# Patient Record
Sex: FEMALE | Race: WHITE | NOT HISPANIC OR LATINO | Employment: UNEMPLOYED | URBAN - METROPOLITAN AREA
[De-identification: names, ages, dates, MRNs, and addresses within clinical notes are randomized per-mention and may not be internally consistent; named-entity substitution may affect disease eponyms.]

---

## 2017-01-02 ENCOUNTER — TRANSFERRED RECORDS (OUTPATIENT)
Dept: HEALTH INFORMATION MANAGEMENT | Facility: CLINIC | Age: 52
End: 2017-01-02

## 2017-01-06 ENCOUNTER — HOSPITAL ENCOUNTER (OUTPATIENT)
Dept: ULTRASOUND IMAGING | Facility: CLINIC | Age: 52
Discharge: HOME | End: 2017-01-06
Attending: INTERNAL MEDICINE | Admitting: INTERNAL MEDICINE
Payer: COMMERCIAL

## 2017-01-06 VITALS
DIASTOLIC BLOOD PRESSURE: 74 MMHG | HEART RATE: 78 BPM | OXYGEN SATURATION: 96 % | SYSTOLIC BLOOD PRESSURE: 107 MMHG | RESPIRATION RATE: 16 BRPM

## 2017-01-06 DIAGNOSIS — R79.89 ABNORMAL LFTS: ICD-10-CM

## 2017-01-06 PROCEDURE — 88313 SPECIAL STAINS GROUP 2: CPT | Mod: 26 | Performed by: INTERNAL MEDICINE

## 2017-01-06 PROCEDURE — 88307 TISSUE EXAM BY PATHOLOGIST: CPT | Performed by: INTERNAL MEDICINE

## 2017-01-06 PROCEDURE — 88313 SPECIAL STAINS GROUP 2: CPT | Performed by: INTERNAL MEDICINE

## 2017-01-06 PROCEDURE — 25000125 ZZHC RX 250

## 2017-01-06 PROCEDURE — 25000125 ZZHC RX 250: Performed by: RADIOLOGY

## 2017-01-06 PROCEDURE — 76942 ECHO GUIDE FOR BIOPSY: CPT

## 2017-01-06 PROCEDURE — 88307 TISSUE EXAM BY PATHOLOGIST: CPT | Mod: 26 | Performed by: INTERNAL MEDICINE

## 2017-01-06 RX ORDER — FENTANYL CITRATE 50 UG/ML
100 INJECTION, SOLUTION INTRAMUSCULAR; INTRAVENOUS ONCE
Status: COMPLETED | OUTPATIENT
Start: 2017-01-06 | End: 2017-01-06

## 2017-01-06 RX ORDER — FENTANYL CITRATE 50 UG/ML
INJECTION, SOLUTION INTRAMUSCULAR; INTRAVENOUS
Status: COMPLETED
Start: 2017-01-06 | End: 2017-01-06

## 2017-01-06 RX ADMIN — MIDAZOLAM 2 MG: 1 INJECTION INTRAMUSCULAR; INTRAVENOUS at 08:38

## 2017-01-06 RX ADMIN — LIDOCAINE HYDROCHLORIDE 20 ML: 10 INJECTION, SOLUTION EPIDURAL; INFILTRATION; INTRACAUDAL; PERINEURAL at 08:39

## 2017-01-06 RX ADMIN — FENTANYL CITRATE 100 MCG: 50 INJECTION, SOLUTION INTRAMUSCULAR; INTRAVENOUS at 08:38

## 2017-01-06 RX ADMIN — FENTANYL CITRATE 100 MCG: 50 INJECTION INTRAMUSCULAR; INTRAVENOUS at 08:38

## 2017-01-06 NOTE — PROGRESS NOTES
Consent for liver random biopsy obtained by Dr. Renner.  Pt tolerated procedure well with Fentanyl 100 mcg and Versed 2.0 mgm titrated to excellent effect.  Vital signs stable thru out visit.  Puncture site dressing clean and dry and free of bleeding on Discharge to home per wheelchair with . All site care instructions understood by pt.  Will follow up with DR. Linares for results.

## 2017-01-11 LAB — COPATH REPORT: NORMAL

## 2019-02-11 LAB
ALT SERPL-CCNC: 13 IU/L (ref 8–45)
AST SERPL-CCNC: 26 IU/L (ref 2–40)
CHOLEST SERPL-MCNC: 186 MG/DL (ref 100–199)
HDLC SERPL-MCNC: 27 MG/DL
LDLC SERPL CALC-MCNC: 118 MG/DL
NONHDLC SERPL-MCNC: 159 MG/DL
TRIGL SERPL-MCNC: 207 MG/DL

## 2019-02-27 LAB — EJECTION FRACTION: 66

## 2019-03-22 ENCOUNTER — TRANSFERRED RECORDS (OUTPATIENT)
Dept: HEALTH INFORMATION MANAGEMENT | Facility: CLINIC | Age: 54
End: 2019-03-22

## 2019-03-23 LAB
CREAT SERPL-MCNC: 0.86 MG/DL (ref 0.57–1.11)
GFR SERPL CREATININE-BSD FRML MDRD: >60 ML/MIN/1.73M2
GLUCOSE SERPL-MCNC: 84 MG/DL (ref 65–100)
POTASSIUM SERPL-SCNC: 4.1 MMOL/L (ref 3.5–5)

## 2019-03-24 ENCOUNTER — APPOINTMENT (OUTPATIENT)
Dept: GENERAL RADIOLOGY | Facility: CLINIC | Age: 54
End: 2019-03-24
Attending: EMERGENCY MEDICINE
Payer: COMMERCIAL

## 2019-03-24 ENCOUNTER — TRANSFERRED RECORDS (OUTPATIENT)
Dept: HEALTH INFORMATION MANAGEMENT | Facility: CLINIC | Age: 54
End: 2019-03-24

## 2019-03-24 ENCOUNTER — HOSPITAL ENCOUNTER (EMERGENCY)
Facility: CLINIC | Age: 54
Discharge: ANOTHER HOSPITAL | End: 2019-03-24
Attending: EMERGENCY MEDICINE | Admitting: EMERGENCY MEDICINE
Payer: COMMERCIAL

## 2019-03-24 VITALS
TEMPERATURE: 97.9 F | DIASTOLIC BLOOD PRESSURE: 84 MMHG | RESPIRATION RATE: 10 BRPM | HEART RATE: 69 BPM | OXYGEN SATURATION: 98 % | SYSTOLIC BLOOD PRESSURE: 133 MMHG

## 2019-03-24 DIAGNOSIS — I21.4 NSTEMI (NON-ST ELEVATED MYOCARDIAL INFARCTION) (H): ICD-10-CM

## 2019-03-24 LAB
ANION GAP SERPL CALCULATED.3IONS-SCNC: 7 MMOL/L (ref 3–14)
BASOPHILS # BLD AUTO: 0 10E9/L (ref 0–0.2)
BASOPHILS NFR BLD AUTO: 0.3 %
BUN SERPL-MCNC: 14 MG/DL (ref 7–30)
CALCIUM SERPL-MCNC: 8.7 MG/DL (ref 8.5–10.1)
CHLORIDE SERPL-SCNC: 108 MMOL/L (ref 94–109)
CO2 SERPL-SCNC: 24 MMOL/L (ref 20–32)
CREAT SERPL-MCNC: 1.02 MG/DL (ref 0.52–1.04)
DIFFERENTIAL METHOD BLD: ABNORMAL
EOSINOPHIL # BLD AUTO: 0.1 10E9/L (ref 0–0.7)
EOSINOPHIL NFR BLD AUTO: 1.4 %
ERYTHROCYTE [DISTWIDTH] IN BLOOD BY AUTOMATED COUNT: 15.4 % (ref 10–15)
GFR SERPL CREATININE-BSD FRML MDRD: 62 ML/MIN/{1.73_M2}
GLUCOSE SERPL-MCNC: 94 MG/DL (ref 70–99)
HCT VFR BLD AUTO: 41 % (ref 35–47)
HGB BLD-MCNC: 13.3 G/DL (ref 11.7–15.7)
IMM GRANULOCYTES # BLD: 0 10E9/L (ref 0–0.4)
IMM GRANULOCYTES NFR BLD: 0.3 %
LYMPHOCYTES # BLD AUTO: 1.3 10E9/L (ref 0.8–5.3)
LYMPHOCYTES NFR BLD AUTO: 36.1 %
MCH RBC QN AUTO: 30.3 PG (ref 26.5–33)
MCHC RBC AUTO-ENTMCNC: 32.4 G/DL (ref 31.5–36.5)
MCV RBC AUTO: 93 FL (ref 78–100)
MONOCYTES # BLD AUTO: 0.3 10E9/L (ref 0–1.3)
MONOCYTES NFR BLD AUTO: 9 %
NEUTROPHILS # BLD AUTO: 1.8 10E9/L (ref 1.6–8.3)
NEUTROPHILS NFR BLD AUTO: 52.9 %
NRBC # BLD AUTO: 0 10*3/UL
NRBC BLD AUTO-RTO: 0 /100
PLATELET # BLD AUTO: 140 10E9/L (ref 150–450)
POTASSIUM SERPL-SCNC: 4.1 MMOL/L (ref 3.4–5.3)
RBC # BLD AUTO: 4.39 10E12/L (ref 3.8–5.2)
SODIUM SERPL-SCNC: 139 MMOL/L (ref 133–144)
TROPONIN I SERPL-MCNC: 0.26 UG/L (ref 0–0.04)
WBC # BLD AUTO: 3.5 10E9/L (ref 4–11)

## 2019-03-24 PROCEDURE — 71046 X-RAY EXAM CHEST 2 VIEWS: CPT

## 2019-03-24 PROCEDURE — 80048 BASIC METABOLIC PNL TOTAL CA: CPT | Performed by: EMERGENCY MEDICINE

## 2019-03-24 PROCEDURE — A9270 NON-COVERED ITEM OR SERVICE: HCPCS | Mod: GY | Performed by: EMERGENCY MEDICINE

## 2019-03-24 PROCEDURE — 84484 ASSAY OF TROPONIN QUANT: CPT | Performed by: EMERGENCY MEDICINE

## 2019-03-24 PROCEDURE — 99285 EMERGENCY DEPT VISIT HI MDM: CPT | Mod: 25

## 2019-03-24 PROCEDURE — 25000132 ZZH RX MED GY IP 250 OP 250 PS 637: Performed by: EMERGENCY MEDICINE

## 2019-03-24 PROCEDURE — 93005 ELECTROCARDIOGRAM TRACING: CPT

## 2019-03-24 PROCEDURE — 85025 COMPLETE CBC W/AUTO DIFF WBC: CPT | Performed by: EMERGENCY MEDICINE

## 2019-03-24 RX ORDER — NITROGLYCERIN 0.4 MG/1
0.4 TABLET SUBLINGUAL EVERY 5 MIN PRN
Status: DISCONTINUED | OUTPATIENT
Start: 2019-03-24 | End: 2019-03-24 | Stop reason: HOSPADM

## 2019-03-24 RX ORDER — ASPIRIN 81 MG/1
324 TABLET, CHEWABLE ORAL ONCE
Status: COMPLETED | OUTPATIENT
Start: 2019-03-24 | End: 2019-03-24

## 2019-03-24 RX ADMIN — NITROGLYCERIN 0.4 MG: 0.4 TABLET SUBLINGUAL at 18:32

## 2019-03-24 RX ADMIN — ASPIRIN 81 MG 324 MG: 81 TABLET ORAL at 18:08

## 2019-03-24 ASSESSMENT — ENCOUNTER SYMPTOMS
DIZZINESS: 1
CHEST TIGHTNESS: 1

## 2019-03-24 NOTE — ED PROVIDER NOTES
History     Chief Complaint:  Chest Pain    HPI   Karolina Light is a 53 year old female with a history of CAD, who presents with her  to the ED for the evaluation of dizziness and tachycardia. The patient reports that just prior to her arrival to the ED, she was walking up stairs when she began to feel dizziness, chest tightness, and her heart racing, prompting her to the ED.  Of note, two days ago the patient had two stents placed during coronary angiography in her mid LAD and mid RCA via her left radial artery.  This was done as a planned procedure following an abnormal stress test. She states that she was feeling her regular self after being discharged yesterday from the hospital and that her chest tightness has been relieved, but not her dizziness. She also notes that her current symptoms feel somewhat similar to a previous heart attack she experienced, but states that her previous heart attack was much more painful. The patient denies pain and other issues.    Allergies:  Atorvastatin  Codeine Sulfate  Plavix     Medications:    Cymbalta  Imdur  Nitrostat  Prilosec  Pravachol    Past Medical History:    CAD  Rheumatoid arthritis   Cute coronary syndrome     Past Surgical History:    Coronary Angiography via the left radial artery.  Stent placed to the mid LAD and to the mid RCA - 3/22/2019  Heart Cath, Angioplasty - 2012  Angiogram - 2015    Family History:    CAD  Cerebrovascular disease    Social History:  Smoking status: Former Smoker, Quit Date: 7/18/2015  Alcohol use: No   Marital Status:   [2]     Review of Systems   Respiratory: Positive for chest tightness.    Cardiovascular: Negative for chest pain.   Neurological: Positive for dizziness.   All other systems reviewed and are negative.      Physical Exam     Patient Vitals for the past 24 hrs:   BP Temp Pulse Resp SpO2   03/24/19 1745 (!) 156/102 97.9  F (36.6  C) 74 16 100 %        Physical  Exam  General:   Well-nourished   Speaking in full sentences  Eyes:   Conjunctiva without injection or scleral icterus  ENT:   Moist mucous membranes   Nares patent   Pinnae normal  Neck:   Full ROM   No stiffness appreciated  Resp:   Lungs CTAB   No crackles, wheezing or audible rubs   Good air movement  CV:    Normal rate, regular rhythm   S1 and S2 present   No murmur, gallop or rub  GI:   BS present   Abdomen soft without distention   Non-tender to light and deep palpation   No guarding or rebound tenderness  Skin:   Warm, dry, well perfused   No rashes or open wounds on exposed skin  MSK:   Moves all extremities   No focal deformities or swelling  Neuro:   Alert   Answers questions appropriately   Moves all extremities equally   Gait stable  Psych:   Normal affect, normal mood      Emergency Department Course   ECG (17:45:19):  Rate 73 bpm. IL interval 180. QRS duration 84. QT/QTc 402/442. P-R-T axes 20 9 30. Normal sinus rhythm. Normal ECG.  Interpreted at 1752 by Sukhdev Hallman MD.     Imaging:  Radiographic findings were communicated with the patient and family who voiced understanding of the findings.    XR Chest 2 Views  IMPRESSION: No acute disease.  As read by Radiology.    Laboratory:  BMP: WNL (Creatinine 1.02)  Troponin (1804): 0.258 (HH)  CBC: WBC 3.5 (L),  (L), WNL (HGB 13.3)    Interventions:  1808, aspirin, 324 mg, PO  1832, Nitrostat, 0.4 mg, Sublingual    Emergency Department Course:  Past medical records, nursing notes, and vitals reviewed.  1745: I performed an exam of the patient and obtained history, as documented above.    IV inserted and blood drawn.    The patient was sent for a chest x ray while in the emergency department, findings above.    1840: I rechecked the patient. Explained findings to patient and spouse.    1850: I spoke with Dr. Valdovinos of Cardiology.     1858: Patient will be transferred to [facility] via EMS. Discussed the case with Dr. Lane, who  will admit the patient to a monitored bed for further monitoring, evaluation, and treatment. Findings and plan explained to the Patient and spouse.    Impression & Plan    Medical Decision Making:  Karolina Light is a 53-year-old female presenting to the emergency department for evaluation of chest pain.  VS on presentation reveal elevated BP though otherwise are unremarkable.  Prior records reviewed from outside hospital notable for stent placement x2 chest 2 days previous.  She presents today with symptoms concerning for recurrent cardiac ischemia given associated chest tightness, dizziness, during exertion.  Symptoms did improve with rest, and following one sublingual nitro here in the ED, symptoms have resolved.  Her EKG demonstrates sinus rhythm without evidence of ST segment elevation or depression or acute changes compared with previous EKG in our system.  Labs demonstrate elevated troponin to 0.258.  At this time, it is unclear if this remains elevated following her recent cardiac catheterization or if this represents underlying ischemia secondary to exertional activities earlier today.  She was provided full dose aspirin.  She has remained compliant with her antiplatelet regimen prescribed by cardiology.  Case was discussed with cardiology from [facility].  He agreed with transfer to [facility] for close monitoring and further treatment.  In the absence of EKG changes, or ongoing pain, he did not feel heparin was necessary at this time.  Patient will be transferred via EMS to [facility] and case was accepted by Dr. Lane to a monitored bed.  Patient and her  were updated regarding the results of the above studies and proposed plan of care.  Questions were answered prior to transfer.    Diagnosis:    ICD-10-CM    1. NSTEMI (non-ST elevated myocardial infarction) (H) I21.4        Disposition:  Patient transferred to [facility].     Discharge Medications:  None    Scribe  Disclosure:  I, Spencer Romo, am serving as a scribe at 5:52 PM on 3/24/2019 to document services personally performed by Sukhdev Hallman MD based on my observations and the provider's statements to me.      Spencer Romo  3/24/2019   [facility] EMERGENCY DEPARTMENT       Sukhdev Hallman MD  03/24/19 2038

## 2019-03-24 NOTE — ED TRIAGE NOTES
Patient presents to the ED reporting palpitations, chest pressure and dizziness after walking up the steps. Patient had 2 stents placed on Friday.

## 2019-03-25 LAB
EJECTION FRACTION: 63
INTERPRETATION ECG - MUSE: NORMAL

## 2019-04-04 ENCOUNTER — HOSPITAL ENCOUNTER (OUTPATIENT)
Dept: CARDIAC REHAB | Facility: CLINIC | Age: 54
End: 2019-04-04
Attending: INTERNAL MEDICINE
Payer: COMMERCIAL

## 2019-04-04 PROCEDURE — 93797 PHYS/QHP OP CAR RHAB WO ECG: CPT | Mod: 59 | Performed by: OCCUPATIONAL THERAPIST

## 2019-04-04 PROCEDURE — 40000575 ZZH STATISTIC OP CARDIAC VISIT #2: Performed by: OCCUPATIONAL THERAPIST

## 2019-04-04 PROCEDURE — 40000116 ZZH STATISTIC OP CR VISIT: Performed by: OCCUPATIONAL THERAPIST

## 2019-04-04 PROCEDURE — 93798 PHYS/QHP OP CAR RHAB W/ECG: CPT | Performed by: OCCUPATIONAL THERAPIST

## 2019-04-09 ENCOUNTER — HOSPITAL ENCOUNTER (OUTPATIENT)
Dept: CARDIAC REHAB | Facility: CLINIC | Age: 54
End: 2019-04-09
Attending: INTERNAL MEDICINE
Payer: COMMERCIAL

## 2019-04-09 PROCEDURE — 40000116 ZZH STATISTIC OP CR VISIT: Performed by: REHABILITATION PRACTITIONER

## 2019-04-09 PROCEDURE — 93798 PHYS/QHP OP CAR RHAB W/ECG: CPT | Performed by: REHABILITATION PRACTITIONER

## 2019-04-12 ENCOUNTER — HOSPITAL ENCOUNTER (OUTPATIENT)
Dept: CARDIAC REHAB | Facility: CLINIC | Age: 54
End: 2019-04-12
Attending: INTERNAL MEDICINE
Payer: COMMERCIAL

## 2019-04-12 PROCEDURE — 40000116 ZZH STATISTIC OP CR VISIT: Performed by: REHABILITATION PRACTITIONER

## 2019-04-12 PROCEDURE — 93798 PHYS/QHP OP CAR RHAB W/ECG: CPT | Performed by: REHABILITATION PRACTITIONER

## 2019-04-15 ENCOUNTER — HOSPITAL ENCOUNTER (OUTPATIENT)
Dept: CARDIAC REHAB | Facility: CLINIC | Age: 54
End: 2019-04-15
Attending: INTERNAL MEDICINE
Payer: COMMERCIAL

## 2019-04-15 PROCEDURE — 93798 PHYS/QHP OP CAR RHAB W/ECG: CPT | Performed by: REHABILITATION PRACTITIONER

## 2019-04-15 PROCEDURE — 40000116 ZZH STATISTIC OP CR VISIT: Performed by: REHABILITATION PRACTITIONER

## 2019-04-17 ENCOUNTER — HOSPITAL ENCOUNTER (OUTPATIENT)
Dept: CARDIAC REHAB | Facility: CLINIC | Age: 54
End: 2019-04-17
Attending: INTERNAL MEDICINE
Payer: COMMERCIAL

## 2019-04-17 PROCEDURE — 40000116 ZZH STATISTIC OP CR VISIT

## 2019-04-17 PROCEDURE — 93798 PHYS/QHP OP CAR RHAB W/ECG: CPT

## 2019-04-19 ENCOUNTER — HOSPITAL ENCOUNTER (OUTPATIENT)
Dept: CARDIAC REHAB | Facility: CLINIC | Age: 54
End: 2019-04-19
Attending: INTERNAL MEDICINE
Payer: COMMERCIAL

## 2019-04-19 PROCEDURE — 40000116 ZZH STATISTIC OP CR VISIT

## 2019-04-19 PROCEDURE — 93798 PHYS/QHP OP CAR RHAB W/ECG: CPT

## 2019-04-22 ENCOUNTER — HOSPITAL ENCOUNTER (OUTPATIENT)
Dept: CARDIAC REHAB | Facility: CLINIC | Age: 54
End: 2019-04-22
Attending: INTERNAL MEDICINE
Payer: COMMERCIAL

## 2019-04-22 PROCEDURE — 40000116 ZZH STATISTIC OP CR VISIT: Performed by: REHABILITATION PRACTITIONER

## 2019-04-22 PROCEDURE — 93798 PHYS/QHP OP CAR RHAB W/ECG: CPT | Performed by: REHABILITATION PRACTITIONER

## 2019-04-24 ENCOUNTER — HOSPITAL ENCOUNTER (OUTPATIENT)
Dept: CARDIAC REHAB | Facility: CLINIC | Age: 54
End: 2019-04-24
Attending: INTERNAL MEDICINE
Payer: COMMERCIAL

## 2019-04-24 PROCEDURE — 93798 PHYS/QHP OP CAR RHAB W/ECG: CPT

## 2019-04-24 PROCEDURE — 40000116 ZZH STATISTIC OP CR VISIT

## 2019-04-25 ENCOUNTER — HOSPITAL ENCOUNTER (OUTPATIENT)
Dept: CARDIAC REHAB | Facility: CLINIC | Age: 54
End: 2019-04-25
Attending: INTERNAL MEDICINE
Payer: COMMERCIAL

## 2019-04-25 PROCEDURE — 40000116 ZZH STATISTIC OP CR VISIT

## 2019-04-25 PROCEDURE — 93798 PHYS/QHP OP CAR RHAB W/ECG: CPT

## 2019-04-29 ENCOUNTER — HOSPITAL ENCOUNTER (OUTPATIENT)
Dept: CARDIAC REHAB | Facility: CLINIC | Age: 54
End: 2019-04-29
Attending: INTERNAL MEDICINE
Payer: COMMERCIAL

## 2019-04-29 PROCEDURE — 93798 PHYS/QHP OP CAR RHAB W/ECG: CPT

## 2019-04-29 PROCEDURE — 40000116 ZZH STATISTIC OP CR VISIT

## 2019-05-01 ENCOUNTER — HOSPITAL ENCOUNTER (OUTPATIENT)
Dept: CARDIAC REHAB | Facility: CLINIC | Age: 54
End: 2019-05-01
Attending: INTERNAL MEDICINE
Payer: COMMERCIAL

## 2019-05-01 PROCEDURE — 93798 PHYS/QHP OP CAR RHAB W/ECG: CPT | Performed by: OCCUPATIONAL THERAPIST

## 2019-05-01 PROCEDURE — 40000116 ZZH STATISTIC OP CR VISIT: Performed by: OCCUPATIONAL THERAPIST

## 2019-05-03 ENCOUNTER — HOSPITAL ENCOUNTER (OUTPATIENT)
Dept: CARDIAC REHAB | Facility: CLINIC | Age: 54
End: 2019-05-03
Attending: INTERNAL MEDICINE
Payer: COMMERCIAL

## 2019-05-03 PROCEDURE — 93798 PHYS/QHP OP CAR RHAB W/ECG: CPT

## 2019-05-03 PROCEDURE — 40000116 ZZH STATISTIC OP CR VISIT

## 2019-05-06 ENCOUNTER — HOSPITAL ENCOUNTER (OUTPATIENT)
Dept: CARDIAC REHAB | Facility: CLINIC | Age: 54
End: 2019-05-06
Attending: INTERNAL MEDICINE
Payer: COMMERCIAL

## 2019-05-06 PROCEDURE — 40000575 ZZH STATISTIC OP CARDIAC VISIT #2: Performed by: OCCUPATIONAL THERAPIST

## 2019-05-06 PROCEDURE — 93797 PHYS/QHP OP CAR RHAB WO ECG: CPT | Performed by: OCCUPATIONAL THERAPIST

## 2019-05-06 PROCEDURE — 93798 PHYS/QHP OP CAR RHAB W/ECG: CPT

## 2019-05-06 PROCEDURE — 40000116 ZZH STATISTIC OP CR VISIT

## 2019-05-08 ENCOUNTER — HOSPITAL ENCOUNTER (OUTPATIENT)
Dept: CARDIAC REHAB | Facility: CLINIC | Age: 54
End: 2019-05-08
Attending: INTERNAL MEDICINE
Payer: COMMERCIAL

## 2019-05-08 PROCEDURE — 93798 PHYS/QHP OP CAR RHAB W/ECG: CPT | Performed by: REHABILITATION PRACTITIONER

## 2019-05-08 PROCEDURE — 40000116 ZZH STATISTIC OP CR VISIT: Performed by: REHABILITATION PRACTITIONER

## 2019-05-09 ENCOUNTER — HOSPITAL ENCOUNTER (OUTPATIENT)
Dept: CARDIAC REHAB | Facility: CLINIC | Age: 54
End: 2019-05-09
Attending: INTERNAL MEDICINE
Payer: COMMERCIAL

## 2019-05-09 PROCEDURE — 93798 PHYS/QHP OP CAR RHAB W/ECG: CPT | Performed by: OCCUPATIONAL THERAPIST

## 2019-05-09 PROCEDURE — 40000116 ZZH STATISTIC OP CR VISIT: Performed by: OCCUPATIONAL THERAPIST

## 2019-05-13 ENCOUNTER — HOSPITAL ENCOUNTER (OUTPATIENT)
Dept: CARDIAC REHAB | Facility: CLINIC | Age: 54
End: 2019-05-13
Attending: INTERNAL MEDICINE
Payer: COMMERCIAL

## 2019-05-13 PROCEDURE — 93797 PHYS/QHP OP CAR RHAB WO ECG: CPT | Performed by: REHABILITATION PRACTITIONER

## 2019-05-13 PROCEDURE — 93798 PHYS/QHP OP CAR RHAB W/ECG: CPT | Performed by: REHABILITATION PRACTITIONER

## 2019-05-13 PROCEDURE — 40000575 ZZH STATISTIC OP CARDIAC VISIT #2: Performed by: REHABILITATION PRACTITIONER

## 2019-05-13 PROCEDURE — 40000116 ZZH STATISTIC OP CR VISIT: Performed by: REHABILITATION PRACTITIONER

## 2019-05-15 ENCOUNTER — HOSPITAL ENCOUNTER (OUTPATIENT)
Dept: CARDIAC REHAB | Facility: CLINIC | Age: 54
End: 2019-05-15
Attending: INTERNAL MEDICINE
Payer: COMMERCIAL

## 2019-05-15 PROCEDURE — 93797 PHYS/QHP OP CAR RHAB WO ECG: CPT | Performed by: REHABILITATION PRACTITIONER

## 2019-05-15 PROCEDURE — 40000575 ZZH STATISTIC OP CARDIAC VISIT #2: Performed by: REHABILITATION PRACTITIONER

## 2019-05-15 PROCEDURE — 93798 PHYS/QHP OP CAR RHAB W/ECG: CPT | Performed by: REHABILITATION PRACTITIONER

## 2019-05-15 PROCEDURE — 40000116 ZZH STATISTIC OP CR VISIT: Performed by: REHABILITATION PRACTITIONER

## 2019-05-17 ENCOUNTER — HOSPITAL ENCOUNTER (OUTPATIENT)
Dept: CARDIAC REHAB | Facility: CLINIC | Age: 54
End: 2019-05-17
Attending: INTERNAL MEDICINE
Payer: COMMERCIAL

## 2019-05-17 PROCEDURE — 40000116 ZZH STATISTIC OP CR VISIT

## 2019-05-17 PROCEDURE — 93798 PHYS/QHP OP CAR RHAB W/ECG: CPT

## 2019-05-21 ENCOUNTER — HOSPITAL ENCOUNTER (OUTPATIENT)
Dept: CARDIAC REHAB | Facility: CLINIC | Age: 54
End: 2019-05-21
Attending: INTERNAL MEDICINE
Payer: COMMERCIAL

## 2019-05-21 PROCEDURE — 40000116 ZZH STATISTIC OP CR VISIT

## 2019-05-21 PROCEDURE — 93798 PHYS/QHP OP CAR RHAB W/ECG: CPT

## 2019-05-23 ENCOUNTER — HOSPITAL ENCOUNTER (OUTPATIENT)
Dept: CARDIAC REHAB | Facility: CLINIC | Age: 54
End: 2019-05-23
Attending: INTERNAL MEDICINE
Payer: COMMERCIAL

## 2019-05-23 PROCEDURE — 93798 PHYS/QHP OP CAR RHAB W/ECG: CPT | Performed by: OCCUPATIONAL THERAPIST

## 2019-05-23 PROCEDURE — 40000116 ZZH STATISTIC OP CR VISIT: Performed by: OCCUPATIONAL THERAPIST

## 2019-05-31 ENCOUNTER — HOSPITAL ENCOUNTER (OUTPATIENT)
Dept: CARDIAC REHAB | Facility: CLINIC | Age: 54
End: 2019-05-31
Attending: INTERNAL MEDICINE
Payer: COMMERCIAL

## 2019-05-31 PROCEDURE — 93798 PHYS/QHP OP CAR RHAB W/ECG: CPT

## 2019-05-31 PROCEDURE — 40000116 ZZH STATISTIC OP CR VISIT

## 2019-06-12 ENCOUNTER — HOSPITAL ENCOUNTER (OUTPATIENT)
Dept: CARDIAC REHAB | Facility: CLINIC | Age: 54
End: 2019-06-12
Attending: INTERNAL MEDICINE
Payer: COMMERCIAL

## 2019-06-12 PROCEDURE — 93798 PHYS/QHP OP CAR RHAB W/ECG: CPT | Performed by: REHABILITATION PRACTITIONER

## 2019-06-12 PROCEDURE — 40000116 ZZH STATISTIC OP CR VISIT: Performed by: REHABILITATION PRACTITIONER

## 2019-06-14 ENCOUNTER — HOSPITAL ENCOUNTER (OUTPATIENT)
Dept: CARDIAC REHAB | Facility: CLINIC | Age: 54
End: 2019-06-14
Attending: INTERNAL MEDICINE
Payer: COMMERCIAL

## 2019-06-14 PROCEDURE — 93798 PHYS/QHP OP CAR RHAB W/ECG: CPT

## 2019-06-14 PROCEDURE — 40000116 ZZH STATISTIC OP CR VISIT

## 2019-06-17 ENCOUNTER — HOSPITAL ENCOUNTER (OUTPATIENT)
Dept: CARDIAC REHAB | Facility: CLINIC | Age: 54
End: 2019-06-17
Attending: INTERNAL MEDICINE
Payer: COMMERCIAL

## 2019-06-17 PROCEDURE — 93798 PHYS/QHP OP CAR RHAB W/ECG: CPT | Performed by: OCCUPATIONAL THERAPIST

## 2019-06-17 PROCEDURE — 40000116 ZZH STATISTIC OP CR VISIT: Performed by: OCCUPATIONAL THERAPIST

## 2019-06-19 ENCOUNTER — HOSPITAL ENCOUNTER (OUTPATIENT)
Dept: CARDIAC REHAB | Facility: CLINIC | Age: 54
End: 2019-06-19
Attending: INTERNAL MEDICINE
Payer: COMMERCIAL

## 2019-06-19 PROCEDURE — 40000116 ZZH STATISTIC OP CR VISIT

## 2019-06-19 PROCEDURE — 93798 PHYS/QHP OP CAR RHAB W/ECG: CPT

## 2019-06-20 ENCOUNTER — HOSPITAL ENCOUNTER (OUTPATIENT)
Dept: CARDIAC REHAB | Facility: CLINIC | Age: 54
End: 2019-06-20
Attending: INTERNAL MEDICINE
Payer: COMMERCIAL

## 2019-06-20 PROCEDURE — 93797 PHYS/QHP OP CAR RHAB WO ECG: CPT | Performed by: REHABILITATION PRACTITIONER

## 2019-06-20 PROCEDURE — 93798 PHYS/QHP OP CAR RHAB W/ECG: CPT | Performed by: REHABILITATION PRACTITIONER

## 2019-06-20 PROCEDURE — 40000575 ZZH STATISTIC OP CARDIAC VISIT #2: Performed by: REHABILITATION PRACTITIONER

## 2019-06-20 PROCEDURE — 40000116 ZZH STATISTIC OP CR VISIT: Performed by: REHABILITATION PRACTITIONER

## 2021-12-03 ENCOUNTER — OFFICE VISIT (OUTPATIENT)
Dept: FAMILY MEDICINE | Facility: CLINIC | Age: 56
End: 2021-12-03
Payer: MEDICARE

## 2021-12-03 VITALS
WEIGHT: 194.6 LBS | SYSTOLIC BLOOD PRESSURE: 120 MMHG | DIASTOLIC BLOOD PRESSURE: 70 MMHG | BODY MASS INDEX: 32.42 KG/M2 | HEIGHT: 65 IN | HEART RATE: 75 BPM | OXYGEN SATURATION: 96 % | RESPIRATION RATE: 16 BRPM | TEMPERATURE: 98.6 F

## 2021-12-03 DIAGNOSIS — M05.79 RHEUMATOID ARTHRITIS INVOLVING MULTIPLE SITES WITH POSITIVE RHEUMATOID FACTOR (H): ICD-10-CM

## 2021-12-03 DIAGNOSIS — Z12.31 ENCOUNTER FOR SCREENING MAMMOGRAM FOR BREAST CANCER: ICD-10-CM

## 2021-12-03 DIAGNOSIS — E78.5 HYPERLIPIDEMIA LDL GOAL <100: ICD-10-CM

## 2021-12-03 DIAGNOSIS — Z11.59 NEED FOR HEPATITIS C SCREENING TEST: ICD-10-CM

## 2021-12-03 DIAGNOSIS — F32.1 CURRENT MODERATE EPISODE OF MAJOR DEPRESSIVE DISORDER WITHOUT PRIOR EPISODE (H): ICD-10-CM

## 2021-12-03 DIAGNOSIS — I25.10 CORONARY ARTERY DISEASE INVOLVING NATIVE CORONARY ARTERY OF NATIVE HEART WITHOUT ANGINA PECTORIS: ICD-10-CM

## 2021-12-03 DIAGNOSIS — Z00.00 ENCOUNTER FOR MEDICARE ANNUAL WELLNESS EXAM: Primary | ICD-10-CM

## 2021-12-03 DIAGNOSIS — J34.89 MASS OF NOSE: ICD-10-CM

## 2021-12-03 DIAGNOSIS — Z12.11 SCREEN FOR COLON CANCER: ICD-10-CM

## 2021-12-03 PROCEDURE — 99204 OFFICE O/P NEW MOD 45 MIN: CPT | Mod: 25 | Performed by: FAMILY MEDICINE

## 2021-12-03 PROCEDURE — 86803 HEPATITIS C AB TEST: CPT | Performed by: FAMILY MEDICINE

## 2021-12-03 PROCEDURE — G0438 PPPS, INITIAL VISIT: HCPCS | Performed by: FAMILY MEDICINE

## 2021-12-03 PROCEDURE — 80061 LIPID PANEL: CPT | Performed by: FAMILY MEDICINE

## 2021-12-03 PROCEDURE — 80053 COMPREHEN METABOLIC PANEL: CPT | Performed by: FAMILY MEDICINE

## 2021-12-03 PROCEDURE — 36415 COLL VENOUS BLD VENIPUNCTURE: CPT | Performed by: FAMILY MEDICINE

## 2021-12-03 RX ORDER — SULFASALAZINE 500 MG/1
1000 TABLET ORAL ONCE
COMMUNITY
End: 2022-03-18

## 2021-12-03 RX ORDER — CARVEDILOL 3.12 MG/1
1 TABLET ORAL DAILY
COMMUNITY
Start: 2021-10-19 | End: 2021-12-03

## 2021-12-03 RX ORDER — CARVEDILOL 3.12 MG/1
3.12 TABLET ORAL 2 TIMES DAILY WITH MEALS
Qty: 180 TABLET | Refills: 1 | Status: SHIPPED | OUTPATIENT
Start: 2021-12-03 | End: 2022-04-04

## 2021-12-03 RX ORDER — SERTRALINE HYDROCHLORIDE 100 MG/1
100 TABLET, FILM COATED ORAL DAILY
Qty: 90 TABLET | Refills: 1 | Status: SHIPPED | OUTPATIENT
Start: 2021-12-03 | End: 2022-07-19

## 2021-12-03 RX ORDER — AZATHIOPRINE 100 MG/1
100 TABLET ORAL 2 TIMES DAILY
COMMUNITY
End: 2022-03-18

## 2021-12-03 RX ORDER — SERTRALINE HYDROCHLORIDE 100 MG/1
100 TABLET, FILM COATED ORAL DAILY
COMMUNITY
End: 2021-12-03

## 2021-12-03 ASSESSMENT — ENCOUNTER SYMPTOMS
COUGH: 0
FEVER: 0
DIARRHEA: 0
NAUSEA: 0
ARTHRALGIAS: 1
CONSTIPATION: 0
ABDOMINAL PAIN: 0
CHILLS: 1
DIZZINESS: 0
WEAKNESS: 1
FREQUENCY: 0
SHORTNESS OF BREATH: 0
DYSURIA: 0
JOINT SWELLING: 1
HEMATURIA: 0
HEARTBURN: 1
BREAST MASS: 0
SORE THROAT: 0
NERVOUS/ANXIOUS: 1
EYE PAIN: 0
PALPITATIONS: 1
PARESTHESIAS: 0
MYALGIAS: 1
HEADACHES: 1
HEMATOCHEZIA: 0

## 2021-12-03 ASSESSMENT — PAIN SCALES - GENERAL: PAINLEVEL: MILD PAIN (2)

## 2021-12-03 ASSESSMENT — PATIENT HEALTH QUESTIONNAIRE - PHQ9
SUM OF ALL RESPONSES TO PHQ QUESTIONS 1-9: 10
SUM OF ALL RESPONSES TO PHQ QUESTIONS 1-9: 10
10. IF YOU CHECKED OFF ANY PROBLEMS, HOW DIFFICULT HAVE THESE PROBLEMS MADE IT FOR YOU TO DO YOUR WORK, TAKE CARE OF THINGS AT HOME, OR GET ALONG WITH OTHER PEOPLE: SOMEWHAT DIFFICULT

## 2021-12-03 ASSESSMENT — ACTIVITIES OF DAILY LIVING (ADL): CURRENT_FUNCTION: NO ASSISTANCE NEEDED

## 2021-12-03 ASSESSMENT — MIFFLIN-ST. JEOR: SCORE: 1468.58

## 2021-12-03 NOTE — PROGRESS NOTES
"SUBJECTIVE:   Karolina Light is a 56 year old female who presents for Preventive Visit.    {  Patient has been advised of split billing requirements and indicates understanding: Yes   Are you in the first 12 months of your Medicare coverage?  No    Healthy Habits:     In general, how would you rate your overall health?  Good    Frequency of exercise:  1 day/week    Duration of exercise:  15-30 minutes    Do you usually eat at least 4 servings of fruit and vegetables a day, include whole grains    & fiber and avoid regularly eating high fat or \"junk\" foods?  No    Taking medications regularly:  Yes    Medication side effects:  Not applicable    Ability to successfully perform activities of daily living:  No assistance needed    Home Safety:  Throw rugs in the hallway    Hearing Impairment:  Feel that people are mumbling or not speaking clearly and need to ask people to speak up or repeat themselves    In the past 6 months, have you been bothered by leaking of urine? Yes    In general, how would you rate your overall mental or emotional health?  Good      PHQ-2 Total Score: 4    Additional concerns today:  Yes    Do you feel safe in your environment? Yes    Have you ever done Advance Care Planning? (For example, a Health Directive, POLST, or a discussion with a medical provider or your loved ones about your wishes): No, advance care planning information given to patient to review.  Advanced care planning was discussed at today's visit.    Previously [facility] pt, but insurance changed and is estblishing care here.  Will need rheum referral.  Also needs cardiology review.  Did have MI in 2012, several stents.    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  Is not currently taking statin.     Growth on her nose for the last 3-4 months, needs assessed.  Itches, doesn't bleed.    Last pap smear was 10 years ago - did have complete hysterectomy.    Uses Sertraline for MDD.  Feels that mood is well treated, no " current concerns.    PmHx, SHx, FHx reviewed and updated.      Fall risk  Fallen 2 or more times in the past year?: No  Any fall with injury in the past year?: No    Cognitive Screening   1) Repeat 3 items (Leader, Season, Table)    2) Clock draw: NORMAL  3) 3 item recall: Recalls 3 objects  Results: 3 items recalled: COGNITIVE IMPAIRMENT LESS LIKELY    Mini-CogTM Copyright MYRA Leger. Licensed by the author for use in [facility]; reprinted with permission (alexey@[facility]). All rights reserved.      Do you have sleep apnea, excessive snoring or daytime drowsiness?: yes    Reviewed and updated as needed this visit by clinical staff  Tobacco  Allergies  Meds   Med Hx  Surg Hx  Fam Hx  Soc Hx       Reviewed and updated as needed this visit by Provider               Social History     Tobacco Use     Smoking status: Former Smoker     Quit date: 2015     Years since quittin.3     Smokeless tobacco: Former User   Substance Use Topics     Alcohol use: No     Alcohol/week: 0.0 standard drinks     If you drink alcohol do you typically have >3 drinks per day or >7 drinks per week? No    Alcohol Use 12/3/2021   Prescreen: >3 drinks/day or >7 drinks/week? No               Current providers sharing in care for this patient include:   Patient Care Team:  No Ref-Primary, Physician as PCP - General    The following health maintenance items are reviewed in Epic and correct as of today:  Health Maintenance Due   Topic Date Due     ANNUAL REVIEW OF  ORDERS  Never done     ADVANCE CARE PLANNING  Never done     MAMMO SCREENING  Never done     Pneumococcal Vaccine: Pediatrics (0 to 5 Years) and At-Risk Patients (6 to 64 Years) (1 of 2 - PPSV23) Never done     COLORECTAL CANCER SCREENING  Never done     COVID-19 Vaccine (1) Never done     HIV SCREENING  Never done     HEPATITIS C SCREENING  Never done     PAP  Never done     ZOSTER IMMUNIZATION (1 of 2) Never done     LUNG CANCER SCREENING  2016      "INFLUENZA VACCINE (1) 09/01/2021     Lab work is in process      Breast CA Risk Assessment (FHS-7) 12/3/2021   Do you have a family history of breast, colon, or ovarian cancer? No / Unknown       click delete button to remove this line now  Mammogram Screening: Recommended annual mammography  Pertinent mammograms are reviewed under the imaging tab.    Review of Systems   Constitutional: Positive for chills. Negative for fever.   HENT: Positive for hearing loss. Negative for congestion, ear pain and sore throat.    Eyes: Positive for visual disturbance. Negative for pain.   Respiratory: Negative for cough and shortness of breath.    Cardiovascular: Positive for chest pain, palpitations and peripheral edema.   Gastrointestinal: Positive for heartburn. Negative for abdominal pain, constipation, diarrhea, hematochezia and nausea.   Breasts:  Negative for tenderness, breast mass and discharge.   Genitourinary: Positive for urgency. Negative for dysuria, frequency, genital sores, hematuria, pelvic pain, vaginal bleeding and vaginal discharge.   Musculoskeletal: Positive for arthralgias, joint swelling and myalgias.   Skin: Negative for rash.   Neurological: Positive for weakness and headaches. Negative for dizziness and paresthesias.   Psychiatric/Behavioral: Positive for mood changes. The patient is nervous/anxious.          OBJECTIVE:   /70 (BP Location: Right arm, Patient Position: Sitting, Cuff Size: Adult Large)   Pulse 75   Temp 98.6  F (37  C) (Oral)   Resp 16   Ht 1.651 m (5' 5\")   Wt 88.3 kg (194 lb 9.6 oz)   SpO2 96%   BMI 32.38 kg/m   Estimated body mass index is 32.38 kg/m  as calculated from the following:    Height as of this encounter: 1.651 m (5' 5\").    Weight as of this encounter: 88.3 kg (194 lb 9.6 oz).  Physical Exam  Constitutional:       General: She is not in acute distress.     Appearance: She is well-developed.   HENT:      Head:        Right Ear: Tympanic membrane and external ear " normal.      Left Ear: Tympanic membrane and external ear normal.      Nose: Nose normal.      Mouth/Throat:      Lips: Pink.      Mouth: Mucous membranes are moist. No oral lesions.      Pharynx: No oropharyngeal exudate.   Eyes:      General:         Right eye: No discharge.         Left eye: No discharge.      Conjunctiva/sclera: Conjunctivae normal.      Pupils: Pupils are equal, round, and reactive to light.   Neck:      Thyroid: No thyromegaly.      Trachea: No tracheal deviation.   Cardiovascular:      Rate and Rhythm: Normal rate and regular rhythm.      Pulses: Normal pulses.      Heart sounds: Normal heart sounds, S1 normal and S2 normal. No murmur heard.  No friction rub. No S3 or S4 sounds.    Pulmonary:      Effort: Pulmonary effort is normal. No respiratory distress.      Breath sounds: Normal breath sounds. No wheezing or rales.   Abdominal:      General: Bowel sounds are normal.      Palpations: Abdomen is soft. There is no mass.      Tenderness: There is no abdominal tenderness.   Musculoskeletal:         General: Normal range of motion.      Cervical back: Neck supple.   Lymphadenopathy:      Cervical: No cervical adenopathy.   Skin:     General: Skin is warm and dry.      Findings: No rash.   Neurological:      Mental Status: She is alert and oriented to person, place, and time.      Motor: No abnormal muscle tone.      Deep Tendon Reflexes: Reflexes are normal and symmetric.   Psychiatric:         Thought Content: Thought content normal.         Judgment: Judgment normal.               ASSESSMENT / PLAN:       ICD-10-CM    1. Encounter for Medicare annual wellness exam  Z00.00 Comprehensive metabolic panel (BMP + Alb, Alk Phos, ALT, AST, Total. Bili, TP)     Comprehensive metabolic panel (BMP + Alb, Alk Phos, ALT, AST, Total. Bili, TP)   2. Screen for colon cancer  Z12.11    3. Need for hepatitis C screening test  Z11.59 Hepatitis C Screen Reflex to HCV RNA Quant and Genotype     Hepatitis C  "Screen Reflex to HCV RNA Quant and Genotype   4. Hyperlipidemia LDL goal <100  E78.5 Adult Cardiology Eval Referral     Lipid panel reflex to direct LDL Fasting     OFFICE/OUTPT VISIT,NEW,LEVL IV     Lipid panel reflex to direct LDL Fasting   5. Rheumatoid arthritis involving multiple sites with positive rheumatoid factor (H)  M05.79 Rheumatology Referral     Comprehensive metabolic panel (BMP + Alb, Alk Phos, ALT, AST, Total. Bili, TP)     OFFICE/OUTPT VISIT,PAPITO ROJAS IV     Comprehensive metabolic panel (BMP + Alb, Alk Phos, ALT, AST, Total. Bili, TP)   6. Coronary artery disease involving native coronary artery of native heart without angina pectoris  I25.10 carvedilol (COREG) 3.125 MG tablet     Adult Cardiology Eval Referral     Lipid panel reflex to direct LDL Fasting     OFFICE/OUTPT VISIT,NEW,LEVL IV     Lipid panel reflex to direct LDL Fasting   7. Current moderate episode of major depressive disorder without prior episode (H)  F32.1 sertraline (ZOLOFT) 100 MG tablet     OFFICE/OUTPT VISIT,NEW,LEVL IV   8. Mass of nose  J34.89 Adult Dermatology Referral     OFFICE/OUTPT VISIT,PAPITO ROJAS IV   9. Encounter for screening mammogram for breast cancer  Z12.31 MA SCREENING DIGITAL BILAT - Future  (s+30)       Patient has been advised of split billing requirements and indicates understanding: Yes  COUNSELING:  Reviewed preventive health counseling, as reflected in patient instructions    Estimated body mass index is 32.38 kg/m  as calculated from the following:    Height as of this encounter: 1.651 m (5' 5\").    Weight as of this encounter: 88.3 kg (194 lb 9.6 oz).    Weight management plan: Discussed healthy diet and exercise guidelines    She reports that she quit smoking about 6 years ago. She has quit using smokeless tobacco.      Appropriate preventive services were discussed with this patient, including applicable screening as appropriate for cardiovascular disease, diabetes, osteopenia/osteoporosis, and " glaucoma.  As appropriate for age/gender, discussed screening for colorectal cancer, prostate cancer, breast cancer, and cervical cancer. Checklist reviewing preventive services available has been given to the patient.    Reviewed patients plan of care and provided an AVS. The Basic Care Plan (routine screening as documented in Health Maintenance) for Karolina meets the Care Plan requirement. This Care Plan has been established and reviewed with the Patient.    Counseling Resources:  ATP IV Guidelines  Pooled Cohorts Equation Calculator  Breast Cancer Risk Calculator  Breast Cancer: Medication to Reduce Risk  FRAX Risk Assessment  ICSI Preventive Guidelines  Dietary Guidelines for Americans, 2010  [employer]'s MyPlate  ASA Prophylaxis  Lung CA Screening    Harley Covington MD  [facility]    Identified Health Risks:  Answers for HPI/ROS submitted by the patient on 12/3/2021  If you checked off any problems, how difficult have these problems made it for you to do your work, take care of things at home, or get along with other people?: Somewhat difficult  PHQ9 TOTAL SCORE: 10

## 2021-12-03 NOTE — PATIENT INSTRUCTIONS
Patient Education   Personalized Prevention Plan  You are due for the preventive services outlined below.  Your care team is available to assist you in scheduling these services.  If you have already completed any of these items, please share that information with your care team to update in your medical record.  Health Maintenance Due   Topic Date Due     ANNUAL REVIEW OF HM ORDERS  Never done     Discuss Advance Care Planning  Never done     Mammogram  Never done     Pneumococcal Vaccine (1 of 2 - PPSV23) Never done     Colorectal Cancer Screening  Never done     COVID-19 Vaccine (1) Never done     HIV Screening  Never done     Annual Wellness Visit  Never done     Hepatitis C Screening  Never done     PAP Smear  Never done     Zoster (Shingles) Vaccine (1 of 2) Never done     LUNG CANCER SCREENING  07/20/2016     Flu Vaccine (1) 09/01/2021

## 2021-12-04 LAB
ALBUMIN SERPL-MCNC: 3.7 G/DL (ref 3.4–5)
ALP SERPL-CCNC: 78 U/L (ref 40–150)
ALT SERPL W P-5'-P-CCNC: 20 U/L (ref 0–50)
ANION GAP SERPL CALCULATED.3IONS-SCNC: 3 MMOL/L (ref 3–14)
AST SERPL W P-5'-P-CCNC: 17 U/L (ref 0–45)
BILIRUB SERPL-MCNC: 0.4 MG/DL (ref 0.2–1.3)
BUN SERPL-MCNC: 14 MG/DL (ref 7–30)
CALCIUM SERPL-MCNC: 9.3 MG/DL (ref 8.5–10.1)
CHLORIDE BLD-SCNC: 108 MMOL/L (ref 94–109)
CO2 SERPL-SCNC: 28 MMOL/L (ref 20–32)
CREAT SERPL-MCNC: 1.13 MG/DL (ref 0.52–1.04)
GFR SERPL CREATININE-BSD FRML MDRD: 54 ML/MIN/1.73M2
GLUCOSE BLD-MCNC: 97 MG/DL (ref 70–99)
HCV AB SERPL QL IA: NONREACTIVE
POTASSIUM BLD-SCNC: 4.5 MMOL/L (ref 3.4–5.3)
PROT SERPL-MCNC: 7.7 G/DL (ref 6.8–8.8)
SODIUM SERPL-SCNC: 139 MMOL/L (ref 133–144)

## 2021-12-04 ASSESSMENT — PATIENT HEALTH QUESTIONNAIRE - PHQ9: SUM OF ALL RESPONSES TO PHQ QUESTIONS 1-9: 10

## 2021-12-06 ENCOUNTER — TELEPHONE (OUTPATIENT)
Dept: DERMATOLOGY | Facility: CLINIC | Age: 56
End: 2021-12-06
Payer: MEDICARE

## 2021-12-06 NOTE — TELEPHONE ENCOUNTER
Health Call Center    Phone Message    May a detailed message be left on voicemail: yes     Reason for Call: Symptoms or Concerns     If patient has red-flag symptoms, warm transfer to triage line    Current symptom or concern: skin lesion on nose    Symptoms have been present for:  4 month(s)    Has patient previously been seen for this? Yes    By : Dr. Covington    Date: 12/03    Are there any new or worsening symptoms? Yes: Pt has had prior skin cancer removed. Pt. Is scheduled for 03/04 and wait listed. Pt. Is concerned to wait this long. Please call Pt back to discuss. Thanks      Action Taken: Message routed to:  [facility] ([facility] Derm    Travel Screening: Not Applicable

## 2021-12-06 NOTE — TELEPHONE ENCOUNTER
Called and spoke to patient.    Patient has a nonhealing lesion on nose- wart like in appearance, itchy, intermittently bleeds, and has been present for 6 months.    Patient has a hx of skin cancer.    Rescheduled patient for an earlier appointment.    Patient voiced understanding.    Tita RN-BSN-N  [facility]  [phone]

## 2021-12-16 LAB
CHOLEST SERPL-MCNC: 195 MG/DL
FASTING STATUS PATIENT QL REPORTED: NO
HDLC SERPL-MCNC: 28 MG/DL
LDLC SERPL CALC-MCNC: 118 MG/DL
NONHDLC SERPL-MCNC: 167 MG/DL
TRIGL SERPL-MCNC: 244 MG/DL

## 2022-01-22 ENCOUNTER — HEALTH MAINTENANCE LETTER (OUTPATIENT)
Age: 57
End: 2022-01-22

## 2022-01-27 ENCOUNTER — OFFICE VISIT (OUTPATIENT)
Dept: DERMATOLOGY | Facility: CLINIC | Age: 57
End: 2022-01-27
Payer: MEDICARE

## 2022-01-27 VITALS
SYSTOLIC BLOOD PRESSURE: 124 MMHG | HEIGHT: 65 IN | BODY MASS INDEX: 32.38 KG/M2 | HEART RATE: 70 BPM | DIASTOLIC BLOOD PRESSURE: 82 MMHG

## 2022-01-27 DIAGNOSIS — D18.01 ANGIOMA OF SKIN: ICD-10-CM

## 2022-01-27 DIAGNOSIS — Z85.828 HISTORY OF SKIN CANCER: ICD-10-CM

## 2022-01-27 DIAGNOSIS — D36.9 ANGIOFIBROMA: ICD-10-CM

## 2022-01-27 DIAGNOSIS — L82.1 SEBORRHEIC KERATOSIS: ICD-10-CM

## 2022-01-27 DIAGNOSIS — D23.9 DERMAL NEVUS: ICD-10-CM

## 2022-01-27 DIAGNOSIS — L81.4 LENTIGO: Primary | ICD-10-CM

## 2022-01-27 PROCEDURE — 99203 OFFICE O/P NEW LOW 30 MIN: CPT | Mod: 25 | Performed by: DERMATOLOGY

## 2022-01-27 PROCEDURE — 88331 PATH CONSLTJ SURG 1 BLK 1SPC: CPT | Performed by: DERMATOLOGY

## 2022-01-27 PROCEDURE — 11102 TANGNTL BX SKIN SINGLE LES: CPT | Performed by: DERMATOLOGY

## 2022-01-27 NOTE — PROGRESS NOTES
Karolina Light is an extremely pleasant 56 year old year old female patient here today for spot on left nswe.   .   Patient states this has been present for a while.  Patient reports the following symptoms:  Crusted.  .  Patient reports the following previous treatments none.  These treatments did not work.  Patient reports the following modifying factors none.  Associated symptoms: none.  Patient has no other skin complaints today.  Remainder of the HPI, Meds, PMH, Allergies, FH, and SH was reviewed in chart.      Past Medical History:   Diagnosis Date     CAD (coronary artery disease)     MI and stent      RA (rheumatoid arthritis) (H)        Past Surgical History:   Procedure Laterality Date     ANGIOGRAM  2015    In-segment restenosis in the proximal right coronary artery stent stenosis appears to be the range of 50%. Serial stenoses in the left anterior ascending artery and in the proximal mid and distal all appear to be in the 50% range. Scattered mild disease in the circumflex system no stenosis greater than 35%. No intervention performed.     HAND RECONSTRUCTION Right 06/15/1967     HEART CATH, ANGIOPLASTY  2012    Angioplasty and MIREILLE x2 to RCA     HYSTERECTOMY  1998        Family History   Problem Relation Age of Onset     Cerebrovascular Disease Mother 61     C.A.D. Father 42     Myocardial Infarction Sister      Cerebrovascular Disease Sister      Diabetes Sister      C.A.D. Brother      C.A.D. Brother      Other - See Comments Brother         PAD     Coronary Artery Disease Brother        Social History     Socioeconomic History     Marital status:      Spouse name: Not on file     Number of children: Not on file     Years of education: Not on file     Highest education level: Not on file   Occupational History     Not on file   Tobacco Use     Smoking status: Former Smoker     Quit date: 2015     Years since quittin.5     Smokeless tobacco: Former User    Substance and Sexual Activity     Alcohol use: No     Alcohol/week: 0.0 standard drinks     Drug use: Not on file     Sexual activity: Not on file   Other Topics Concern      Service Not Asked     Blood Transfusions Not Asked     Caffeine Concern Yes     Comment: 20+ oz daily     Occupational Exposure Not Asked     Hobby Hazards Not Asked     Sleep Concern Yes     Stress Concern Yes     Weight Concern Not Asked     Special Diet Yes     Comment: low sodium, greens , healthier      Back Care Not Asked     Exercise No     Comment: walks dogs 1 mile a day      Bike Helmet Not Asked     Seat Belt Yes     Self-Exams Not Asked   Social History Narrative     Not on file     Social Determinants of Health     Financial Resource Strain: Not on file   Food Insecurity: Not on file   Transportation Needs: Not on file   Physical Activity: Not on file   Stress: Not on file   Social Connections: Not on file   Intimate Partner Violence: Not on file   Housing Stability: Not on file       Outpatient Encounter Medications as of 1/27/2022   Medication Sig Dispense Refill     acetaminophen (TYLENOL) 500 MG tablet Take 1,500 mg by mouth daily as needed (RA flare)       aspirin EC 81 MG EC tablet Take 1 tablet by mouth daily. (Patient not taking: Reported on 12/3/2021) 180 tablet 3     azaTHIOprine 100 MG TABS Take 100 mg by mouth 2 times daily In the morning       carvedilol (COREG) 3.125 MG tablet Take 1 tablet (3.125 mg) by mouth 2 times daily (with meals) 180 tablet 1     nitroglycerin (NITROSTAT) 0.4 MG SL tablet Place 1 tablet (0.4 mg) under the tongue every 5 minutes as needed for chest pain 25 tablet 11     sertraline (ZOLOFT) 100 MG tablet Take 1 tablet (100 mg) by mouth daily 90 tablet 1     sulfaSALAzine (AZULFIDINE) 500 MG tablet Take 1,000 mg by mouth once       vitamin C (ASCORBIC ACID) 250 MG TABS tablet Take 1 tablet by mouth daily       No facility-administered encounter medications on file as of 1/27/2022.  "            O:   NAD, WDWN, Alert & Oriented, Mood & Affect wnl, Vitals stable   Here today alone   /82   Pulse 70   Ht 1.651 m (5' 5\")   BMI 32.38 kg/m     General appearance normal   Vitals stable   Alert, oriented and in no acute distress      Following lymph nodes palpated: Occipital, Cervical, Supraclavicular no lad  L NSW crusted 5mm scaly papule        Stuck on papules and brown macules on trunk and ext   Red papules on trunk  Flesh colored papules on trunk         Eyes: Conjunctivae/lids:Normal     ENT: Lips, buccal mucosa, tongue: normal    MSK:Normal    Cardiovascular: peripheral edema none    Pulm: Breathing Normal    Lymph Nodes: No Head and Neck Lymphadenopathy     Neuro/Psych: Orientation:Alert and Orientedx3 ; Mood/Affect:normal       MICRO:   L NSW:Unremarkable epidermis with some thinning of epidermis and a well-circumscribed dome-shaped lesion with epithelial collarettes, characterized by a proliferation of sebaceous glands in dermis    A/P:  1. Seborrheic keratosis, lentigo, angioma, dermal nevus, hx of non-melanoma skin cancer   2. L NSW r/o basal cell carcinoma   TANGENTIAL BIOPSY IN HOUSE:  After consent, anesthesia with LEC and prep, tangential excision performed and dx above confirmed with frozen section histology.  No complications and routine wound care.  Patient is not on  anticoagulants and risk of bleeding discussed with patient.       I have personally reviewed all specimens and/or slides and used them with my medical judgement to determine or confirm the final diagnosis.     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      It was a pleasure speaking to Karolina Light today.  Previous clinic notes and pertinent laboratory tests were reviewed prior to Karolina Light's visit.  Nature and genetics of benign skin lesions dicussed with patient.  Signs and Symptoms of skin cancer discussed with patient.  Patient encouraged to perform monthly skin exams.  UV " precautions reviewed with patient.  Risks of non-melanoma skin cancer discussed with patient   Return to clinic 6 months

## 2022-01-27 NOTE — LETTER
1/27/2022         RE: Karolina Light  [address] [facility] [address]        Dear Colleague,    Thank you for referring your patient, Karolina Light, to the [facility]. Please see a copy of my visit note below.    Karolina Light is an extremely pleasant 56 year old year old female patient here today for spot on left nswe.   .   Patient states this has been present for a while.  Patient reports the following symptoms:  Crusted.  .  Patient reports the following previous treatments none.  These treatments did not work.  Patient reports the following modifying factors none.  Associated symptoms: none.  Patient has no other skin complaints today.  Remainder of the HPI, Meds, PMH, Allergies, FH, and SH was reviewed in chart.      Past Medical History:   Diagnosis Date     CAD (coronary artery disease)     MI and stent 2012     RA (rheumatoid arthritis) (H)        Past Surgical History:   Procedure Laterality Date     ANGIOGRAM  07/21/2015    In-segment restenosis in the proximal right coronary artery stent stenosis appears to be the range of 50%. Serial stenoses in the left anterior ascending artery and in the proximal mid and distal all appear to be in the 50% range. Scattered mild disease in the circumflex system no stenosis greater than 35%. No intervention performed.     HAND RECONSTRUCTION Right 06/15/1967     HEART CATH, ANGIOPLASTY  11/01/2012    Angioplasty and MIREILLE x2 to RCA     HYSTERECTOMY  03/01/1998        Family History   Problem Relation Age of Onset     Cerebrovascular Disease Mother 61     C.A.D. Father 42     Myocardial Infarction Sister      Cerebrovascular Disease Sister      Diabetes Sister      C.A.D. Brother      C.A.D. Brother      Other - See Comments Brother         PAD     Coronary Artery Disease Brother        Social History     Socioeconomic History     Marital status:      Spouse name: Not on file     Number of children:  Not on file     Years of education: Not on file     Highest education level: Not on file   Occupational History     Not on file   Tobacco Use     Smoking status: Former Smoker     Quit date: 2015     Years since quittin.5     Smokeless tobacco: Former User   Substance and Sexual Activity     Alcohol use: No     Alcohol/week: 0.0 standard drinks     Drug use: Not on file     Sexual activity: Not on file   Other Topics Concern      Service Not Asked     Blood Transfusions Not Asked     Caffeine Concern Yes     Comment: 20+ oz daily     Occupational Exposure Not Asked     Hobby Hazards Not Asked     Sleep Concern Yes     Stress Concern Yes     Weight Concern Not Asked     Special Diet Yes     Comment: low sodium, greens , healthier      Back Care Not Asked     Exercise No     Comment: walks dogs 1 mile a day      Bike Helmet Not Asked     Seat Belt Yes     Self-Exams Not Asked   Social History Narrative     Not on file     Social Determinants of Health     Financial Resource Strain: Not on file   Food Insecurity: Not on file   Transportation Needs: Not on file   Physical Activity: Not on file   Stress: Not on file   Social Connections: Not on file   Intimate Partner Violence: Not on file   Housing Stability: Not on file       Outpatient Encounter Medications as of 2022   Medication Sig Dispense Refill     acetaminophen (TYLENOL) 500 MG tablet Take 1,500 mg by mouth daily as needed (RA flare)       aspirin EC 81 MG EC tablet Take 1 tablet by mouth daily. (Patient not taking: Reported on 12/3/2021) 180 tablet 3     azaTHIOprine 100 MG TABS Take 100 mg by mouth 2 times daily In the morning       carvedilol (COREG) 3.125 MG tablet Take 1 tablet (3.125 mg) by mouth 2 times daily (with meals) 180 tablet 1     nitroglycerin (NITROSTAT) 0.4 MG SL tablet Place 1 tablet (0.4 mg) under the tongue every 5 minutes as needed for chest pain 25 tablet 11     sertraline (ZOLOFT) 100 MG tablet Take 1 tablet (100  "mg) by mouth daily 90 tablet 1     sulfaSALAzine (AZULFIDINE) 500 MG tablet Take 1,000 mg by mouth once       vitamin C (ASCORBIC ACID) 250 MG TABS tablet Take 1 tablet by mouth daily       No facility-administered encounter medications on file as of 1/27/2022.             O:   NAD, WDWN, Alert & Oriented, Mood & Affect wnl, Vitals stable   Here today alone   /82   Pulse 70   Ht 1.651 m (5' 5\")   BMI 32.38 kg/m     General appearance normal   Vitals stable   Alert, oriented and in no acute distress      Following lymph nodes palpated: Occipital, Cervical, Supraclavicular no lad  L NSW crusted 5mm scaly papule        Stuck on papules and brown macules on trunk and ext   Red papules on trunk  Flesh colored papules on trunk         Eyes: Conjunctivae/lids:Normal     ENT: Lips, buccal mucosa, tongue: normal    MSK:Normal    Cardiovascular: peripheral edema none    Pulm: Breathing Normal    Lymph Nodes: No Head and Neck Lymphadenopathy     Neuro/Psych: Orientation:Alert and Orientedx3 ; Mood/Affect:normal       MICRO:   L NSW:Unremarkable epidermis with some thinning of epidermis and a well-circumscribed dome-shaped lesion with epithelial collarettes, characterized by a proliferation of sebaceous glands in dermis    A/P:  1. Seborrheic keratosis, lentigo, angioma, dermal nevus, hx of non-melanoma skin cancer   2. L NSW r/o basal cell carcinoma   TANGENTIAL BIOPSY IN HOUSE:  After consent, anesthesia with LEC and prep, tangential excision performed and dx above confirmed with frozen section histology.  No complications and routine wound care.  Patient is not on  anticoagulants and risk of bleeding discussed with patient.       I have personally reviewed all specimens and/or slides and used them with my medical judgement to determine or confirm the final diagnosis.     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      It was a pleasure speaking to Karolina Light today.  Previous clinic notes and " pertinent laboratory tests were reviewed prior to Karolina Light's visit.  Nature and genetics of benign skin lesions dicussed with patient.  Signs and Symptoms of skin cancer discussed with patient.  Patient encouraged to perform monthly skin exams.  UV precautions reviewed with patient.  Risks of non-melanoma skin cancer discussed with patient   Return to clinic 6 months        Again, thank you for allowing me to participate in the care of your patient.        Sincerely,        Xu White MD

## 2022-01-27 NOTE — PATIENT INSTRUCTIONS
Angiofibroma- benign      Wound Care Instructions     FOR SUPERFICIAL WOUNDS     [facility] [phone]                       AFTER 24 HOURS YOU SHOULD REMOVE THE BANDAGE AND BEGIN DAILY DRESSING CHANGES AS FOLLOWS:     1) Remove Dressing.     2) Clean and dry the area with tap water using a Q-tip or sterile gauze pad.     3) Apply Vaseline, Aquaphor, Polysporin ointment or Bacitracin ointment over entire wound.  Do NOT use Neosporin ointment.     4) Cover the wound with a band-aid, or a sterile non-stick gauze pad and micropore paper tape      REPEAT THESE INSTRUCTIONS AT LEAST ONCE A DAY UNTIL THE WOUND HAS COMPLETELY HEALED.    It is an old wives tale that a wound heals better when it is exposed to air and allowed to dry out. The wound will heal faster with a better cosmetic result if it is kept moist with ointment and covered with a bandage.    **Do not let the wound dry out.**      Supplies Needed:      *Cotton tipped applicators (Q-tips)    *Polysporin Ointment or Bacitracin Ointment (NOT NEOSPORIN)    *Band-aids or non-stick gauze pads and micropore paper tape.      PATIENT INFORMATION:    During the healing process you will notice a number of changes. All wounds develop a small halo of redness surrounding the wound.  This means healing is occurring. Severe itching with extensive redness usually indicates sensitivity to the ointment or bandage tape used to dress the wound.  You should call our office if this develops.      Swelling  and/or discoloration around your surgical site is common, particularly when performed around the eye.    All wounds normally drain.  The larger the wound the more drainage there will be.  After 7-10 days, you will notice the wound beginning to shrink and new skin will begin to grow.  The wound is healed when you can see skin has formed over the entire area.  A healed wound has a healthy, shiny look to the surface and is red to dark pink in color to normalize.   Wounds may take approximately 4-6 weeks to heal.  Larger wounds may take 6-8 weeks.  After the wound is healed you may discontinue dressing changes.    You may experience a sensation of tightness as your wound heals. This is normal and will gradually subside.    Your healed wound may be sensitive to temperature changes. This sensitivity improves with time, but if you re having a lot of discomfort, try to avoid temperature extremes.    Patients frequently experience itching after their wound appears to have healed because of the continue healing under the skin.  Plain Vaseline will help relieve the itching.        POSSIBLE COMPLICATIONS    BLEEDIN. Leave the bandage in place.  2. Use tightly rolled up gauze or a cloth to apply direct pressure over the bandage for 30  minutes.  3. Reapply pressure for an additional 30 minutes if necessary  4. Use additional gauze and tape to maintain pressure once the bleeding has stopped.  5.

## 2022-03-04 ENCOUNTER — OFFICE VISIT (OUTPATIENT)
Dept: DERMATOLOGY | Facility: CLINIC | Age: 57
End: 2022-03-04
Attending: FAMILY MEDICINE
Payer: MEDICARE

## 2022-03-04 VITALS — SYSTOLIC BLOOD PRESSURE: 134 MMHG | HEART RATE: 89 BPM | OXYGEN SATURATION: 97 % | DIASTOLIC BLOOD PRESSURE: 89 MMHG

## 2022-03-04 DIAGNOSIS — D18.01 ANGIOMA OF SKIN: ICD-10-CM

## 2022-03-04 DIAGNOSIS — L82.1 SEBORRHEIC KERATOSIS: ICD-10-CM

## 2022-03-04 DIAGNOSIS — Z85.828 HISTORY OF BASAL CELL CARCINOMA (BCC): ICD-10-CM

## 2022-03-04 DIAGNOSIS — D22.9 NEVUS: Primary | ICD-10-CM

## 2022-03-04 DIAGNOSIS — D48.5 NEOPLASM OF UNCERTAIN BEHAVIOR OF SKIN: ICD-10-CM

## 2022-03-04 DIAGNOSIS — L81.4 LENTIGO: ICD-10-CM

## 2022-03-04 PROCEDURE — 11102 TANGNTL BX SKIN SINGLE LES: CPT | Performed by: PHYSICIAN ASSISTANT

## 2022-03-04 PROCEDURE — 99213 OFFICE O/P EST LOW 20 MIN: CPT | Mod: 25 | Performed by: PHYSICIAN ASSISTANT

## 2022-03-04 PROCEDURE — 88305 TISSUE EXAM BY PATHOLOGIST: CPT | Performed by: PATHOLOGY

## 2022-03-04 RX ORDER — AZATHIOPRINE 100 MG/1
100 TABLET ORAL 2 TIMES DAILY
OUTPATIENT
Start: 2022-03-04

## 2022-03-04 NOTE — LETTER
3/4/2022         RE: Karolina Light  [address] [facility] [address]        Dear Colleague,    Thank you for referring your patient, Karolina Light, to the [facility]. Please see a copy of my visit note below.    HPI:   Chief complaints: Karolina Light is a pleasant 56 year old female who presents for Full skin cancer screening to rule out skin cancer   Last Skin Exam: few years ago      1st Baseline: no  Personal HX of Skin Cancer: yes BCC on the right forehead in 2019   Personal HX of Malignant Melanoma: no   Family HX of Skin Cancer / Malignant Melanoma: no  Personal HX of Atypical Moles:   no  Risk factors: history of sun exposure  New / Changing lesions:yes red spots around her pervious mohs site  Social History: has 5 dogs in the home; 3 are her son's dogs  On review of systems, there are no further skin complaints, patient is feeling otherwise well.   ROS of the following were done and are negative: Constitutional, Eyes, Ears, Nose,   Mouth, Throat, Cardiovascular, Respiratory, GI, Genitourinary, Musculoskeletal,   Psychiatric, Endocrine, Allergic/Immunologic.    PHYSICAL EXAM:   /89   Pulse 89   SpO2 97%   Breastfeeding No   Skin exam performed as follows: Type 2 skin. Mood appropriate  Alert and Oriented X 3. Well developed, well nourished in no distress.  General appearance: Normal  Head including face: Normal  Eyes: conjunctiva and lids: Normal  Mouth: Lips, teeth, gums: Normal  Neck: Normal  Chest-breast/axillae: Normal  Back: Normal  Spleen and liver: Normal  Cardiovascular: Exam of peripheral vascular system by observation for swelling, varicosities, edema: Normal  Genitalia: groin, buttocks: Normal  Extremities: digits/nails (clubbing): Normal  Eccrine and Apocrine glands: Normal  Right upper extremity: Normal  Left upper extremity: Normal  Right lower extremity: Normal  Left lower extremity: Normal  Skin: Scalp and body hair:  See below    Pt deferred exam of breasts, groin, buttocks: No    Other physical findings:  1. Multiple pigmented macules on extremities and trunk  2. Multiple pigmented macules on face, trunk and extremities  3. Multiple vascular papules on trunk, arms and legs  4. Multiple scattered keratotic plaques  5. 2 mm pearly papule with arborizing vessels  on the right forehead adjacent to previous mohs site     Except as noted above, no other signs of skin cancer or melanoma.     ASSESSMENT/PLAN:   Benign Full skin cancer screening today. . Patient with history of BCC  Advised on monthly self exams and 1 year  Patient Education: Appropriate brochures given.    1. Multiple benign appearing melanocytic nevi on arms, legs and trunk. Discussed ABCDEs of melanoma and sunscreen.   2. Multiple lentigos on arms, legs and trunk. Advised benign, no treatment needed.  3. Multiple scattered angiomas. Advised benign, no treatment needed.   4. Seborrheic keratosis on arms, legs and trunk. Advised benign, no treatment needed.  5. R/o BCC on the right forehead. Shave biopsy performed.  Area cleaned and anesthetized with 1% lidocaine with epinephrine.  Dermablade used to remove the lesion and sent to pathology. Bleeding was cauterized. Pt tolerated procedure well with no complications.             Follow-up: yearly FSE/PRN sooner    1.) Patient was asked about new and changing moles. YES  2.) Patient received a complete physical skin examination: YES  3.) Patient was counseled to perform a monthly self skin examination: YES  Scribed By: Analia Turner MS, PAROBBIN          Again, thank you for allowing me to participate in the care of your patient.        Sincerely,        Analia Turner PA-C

## 2022-03-04 NOTE — PROGRESS NOTES
HPI:   Chief complaints: Karolina Light is a pleasant 56 year old female who presents for Full skin cancer screening to rule out skin cancer   Last Skin Exam: few years ago      1st Baseline: no  Personal HX of Skin Cancer: yes BCC on the right forehead in 2019   Personal HX of Malignant Melanoma: no   Family HX of Skin Cancer / Malignant Melanoma: no  Personal HX of Atypical Moles:   no  Risk factors: history of sun exposure  New / Changing lesions:yes red spots around her pervious mohs site  Social History: has 5 dogs in the home; 3 are her son's dogs  On review of systems, there are no further skin complaints, patient is feeling otherwise well.   ROS of the following were done and are negative: Constitutional, Eyes, Ears, Nose,   Mouth, Throat, Cardiovascular, Respiratory, GI, Genitourinary, Musculoskeletal,   Psychiatric, Endocrine, Allergic/Immunologic.    PHYSICAL EXAM:   /89   Pulse 89   SpO2 97%   Breastfeeding No   Skin exam performed as follows: Type 2 skin. Mood appropriate  Alert and Oriented X 3. Well developed, well nourished in no distress.  General appearance: Normal  Head including face: Normal  Eyes: conjunctiva and lids: Normal  Mouth: Lips, teeth, gums: Normal  Neck: Normal  Chest-breast/axillae: Normal  Back: Normal  Spleen and liver: Normal  Cardiovascular: Exam of peripheral vascular system by observation for swelling, varicosities, edema: Normal  Genitalia: groin, buttocks: Normal  Extremities: digits/nails (clubbing): Normal  Eccrine and Apocrine glands: Normal  Right upper extremity: Normal  Left upper extremity: Normal  Right lower extremity: Normal  Left lower extremity: Normal  Skin: Scalp and body hair: See below    Pt deferred exam of breasts, groin, buttocks: No    Other physical findings:  1. Multiple pigmented macules on extremities and trunk  2. Multiple pigmented macules on face, trunk and extremities  3. Multiple vascular papules on trunk, arms and legs  4. Multiple  scattered keratotic plaques  5. 2 mm pearly papule with arborizing vessels  on the right forehead adjacent to previous mohs site     Except as noted above, no other signs of skin cancer or melanoma.     ASSESSMENT/PLAN:   Benign Full skin cancer screening today. . Patient with history of BCC  Advised on monthly self exams and 1 year  Patient Education: Appropriate brochures given.    1. Multiple benign appearing melanocytic nevi on arms, legs and trunk. Discussed ABCDEs of melanoma and sunscreen.   2. Multiple lentigos on arms, legs and trunk. Advised benign, no treatment needed.  3. Multiple scattered angiomas. Advised benign, no treatment needed.   4. Seborrheic keratosis on arms, legs and trunk. Advised benign, no treatment needed.  5. R/o BCC on the right forehead. Shave biopsy performed.  Area cleaned and anesthetized with 1% lidocaine with epinephrine.  Dermablade used to remove the lesion and sent to pathology. Bleeding was cauterized. Pt tolerated procedure well with no complications.             Follow-up: yearly FSE/PRN sooner    1.) Patient was asked about new and changing moles. YES  2.) Patient received a complete physical skin examination: YES  3.) Patient was counseled to perform a monthly self skin examination: YES  Scribed By: Analia Turner MS, PA-C

## 2022-03-04 NOTE — TELEPHONE ENCOUNTER
Sky Daniel MD     [facility]    [address]    [facility] [address]    [place] [address]    [phone]

## 2022-03-04 NOTE — PATIENT INSTRUCTIONS
Wound Care Instructions     FOR SUPERFICIAL WOUNDS     [facility]  [phone]                 AFTER 24 HOURS YOU SHOULD REMOVE THE BANDAGE AND BEGIN DAILY DRESSING CHANGES AS FOLLOWS:     1) Remove Dressing.     2) Clean and dry the area with tap water using a Q-tip or sterile gauze pad.     3) Apply Vaseline, Aquaphor, Polysporin ointment or Bacitracin ointment over entire wound.  Do NOT use Neosporin ointment.     4) Cover the wound with a band-aid, or a sterile non-stick gauze pad and micropore paper tape    REPEAT THESE INSTRUCTIONS AT LEAST ONCE A DAY UNTIL THE WOUND HAS COMPLETELY HEALED.    It is an old wives tale that a wound heals better when it is exposed to air and allowed to dry out. The wound will heal faster with a better cosmetic result if it is kept moist with ointment and covered with a bandage.    **Do not let the wound dry out.**    Supplies Needed:      *Cotton tipped applicators (Q-tips)    *Vaseline, Aquaphor, Polysporin or Bacitracin Ointment (NOT NEOSPORIN)    *Band-aids or non-stick gauze pads and micropore paper tape.      PATIENT INFORMATION:    During the healing process you will notice a number of changes. All wounds develop a small halo of redness surrounding the wound.  This means healing is occurring. Severe itching with extensive redness usually indicates sensitivity to the ointment or bandage tape used to dress the wound.  You should call our office if this develops.      Swelling  and/or discoloration around your surgical site is common, particularly when performed around the eye.    All wounds normally drain.  The larger the wound the more drainage there will be.  After 7-10 days, you will notice the wound beginning to shrink and new skin will begin to grow.  The wound is healed when you can see skin has formed over the entire area.  A healed wound has a healthy, shiny look to the surface and is red to dark pink in color to normalize.  Wounds may  take approximately 4-6 weeks to heal.  Larger wounds may take 6-8 weeks.  After the wound is healed you may discontinue dressing changes.    You may experience a sensation of tightness as your wound heals. This is normal and will gradually subside.    Your healed wound may be sensitive to temperature changes. This sensitivity improves with time, but if you re having a lot of discomfort, try to avoid temperature extremes.    Patients frequently experience itching after their wound appears to have healed because of the continue healing under the skin.  Plain Vaseline will help relieve the itching.      POSSIBLE COMPLICATIONS    BLEEDIN. Leave the bandage in place.  2. Use tightly rolled up gauze or a cloth to apply direct pressure over the bandage for 30  minutes.  3. Reapply pressure for an additional 30 minutes if necessary  4. Use additional gauze and tape to maintain pressure once the bleeding has stopped.

## 2022-03-08 ENCOUNTER — TELEPHONE (OUTPATIENT)
Dept: DERMATOLOGY | Facility: CLINIC | Age: 57
End: 2022-03-08
Payer: MEDICARE

## 2022-03-08 LAB
PATH REPORT.COMMENTS IMP SPEC: NORMAL
PATH REPORT.COMMENTS IMP SPEC: NORMAL
PATH REPORT.FINAL DX SPEC: NORMAL
PATH REPORT.GROSS SPEC: NORMAL
PATH REPORT.MICROSCOPIC SPEC OTHER STN: NORMAL
PATH REPORT.RELEVANT HX SPEC: NORMAL

## 2022-03-08 NOTE — TELEPHONE ENCOUNTER
Called and spoke to patient.    Educated patient on biopsy results- BCC (mohs).    **patient has hx of skin cancer/mohs    Educated patient on BCC, mohs, scheduled mohs appointment, and letter/packet sent.    Patient voiced understanding.    Tita RN-BSN-N  [facility]  [phone]

## 2022-03-08 NOTE — LETTER
[facility]  [address]  [address]  [phone]    3/8/2022       Karolina Light  [address] [place] [id number]      Dear Karolina:    You are scheduled for Mohs Surgery on: 5/5/2022 @8:15am.    Please check in at [facility], [address].     You don't need to arrive more than 5-10 minutes prior to your appointment time.     Be sure to eat a good breakfast and bathe and wash your hair prior to surgery.     If you are taking any anti-coagulants that are prescribed by your Doctor (such as Coumadin/Warfarin, Plavix, Aspirin, Ibuprofen), please continue taking them.     However, if you are taking anti-coagulants over the counter without a Doctor's order for a medical condition, please discontinue them 10 days prior to surgery.     Please wear loose comfortable clothing as it could possibly be 4-6 hours until your surgery is completed depending upon how many layers of tissue need to be removed.      Thank you,    JUANITO White MD

## 2022-03-08 NOTE — TELEPHONE ENCOUNTER
----- Message from Analia Turner PA-C sent at 3/8/2022  4:21 PM CST -----  Right forehead BCC please schedule mohs

## 2022-03-18 ENCOUNTER — OFFICE VISIT (OUTPATIENT)
Dept: RHEUMATOLOGY | Facility: CLINIC | Age: 57
End: 2022-03-18
Attending: FAMILY MEDICINE
Payer: MEDICARE

## 2022-03-18 VITALS
BODY MASS INDEX: 32.21 KG/M2 | SYSTOLIC BLOOD PRESSURE: 118 MMHG | HEART RATE: 69 BPM | DIASTOLIC BLOOD PRESSURE: 80 MMHG | WEIGHT: 193.3 LBS | HEIGHT: 65 IN | OXYGEN SATURATION: 95 %

## 2022-03-18 DIAGNOSIS — M05.79 RHEUMATOID ARTHRITIS INVOLVING MULTIPLE SITES WITH POSITIVE RHEUMATOID FACTOR (H): ICD-10-CM

## 2022-03-18 PROCEDURE — 99205 OFFICE O/P NEW HI 60 MIN: CPT | Performed by: STUDENT IN AN ORGANIZED HEALTH CARE EDUCATION/TRAINING PROGRAM

## 2022-03-18 RX ORDER — SULFASALAZINE 500 MG/1
1000 TABLET ORAL ONCE
Qty: 180 TABLET | Refills: 0 | Status: SHIPPED | OUTPATIENT
Start: 2022-03-18 | End: 2022-03-18

## 2022-03-18 RX ORDER — AZATHIOPRINE 50 MG/1
100 TABLET ORAL DAILY
Qty: 180 TABLET | Refills: 0 | Status: SHIPPED | OUTPATIENT
Start: 2022-03-18 | End: 2022-06-23

## 2022-03-18 ASSESSMENT — PAIN SCALES - GENERAL: PAINLEVEL: MILD PAIN (3)

## 2022-03-18 NOTE — NURSING NOTE
"Chief Complaint   Patient presents with     New Patient     Rheumatoid arthritis involving multiple sites with positive rheumatoid factor       Vitals:    03/18/22 1243   BP: 118/80   BP Location: Left arm   Patient Position: Sitting   Cuff Size: Adult Large   Pulse: 69   SpO2: 95%   Weight: 87.7 kg (193 lb 4.8 oz)   Height: 1.651 m (5' 5\")       Body mass index is 32.17 kg/m .    Brittni Penn MA    "

## 2022-03-18 NOTE — PATIENT INSTRUCTIONS
Continue Azathioprine 100 mg daily    Sulfasalazine 1000 mg daily     Labs every 3 months     Follow up in 6 months

## 2022-03-18 NOTE — PROGRESS NOTES
Rheumatology Clinic Visit     Karolina Light MRN# [id number]   YOB: 1965 Age: 56 year old     Date of Visit: Mar 18, 2022   Primary care provider: Harley Covington          Assessment and Plan:     Assessment     Seropositive Rheumatoid arthritis   High risk medication use - SSZ and AZA  CAD s/p cardiac stenting  HTN, Morbid obesity    Ms. Light is 56 year old female seen in clinic for management of Rheumatoid arthritis    Seropositive Rheumatoid arthritis : She has seropositive rheumatoid arthritis for many years, diagnosed in 1994. She was maintained on methotrexate for almost 27 years but in 2016 it was discontinued due to elevation in liver enzymes. At present she is on sulfasalazine 1000 mg daily and azathioprine 100 mg daily.  Denies any side effect with medication. She has noticed mild improvement in her symptoms on the combination.  On exam she has no active synovitis or tenderness.    We will continue same dose of sulfasalazine and azathioprine.    High risk medication use: Sulfasalazine and azathioprine monitoring labs done on 3/21/2022 showed normal CBC, creatinine of 1.28, normal AST, ALT, CRP of 19, sed rate of 18.  Will repeat her creatinine and CRP.  She follows with dermatology on a regular basis for skin checks.     Plan    Continue Azathioprine 100 mg daily    Sulfasalazine 1000 mg daily     Labs every 3 months     Follow up in 6 months     -- Orders placed this encounter  Orders Placed This Encounter   Procedures     CBC with platelets     AST     ALT     Albumin level     Creatinine     CRP inflammation     Erythrocyte sedimentation rate auto       -- Medications   Orders Placed This Encounter   Medications     azaTHIOprine (IMURAN) 50 MG tablet     Sig: Take 2 tablets (100 mg) by mouth daily Labs every 8-12 weeks.     Dispense:  180 tablet     Refill:  0     sulfaSALAzine (AZULFIDINE) 500 MG tablet     Sig: Take 2 tablets (1,000 mg) by mouth once for 1 dose     Dispense:   180 tablet     Refill:  0       TT 60 min was spent on date of the encounter doing chart review, history and exam, documentation and further activities as noted above. Any prior notes, outside records, laboratory results, and imaging studies were reviewed if relevant.    Patient verbalized agreement with and understanding of the rationale for the diagnosis and treatment plan.  All questions were answered to best of my ability and the patient's satisfaction.      Chart documentation done in part with Dragon Voice recognition Software. Although reviewed after completion, some word and grammatical error may remain.              Active Problem List:     Patient Active Problem List    Diagnosis Date Noted     Idiopathic esophageal varices without bleeding (H) 04/04/2022     Priority: Medium     Hyperlipidemia LDL goal <100 12/03/2021     Priority: Medium     Rheumatoid arthritis involving multiple sites with positive rheumatoid factor (H) 12/03/2021     Priority: Medium     Current moderate episode of major depressive disorder without prior episode (H) 12/03/2021     Priority: Medium     Chest pain 07/20/2015     Priority: Medium     CAD (coronary artery disease)      Priority: Medium     MI and stent 2012       ACS (acute coronary syndrome) (H) 11/21/2012     Priority: Medium            History of Present Illness:   Karolina Light is a 56 year old female with PMH of CAD s/p stent, depression, hyperlipidemia seen in the clinic in consultation at request of Dr Covington for management of rheumatoid arthritis.    She was diagnosed with seropositive rheumatoid arthritis in 1994 and has been on methotrexate since that time.  She follows with Dr. Daniel in rheumatology for many years and noted to change in insurance moved her care to [place].  She stayed on methotrexate for almost 27 years but in 2016 it was discontinued due to elevated liver enzymes.  She tried hydroxychloroquine but did not help.  Later she was started on  sulfasalazine and azathioprine in 5/2017.  She is taking azathioprine 100 mg daily and sulfasalazine 1000 mg twice daily.  The combination has helped to keep her arthritis under control.  Denies any side effects with medication.  She follows with Dermatology for skin checks every 6 months. She see cardiologist once a year for coronary artery disease.    No history of psoriasis, ulcerative colitis or chron's disease. No h/o iritis, enthesitis, finger or toe swelling like dactylitis, plantar fascitis or heel pain. Denies any raynauds, malar rash, photosensitivity, recurrent mouth/genital ulcers, sicca symptoms, pleuritic chest pains, recurrent sinusitis/rhinitis, swallowing difficulty, hearing or visual changes recently.          Review of Systems:     Review Of Systems  Constitutional: denies fever, chills, night sweats and weight loss.  Skin: No skin rash.  Eyes: No dryness or irritation in eyes. No episode of eye inflammation or redness.   Ears/Nose/Throat: no recurrent sinus infections.  Respiratory: No shortness of breath, dyspnea on exertion, cough, or hemoptysis  Cardiovascular: no chest pain or palpitations.  Gastrointestinal: no nausea, vomiting, abdominal pain.  Normal bowel movements.  Genitourinary: no dysuria, frequency  or hematuria.  Musculoskeletal: as in HPI  Neurologic: no numbness, tingling.  Psychiatric: no mood disorders.  Hematologic/Lymphatic/Immunologic: no history of easy bruising, petechia or purpura.  No abnormal bleeding.   Endocrine: no h/o thyroid disease or Diabetes.                  Past Medical History:     Past Medical History:   Diagnosis Date     CAD (coronary artery disease)     MI and stent 2012     RA (rheumatoid arthritis) (H)      Skin cancer     unknown type     Past Surgical History:   Procedure Laterality Date     ANGIOGRAM  07/21/2015    In-segment restenosis in the proximal right coronary artery stent stenosis appears to be the range of 50%. Serial stenoses in the left  anterior ascending artery and in the proximal mid and distal all appear to be in the 50% range. Scattered mild disease in the circumflex system no stenosis greater than 35%. No intervention performed.     HAND RECONSTRUCTION Right 06/15/1967     HEART CATH, ANGIOPLASTY  2012    Angioplasty and MIREILLE x2 to RCA     HYSTERECTOMY  1998            Social History:     Social History     Occupational History     Not on file   Tobacco Use     Smoking status: Former Smoker     Quit date: 2015     Years since quittin.7     Smokeless tobacco: Former User   Substance and Sexual Activity     Alcohol use: No     Alcohol/week: 0.0 standard drinks     Drug use: Not on file     Sexual activity: Not on file            Family History:     Family History   Problem Relation Age of Onset     Cerebrovascular Disease Mother 61     C.A.D. Father 42     Myocardial Infarction Sister      Cerebrovascular Disease Sister      Diabetes Sister      C.A.D. Brother      C.A.D. Brother      Other - See Comments Brother         PAD     Coronary Artery Disease Brother             Allergies:     Allergies   Allergen Reactions     Bee Venom Swelling     Shellfish Allergy Anaphylaxis     Atorvastatin Muscle Pain (Myalgia)     Codeine Sulfate      Methotrexate Other (See Comments)     hepatotxicity     Plavix [Clopidogrel]      Night terrors            Medications:     Current Outpatient Medications   Medication Sig Dispense Refill     acetaminophen (TYLENOL) 500 MG tablet Take 1,500 mg by mouth daily as needed (RA flare)       azaTHIOprine (IMURAN) 50 MG tablet Take 2 tablets (100 mg) by mouth daily Labs every 8-12 weeks. 180 tablet 0     sertraline (ZOLOFT) 100 MG tablet Take 1 tablet (100 mg) by mouth daily 90 tablet 1     aspirin (ASA) 81 MG chewable tablet Take 1 tablet (81 mg) by mouth daily       aspirin EC 81 MG EC tablet Take 1 tablet by mouth daily. (Patient not taking: Reported on 12/3/2021) 180 tablet 3     carvedilol (COREG)  "3.125 MG tablet Take 2 tablets (6.25 mg) by mouth 2 times daily (with meals) 180 tablet 1     nitroglycerin (NITROSTAT) 0.4 MG SL tablet Place 1 tablet (0.4 mg) under the tongue every 5 minutes as needed for chest pain 25 tablet 11     nitroGLYcerin (NITROSTAT) 0.4 MG sublingual tablet For chest pain place 1 tablet under the tongue every 5 minutes for 3 doses. If symptoms persist 5 minutes after 1st dose call 911. 30 tablet 11     sulfaSALAzine (AZULFIDINE) 500 MG tablet Take 1,000 mg by mouth       vitamin C (ASCORBIC ACID) 250 MG TABS tablet Take 1 tablet by mouth daily (Patient not taking: Reported on 3/18/2022)              Physical Exam:   Blood pressure 118/80, pulse 69, height 1.651 m (5' 5\"), weight 87.7 kg (193 lb 4.8 oz), SpO2 95 %, not currently breastfeeding.  Wt Readings from Last 4 Encounters:   04/04/22 87.8 kg (193 lb 9.6 oz)   03/18/22 87.7 kg (193 lb 4.8 oz)   12/03/21 88.3 kg (194 lb 9.6 oz)   04/14/16 87.5 kg (193 lb)       Constitutional: Moderately built, appearing stated age; cooperative  Eyes: nl EOM, PERRLA, vision, conjunctiva, sclera  ENT: nl external ears, nose, hearing, lips, teeth, gums, throat  No mucous membrane lesions, normal saliva pool  Neck: no mass or thyroid enlargement  Resp: lungs clear to auscultation, nl to palpation  CV: RRR, no murmurs, rubs or gallops, no edema  GI: no ABD mass or tenderness, no HSM  : not tested  Lymph: no cervical, supraclavicular, inguinal or epitrochlear nodes    MS: All TMJ, neck, shoulder, elbow, wrist, MCP/PIP/DIP, spine, hip, knee, ankle, and foot MTP/IP joints were examined.   -- No active synovitis or deformity. Full ROM.  Normal  strength.   -- No dactylitis,  tenosynovitis, enthesopathy.    Skin: no nail pitting, alopecia, rash, nodules or lesions  Neuro: nl cranial nerves, strength, sensation, DTRs.   Psych: nl judgement, orientation, memory, affect.         Data:     No results found for any visits on 03/18/22.    Recent Labs   Lab " Test 12/03/21  1230 03/24/19  1804 07/21/15  0635 07/19/15  2357 06/23/15  0000   WBC  --  3.5* 4.4 10.4 6.6   RBC  --  4.39 3.83 4.16 4.62   HGB  --  13.3 11.6* 12.9 14.0   HCT  --  41.0 36.3 39.0 42.3   MCV  --  93 95 94 92   RDW  --  15.4* 18.3* 18.4* 19.9*   PLT  --  140* 108* 157 180   ALBUMIN 3.7  --  2.5* 3.1* 3.5   CRP  --   --   --   --  2.60*   BUN 14 14 9 8 12      No results for input(s): TSH, T4 in the last 00016 hours.  Hemoglobin   Date Value Ref Range Status   03/21/2022 13.6 11.7 - 15.7 g/dL Final   03/24/2019 13.3 11.7 - 15.7 g/dL Final   07/21/2015 11.6 (L) 11.7 - 15.7 g/dL Final   07/19/2015 12.9 11.7 - 15.7 g/dL Final     Urea Nitrogen   Date Value Ref Range Status   12/03/2021 14 7 - 30 mg/dL Final   03/24/2019 14 7 - 30 mg/dL Final   07/21/2015 9 7 - 30 mg/dL Final   07/19/2015 8 7 - 30 mg/dL Final     Sed Rate   Date Value Ref Range Status   06/23/2015 41 (H) 31 mm/hr Final     Erythrocyte Sedimentation Rate   Date Value Ref Range Status   03/21/2022 18 0 - 30 mm/hr Final     C-Reactive Protein   Date Value Ref Range Status   06/23/2015 2.60 (A) 0 - 0.5 mg/dL Final     CRP Inflammation   Date Value Ref Range Status   03/21/2022 19.0 (H) 0.0 - 8.0 mg/L Final     AST   Date Value Ref Range Status   03/21/2022 18 0 - 45 U/L Final   12/03/2021 17 0 - 45 U/L Final   02/11/2019 26 2 - 40 IU/L Final   07/21/2015 30 0 - 45 U/L Final   07/19/2015 37 0 - 45 U/L Final     Albumin   Date Value Ref Range Status   03/21/2022 3.8 3.4 - 5.0 g/dL Final   12/03/2021 3.7 3.4 - 5.0 g/dL Final   07/21/2015 2.5 (L) 3.4 - 5.0 g/dL Final   07/19/2015 3.1 (L) 3.4 - 5.0 g/dL Final   06/23/2015 3.5 g/dL Final     Alkaline Phosphatase   Date Value Ref Range Status   12/03/2021 78 40 - 150 U/L Final   07/21/2015 108 40 - 150 U/L Final   07/19/2015 119 40 - 150 U/L Final   06/23/2015 152 (H) 50 - 136 U/L Final     ALT   Date Value Ref Range Status   03/21/2022 23 0 - 50 U/L Final   12/03/2021 20 0 - 50 U/L Final    02/11/2019 13 8 - 45 IU/L Final   07/21/2015 30 0 - 50 U/L Final   07/19/2015 39 0 - 50 U/L Final     Recent Labs   Lab Test 03/21/22  1506 12/03/21  1230 03/24/19  1804 02/11/19  0000 07/21/15  0635 07/19/15  2357   WBC 4.7  --  3.5*  --  4.4 10.4   HGB 13.6  --  13.3  --  11.6* 12.9   HCT 42.0  --  41.0  --  36.3 39.0   MCV 88  --  93  --  95 94     --  140*  --  108* 157   BUN  --  14 14  --  9 8   AST 18 17  --  26 30 37   ALT 23 20  --  13 30 39   ALKPHOS  --  78  --   --  108 119     Outside studies reviewed: Dr. Sky Daniel's notes reviewed    Reviewed Rheumatology lab flowsheet    Ana Richey MD  [facility]  Department of Rheumatology & Autoimmune Disorders  [facility]: [phone]   Pager - [phone]

## 2022-03-21 ENCOUNTER — LAB (OUTPATIENT)
Dept: LAB | Facility: CLINIC | Age: 57
End: 2022-03-21
Payer: MEDICARE

## 2022-03-21 DIAGNOSIS — M05.79 RHEUMATOID ARTHRITIS INVOLVING MULTIPLE SITES WITH POSITIVE RHEUMATOID FACTOR (H): ICD-10-CM

## 2022-03-21 LAB
ALBUMIN SERPL-MCNC: 3.8 G/DL (ref 3.4–5)
ALT SERPL W P-5'-P-CCNC: 23 U/L (ref 0–50)
AST SERPL W P-5'-P-CCNC: 18 U/L (ref 0–45)
CREAT SERPL-MCNC: 1.28 MG/DL (ref 0.52–1.04)
CRP SERPL-MCNC: 19 MG/L (ref 0–8)
ERYTHROCYTE [DISTWIDTH] IN BLOOD BY AUTOMATED COUNT: 15.8 % (ref 10–15)
ERYTHROCYTE [SEDIMENTATION RATE] IN BLOOD BY WESTERGREN METHOD: 18 MM/HR (ref 0–30)
GFR SERPL CREATININE-BSD FRML MDRD: 49 ML/MIN/1.73M2
HCT VFR BLD AUTO: 42 % (ref 35–47)
HGB BLD-MCNC: 13.6 G/DL (ref 11.7–15.7)
MCH RBC QN AUTO: 28.6 PG (ref 26.5–33)
MCHC RBC AUTO-ENTMCNC: 32.4 G/DL (ref 31.5–36.5)
MCV RBC AUTO: 88 FL (ref 78–100)
PLATELET # BLD AUTO: 156 10E3/UL (ref 150–450)
RBC # BLD AUTO: 4.76 10E6/UL (ref 3.8–5.2)
WBC # BLD AUTO: 4.7 10E3/UL (ref 4–11)

## 2022-03-21 PROCEDURE — 85027 COMPLETE CBC AUTOMATED: CPT

## 2022-03-21 PROCEDURE — 84450 TRANSFERASE (AST) (SGOT): CPT

## 2022-03-21 PROCEDURE — 36415 COLL VENOUS BLD VENIPUNCTURE: CPT

## 2022-03-21 PROCEDURE — 82040 ASSAY OF SERUM ALBUMIN: CPT

## 2022-03-21 PROCEDURE — 82565 ASSAY OF CREATININE: CPT

## 2022-03-21 PROCEDURE — 85652 RBC SED RATE AUTOMATED: CPT

## 2022-03-21 PROCEDURE — 86140 C-REACTIVE PROTEIN: CPT

## 2022-03-21 PROCEDURE — 84460 ALANINE AMINO (ALT) (SGPT): CPT

## 2022-03-21 NOTE — LETTER
April 27, 2022      Karolina Light  [address] [facility] [address]        Dear ,    We are writing to inform you of your test results.    Your blood work has shown normal CBC, liver test but kidney test is mildly elevated as compared to before.  I will repeat it. Discuss with your primary care provider about elevated creatinine.  CRP marker of inflammation is elevated.  Let us know if you are having any flare like symptoms.     Ana Richey MD       Resulted Orders   CBC with platelets   Result Value Ref Range    WBC Count 4.7 4.0 - 11.0 10e3/uL    RBC Count 4.76 3.80 - 5.20 10e6/uL    Hemoglobin 13.6 11.7 - 15.7 g/dL    Hematocrit 42.0 35.0 - 47.0 %    MCV 88 78 - 100 fL    MCH 28.6 26.5 - 33.0 pg    MCHC 32.4 31.5 - 36.5 g/dL    RDW 15.8 (H) 10.0 - 15.0 %    Platelet Count 156 150 - 450 10e3/uL   AST   Result Value Ref Range    AST 18 0 - 45 U/L   ALT   Result Value Ref Range    ALT 23 0 - 50 U/L   Albumin level   Result Value Ref Range    Albumin 3.8 3.4 - 5.0 g/dL   Creatinine   Result Value Ref Range    Creatinine 1.28 (H) 0.52 - 1.04 mg/dL    GFR Estimate 49 (L) >60 mL/min/1.73m2      Comment:      Effective December 21, 2021 eGFRcr in adults is calculated using the 2021 CKD-EPI creatinine equation which includes age and gender (Chandra baker al., NEJM, DOI: 10.1056/IQWXmr8415337)   CRP inflammation   Result Value Ref Range    CRP Inflammation 19.0 (H) 0.0 - 8.0 mg/L   Erythrocyte sedimentation rate auto   Result Value Ref Range    Erythrocyte Sedimentation Rate 18 0 - 30 mm/hr

## 2022-04-04 ENCOUNTER — OFFICE VISIT (OUTPATIENT)
Dept: CARDIOLOGY | Facility: CLINIC | Age: 57
End: 2022-04-04
Attending: FAMILY MEDICINE
Payer: MEDICARE

## 2022-04-04 VITALS
HEART RATE: 78 BPM | BODY MASS INDEX: 32.26 KG/M2 | HEIGHT: 65 IN | WEIGHT: 193.6 LBS | DIASTOLIC BLOOD PRESSURE: 80 MMHG | SYSTOLIC BLOOD PRESSURE: 132 MMHG | OXYGEN SATURATION: 97 %

## 2022-04-04 DIAGNOSIS — I25.10 CORONARY ARTERY DISEASE INVOLVING NATIVE CORONARY ARTERY OF NATIVE HEART WITHOUT ANGINA PECTORIS: ICD-10-CM

## 2022-04-04 DIAGNOSIS — I85.00 IDIOPATHIC ESOPHAGEAL VARICES WITHOUT BLEEDING (H): ICD-10-CM

## 2022-04-04 DIAGNOSIS — Z11.59 ENCOUNTER FOR SCREENING FOR OTHER VIRAL DISEASES: Primary | ICD-10-CM

## 2022-04-04 DIAGNOSIS — M05.742 RHEUMATOID ARTHRITIS INVOLVING BOTH HANDS WITH POSITIVE RHEUMATOID FACTOR (H): Primary | ICD-10-CM

## 2022-04-04 DIAGNOSIS — E78.5 HYPERLIPIDEMIA LDL GOAL <100: ICD-10-CM

## 2022-04-04 DIAGNOSIS — F32.1 CURRENT MODERATE EPISODE OF MAJOR DEPRESSIVE DISORDER WITHOUT PRIOR EPISODE (H): ICD-10-CM

## 2022-04-04 DIAGNOSIS — M05.741 RHEUMATOID ARTHRITIS INVOLVING BOTH HANDS WITH POSITIVE RHEUMATOID FACTOR (H): Primary | ICD-10-CM

## 2022-04-04 PROCEDURE — 93000 ELECTROCARDIOGRAM COMPLETE: CPT | Performed by: INTERNAL MEDICINE

## 2022-04-04 PROCEDURE — 99204 OFFICE O/P NEW MOD 45 MIN: CPT | Performed by: INTERNAL MEDICINE

## 2022-04-04 RX ORDER — CARVEDILOL 3.12 MG/1
6.25 TABLET ORAL 2 TIMES DAILY WITH MEALS
Qty: 180 TABLET | Refills: 1 | Status: SHIPPED | OUTPATIENT
Start: 2022-04-04 | End: 2022-04-23

## 2022-04-04 RX ORDER — NITROGLYCERIN 0.4 MG/1
TABLET SUBLINGUAL
Qty: 30 TABLET | Refills: 11 | Status: SHIPPED | OUTPATIENT
Start: 2022-04-04

## 2022-04-04 RX ORDER — SULFASALAZINE 500 MG/1
1000 TABLET ORAL DAILY
COMMUNITY
Start: 2022-03-18 | End: 2022-06-24

## 2022-04-04 RX ORDER — ASPIRIN 81 MG/1
81 TABLET, CHEWABLE ORAL DAILY
Status: ON HOLD | COMMUNITY
Start: 2022-04-04 | End: 2022-04-23

## 2022-04-04 NOTE — PROGRESS NOTES
Service Date: 04/04/2022    CARDIOLOGY CONSULTATION     REFERRING PROVIDER:  Harley Covington MD    HISTORY OF PRESENT ILLNESS:  Karolina Light, a 57-year-old woman with coronary artery disease, rheumatoid arthritis, dyslipidemia (statin intolerant), cirrhosis, esophageal varices and an old history of right wrist laceration with ulnar neuropathy, was evaluated in consultation at your request for chest pain.    The patient sustained a non-ST segment elevation MI in 11/2012.  Diagnostic coronary angiography showed a 20% narrowing in the left main with no significant focal narrowing in the LAD, a 70% narrowing in the proximal right coronary and a distal 90% narrowing in the right coronary, felt to be the infarct-related vessel.  The patient underwent implantation of a 2.5 x 38 mm length everolimus-eluting Promus Element stent in the distal right coronary, postdilated to 2.5 mm, and a 2.5 x 38 mm length everolimus-eluting Promus Element stent in the proximal right coronary, postdilated to 2.75 mm.  In 2015, she underwent repeat coronary angiography for chest discomfort and an abnormal nuclear stress test.  There was a 50% proximal in-stent restenosis in the right coronary with a normal iFR.  There were scattered 50% narrowings in the proximal and mid LAD with a normal FFR.  The circumflex had atheromatous change with no significant focal narrowing.  Medical therapy was continued.    In 2019, she was seen at the [facility] for recurrent chest discomfort.  Repeat coronary angiography showed a 75% stenosis in the mid LAD and an 80%-90% in-stent restenosis in the proximal right coronary.  The stent in the distal right coronary remained widely patent.  There was a 50% lesion in the mid circumflex.  FFR in the LAD was 0.79, and a 3.0 x 34 mm length zotarolimus-eluting [place] Resolute stent was placed in the proximal right coronary while a 2.75 x 38 mm length zotarolimus-eluting Juliano stent was placed in the mid  LAD.    The patient was free of symptoms until about 1 month ago, at which time she noted the recurrence of typical anginal symptoms.  She describes substernal chest discomfort that occurs both with activity and at times at rest.  There have been no prolonged episodes thus far.    The patient has been off aspirin ever since she saw her cardiologist in December 12/2021.  She is on no other antiplatelet therapy.  She has been intolerant of statin drugs and has refused PCSK9 inhibitors.    Her rheumatoid arthritis is well controlled on current therapy.  Although she has a history of cirrhosis with esophageal varices, there is no significant drinking history, and the patient has been free of any bleeding.    CARDIAC RISK FACTORS:    1.  Dyslipidemia.  2.  Previous myocardial infarction.  3.  Marked family history of premature coronary artery disease.    No history of diabetes or smoking at this time.  No reports of hypertension.    PAST MEDICAL HISTORY:    1.  Rheumatoid arthritis:  Currently on azathioprine 100 mg daily.  2.  Coronary artery disease.  a.  Non-ST segment elevation MI in 2012.  Implantation of drug-eluting stents in distal posterolateral branch of right coronary and proximal right coronary.  b.  Coronary angiography in 2015 for recurrent chest pain.  Abnormal nuclear stress test.  Invasive evaluation with iFR demonstrating no lesion that warranted intervention at that time.  c.  Presentation with exertional angina in 2019.  Repeat angiography showing 25% narrowing in left main, 75% in-stent restenosis in LAD, 50% circumflex and subtotal proximal right coronary artery narrowing.  Status post implantation of zotarolimus-eluting [employer] [place] stents in the mid LAD and the right coronary within regions of in-stent restenosis.  d.  Recent recurrence of angina.  3.  Dyslipidemia:  Reportedly statin intolerant.  Has been unwilling to try ezetimibe or PCSK9 inhibitors.  4.  Reported history of cirrhosis with  esophageal varices: No active bleeds at this time.  5.  History of right wrist laceration.  Subsequent ulnar nerve damage.    ALLERGIES:  Bee venom causes swelling.  Shellfish cause anaphylaxis.    She has tolerated contrast dye without reaction in the past.    She is allergic to atorvastatin, codeine, methotrexate and clopidogrel.  She has tolerated ticagrelor in the past.    HABITS:  The patient no longer smokes cigarettes.  She does not abuse alcohol.  There is no history of drug use.    SOCIAL HISTORY:  The patient is .  She has 3 children.  She has 3 grandchildren, ages 15, 11 and 7.  She enjoys making chain mail jewelry. Her  has multiple sclerosis, but she and her  enjoy camping.  She has a dog, and she is learning knitting.    REVIEW OF SYSTEMS:  A 12 point review of systems was performed.  Outside the issues mentioned in the HPI, there were no other complaints.    FAMILY HISTORY:  There is a family history of premature coronary artery disease in several brothers.  She has a father who had rheumatoid arthritis.  There is also diabetes in family members.    MEDICATIONS:    1.  Azathioprine 100 mg daily.  2.  Carvedilol 3.125 mg b.i.d.  3.  Sertraline 100 mg daily.  4.  Nitroglycerin sublingual p.r.n.:  Not taking.    PHYSICAL EXAMINATION:    GENERAL:  Exam demonstrates a very pleasant, cooperative and intelligent, 57-year-old woman who appears comfortable at rest.  She is overweight.  VITAL SIGNS:  Her blood pressure is 132/80. Her heart rate is 78.  Her height is 1.65 m.  Her weight is 87.9 kg.  Her BMI is 32.  LUNGS:  Clear to percussion and auscultation.  CARDIOVASCULAR:  Normal S1 with a normal S2.  There is no S3.  There is no murmur, rub or click.  Her pulses are symmetrical in the carotid, radial, brachial, femoral and dorsalis pedis areas.  Her right wrist has evidence of previous surgery with loss of half of the 5th digit.  She has a palpable radial pulse, but I cannot feel a  good ulnar pulse.  She has good left radial and ulnar pulses.  ABDOMEN:  Obesity is present.  Liver percusses to about 7 cm.  Spleen is not palpable.  The aorta is not tender or enlarged.  NEUROLOGIC:  Cranial nerves II-XII are intact.  Strength is equal and symmetrical.  She displays normal insight and judgment.    LABORATORY STUDIES:  Her LDL cholesterol is 118.  Her creatinine is 1.28, her hemoglobin is 13.6, and platelet count is 156,000.  Her ECG shows a normal sinus rhythm with a normal axis and normal intervals.    ASSESSMENT:  This 57-year-old woman with a known history of coronary artery disease, status post multiple previous percutaneous coronary interventions, now presents with progressive angina despite her current medical therapy.  The patient is reportedly intolerant of statins.  She still refuses to consider PCSK9 inhibitors and ezetimibe.  She is on a low dose of carvedilol, which could be safely increased.  I am uncertain why she is off aspirin, but I would resume aspirin 81 daily as long as there are no bleeding complications.  The patient has been intolerant of clopidogrel, but has taken ticagrelor without problems.  Given her previous right wrist injury, we will plan a left radial approach for an angiography.    I would favor diagnostic coronary angiography with possible revascularization.  I have explained to the patient that if she suffered a secondary restenosis at either site in her right or left coronary, it may be better to consider bypass surgery.  Repeat percutaneous intervention has limited options, but brachytherapy and/or laser treatment could be considered as well.    RECOMMENDATIONS:    1.  Increase carvedilol from 3.125-6.25 b.i.d.  2.  Resume aspirin 81 daily.  3.  We will continue to discuss the possibility of taking either ezetimibe or a PCSK9 inhibitor if the patient is statin intolerant.  4.  Diagnostic coronary angiography with possible revascularization via the left radial  artery.  I would plan to use ticagrelor if she receives another stent.  We have discussed the risks of death, myocardial infarction, stroke, hematoma, bleeding, infection, embolus, arrhythmias, in-stent restenosis, the option of medical therapy alone, the risk of peripheral vascular complications and the possibility that a repeat PCI may not be a good option if she has in-stent restenosis.  5.  Mediterranean-style diet.  6.  Regular exercise program.    We appreciate the opportunity to participate in the care of your patient Anup Light.      cc:  [place] [address]    Xu Dias MD        D: 2022   T: 2022   MT: neto    Name:     ANUP LIGHT  MRN:      [id number]        Account:      [id number]   :      1965           Service Date: 2022       Document: [id number]

## 2022-04-04 NOTE — PROGRESS NOTES
HISTORY OF PRESENT ILLNESS:  Last intervention 3/19 for chest pain. Same discomfort beginning last month. One to 2 times per week. Not at night. Occurs one to two times per day. Sits rests and goes away. Doesn't take NTG>. Does not wish  PCSK9 inhibitors. Not on asa or any other antiplatet. RA well controlled. More frequent, progressive.    Allergies intolerant of statins  Hydroxychloroquine methotrexate, codeine    Does not smoke  Very rare etoh. 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    Encounter Diagnoses   Name Primary?     Hyperlipidemia LDL goal <100      Coronary artery disease involving native coronary artery of native heart without angina pectoris        CURRENT MEDICATIONS:  Current Outpatient Medications   Medication Sig Dispense Refill     acetaminophen (TYLENOL) 500 MG tablet Take 1,500 mg by mouth daily as needed (RA flare)       azaTHIOprine (IMURAN) 50 MG tablet Take 2 tablets (100 mg) by mouth daily Labs every 8-12 weeks. 180 tablet 0     carvedilol (COREG) 3.125 MG tablet Take 1 tablet (3.125 mg) by mouth 2 times daily (with meals) 180 tablet 1     nitroglycerin (NITROSTAT) 0.4 MG SL tablet Place 1 tablet (0.4 mg) under the tongue every 5 minutes as needed for chest pain 25 tablet 11     sertraline (ZOLOFT) 100 MG tablet Take 1 tablet (100 mg) by mouth daily 90 tablet 1     sulfaSALAzine (AZULFIDINE) 500 MG tablet Take 1,000 mg by mouth       aspirin EC 81 MG EC tablet Take 1 tablet by mouth daily. (Patient not taking: Reported on  "12/3/2021) 180 tablet 3     vitamin C (ASCORBIC ACID) 250 MG TABS tablet Take 1 tablet by mouth daily (Patient not taking: Reported on 3/18/2022)         ALLERGIES     Allergies   Allergen Reactions     Bee Venom Swelling     Shellfish Allergy Anaphylaxis     Atorvastatin Muscle Pain (Myalgia)     Codeine Sulfate      Methotrexate Other (See Comments)     hepatotxicity     Plavix [Clopidogrel]      Night terrors       PAST MEDICAL, SURGICAL, FAMILY, SOCIAL HISTORY:  History was reviewed and updated as needed, see medical record.    Review of Systems:  A 12-point review of systems was completed, see medical record for detailed review of systems information.    Physical Exam:  Vitals: /80 (BP Location: Right arm, Patient Position: Sitting, Cuff Size: Adult Regular)   Pulse 78   Ht 1.651 m (5' 5\")   Wt 87.8 kg (193 lb 9.6 oz)   SpO2 97%   BMI 32.22 kg/m      Constitutional:           Skin:           Head:           Eyes:           ENT:           Neck:           Chest:           Cardiac:                    Abdomen:           Vascular:                                        Extremities and Back:           Neurological:           ASSESSMENT: progressive angina sp old PCI, statin intolerant does not want PCSK9 inhibitor and resistant to ezetamibe.        RECOMMENDATIONS:  Increase carvedilol 6.25 bid   Resume asa 81 mg  CAG possible PCI      Recent Lab Results:  LIPID RESULTS:  Lab Results   Component Value Date    CHOL 195 12/03/2021    CHOL 186 02/11/2019    HDL 28 (L) 12/03/2021    HDL 27 (L) 02/11/2019     (H) 12/03/2021     02/11/2019    TRIG 244 (H) 12/03/2021    TRIG 207 (H) 02/11/2019    CHOLHDLRATIO 7.2 (H) 07/21/2015       LIVER ENZYME RESULTS:  Lab Results   Component Value Date    AST 18 03/21/2022    AST 26 02/11/2019    ALT 23 03/21/2022    ALT 13 02/11/2019       CBC RESULTS:  Lab Results   Component Value Date    WBC 4.7 03/21/2022    WBC 3.5 (L) 03/24/2019    RBC 4.76 03/21/2022    " RBC 4.39 03/24/2019    HGB 13.6 03/21/2022    HGB 13.3 03/24/2019    HCT 42.0 03/21/2022    HCT 41.0 03/24/2019    MCV 88 03/21/2022    MCV 93 03/24/2019    MCH 28.6 03/21/2022    MCH 30.3 03/24/2019    MCHC 32.4 03/21/2022    MCHC 32.4 03/24/2019    RDW 15.8 (H) 03/21/2022    RDW 15.4 (H) 03/24/2019     03/21/2022     (L) 03/24/2019       BMP RESULTS:  Lab Results   Component Value Date     12/03/2021     03/24/2019    POTASSIUM 4.5 12/03/2021    POTASSIUM 4.1 03/24/2019    CHLORIDE 108 12/03/2021    CHLORIDE 108 03/24/2019    CO2 28 12/03/2021    CO2 24 03/24/2019    ANIONGAP 3 12/03/2021    ANIONGAP 7 03/24/2019    GLC 97 12/03/2021    GLC 94 03/24/2019    BUN 14 12/03/2021    BUN 14 03/24/2019    CR 1.28 (H) 03/21/2022    CR 1.02 03/24/2019    GFRESTIMATED 49 (L) 03/21/2022    GFRESTIMATED 62 03/24/2019    GFRESTBLACK 72 03/24/2019    JUDAH 9.3 12/03/2021    JUDAH 8.7 03/24/2019        A1C RESULTS:  No results found for: A1C    INR RESULTS:  No results found for: INR    We greatly appreciate the opportunity to be involved in the care of your patient, Karolina Light.    Sincerely,  Xu Dias MD      CC  Harley Covington MD  [address] [address]

## 2022-04-04 NOTE — LETTER
4/4/2022    Harley Covington MD  [address] [place] [address]    RE: Karolina Light       Dear Colleague,     I had the pleasure of seeing Karolina Light in the [facility].  HISTORY OF PRESENT ILLNESS:  Last intervention 3/19 for chest pain. Same discomfort beginning last month. One to 2 times per week. Not at night. Occurs one to two times per day. Sits rests and goes away. Doesn't take NTG>. Does not wish  PCSK9 inhibitors. Not on asa or any other antiplatet. RA well controlled. More frequent, progressive.    Allergies intolerant of statins  Hydroxychloroquine methotrexate, codeine    Does not smoke  Very rare etoh. 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    Encounter Diagnoses   Name Primary?     Hyperlipidemia LDL goal <100      Coronary artery disease involving native coronary artery of native heart without angina pectoris        CURRENT MEDICATIONS:  Current Outpatient Medications   Medication Sig Dispense Refill     acetaminophen (TYLENOL) 500 MG tablet Take 1,500 mg by mouth daily as needed (RA flare)       azaTHIOprine (IMURAN) 50 MG tablet Take 2 tablets (100 mg) by mouth daily Labs every 8-12 weeks. 180 tablet 0     carvedilol (COREG) 3.125 MG tablet Take 1 tablet (3.125 mg) by mouth 2 times daily (with meals) 180 tablet 1     nitroglycerin (NITROSTAT) 0.4 MG SL tablet Place 1 tablet (0.4 mg) under the tongue every 5 minutes as needed for chest pain 25 tablet 11     sertraline (ZOLOFT) 100 MG tablet Take  "1 tablet (100 mg) by mouth daily 90 tablet 1     sulfaSALAzine (AZULFIDINE) 500 MG tablet Take 1,000 mg by mouth       aspirin EC 81 MG EC tablet Take 1 tablet by mouth daily. (Patient not taking: Reported on 12/3/2021) 180 tablet 3     vitamin C (ASCORBIC ACID) 250 MG TABS tablet Take 1 tablet by mouth daily (Patient not taking: Reported on 3/18/2022)         ALLERGIES     Allergies   Allergen Reactions     Bee Venom Swelling     Shellfish Allergy Anaphylaxis     Atorvastatin Muscle Pain (Myalgia)     Codeine Sulfate      Methotrexate Other (See Comments)     hepatotxicity     Plavix [Clopidogrel]      Night terrors       PAST MEDICAL, SURGICAL, FAMILY, SOCIAL HISTORY:  History was reviewed and updated as needed, see medical record.    Review of Systems:  A 12-point review of systems was completed, see medical record for detailed review of systems information.    Physical Exam:  Vitals: /80 (BP Location: Right arm, Patient Position: Sitting, Cuff Size: Adult Regular)   Pulse 78   Ht 1.651 m (5' 5\")   Wt 87.8 kg (193 lb 9.6 oz)   SpO2 97%   BMI 32.22 kg/m      Constitutional:           Skin:           Head:           Eyes:           ENT:           Neck:           Chest:           Cardiac:                    Abdomen:           Vascular:                                        Extremities and Back:           Neurological:           ASSESSMENT: progressive angina sp old PCI, statin intolerant does not want PCSK9 inhibitor and resistant to ezetamibe.        RECOMMENDATIONS:  Increase carvedilol 6.25 bid   Resume asa 81 mg  CAG possible PCI      Recent Lab Results:  LIPID RESULTS:  Lab Results   Component Value Date    CHOL 195 12/03/2021    CHOL 186 02/11/2019    HDL 28 (L) 12/03/2021    HDL 27 (L) 02/11/2019     (H) 12/03/2021     02/11/2019    TRIG 244 (H) 12/03/2021    TRIG 207 (H) 02/11/2019    CHOLHDLRATIO 7.2 (H) 07/21/2015       LIVER ENZYME RESULTS:  Lab Results   Component Value Date    " AST 18 03/21/2022    AST 26 02/11/2019    ALT 23 03/21/2022    ALT 13 02/11/2019       CBC RESULTS:  Lab Results   Component Value Date    WBC 4.7 03/21/2022    WBC 3.5 (L) 03/24/2019    RBC 4.76 03/21/2022    RBC 4.39 03/24/2019    HGB 13.6 03/21/2022    HGB 13.3 03/24/2019    HCT 42.0 03/21/2022    HCT 41.0 03/24/2019    MCV 88 03/21/2022    MCV 93 03/24/2019    MCH 28.6 03/21/2022    MCH 30.3 03/24/2019    MCHC 32.4 03/21/2022    MCHC 32.4 03/24/2019    RDW 15.8 (H) 03/21/2022    RDW 15.4 (H) 03/24/2019     03/21/2022     (L) 03/24/2019       BMP RESULTS:  Lab Results   Component Value Date     12/03/2021     03/24/2019    POTASSIUM 4.5 12/03/2021    POTASSIUM 4.1 03/24/2019    CHLORIDE 108 12/03/2021    CHLORIDE 108 03/24/2019    CO2 28 12/03/2021    CO2 24 03/24/2019    ANIONGAP 3 12/03/2021    ANIONGAP 7 03/24/2019    GLC 97 12/03/2021    GLC 94 03/24/2019    BUN 14 12/03/2021    BUN 14 03/24/2019    CR 1.28 (H) 03/21/2022    CR 1.02 03/24/2019    GFRESTIMATED 49 (L) 03/21/2022    GFRESTIMATED 62 03/24/2019    GFRESTBLACK 72 03/24/2019    JUDAH 9.3 12/03/2021    JUDAH 8.7 03/24/2019        A1C RESULTS:  No results found for: A1C    INR RESULTS:  No results found for: INR    We greatly appreciate the opportunity to be involved in the care of your patient, Karolina Light.    Sincerely,  Xu Dias MD        Harley Covington MD  [address] [place] [address]

## 2022-04-19 ENCOUNTER — LAB (OUTPATIENT)
Dept: LAB | Facility: CLINIC | Age: 57
End: 2022-04-19
Attending: INTERNAL MEDICINE
Payer: MEDICARE

## 2022-04-19 DIAGNOSIS — Z11.59 ENCOUNTER FOR SCREENING FOR OTHER VIRAL DISEASES: ICD-10-CM

## 2022-04-19 LAB — SARS-COV-2 RNA RESP QL NAA+PROBE: NEGATIVE

## 2022-04-19 PROCEDURE — U0005 INFEC AGEN DETEC AMPLI PROBE: HCPCS

## 2022-04-19 PROCEDURE — U0003 INFECTIOUS AGENT DETECTION BY NUCLEIC ACID (DNA OR RNA); SEVERE ACUTE RESPIRATORY SYNDROME CORONAVIRUS 2 (SARS-COV-2) (CORONAVIRUS DISEASE [COVID-19]), AMPLIFIED PROBE TECHNIQUE, MAKING USE OF HIGH THROUGHPUT TECHNOLOGIES AS DESCRIBED BY CMS-2020-01-R: HCPCS

## 2022-04-19 NOTE — RESULT ENCOUNTER NOTE
Amy Turcios,    Your blood work has shown normal CBC, liver test but kidney test is mildly elevated as compared to before.  I will repeat it. Discuss with your primary care provider about elevated creatinine.  CRP marker of inflammation is elevated.  Let us know if you are having any flare like symptoms.    Ana Richey MD

## 2022-04-21 ENCOUNTER — HOSPITAL ENCOUNTER (OUTPATIENT)
Facility: CLINIC | Age: 57
Discharge: HOME | End: 2022-04-21
Admitting: INTERNAL MEDICINE
Payer: MEDICARE

## 2022-04-21 VITALS
RESPIRATION RATE: 16 BRPM | HEART RATE: 78 BPM | SYSTOLIC BLOOD PRESSURE: 144 MMHG | DIASTOLIC BLOOD PRESSURE: 84 MMHG | OXYGEN SATURATION: 100 %

## 2022-04-21 DIAGNOSIS — R07.9 CHEST PAIN, UNSPECIFIED TYPE: ICD-10-CM

## 2022-04-21 DIAGNOSIS — I20.0 WORSENING ANGINA (H): ICD-10-CM

## 2022-04-21 DIAGNOSIS — I25.10 CORONARY ARTERY DISEASE INVOLVING NATIVE CORONARY ARTERY OF NATIVE HEART WITHOUT ANGINA PECTORIS: Primary | ICD-10-CM

## 2022-04-21 DIAGNOSIS — E78.5 HYPERLIPIDEMIA LDL GOAL <100: ICD-10-CM

## 2022-04-21 DIAGNOSIS — I25.10 CORONARY ARTERY DISEASE INVOLVING NATIVE CORONARY ARTERY OF NATIVE HEART WITHOUT ANGINA PECTORIS: ICD-10-CM

## 2022-04-21 DIAGNOSIS — I20.89 STABLE ANGINA PECTORIS (H): Primary | ICD-10-CM

## 2022-04-21 PROBLEM — Z98.890 STATUS POST CORONARY ANGIOGRAM: Status: ACTIVE | Noted: 2022-04-21

## 2022-04-21 LAB
ACT BLD: 292 SECONDS (ref 74–150)
ANION GAP SERPL CALCULATED.3IONS-SCNC: 5 MMOL/L (ref 3–14)
APTT PPP: 30 SECONDS (ref 22–38)
BUN SERPL-MCNC: 12 MG/DL (ref 7–30)
CALCIUM SERPL-MCNC: 9 MG/DL (ref 8.5–10.1)
CHLORIDE BLD-SCNC: 108 MMOL/L (ref 94–109)
CHOLEST SERPL-MCNC: 182 MG/DL
CO2 SERPL-SCNC: 27 MMOL/L (ref 20–32)
CREAT SERPL-MCNC: 1.05 MG/DL (ref 0.52–1.04)
ERYTHROCYTE [DISTWIDTH] IN BLOOD BY AUTOMATED COUNT: 15.6 % (ref 10–15)
GFR SERPL CREATININE-BSD FRML MDRD: 61 ML/MIN/1.73M2
GLUCOSE BLD-MCNC: 102 MG/DL (ref 70–99)
HCT VFR BLD AUTO: 41.5 % (ref 35–47)
HDLC SERPL-MCNC: 22 MG/DL
HGB BLD-MCNC: 13.3 G/DL (ref 11.7–15.7)
INR PPP: 1.06 (ref 0.85–1.15)
LDLC SERPL CALC-MCNC: 107 MG/DL
MAGNESIUM SERPL-MCNC: 2.6 MG/DL (ref 1.6–2.3)
MCH RBC QN AUTO: 28.4 PG (ref 26.5–33)
MCHC RBC AUTO-ENTMCNC: 32 G/DL (ref 31.5–36.5)
MCV RBC AUTO: 89 FL (ref 78–100)
NONHDLC SERPL-MCNC: 160 MG/DL
PLATELET # BLD AUTO: 118 10E3/UL (ref 150–450)
POTASSIUM BLD-SCNC: 3.9 MMOL/L (ref 3.4–5.3)
RBC # BLD AUTO: 4.68 10E6/UL (ref 3.8–5.2)
SODIUM SERPL-SCNC: 140 MMOL/L (ref 133–144)
TRIGL SERPL-MCNC: 264 MG/DL
WBC # BLD AUTO: 3.2 10E3/UL (ref 4–11)

## 2022-04-21 PROCEDURE — 258N000003 HC RX IP 258 OP 636: Performed by: INTERNAL MEDICINE

## 2022-04-21 PROCEDURE — 272N000001 HC OR GENERAL SUPPLY STERILE: Performed by: INTERNAL MEDICINE

## 2022-04-21 PROCEDURE — C1887 CATHETER, GUIDING: HCPCS | Performed by: INTERNAL MEDICINE

## 2022-04-21 PROCEDURE — C1753 CATH, INTRAVAS ULTRASOUND: HCPCS | Performed by: INTERNAL MEDICINE

## 2022-04-21 PROCEDURE — 85014 HEMATOCRIT: CPT | Performed by: INTERNAL MEDICINE

## 2022-04-21 PROCEDURE — 92978 ENDOLUMINL IVUS OCT C 1ST: CPT | Mod: 26 | Performed by: INTERNAL MEDICINE

## 2022-04-21 PROCEDURE — 99152 MOD SED SAME PHYS/QHP 5/>YRS: CPT | Performed by: INTERNAL MEDICINE

## 2022-04-21 PROCEDURE — C9600 PERC DRUG-EL COR STENT SING: HCPCS | Performed by: INTERNAL MEDICINE

## 2022-04-21 PROCEDURE — 250N000011 HC RX IP 250 OP 636: Performed by: INTERNAL MEDICINE

## 2022-04-21 PROCEDURE — 80061 LIPID PANEL: CPT | Performed by: INTERNAL MEDICINE

## 2022-04-21 PROCEDURE — 85610 PROTHROMBIN TIME: CPT | Performed by: INTERNAL MEDICINE

## 2022-04-21 PROCEDURE — 92978 ENDOLUMINL IVUS OCT C 1ST: CPT | Performed by: INTERNAL MEDICINE

## 2022-04-21 PROCEDURE — 93010 ELECTROCARDIOGRAM REPORT: CPT | Performed by: INTERNAL MEDICINE

## 2022-04-21 PROCEDURE — 85347 COAGULATION TIME ACTIVATED: CPT

## 2022-04-21 PROCEDURE — 36415 COLL VENOUS BLD VENIPUNCTURE: CPT | Performed by: INTERNAL MEDICINE

## 2022-04-21 PROCEDURE — 93454 CORONARY ARTERY ANGIO S&I: CPT | Mod: XU | Performed by: INTERNAL MEDICINE

## 2022-04-21 PROCEDURE — 85730 THROMBOPLASTIN TIME PARTIAL: CPT | Performed by: INTERNAL MEDICINE

## 2022-04-21 PROCEDURE — C1725 CATH, TRANSLUMIN NON-LASER: HCPCS | Performed by: INTERNAL MEDICINE

## 2022-04-21 PROCEDURE — 93571 IV DOP VEL&/PRESS C FLO 1ST: CPT | Performed by: INTERNAL MEDICINE

## 2022-04-21 PROCEDURE — 250N000009 HC RX 250: Performed by: INTERNAL MEDICINE

## 2022-04-21 PROCEDURE — C1894 INTRO/SHEATH, NON-LASER: HCPCS | Performed by: INTERNAL MEDICINE

## 2022-04-21 PROCEDURE — 93571 IV DOP VEL&/PRESS C FLO 1ST: CPT | Mod: 26 | Performed by: INTERNAL MEDICINE

## 2022-04-21 PROCEDURE — 250N000013 HC RX MED GY IP 250 OP 250 PS 637: Performed by: INTERNAL MEDICINE

## 2022-04-21 PROCEDURE — C1769 GUIDE WIRE: HCPCS | Performed by: INTERNAL MEDICINE

## 2022-04-21 PROCEDURE — C1874 STENT, COATED/COV W/DEL SYS: HCPCS | Performed by: INTERNAL MEDICINE

## 2022-04-21 PROCEDURE — 83735 ASSAY OF MAGNESIUM: CPT | Performed by: INTERNAL MEDICINE

## 2022-04-21 PROCEDURE — 99153 MOD SED SAME PHYS/QHP EA: CPT | Performed by: INTERNAL MEDICINE

## 2022-04-21 PROCEDURE — 92928 PRQ TCAT PLMT NTRAC ST 1 LES: CPT | Mod: RC | Performed by: INTERNAL MEDICINE

## 2022-04-21 PROCEDURE — 250N000013 HC RX MED GY IP 250 OP 250 PS 637

## 2022-04-21 PROCEDURE — 80048 BASIC METABOLIC PNL TOTAL CA: CPT | Performed by: INTERNAL MEDICINE

## 2022-04-21 DEVICE — STENT RESOLUTE ONYX DE 2.7FR 3.50X38MM RONYX DE35038UX: Type: IMPLANTABLE DEVICE | Status: FUNCTIONAL

## 2022-04-21 DEVICE — STENT RESOLUTE ONYX DE 2.7FR 3.00X12MM RONYX DE30012UX: Type: IMPLANTABLE DEVICE | Status: FUNCTIONAL

## 2022-04-21 RX ORDER — NALOXONE HYDROCHLORIDE 0.4 MG/ML
0.4 INJECTION, SOLUTION INTRAMUSCULAR; INTRAVENOUS; SUBCUTANEOUS
Status: DISCONTINUED | OUTPATIENT
Start: 2022-04-21 | End: 2022-04-21 | Stop reason: HOSPADM

## 2022-04-21 RX ORDER — ASPIRIN 325 MG
325 TABLET ORAL ONCE
Status: DISCONTINUED | OUTPATIENT
Start: 2022-04-21 | End: 2022-04-21

## 2022-04-21 RX ORDER — POTASSIUM CHLORIDE 750 MG/1
20 TABLET, EXTENDED RELEASE ORAL
Status: CANCELLED | OUTPATIENT
Start: 2022-04-21

## 2022-04-21 RX ORDER — ACETAMINOPHEN 325 MG/1
650 TABLET ORAL EVERY 4 HOURS PRN
Status: DISCONTINUED | OUTPATIENT
Start: 2022-04-21 | End: 2022-04-21 | Stop reason: HOSPADM

## 2022-04-21 RX ORDER — ATROPINE SULFATE 0.1 MG/ML
0.5 INJECTION INTRAVENOUS
Status: DISCONTINUED | OUTPATIENT
Start: 2022-04-21 | End: 2022-04-21 | Stop reason: HOSPADM

## 2022-04-21 RX ORDER — OXYCODONE HYDROCHLORIDE 5 MG/1
5 TABLET ORAL EVERY 4 HOURS PRN
Status: DISCONTINUED | OUTPATIENT
Start: 2022-04-21 | End: 2022-04-21 | Stop reason: HOSPADM

## 2022-04-21 RX ORDER — FENTANYL CITRATE 50 UG/ML
INJECTION, SOLUTION INTRAMUSCULAR; INTRAVENOUS
Status: DISCONTINUED | OUTPATIENT
Start: 2022-04-21 | End: 2022-04-21 | Stop reason: HOSPADM

## 2022-04-21 RX ORDER — NITROGLYCERIN 5 MG/ML
VIAL (ML) INTRAVENOUS
Status: DISCONTINUED
Start: 2022-04-21 | End: 2022-04-21 | Stop reason: HOSPADM

## 2022-04-21 RX ORDER — VERAPAMIL HYDROCHLORIDE 2.5 MG/ML
INJECTION, SOLUTION INTRAVENOUS
Status: DISCONTINUED
Start: 2022-04-21 | End: 2022-04-21 | Stop reason: HOSPADM

## 2022-04-21 RX ORDER — DOPAMINE HYDROCHLORIDE 160 MG/100ML
2-20 INJECTION, SOLUTION INTRAVENOUS CONTINUOUS PRN
Status: DISCONTINUED | OUTPATIENT
Start: 2022-04-21 | End: 2022-04-21

## 2022-04-21 RX ORDER — FLUMAZENIL 0.1 MG/ML
0.2 INJECTION, SOLUTION INTRAVENOUS
Status: DISCONTINUED | OUTPATIENT
Start: 2022-04-21 | End: 2022-04-21 | Stop reason: HOSPADM

## 2022-04-21 RX ORDER — ASPIRIN 325 MG
325 TABLET ORAL ONCE
Status: CANCELLED | OUTPATIENT
Start: 2022-04-21 | End: 2022-04-21

## 2022-04-21 RX ORDER — ARGATROBAN 1 MG/ML
150 INJECTION, SOLUTION INTRAVENOUS
Status: DISCONTINUED | OUTPATIENT
Start: 2022-04-21 | End: 2022-04-21

## 2022-04-21 RX ORDER — IOPAMIDOL 755 MG/ML
INJECTION, SOLUTION INTRAVASCULAR
Status: DISCONTINUED | OUTPATIENT
Start: 2022-04-21 | End: 2022-04-21 | Stop reason: HOSPADM

## 2022-04-21 RX ORDER — FENTANYL CITRATE 50 UG/ML
INJECTION, SOLUTION INTRAMUSCULAR; INTRAVENOUS
Status: DISCONTINUED
Start: 2022-04-21 | End: 2022-04-21 | Stop reason: HOSPADM

## 2022-04-21 RX ORDER — METOPROLOL TARTRATE 1 MG/ML
5 INJECTION, SOLUTION INTRAVENOUS
Status: DISCONTINUED | OUTPATIENT
Start: 2022-04-21 | End: 2022-04-21 | Stop reason: HOSPADM

## 2022-04-21 RX ORDER — LIDOCAINE HYDROCHLORIDE 10 MG/ML
INJECTION, SOLUTION EPIDURAL; INFILTRATION; INTRACAUDAL; PERINEURAL
Status: DISCONTINUED
Start: 2022-04-21 | End: 2022-04-21 | Stop reason: HOSPADM

## 2022-04-21 RX ORDER — ASPIRIN 81 MG/1
81 TABLET ORAL DAILY
Status: DISCONTINUED | OUTPATIENT
Start: 2022-04-22 | End: 2022-04-21 | Stop reason: HOSPADM

## 2022-04-21 RX ORDER — LIDOCAINE 40 MG/G
CREAM TOPICAL
Status: DISCONTINUED | OUTPATIENT
Start: 2022-04-21 | End: 2022-04-21

## 2022-04-21 RX ORDER — HEPARIN SODIUM 1000 [USP'U]/ML
INJECTION, SOLUTION INTRAVENOUS; SUBCUTANEOUS
Status: DISCONTINUED
Start: 2022-04-21 | End: 2022-04-21 | Stop reason: HOSPADM

## 2022-04-21 RX ORDER — HYDRALAZINE HYDROCHLORIDE 20 MG/ML
10 INJECTION INTRAMUSCULAR; INTRAVENOUS EVERY 4 HOURS PRN
Status: DISCONTINUED | OUTPATIENT
Start: 2022-04-21 | End: 2022-04-21 | Stop reason: HOSPADM

## 2022-04-21 RX ORDER — SODIUM CHLORIDE 9 MG/ML
INJECTION, SOLUTION INTRAVENOUS CONTINUOUS
Status: DISCONTINUED | OUTPATIENT
Start: 2022-04-21 | End: 2022-04-21 | Stop reason: HOSPADM

## 2022-04-21 RX ORDER — NALOXONE HYDROCHLORIDE 0.4 MG/ML
0.2 INJECTION, SOLUTION INTRAMUSCULAR; INTRAVENOUS; SUBCUTANEOUS
Status: DISCONTINUED | OUTPATIENT
Start: 2022-04-21 | End: 2022-04-21 | Stop reason: HOSPADM

## 2022-04-21 RX ORDER — ASPIRIN 81 MG/1
243 TABLET, CHEWABLE ORAL ONCE
Status: DISCONTINUED | OUTPATIENT
Start: 2022-04-21 | End: 2022-04-21

## 2022-04-21 RX ORDER — FENTANYL CITRATE 50 UG/ML
25 INJECTION, SOLUTION INTRAMUSCULAR; INTRAVENOUS
Status: DISCONTINUED | OUTPATIENT
Start: 2022-04-21 | End: 2022-04-21 | Stop reason: HOSPADM

## 2022-04-21 RX ORDER — SODIUM CHLORIDE 9 MG/ML
INJECTION, SOLUTION INTRAVENOUS CONTINUOUS
Status: DISCONTINUED | OUTPATIENT
Start: 2022-04-21 | End: 2022-04-21

## 2022-04-21 RX ORDER — ARGATROBAN 1 MG/ML
350 INJECTION, SOLUTION INTRAVENOUS
Status: DISCONTINUED | OUTPATIENT
Start: 2022-04-21 | End: 2022-04-21

## 2022-04-21 RX ORDER — HEPARIN SODIUM 10000 [USP'U]/100ML
100-1000 INJECTION, SOLUTION INTRAVENOUS CONTINUOUS PRN
Status: DISCONTINUED | OUTPATIENT
Start: 2022-04-21 | End: 2022-04-21

## 2022-04-21 RX ORDER — ONDANSETRON 2 MG/ML
4 INJECTION INTRAMUSCULAR; INTRAVENOUS EVERY 6 HOURS PRN
Status: DISCONTINUED | OUTPATIENT
Start: 2022-04-21 | End: 2022-04-21 | Stop reason: HOSPADM

## 2022-04-21 RX ORDER — ASPIRIN 81 MG/1
243 TABLET, CHEWABLE ORAL ONCE
Status: CANCELLED | OUTPATIENT
Start: 2022-04-21

## 2022-04-21 RX ORDER — POTASSIUM CHLORIDE 1500 MG/1
20 TABLET, EXTENDED RELEASE ORAL
Status: COMPLETED | OUTPATIENT
Start: 2022-04-21 | End: 2022-04-21

## 2022-04-21 RX ORDER — EPTIFIBATIDE 2 MG/ML
180 INJECTION, SOLUTION INTRAVENOUS EVERY 10 MIN PRN
Status: DISCONTINUED | OUTPATIENT
Start: 2022-04-21 | End: 2022-04-21

## 2022-04-21 RX ORDER — DOBUTAMINE HYDROCHLORIDE 200 MG/100ML
2-20 INJECTION INTRAVENOUS CONTINUOUS PRN
Status: DISCONTINUED | OUTPATIENT
Start: 2022-04-21 | End: 2022-04-21

## 2022-04-21 RX ORDER — SODIUM CHLORIDE 9 MG/ML
INJECTION, SOLUTION INTRAVENOUS CONTINUOUS
Status: CANCELLED | OUTPATIENT
Start: 2022-04-21

## 2022-04-21 RX ORDER — ASPIRIN 81 MG/1
81 TABLET, CHEWABLE ORAL ONCE
Status: DISCONTINUED | OUTPATIENT
Start: 2022-04-21 | End: 2022-04-21 | Stop reason: HOSPADM

## 2022-04-21 RX ORDER — NITROGLYCERIN 0.4 MG/1
0.4 TABLET SUBLINGUAL EVERY 5 MIN PRN
Status: DISCONTINUED | OUTPATIENT
Start: 2022-04-21 | End: 2022-04-21 | Stop reason: HOSPADM

## 2022-04-21 RX ORDER — OXYCODONE HYDROCHLORIDE 5 MG/1
10 TABLET ORAL EVERY 4 HOURS PRN
Status: DISCONTINUED | OUTPATIENT
Start: 2022-04-21 | End: 2022-04-21 | Stop reason: HOSPADM

## 2022-04-21 RX ORDER — POTASSIUM CHLORIDE 1500 MG/1
TABLET, EXTENDED RELEASE ORAL
Status: COMPLETED
Start: 2022-04-21 | End: 2022-04-21

## 2022-04-21 RX ORDER — LIDOCAINE 40 MG/G
CREAM TOPICAL
Status: CANCELLED | OUTPATIENT
Start: 2022-04-21

## 2022-04-21 RX ORDER — ONDANSETRON 4 MG/1
4 TABLET, ORALLY DISINTEGRATING ORAL EVERY 6 HOURS PRN
Status: DISCONTINUED | OUTPATIENT
Start: 2022-04-21 | End: 2022-04-21 | Stop reason: HOSPADM

## 2022-04-21 RX ORDER — NITROGLYCERIN 5 MG/ML
VIAL (ML) INTRAVENOUS
Status: DISCONTINUED | OUTPATIENT
Start: 2022-04-21 | End: 2022-04-21 | Stop reason: HOSPADM

## 2022-04-21 RX ORDER — HEPARIN SODIUM 1000 [USP'U]/ML
INJECTION, SOLUTION INTRAVENOUS; SUBCUTANEOUS
Status: DISCONTINUED | OUTPATIENT
Start: 2022-04-21 | End: 2022-04-21 | Stop reason: HOSPADM

## 2022-04-21 RX ADMIN — POTASSIUM CHLORIDE 20 MEQ: 1500 TABLET, EXTENDED RELEASE ORAL at 10:59

## 2022-04-21 RX ADMIN — SODIUM CHLORIDE: 9 INJECTION, SOLUTION INTRAVENOUS at 11:00

## 2022-04-21 NOTE — DISCHARGE INSTRUCTIONS
"  Discharge Instructions for Cardiac Catheterization   Cardiac catheterization is an invasive procedure to evaluate for certain heart problems involving the hearts chambers, valves, and blood vessels.  A thin, flexible tube (catheter) is put in a blood vessel in your groin or arm. Once the catheter is advanced into the heart measurements can be taken to assess blood flow, pressure, and oxygen. The healthcare provider can inject contrast fluid into your blood, which then flows to your heart. X-rays pictures can then be taken of your heart.  Often \"coronary angiography\" is performed as part of a cardiac catheterization which looks for blocked areas in the arteries that send blood to the heart. If a significant blockage is found your doctor may attempt to open up the artery which often involves placing a stent. Your provider will review the results of your procedure with you. Be sure to ask any questions you have before you leave. This sheet will help you take care of yourself at home.  Home care  Don't drive or make any important decisions for at least 24 hours after getting any type of sedation or anesthesia.   Arrange to have a responsible adult drive you home after your procedure.  Only do light and easy activities for the next  2 to 3 days. Ask for help with chores and errands while you recover. Have someone drive you to your appointments.  Don't lift anything heavy (No More than 10 pounds) for 3 days.  Ask your healthcare team when you can expect to return to work. Unless your job involves lifting, you may be able to return to your normal activities within a couple of days.  Take your medicines as directed. Don't skip doses.  Drink  6 to 8 glasses of water a day. This is to help flush the contrast dye out of your body. Call your healthcare team if your urine has any change in color.  Take your temperature each day for 7 days. If you feel cold and clammy or start sweating, take your temperature right away and call " your healthcare team.  Check your incisions every day for signs of infection. These include redness, swelling, and drainage. It's normal to have a small bruise or bump where the catheter was inserted. A bruise that's getting larger is not normal and should be reported to your healthcare team. If you see blood forming in the incision, call your healthcare team. Go to the emergency department if you have uncontrolled bleeding from the artery site. This is especially true if you take medicines that make it hard for your blood to clot. Examples are aspirin, clopidogrel, and warfarin.  Eat a healthy diet. Make sure it's low in fat, salt, and cholesterol. Ask your healthcare team for diet information.  Stop smoking. Enroll in a stop-smoking program or ask your healthcare team for help. Stop-smoking programs can be life saving.  Exercise as your healthcare team tells you to. Your healthcare team may recommend you start a cardiac rehabilitation program. Cardiac rehab is an exercise program in which trained healthcare staff watch your progress and stress on your heart while you exercise. Ask your team how to enroll.  Don't swim or take baths until your healthcare team says it s OK. You can shower the day after the procedure. Keep the site clean and dry. This keeps the incision from getting wet and infected until the skin and artery can heal.  Be sure to follow all after-care instructions.     Follow-up care  Make a follow-up appointment as advised by our staff. It's common to have a follow-up appointment 2 to 4 weeks after an angioplasty or coronary stent procedure.  Make a yearly appointment, too. This is to make sure you are still doing well and not having any new symptoms.  Don't wait for a follow-up appointment if your medicines aren't working or you are having heart-related symptoms.    When to seek medical care  Call your healthcare provider right away if you have any of the following:  Chest pain  Constant or  increasing pain or numbness in your leg  Fever of 100.4  F ( 38.0 C) or higher, or as directed by your healthcare provider  Symptoms of infection. These include redness, swelling, drainage, or warmth at the incision site.  Shortness of breath  A leg that feels cold or appears blue  Bleeding, bruising, or a lot of swelling where the catheter was inserted  Blood in your urine  Black or tarry stools  Any unusual bleeding  Melissa last reviewed this educational content on 10/1/2019    4330-2761 [employer]. All rights reserved. This information is not intended as a substitute for professional medical care. Always follow your healthcare professional's instructions.

## 2022-04-21 NOTE — IP AVS SNAPSHOT
After Visit Summary Template Not Found    This Print Group is only intended to be used in the After Visit Summary and can only be used in a report that uses a released After Visit Summary Template.                       MRN:[id number]                          After Visit Summary   4/21/2022    Karolina Light   MRN: [id number]           Visit Information        Department      4/21/2022  9:55 AM [facility]          Review of your medicines      UNREVIEWED medicines. Ask your doctor about these medicines       Dose / Directions   acetaminophen 500 MG tablet  Commonly known as: TYLENOL      Dose: 1,500 mg  Take 1,500 mg by mouth daily as needed (RA flare)  Refills: 0     * aspirin 81 MG EC tablet  Commonly known as: ASA  Used for: ACS (acute coronary syndrome) (H)  Ask about: Which instructions should I use?      Dose: 81 mg  Take 1 tablet by mouth daily.  Quantity: 180 tablet  Refills: 3     * aspirin 81 MG chewable tablet  Commonly known as: ASA  Used for: Hyperlipidemia LDL goal <100, Coronary artery disease involving native coronary artery of native heart without angina pectoris  Ask about: Which instructions should I use?      Dose: 81 mg  Take 1 tablet (81 mg) by mouth daily  Refills: 0     * aspirin 81 MG EC tablet  Commonly known as: ASA  Used for: Stable angina pectoris (H)  Ask about: Which instructions should I use?      Dose: 81 mg  Start taking on: April 22, 2022  Take 1 tablet (81 mg) by mouth daily Start tomorrow.  Quantity: 30 tablet  Refills: 3     azaTHIOprine 50 MG tablet  Commonly known as: IMURAN  Used for: Rheumatoid arthritis involving multiple sites with positive rheumatoid factor (H)      Dose: 100 mg  Take 2 tablets (100 mg) by mouth daily Labs every 8-12 weeks.  Quantity: 180 tablet  Refills: 0     carvedilol 3.125 MG tablet  Commonly known as: COREG  Used for: Coronary artery disease involving native coronary artery of native heart without angina  pectoris      Dose: 6.25 mg  Take 2 tablets (6.25 mg) by mouth 2 times daily (with meals)  Quantity: 180 tablet  Refills: 1     * nitroGLYcerin 0.4 MG sublingual tablet  Commonly known as: NITROSTAT  Used for: ACS (acute coronary syndrome) (H)      Dose: 0.4 mg  Place 1 tablet (0.4 mg) under the tongue every 5 minutes as needed for chest pain  Quantity: 25 tablet  Refills: 11     * nitroGLYcerin 0.4 MG sublingual tablet  Commonly known as: NITROSTAT  Used for: Hyperlipidemia LDL goal <100, Coronary artery disease involving native coronary artery of native heart without angina pectoris      For chest pain place 1 tablet under the tongue every 5 minutes for 3 doses. If symptoms persist 5 minutes after 1st dose call 911.  Quantity: 30 tablet  Refills: 11     sertraline 100 MG tablet  Commonly known as: ZOLOFT  Used for: Current moderate episode of major depressive disorder without prior episode (H)      Dose: 100 mg  Take 1 tablet (100 mg) by mouth daily  Quantity: 90 tablet  Refills: 1     sulfaSALAzine 500 MG tablet  Commonly known as: AZULFIDINE      Dose: 1,000 mg  Take 1,000 mg by mouth  Refills: 0     vitamin C 250 MG tablet  Commonly known as: ASCORBIC ACID      Dose: 1 tablet  Take 1 tablet by mouth daily  Refills: 0         * This list has 5 medication(s) that are the same as other medications prescribed for you. Read the directions carefully, and ask your doctor or other care provider to review them with you.            START taking       Dose / Directions   ticagrelor 90 MG tablet  Commonly known as: BRILINTA  Used for: Stable angina pectoris (H)      Dose: 90 mg  Take 1 tablet (90 mg) by mouth 2 times daily Start this evening.  Quantity: 180 tablet  Refills: 3           Where to get your medicines      Some of these will need a paper prescription and others can be bought over the counter. Ask your nurse if you have questions.    Bring a paper prescription for each of these medications  aspirin 81 MG EC  tablet  ticagrelor 90 MG tablet           Prescriptions were sent or printed at these locations (2 Prescriptions)            Other Prescriptions                Printed at Department/Unit printer (2 of 2)         aspirin (ASA) 81 MG EC tablet               ticagrelor (BRILINTA) 90 MG tablet                Protect others around you: Learn how to safely use, store and throw away your medicines at www.disposemymeds.org.       Follow-ups after your visit       Additional Services     CARDIAC REHAB REFERRAL      Please be aware that coverage of these services is subject to the terms and limitations of your health insurance plan.  Call member services at your health plan with any benefit or coverage questions.     Order is sent electronically to central rehab scheduling. Call [phone] if you haven't been contacted regarding these appointments within 2 business days of discharge.  If you have not heard from the scheduling office within 2 business days, please call [phone] for [facility], [phone] for [place] and [phone] for [place].         Preferred Location: [facility]    Scheduling Instructions: If you have not heard from the scheduling office within 2 business days, please call [phone] for [facility], [phone] for [place] and [phone] for [place].    Which Procedure did the patient have?: PCI    Special Programs: Outpatient Phase II Cardiac Rehab Program      Your next 10 appointments already scheduled    Apr 27, 2022  1:30 PM  Return Cardiology with Scot Soto PA-C  [facility] ([facility] ) [address] [id number] [phone]        May 05, 2022  8:15 AM  (Arrive by 8:00 AM)  MOHS with Xu White MD  [facility] ([facility] ) [address]  "[id number]  [phone]        Jun 03, 2022 10:30 AM  (Arrive by 10:10 AM)  Provider Visit with Harley Covington MD  [facility] ([facility] ) [address] [facility] [address] [phone]   Please plan to arrive at the clinic at your \"Arrive By\" time for your appointment. Our late policy will be enforced based on this time.       Aug 18, 2022 12:30 PM  Return Visit with Ana Richey MD  [facility] [place] [facility] [address] [address] [phone]           Care Instructions       After Care Instructions     Discharge Instructions - IF on Metformin (Glucophage or Glucovance) or Metformin containing medications on day of the procedure and for 48 hours after IV contrast given- Patients with acute kidney injury or severe chronic kidney disease (stage IV or stage V; i.e., eGFR less than 30)      IF on Metformin (Glucophage or Glucovance) or Metformin containing medications , schedule a Basic Metabolic Panel at [facility] or Primary Clinic in 48 - 72 hours post procedure and PRIOR TO resuming the Metformin or Metformin containing medications.  Hold Metformin (Glucophage or Glucovance) or Metformin containing medications until after the Basic Metabolic Panel on the 2nd or 3rd day following the procedure.  May resume after blood draw is complete.         Reason Lipid Lowering Medications not prescribed from this order set        Further instructions from your care team         Discharge Instructions for Cardiac Catheterization   Cardiac catheterization is an invasive procedure to evaluate for certain heart problems involving the hearts chambers, valves, and blood vessels.  A thin, flexible tube (catheter) is put in a blood vessel in your groin or arm. Once the catheter is advanced into the heart measurements can be taken to assess blood flow, pressure, and oxygen. " "The healthcare provider can inject contrast fluid into your blood, which then flows to your heart. X-rays pictures can then be taken of your heart.  Often \"coronary angiography\" is performed as part of a cardiac catheterization which looks for blocked areas in the arteries that send blood to the heart. If a significant blockage is found your doctor may attempt to open up the artery which often involves placing a stent. Your provider will review the results of your procedure with you. Be sure to ask any questions you have before you leave. This sheet will help you take care of yourself at home.  Home care  Don't drive or make any important decisions for at least 24 hours after getting any type of sedation or anesthesia.   Arrange to have a responsible adult drive you home after your procedure.  Only do light and easy activities for the next  2 to 3 days. Ask for help with chores and errands while you recover. Have someone drive you to your appointments.  Don't lift anything heavy (No More than 10 pounds) for 3 days.  Ask your healthcare team when you can expect to return to work. Unless your job involves lifting, you may be able to return to your normal activities within a couple of days.  Take your medicines as directed. Don't skip doses.  Drink  6 to 8 glasses of water a day. This is to help flush the contrast dye out of your body. Call your healthcare team if your urine has any change in color.  Take your temperature each day for 7 days. If you feel cold and clammy or start sweating, take your temperature right away and call your healthcare team.  Check your incisions every day for signs of infection. These include redness, swelling, and drainage. It's normal to have a small bruise or bump where the catheter was inserted. A bruise that's getting larger is not normal and should be reported to your healthcare team. If you see blood forming in the incision, call your healthcare team. Go to the emergency department if " you have uncontrolled bleeding from the artery site. This is especially true if you take medicines that make it hard for your blood to clot. Examples are aspirin, clopidogrel, and warfarin.  Eat a healthy diet. Make sure it's low in fat, salt, and cholesterol. Ask your healthcare team for diet information.  Stop smoking. Enroll in a stop-smoking program or ask your healthcare team for help. Stop-smoking programs can be life saving.  Exercise as your healthcare team tells you to. Your healthcare team may recommend you start a cardiac rehabilitation program. Cardiac rehab is an exercise program in which trained healthcare staff watch your progress and stress on your heart while you exercise. Ask your team how to enroll.  Don't swim or take baths until your healthcare team says it s OK. You can shower the day after the procedure. Keep the site clean and dry. This keeps the incision from getting wet and infected until the skin and artery can heal.  Be sure to follow all after-care instructions.     Follow-up care  Make a follow-up appointment as advised by our staff. It's common to have a follow-up appointment 2 to 4 weeks after an angioplasty or coronary stent procedure.  Make a yearly appointment, too. This is to make sure you are still doing well and not having any new symptoms.  Don't wait for a follow-up appointment if your medicines aren't working or you are having heart-related symptoms.    When to seek medical care  Call your healthcare provider right away if you have any of the following:  Chest pain  Constant or increasing pain or numbness in your leg  Fever of 100.4  F ( 38.0 C) or higher, or as directed by your healthcare provider  Symptoms of infection. These include redness, swelling, drainage, or warmth at the incision site.  Shortness of breath  A leg that feels cold or appears blue  Bleeding, bruising, or a lot of swelling where the catheter was inserted  Blood in your urine  Black or tarry stools  Any  unusual bleeding  Melissa last reviewed this educational content on 10/1/2019    8616-9598 [employer]. All rights reserved. This information is not intended as a substitute for professional medical care. Always follow your healthcare professional's instructions.          Additional Information About Your Visit       [employer] Information    [employer] gives you secure access to your electronic health record. If you see a primary care provider, you can also send messages to your care team and make appointments. If you have questions, please call your primary care clinic.  If you do not have a primary care provider, please call [phone] and they will assist you.       Care EveryWhere ID    This is your Care EveryWhere ID. This could be used by other organizations to access your [facility] medical records  [id number]       Your Vitals Were  Most recent update: 4/21/2022  3:49 PM    Blood Pressure   105/73    Pulse   60    Respirations   14    Pulse Oximetry   100%           Primary Care Provider  Harley Covington MD Office Phone #  [phone] Fax #  [phone]      Equal Access to Services    KELIN MCEKON : Hadii aad ku hadasho Soomaali, waaxda luqadaha, qaybta kaalmada adeegyada, waxay idiin hayjosephn norman mejia . So [facility] [phone].    ATENCIÓN: Si habla español, tiene a mack disposición servicios gratuitos de asistencia lingüística. Llame al [phone].    We comply with applicable federal and state civil rights laws, including the [place] Human Rights Act. We do not discriminate on the basis of race, color, creed, Yazidism, national origin, marital status, age, disability, sex, sexual orientation, or gender identity.    If you would like an itemization of your charges they will now be available in [employer] 30 days after discharge. To access the itemized statements in [employer] go to billing/billing summary. From there select view account. There will be multiple tabs showing an  overview of your account, detail, payments, and communications. From the communications tab you can see your monthly statements, your itemized statements, and any billing letters generated for your account. If you do not have a [employer] account and need help getting access please contact "[employer]" at [phone].  If you would prefer to have your itemized statements mailed please contact our automated itemized bill request line at [phone] option  2.       Thank you!    Thank you for choosing [facility] for your care. Our goal is always to provide you with excellent care. Hearing back from our patients is one way we can continue to improve our services. Please take a few minutes to complete the written survey that you may receive in the mail after you visit. If you would like to speak to someone directly about your visit please contact Patient Relations at [phone]. Thank you!              Medication List      Medications          Morning Afternoon Evening Bedtime As Needed    ticagrelor 90 MG tablet  Also known as: BRILINTA  INSTRUCTIONS: Take 1 tablet (90 mg) by mouth 2 times daily Start this evening.  LAST TAKEN: 180 mg on April 21, 2022 12:48 PM                       ASK your doctor about these medications          Morning Afternoon Evening Bedtime As Needed    acetaminophen 500 MG tablet  Also known as: TYLENOL  INSTRUCTIONS: Take 1,500 mg by mouth daily as needed (RA flare)                     * aspirin 81 MG EC tablet  Also known as: ASA  INSTRUCTIONS: Take 1 tablet by mouth daily.  Ask about: Which instructions should I use?                     * aspirin 81 MG chewable tablet  Also known as: ASA  INSTRUCTIONS: Take 1 tablet (81 mg) by mouth daily  Ask about: Which instructions should I use?                     * aspirin 81 MG EC tablet  Also known as: ASA  Start taking on: April 22, 2022  INSTRUCTIONS: Take 1 tablet (81 mg) by mouth daily Start tomorrow.  Ask about: Which  instructions should I use?                     azaTHIOprine 50 MG tablet  Also known as: IMURAN  INSTRUCTIONS: Take 2 tablets (100 mg) by mouth daily Labs every 8-12 weeks.                     carvedilol 3.125 MG tablet  Also known as: COREG  INSTRUCTIONS: Take 2 tablets (6.25 mg) by mouth 2 times daily (with meals)                     * nitroGLYcerin 0.4 MG sublingual tablet  Also known as: NITROSTAT  INSTRUCTIONS: Place 1 tablet (0.4 mg) under the tongue every 5 minutes as needed for chest pain  LAST TAKEN: Ask your nurse or doctor                     * nitroGLYcerin 0.4 MG sublingual tablet  Also known as: NITROSTAT  INSTRUCTIONS: For chest pain place 1 tablet under the tongue every 5 minutes for 3 doses. If symptoms persist 5 minutes after 1st dose call 911.  LAST TAKEN: Ask your nurse or doctor                     sertraline 100 MG tablet  Also known as: ZOLOFT  INSTRUCTIONS: Take 1 tablet (100 mg) by mouth daily                     sulfaSALAzine 500 MG tablet  Also known as: AZULFIDINE  INSTRUCTIONS: Take 1,000 mg by mouth                     vitamin C 250 MG tablet  Also known as: ASCORBIC ACID  INSTRUCTIONS: Take 1 tablet by mouth daily                        * This list has 5 medication(s) that are the same as other medications prescribed for you. Read the directions carefully, and ask your doctor or other care provider to review them with you.

## 2022-04-21 NOTE — Clinical Note
Stent deployed in the proximal right coronary artery. Max pressure = 14 hanh. Total duration = 20 seconds.

## 2022-04-21 NOTE — Clinical Note
The first balloon was inserted into the right coronary artery and proximal right coronary artery.Max pressure = 18 hanh. Total duration = 20 seconds.     Max pressure = 18 hanh. Total duration = 20 seconds.  Balloon reinflated a fourth time: Max pressure = 16 hanh. Total duration = 30 seconds.

## 2022-04-21 NOTE — IP AVS SNAPSHOT
[facility]  [address]  [facility] [address]  Phone: [phone]  Fax: [phone]                                      After Visit Summary   4/21/2022    Karolina Light   MRN: [id number]           After Visit Summary Signature Page    I have received my discharge instructions, and my questions have been answered. I have discussed any challenges I see with this plan with the nurse or doctor.    ..........................................................................................................................................  Patient/Patient Representative Signature      ..........................................................................................................................................  Patient Representative Print Name and Relationship to Patient    ..................................................               ................................................  Date                                   Time    ..........................................................................................................................................  Reviewed by Signature/Title    ...................................................              ..............................................  Date                                               Time          22EPIC Rev 08/18

## 2022-04-21 NOTE — Clinical Note
The first balloon was inserted into the right coronary artery and ostium right coronary artery.Max pressure = 12 hanh. Total duration = 13 seconds.     Max pressure = 12 hanh. Total duration = 10 seconds.    Balloon reinflated a second time: Max pressure = 12 hanh. Total duration = 10 seconds.

## 2022-04-21 NOTE — Clinical Note
The first balloon was inserted into the right coronary artery and middle right coronary artery.Max pressure = 14 hanh. Total duration = 25 seconds.     Max pressure = 12 ahnh. Total duration = 20 seconds.    Balloon reinflated a second time: Max pressure = 12 hanh. Total duration = 20 seconds.  Balloon reinflated a third time: Max pressure = 12 hanh. Total duration = 20 seconds.  Balloon reinflated a fourth time: Max pressure = 12 hanh. Total duration = 20 seconds.  Balloon reinflated a fourth time: Max pressure = 14 hanh. Total duration = 15 seconds.

## 2022-04-21 NOTE — Clinical Note
The first balloon was inserted into the right coronary artery and distal right coronary artery.Max pressure = 14 hanh. Total duration = 20 seconds.

## 2022-04-21 NOTE — Clinical Note
Prepped: right groin and left radial. Prepped with: ChloraPrep. The patient was draped. .Pre-procedure site marking:Insertion site not predetermined

## 2022-04-21 NOTE — PROGRESS NOTES
Patient discharged home accompanied by , Eddie.  Discharged instructions given, questions answered.  Yulissa Piedra RN

## 2022-04-21 NOTE — Clinical Note
The DP pulses are 2+ bilaterally. The PT pulses are 2+ bilaterally. The left radial pulse is 2+. The left ulnar pulse is 1+.

## 2022-04-21 NOTE — Clinical Note
The first balloon was inserted into the right coronary artery and distal right coronary artery.Max pressure = 12 hanh. Total duration = 20 seconds.     Max pressure = 12 hanh. Total duration = 20 seconds.    Balloon reinflated a second time: Max pressure = 12 hanh. Total duration = 20 seconds.

## 2022-04-22 ENCOUNTER — HOSPITAL ENCOUNTER (OUTPATIENT)
Facility: CLINIC | Age: 57
Setting detail: OBSERVATION
Discharge: HOME | End: 2022-04-23
Attending: EMERGENCY MEDICINE | Admitting: INTERNAL MEDICINE
Payer: MEDICARE

## 2022-04-22 ENCOUNTER — APPOINTMENT (OUTPATIENT)
Dept: CT IMAGING | Facility: CLINIC | Age: 57
End: 2022-04-22
Attending: EMERGENCY MEDICINE
Payer: MEDICARE

## 2022-04-22 ENCOUNTER — TELEPHONE (OUTPATIENT)
Dept: CARDIOLOGY | Facility: CLINIC | Age: 57
End: 2022-04-22

## 2022-04-22 DIAGNOSIS — R07.9 CHEST PAIN, UNSPECIFIED TYPE: Primary | ICD-10-CM

## 2022-04-22 DIAGNOSIS — I20.9 ANGINAL PAIN (H): ICD-10-CM

## 2022-04-22 DIAGNOSIS — I24.9 ACS (ACUTE CORONARY SYNDROME) (H): ICD-10-CM

## 2022-04-22 LAB
ANION GAP SERPL CALCULATED.3IONS-SCNC: 5 MMOL/L (ref 3–14)
ATRIAL RATE - MUSE: 57 BPM
ATRIAL RATE - MUSE: 64 BPM
BASOPHILS # BLD AUTO: 0 10E3/UL (ref 0–0.2)
BASOPHILS NFR BLD AUTO: 0 %
BUN SERPL-MCNC: 13 MG/DL (ref 7–30)
CALCIUM SERPL-MCNC: 9.2 MG/DL (ref 8.5–10.1)
CHLORIDE BLD-SCNC: 106 MMOL/L (ref 94–109)
CO2 SERPL-SCNC: 25 MMOL/L (ref 20–32)
CREAT SERPL-MCNC: 1.03 MG/DL (ref 0.52–1.04)
CRP SERPL-MCNC: 8.3 MG/L (ref 0–8)
D DIMER PPP FEU-MCNC: 1.6 UG/ML FEU (ref 0–0.5)
DIASTOLIC BLOOD PRESSURE - MUSE: NORMAL MMHG
DIASTOLIC BLOOD PRESSURE - MUSE: NORMAL MMHG
EOSINOPHIL # BLD AUTO: 0 10E3/UL (ref 0–0.7)
EOSINOPHIL NFR BLD AUTO: 1 %
ERYTHROCYTE [DISTWIDTH] IN BLOOD BY AUTOMATED COUNT: 15.6 % (ref 10–15)
ERYTHROCYTE [SEDIMENTATION RATE] IN BLOOD BY WESTERGREN METHOD: 25 MM/HR (ref 0–30)
GFR SERPL CREATININE-BSD FRML MDRD: 63 ML/MIN/1.73M2
GLUCOSE BLD-MCNC: 83 MG/DL (ref 70–99)
HCT VFR BLD AUTO: 41.1 % (ref 35–47)
HGB BLD-MCNC: 13.2 G/DL (ref 11.7–15.7)
IMM GRANULOCYTES # BLD: 0 10E3/UL
IMM GRANULOCYTES NFR BLD: 0 %
INTERPRETATION ECG - MUSE: NORMAL
INTERPRETATION ECG - MUSE: NORMAL
LYMPHOCYTES # BLD AUTO: 0.8 10E3/UL (ref 0.8–5.3)
LYMPHOCYTES NFR BLD AUTO: 20 %
MCH RBC QN AUTO: 28.4 PG (ref 26.5–33)
MCHC RBC AUTO-ENTMCNC: 32.1 G/DL (ref 31.5–36.5)
MCV RBC AUTO: 89 FL (ref 78–100)
MONOCYTES # BLD AUTO: 0.3 10E3/UL (ref 0–1.3)
MONOCYTES NFR BLD AUTO: 7 %
NEUTROPHILS # BLD AUTO: 3 10E3/UL (ref 1.6–8.3)
NEUTROPHILS NFR BLD AUTO: 72 %
NRBC # BLD AUTO: 0 10E3/UL
NRBC BLD AUTO-RTO: 0 /100
P AXIS - MUSE: 17 DEGREES
P AXIS - MUSE: 22 DEGREES
PLATELET # BLD AUTO: 147 10E3/UL (ref 150–450)
POTASSIUM BLD-SCNC: 4.1 MMOL/L (ref 3.4–5.3)
PR INTERVAL - MUSE: 186 MS
PR INTERVAL - MUSE: 206 MS
QRS DURATION - MUSE: 80 MS
QRS DURATION - MUSE: 82 MS
QT - MUSE: 430 MS
QT - MUSE: 472 MS
QTC - MUSE: 443 MS
QTC - MUSE: 459 MS
R AXIS - MUSE: -2 DEGREES
R AXIS - MUSE: 2 DEGREES
RBC # BLD AUTO: 4.64 10E6/UL (ref 3.8–5.2)
SARS-COV-2 RNA RESP QL NAA+PROBE: NEGATIVE
SODIUM SERPL-SCNC: 136 MMOL/L (ref 133–144)
SYSTOLIC BLOOD PRESSURE - MUSE: NORMAL MMHG
SYSTOLIC BLOOD PRESSURE - MUSE: NORMAL MMHG
T AXIS - MUSE: 25 DEGREES
T AXIS - MUSE: 40 DEGREES
TROPONIN I SERPL HS-MCNC: 216 NG/L
TROPONIN I SERPL HS-MCNC: 234 NG/L
TROPONIN I SERPL HS-MCNC: 240 NG/L
TROPONIN I SERPL HS-MCNC: 273 NG/L
VENTRICULAR RATE- MUSE: 57 BPM
VENTRICULAR RATE- MUSE: 64 BPM
WBC # BLD AUTO: 4.1 10E3/UL (ref 4–11)

## 2022-04-22 PROCEDURE — 36415 COLL VENOUS BLD VENIPUNCTURE: CPT | Performed by: INTERNAL MEDICINE

## 2022-04-22 PROCEDURE — G1004 CDSM NDSC: HCPCS

## 2022-04-22 PROCEDURE — C9803 HOPD COVID-19 SPEC COLLECT: HCPCS

## 2022-04-22 PROCEDURE — U0005 INFEC AGEN DETEC AMPLI PROBE: HCPCS | Performed by: EMERGENCY MEDICINE

## 2022-04-22 PROCEDURE — 250N000013 HC RX MED GY IP 250 OP 250 PS 637: Performed by: INTERNAL MEDICINE

## 2022-04-22 PROCEDURE — 250N000009 HC RX 250: Performed by: EMERGENCY MEDICINE

## 2022-04-22 PROCEDURE — 85025 COMPLETE CBC W/AUTO DIFF WBC: CPT | Performed by: EMERGENCY MEDICINE

## 2022-04-22 PROCEDURE — 99285 EMERGENCY DEPT VISIT HI MDM: CPT | Mod: 25

## 2022-04-22 PROCEDURE — 85379 FIBRIN DEGRADATION QUANT: CPT | Performed by: EMERGENCY MEDICINE

## 2022-04-22 PROCEDURE — 84484 ASSAY OF TROPONIN QUANT: CPT | Performed by: INTERNAL MEDICINE

## 2022-04-22 PROCEDURE — 84484 ASSAY OF TROPONIN QUANT: CPT | Performed by: EMERGENCY MEDICINE

## 2022-04-22 PROCEDURE — 250N000013 HC RX MED GY IP 250 OP 250 PS 637: Performed by: EMERGENCY MEDICINE

## 2022-04-22 PROCEDURE — 86140 C-REACTIVE PROTEIN: CPT | Performed by: EMERGENCY MEDICINE

## 2022-04-22 PROCEDURE — 99220 PR INITIAL OBSERVATION CARE,LEVEL III: CPT | Performed by: INTERNAL MEDICINE

## 2022-04-22 PROCEDURE — 250N000011 HC RX IP 250 OP 636: Performed by: EMERGENCY MEDICINE

## 2022-04-22 PROCEDURE — 36415 COLL VENOUS BLD VENIPUNCTURE: CPT | Performed by: EMERGENCY MEDICINE

## 2022-04-22 PROCEDURE — G0378 HOSPITAL OBSERVATION PER HR: HCPCS

## 2022-04-22 PROCEDURE — 85652 RBC SED RATE AUTOMATED: CPT | Performed by: EMERGENCY MEDICINE

## 2022-04-22 PROCEDURE — 93005 ELECTROCARDIOGRAM TRACING: CPT

## 2022-04-22 PROCEDURE — 80048 BASIC METABOLIC PNL TOTAL CA: CPT | Performed by: EMERGENCY MEDICINE

## 2022-04-22 RX ORDER — ACETAMINOPHEN 650 MG/1
650 SUPPOSITORY RECTAL EVERY 6 HOURS PRN
Status: DISCONTINUED | OUTPATIENT
Start: 2022-04-22 | End: 2022-04-23 | Stop reason: HOSPADM

## 2022-04-22 RX ORDER — IOPAMIDOL 755 MG/ML
500 INJECTION, SOLUTION INTRAVASCULAR ONCE
Status: COMPLETED | OUTPATIENT
Start: 2022-04-22 | End: 2022-04-22

## 2022-04-22 RX ORDER — IBUPROFEN 600 MG/1
600 TABLET, FILM COATED ORAL EVERY 6 HOURS PRN
Status: DISCONTINUED | OUTPATIENT
Start: 2022-04-22 | End: 2022-04-23 | Stop reason: HOSPADM

## 2022-04-22 RX ORDER — SULFASALAZINE 500 MG/1
1000 TABLET ORAL DAILY
Status: DISCONTINUED | OUTPATIENT
Start: 2022-04-23 | End: 2022-04-23 | Stop reason: HOSPADM

## 2022-04-22 RX ORDER — MAGNESIUM HYDROXIDE/ALUMINUM HYDROXICE/SIMETHICONE 120; 1200; 1200 MG/30ML; MG/30ML; MG/30ML
30 SUSPENSION ORAL EVERY 4 HOURS PRN
Status: DISCONTINUED | OUTPATIENT
Start: 2022-04-22 | End: 2022-04-23 | Stop reason: HOSPADM

## 2022-04-22 RX ORDER — CARVEDILOL 6.25 MG/1
6.25 TABLET ORAL 2 TIMES DAILY WITH MEALS
Status: DISCONTINUED | OUTPATIENT
Start: 2022-04-22 | End: 2022-04-23

## 2022-04-22 RX ORDER — ACETAMINOPHEN 325 MG/1
650 TABLET ORAL EVERY 6 HOURS PRN
Status: DISCONTINUED | OUTPATIENT
Start: 2022-04-22 | End: 2022-04-23 | Stop reason: HOSPADM

## 2022-04-22 RX ORDER — NITROGLYCERIN 0.4 MG/1
0.4 TABLET SUBLINGUAL EVERY 5 MIN PRN
Status: DISCONTINUED | OUTPATIENT
Start: 2022-04-22 | End: 2022-04-23 | Stop reason: HOSPADM

## 2022-04-22 RX ORDER — NITROGLYCERIN 0.4 MG/1
0.4 TABLET SUBLINGUAL ONCE
Status: COMPLETED | OUTPATIENT
Start: 2022-04-22 | End: 2022-04-22

## 2022-04-22 RX ORDER — ASPIRIN 81 MG/1
81 TABLET, CHEWABLE ORAL DAILY
Status: DISCONTINUED | OUTPATIENT
Start: 2022-04-23 | End: 2022-04-23 | Stop reason: HOSPADM

## 2022-04-22 RX ORDER — SERTRALINE HYDROCHLORIDE 100 MG/1
100 TABLET, FILM COATED ORAL DAILY
Status: DISCONTINUED | OUTPATIENT
Start: 2022-04-23 | End: 2022-04-23 | Stop reason: HOSPADM

## 2022-04-22 RX ORDER — AZATHIOPRINE 50 MG/1
100 TABLET ORAL DAILY
Status: DISCONTINUED | OUTPATIENT
Start: 2022-04-23 | End: 2022-04-23 | Stop reason: HOSPADM

## 2022-04-22 RX ADMIN — IOPAMIDOL 66 ML: 755 INJECTION, SOLUTION INTRAVENOUS at 15:16

## 2022-04-22 RX ADMIN — SODIUM CHLORIDE 85 ML: 9 INJECTION, SOLUTION INTRAVENOUS at 15:17

## 2022-04-22 RX ADMIN — CARVEDILOL 6.25 MG: 6.25 TABLET, FILM COATED ORAL at 19:06

## 2022-04-22 RX ADMIN — TICAGRELOR 90 MG: 90 TABLET ORAL at 20:04

## 2022-04-22 RX ADMIN — NITROGLYCERIN 0.4 MG: 0.4 TABLET SUBLINGUAL at 14:00

## 2022-04-22 NOTE — ED NOTES
[facility]  ED Nurse Handoff Report    Karolina Light is a 57 year old female   ED Chief complaint: Chest Pain and Shortness of Breath  . ED Diagnosis:   Final diagnoses:   Chest pain, unspecified type     Allergies:   Allergies   Allergen Reactions     Bee Venom Swelling     Shellfish Allergy Anaphylaxis     Atorvastatin Muscle Pain (Myalgia)     Codeine Sulfate      Hydrocodone      Heart races and feels like bugs crawling under skin     Methotrexate Other (See Comments)     hepatotxicity     Plavix [Clopidogrel]      Night terrors       Code Status: Full Code  Activity level - Baseline/Home:  Independent. Activity Level - Current:   Independent. Lift room needed: No. Bariatric: No   Needed: No   Isolation: No. Infection: Not Applicable.     Vital Signs:   Vitals:    04/22/22 1445 04/22/22 1530 04/22/22 1545 04/22/22 1600   BP: 119/82 126/79 132/76 (!) 144/88   Pulse: 66 65 64 69   Resp: 8 14 10 13   Temp:       TempSrc:       SpO2: 98% 99% 99% 98%   Weight:       Height:           Cardiac Rhythm:  ,      Pain level:    Patient confused: No. Patient Falls Risk: No.   Elimination Status: Has voided   Patient Report - Initial Complaint: cp, sob. Focused Assessment: cardiac   Tests Performed:   Labs Ordered and Resulted from Time of ED Arrival to Time of ED Departure   TROPONIN I - Abnormal       Result Value    Troponin I High Sensitivity 273 (*)    D DIMER QUANTITATIVE - Abnormal    D-Dimer Quantitative 1.60 (*)    CBC WITH PLATELETS AND DIFFERENTIAL - Abnormal    WBC Count 4.1      RBC Count 4.64      Hemoglobin 13.2      Hematocrit 41.1      MCV 89      MCH 28.4      MCHC 32.1      RDW 15.6 (*)     Platelet Count 147 (*)     % Neutrophils 72      % Lymphocytes 20      % Monocytes 7      % Eosinophils 1      % Basophils 0      % Immature Granulocytes 0      NRBCs per 100 WBC 0      Absolute Neutrophils 3.0      Absolute Lymphocytes 0.8      Absolute Monocytes 0.3      Absolute Eosinophils  0.0      Absolute Basophils 0.0      Absolute Immature Granulocytes 0.0      Absolute NRBCs 0.0     CRP INFLAMMATION - Abnormal    CRP Inflammation 8.3 (*)    BASIC METABOLIC PANEL - Normal    Sodium 136      Potassium 4.1      Chloride 106      Carbon Dioxide (CO2) 25      Anion Gap 5      Urea Nitrogen 13      Creatinine 1.03      Calcium 9.2      Glucose 83      GFR Estimate 63     ERYTHROCYTE SEDIMENTATION RATE AUTO - Normal    Erythrocyte Sedimentation Rate 25     COVID-19 VIRUS (CORONAVIRUS) BY PCR     CT Chest Pulmonary Embolism w Contrast   Final Result   IMPRESSION:   1.  No evidence for pulmonary embolism, acute thoracic aortic   abnormality, or acute airspace disease.   2.  Coronary artery calcifications   3.  New splenomegaly as compared to the CT from 7/20/2015, nonspecific   as to etiology.      ALONZO MILLER MD            SYSTEM ID:  [id number]        . Abnormal Results: See above.   Treatments provided: nitroglycerin x1  Family Comments: NA  OBS brochure/video discussed/provided to patient:  Yes  ED Medications:   Medications   nitroGLYcerin (NITROSTAT) sublingual tablet 0.4 mg (0.4 mg Sublingual Given 4/22/22 1400)   CT SCAN FLUSH (85 mLs Intravenous Given 4/22/22 1517)   iopamidol (ISOVUE-370) solution 500 mL (66 mLs Intravenous Given 4/22/22 1516)     Drips infusing:  No  For the majority of the shift, the patient's behavior Green. Interventions performed were NA.    Sepsis treatment initiated: No     Patient tested for COVID 19 prior to admission: YES    ED Nurse Name/Phone Number: Lucila Garsia RN,   4:29 PM    RECEIVING UNIT ED HANDOFF REVIEW    Above ED Nurse Handoff Report was reviewed: Yes  Reviewed by: Christi Mcqueen RN on April 22, 2022 at 4:52 PM

## 2022-04-22 NOTE — H&P
[facility]    HISTORY AND PHYSICAL    (Hospitalist Service)       Date of Admission:  4/22/2022    Assessment & Plan   Karolina Light is a 57 year old female who presents with chest pain.    This is a 57-year-old female with a past medical history significant for coronary artery disease, hypertension,  rheumatoid arthritis, dyslipidemia (statin intolerant), cirrhosis, esophageal varices.  She underwent coronary angiogram just yesterday for uncontrolled anginal symptoms.  Drug-eluting stents to the proximal and mid RCA placed for in-stent restenosis.    Her symptoms started around 11:00 this morning.  She had something to eat and she was resting in the bed when the symptoms started.  She describes the pain as dull achy pain.  Pain was constant in nature.  She thinks that this pain is not the same as her angina pain.  Her angina pain used to be more sharp but this was more dull and burning in nature.  Initially the pain was constant but when she was in the emergency room she noticed that the pain was getting better when she was sitting up and worse with laying down.    EKG in the emergency room is unremarkable.  She does have marginally elevated troponin.  She is currently chest pain-free.  CT chest was negative for PE.    Dr. Elena spoke to cardiologist on-call Dr. Patterson.  Unclear etiology.  Difficult to interpret in the mildly elevated troponin in the setting of recent PCI.  Recommended admission and trending the troponin.    Problem List:    Chest pain.  - In the setting of recent PCI.  - The characteristics of the pain (improvement with sitting up) is in favor of pericarditis.  But no pericarditis like EKG changes noted.  Other possibility would be GERD as the pain was burning in nature.  - Admission to medicine service as an observation.  - Trend the troponins.  - Continue aspirin and Brilinta.  - Echocardiogram in the morning.  - If she develops the chest pain again, NSAIDs such as  ibuprofen can be tried.  If that does not help then a GI cocktail or PPI can be tried.  These trials would be helpful in treating her pain as well as differentiating the cause.    Hypertension  - Continue carvedilol    Dyslipidemia  - Intolerant to statins.  Per cardiology note, patient has declined PCSK9 inhibitors.    Rheumatoid arthritis  - Continue Imuran and sulfasalazine.    Depression  - On Zoloft.      DVT Prophylaxis: Low Risk/Ambulatory with no VTE prophylaxis indicated  Code Status: Full Code    Mushtaq Donaldson MD    Primary Care Physician   Harley Covington    Chief Complaint   Chest pain      History of Present Illness   Karolina Light is a 57 year old female presented with chest pain      Past Medical History    I have reviewed this patient's medical history and updated it with pertinent information if needed.   Past Medical History:   Diagnosis Date     CAD (coronary artery disease)     MI and stent 2012     RA (rheumatoid arthritis) (H)      Skin cancer     unknown type       Past Surgical History   I have reviewed this patient's surgical history and updated it with pertinent information if needed.  Past Surgical History:   Procedure Laterality Date     ANGIOGRAM  07/21/2015    In-segment restenosis in the proximal right coronary artery stent stenosis appears to be the range of 50%. Serial stenoses in the left anterior ascending artery and in the proximal mid and distal all appear to be in the 50% range. Scattered mild disease in the circumflex system no stenosis greater than 35%. No intervention performed.     HAND RECONSTRUCTION Right 06/15/1967     HEART CATH, ANGIOPLASTY  11/01/2012    Angioplasty and MIREILLE x2 to RCA     HYSTERECTOMY  03/01/1998       Prior to Admission Medications   Prior to Admission Medications   Prescriptions Last Dose Informant Patient Reported? Taking?   acetaminophen (TYLENOL) 500 MG tablet   Yes No   Sig: Take 1,500 mg by mouth daily as needed (RA flare)   aspirin (ASA) 81  MG EC tablet   No No   Sig: Take 1 tablet (81 mg) by mouth daily Start tomorrow.   aspirin (ASA) 81 MG chewable tablet   No No   Sig: Take 1 tablet (81 mg) by mouth daily   aspirin EC 81 MG EC tablet   No No   Sig: Take 1 tablet by mouth daily.   Patient not taking: Reported on 12/3/2021   azaTHIOprine (IMURAN) 50 MG tablet   No No   Sig: Take 2 tablets (100 mg) by mouth daily Labs every 8-12 weeks.   carvedilol (COREG) 3.125 MG tablet   No No   Sig: Take 2 tablets (6.25 mg) by mouth 2 times daily (with meals)   nitroGLYcerin (NITROSTAT) 0.4 MG sublingual tablet   No No   Sig: For chest pain place 1 tablet under the tongue every 5 minutes for 3 doses. If symptoms persist 5 minutes after 1st dose call 911.   nitroglycerin (NITROSTAT) 0.4 MG SL tablet   No No   Sig: Place 1 tablet (0.4 mg) under the tongue every 5 minutes as needed for chest pain   sertraline (ZOLOFT) 100 MG tablet   No No   Sig: Take 1 tablet (100 mg) by mouth daily   sulfaSALAzine (AZULFIDINE) 500 MG tablet   Yes No   Sig: Take 1,000 mg by mouth   ticagrelor (BRILINTA) 90 MG tablet   No No   Sig: Take 1 tablet (90 mg) by mouth 2 times daily Start this evening.   vitamin C (ASCORBIC ACID) 250 MG TABS tablet   Yes No   Sig: Take 1 tablet by mouth daily   Patient not taking: Reported on 3/18/2022      Facility-Administered Medications: None     Allergies   Allergies   Allergen Reactions     Bee Venom Swelling     Shellfish Allergy Anaphylaxis     Atorvastatin Muscle Pain (Myalgia)     Codeine Sulfate      Hydrocodone      Heart races and feels like bugs crawling under skin     Methotrexate Other (See Comments)     hepatotxicity     Plavix [Clopidogrel]      Night terrors       Social History   I have reviewed this patient's social history and updated it with pertinent information if needed. Karolina Light  reports that she quit smoking about 6 years ago. She has quit using smokeless tobacco. She reports that she does not drink alcohol.    Family  History   I have reviewed this patient's family history and updated it with pertinent information if needed.   Family History   Problem Relation Age of Onset     Cerebrovascular Disease Mother 61     C.A.D. Father 42     Myocardial Infarction Sister      Cerebrovascular Disease Sister      Diabetes Sister      C.A.D. Brother      C.A.D. Brother      Other - See Comments Brother         PAD     Coronary Artery Disease Brother        Review of Systems   The 10 point Review of Systems is negative other than noted in the HPI or here.     Physical Exam   Temp: 97.1  F (36.2  C) Temp src: Temporal BP: (!) 144/88 Pulse: 69   Resp: 13 SpO2: 98 % O2 Device: None (Room air)    Vital Signs with Ranges  Temp:  [97.1  F (36.2  C)] 97.1  F (36.2  C)  Pulse:  [64-78] 69  Resp:  [8-20] 13  BP: (119-155)/() 144/88  SpO2:  [97 %-99 %] 98 %  193 lbs 0 oz    Constitutional: Awake, alert, cooperative, no apparent distress.  Eyes: Conjunctiva and pupils examined and normal.  HEENT: Moist mucous membranes, normal dentition.  Respiratory: Clear to auscultation bilaterally, no crackles or wheezing.  Cardiovascular: Regular rate and rhythm, normal S1 and S2, and no murmur noted.  GI: Soft, non-distended, non-tender, normal bowel sounds.  Skin: No rashes, no cyanosis, no edema.  Musculoskeletal: No joint swelling, erythema or tenderness.  Neurologic: Cranial nerves 2-12 intact, normal strength and sensation.  Psychiatric: Alert, oriented to person, place and time, no obvious anxiety or depression.    Data     Recent Labs   Lab 04/22/22  1357 04/21/22  1029   WBC 4.1 3.2*   HGB 13.2 13.3   MCV 89 89   * 118*   INR  --  1.06    140   POTASSIUM 4.1 3.9   CHLORIDE 106 108   CO2 25 27   BUN 13 12   CR 1.03 1.05*   ANIONGAP 5 5   JUDAH 9.2 9.0   GLC 83 102*       Recent Results (from the past 24 hour(s))   CT Chest Pulmonary Embolism w Contrast    Narrative    CT CHEST PULMONARY EMBOLISM WITH CONTRAST 4/22/2022 3:22 PM    CLINICAL  HISTORY: Chest pain, elevated D-dimer.    TECHNIQUE: CT angiogram chest during arterial phase injection IV  contrast. 2D and 3D MIP reconstructions were performed by the CT  technologist. Dose reduction techniques were used.     CONTRAST: 66 mL Isovue-370    COMPARISON: CT chest 7/20/2015.    FINDINGS:  ANGIOGRAM CHEST: Pulmonary arteries are normal caliber and negative  for pulmonary emboli. Thoracic aorta is negative for dissection. No CT  evidence of right heart strain.    LUNGS AND PLEURA: No effusions. Stable small right lower lobe  granuloma series 7 image 103. Stable 0.3 cm nodule posteromedial right  lower lobe image 118. No new worrisome airspace disease.    MEDIASTINUM/AXILLAE: No acute mediastinal abnormality. No enlarged  mediastinal lymph nodes. There are a few minimally prominent and  stable left axillary lymph nodes.    CORONARY ARTERY CALCIFICATION: Suspect moderate.    UPPER ABDOMEN: New splenomegaly with transverse length of 18 cm,  previously 13.4 cm on the CT from 7/20/2015. Some vicarious excretion  of contrast material versus gallstones in the gallbladder lumen.    MUSCULOSKELETAL: Normal.      Impression    IMPRESSION:  1.  No evidence for pulmonary embolism, acute thoracic aortic  abnormality, or acute airspace disease.  2.  Coronary artery calcifications  3.  New splenomegaly as compared to the CT from 7/20/2015, nonspecific  as to etiology.    ALONZO MILLER MD         SYSTEM ID:  [id number]

## 2022-04-22 NOTE — TELEPHONE ENCOUNTER
Patient was here for scheduled OP coronary angiogram secondary to accelerating chest pain over the past several weeks. PMH: coronary artery disease, rheumatoid arthritis, dyslipidemia (statin intolerant), cirrhosis, esophageal varices and an old history of right wrist laceration with ulnar neuropathy. 4/21/22: Coronary angiogram via LRA s/p successful PCI with drug-eluting stent placement in the proximal and mid RCA for in-stent restenosis. Moderate flow-limiting mid LAD in-stent restenosis (iFR 0.88) involving a moderate-sized diagonal branch. Consider staged PCI of the LAD and first diagonal branch if patient continues to have refractory angina despite optimal medical therapy. Started on ASA, Brilinta and PRN SL NTG. Writer attempted to call patient to discuss any post hospital d/c questions, review medication changes, and confirm f/u appts, but  states pt is back in the ED secondary to burning chest pain. RN will follow up with pt next week. Will confirm with patient that she was d/c with an adequate supply of the antiplatelet Brilinta, and reminded of importance of taking without interruption. Pt has an Rx for PRN SL Nitroglycerin. Pt has an OV scheduled on 4/27/22 at 1330 with JOEL Scot Soto at our [place] [facility]. Cardiac rehab still needs to be scheduled.  JOEY Jackson RN.

## 2022-04-22 NOTE — PHARMACY-ADMISSION MEDICATION HISTORY
Admission medication history interview status for this patient is complete. See [facility] admission navigator for allergy information, prior to admission medications and immunization status.     Medication history interview done, indicate source(s): Patient  Medication history resources (including written lists, pill bottles, clinic record):Jose, chart review  Pharmacy: [facility]/pharmacy #[id number] [id number]  [address]    Changes made to PTA medication list:  Added: none  Changed: none  Reported as Not Taking: none  Removed: vitamin C    Actions taken by pharmacist (provider contacted, etc):None     Additional medication history information:None    Medication reconciliation/reorder completed by provider prior to medication history?  Y    Prior to Admission medications    Medication Sig Last Dose Taking? Auth Provider   aspirin (ASA) 81 MG chewable tablet Take 1 tablet (81 mg) by mouth daily  at duplicate entry, last took today AM 4/22 Yes Xu Dias MD   aspirin (ASA) 81 MG EC tablet Take 1 tablet (81 mg) by mouth daily Start tomorrow. 4/22/2022 at AM Yes Anant Saldivar MD   azaTHIOprine (IMURAN) 50 MG tablet Take 2 tablets (100 mg) by mouth daily Labs every 8-12 weeks. 4/22/2022 at AM Yes Ana Richey MD   carvedilol (COREG) 3.125 MG tablet Take 2 tablets (6.25 mg) by mouth 2 times daily (with meals) 4/22/2022 at AM x1 Yes Xu Dias MD   nitroGLYcerin (NITROSTAT) 0.4 MG sublingual tablet For chest pain place 1 tablet under the tongue every 5 minutes for 3 doses. If symptoms persist 5 minutes after 1st dose call 911.  at PRN Yes Xu Dias MD   sertraline (ZOLOFT) 100 MG tablet Take 1 tablet (100 mg) by mouth daily 4/22/2022 at Unknown time Yes Harley Covington MD   sulfaSALAzine (AZULFIDINE) 500 MG tablet Take 1,000 mg by mouth daily 4/22/2022 at AM Yes Reported, Patient   ticagrelor (BRILINTA) 90 MG tablet Take 1 tablet (90 mg) by mouth 2 times daily Start  this evening.  Patient taking differently: Take 90 mg by mouth 2 times daily 4/22/2022 at AM x1 Yes Anant Saldivar MD

## 2022-04-22 NOTE — ED PROVIDER NOTES
History     Chief Complaint:  Chest Pain and Shortness of Breath       HPI   Karolina Light is a 57 year old female who presents with chest pain 1 day after coronary angiogram status post stent placement.  Patient reports she was laying down around 11 AM when she experienced sharp central chest discomfort.  She felt chest heaviness as well.  Not clearly worse with exertion.  At present pain is 1 out of 10.  Symptoms are improved with sitting up and worse with lying down.  Currently not short of breath.  Pain feels similar to symptoms she was having prior to  angiogram yesterday although prior pain was not positional like this was.    Per chart review patient has history of CAD, cirrhosis.  She has history of prior drug-eluting stent placements and multiple previous percutaneous coronary interventions.  Yesterday she underwent diagnostic coronary angiogram due to concern for progressive angina despite medical therapy.    4/21/22 coronary angiogram:  Conclusion    1.  Status post successful PCI with drug-eluting stent placement in the proximal and mid RCA for in-stent restenosis.  2.  Moderate flow-limiting mid LAD in-stent restenosis (iFR 0.88) involving a moderate-sized diagonal branch.          ROS:  Review of Systems  A 10 point ROS was obtained and negative except as noted here and in HPI      Allergies:  Bee Venom  Shellfish Allergy  Atorvastatin  Codeine Sulfate  Hydrocodone  Methotrexate  Plavix [Clopidogrel]     Medications:    No current outpatient medications on file.      Past Medical History:    Past Medical History:   Diagnosis Date     CAD (coronary artery disease)      RA (rheumatoid arthritis) (H)      Skin cancer      Patient Active Problem List   Diagnosis     ACS (acute coronary syndrome) (H)     CAD (coronary artery disease)     Chest pain     Hyperlipidemia LDL goal <100     Rheumatoid arthritis involving multiple sites with positive rheumatoid factor (H)     Current moderate episode of major  "depressive disorder without prior episode (H)     Idiopathic esophageal varices without bleeding (H)     Status post coronary angiogram        Past Surgical History:    Past Surgical History:   Procedure Laterality Date     ANGIOGRAM  07/21/2015    In-segment restenosis in the proximal right coronary artery stent stenosis appears to be the range of 50%. Serial stenoses in the left anterior ascending artery and in the proximal mid and distal all appear to be in the 50% range. Scattered mild disease in the circumflex system no stenosis greater than 35%. No intervention performed.     HAND RECONSTRUCTION Right 06/15/1967     HEART CATH, ANGIOPLASTY  11/01/2012    Angioplasty and MIREILLE x2 to RCA     HYSTERECTOMY  03/01/1998        Family History:    family history includes C.A.D. in her brother and brother; C.A.D. (age of onset: 42) in her father; Cerebrovascular Disease in her sister; Cerebrovascular Disease (age of onset: 61) in her mother; Coronary Artery Disease in her brother; Diabetes in her sister; Myocardial Infarction in her sister; Other - See Comments in her brother.    Social History:   reports that she quit smoking about 6 years ago. She has quit using smokeless tobacco. She reports that she does not drink alcohol.  PCP: Harley Covington     Physical Exam     Patient Vitals for the past 24 hrs:   BP Temp Temp src Pulse Resp SpO2 Height Weight   04/22/22 1728 (!) 147/82 98  F (36.7  C) Oral 65 12 98 % 1.651 m (5' 5\") 88.5 kg (195 lb 1.6 oz)   04/22/22 1715 -- -- -- 67 8 98 % -- --   04/22/22 1700 (!) 136/93 -- -- 63 16 98 % -- --   04/22/22 1645 (!) 143/88 -- -- 66 11 97 % -- --   04/22/22 1630 137/82 -- -- 71 13 98 % -- --   04/22/22 1615 (!) 152/92 -- -- 71 (!) 34 100 % -- --   04/22/22 1600 (!) 144/88 -- -- 69 13 98 % -- --   04/22/22 1545 132/76 -- -- 64 10 99 % -- --   04/22/22 1530 126/79 -- -- 65 14 99 % -- --   04/22/22 1445 119/82 -- -- 66 8 98 % -- --   04/22/22 1430 131/84 -- -- 68 10 97 % -- " "--   04/22/22 1415 133/88 -- -- 65 13 98 % -- --   04/22/22 1400 (!) 151/86 -- -- 66 16 99 % -- --   04/22/22 1306 (!) 155/100 97.1  F (36.2  C) Temporal 71 20 99 % 1.651 m (5' 5\") 87.5 kg (193 lb)        Physical Exam  VS: Reviewed per above  HENT: normal speech  EYES: sclera anicteric  CV: Rate as noted, regular rhythm.   RESP: Effort normal. Breath sounds are normal bilaterally.  GI: no tenderness/rebound/guarding, not distended.  NEURO: Alert, moving all extremities  MSK: No deformity of the extremities  SKIN: Warm and dry    Emergency Department Course   ECG  ECG taken at 1312, ECG read at 1315  Normal sinus rhythm    No significant change as compared to prior, dated 04/21/22.  Rate 64 bpm. NH interval 186 ms. QRS duration 82 ms. QT/QTc 430/443 ms. P-R-T axes 22 -2 40.     Imaging:  CT Chest Pulmonary Embolism w Contrast   Final Result   IMPRESSION:   1.  No evidence for pulmonary embolism, acute thoracic aortic   abnormality, or acute airspace disease.   2.  Coronary artery calcifications   3.  New splenomegaly as compared to the CT from 7/20/2015, nonspecific   as to etiology.      ALONZO MILLER MD            SYSTEM ID:  [id number]      Echocardiogram Complete    (Results Pending)      Report per radiology    Laboratory:  Labs Ordered and Resulted from Time of ED Arrival to Time of ED Departure   TROPONIN I - Abnormal       Result Value    Troponin I High Sensitivity 273 (*)    D DIMER QUANTITATIVE - Abnormal    D-Dimer Quantitative 1.60 (*)    CBC WITH PLATELETS AND DIFFERENTIAL - Abnormal    WBC Count 4.1      RBC Count 4.64      Hemoglobin 13.2      Hematocrit 41.1      MCV 89      MCH 28.4      MCHC 32.1      RDW 15.6 (*)     Platelet Count 147 (*)     % Neutrophils 72      % Lymphocytes 20      % Monocytes 7      % Eosinophils 1      % Basophils 0      % Immature Granulocytes 0      NRBCs per 100 WBC 0      Absolute Neutrophils 3.0      Absolute Lymphocytes 0.8      Absolute Monocytes 0.3      Absolute " Eosinophils 0.0      Absolute Basophils 0.0      Absolute Immature Granulocytes 0.0      Absolute NRBCs 0.0     CRP INFLAMMATION - Abnormal    CRP Inflammation 8.3 (*)    BASIC METABOLIC PANEL - Normal    Sodium 136      Potassium 4.1      Chloride 106      Carbon Dioxide (CO2) 25      Anion Gap 5      Urea Nitrogen 13      Creatinine 1.03      Calcium 9.2      Glucose 83      GFR Estimate 63     ERYTHROCYTE SEDIMENTATION RATE AUTO - Normal    Erythrocyte Sedimentation Rate 25     COVID-19 VIRUS (CORONAVIRUS) BY PCR - Normal    SARS CoV2 PCR Negative     TROPONIN I        Emergency Department Course:     Reviewed:  I reviewed nursing notes, vitals and past medical history    Assessments:   I obtained history and examined the patient as noted above.    I rechecked the patient and explained findings.     Consults:   Dr Patterson, cardiology    Interventions:  Medications   aspirin (ASA) chewable tablet 81 mg (has no administration in time range)   carvedilol (COREG) tablet 6.25 mg (has no administration in time range)   sertraline (ZOLOFT) tablet 100 mg (has no administration in time range)   ticagrelor (BRILINTA) tablet 90 mg (has no administration in time range)   nitroGLYcerin (NITROSTAT) sublingual tablet 0.4 mg (has no administration in time range)   alum & mag hydroxide-simethicone (MAALOX) suspension 30 mL (has no administration in time range)   acetaminophen (TYLENOL) tablet 650 mg (has no administration in time range)     Or   acetaminophen (TYLENOL) Suppository 650 mg (has no administration in time range)   ibuprofen (ADVIL/MOTRIN) tablet 600 mg (has no administration in time range)   nitroGLYcerin (NITROSTAT) sublingual tablet 0.4 mg (0.4 mg Sublingual Given 4/22/22 1400)   CT SCAN FLUSH (85 mLs Intravenous Given 4/22/22 1517)   iopamidol (ISOVUE-370) solution 500 mL (66 mLs Intravenous Given 4/22/22 1516)        Disposition:  The patient was admitted to the hospital under the care of Dr. Donaldson.      Impression & Plan      Covid-19  Karolina Light was evaluated during a global COVID-19 pandemic, which necessitated consideration that the patient might be at risk for infection with the SARS-CoV-2 virus that causes COVID-19.   Applicable protocols for evaluation were followed during the patient's care.   COVID-19 was considered as part of the patient's evaluation. The plan for testing is:  a test was obtained during this visit.    Medical Decision Making:  Patient presents to the ER for evaluation of chest pain 1 day after coronary angiogram with 2 stents placed.  On arrival, vital signs are reassuring aside from mildly elevated blood pressure.  Her chest pain is already improving while in the ER.  She was given sublingual nitroglycerin and chest pain resolved.  EKG does not show specific ischemic change.  A single troponin is elevated although this is difficult to interpret in the setting of recent PCI.  I spoke with cardiology with plans for hospital admission to trending of cardiac enzymes and cardiac evaluation.  Patient did have a CT pulmonary angiogram due to elevated D-dimer which was negative for acute PE or other intrathoracic process.  Patient remained stable in the ER prior to admission    Diagnosis:    ICD-10-CM    1. Chest pain, unspecified type  R07.9         Discharge Medications:  Current Discharge Medication List           4/22/2022   Doug Elena MD     Scribe Disclosure:  I, Justin Carrillo, am serving as a scribe on 4/22/2022 at 1:55 PM to document services personally performed by Doug Elena MD based on my observations and the provider's statements to me.              Doug Elena MD  04/22/22 1004

## 2022-04-22 NOTE — ED TRIAGE NOTES
Pt arrives to the ED due to having mid sternal chest pain along with increased SOB that began suddenly around 1130. Pt had angioplasty done yesterday, where she had 2 stents placed.  Pt states pain with deep breath, states feeling like she has a weight on her chest.

## 2022-04-23 ENCOUNTER — APPOINTMENT (OUTPATIENT)
Dept: CARDIOLOGY | Facility: CLINIC | Age: 57
End: 2022-04-23
Attending: INTERNAL MEDICINE
Payer: MEDICARE

## 2022-04-23 VITALS
DIASTOLIC BLOOD PRESSURE: 78 MMHG | OXYGEN SATURATION: 97 % | WEIGHT: 195.1 LBS | HEART RATE: 65 BPM | HEIGHT: 65 IN | BODY MASS INDEX: 32.51 KG/M2 | SYSTOLIC BLOOD PRESSURE: 139 MMHG | TEMPERATURE: 98 F | RESPIRATION RATE: 16 BRPM

## 2022-04-23 LAB — LVEF ECHO: NORMAL

## 2022-04-23 PROCEDURE — 99217 PR OBSERVATION CARE DISCHARGE: CPT | Performed by: PHYSICIAN ASSISTANT

## 2022-04-23 PROCEDURE — 250N000012 HC RX MED GY IP 250 OP 636 PS 637: Performed by: INTERNAL MEDICINE

## 2022-04-23 PROCEDURE — 99214 OFFICE O/P EST MOD 30 MIN: CPT | Performed by: INTERNAL MEDICINE

## 2022-04-23 PROCEDURE — G0378 HOSPITAL OBSERVATION PER HR: HCPCS

## 2022-04-23 PROCEDURE — 93306 TTE W/DOPPLER COMPLETE: CPT

## 2022-04-23 PROCEDURE — 250N000013 HC RX MED GY IP 250 OP 250 PS 637: Performed by: INTERNAL MEDICINE

## 2022-04-23 PROCEDURE — 93306 TTE W/DOPPLER COMPLETE: CPT | Mod: 26 | Performed by: INTERNAL MEDICINE

## 2022-04-23 RX ORDER — ISOSORBIDE MONONITRATE 30 MG/1
30 TABLET, EXTENDED RELEASE ORAL DAILY
Status: DISCONTINUED | OUTPATIENT
Start: 2022-04-23 | End: 2022-04-23 | Stop reason: HOSPADM

## 2022-04-23 RX ORDER — METOPROLOL SUCCINATE 25 MG/1
25 TABLET, EXTENDED RELEASE ORAL DAILY
Status: DISCONTINUED | OUTPATIENT
Start: 2022-04-23 | End: 2022-04-23 | Stop reason: HOSPADM

## 2022-04-23 RX ORDER — ISOSORBIDE MONONITRATE 30 MG/1
30 TABLET, EXTENDED RELEASE ORAL DAILY
Qty: 30 TABLET | Refills: 3 | Status: SHIPPED | OUTPATIENT
Start: 2022-04-23 | End: 2022-05-06

## 2022-04-23 RX ORDER — METOPROLOL SUCCINATE 25 MG/1
25 TABLET, EXTENDED RELEASE ORAL DAILY
Status: DISCONTINUED | OUTPATIENT
Start: 2022-04-24 | End: 2022-04-23

## 2022-04-23 RX ORDER — METOPROLOL SUCCINATE 25 MG/1
25 TABLET, EXTENDED RELEASE ORAL DAILY
Qty: 30 TABLET | Refills: 3 | Status: SHIPPED | OUTPATIENT
Start: 2022-04-24 | End: 2022-05-06

## 2022-04-23 RX ADMIN — SERTRALINE 100 MG: 100 TABLET, FILM COATED ORAL at 08:11

## 2022-04-23 RX ADMIN — CARVEDILOL 6.25 MG: 6.25 TABLET, FILM COATED ORAL at 08:10

## 2022-04-23 RX ADMIN — METOPROLOL SUCCINATE 25 MG: 25 TABLET, EXTENDED RELEASE ORAL at 11:35

## 2022-04-23 RX ADMIN — ASPIRIN 81 MG CHEWABLE TABLET 81 MG: 81 TABLET CHEWABLE at 08:12

## 2022-04-23 RX ADMIN — AZATHIOPRINE 100 MG: 50 TABLET ORAL at 08:12

## 2022-04-23 RX ADMIN — SULFASALAZINE 1000 MG: 500 TABLET ORAL at 08:22

## 2022-04-23 RX ADMIN — TICAGRELOR 90 MG: 90 TABLET ORAL at 08:14

## 2022-04-23 NOTE — PLAN OF CARE
"PRIMARY DIAGNOSIS: CHEST PAIN  OUTPATIENT/OBSERVATION GOALS TO BE MET BEFORE DISCHARGE:  1. Ruled out acute coronary syndrome (negative or stable Troponin):  Yes  2. Pain Status: Pain free.  3. Appropriate provocative testing performed: No  - Stress Test Procedure: Echo  - Interpretation of cardiac rhythm per telemetry tech: NSR 60s    4. Cleared by Consultants (if applicable):No  5. Return to near baseline physical activity: Yes  Discharge Planner Nurse   Safe discharge environment identified: Yes  Barriers to discharge: Yes       Entered by: Neftali Emerson 04/23/2022 4:00AM  /78 (BP Location: Right arm, Patient Position: Supine)   Pulse 64   Temp 98.2  F (36.8  C) (Oral)   Resp 16   Ht 1.651 m (5' 5\")   Wt 88.5 kg (195 lb 1.6 oz)   SpO2 96%   BMI 32.47 kg/m    VSS on RA. AxOx4 and able to make needs known. Ambulating independently. Tolerating cardiac diet. Has not reported pain overnight. PIV SL. Tele : NSR 60s. 2200 Trop: 216 (trending down). Plan to monitor and treat chest pain, trend troponin, and complete Echo in the morning. Pt is agreeable to POC and resting peacefully. Will continue to provide supportive cares.  Please review provider order for any additional goals.   Nurse to notify provider when observation goals have been met and patient is ready for discharge.      "

## 2022-04-23 NOTE — CONSULTS
[facility]  Cardiology Consultation     Date of Admission:  4/22/2022    Assessment & Plan   Karolina Light is a 57 year old female with    1.  Coronary disease s/p recent PCI of RCA, moderate ISR of previously placed LAD stent, 80% stenosis of D1  2.  Hyperlipidemia    The patient has been observed overnight in the hospital and continues to remain asymptomatic.  She is only on Coreg for angina.  She tells me that she had an allergic reaction to metoprolol a decade ago [nausea vomiting].  This is important because her blood pressure is borderline and ideally I would like her to be on metoprolol to give room in terms of blood pressure for addition of another antianginal such as Imdur.  We talked about this and the patient agreed to give metoprolol another try.  If she is able to tolerate this medication we will continue 25 mg of metoprolol XL and discontinue Coreg.  We will also add 30 mg of Imdur.  She will follow-up with Dr. Shine and has an upcoming appointment with him.  She will continue to take dual antiplatelet therapy at least for a year.  I believe she is stable from cardiac standpoint for discharge at this time.  Cardiology will sign off.    Geoavny Pino    Code Status    Full Code    Reason for Consult   Reason for consult: Chest pain    Primary Care Physician   Harley Covington    Chief Complaint   Chest pain    History of Present Illness   Karolina Light is a 57 year old female with past medical history of coronary disease status post previous stenting, hyperlipidemia, rheumatoid arthritis, major depression presented to the hospital for complaints of an episode of chest pain.    The patient recently underwent coronary angiogram and PCI of RCA on 4/21/2022.  This was in the setting of ACS.  She was also noted to have in-stent restenosis of the previously placed proximal LAD stent.  Hemodynamic assessment showed IFR of 0.88 (bracket borderline positive).  The plan was to  manage her aggressively with antianginals and  consider intervention on the LAD only if medical therapy fails.    The patient was doing well until last night when she had an episode of severe left-sided/substernal chest pain.  This occurred at rest and the patient was lying in bed when this happened.  The pain lasted from 11:00 to 4:30 in the morning and got relieved by 1 sublingual nitroglycerin.  Patient does have sublingual nitroglycerin with her but did not take it until it was given to her in the emergency room.  The pain has since resolved and has not recurred.  Blood work showed troponin which has been downtrending and is most likely from the PCI few days earlier.  EKG does not show ACS.  Echocardiogram shows normal LV size and systolic function.  No significant valvular disease.    Patient Active Problem List   Diagnosis     ACS (acute coronary syndrome) (H)     CAD (coronary artery disease)     Chest pain     Hyperlipidemia LDL goal <100     Rheumatoid arthritis involving multiple sites with positive rheumatoid factor (H)     Current moderate episode of major depressive disorder without prior episode (H)     Idiopathic esophageal varices without bleeding (H)     Status post coronary angiogram       Past Medical History   I have reviewed this patient's medical history and updated it with pertinent information if needed.   Past Medical History:   Diagnosis Date     CAD (coronary artery disease)     MI and stent 2012     RA (rheumatoid arthritis) (H)      Skin cancer     unknown type       Past Surgical History   I have reviewed this patient's surgical history and updated it with pertinent information if needed.  Past Surgical History:   Procedure Laterality Date     ANGIOGRAM  07/21/2015    In-segment restenosis in the proximal right coronary artery stent stenosis appears to be the range of 50%. Serial stenoses in the left anterior ascending artery and in the proximal mid and distal all appear to be in the  50% range. Scattered mild disease in the circumflex system no stenosis greater than 35%. No intervention performed.     HAND RECONSTRUCTION Right 06/15/1967     HEART CATH, ANGIOPLASTY  11/01/2012    Angioplasty and MIREILLE x2 to RCA     HYSTERECTOMY  03/01/1998       Prior to Admission Medications   Prior to Admission Medications   Prescriptions Last Dose Informant Patient Reported? Taking?   aspirin (ASA) 81 MG EC tablet 4/22/2022 at AM  No Yes   Sig: Take 1 tablet (81 mg) by mouth daily Start tomorrow.   aspirin (ASA) 81 MG chewable tablet  at duplicate entry, last took today AM 4/22  No Yes   Sig: Take 1 tablet (81 mg) by mouth daily   azaTHIOprine (IMURAN) 50 MG tablet 4/22/2022 at AM  No Yes   Sig: Take 2 tablets (100 mg) by mouth daily Labs every 8-12 weeks.   carvedilol (COREG) 3.125 MG tablet 4/22/2022 at AM x1  No Yes   Sig: Take 2 tablets (6.25 mg) by mouth 2 times daily (with meals)   nitroGLYcerin (NITROSTAT) 0.4 MG sublingual tablet  at PRN  No Yes   Sig: For chest pain place 1 tablet under the tongue every 5 minutes for 3 doses. If symptoms persist 5 minutes after 1st dose call 911.   sertraline (ZOLOFT) 100 MG tablet 4/22/2022 at Unknown time  No Yes   Sig: Take 1 tablet (100 mg) by mouth daily   sulfaSALAzine (AZULFIDINE) 500 MG tablet 4/22/2022 at AM  Yes Yes   Sig: Take 1,000 mg by mouth daily   ticagrelor (BRILINTA) 90 MG tablet 4/22/2022 at AM x1  No Yes   Sig: Take 1 tablet (90 mg) by mouth 2 times daily Start this evening.   Patient taking differently: Take 90 mg by mouth 2 times daily      Facility-Administered Medications: None     Current Facility-Administered Medications   Medication Dose Route Frequency     aspirin  81 mg Oral Daily     azaTHIOprine  100 mg Oral Daily     isosorbide mononitrate  30 mg Oral Daily     metoprolol succinate ER  25 mg Oral Daily     sertraline  100 mg Oral Daily     sulfaSALAzine  1,000 mg Oral Daily     ticagrelor  90 mg Oral BID     Current Facility-Administered  Medications   Medication Last Rate     Allergies   Allergies   Allergen Reactions     Bee Venom Swelling     Shellfish Allergy Anaphylaxis     Atorvastatin Muscle Pain (Myalgia)     Codeine Sulfate      Hydrocodone      Heart races and feels like bugs crawling under skin     Methotrexate Other (See Comments)     hepatotxicity     Plavix [Clopidogrel]      Night terrors       Social History    reports that she quit smoking about 6 years ago. She has quit using smokeless tobacco. She reports that she does not drink alcohol.    Family History   Family History   Problem Relation Age of Onset     Cerebrovascular Disease Mother 61     C.A.D. Father 42     Myocardial Infarction Sister      Cerebrovascular Disease Sister      Diabetes Sister      C.A.D. Brother      C.A.D. Brother      Other - See Comments Brother         PAD     Coronary Artery Disease Brother        Review of Systems   The comprehensive 10 point Review of Systems is negative other than noted in the HPI or here.     Physical Exam   Temp: 98.1  F (36.7  C) Temp src: Oral BP: 123/72 Pulse: 71   Resp: 16 SpO2: 96 % O2 Device: None (Room air)    Vital Signs with Ranges  Temp:  [97.1  F (36.2  C)-98.5  F (36.9  C)] 98.1  F (36.7  C)  Pulse:  [63-71] 71  Resp:  [8-34] 16  BP: (108-152)/(59-93) 123/72  SpO2:  [96 %-100 %] 96 %  195 lbs 1.6 oz    Constitutional: Alert, no apparent distress,    Lungs: Normal bilateral breath sounds   Cardiovascular: Regular rate and rhythm, normal S1 and S2, and no murmur   Lymphm node  Neck No enlargement  No jugular vein extension or carotid bruit   Skin: Normal   Extremity: No edema       Data   Results for orders placed or performed during the hospital encounter of 04/22/22 (from the past 24 hour(s))   CT Chest Pulmonary Embolism w Contrast    Narrative    CT CHEST PULMONARY EMBOLISM WITH CONTRAST 4/22/2022 3:22 PM    CLINICAL HISTORY: Chest pain, elevated D-dimer.    TECHNIQUE: CT angiogram chest during arterial phase  injection IV  contrast. 2D and 3D MIP reconstructions were performed by the CT  technologist. Dose reduction techniques were used.     CONTRAST: 66 mL Isovue-370    COMPARISON: CT chest 7/20/2015.    FINDINGS:  ANGIOGRAM CHEST: Pulmonary arteries are normal caliber and negative  for pulmonary emboli. Thoracic aorta is negative for dissection. No CT  evidence of right heart strain.    LUNGS AND PLEURA: No effusions. Stable small right lower lobe  granuloma series 7 image 103. Stable 0.3 cm nodule posteromedial right  lower lobe image 118. No new worrisome airspace disease.    MEDIASTINUM/AXILLAE: No acute mediastinal abnormality. No enlarged  mediastinal lymph nodes. There are a few minimally prominent and  stable left axillary lymph nodes.    CORONARY ARTERY CALCIFICATION: Suspect moderate.    UPPER ABDOMEN: New splenomegaly with transverse length of 18 cm,  previously 13.4 cm on the CT from 7/20/2015. Some vicarious excretion  of contrast material versus gallstones in the gallbladder lumen.    MUSCULOSKELETAL: Normal.      Impression    IMPRESSION:  1.  No evidence for pulmonary embolism, acute thoracic aortic  abnormality, or acute airspace disease.  2.  Coronary artery calcifications  3.  New splenomegaly as compared to the CT from 7/20/2015, nonspecific  as to etiology.    ALONZO MILLER MD         SYSTEM ID:  [id number]   Asymptomatic COVID-19 Virus (Coronavirus) by PCR Nasopharyngeal    Specimen: Nasopharyngeal; Swab   Result Value Ref Range    SARS CoV2 PCR Negative Negative    Narrative    Testing was performed using the Xpert Xpress SARS-CoV-2 Assay on the   Cepheid Gene-Xpert Instrument Systems. Additional information about   this Emergency Use Authorization (EUA) assay can be found via the Lab   Guide. This test should be ordered for the detection of SARS-CoV-2 in   individuals who meet SARS-CoV-2 clinical and/or epidemiological   criteria. Test performance is unknown in asymptomatic patients. This   test is  for in vitro diagnostic use under the FDA EUA for   laboratories certified under CLIA to perform high complexity testing.   This test has not been FDA cleared or approved. A negative result   does not rule out the presence of PCR inhibitors in the specimen or   target RNA in concentration below the limit of detection for the   assay. The possibility of a false negative should be considered if   the patient's recent exposure or clinical presentation suggests   COVID-19. This test was validated by the [facility]. This laboratory is certified under the Clinical Laboratory Improvement Amendments of 1988 (CLIA-88) as qualified to perform high complexity laboratory testing.     Troponin I (now)   Result Value Ref Range    Troponin I High Sensitivity 240 (HH) <54 ng/L   Troponin I - now then in 2 hours x 2   Result Value Ref Range    Troponin I High Sensitivity 234 (HH) <54 ng/L   Troponin I - now then in 2 hours x 2   Result Value Ref Range    Troponin I High Sensitivity 216 (HH) <54 ng/L   Echocardiogram Complete   Result Value Ref Range    LVEF  60-65%     Narrative    [id number]  [id number]  [id number]  [id number]^GABRIEL^CAROL     [facility]  Echocardiography Laboratory  [address] [place] [address]     Name: ANUP WASHINGTON  MRN: [id number]  : 1965  Study Date: 2022 08:41 AM  Age: 57 yrs  Gender: Female  Patient Location: [facility]  Reason For Study: Chest Pain, Pericarditis  Ordering Physician: CAROL RIOS  Referring Physician: Harley Covington MD  Performed By: Nell Calderon RDCS     BSA: 2.0 m2  Height: 65 in  Weight: 195 lb  HR: 62  BP: 147/82 mmHg  ______________________________________________________________________________  Procedure  Complete Portable Echo Adult. Complete Echo Adult.  ______________________________________________________________________________  Interpretation Summary     Left ventricular systolic function is normal. The  visual ejection fraction is  60-65%. No regional wall motion abnormalities noted.  The right ventricle is normal in size and function. RV apical hypokinesis.  No hemodynamically significant valvular abnormalities on 2D or color flow  imaging.  Compared to the prior study in 2015, LV systolic function is unchanged.  ______________________________________________________________________________  Left Ventricle  The left ventricle is normal in size. There is concentric remodeling present.  Left ventricular systolic function is normal. The visual ejection fraction is  60-65%. Grade I or early diastolic dysfunction. No regional wall motion  abnormalities noted.     Right Ventricle  The right ventricle is normal in size and function. RV apical hypokinesis.     Atria  Normal left atrial size. Right atrial size is normal.     Mitral Valve  The mitral valve leaflets appear normal. There is no evidence of stenosis,  fluttering, or prolapse. There is trace mitral regurgitation.     Tricuspid Valve  Normal tricuspid valve. There is trace tricuspid regurgitation. The right  ventricular systolic pressure is approximated at 12.8 mmHg plus the right  atrial pressure.     Aortic Valve  There is moderate aortic sclerosis of the non-coronary cusp. The aortic valve  is trileaflet. No aortic stenosis is present.     Pulmonic Valve  The pulmonic valve is not well visualized. There is trace pulmonic valvular  regurgitation.     Vessels  Normal size aorta. Normal size ascending aorta. Inferior vena cava not well  visualized for estimation of right atrial pressure.     Pericardium  There is no pericardial effusion.     Rhythm  Sinus rhythm was noted.  ______________________________________________________________________________  MMode/2D Measurements & Calculations  IVSd: 0.97 cm     LVIDd: 4.2 cm  LVIDs: 3.3 cm  LVPWd: 1.1 cm  FS: 20.4 %  LV mass(C)d: 140.7 grams  LV mass(C)dI: 71.9 grams/m2  Ao root diam: 3.5 cm  LA dimension: 4.1  cm  asc Aorta Diam: 4.0 cm  LA/Ao: 1.2  LVOT diam: 2.0 cm  LVOT area: 3.2 cm2  RWT: 0.51     Doppler Measurements & Calculations  MV E max pamela: 61.2 cm/sec  MV A max pamela: 79.0 cm/sec  MV E/A: 0.78  MV dec time: 0.16 sec  Ao V2 max: 121.1 cm/sec  Ao max P.0 mmHg  PA acc time: 0.18 sec  TR max pamela: 179.0 cm/sec  TR max P.8 mmHg  E/E' av.4  Lateral E/e': 7.8     Medial E/e': 8.9     ______________________________________________________________________________  Report approved by: Isatu Enriquez 2022 01:01 PM

## 2022-04-23 NOTE — PLAN OF CARE
"Goal Outcome Evaluation:    Plan of Care Reviewed With: patient, spouse     Patient's After Visit Summary was reviewed with patient and/or spouse.   Patient verbalized understanding of After Visit Summary, recommended follow up and was given an opportunity to ask questions.   Discharge medications sent home with patient/family: Not applicable, meds sent to [facility] pharmacy. (Metoprolol and Imdur)   Discharged with spouse    /78 (BP Location: Left arm)   Pulse 65   Temp 98  F (36.7  C) (Oral)   Resp 16   Ht 1.651 m (5' 5\")   Wt 88.5 kg (195 lb 1.6 oz)   SpO2 97%   BMI 32.47 kg/m       OBSERVATION patient END time: 1556      "

## 2022-04-23 NOTE — PLAN OF CARE
"PRIMARY DIAGNOSIS: CHEST PAIN  OUTPATIENT/OBSERVATION GOALS TO BE MET BEFORE DISCHARGE:  1. Ruled out acute coronary syndrome (negative or stable Troponin):  No  2. Pain Status: Pain free.  3. Appropriate provocative testing performed: No  - Stress Test Procedure: Echo  - Interpretation of cardiac rhythm per telemetry tech: NSR 60s    4. Cleared by Consultants (if applicable):No  5. Return to near baseline physical activity: Yes  Discharge Planner Nurse   Safe discharge environment identified: Yes  Barriers to discharge: Yes       Entered by: Neftali Emerson 04/23/2022 12:37 AM  /70 (BP Location: Right arm)   Pulse 64   Temp 98.2  F (36.8  C) (Oral)   Resp 15   Ht 1.651 m (5' 5\")   Wt 88.5 kg (195 lb 1.6 oz)   SpO2 97%   BMI 32.47 kg/m    VSS on RA. AxOx4 and able to make needs known. Ambulating independently. Tolerating cardiac diet. Has not reported pain in last 4hr. PIV SL. Tele : NSR 60s. 2200 Trop: 216. Plan to monitor and treat chest pain, trend troponin, and complete Echo in the morning. Pt is agreeable to POC and resting peacefully. Will continue to provide supportive cares.  Please review provider order for any additional goals.   Nurse to notify provider when observation goals have been met and patient is ready for discharge.    "

## 2022-04-23 NOTE — PLAN OF CARE
Goal Outcome Evaluation:    PRIMARY DIAGNOSIS: CHEST PAIN  OUTPATIENT/OBSERVATION GOALS TO BE MET BEFORE DISCHARGE:  1. Ruled out acute coronary syndrome (negative or stable Troponin):  No  2. Pain Status: Improved-controlled with oral pain medications.  3. Appropriate provocative testing performed: Yes  - Stress Test Procedure: Echo  - Interpretation of cardiac rhythm per telemetry tech:     4. Cleared by Consultants (if applicable):No  5. Return to near baseline physical activity: Yes  Discharge Planner Nurse   Safe discharge environment identified: Yes  Barriers to discharge: Yes      Entered by: Chrsiti Mcqueen 04/22/2022   Pt is AandOx4, clear speech. Inp. Mod carb diet, S/L. VS stable. Awaiting tele box.  Plan: echo in the morning.     Please review provider order for any additional goals.   Nurse to notify provider when observation goals have been met and patient is ready for discharge.

## 2022-04-23 NOTE — PROGRESS NOTES
Care Management Discharge Note    Discharge Date: 04/23/2022     Discharge Disposition:  Home    Additional Information:  Pt has a [facility] listed and is identified as a Service Bundle #3. Per chart review, no needs or assessment needed at this time. Please consult CM/SW if discharge needs should arise.    Sushila Senior RN BSN   Inpatient Care Coordination  [facility]   Phone [phone]

## 2022-04-23 NOTE — PROGRESS NOTES
[facility]  Medicine Progress Note - Hospitalist Service       Date of Admission:  4/22/2022  Assessment & Plan     Karolina Light is a 58 yo female with HTN, statin-intolerant DLD, RA on chronic immunotherapies, and ASCVD.  On 4/21/2022, she underwent cardiac cath for accelerating angina culminating in MIREILLE to pRCA and mRCA for instent restenosis with identified (but not addressed) flow-limiting in-stent restenosis of mLAD prompting recommendation to consider staged stenting of mLAD and D1 if angina recurs.  DAPT with Brilinta initiated.  Patient discharged home same-day before returning 4/22/2022 with recurrent chest pain.  Initial trop mildly elevated.  EKG without changes.  ED provider spoke with Cardiologist who recommended overnight Observation.     Chest pain 24 hours after PCI  Trops elevated but cycled flat.  EKG unchanged.  Patient is now chest pain free but still feels unwell.  Etiology for chest pain unclear:  Query symptomatic mLAD/D1 lesion vs Dressler's vs re-perfusion angina.  -- Hemodynamically stable  -- Echocardiogram in process  -- Flat trops and resolution of chest pain calm concerns that patient would need to proceed with staged stenting of mLAD and D1 but await results of Echo and Cardiology recommendations.   -- Continue PTA ASA 81, Brilinta.  -- Considering decreasing carvedilol to 3.125 BID for soft pressures     Dyslipidemia  - Intolerant to statins.  Per cardiology note, patient has declined PCSK9 inhibitors.     Rheumatoid arthritis  - Continue PTA Imuran and sulfasalazine.     Depression  - Continue PTA Zoloft      Diet: Combination Diet Low Saturated Fat Na <2400mg Diet, No Caffeine Diet    DVT Prophylaxis: Ambulate  Code Status: Full Code      DISPO:  Home in 0-1 days    BILL Weiss  Hospitalist Service  [facility]     Text Page (7AM - 5PM, M-F)  ______________________________________________________________________    Interval  "History   Chest pain free.  Tolerating PO.  Feels \"blah\", lethargic.  BP a bit on the lower side but denies dizziness.    Data reviewed today: I reviewed all medications, new labs and imaging results over the last 24 hours. I personally reviewed no images or EKG's today.    Physical Exam   Vital Signs: Temp: 98.5  F (36.9  C) Temp src: Oral BP: 108/59 Pulse: 67   Resp: 16 SpO2: 97 % O2 Device: None (Room air)    Weight: 195 lbs 1.6 oz    GENERAL:  Pleasant, cooperative, alert. Well developed, well nourished.   HEENT: Normocephalic, atraumatic.  Extra occular mm intact.  Sclera clear. PERRL.  Mucous membranes moist.    PULMONOLOGY: Clear to auscultation bilaterally   CARDIAC: Regular    ABDOMEN: Soft, nontender    MUSCULOSKELETAL:  Moving x 4 spontaneously with CMS intact x4.  Right arm with scattered bruising.  Angio site flat, intact.   NEURO: Alert and oriented x3.  CN II-XII grossly intact and symmetric. Nonfocal.       Data   Recent Labs   Lab 04/22/22  1357 04/21/22  1029   WBC 4.1 3.2*   HGB 13.2 13.3   MCV 89 89   * 118*   INR  --  1.06    140   POTASSIUM 4.1 3.9   CHLORIDE 106 108   CO2 25 27   BUN 13 12   CR 1.03 1.05*   ANIONGAP 5 5   JUDAH 9.2 9.0   GLC 83 102*     Recent Results (from the past 24 hour(s))   CT Chest Pulmonary Embolism w Contrast    Narrative    CT CHEST PULMONARY EMBOLISM WITH CONTRAST 4/22/2022 3:22 PM    CLINICAL HISTORY: Chest pain, elevated D-dimer.    TECHNIQUE: CT angiogram chest during arterial phase injection IV  contrast. 2D and 3D MIP reconstructions were performed by the CT  technologist. Dose reduction techniques were used.     CONTRAST: 66 mL Isovue-370    COMPARISON: CT chest 7/20/2015.    FINDINGS:  ANGIOGRAM CHEST: Pulmonary arteries are normal caliber and negative  for pulmonary emboli. Thoracic aorta is negative for dissection. No CT  evidence of right heart strain.    LUNGS AND PLEURA: No effusions. Stable small right lower lobe  granuloma series 7 image " 103. Stable 0.3 cm nodule posteromedial right  lower lobe image 118. No new worrisome airspace disease.    MEDIASTINUM/AXILLAE: No acute mediastinal abnormality. No enlarged  mediastinal lymph nodes. There are a few minimally prominent and  stable left axillary lymph nodes.    CORONARY ARTERY CALCIFICATION: Suspect moderate.    UPPER ABDOMEN: New splenomegaly with transverse length of 18 cm,  previously 13.4 cm on the CT from 7/20/2015. Some vicarious excretion  of contrast material versus gallstones in the gallbladder lumen.    MUSCULOSKELETAL: Normal.      Impression    IMPRESSION:  1.  No evidence for pulmonary embolism, acute thoracic aortic  abnormality, or acute airspace disease.  2.  Coronary artery calcifications  3.  New splenomegaly as compared to the CT from 7/20/2015, nonspecific  as to etiology.    ALONZO MILLER MD         SYSTEM ID:  [id number]     Medications       aspirin  81 mg Oral Daily     azaTHIOprine  100 mg Oral Daily     carvedilol  6.25 mg Oral BID w/meals     sertraline  100 mg Oral Daily     sulfaSALAzine  1,000 mg Oral Daily     ticagrelor  90 mg Oral BID

## 2022-04-23 NOTE — PLAN OF CARE
PRIMARY DIAGNOSIS: CHEST PAIN  OUTPATIENT/OBSERVATION GOALS TO BE MET BEFORE DISCHARGE:  1. Ruled out acute coronary syndrome (negative or stable Troponin):  No  2. Pain Status: Pain free.  3. Appropriate provocative testing performed: No  - Stress Test Procedure: Echo  - Interpretation of cardiac rhythm per telemetry tech:      4. Cleared by Consultants (if applicable):No  5. Return to near baseline physical activity: Yes  Discharge Planner Nurse   Safe discharge environment identified: Yes  Barriers to discharge: Yes        Pt AandOx4. Independent in room. Mod carb diet. PIV on R arm S/L. Denies chest pain. Plan: Echo completed. Ambulated in the su[address] way successfully,  VS stable. Plan: received and tolerated metoprolol, and possible discharge home.     Please review provider order for any additional goals.   Nurse to notify provider when observation goals have been met and patient is ready for discharge.

## 2022-04-23 NOTE — DISCHARGE SUMMARY
[facility]  Discharge Summary  Hospitalist    Date of Admission:  4/22/2022  Date of Discharge:  4/23/2022    Discharging Provider: Martina Alanis PAC    Discharge Diagnoses   1. New onset chest pain 24 hours after cardiac cath.  Work-up negative for ischemic process.  Echo without new rWMA.  Seen by Cardiology who recommend starting Imdur 30 and changing carvedilol >> Toprol. PTA ASA 81 and Brilinta continued    ASCVD with concerns for unstable angina culminating in C 4/21/2022 which revealed in-stent restenosis in RCA and LAD stents.  S/p MIREILLE to pRCA and mRCA.  While iFR suggests moderate flow limiting lesion in the prior mLAD stent, plan is to pursue medical management versus staged PCI of mLAD and D1.    2. HTN complicated by intermittent hypotension  3. Statin intolerant DLD.  Declines trial of PCSK9 inhibitors.  4. Obesity   5. RA on immunospressants    Discharge Disposition     Discharged to home    Condition at Discharge:  Stable    History of Present Illness   Karolina Light is a 56 yo female with HTN, statin-intolerant DLD, RA on chronic immunotherapies, and ASCVD.  On 4/21/2022, she underwent cardiac cath for accelerating angina culminating in MIREILLE to pRCA and mRCA for instent restenosis with identified (but not addressed) flow-limiting in-stent restenosis of mLAD prompting recommendation to consider staged stenting of mLAD and D1 if angina recurs.  DAPT with Brilinta initiated.  Patient discharged home same-day before returning 4/22/2022 with recurrent chest pain.  Initial trop mildly elevated.  EKG without changes.  ED provider spoke with Cardiologist who recommended overnight Observation.     Trops elevated but cycled flat.  EKG unchanged.  Patient is now chest pain free but still feels unwell.  Etiology for chest pain unclear:  Query symptomatic mLAD/D1 lesion vs Dressler's vs re-perfusion angina.  Echocardiogram within normal limits.  Patient seen by Cardiology who  recommends starting Imdur 30 daily and changing from carvedilol >> Toprol 25.  Toprol was given in-house and BP actually improved.  Patient ambulated around unit without dizziness or lightheadedness.      Patient to discharge home today with planned outpatient Cardiology appt late next week.      BILL Weiss    Code Status   Full Code       Primary Care Physician   Harley Covington    Consultations This Hospital Stay   CARDIOLOGY IP CONSULT  CARDIOLOGY IP CONSULT    Time Spent on this Encounter   I, BILL Weiss, personally saw the patient today and spent greater than 30 minutes discharging this patient.    Allergies   Allergies   Allergen Reactions     Bee Venom Swelling     Shellfish Allergy Anaphylaxis     Atorvastatin Muscle Pain (Myalgia)     Codeine Sulfate      Hydrocodone      Heart races and feels like bugs crawling under skin     Methotrexate Other (See Comments)     hepatotxicity     Plavix [Clopidogrel]      Night terrors         Discharge Medications   Current Discharge Medication List      START taking these medications    Details   isosorbide mononitrate (IMDUR) 30 MG 24 hr tablet Take 1 tablet (30 mg) by mouth daily . Do not take if BP < 100  Qty: 30 tablet, Refills: 3    Associated Diagnoses: Chest pain, unspecified type; ACS (acute coronary syndrome) (H); Anginal pain (H)      metoprolol succinate ER (TOPROL-XL) 25 MG 24 hr tablet Take 1 tablet (25 mg) by mouth daily . Do not take if BP < 110  Qty: 30 tablet, Refills: 3    Associated Diagnoses: Chest pain, unspecified type; ACS (acute coronary syndrome) (H)         CONTINUE these medications which have NOT CHANGED    Details   aspirin (ASA) 81 MG EC tablet Take 1 tablet (81 mg) by mouth daily Start tomorrow.  Qty: 30 tablet, Refills: 3    Associated Diagnoses: Stable angina pectoris (H)      azaTHIOprine (IMURAN) 50 MG tablet Take 2 tablets (100 mg) by mouth daily Labs every 8-12 weeks.  Qty: 180 tablet, Refills: 0     Associated Diagnoses: Rheumatoid arthritis involving multiple sites with positive rheumatoid factor (H)      nitroGLYcerin (NITROSTAT) 0.4 MG sublingual tablet For chest pain place 1 tablet under the tongue every 5 minutes for 3 doses. If symptoms persist 5 minutes after 1st dose call 911.  Qty: 30 tablet, Refills: 11    Associated Diagnoses: Hyperlipidemia LDL goal <100; Coronary artery disease involving native coronary artery of native heart without angina pectoris      sertraline (ZOLOFT) 100 MG tablet Take 1 tablet (100 mg) by mouth daily  Qty: 90 tablet, Refills: 1    Associated Diagnoses: Current moderate episode of major depressive disorder without prior episode (H)      sulfaSALAzine (AZULFIDINE) 500 MG tablet Take 1,000 mg by mouth daily      ticagrelor (BRILINTA) 90 MG tablet Take 1 tablet (90 mg) by mouth 2 times daily Start this evening.  Qty: 180 tablet, Refills: 3    Associated Diagnoses: Stable angina pectoris (H)         STOP taking these medications       aspirin (ASA) 81 MG chewable tablet Comments:   Reason for Stopping:         carvedilol (COREG) 3.125 MG tablet Comments:   Reason for Stopping:               Data   Most Recent 3 CBC's:  Recent Labs   Lab Test 04/22/22  1357 04/21/22  1029 03/21/22  1506   WBC 4.1 3.2* 4.7   HGB 13.2 13.3 13.6   MCV 89 89 88   * 118* 156      Most Recent 3 BMP's:  Recent Labs   Lab Test 04/22/22  1357 04/21/22  1029 03/21/22  1506 12/03/21  1230    140  --  139   POTASSIUM 4.1 3.9  --  4.5   CHLORIDE 106 108  --  108   CO2 25 27  --  28   BUN 13 12  --  14   CR 1.03 1.05* 1.28* 1.13*   ANIONGAP 5 5  --  3   JUDAH 9.2 9.0  --  9.3   GLC 83 102*  --  97     Most Recent 2 LFT's:  Recent Labs   Lab Test 03/21/22  1506 12/03/21  1230 02/11/19  0000 07/21/15  0635   AST 18 17   < > 30   ALT 23 20   < > 30   ALKPHOS  --  78  --  108   BILITOTAL  --  0.4  --  0.3    < > = values in this interval not displayed.     Most Recent INR's and Anticoagulation Dosing  History:  Anticoagulation Dose History     Recent Dosing and Labs Latest Ref Rng & Units 4/21/2022    INR 0.85 - 1.15 1.06        Most Recent 3 Troponin's:  Recent Labs   Lab Test 03/24/19  1804 07/20/15  0826 07/20/15  0506   TROPI 0.258* <0.015  The 99th percentile for upper reference range is 0.045 ug/L.  Troponin values in   the range of 0.045 - 0.120 ug/L may be associated with risks of adverse   clinical events.   <0.015  The 99th percentile for upper reference range is 0.045 ug/L.  Troponin values in   the range of 0.045 - 0.120 ug/L may be associated with risks of adverse   clinical events.       Most Recent Cholesterol Panel:  Recent Labs   Lab Test 04/21/22  1029   CHOL 182   *   HDL 22*   TRIG 264*     Most Recent 6 Bacteria Isolates From Any Culture (See EPIC Reports for Culture Details):No lab results found.  Most Recent TSH, T4 and A1c Labs:No lab results found.  Results for orders placed or performed during the hospital encounter of 04/22/22   CT Chest Pulmonary Embolism w Contrast    Narrative    CT CHEST PULMONARY EMBOLISM WITH CONTRAST 4/22/2022 3:22 PM    CLINICAL HISTORY: Chest pain, elevated D-dimer.    TECHNIQUE: CT angiogram chest during arterial phase injection IV  contrast. 2D and 3D MIP reconstructions were performed by the CT  technologist. Dose reduction techniques were used.     CONTRAST: 66 mL [id number]    COMPARISON: CT chest 7/20/2015.    FINDINGS:  ANGIOGRAM CHEST: Pulmonary arteries are normal caliber and negative  for pulmonary emboli. Thoracic aorta is negative for dissection. No CT  evidence of right heart strain.    LUNGS AND PLEURA: No effusions. Stable small right lower lobe  granuloma series 7 image 103. Stable 0.3 cm nodule posteromedial right  lower lobe image 118. No new worrisome airspace disease.    MEDIASTINUM/AXILLAE: No acute mediastinal abnormality. No enlarged  mediastinal lymph nodes. There are a few minimally prominent and  stable left axillary lymph  nodes.    CORONARY ARTERY CALCIFICATION: Suspect moderate.    UPPER ABDOMEN: New splenomegaly with transverse length of 18 cm,  previously 13.4 cm on the CT from 2015. Some vicarious excretion  of contrast material versus gallstones in the gallbladder lumen.    MUSCULOSKELETAL: Normal.      Impression    IMPRESSION:  1.  No evidence for pulmonary embolism, acute thoracic aortic  abnormality, or acute airspace disease.  2.  Coronary artery calcifications  3.  New splenomegaly as compared to the CT from 2015, nonspecific  as to etiology.    ALONZO MILLER MD         SYSTEM ID:  [id number]   Echocardiogram Complete     Value    LVEF  60-65%    Narrative    [id number]  [id number]  [id number]  [id number]^GABRIEL^CAROL     [facility]  Echocardiography Laboratory  [address] [place] [address]     Name: ANUP WASHINGTON  MRN: [id number]  : 1965  Study Date: 2022 08:41 AM  Age: 57 yrs  Gender: Female  Patient Location: [facility]  Reason For Study: Chest Pain, Pericarditis  Ordering Physician: CAROL RIOS  Referring Physician: Harley Covington MD  Performed By: Nell Calderon RDCS     BSA: 2.0 m2  Height: 65 in  Weight: 195 lb  HR: 62  BP: 147/82 mmHg  ______________________________________________________________________________  Procedure  Complete Portable Echo Adult. Complete Echo Adult.  ______________________________________________________________________________  Interpretation Summary     Left ventricular systolic function is normal. The visual ejection fraction is  60-65%. No regional wall motion abnormalities noted.  The right ventricle is normal in size and function. RV apical hypokinesis.  No hemodynamically significant valvular abnormalities on 2D or color flow  imaging.  Compared to the prior study in , LV systolic function is unchanged.  ______________________________________________________________________________  Left Ventricle  The left ventricle is normal in  size. There is concentric remodeling present.  Left ventricular systolic function is normal. The visual ejection fraction is  60-65%. Grade I or early diastolic dysfunction. No regional wall motion  abnormalities noted.     Right Ventricle  The right ventricle is normal in size and function. RV apical hypokinesis.     Atria  Normal left atrial size. Right atrial size is normal.     Mitral Valve  The mitral valve leaflets appear normal. There is no evidence of stenosis,  fluttering, or prolapse. There is trace mitral regurgitation.     Tricuspid Valve  Normal tricuspid valve. There is trace tricuspid regurgitation. The right  ventricular systolic pressure is approximated at 12.8 mmHg plus the right  atrial pressure.     Aortic Valve  There is moderate aortic sclerosis of the non-coronary cusp. The aortic valve  is trileaflet. No aortic stenosis is present.     Pulmonic Valve  The pulmonic valve is not well visualized. There is trace pulmonic valvular  regurgitation.     Vessels  Normal size aorta. Normal size ascending aorta. Inferior vena cava not well  visualized for estimation of right atrial pressure.     Pericardium  There is no pericardial effusion.     Rhythm  Sinus rhythm was noted.  ______________________________________________________________________________  MMode/2D Measurements & Calculations  IVSd: 0.97 cm     LVIDd: 4.2 cm  LVIDs: 3.3 cm  LVPWd: 1.1 cm  FS: 20.4 %  LV mass(C)d: 140.7 grams  LV mass(C)dI: 71.9 grams/m2  Ao root diam: 3.5 cm  LA dimension: 4.1 cm  asc Aorta Diam: 4.0 cm  LA/Ao: 1.2  LVOT diam: 2.0 cm  LVOT area: 3.2 cm2  RWT: 0.51     Doppler Measurements & Calculations  MV E max pamela: 61.2 cm/sec  MV A max pamela: 79.0 cm/sec  MV E/A: 0.78  MV dec time: 0.16 sec  Ao V2 max: 121.1 cm/sec  Ao max P.0 mmHg  PA acc time: 0.18 sec  TR max pamela: 179.0 cm/sec  TR max P.8 mmHg  E/E' av.4  Lateral E/e': 7.8     Medial E/e': 8.9      ______________________________________________________________________________  Report approved by: Isatu Enriquez 04/23/2022 01:01 PM               Discharge Orders      Primary Care - Care Coordination Referral      Reason for your hospital stay    Brought in for evaluation of chest pain.  Thankfully your cardiac enzymes were stable.  Echocardiogram did not show any new or concerning changes.  You were seen by Cardiology who recommends stopping carvedilol and starting metoprolol XL.  They also recommend starting a medication called Imdur that you will take daily; this is a long-acting nitroglycerin which can help improve symptoms of angina.  Continue to take aspirin and Brilinta daily.  Please follow-up with Cardiology as instructed to discuss medication management and consideration for alternative cholesterol-lowering medication.     Activity    Your activity upon discharge: As  tolerated     Follow-up and recommended labs and tests     Follow-up with your PMD in 3-5 days or sooner if needed     When to contact your care team    Call your primary doctor if you have any of the following: temperature greater than 101, worsening shortness of breath, increased swelling, worsening or uncontrolled pain, new or unrelenting diarrhea, dizziness/passing out, falls, bleeding that doesn't stop, loss of taste, loss of smell, or any other new or concerning symptoms.  Call 911 or go to the Emergency Room if you need immediate assistance.

## 2022-04-23 NOTE — PLAN OF CARE
"PRIMARY DIAGNOSIS: CHEST PAIN  OUTPATIENT/OBSERVATION GOALS TO BE MET BEFORE DISCHARGE:  1. Ruled out acute coronary syndrome (negative or stable Troponin):  No  2. Pain Status: Improved with use of alternative comfort measures i.e.: positioning  3. Appropriate provocative testing performed: No  - Stress Test Procedure: Echo  - Interpretation of cardiac rhythm per telemetry tech: NS 60s    4. Cleared by Consultants (if applicable):No  5. Return to near baseline physical activity: Yes  Discharge Planner Nurse   Safe discharge environment identified: Yes  Barriers to discharge: Yes       Entered by: Neftali Emerson 04/22/2022 8:00PM  /70 (BP Location: Right arm)   Pulse 64   Temp 98.2  F (36.8  C) (Oral)   Resp 15   Ht 1.651 m (5' 5\")   Wt 88.5 kg (195 lb 1.6 oz)   SpO2 97%   BMI 32.47 kg/m    VSS on RA. AxOx4 and able to make needs known. Ambulating independently. Tolerating cardiac diet. Reported 2/10 chest pain that resolved on it's own. PIV SL. Tele : NSR 60s. Plan to monitor and treat chest pain, trend troponin, and complete Echo in the morning. Pt is agreeable to POC and resting peacefully. Will continue to provide supportive cares.  Please review provider order for any additional goals.   Nurse to notify provider when observation goals have been met and patient is ready for discharge.    "

## 2022-04-23 NOTE — PLAN OF CARE
PRIMARY DIAGNOSIS: CHEST PAIN  OUTPATIENT/OBSERVATION GOALS TO BE MET BEFORE DISCHARGE:  1. Ruled out acute coronary syndrome (negative or stable Troponin):  No  2. Pain Status: Pain free.  3. Appropriate provocative testing performed: No  - Stress Test Procedure: Echo  - Interpretation of cardiac rhythm per telemetry tech: NSR 60s     4. Cleared by Consultants (if applicable):No  5. Return to near baseline physical activity: Yes  Discharge Planner Nurse   Safe discharge environment identified: Yes  Barriers to discharge: Yes        Pt AandOx4. Independent in room. Mod carb diet. PIV on R arm S/L. Denies chest pain. Tele placed. Plan: Echo, and monitor trending trop. Will continue to provide supportive cares.  Please review provider order for any additional goals.   Nurse to notify provider when observation goals have been met and patient is ready for discharge.

## 2022-04-25 ENCOUNTER — PATIENT OUTREACH (OUTPATIENT)
Dept: CARE COORDINATION | Facility: CLINIC | Age: 57
End: 2022-04-25
Payer: MEDICARE

## 2022-04-25 NOTE — PROGRESS NOTES
Clinic Care Coordination Contact  [facility]/Voicemail       Clinical Data: Care Coordinator Outreach  Outreach attempted x 1.  Left message on patient's voicemail with call back information and requested return call.    Plan: Care Coordinator will try to reach patient again in 1-2 business days.    CECE Keller, B.S. [facility] Care Coordination  [facility]:  [place], [place] and [place]  [phone]  [email or web address]

## 2022-04-25 NOTE — TELEPHONE ENCOUNTER
Pt was hospitalized overnight 4/22/22-4/23/22 secondary to new onset chest pain 24 hours after cardiac cath.  Work-up negative for ischemic process.  Echo without new rWMA. Seen by Cardiology who recommend starting Imdur 30 and changing Carvedilol to Toprol. PTA ASA 81 and Brilinta continued. Writer called pt and she denies any chest pain, SOB or significant lightheadedness. One episode of lightheadedness this AM. Fluids encouraged and instructed to make position changes slowly as body adjusting to new medications. LRA access site is bruised, but without signs of infection, swelling or bleeding. Stats she has an adequate supply of all medications, denies any medication questions and has been taking Brilinta every 12 hrs without interruption. Reviewed f/u appt as scheduled on 4/27/22 below. Cardiac rehab is now schedule on 4/28/22 at 1400. Pt verbalized understanding of all information without further questions. JOEY Jackson RN.

## 2022-04-25 NOTE — LETTER
[facility] CARE COORDINATION  [facility]  April 26, 2022    Karolina Light  [address] [address] [address]      Dear Karolina,    I am a clinic community health worker who works with Harley Covington MD at the [facility]. I wanted to thank you for spending the time to talk with me.  Below is a description of clinic care coordination and how I can further assist you.      The clinic care coordination team is made up of a registered nurse,  and community health worker who understand the health care system. The goal of clinic care coordination is to help you manage your health and improve access to the health care system in the most efficient manner. The team can assist you in meeting your health care goals by providing education, coordinating services, strengthening the communication among your providers and supporting you with any resource needs.    Please feel free to contact me at [phone] with any questions or concerns. We are focused on providing you with the highest-quality healthcare experience possible and that all starts with you.     Sincerely,     Sarika Whipple, CECE, B.S. Public Health  Clinic Care Coordination  [facility]:  [facility]  [phone]  [email or web address]

## 2022-04-26 ENCOUNTER — TELEPHONE (OUTPATIENT)
Dept: CARE COORDINATION | Facility: CLINIC | Age: 57
End: 2022-04-26
Payer: MEDICARE

## 2022-04-26 ENCOUNTER — APPOINTMENT (OUTPATIENT)
Dept: NURSING | Facility: CLINIC | Age: 57
End: 2022-04-26
Payer: MEDICARE

## 2022-04-26 DIAGNOSIS — K75.81 NASH (NONALCOHOLIC STEATOHEPATITIS): Primary | ICD-10-CM

## 2022-04-26 NOTE — PROGRESS NOTES
Clinic Care Coordination Contact  [facility]: Post-Discharge Note  SITUATION                                                      Admission:    Admission Date: 04/22/22   Reason for Admission: Chest pain  Discharge:   Discharge Date: 04/23/22  Discharge Diagnosis: New onset chest pain 24 hours after cardiac cath.    BACKGROUND                                                      Per hospital discharge summary and inpatient provider notes:  Karolina Light is a 58 yo female with HTN, statin-intolerant DLD, RA on chronic immunotherapies, and ASCVD.  On 4/21/2022, she underwent cardiac cath for accelerating angina culminating in MIREILLE to pRCA and mRCA for instent restenosis with identified (but not addressed) flow-limiting in-stent restenosis of mLAD prompting recommendation to consider staged stenting of mLAD and D1 if angina recurs.  DAPT with Brilinta initiated.  Patient discharged home same-day before returning 4/22/2022 with recurrent chest pain.  Initial trop mildly elevated.  EKG without changes.  ED provider spoke with Cardiologist who recommended overnight Observation.    ASSESSMENT      Enrollment  Primary Care Care Coordination Status: Declined    Discharge Assessment  How are you doing now that you are home?: Patient states she is doing well since being home  How are your symptoms? (Red Flag symptoms escalate to triage hotline per guidelines): Improved  Do you feel your condition is stable enough to be safe at home until your provider visit?: Yes  Does the patient have their discharge instructions? : Yes  Does the patient have questions regarding their discharge instructions? : No  Were you started on any new medications or were there changes to any of your previous medications? : Yes  Does the patient have all of their medications?: Yes  Do you have questions regarding any of your medications? : No  Do you have all of your needed medical supplies or equipment (DME)?  (i.e. oxygen tank, CPAP, cane,  etc.): No - What equipment or supplies are needed? (patient is still needing to  BP cuff)  Discharge follow-up appointment scheduled within 14 calendar days? : Yes  Discharge Follow Up Appointment Date: 04/27/22  Discharge Follow Up Appointment Scheduled with?: Specialty Care Provider    Care Management   Community Health Worker Initial Outreach    CHW introduced self and role with CC  Patient states that she is doing well, seeing cardiology tomorrow.   She explains that she has fatty liver disease and was being seen at [facility]. Supposed to get a Liver US every 6 months. Wondering if she should be seen by them again after all of her medications changes and wanting PCPs input.   Recently switched from [facility] to [facility] with insurance change, going to call to check that [facility] is covered.   CHW inquired if patient is in need of any additional support or resources however patient declines.  CHW provided contact information and encouraged patient to reach out with any future needs or concerns, patient agrees.    Patient accepts CC: No, patient has no outstanding needs for CC at this time. Patient will be sent Care Coordination introduction letter for future reference.     PLAN                                                      Outpatient Plan: Follow-up with your PMD in 3-5 days or sooner if needed    Future Appointments   Date Time Provider Department Center   4/27/2022  1:30 PM [facility]   4/28/2022  2:15 PM 3, [facility]   5/5/2022  8:15 AM Xu White MD [facility]   6/3/2022 10:30 AM [facility]   8/18/2022 12:30 PM Ana Richey MD [facility]         For any urgent concerns, please contact our 24 hour nurse triage line: [phone] ([phone])       CECE Keller, B.S. [facility] Care Coordination  [facility]:  [place], [place] and [place]  [phone]  [email or web address]

## 2022-04-26 NOTE — TELEPHONE ENCOUNTER
Dr. Covington,     Patient recently had a cardiology procedure and then hospitalized 4/22-4/23 for new onset of chest pain after cardiac cath.   Today, she asks if she should see [facility] again regarding her fatty liver and new medications and looking for input from PCP team. She states that she normally gets a liver ultrasound ever 6 months as well.     Please advise, thank you    Sarika Whipple, CECE, B.S. [facility]  Clinic Care Coordination  [facility]:  [place], [place] and [place]  [phone]  [email or web address]

## 2022-04-27 ENCOUNTER — OFFICE VISIT (OUTPATIENT)
Dept: CARDIOLOGY | Facility: CLINIC | Age: 57
End: 2022-04-27
Payer: MEDICARE

## 2022-04-27 VITALS
DIASTOLIC BLOOD PRESSURE: 86 MMHG | HEART RATE: 67 BPM | BODY MASS INDEX: 31.86 KG/M2 | OXYGEN SATURATION: 99 % | WEIGHT: 191.2 LBS | HEIGHT: 65 IN | SYSTOLIC BLOOD PRESSURE: 122 MMHG

## 2022-04-27 DIAGNOSIS — I25.10 CORONARY ARTERY DISEASE INVOLVING NATIVE CORONARY ARTERY OF NATIVE HEART WITHOUT ANGINA PECTORIS: Primary | ICD-10-CM

## 2022-04-27 DIAGNOSIS — E78.5 HYPERLIPIDEMIA LDL GOAL <100: ICD-10-CM

## 2022-04-27 PROCEDURE — 99214 OFFICE O/P EST MOD 30 MIN: CPT | Performed by: PHYSICIAN ASSISTANT

## 2022-04-27 NOTE — PROGRESS NOTES
CARDIOLOGY CLINIC PROGRESS NOTE    DOS: 2022      Karolina Light  : 1965, 57 year old  MRN: [id number]      History:  I am meeting Karolina Light today for follow up after a coronary angiogram.  She is a patient of Dr. Dias.     Karolina Light is a 57 year old woman with history of coronary artery disease, rheumatoid arthritis, dyslipidemia (statin intolerant), cirrhosis, esophageal varices and an old history of right wrist laceration with ulnar neuropathy.     The patient sustained a non-ST segment elevation MI in 2012.  Diagnostic coronary angiography showed a 20% narrowing in the left main with no significant focal narrowing in the LAD, a 70% narrowing in the proximal right coronary and a distal 90% narrowing in the right coronary, felt to be the infarct-related vessel.  The patient underwent implantation of a 2.5 x 38 mm length everolimus-eluting Promus Element stent in the distal right coronary, postdilated to 2.5 mm, and a 2.5 x 38 mm length everolimus-eluting Promus Element stent in the proximal right coronary, postdilated to 2.75 mm.      In , she underwent repeat coronary angiography for chest discomfort and an abnormal nuclear stress test.  There was a 50% proximal in-stent restenosis in the right coronary with a normal iFR.  There were scattered 50% narrowings in the proximal and mid LAD with a normal FFR.  The circumflex had atheromatous change with no significant focal narrowing.  Medical therapy was continued.     In , she was seen at the [facility] in [place] for recurrent chest discomfort.  Repeat coronary angiography showed a 75% stenosis in the mid LAD and an 80%-90% in-stent restenosis in the proximal right coronary.  The stent in the distal right coronary remained widely patent.  There was a 50% lesion in the mid circumflex.  FFR in the LAD was 0.79, and a 3.0 x 34 mm length zotarolimus-eluting Juliano Resolute stent was placed in the proximal right coronary  while a 2.75 x 38 mm length zotarolimus-eluting [place] stent was placed in the mid LAD.     The patient has been off aspirin ever since she saw her cardiologist in December 12/2021.  She has been intolerant of statin drugs and has refused PCSK9 inhibitors.    She saw Dr. Dias 4/4/22 due to recurrence of her typical chest discomfort. The patient was free of symptoms until about 1 month prior, but then had recurrence of typical anginal symptoms (substernal chest discomfort that occurs both with activity and at times at rest).   Carvedilol increased to 6.25 mg bid.    4/21/22 cor angio:  1.  Status post successful PCI with drug-eluting stent placement in the proximal and mid RCA for in-stent restenosis.  2.  Moderate flow-limiting mid LAD in-stent restenosis (iFR 0.88) involving a moderate-sized diagonal branch.    4/22/22- ED for chest pain after angio. Seen by Cardiology, who recommend switching Coreg to Toprol XL to allow for adding Imdur 30 mg as an anti-anginal. PTA ASA 81 and Brilinta continued  Plts were 147.     4/23/22 Echo:  Left ventricular systolic function is normal. The visual ejection fraction is 60-65%. No regional wall motion abnormalities noted.  The right ventricle is normal in size and function. RV apical hypokinesis.  No hemodynamically significant valvular abnormalities on 2D or color flow imaging.  Compared to the prior study in 2015, LV systolic function is unchanged.    She presents today and is tolerating the metoprolol and Imdur. Her BP is controlled and not too low.   One more episode of chest pain, but went away quickly and has not returned.  She has significant bruising.  She understands this can be from ASA + Brilinta.  She has CBC with plts due in May with rheumatology. We discussed getting plts today and she prefers to wait until her rheumatology appt.       ROS:  Skin:  Positive for bruising   Eyes:  Negative    ENT:  Positive for    Respiratory:  Positive for shortness of  breath;dyspnea on exertion;dyspnea at rest  Cardiovascular:    chest pain;Positive for;fatigue;lightheadedness;dizziness  Gastroenterology: Negative    Genitourinary:  not assessed    Musculoskeletal:  Positive for back pain  Neurologic:  Positive for headaches  Psychiatric:  Negative    Heme/Lymph/Imm:  Positive for easy bruising  Endocrine:  Negative      PAST MEDICAL HISTORY:  Past Medical History:   Diagnosis Date     CAD (coronary artery disease)     MI and stent 2012     RA (rheumatoid arthritis) (H)      Skin cancer     unknown type       PAST SURGICAL HISTORY:  Past Surgical History:   Procedure Laterality Date     ANGIOGRAM  07/21/2015    In-segment restenosis in the proximal right coronary artery stent stenosis appears to be the range of 50%. Serial stenoses in the left anterior ascending artery and in the proximal mid and distal all appear to be in the 50% range. Scattered mild disease in the circumflex system no stenosis greater than 35%. No intervention performed.     CV CORONARY ANGIOGRAM Left 4/21/2022    Procedure: Coronary Angiogram;  Surgeon: Anant Saldivar MD;  Location:  HEART CARDIAC CATH LAB     CV INSTANTANEOUS WAVE-FREE RATIO N/A 4/21/2022    Procedure: Instantaneous Wave-Free Ratio;  Surgeon: Anant Saldivar MD;  Location:  HEART CARDIAC CATH LAB     CV INTRAVASULAR ULTRASOUND N/A 4/21/2022    Procedure: Intravascular Ultrasound;  Surgeon: Anant Saldivar MD;  Location: [facility] CARDIAC CATH LAB     CV PCI ANGIOPLASTY N/A 4/21/2022    Procedure: Percutaneous Transluminal Angioplasty;  Surgeon: Anant Saldivar MD;  Location:  HEART CARDIAC CATH LAB     HAND RECONSTRUCTION Right 06/15/1967     HEART CATH, ANGIOPLASTY  11/01/2012    Angioplasty and MIREILLE x2 to RCA     HYSTERECTOMY  03/01/1998       SOCIAL HISTORY:  Social History     Socioeconomic History     Marital status:      Spouse name: None     Number of children: None     Years of education: None     Highest  education level: None   Tobacco Use     Smoking status: Former Smoker     Quit date: 2015     Years since quittin.7     Smokeless tobacco: Former User   Substance and Sexual Activity     Alcohol use: No     Alcohol/week: 0.0 standard drinks   Other Topics Concern     Caffeine Concern Yes     Comment: 20+ oz daily     Sleep Concern Yes     Stress Concern Yes     Special Diet Yes     Comment: low sodium, greens , healthier      Exercise No     Comment: walks dogs 1 mile a day      Seat Belt Yes       FAMILY HISTORY:  Family History   Problem Relation Age of Onset     Cerebrovascular Disease Mother 61     C.A.D. Father 42     Myocardial Infarction Sister      Cerebrovascular Disease Sister      Diabetes Sister      C.A.D. Brother      C.A.D. Brother      Other - See Comments Brother         PAD     Coronary Artery Disease Brother        MEDS: aspirin (ASA) 81 MG EC tablet, Take 1 tablet (81 mg) by mouth daily Start tomorrow.  azaTHIOprine (IMURAN) 50 MG tablet, Take 2 tablets (100 mg) by mouth daily Labs every 8-12 weeks.  isosorbide mononitrate (IMDUR) 30 MG 24 hr tablet, Take 1 tablet (30 mg) by mouth daily . Do not take if BP < 100  metoprolol succinate ER (TOPROL-XL) 25 MG 24 hr tablet, Take 1 tablet (25 mg) by mouth daily . Do not take if BP < 110  nitroGLYcerin (NITROSTAT) 0.4 MG sublingual tablet, For chest pain place 1 tablet under the tongue every 5 minutes for 3 doses. If symptoms persist 5 minutes after 1st dose call 911.  sertraline (ZOLOFT) 100 MG tablet, Take 1 tablet (100 mg) by mouth daily  sulfaSALAzine (AZULFIDINE) 500 MG tablet, Take 1,000 mg by mouth daily  ticagrelor (BRILINTA) 90 MG tablet, Take 1 tablet (90 mg) by mouth 2 times daily Start this evening. (Patient taking differently: Take 90 mg by mouth 2 times daily)  [DISCONTINUED] carvedilol (COREG) 3.125 MG tablet, Take 2 tablets (6.25 mg) by mouth 2 times daily (with meals)    No current facility-administered medications on file prior  "to visit.      ALLERGIES:   Allergies   Allergen Reactions     Bee Venom Swelling     Shellfish Allergy Anaphylaxis     Atorvastatin Muscle Pain (Myalgia)     Codeine Sulfate      Hydrocodone      Heart races and feels like bugs crawling under skin     Methotrexate Other (See Comments)     hepatotxicity     Plavix [Clopidogrel]      Night terrors       PHYSICAL EXAM:  Vitals: /86 (BP Location: Right arm, Patient Position: Sitting, Cuff Size: Adult Regular)   Pulse 67   Ht 1.651 m (5' 5\")   Wt 86.7 kg (191 lb 3.2 oz)   SpO2 99%   BMI 31.82 kg/m    Constitutional:  cooperative, alert and oriented, well developed, well nourished, in no acute distress        Skin:  warm and dry to the touch   scattered ecchymosis    Head:  normocephalic, no masses or lesions        Eyes:  pupils equal and round;conjunctivae and lids unremarkable;sclera white        ENT:  no pallor or cyanosis        Neck:  JVP normal        Respiratory:  normal breath sounds, clear to auscultation, normal A-P diameter, normal symmetry, normal respiratory excursion, no use of accessory muscles   No crackles, + end expiratory wheezes    Cardiac: regular rhythm;no murmurs, gallops or rubs detected                  GI:  abdomen soft;BS normoactive        Vascular:                                   left wrist is ecchymotic at the cath site    Extremities and Musculoskeletal:  no edema arthritic deformities of UE swan neck deformity right small finger    Neurological:  no gross motor deficits;affect appropriate            LABS/DATA:  I reviewed the following:  Cardiac cath as ntoed      ASSESSMENT:  Coronary artery disease.  - Non-ST segment elevation MI in 2012.  Implantation of drug-eluting stents in distal posterolateral branch of right coronary and proximal right coronary.  - Coronary angiography in 2015 for recurrent chest pain.  Abnormal nuclear stress test.  Invasive evaluation with iFR demonstrating no lesion that warranted intervention at " that time.  - Presentation with exertional angina in 2019.  Repeat angiography showing 25% narrowing in left main, 75% in-stent restenosis in LAD, 50% circumflex and subtotal proximal right coronary artery narrowing.  Status post implantation of zotarolimus-eluting Medtronic [place] stents in the mid LAD and the right coronary within regions of in-stent restenosis.  - Recent recurrence of angina. 4/21/22 cor angio:  Status post successful PCI with drug-eluting stent placement in the proximal and mid RCA for in-stent restenosis.  Moderate flow-limiting mid LAD in-stent restenosis (iFR 0.88) involving a moderate-sized diagonal branch.  - Continue ASA 81 mg, brilinta 90 mg BID likely for 1 year.   - Continue Toprol XL 25 mg, Imdur 30 mg  - Intolerant of statins.  She has declined PCSK9 inhibitor in the past, but does seem more open to this now  - Mediterranean-style diet.  - Regular exercise program. Cardiac rehab      Dyslipidemia:  Reportedly statin intolerant.  Has been unwilling to try ezetimibe or PCSK9 inhibitors. She seems more open to this now, and is willing to discuss with Dr. Dias in 2 months follow up    Reported history of cirrhosis with esophageal varices: No active bleeds at this time.    Rheumatoid arthritis:  Currently on azathioprine 100 mg daily.          Follow up:  Dr. Dias in 2 months        Scot Soto PA-C

## 2022-04-27 NOTE — LETTER
2022    Harley Covington MD  [address]  [facility] [address]    RE: Karolina Light       Dear Colleague,     I had the pleasure of seeing Karolina Light in the [facility].      CARDIOLOGY CLINIC PROGRESS NOTE    DOS: 2022      Karolina Light  : 1965, 57 year old  MRN: [id number]      History:  I am meeting Karolina Light today for follow up after a coronary angiogram.  She is a patient of Dr. Dias.     Karolina Light is a 57 year old woman with history of coronary artery disease, rheumatoid arthritis, dyslipidemia (statin intolerant), cirrhosis, esophageal varices and an old history of right wrist laceration with ulnar neuropathy.     The patient sustained a non-ST segment elevation MI in 2012.  Diagnostic coronary angiography showed a 20% narrowing in the left main with no significant focal narrowing in the LAD, a 70% narrowing in the proximal right coronary and a distal 90% narrowing in the right coronary, felt to be the infarct-related vessel.  The patient underwent implantation of a 2.5 x 38 mm length everolimus-eluting Promus Element stent in the distal right coronary, postdilated to 2.5 mm, and a 2.5 x 38 mm length everolimus-eluting Promus Element stent in the proximal right coronary, postdilated to 2.75 mm.      In , she underwent repeat coronary angiography for chest discomfort and an abnormal nuclear stress test.  There was a 50% proximal in-stent restenosis in the right coronary with a normal iFR.  There were scattered 50% narrowings in the proximal and mid LAD with a normal FFR.  The circumflex had atheromatous change with no significant focal narrowing.  Medical therapy was continued.     In , she was seen at the [facility] for recurrent chest discomfort.  Repeat coronary angiography showed a 75% stenosis in the mid LAD and an 80%-90% in-stent restenosis in the proximal right coronary.  The stent in the distal right  coronary remained widely patent.  There was a 50% lesion in the mid circumflex.  FFR in the LAD was 0.79, and a 3.0 x 34 mm length zotarolimus-eluting Juliano Resolute stent was placed in the proximal right coronary while a 2.75 x 38 mm length zotarolimus-eluting [place] stent was placed in the mid LAD.     The patient has been off aspirin ever since she saw her cardiologist in December 12/2021.  She has been intolerant of statin drugs and has refused PCSK9 inhibitors.    She saw Dr. Dias 4/4/22 due to recurrence of her typical chest discomfort. The patient was free of symptoms until about 1 month prior, but then had recurrence of typical anginal symptoms (substernal chest discomfort that occurs both with activity and at times at rest).   Carvedilol increased to 6.25 mg bid.    4/21/22 cor angio:  1.  Status post successful PCI with drug-eluting stent placement in the proximal and mid RCA for in-stent restenosis.  2.  Moderate flow-limiting mid LAD in-stent restenosis (iFR 0.88) involving a moderate-sized diagonal branch.    4/22/22- ED for chest pain after angio. Seen by Cardiology, who recommend switching Coreg to Toprol XL to allow for adding Imdur 30 mg as an anti-anginal. PTA ASA 81 and Brilinta continued  Plts were 147.     4/23/22 Echo:  Left ventricular systolic function is normal. The visual ejection fraction is 60-65%. No regional wall motion abnormalities noted.  The right ventricle is normal in size and function. RV apical hypokinesis.  No hemodynamically significant valvular abnormalities on 2D or color flow imaging.  Compared to the prior study in 2015, LV systolic function is unchanged.    She presents today and is tolerating the metoprolol and Imdur. Her BP is controlled and not too low.   One more episode of chest pain, but went away quickly and has not returned.  She has significant bruising.  She understands this can be from ASA + Brilinta.  She has CBC with plts due in May with rheumatology. We  discussed getting plts today and she prefers to wait until her rheumatology appt.       ROS:  Skin:  Positive for bruising   Eyes:  Negative    ENT:  Positive for    Respiratory:  Positive for shortness of breath;dyspnea on exertion;dyspnea at rest  Cardiovascular:    chest pain;Positive for;fatigue;lightheadedness;dizziness  Gastroenterology: Negative    Genitourinary:  not assessed    Musculoskeletal:  Positive for back pain  Neurologic:  Positive for headaches  Psychiatric:  Negative    Heme/Lymph/Imm:  Positive for easy bruising  Endocrine:  Negative      PAST MEDICAL HISTORY:  Past Medical History:   Diagnosis Date     CAD (coronary artery disease)     MI and stent 2012     RA (rheumatoid arthritis) (H)      Skin cancer     unknown type       PAST SURGICAL HISTORY:  Past Surgical History:   Procedure Laterality Date     ANGIOGRAM  07/21/2015    In-segment restenosis in the proximal right coronary artery stent stenosis appears to be the range of 50%. Serial stenoses in the left anterior ascending artery and in the proximal mid and distal all appear to be in the 50% range. Scattered mild disease in the circumflex system no stenosis greater than 35%. No intervention performed.     CV CORONARY ANGIOGRAM Left 4/21/2022    Procedure: Coronary Angiogram;  Surgeon: Anant Saldivar MD;  Location: [facility] CARDIAC CATH LAB     CV INSTANTANEOUS WAVE-FREE RATIO N/A 4/21/2022    Procedure: Instantaneous Wave-Free Ratio;  Surgeon: Anant Saldivar MD;  Location: [facility] CARDIAC CATH LAB     CV INTRAVASULAR ULTRASOUND N/A 4/21/2022    Procedure: Intravascular Ultrasound;  Surgeon: Anant Saldivar MD;  Location: [facility] CARDIAC CATH LAB     CV PCI ANGIOPLASTY N/A 4/21/2022    Procedure: Percutaneous Transluminal Angioplasty;  Surgeon: Anant Saldivar MD;  Location: [facility] CARDIAC CATH LAB     HAND RECONSTRUCTION Right 06/15/1967     HEART CATH, ANGIOPLASTY  11/01/2012    Angioplasty and MIREILLE x2 to RCA      HYSTERECTOMY  1998       SOCIAL HISTORY:  Social History     Socioeconomic History     Marital status:      Spouse name: None     Number of children: None     Years of education: None     Highest education level: None   Tobacco Use     Smoking status: Former Smoker     Quit date: 2015     Years since quittin.7     Smokeless tobacco: Former User   Substance and Sexual Activity     Alcohol use: No     Alcohol/week: 0.0 standard drinks   Other Topics Concern     Caffeine Concern Yes     Comment: 20+ oz daily     Sleep Concern Yes     Stress Concern Yes     Special Diet Yes     Comment: low sodium, greens , healthier      Exercise No     Comment: walks dogs 1 mile a day      Seat Belt Yes       FAMILY HISTORY:  Family History   Problem Relation Age of Onset     Cerebrovascular Disease Mother 61     C.A.D. Father 42     Myocardial Infarction Sister      Cerebrovascular Disease Sister      Diabetes Sister      C.A.D. Brother      C.A.D. Brother      Other - See Comments Brother         PAD     Coronary Artery Disease Brother        MEDS: aspirin (ASA) 81 MG EC tablet, Take 1 tablet (81 mg) by mouth daily Start tomorrow.  azaTHIOprine (IMURAN) 50 MG tablet, Take 2 tablets (100 mg) by mouth daily Labs every 8-12 weeks.  isosorbide mononitrate (IMDUR) 30 MG 24 hr tablet, Take 1 tablet (30 mg) by mouth daily . Do not take if BP < 100  metoprolol succinate ER (TOPROL-XL) 25 MG 24 hr tablet, Take 1 tablet (25 mg) by mouth daily . Do not take if BP < 110  nitroGLYcerin (NITROSTAT) 0.4 MG sublingual tablet, For chest pain place 1 tablet under the tongue every 5 minutes for 3 doses. If symptoms persist 5 minutes after 1st dose call 911.  sertraline (ZOLOFT) 100 MG tablet, Take 1 tablet (100 mg) by mouth daily  sulfaSALAzine (AZULFIDINE) 500 MG tablet, Take 1,000 mg by mouth daily  ticagrelor (BRILINTA) 90 MG tablet, Take 1 tablet (90 mg) by mouth 2 times daily Start this evening. (Patient taking differently:  "Take 90 mg by mouth 2 times daily)  [DISCONTINUED] carvedilol (COREG) 3.125 MG tablet, Take 2 tablets (6.25 mg) by mouth 2 times daily (with meals)    No current facility-administered medications on file prior to visit.      ALLERGIES:   Allergies   Allergen Reactions     Bee Venom Swelling     Shellfish Allergy Anaphylaxis     Atorvastatin Muscle Pain (Myalgia)     Codeine Sulfate      Hydrocodone      Heart races and feels like bugs crawling under skin     Methotrexate Other (See Comments)     hepatotxicity     Plavix [Clopidogrel]      Night terrors       PHYSICAL EXAM:  Vitals: /86 (BP Location: Right arm, Patient Position: Sitting, Cuff Size: Adult Regular)   Pulse 67   Ht 1.651 m (5' 5\")   Wt 86.7 kg (191 lb 3.2 oz)   SpO2 99%   BMI 31.82 kg/m    Constitutional:  cooperative, alert and oriented, well developed, well nourished, in no acute distress        Skin:  warm and dry to the touch   scattered ecchymosis    Head:  normocephalic, no masses or lesions        Eyes:  pupils equal and round;conjunctivae and lids unremarkable;sclera white        ENT:  no pallor or cyanosis        Neck:  JVP normal        Respiratory:  normal breath sounds, clear to auscultation, normal A-P diameter, normal symmetry, normal respiratory excursion, no use of accessory muscles   No crackles, + end expiratory wheezes    Cardiac: regular rhythm;no murmurs, gallops or rubs detected                  GI:  abdomen soft;BS normoactive        Vascular:                                   left wrist is ecchymotic at the cath site    Extremities and Musculoskeletal:  no edema arthritic deformities of UE swan neck deformity right small finger    Neurological:  no gross motor deficits;affect appropriate            LABS/DATA:  I reviewed the following:  Cardiac cath as ntoed      ASSESSMENT:  Coronary artery disease.  - Non-ST segment elevation MI in 2012.  Implantation of drug-eluting stents in distal posterolateral branch of right " coronary and proximal right coronary.  - Coronary angiography in 2015 for recurrent chest pain.  Abnormal nuclear stress test.  Invasive evaluation with iFR demonstrating no lesion that warranted intervention at that time.  - Presentation with exertional angina in 2019.  Repeat angiography showing 25% narrowing in left main, 75% in-stent restenosis in LAD, 50% circumflex and subtotal proximal right coronary artery narrowing.  Status post implantation of zotarolimus-eluting Medtronic [place] stents in the mid LAD and the right coronary within regions of in-stent restenosis.  - Recent recurrence of angina. 4/21/22 cor angio:  Status post successful PCI with drug-eluting stent placement in the proximal and mid RCA for in-stent restenosis.  Moderate flow-limiting mid LAD in-stent restenosis (iFR 0.88) involving a moderate-sized diagonal branch.  - Continue ASA 81 mg, brilinta 90 mg BID likely for 1 year.   - Continue Toprol XL 25 mg, Imdur 30 mg  - Intolerant of statins.  She has declined PCSK9 inhibitor in the past, but does seem more open to this now  - Mediterranean-style diet.  - Regular exercise program. Cardiac rehab      Dyslipidemia:  Reportedly statin intolerant.  Has been unwilling to try ezetimibe or PCSK9 inhibitors. She seems more open to this now, and is willing to discuss with Dr. Dias in 2 months follow up    Reported history of cirrhosis with esophageal varices: No active bleeds at this time.    Rheumatoid arthritis:  Currently on azathioprine 100 mg daily.    Follow up:  Dr. Dias in 2 months        Scot Soto PA-C      Thank you for allowing me to participate in the care of your patient.      Sincerely,     Scot Soto PA-C     [facility]  cc:   No referring provider defined for this encounter.

## 2022-04-28 ENCOUNTER — HOSPITAL ENCOUNTER (OUTPATIENT)
Dept: CARDIAC REHAB | Facility: CLINIC | Age: 57
Discharge: HOME | End: 2022-04-28
Attending: INTERNAL MEDICINE
Payer: MEDICARE

## 2022-04-28 DIAGNOSIS — I20.89 STABLE ANGINA PECTORIS (H): ICD-10-CM

## 2022-04-28 PROCEDURE — 93797 PHYS/QHP OP CAR RHAB WO ECG: CPT | Mod: 59 | Performed by: REHABILITATION PRACTITIONER

## 2022-04-28 PROCEDURE — 93798 PHYS/QHP OP CAR RHAB W/ECG: CPT | Performed by: REHABILITATION PRACTITIONER

## 2022-04-29 NOTE — TELEPHONE ENCOUNTER
If every 6m has been her standard of care she should continue that.  Does she need a new referral from me?

## 2022-05-02 ENCOUNTER — HOSPITAL ENCOUNTER (OUTPATIENT)
Dept: CARDIAC REHAB | Facility: CLINIC | Age: 57
Discharge: HOME | End: 2022-05-02
Attending: INTERNAL MEDICINE
Payer: MEDICARE

## 2022-05-02 PROBLEM — K75.81 NASH (NONALCOHOLIC STEATOHEPATITIS): Status: ACTIVE | Noted: 2022-05-02

## 2022-05-02 PROCEDURE — 93797 PHYS/QHP OP CAR RHAB WO ECG: CPT | Mod: XU

## 2022-05-02 PROCEDURE — 93798 PHYS/QHP OP CAR RHAB W/ECG: CPT

## 2022-05-02 NOTE — TELEPHONE ENCOUNTER
Referral to [facility] placed.  She will need to call and schedule, but may do so now.    Harley Covington MD

## 2022-05-02 NOTE — TELEPHONE ENCOUNTER
Spoke with Karolina she does need a new referral. Please call patient when referral has been placed, ok to leave detailed message at [phone]    Macy lUloa-

## 2022-05-05 ENCOUNTER — OFFICE VISIT (OUTPATIENT)
Dept: DERMATOLOGY | Facility: CLINIC | Age: 57
End: 2022-05-05
Payer: MEDICARE

## 2022-05-05 VITALS — HEART RATE: 71 BPM | SYSTOLIC BLOOD PRESSURE: 138 MMHG | DIASTOLIC BLOOD PRESSURE: 79 MMHG | OXYGEN SATURATION: 97 %

## 2022-05-05 DIAGNOSIS — C44.91 RECURRENT BASAL CELL CARCINOMA: Primary | ICD-10-CM

## 2022-05-05 PROCEDURE — 99207 PR NO CHARGE LOS: CPT | Performed by: DERMATOLOGY

## 2022-05-05 PROCEDURE — 17311 MOHS 1 STAGE H/N/HF/G: CPT | Performed by: DERMATOLOGY

## 2022-05-05 RX ORDER — SERTRALINE HYDROCHLORIDE 100 MG/1
1 TABLET, FILM COATED ORAL DAILY
COMMUNITY
Start: 2021-01-04 | End: 2022-07-19

## 2022-05-05 NOTE — PATIENT INSTRUCTIONS
Open Wound Care     for _forhead  No strenuous activity for 48 hours    Take Tylenol as needed for discomfort.                                                .         Do not drink alcoholic beverages for 48 hours.    Keep the pressure bandage in place for 24 hours. If the bandage becomes blood tinged or loose, reinforce it with gauze and tape.        (Refer to the reverse side of this page for management of bleeding).    Remove bandage in 24 hours and begin wound care as follows:     Clean area with tap water using a Q tip or gauze pad, (shower / bathe normally)  Dry wound with Q tip or gauze pad  Apply Aquaphor, Vaseline, Polysporin or Bacitracin Ointment with a Q tip  Do NOT use Neosporin Ointment   Cover the wound with a band-aid or nonstick gauze pad and paper tape.  Repeat wound care once a day until wound is completely healed.    It is an old wives tale that a wound heals better when it is exposed to air and allowed to dry out. The wound will heal faster with a better cosmetic result if it is kept moist with ointment and covered with a bandage.  Do not let the wound dry out.    Supplies Needed:                Qtips or gauze pads                Vaseline, Aquaphor, Polysporin Ointment or Bacitracin Ointment (NOT NEOSPORIN)                Bandaids or nonstick gauze pads and paper tape    Wound care kits and brown paper tape are available for purchase at   the pharmacy.    BLEEDING:    Use tightly rolled up gauze or cloth to apply direct pressure over the bandage for 20   minutes.  Reapply pressure for an additional 20 minutes if necessary  Call the office or go to the nearest emergency room if pressure fails to stop the bleeding.  Use additional gauze and tape to maintain pressure once the bleeding has stopped.  Begin wound care 24 hours after surgery as directed.                WOUND HEALING    One week after surgery a pink / red halo will form around the outside of the wound.   This is new skin.  The center of  the wound will appear yellowish white and produce some drainage.  The pink halo will slowly migrate in toward the center of the wound until the wound is covered with new shiny pink skin.  There will be no more drainage when the wound is completely healed.  It will take six months to one year for the redness to fade.  The scar may be itchy, tight and sensitive to extreme temperatures for a year after the surgery.  Massaging the area several times a day for several minutes after the wound is completely healed will help the scar soften and normalize faster. Begin massage only after healing is complete.    In case of emergency call: Dr White: [phone]     [facility]: [phone]  [facility]: [phone]

## 2022-05-05 NOTE — PROGRESS NOTES
Karolina Light is an extremely pleasant 57 year old year old female patient here today for evaluation and managment of basal cell carcinoma recurrent on right forehead.  Patient has no other skin complaints today.  Remainder of the HPI, Meds, PMH, Allergies, FH, and SH was reviewed in chart.      Past Medical History:   Diagnosis Date     CAD (coronary artery disease)     MI and stent 2012     RA (rheumatoid arthritis) (H)      Skin cancer     unknown type       Past Surgical History:   Procedure Laterality Date     ANGIOGRAM  07/21/2015    In-segment restenosis in the proximal right coronary artery stent stenosis appears to be the range of 50%. Serial stenoses in the left anterior ascending artery and in the proximal mid and distal all appear to be in the 50% range. Scattered mild disease in the circumflex system no stenosis greater than 35%. No intervention performed.     CV CORONARY ANGIOGRAM Left 4/21/2022    Procedure: Coronary Angiogram;  Surgeon: Anant Saldivar MD;  Location:  HEART [facility]ARD[facility]ATH [facility]     CV INSTANTANEOUS WAVE-FREE RATIO N/A 4/21/2022    Procedure: Instantaneous Wave-Free Ratio;  Surgeon: Anant Saldivar MD;  Location:  HE[facility]ATH [facility]     CV INTRAVASULAR ULTRASOUND N/A 4/21/2022    Procedure: Intravascular Ultrasound;  Surgeon: Anant Saldivar MD;  Location:  HE[facility]ARD[facility] CATH LAB     CV PCI ANGIOPLASTY N/A 4/21/2022    Procedure: Percutaneous Transluminal Angioplasty;  Surgeon: Anant Saldivar MD;  Location:  HEART CARDIAC CATH LAB     HAND RECONSTRUCTION Right 06/15/1967     HEART CATH, ANGIOPLASTY  11/01/2012    Angioplasty and MIREILLE x2 to RCA     HYSTERECTOMY  03/01/1998        Family History   Problem Relation Age of Onset     Cerebrovascular Disease Mother 61     C.A.D. Father 42     Myocardial Infarction Sister      Cerebrovascular Disease Sister      Diabetes Sister      C.A.D. Brother      C.A.D. Brother      Other - See Comments Brother         PAD     Coronary  Artery Disease Brother        Social History     Socioeconomic History     Marital status:      Spouse name: Not on file     Number of children: Not on file     Years of education: Not on file     Highest education level: Not on file   Occupational History     Not on file   Tobacco Use     Smoking status: Former Smoker     Quit date: 2015     Years since quittin.8     Smokeless tobacco: Former User   Substance and Sexual Activity     Alcohol use: No     Alcohol/week: 0.0 standard drinks     Drug use: Not on file     Sexual activity: Not on file   Other Topics Concern      Service Not Asked     Blood Transfusions Not Asked     Caffeine Concern Yes     Comment: 20+ oz daily     Occupational Exposure Not Asked     Hobby Hazards Not Asked     Sleep Concern Yes     Stress Concern Yes     Weight Concern Not Asked     Special Diet Yes     Comment: low sodium, greens , healthier      Back Care Not Asked     Exercise No     Comment: walks dogs 1 mile a day      Bike Helmet Not Asked     Seat Belt Yes     Self-Exams Not Asked   Social History Narrative     Not on file     Social Determinants of Health     Financial Resource Strain: Not on file   Food Insecurity: Not on file   Transportation Needs: Not on file   Physical Activity: Not on file   Stress: Not on file   Social Connections: Not on file   Intimate Partner Violence: Not on file   Housing Stability: Not on file       Outpatient Encounter Medications as of 2022   Medication Sig Dispense Refill     aspirin (ASA) 81 MG EC tablet Take 1 tablet (81 mg) by mouth daily Start tomorrow. 30 tablet 3     azaTHIOprine (IMURAN) 50 MG tablet Take 2 tablets (100 mg) by mouth daily Labs every 8-12 weeks. 180 tablet 0     isosorbide mononitrate (IMDUR) 30 MG 24 hr tablet Take 1 tablet (30 mg) by mouth daily . Do not take if BP < 100 30 tablet 3     metoprolol succinate ER (TOPROL-XL) 25 MG 24 hr tablet Take 1 tablet (25 mg) by mouth daily . Do not take if  BP < 110 30 tablet 3     nitroGLYcerin (NITROSTAT) 0.4 MG sublingual tablet For chest pain place 1 tablet under the tongue every 5 minutes for 3 doses. If symptoms persist 5 minutes after 1st dose call 911. 30 tablet 11     sertraline (ZOLOFT) 100 MG tablet Take 1 tablet by mouth daily       sertraline (ZOLOFT) 100 MG tablet Take 1 tablet (100 mg) by mouth daily 90 tablet 1     sulfaSALAzine (AZULFIDINE) 500 MG tablet Take 1,000 mg by mouth daily       ticagrelor (BRILINTA) 90 MG tablet Take 1 tablet (90 mg) by mouth 2 times daily Start this evening. (Patient taking differently: Take 90 mg by mouth 2 times daily) 180 tablet 3     [DISCONTINUED] carvedilol (COREG) 3.125 MG tablet Take 2 tablets (6.25 mg) by mouth 2 times daily (with meals) 180 tablet 1     No facility-administered encounter medications on file as of 5/5/2022.             O:   NAD, WDWN, Alert & Oriented, Mood & Affect wnl, Vitals stable   Here today alone   /79   Pulse 71   SpO2 97%   Breastfeeding No    General appearance normal   Vitals stable   Alert, oriented and in no acute distress     R forehead 2cm red scaly papule       Eyes: Conjunctivae/lids:Normal     ENT: Lips, buccal mucosa, tongue: normal    MSK:Normal    Cardiovascular: peripheral edema none    Pulm: Breathing Normal    Neuro/Psych: Orientation:Alert and Orientedx3 ; Mood/Affect:normal       A/P:  1. Recurrent basal cell carcinoma   MOHS:   Recurrent and Location    The rationale for Mohs surgery was discussed with the patient and consent was obtained.  The risks and benefits as well as alternatives to therapy were discussed, in detail.  Specifically, the risks of infection, scarring, bleeding, prolonged wound healing, incomplete removal, allergy to anesthesia, nerve injury and recurrence were addressed.  Indication for Mohs was Recurrent and Location. Prior to the procedure, the treatment site was clearly identified and, if available, confirmed with previous photos and  confirmed by the patient   All components of the Universal Protocol/PAUSE rule were completed.  The Mohs surgeon operated in two distinct and integrated capacities as the surgeon and pathologist.      The area was prepped with Betasept.  A rim of normal appearing skin was marked circumferentially around the lesion.  The area was infiltrated with local anesthesia.  The tumor was first debulked to remove all clinically apparent tumor.  An incision following the standard Mohs approach was done and the specimen was oriented,mapped and placed in 1 block(s).  Each specimen was then chromacoded and processed in the Mohs laboratory using standard Mohs technique and submitted for frozen section histology.  Frozen section analysis showed no residual tumor but CLEAR MARGINS.      The tumor was excised using standard Mohs technique in 1 stages(s).  CLEAR MARGINS OBTAINED and Final defect size was 2.5 cm.     We discussed the options for wound management in full with the patient including risks/benefits/ possible outcomes.        REPAIR SECOND INTENT: We discussed the options for wound management in full with the patient including risks/benefits/possible outcomes. Decision made to allow the wound to heal by second intention. Cautery was used for for hemostasis. EBL minimal; complications none; wound care routine.  The patient was discharged in good condition and will return in one month or prn for wound evaluation.  It was a pleasure speaking to Karolina Light today.  Previous clinic notes and pertinent laboratory tests were reviewed prior to Karolina Light's visit.  Nature and genetics of benign skin lesions dicussed with patient.  Signs and Symptoms of skin cancer discussed with patient.  Patient encouraged to perform monthly skin exams.  UV precautions reviewed with patient.  Risks of non-melanoma skin cancer discussed with patient   Return to clinic 6 months

## 2022-05-05 NOTE — LETTER
5/5/2022         RE: Karolina Light  [address] [facility] [address]        Dear Colleague,    Thank you for referring your patient, Karolina Light, to the [facility]. Please see a copy of my visit note below.    Surgical Office Location:  [facility]  [address] [address]      Karolina Light is an extremely pleasant 57 year old year old female patient here today for evaluation and managment of basal cell carcinoma recurrent on right forehead.  Patient has no other skin complaints today.  Remainder of the HPI, Meds, PMH, Allergies, FH, and SH was reviewed in chart.      Past Medical History:   Diagnosis Date     CAD (coronary artery disease)     MI and stent 2012     RA (rheumatoid arthritis) (H)      Skin cancer     unknown type       Past Surgical History:   Procedure Laterality Date     ANGIOGRAM  07/21/2015    In-segment restenosis in the proximal right coronary artery stent stenosis appears to be the range of 50%. Serial stenoses in the left anterior ascending artery and in the proximal mid and distal all appear to be in the 50% range. Scattered mild disease in the circumflex system no stenosis greater than 35%. No intervention performed.     CV CORONARY ANGIOGRAM Left 4/21/2022    Procedure: Coronary Angiogram;  Surgeon: Anant Saldivar MD;  Location:  [facility]ARD[facility] CATH LAB     CV INSTANTANEOUS WAVE-FREE RATIO N/A 4/21/2022    Procedure: Instantaneous Wave-Free Ratio;  Surgeon: Anant Saldivar MD;  Location:  HEART CARDIAC CATH LAB     CV INTRAVASULAR ULTRASOUND N/A 4/21/2022    Procedure: Intravascular Ultrasound;  Surgeon: Anant Saldivar MD;  Location:  HEART CARDIAC CATH LAB     CV PCI ANGIOPLASTY N/A 4/21/2022    Procedure: Percutaneous Transluminal Angioplasty;  Surgeon: Anant Saldivar MD;  Location:  HEART CARDIAC CATH LAB     HAND RECONSTRUCTION Right 06/15/1967     HEART CATH, ANGIOPLASTY  11/01/2012     Angioplasty and MIREILLE x2 to RCA     HYSTERECTOMY  1998        Family History   Problem Relation Age of Onset     Cerebrovascular Disease Mother 61     C.A.D. Father 42     Myocardial Infarction Sister      Cerebrovascular Disease Sister      Diabetes Sister      C.A.D. Brother      C.A.D. Brother      Other - See Comments Brother         PAD     Coronary Artery Disease Brother        Social History     Socioeconomic History     Marital status:      Spouse name: Not on file     Number of children: Not on file     Years of education: Not on file     Highest education level: Not on file   Occupational History     Not on file   Tobacco Use     Smoking status: Former Smoker     Quit date: 2015     Years since quittin.8     Smokeless tobacco: Former User   Substance and Sexual Activity     Alcohol use: No     Alcohol/week: 0.0 standard drinks     Drug use: Not on file     Sexual activity: Not on file   Other Topics Concern      Service Not Asked     Blood Transfusions Not Asked     Caffeine Concern Yes     Comment: 20+ oz daily     Occupational Exposure Not Asked     Hobby Hazards Not Asked     Sleep Concern Yes     Stress Concern Yes     Weight Concern Not Asked     Special Diet Yes     Comment: low sodium, greens , healthier      Back Care Not Asked     Exercise No     Comment: walks dogs 1 mile a day      Bike Helmet Not Asked     Seat Belt Yes     Self-Exams Not Asked   Social History Narrative     Not on file     Social Determinants of Health     Financial Resource Strain: Not on file   Food Insecurity: Not on file   Transportation Needs: Not on file   Physical Activity: Not on file   Stress: Not on file   Social Connections: Not on file   Intimate Partner Violence: Not on file   Housing Stability: Not on file       Outpatient Encounter Medications as of 2022   Medication Sig Dispense Refill     aspirin (ASA) 81 MG EC tablet Take 1 tablet (81 mg) by mouth daily Start tomorrow. 30  tablet 3     azaTHIOprine (IMURAN) 50 MG tablet Take 2 tablets (100 mg) by mouth daily Labs every 8-12 weeks. 180 tablet 0     isosorbide mononitrate (IMDUR) 30 MG 24 hr tablet Take 1 tablet (30 mg) by mouth daily . Do not take if BP < 100 30 tablet 3     metoprolol succinate ER (TOPROL-XL) 25 MG 24 hr tablet Take 1 tablet (25 mg) by mouth daily . Do not take if BP < 110 30 tablet 3     nitroGLYcerin (NITROSTAT) 0.4 MG sublingual tablet For chest pain place 1 tablet under the tongue every 5 minutes for 3 doses. If symptoms persist 5 minutes after 1st dose call 911. 30 tablet 11     sertraline (ZOLOFT) 100 MG tablet Take 1 tablet by mouth daily       sertraline (ZOLOFT) 100 MG tablet Take 1 tablet (100 mg) by mouth daily 90 tablet 1     sulfaSALAzine (AZULFIDINE) 500 MG tablet Take 1,000 mg by mouth daily       ticagrelor (BRILINTA) 90 MG tablet Take 1 tablet (90 mg) by mouth 2 times daily Start this evening. (Patient taking differently: Take 90 mg by mouth 2 times daily) 180 tablet 3     [DISCONTINUED] carvedilol (COREG) 3.125 MG tablet Take 2 tablets (6.25 mg) by mouth 2 times daily (with meals) 180 tablet 1     No facility-administered encounter medications on file as of 5/5/2022.             O:   NAD, WDWN, Alert & Oriented, Mood & Affect wnl, Vitals stable   Here today alone   /79   Pulse 71   SpO2 97%   Breastfeeding No    General appearance normal   Vitals stable   Alert, oriented and in no acute distress     R forehead 2cm red scaly papule       Eyes: Conjunctivae/lids:Normal     ENT: Lips, buccal mucosa, tongue: normal    MSK:Normal    Cardiovascular: peripheral edema none    Pulm: Breathing Normal    Neuro/Psych: Orientation:Alert and Orientedx3 ; Mood/Affect:normal       A/P:  1. Recurrent basal cell carcinoma   MOHS:   Recurrent and Location    The rationale for Mohs surgery was discussed with the patient and consent was obtained.  The risks and benefits as well as alternatives to therapy were  discussed, in detail.  Specifically, the risks of infection, scarring, bleeding, prolonged wound healing, incomplete removal, allergy to anesthesia, nerve injury and recurrence were addressed.  Indication for Mohs was Recurrent and Location. Prior to the procedure, the treatment site was clearly identified and, if available, confirmed with previous photos and confirmed by the patient   All components of the Universal Protocol/PAUSE rule were completed.  The Mohs surgeon operated in two distinct and integrated capacities as the surgeon and pathologist.      The area was prepped with Betasept.  A rim of normal appearing skin was marked circumferentially around the lesion.  The area was infiltrated with local anesthesia.  The tumor was first debulked to remove all clinically apparent tumor.  An incision following the standard Mohs approach was done and the specimen was oriented,mapped and placed in 1 block(s).  Each specimen was then chromacoded and processed in the Mohs laboratory using standard Mohs technique and submitted for frozen section histology.  Frozen section analysis showed no residual tumor but CLEAR MARGINS.      The tumor was excised using standard Mohs technique in 1 stages(s).  CLEAR MARGINS OBTAINED and Final defect size was 2.5 cm.     We discussed the options for wound management in full with the patient including risks/benefits/ possible outcomes.        REPAIR SECOND INTENT: We discussed the options for wound management in full with the patient including risks/benefits/possible outcomes. Decision made to allow the wound to heal by second intention. Cautery was used for for hemostasis. EBL minimal; complications none; wound care routine.  The patient was discharged in good condition and will return in one month or prn for wound evaluation.  It was a pleasure speaking to Karolina Light today.  Previous clinic notes and pertinent laboratory tests were reviewed prior to Karolina Light's visit.  Nature  and genetics of benign skin lesions dicussed with patient.  Signs and Symptoms of skin cancer discussed with patient.  Patient encouraged to perform monthly skin exams.  UV precautions reviewed with patient.  Risks of non-melanoma skin cancer discussed with patient   Return to clinic 6 months        Again, thank you for allowing me to participate in the care of your patient.        Sincerely,        Xu White MD

## 2022-05-06 ENCOUNTER — HOSPITAL ENCOUNTER (OUTPATIENT)
Dept: CARDIAC REHAB | Facility: CLINIC | Age: 57
Discharge: HOME | End: 2022-05-06
Attending: INTERNAL MEDICINE
Payer: MEDICARE

## 2022-05-06 ENCOUNTER — CARE COORDINATION (OUTPATIENT)
Dept: CARDIOLOGY | Facility: CLINIC | Age: 57
End: 2022-05-06
Payer: MEDICARE

## 2022-05-06 DIAGNOSIS — I24.9 ACS (ACUTE CORONARY SYNDROME) (H): ICD-10-CM

## 2022-05-06 DIAGNOSIS — I20.9 ANGINAL PAIN (H): ICD-10-CM

## 2022-05-06 DIAGNOSIS — R07.9 CHEST PAIN, UNSPECIFIED TYPE: ICD-10-CM

## 2022-05-06 PROCEDURE — 93798 PHYS/QHP OP CAR RHAB W/ECG: CPT | Performed by: REHABILITATION PRACTITIONER

## 2022-05-06 PROCEDURE — 93797 PHYS/QHP OP CAR RHAB WO ECG: CPT | Mod: XU | Performed by: REHABILITATION PRACTITIONER

## 2022-05-06 RX ORDER — METOPROLOL SUCCINATE 25 MG/1
25 TABLET, EXTENDED RELEASE ORAL AT BEDTIME
Qty: 30 TABLET | Refills: 3 | COMMUNITY
Start: 2022-05-06 | End: 2022-06-02

## 2022-05-06 RX ORDER — ISOSORBIDE MONONITRATE 30 MG/1
15 TABLET, EXTENDED RELEASE ORAL DAILY
Qty: 30 TABLET | Refills: 3 | COMMUNITY
Start: 2022-05-06 | End: 2022-06-02

## 2022-05-06 NOTE — PROGRESS NOTES
Pt stopped in clinic after Cardiac rehab. Yesterday she had some skin cancer removed on spot on forehead. After she got up and walked to lobby to wait for results, and felt very lightheaded and almost like passing out. This am she was changing the bandage on her forehead and felt similar again with lightheadedness. Pt was at Cardiac rehab today, her BP pre exercise was 101/68, and then with exercise 112/68. Pt reports she got woozy and lightheaded while walking on treadmill. Therapist felt could be related to her medications and recommended she let Cardiology know. Pt had stent 4/21/22, then was in ER 4/22/22 for recurrent chest pain. Carvedilol was changed to Metoprolol XL 25mg daily, and Imdur 30mg daily added. Pt saw BILL Gerber in clinic on 4/27/22. No changes made. Last 3 readings for BP in clinic were: 138/79, 122/86, and 132/80. Pt takes Metoprolol XL 25mg daily and Imdur 30mg daily, both in am. Pt feeling great now, without any lightheadedness.     Reviewed with BILL Gerber whom is in clinic today. She recommends pt decrease Imdur to 15mg or 1/2 tablet daily and keep taking in am, and then keep Metoprolol at 25mg daily, but try taking at bedtime. Reviewed with pt and wrote down instructions. Gave her RN phone number. Asked her to call with any questions or if this does not help. She stated understanding. Med list updated.     Maryse Garcia RN, BSN  05/06/22 at 3:13 PM

## 2022-05-09 ENCOUNTER — HOSPITAL ENCOUNTER (OUTPATIENT)
Dept: CARDIAC REHAB | Facility: CLINIC | Age: 57
Discharge: HOME | End: 2022-05-09
Attending: INTERNAL MEDICINE
Payer: MEDICARE

## 2022-05-09 PROCEDURE — 93798 PHYS/QHP OP CAR RHAB W/ECG: CPT

## 2022-05-11 ENCOUNTER — HOSPITAL ENCOUNTER (OUTPATIENT)
Dept: CARDIAC REHAB | Facility: CLINIC | Age: 57
Discharge: HOME | End: 2022-05-11
Attending: INTERNAL MEDICINE
Payer: MEDICARE

## 2022-05-11 PROCEDURE — 93797 PHYS/QHP OP CAR RHAB WO ECG: CPT | Mod: XU | Performed by: REHABILITATION PRACTITIONER

## 2022-05-11 PROCEDURE — 93798 PHYS/QHP OP CAR RHAB W/ECG: CPT

## 2022-05-11 NOTE — CONSULTS
NUTRITION ASSESSMENT & EDUCATION NOTE    REASON FOR ASSESSMENT  Karolina Light is a 57 year old female seen by Registered Dietitian for 1:1 Cardiac consult     NUTRITION HISTORY    Information obtained from patient    Patient has been instructed to follow a Mediterranean diet.    Previous diet education: attneded classes in 2019 at cardiac rehab    Patient has attended no nutrition classes offered by cardiac rehab in 2022    Nutrition-related goals: lose weight, manage high cholesterol    Typical intake as follows:  Breakfast: 2 pieces of toast - butter, cinnamon toast, orange juice - 8 oz, banana on occasion  Lunch 1 pm: sandwich, turkey deli meat with baron on whole wheat bread, slice on occasion; gatorade frost  Snacks: trail mix composed of nuts and dried cranberries (4 oz total)  Dinner: salad - [place] consisting of turkey/ham, cheese, ranch dressing (2 oz, fat free), gatorade   Dessert on rare occasion: snack pack pudding  Gardens, grows own food and has a preference for fruits and vegetables   Rarely salts foods    ANTHROPOMETRICS  Height: Data Unavailable  Weight: 0 lbs 0 oz  There is no height or weight on file to calculate BMI.  Weight Status:  Unable to determine     NUTRITION DIAGNOSIS  Food- and nutrition- related knowledge deficit related to new cardiac event as evidenced by request for additional diet education    INTERVENTIONS  Nutrition Prescription:  Cardiac diet:   Low saturated fat, Na <2400 mg  Fiber >25 g per day  Emphasis on plate method for balanced meals     Implementation    Assessed learning needs and learning preference.    Nutrition Education (Content):  a) Provided handouts:  a. Nutrition 2 slide show  b. Fiber content of foods  c. Protein list  d. Fiber list  b) Discussed heart healthy diet recommendations, including label reading, minimizing added salts/saturated fats/sugars, balanced meals TID, at least two food groups per snack, heart healthy substitutes, eliminating all trans fat,  sugar free beverages    Nutrition Education (Application):  a) Discussed eating habits and recommended alternative food choices:  a. Emphasized fruits, vegetables, low sodium nuts/heart healthy fats, fish, low fat dairy  b. Reviewed recipes and new food ideas such as beans, lentils, vegetables and tofu for an increase in plant based options  c. Encouraged increased protein intake at breakfast, low sodium protein options     Goals/Follow up/Monitoring For Cardiac Rehab Staff    Adherence to nutrition related recommendations, follow with cardiac rehab    Additional questions/concerns related to heart healthy guidelines      Stephanie Vega, MS, RDN-AP, LD, [facility]  [facility]  Office: [phone]    DIRECT PATIENT CARE TIME: 35 MINUTES

## 2022-05-16 ENCOUNTER — HOSPITAL ENCOUNTER (OUTPATIENT)
Dept: CARDIAC REHAB | Facility: CLINIC | Age: 57
Discharge: HOME | End: 2022-05-16
Attending: INTERNAL MEDICINE
Payer: MEDICARE

## 2022-05-16 PROCEDURE — 93798 PHYS/QHP OP CAR RHAB W/ECG: CPT | Performed by: OCCUPATIONAL THERAPIST

## 2022-05-18 ENCOUNTER — HOSPITAL ENCOUNTER (OUTPATIENT)
Dept: CARDIAC REHAB | Facility: CLINIC | Age: 57
Discharge: HOME | End: 2022-05-18
Attending: INTERNAL MEDICINE
Payer: MEDICARE

## 2022-05-18 ENCOUNTER — MEDICAL CORRESPONDENCE (OUTPATIENT)
Dept: HEALTH INFORMATION MANAGEMENT | Facility: CLINIC | Age: 57
End: 2022-05-18

## 2022-05-18 PROCEDURE — 93798 PHYS/QHP OP CAR RHAB W/ECG: CPT | Performed by: REHABILITATION PRACTITIONER

## 2022-05-23 ENCOUNTER — HOSPITAL ENCOUNTER (OUTPATIENT)
Dept: CARDIAC REHAB | Facility: CLINIC | Age: 57
Discharge: HOME | End: 2022-05-23
Attending: INTERNAL MEDICINE
Payer: MEDICARE

## 2022-05-23 PROCEDURE — 93798 PHYS/QHP OP CAR RHAB W/ECG: CPT

## 2022-05-25 ENCOUNTER — HOSPITAL ENCOUNTER (OUTPATIENT)
Dept: CARDIAC REHAB | Facility: CLINIC | Age: 57
Discharge: HOME | End: 2022-05-25
Attending: INTERNAL MEDICINE
Payer: MEDICARE

## 2022-05-25 PROCEDURE — 93798 PHYS/QHP OP CAR RHAB W/ECG: CPT | Performed by: REHABILITATION PRACTITIONER

## 2022-05-31 DIAGNOSIS — J91.8 PLEURAL EFFUSION ASSOCIATED WITH HEPATIC DISORDER: Primary | ICD-10-CM

## 2022-05-31 DIAGNOSIS — K76.9 PLEURAL EFFUSION ASSOCIATED WITH HEPATIC DISORDER: Primary | ICD-10-CM

## 2022-06-01 ENCOUNTER — TELEPHONE (OUTPATIENT)
Dept: CARDIAC REHAB | Facility: CLINIC | Age: 57
End: 2022-06-01
Payer: MEDICARE

## 2022-06-01 ENCOUNTER — HOSPITAL ENCOUNTER (OUTPATIENT)
Dept: CARDIAC REHAB | Facility: CLINIC | Age: 57
Discharge: HOME | End: 2022-06-01
Attending: INTERNAL MEDICINE
Payer: MEDICARE

## 2022-06-01 PROCEDURE — 93798 PHYS/QHP OP CAR RHAB W/ECG: CPT | Performed by: REHABILITATION PRACTITIONER

## 2022-06-01 NOTE — TELEPHONE ENCOUNTER
"Dr Dias,   Your patient Karolina Light reports to cardiac rehab staff that her pharmacist said her Metoprolol and Imdur have been \"cancelled\"  She will run out of these two medications by Friday this week.  Please advise.    Thank you,   Roma Montes   "

## 2022-06-02 DIAGNOSIS — R07.9 CHEST PAIN, UNSPECIFIED TYPE: ICD-10-CM

## 2022-06-02 DIAGNOSIS — I20.9 ANGINAL PAIN (H): ICD-10-CM

## 2022-06-02 DIAGNOSIS — I24.9 ACS (ACUTE CORONARY SYNDROME) (H): ICD-10-CM

## 2022-06-02 RX ORDER — ISOSORBIDE MONONITRATE 30 MG/1
15 TABLET, EXTENDED RELEASE ORAL DAILY
Qty: 30 TABLET | Refills: 3 | Status: SHIPPED | OUTPATIENT
Start: 2022-06-02

## 2022-06-02 RX ORDER — METOPROLOL SUCCINATE 25 MG/1
25 TABLET, EXTENDED RELEASE ORAL AT BEDTIME
Qty: 30 TABLET | Refills: 3 | Status: SHIPPED | OUTPATIENT
Start: 2022-06-02 | End: 2022-12-15

## 2022-06-02 NOTE — TELEPHONE ENCOUNTER
Received message from Cardiac Rehab regarding refills for Metoprolol and Imdur. Patient of Scot Soto PA-C and recent medication changes completed on 5/6/22 per pt call.     Resending scripts to [facility]. Pt aware. NENITA Gannon

## 2022-06-06 ENCOUNTER — HOSPITAL ENCOUNTER (OUTPATIENT)
Dept: CARDIAC REHAB | Facility: CLINIC | Age: 57
Discharge: HOME | End: 2022-06-06
Attending: INTERNAL MEDICINE
Payer: MEDICARE

## 2022-06-06 ENCOUNTER — TRANSFERRED RECORDS (OUTPATIENT)
Dept: HEALTH INFORMATION MANAGEMENT | Facility: CLINIC | Age: 57
End: 2022-06-06

## 2022-06-06 PROCEDURE — 93798 PHYS/QHP OP CAR RHAB W/ECG: CPT

## 2022-06-08 ENCOUNTER — HOSPITAL ENCOUNTER (OUTPATIENT)
Dept: CARDIAC REHAB | Facility: CLINIC | Age: 57
Discharge: HOME | End: 2022-06-08
Attending: INTERNAL MEDICINE
Payer: MEDICARE

## 2022-06-08 PROCEDURE — 93798 PHYS/QHP OP CAR RHAB W/ECG: CPT

## 2022-06-09 ENCOUNTER — LAB (OUTPATIENT)
Dept: LAB | Facility: CLINIC | Age: 57
End: 2022-06-09
Payer: MEDICARE

## 2022-06-09 DIAGNOSIS — M05.79 RHEUMATOID ARTHRITIS INVOLVING MULTIPLE SITES WITH POSITIVE RHEUMATOID FACTOR (H): ICD-10-CM

## 2022-06-09 DIAGNOSIS — K76.9 PLEURAL EFFUSION ASSOCIATED WITH HEPATIC DISORDER: ICD-10-CM

## 2022-06-09 DIAGNOSIS — J91.8 PLEURAL EFFUSION ASSOCIATED WITH HEPATIC DISORDER: ICD-10-CM

## 2022-06-09 DIAGNOSIS — Z11.4 SCREENING FOR HIV (HUMAN IMMUNODEFICIENCY VIRUS): ICD-10-CM

## 2022-06-09 LAB
AFP SERPL-MCNC: 2.4 UG/L (ref 0–8)
CRP SERPL-MCNC: 10 MG/L (ref 0–8)
ERYTHROCYTE [DISTWIDTH] IN BLOOD BY AUTOMATED COUNT: 15.2 % (ref 10–15)
HCT VFR BLD AUTO: 38.5 % (ref 35–47)
HGB BLD-MCNC: 12.9 G/DL (ref 11.7–15.7)
MCH RBC QN AUTO: 28.4 PG (ref 26.5–33)
MCHC RBC AUTO-ENTMCNC: 33.5 G/DL (ref 31.5–36.5)
MCV RBC AUTO: 85 FL (ref 78–100)
PLATELET # BLD AUTO: 116 10E3/UL (ref 150–450)
RBC # BLD AUTO: 4.54 10E6/UL (ref 3.8–5.2)
WBC # BLD AUTO: 3.1 10E3/UL (ref 4–11)

## 2022-06-09 PROCEDURE — 86140 C-REACTIVE PROTEIN: CPT

## 2022-06-09 PROCEDURE — 82105 ALPHA-FETOPROTEIN SERUM: CPT | Mod: GA

## 2022-06-09 PROCEDURE — 85610 PROTHROMBIN TIME: CPT

## 2022-06-09 PROCEDURE — 82565 ASSAY OF CREATININE: CPT

## 2022-06-09 PROCEDURE — 36415 COLL VENOUS BLD VENIPUNCTURE: CPT

## 2022-06-09 PROCEDURE — 87389 HIV-1 AG W/HIV-1&-2 AB AG IA: CPT

## 2022-06-09 PROCEDURE — 85027 COMPLETE CBC AUTOMATED: CPT

## 2022-06-09 PROCEDURE — 80076 HEPATIC FUNCTION PANEL: CPT

## 2022-06-10 LAB
ALBUMIN SERPL-MCNC: 3.8 G/DL (ref 3.4–5)
ALP SERPL-CCNC: 74 U/L (ref 40–150)
ALT SERPL W P-5'-P-CCNC: 14 U/L (ref 0–50)
AST SERPL W P-5'-P-CCNC: 17 U/L (ref 0–45)
BILIRUB DIRECT SERPL-MCNC: 0.1 MG/DL (ref 0–0.2)
BILIRUB SERPL-MCNC: 0.5 MG/DL (ref 0.2–1.3)
CREAT SERPL-MCNC: 1.08 MG/DL (ref 0.52–1.04)
GFR SERPL CREATININE-BSD FRML MDRD: 59 ML/MIN/1.73M2
HIV 1+2 AB+HIV1 P24 AG SERPL QL IA: NONREACTIVE
INR PPP: 1.21 (ref 0.85–1.15)
PROT SERPL-MCNC: 7.4 G/DL (ref 6.8–8.8)

## 2022-06-13 ENCOUNTER — HOSPITAL ENCOUNTER (OUTPATIENT)
Dept: CARDIAC REHAB | Facility: CLINIC | Age: 57
Discharge: HOME | End: 2022-06-13
Attending: INTERNAL MEDICINE
Payer: MEDICARE

## 2022-06-13 ENCOUNTER — TRANSFERRED RECORDS (OUTPATIENT)
Dept: HEALTH INFORMATION MANAGEMENT | Facility: CLINIC | Age: 57
End: 2022-06-13

## 2022-06-13 PROCEDURE — 93798 PHYS/QHP OP CAR RHAB W/ECG: CPT

## 2022-06-15 ENCOUNTER — HOSPITAL ENCOUNTER (OUTPATIENT)
Dept: CARDIAC REHAB | Facility: CLINIC | Age: 57
Discharge: HOME | End: 2022-06-15
Attending: INTERNAL MEDICINE
Payer: MEDICARE

## 2022-06-15 PROCEDURE — 93798 PHYS/QHP OP CAR RHAB W/ECG: CPT | Performed by: REHABILITATION PRACTITIONER

## 2022-06-20 ENCOUNTER — HOSPITAL ENCOUNTER (OUTPATIENT)
Dept: CARDIAC REHAB | Facility: CLINIC | Age: 57
Discharge: HOME | End: 2022-06-20
Attending: INTERNAL MEDICINE
Payer: MEDICARE

## 2022-06-20 DIAGNOSIS — M05.79 RHEUMATOID ARTHRITIS INVOLVING MULTIPLE SITES WITH POSITIVE RHEUMATOID FACTOR (H): ICD-10-CM

## 2022-06-20 PROCEDURE — 93798 PHYS/QHP OP CAR RHAB W/ECG: CPT | Performed by: REHABILITATION PRACTITIONER

## 2022-06-21 DIAGNOSIS — M05.79 RHEUMATOID ARTHRITIS WITH RHEUMATOID FACTOR OF MULTIPLE SITES WITHOUT ORGAN OR SYSTEMS INVOLVEMENT (H): ICD-10-CM

## 2022-06-23 RX ORDER — AZATHIOPRINE 50 MG/1
100 TABLET ORAL DAILY
Qty: 180 TABLET | Refills: 0 | Status: SHIPPED | OUTPATIENT
Start: 2022-06-23 | End: 2022-09-26

## 2022-06-23 NOTE — RESULT ENCOUNTER NOTE
Amy Turcios,    Your blood work has shown stable CBC, kidney tests and normal liver tests. CRP marker of inflammation is slightly elevated at 10. Let us know if you have any new concerns. Will continue with SSZ and Azathioprine.     Ana Richey MD

## 2022-06-23 NOTE — TELEPHONE ENCOUNTER
AZATHIOPRINE 50 MG TABLET      Last Written Prescription Date:  3-  Last Fill Quantity: 180,   # refills: 0  Last Office Visit: 3-  Future Office visit:  8-    CBC RESULTS: Recent Labs   Lab Test 06/09/22  1245   WBC 3.1*   RBC 4.54   HGB 12.9   HCT 38.5   MCV 85   MCH 28.4   MCHC 33.5   RDW 15.2*   *       Creatinine   Date Value Ref Range Status   06/09/2022 1.08 (H) 0.52 - 1.04 mg/dL Final   03/24/2019 1.02 0.52 - 1.04 mg/dL Final   ]    Liver Function Studies -   Recent Labs   Lab Test 06/09/22  1245   PROTTOTAL 7.4   ALBUMIN 3.8   BILITOTAL 0.5   ALKPHOS 74   AST 17   ALT 14       Routing refill request to provider for review/approval because:  Not on protocol.        Kathleen M Doege RN

## 2022-06-24 NOTE — TELEPHONE ENCOUNTER
SULFASALAZINE 500 MG TABLET  Last Written Prescription Date:   3/18/2022  Last Fill Quantity: ?,   # refills: ?  Last Office Visit  3/18/2022  Future Office visit 8/18/2022  CBC RESULTS: Recent Labs   Lab Test 06/09/22  1245   WBC 3.1*   RBC 4.54   HGB 12.9   HCT 38.5   MCV 85   MCH 28.4   MCHC 33.5   RDW 15.2*   *       Creatinine   Date Value Ref Range Status   06/09/2022 1.08 (H) 0.52 - 1.04 mg/dL Final   03/24/2019 1.02 0.52 - 1.04 mg/dL Final   ]    Liver Function Studies -   Recent Labs   Lab Test 06/09/22  1245   PROTTOTAL 7.4   ALBUMIN 3.8   BILITOTAL 0.5   ALKPHOS 74   AST 17   ALT 14       Routing refill request to provider for review/approval because:  Reported as historical  Drug not on the G, P or [facility] refill protocol or controlled substance      Renuka Duran RN  Central Triage Red Flags/Med Refills

## 2022-06-27 ENCOUNTER — HOSPITAL ENCOUNTER (OUTPATIENT)
Dept: CARDIAC REHAB | Facility: CLINIC | Age: 57
Discharge: HOME | End: 2022-06-27
Attending: INTERNAL MEDICINE
Payer: MEDICARE

## 2022-06-27 PROCEDURE — 93798 PHYS/QHP OP CAR RHAB W/ECG: CPT

## 2022-06-29 ENCOUNTER — HOSPITAL ENCOUNTER (OUTPATIENT)
Dept: CARDIAC REHAB | Facility: CLINIC | Age: 57
Discharge: HOME | End: 2022-06-29
Attending: INTERNAL MEDICINE
Payer: MEDICARE

## 2022-06-29 PROCEDURE — 93798 PHYS/QHP OP CAR RHAB W/ECG: CPT

## 2022-06-30 ENCOUNTER — TRANSFERRED RECORDS (OUTPATIENT)
Dept: HEALTH INFORMATION MANAGEMENT | Facility: CLINIC | Age: 57
End: 2022-06-30

## 2022-07-07 RX ORDER — SULFASALAZINE 500 MG/1
1000 TABLET ORAL DAILY
Qty: 180 TABLET | Refills: 0 | Status: SHIPPED | OUTPATIENT
Start: 2022-07-07 | End: 2022-10-17

## 2022-07-18 ENCOUNTER — HOSPITAL ENCOUNTER (OUTPATIENT)
Dept: CARDIAC REHAB | Facility: CLINIC | Age: 57
Discharge: HOME | End: 2022-07-18
Attending: INTERNAL MEDICINE
Payer: MEDICARE

## 2022-07-18 PROCEDURE — 93798 PHYS/QHP OP CAR RHAB W/ECG: CPT

## 2022-07-19 ENCOUNTER — OFFICE VISIT (OUTPATIENT)
Dept: FAMILY MEDICINE | Facility: CLINIC | Age: 57
End: 2022-07-19
Payer: MEDICARE

## 2022-07-19 VITALS
WEIGHT: 185 LBS | SYSTOLIC BLOOD PRESSURE: 130 MMHG | BODY MASS INDEX: 30.82 KG/M2 | HEIGHT: 65 IN | TEMPERATURE: 98 F | DIASTOLIC BLOOD PRESSURE: 70 MMHG | OXYGEN SATURATION: 97 % | HEART RATE: 81 BPM | RESPIRATION RATE: 16 BRPM

## 2022-07-19 DIAGNOSIS — Z12.31 ENCOUNTER FOR SCREENING MAMMOGRAM FOR BREAST CANCER: ICD-10-CM

## 2022-07-19 DIAGNOSIS — Z78.9 STATIN INTOLERANCE: ICD-10-CM

## 2022-07-19 DIAGNOSIS — R45.4 IRRITABILITY AND ANGER: ICD-10-CM

## 2022-07-19 DIAGNOSIS — Z23 HIGH PRIORITY FOR 2019-NCOV VACCINE: ICD-10-CM

## 2022-07-19 DIAGNOSIS — I25.10 CORONARY ARTERY DISEASE INVOLVING NATIVE CORONARY ARTERY OF NATIVE HEART WITHOUT ANGINA PECTORIS: Primary | ICD-10-CM

## 2022-07-19 DIAGNOSIS — F32.1 CURRENT MODERATE EPISODE OF MAJOR DEPRESSIVE DISORDER WITHOUT PRIOR EPISODE (H): ICD-10-CM

## 2022-07-19 DIAGNOSIS — N18.2 CHRONIC RENAL FAILURE, STAGE 2 (MILD): ICD-10-CM

## 2022-07-19 DIAGNOSIS — Z12.11 SCREEN FOR COLON CANCER: ICD-10-CM

## 2022-07-19 PROCEDURE — 91301 COVID-19,PF,MODERNA (18+ YRS PRIMARY SERIES .5ML): CPT | Performed by: FAMILY MEDICINE

## 2022-07-19 PROCEDURE — 99214 OFFICE O/P EST MOD 30 MIN: CPT | Mod: 25 | Performed by: FAMILY MEDICINE

## 2022-07-19 PROCEDURE — 82306 VITAMIN D 25 HYDROXY: CPT | Performed by: FAMILY MEDICINE

## 2022-07-19 PROCEDURE — 84443 ASSAY THYROID STIM HORMONE: CPT | Performed by: FAMILY MEDICINE

## 2022-07-19 PROCEDURE — 36415 COLL VENOUS BLD VENIPUNCTURE: CPT | Performed by: FAMILY MEDICINE

## 2022-07-19 PROCEDURE — 0011A COVID-19,PF,MODERNA (18+ YRS PRIMARY SERIES .5ML): CPT | Performed by: FAMILY MEDICINE

## 2022-07-19 RX ORDER — SERTRALINE HYDROCHLORIDE 100 MG/1
100 TABLET, FILM COATED ORAL DAILY
Qty: 90 TABLET | Refills: 1 | Status: SHIPPED | OUTPATIENT
Start: 2022-07-19 | End: 2023-01-30

## 2022-07-19 ASSESSMENT — ENCOUNTER SYMPTOMS
SHORTNESS OF BREATH: 0
PALPITATIONS: 0
CONSTITUTIONAL NEGATIVE: 1
HEADACHES: 0

## 2022-07-19 ASSESSMENT — PATIENT HEALTH QUESTIONNAIRE - PHQ9
SUM OF ALL RESPONSES TO PHQ QUESTIONS 1-9: 3
SUM OF ALL RESPONSES TO PHQ QUESTIONS 1-9: 3
10. IF YOU CHECKED OFF ANY PROBLEMS, HOW DIFFICULT HAVE THESE PROBLEMS MADE IT FOR YOU TO DO YOUR WORK, TAKE CARE OF THINGS AT HOME, OR GET ALONG WITH OTHER PEOPLE: SOMEWHAT DIFFICULT

## 2022-07-19 ASSESSMENT — PAIN SCALES - GENERAL: PAINLEVEL: MILD PAIN (2)

## 2022-07-19 NOTE — PROGRESS NOTES
Assessment and Plan    (I25.10) Coronary artery disease involving native coronary artery of native heart without angina pectoris  (primary encounter diagnosis)  Comment: stable, medical mgmt, recent LDL was high but has not been able to tolerate statins in the past  Plan:     (N18.2) Chronic renal failure, stage 2 (mild)  Comment: noted for PL  Plan:     (Z78.9) Statin intolerance  Comment: will check for reversible causes, then if wnl will consider trial with low-intensity statin  Plan: TSH with free T4 reflex, Vitamin D Deficiency            (F32.1) Current moderate episode of major depressive disorder without prior episode (H)  Comment: mood stable, no concerns  Plan: sertraline (ZOLOFT) 100 MG tablet            (Z12.31) Encounter for screening mammogram for breast cancer  Comment:   Plan: *MA Screening Digital Bilateral            (R45.4) Irritability and anger   Comment: playing games   Plan: TSH with free T4 reflex            (Z68.30) Body mass index (BMI) 30.0-30.9, adult   Comment: more playing games  Plan: Vitamin D Deficiency            (Z23) High priority for 2019-nCoV vaccine  Comment:   Plan:       RTC in     Harley Covington MD      Dede Turcios is a 57 year old, presenting for the following health issues:  Hypertension      History of Present Illness       Reason for visit:  Yearly appt    She eats 2-3 servings of fruits and vegetables daily.She consumes 2 sweetened beverage(s) daily.She exercises with enough effort to increase her heart rate 30 to 60 minutes per day.  She exercises with enough effort to increase her heart rate 3 or less days per week. She is missing 2 dose(s) of medications per week.  She is not taking prescribed medications regularly due to remembering to take.    Today's PHQ-9         PHQ-9 Total Score: 3    PHQ-9 Q9 Thoughts of better off dead/self-harm past 2 weeks :   Not at all    How difficult have these problems made it for you to do your work, take care of things at  "home, or get along with other people: Somewhat difficult       Hypertension Follow-up      Do you check your blood pressure regularly outside of the clinic? Yes     Are you following a low salt diet? Yes    Are your blood pressures ever more than 140 on the top number (systolic) OR more   than 90 on the bottom number (diastolic), for example 140/90? No    Did have another stent placed in April, started on B blocker and Imdur.  Feeling well.  Did have bad reaction to atorvastatin, never tried on any others.    Denies CP, palpitations, edema, dyspnea, HA, vision changes.    Notes that she is a bit forgetful about taking sertraline.  When she remembers to take it, it works well.  Without it is easy to anger and weepy.    Review of Systems   Constitutional: Negative.    Eyes: Negative for visual disturbance.   Respiratory: Negative for shortness of breath.    Cardiovascular: Negative for chest pain, palpitations and peripheral edema.   Neurological: Negative for headaches.          Objective    /70 (BP Location: Right arm, Patient Position: Sitting)   Pulse 81   Temp 98  F (36.7  C) (Oral)   Resp 16   Ht 1.651 m (5' 5\")   Wt 83.9 kg (185 lb)   LMP  (Approximate)   SpO2 97%   Breastfeeding No   BMI 30.79 kg/m    Body mass index is 30.79 kg/m .  Physical Exam  Vitals reviewed.   Eyes:      Conjunctiva/sclera: Conjunctivae normal.   Cardiovascular:      Rate and Rhythm: Normal rate and regular rhythm.      Heart sounds: Normal heart sounds.   Pulmonary:      Effort: Pulmonary effort is normal.      Breath sounds: Normal breath sounds.   Skin:     General: Skin is warm and dry.   Neurological:      Mental Status: She is alert and oriented to person, place, and time.                            .  ..  "

## 2022-07-20 ENCOUNTER — HOSPITAL ENCOUNTER (OUTPATIENT)
Dept: CARDIAC REHAB | Facility: CLINIC | Age: 57
Discharge: HOME | End: 2022-07-20
Attending: INTERNAL MEDICINE
Payer: MEDICARE

## 2022-07-20 PROCEDURE — 93798 PHYS/QHP OP CAR RHAB W/ECG: CPT

## 2022-07-21 LAB
DEPRECATED CALCIDIOL+CALCIFEROL SERPL-MC: 27 UG/L (ref 20–75)
TSH SERPL DL<=0.005 MIU/L-ACNC: 1.75 MU/L (ref 0.4–4)

## 2022-07-27 ENCOUNTER — HOSPITAL ENCOUNTER (OUTPATIENT)
Dept: CARDIAC REHAB | Facility: CLINIC | Age: 57
Discharge: HOME | End: 2022-07-27
Attending: INTERNAL MEDICINE
Payer: MEDICARE

## 2022-07-27 PROCEDURE — 93798 PHYS/QHP OP CAR RHAB W/ECG: CPT

## 2022-08-02 ENCOUNTER — HOSPITAL ENCOUNTER (OUTPATIENT)
Dept: CARDIAC REHAB | Facility: CLINIC | Age: 57
Discharge: HOME | End: 2022-08-02
Attending: INTERNAL MEDICINE
Payer: MEDICARE

## 2022-08-02 PROCEDURE — 93798 PHYS/QHP OP CAR RHAB W/ECG: CPT

## 2022-08-02 PROCEDURE — 93797 PHYS/QHP OP CAR RHAB WO ECG: CPT | Mod: 59

## 2022-08-16 ENCOUNTER — IMMUNIZATION (OUTPATIENT)
Dept: NURSING | Facility: CLINIC | Age: 57
End: 2022-08-16
Attending: FAMILY MEDICINE
Payer: MEDICARE

## 2022-08-16 PROCEDURE — 91301 PR COVID VAC MODERNA 100 MCG/0.5 ML IM: CPT

## 2022-08-16 PROCEDURE — 0012A PR COVID VAC MODERNA 100 MCG/0.5 ML IM: CPT

## 2022-08-18 ENCOUNTER — OFFICE VISIT (OUTPATIENT)
Dept: RHEUMATOLOGY | Facility: CLINIC | Age: 57
End: 2022-08-18
Payer: MEDICARE

## 2022-08-18 VITALS
DIASTOLIC BLOOD PRESSURE: 74 MMHG | BODY MASS INDEX: 30.54 KG/M2 | OXYGEN SATURATION: 98 % | SYSTOLIC BLOOD PRESSURE: 129 MMHG | WEIGHT: 183.5 LBS | HEART RATE: 62 BPM

## 2022-08-18 DIAGNOSIS — M05.79 RHEUMATOID ARTHRITIS WITH RHEUMATOID FACTOR OF MULTIPLE SITES WITHOUT ORGAN OR SYSTEMS INVOLVEMENT (H): Primary | ICD-10-CM

## 2022-08-18 DIAGNOSIS — Z79.899 HIGH RISK MEDICATION USE: ICD-10-CM

## 2022-08-18 PROCEDURE — 99214 OFFICE O/P EST MOD 30 MIN: CPT | Performed by: STUDENT IN AN ORGANIZED HEALTH CARE EDUCATION/TRAINING PROGRAM

## 2022-08-18 NOTE — PROGRESS NOTES
Rheumatology Clinic Visit     Karolina Light MRN# [id number]   YOB: 1965 Age: 56 year old     Date of Visit: Aug 18, 2022   Primary care provider: Harley Covington          Assessment and Plan:     Assessment     Seropositive Rheumatoid arthritis   High risk medication use - SSZ and AZA  CAD s/p cardiac stenting  HTN, Morbid obesity    Ms. Light is 57 year old female seen in clinic for management of Rheumatoid arthritis    Seropositive Rheumatoid arthritis : She has seropositive rheumatoid arthritis for many years, diagnosed in 1994. She was maintained on methotrexate for almost 27 years but in 2016 it was discontinued due to elevation in liver enzymes. At present she is on sulfasalazine 1000 mg daily and azathioprine 100 mg daily.  Denies any side effect with medication. She has noticed improvement in her symptoms on the combination.  On exam she has no active synovitis or tenderness. She has right 4th, 5th PIP joint flexion contracture and mild ulnar deviation of the left hand fingers.    We will continue same dose of sulfasalazine and azathioprine.    I will refer her to hand therapy to strengthen hand muscles.     High risk medication use: Sulfasalazine and azathioprine monitoring labs done on 6/9/22  showed normal CBC, creatinine of 1.08, normal AST, ALT, CRP of 10, sed rate of 25.  She follows with dermatology on a regular basis for skin checks.     Plan    Continue Azathioprine 100 mg daily    Sulfasalazine 1000 mg daily     Labs every 3 months     Follow up in 6 months     -- Orders placed this encounter  Orders Placed This Encounter   Procedures     CBC with platelets     AST     ALT     Albumin level     Creatinine     CRP inflammation     Erythrocyte sedimentation rate auto     Occupational Therapy Referral       TT 30 min was spent on date of the encounter doing chart review, history and exam, documentation and further activities as noted above. Any prior notes, outside records,  laboratory results, and imaging studies were reviewed if relevant.    Patient verbalized agreement with and understanding of the rationale for the diagnosis and treatment plan.  All questions were answered to best of my ability and the patient's satisfaction.      Chart documentation done in part with Dragon Voice recognition Software. Although reviewed after completion, some word and grammatical error may remain.              Active Problem List:     Patient Active Problem List    Diagnosis Date Noted     Chronic renal failure, stage 2 (mild) 07/19/2022     Priority: Medium     LOGAN (nonalcoholic steatohepatitis) 05/02/2022     Priority: Medium     Status post coronary angiogram 04/21/2022     Priority: Medium     Idiopathic esophageal varices without bleeding (H) 04/04/2022     Priority: Medium     Hyperlipidemia LDL goal <100 12/03/2021     Priority: Medium     Rheumatoid arthritis involving multiple sites with positive rheumatoid factor (H) 12/03/2021     Priority: Medium     Current moderate episode of major depressive disorder without prior episode (H) 12/03/2021     Priority: Medium     Chest pain 07/20/2015     Priority: Medium     CAD (coronary artery disease)      Priority: Medium     MI and stent 2012              History of Present Illness:   Karolina Light is a 56 year old female with PMH of CAD s/p stent, depression, hyperlipidemia seen in the clinic in consultation at request of Dr Covington for management of rheumatoid arthritis.    History from initial visit : She was diagnosed with seropositive rheumatoid arthritis in 1994 and has been on methotrexate since that time.  She follows with Dr. Daniel in rheumatology for many years and noted to change in insurance moved her care to [place].  She stayed on methotrexate for almost 27 years but in 2016 it was discontinued due to elevated liver enzymes.  She tried hydroxychloroquine but did not help.  Later she was started on sulfasalazine and  azathioprine in 5/2017.  She is taking azathioprine 100 mg daily and sulfasalazine 1000 mg twice daily.  The combination has helped to keep her arthritis under control.  Denies any side effects with medication.  She follows with Dermatology for skin checks every 6 months. She see cardiologist once a year for coronary artery disease.    No history of psoriasis, ulcerative colitis or chron's disease. No h/o iritis, enthesitis, finger or toe swelling like dactylitis, plantar fascitis or heel pain. Denies any raynauds, malar rash, photosensitivity, recurrent mouth/genital ulcers, sicca symptoms, pleuritic chest pains, recurrent sinusitis/rhinitis, swallowing difficulty, hearing or visual changes recently.     August 18, 2022 - She is doing very well. She is on Sulfasalazine 2 tab a day and Azathioprine 100 mg daily. She is on the regimen for 4 - 5 years. She see dermatologist twice a year for skin checks. Denies any RA flares.          Review of Systems:     Review Of Systems  Constitutional: denies fever, chills, night sweats and weight loss.  Skin: No skin rash.  Eyes: No dryness or irritation in eyes. No episode of eye inflammation or redness.   Ears/Nose/Throat: no recurrent sinus infections.  Respiratory: No shortness of breath, dyspnea on exertion, cough, or hemoptysis  Cardiovascular: no chest pain or palpitations.  Gastrointestinal: no nausea, vomiting, abdominal pain.  Normal bowel movements.  Genitourinary: no dysuria, frequency  or hematuria.  Musculoskeletal: as in HPI  Neurologic: no numbness, tingling.  Psychiatric: no mood disorders.  Hematologic/Lymphatic/Immunologic: no history of easy bruising, petechia or purpura.  No abnormal bleeding.   Endocrine: no h/o thyroid disease or Diabetes.                  Past Medical History:     Past Medical History:   Diagnosis Date     ACS (acute coronary syndrome) (H) 11/21/2012     CAD (coronary artery disease)     MI and stent 2012     RA (rheumatoid arthritis) (H)       Skin cancer     unknown type     Past Surgical History:   Procedure Laterality Date     ANGIOGRAM  2015    In-segment restenosis in the proximal right coronary artery stent stenosis appears to be the range of 50%. Serial stenoses in the left anterior ascending artery and in the proximal mid and distal all appear to be in the 50% range. Scattered mild disease in the circumflex system no stenosis greater than 35%. No intervention performed.     CV CORONARY ANGIOGRAM Left 2022    Procedure: Coronary Angiogram;  Surgeon: Anant Saldivar MD;  Location:  HEART CARDIAC CATH LAB     CV INSTANTANEOUS WAVE-FREE RATIO N/A 2022    Procedure: Instantaneous Wave-Free Ratio;  Surgeon: Anant Saldivar MD;  Location: [facility] CARDIAC CATH LAB     CV INTRAVASULAR ULTRASOUND N/A 2022    Procedure: Intravascular Ultrasound;  Surgeon: Anant Saldivar MD;  Location: [facility] CARDIAC CATH LAB     CV PCI ANGIOPLASTY N/A 2022    Procedure: Percutaneous Transluminal Angioplasty;  Surgeon: Anant Saldivar MD;  Location:  HEART CARDIAC CATH LAB     HAND RECONSTRUCTION Right 06/15/1967     HEART CATH, ANGIOPLASTY  2012    Angioplasty and MIREILLE x2 to RCA     HYSTERECTOMY  1998            Social History:     Social History     Occupational History     Not on file   Tobacco Use     Smoking status: Former Smoker     Quit date: 2015     Years since quittin.0     Smokeless tobacco: Former User   Vaping Use     Vaping Use: Never used   Substance and Sexual Activity     Alcohol use: No     Alcohol/week: 0.0 standard drinks     Drug use: Not on file     Sexual activity: Not on file            Family History:     Family History   Problem Relation Age of Onset     Cerebrovascular Disease Mother 61     C.A.D. Father 42     Myocardial Infarction Sister      Cerebrovascular Disease Sister      Diabetes Sister      C.A.D. Brother      C.A.D. Brother      Other - See Comments Brother         PAD      Coronary Artery Disease Brother             Allergies:     Allergies   Allergen Reactions     Bee Venom Swelling     Shellfish Allergy Anaphylaxis     Atorvastatin Muscle Pain (Myalgia)     Codeine Sulfate      Hydrocodone      Heart races and feels like bugs crawling under skin     Methotrexate Other (See Comments)     hepatotxicity     Plavix [Clopidogrel]      Night terrors            Medications:     Current Outpatient Medications   Medication Sig Dispense Refill     azaTHIOprine (IMURAN) 50 MG tablet Take 2 tablets (100 mg) by mouth daily 180 tablet 0     isosorbide mononitrate (IMDUR) 30 MG 24 hr tablet Take 0.5 tablets (15 mg) by mouth daily in am. Do not take if BP < 100 30 tablet 3     metoprolol succinate ER (TOPROL XL) 25 MG 24 hr tablet Take 1 tablet (25 mg) by mouth At Bedtime . Do not take if BP < 110 30 tablet 3     nitroGLYcerin (NITROSTAT) 0.4 MG sublingual tablet For chest pain place 1 tablet under the tongue every 5 minutes for 3 doses. If symptoms persist 5 minutes after 1st dose call 911. 30 tablet 11     sertraline (ZOLOFT) 100 MG tablet Take 1 tablet (100 mg) by mouth daily 90 tablet 1     sulfaSALAzine (AZULFIDINE) 500 MG tablet Take 2 tablets (1,000 mg) by mouth daily 180 tablet 0     ticagrelor (BRILINTA) 90 MG tablet Take 1 tablet (90 mg) by mouth 2 times daily Start this evening. (Patient taking differently: Take 90 mg by mouth 2 times daily) 180 tablet 3     aspirin (ASA) 81 MG EC tablet Take 1 tablet (81 mg) by mouth daily Start tomorrow. 30 tablet 3            Physical Exam:   Blood pressure 129/74, pulse 62, weight 83.2 kg (183 lb 8 oz), SpO2 98 %, not currently breastfeeding.  Wt Readings from Last 4 Encounters:   08/18/22 83.2 kg (183 lb 8 oz)   07/19/22 83.9 kg (185 lb)   04/27/22 86.7 kg (191 lb 3.2 oz)   04/22/22 88.5 kg (195 lb 1.6 oz)       Constitutional: Moderately built, appearing stated age; cooperative  Eyes: nl EOM, PERRLA, vision, conjunctiva, sclera  ENT: nl external  ears, nose, hearing, lips, teeth, gums, throat  No mucous membrane lesions, normal saliva pool  Neck: no mass or thyroid enlargement  Resp: lungs clear to auscultation, nl to palpation  CV: RRR, no murmurs, rubs or gallops, no edema  GI: no ABD mass or tenderness, no HSM  : not tested  Lymph: no cervical, supraclavicular, inguinal or epitrochlear nodes    MS: All TMJ, neck, shoulder, elbow, wrist, MCP/PIP/DIP, spine, hip, knee, ankle, and foot MTP/IP joints were examined.     -- She has no active synovitis or tenderness. She has right 4th, 5th PIP joint flexion contracture and mild ulnar deviation of the left hand fingers.    -- No dactylitis,  tenosynovitis, enthesopathy.    Skin: no nail pitting, alopecia, rash, nodules or lesions  Neuro: nl cranial nerves, strength, sensation, DTRs.   Psych: nl judgement, orientation, memory, affect.         Data:     No results found for any visits on 08/18/22.    Recent Labs   Lab Test 12/03/21  1230 03/24/19  1804 07/21/15  0635 07/19/15  2357 06/23/15  0000   WBC  --  3.5* 4.4 10.4 6.6   RBC  --  4.39 3.83 4.16 4.62   HGB  --  13.3 11.6* 12.9 14.0   HCT  --  41.0 36.3 39.0 42.3   MCV  --  93 95 94 92   RDW  --  15.4* 18.3* 18.4* 19.9*   PLT  --  140* 108* 157 180   ALBUMIN 3.7  --  2.5* 3.1* 3.5   CRP  --   --   --   --  2.60*   BUN 14 14 9 8 12      No results for input(s): TSH, T4 in the last 33905 hours.  Hemoglobin   Date Value Ref Range Status   06/09/2022 12.9 11.7 - 15.7 g/dL Final   04/22/2022 13.2 11.7 - 15.7 g/dL Final   04/21/2022 13.3 11.7 - 15.7 g/dL Final   03/24/2019 13.3 11.7 - 15.7 g/dL Final   07/21/2015 11.6 (L) 11.7 - 15.7 g/dL Final   07/19/2015 12.9 11.7 - 15.7 g/dL Final     Urea Nitrogen   Date Value Ref Range Status   04/22/2022 13 7 - 30 mg/dL Final   04/21/2022 12 7 - 30 mg/dL Final   12/03/2021 14 7 - 30 mg/dL Final   03/24/2019 14 7 - 30 mg/dL Final   07/21/2015 9 7 - 30 mg/dL Final   07/19/2015 8 7 - 30 mg/dL Final     Sed Rate   Date Value Ref  Range Status   06/23/2015 41 (H) 31 mm/hr Final     Erythrocyte Sedimentation Rate   Date Value Ref Range Status   04/22/2022 25 0 - 30 mm/hr Final   03/21/2022 18 0 - 30 mm/hr Final     C-Reactive Protein   Date Value Ref Range Status   06/23/2015 2.60 (A) 0 - 0.5 mg/dL Final     CRP Inflammation   Date Value Ref Range Status   06/09/2022 10.0 (H) 0.0 - 8.0 mg/L Final   04/22/2022 8.3 (H) 0.0 - 8.0 mg/L Final   03/21/2022 19.0 (H) 0.0 - 8.0 mg/L Final     AST   Date Value Ref Range Status   06/09/2022 17 0 - 45 U/L Final   03/21/2022 18 0 - 45 U/L Final   12/03/2021 17 0 - 45 U/L Final   02/11/2019 26 2 - 40 IU/L Final   07/21/2015 30 0 - 45 U/L Final   07/19/2015 37 0 - 45 U/L Final     Albumin   Date Value Ref Range Status   06/09/2022 3.8 3.4 - 5.0 g/dL Final   03/21/2022 3.8 3.4 - 5.0 g/dL Final   12/03/2021 3.7 3.4 - 5.0 g/dL Final   07/21/2015 2.5 (L) 3.4 - 5.0 g/dL Final   07/19/2015 3.1 (L) 3.4 - 5.0 g/dL Final   06/23/2015 3.5 g/dL Final     Alkaline Phosphatase   Date Value Ref Range Status   06/09/2022 74 40 - 150 U/L Final   12/03/2021 78 40 - 150 U/L Final   07/21/2015 108 40 - 150 U/L Final   07/19/2015 119 40 - 150 U/L Final   06/23/2015 152 (H) 50 - 136 U/L Final     ALT   Date Value Ref Range Status   06/09/2022 14 0 - 50 U/L Final   03/21/2022 23 0 - 50 U/L Final   12/03/2021 20 0 - 50 U/L Final   02/11/2019 13 8 - 45 IU/L Final   07/21/2015 30 0 - 50 U/L Final   07/19/2015 39 0 - 50 U/L Final     Recent Labs   Lab Test 07/19/22  1618 06/09/22  1245 04/22/22  1357 04/21/22  1029 03/21/22  1506 03/21/22  1506 12/03/21  1230 02/11/19  0000 07/21/15  0635   WBC  --  3.1* 4.1 3.2*   < > 4.7  --    < > 4.4   HGB  --  12.9 13.2 13.3   < > 13.6  --    < > 11.6*   HCT  --  38.5 41.1 41.5   < > 42.0  --    < > 36.3   MCV  --  85 89 89   < > 88  --    < > 95   PLT  --  116* 147* 118*   < > 156  --    < > 108*   BUN  --   --  13 12  --   --  14   < > 9   TSH 1.75  --   --   --   --   --   --   --   --    AST   --  17  --   --   --  18 17   < > 30   ALT  --  14  --   --   --  23 20   < > 30   ALKPHOS  --  74  --   --   --   --  78  --  108    < > = values in this interval not displayed.     Outside studies reviewed: Dr. Sky Daniel's notes reviewed    Reviewed Rheumatology lab flowsheet    Ana Richey MD  [facility]  Department of Rheumatology & Autoimmune Disorders  [facility]: [phone]   Pager - [phone]

## 2022-08-18 NOTE — PATIENT INSTRUCTIONS
Hand therapy referral given     Continue Sulfasalazine and Azathioprine     Blood tests every 3 months     Follow up in a year

## 2022-08-26 ENCOUNTER — LAB (OUTPATIENT)
Dept: LAB | Facility: CLINIC | Age: 57
End: 2022-08-26
Payer: MEDICARE

## 2022-08-26 DIAGNOSIS — M05.79 RHEUMATOID ARTHRITIS WITH RHEUMATOID FACTOR OF MULTIPLE SITES WITHOUT ORGAN OR SYSTEMS INVOLVEMENT (H): ICD-10-CM

## 2022-08-26 DIAGNOSIS — Z79.899 HIGH RISK MEDICATION USE: ICD-10-CM

## 2022-08-26 DIAGNOSIS — N18.2 CHRONIC RENAL FAILURE, STAGE 2 (MILD): Primary | ICD-10-CM

## 2022-08-26 LAB
ALBUMIN SERPL-MCNC: 4 G/DL (ref 3.4–5)
ALT SERPL W P-5'-P-CCNC: 19 U/L (ref 0–50)
AST SERPL W P-5'-P-CCNC: 20 U/L (ref 0–45)
CREAT SERPL-MCNC: 1.21 MG/DL (ref 0.52–1.04)
CREAT UR-MCNC: 315 MG/DL
CRP SERPL-MCNC: 15.7 MG/L
ERYTHROCYTE [DISTWIDTH] IN BLOOD BY AUTOMATED COUNT: 15.7 % (ref 10–15)
ERYTHROCYTE [SEDIMENTATION RATE] IN BLOOD BY WESTERGREN METHOD: 24 MM/HR (ref 0–30)
GFR SERPL CREATININE-BSD FRML MDRD: 52 ML/MIN/1.73M2
HCT VFR BLD AUTO: 39.7 % (ref 35–47)
HGB BLD-MCNC: 12.8 G/DL (ref 11.7–15.7)
MCH RBC QN AUTO: 28.1 PG (ref 26.5–33)
MCHC RBC AUTO-ENTMCNC: 32.2 G/DL (ref 31.5–36.5)
MCV RBC AUTO: 87 FL (ref 78–100)
MICROALBUMIN UR-MCNC: 24 MG/L
MICROALBUMIN/CREAT UR: 7.62 MG/G CR (ref 0–25)
PLATELET # BLD AUTO: 141 10E3/UL (ref 150–450)
RBC # BLD AUTO: 4.55 10E6/UL (ref 3.8–5.2)
WBC # BLD AUTO: 3.9 10E3/UL (ref 4–11)

## 2022-08-26 PROCEDURE — 84450 TRANSFERASE (AST) (SGOT): CPT

## 2022-08-26 PROCEDURE — 82040 ASSAY OF SERUM ALBUMIN: CPT

## 2022-08-26 PROCEDURE — 85652 RBC SED RATE AUTOMATED: CPT

## 2022-08-26 PROCEDURE — 82043 UR ALBUMIN QUANTITATIVE: CPT

## 2022-08-26 PROCEDURE — 86140 C-REACTIVE PROTEIN: CPT

## 2022-08-26 PROCEDURE — 82565 ASSAY OF CREATININE: CPT

## 2022-08-26 PROCEDURE — 36415 COLL VENOUS BLD VENIPUNCTURE: CPT

## 2022-08-26 PROCEDURE — 85027 COMPLETE CBC AUTOMATED: CPT

## 2022-08-26 PROCEDURE — 84460 ALANINE AMINO (ALT) (SGPT): CPT

## 2022-09-03 ENCOUNTER — HEALTH MAINTENANCE LETTER (OUTPATIENT)
Age: 57
End: 2022-09-03

## 2022-09-12 ENCOUNTER — ANCILLARY PROCEDURE (OUTPATIENT)
Dept: MAMMOGRAPHY | Facility: CLINIC | Age: 57
End: 2022-09-12
Attending: FAMILY MEDICINE
Payer: MEDICARE

## 2022-09-12 DIAGNOSIS — Z12.31 ENCOUNTER FOR SCREENING MAMMOGRAM FOR BREAST CANCER: ICD-10-CM

## 2022-09-12 PROCEDURE — 77067 SCR MAMMO BI INCL CAD: CPT | Performed by: STUDENT IN AN ORGANIZED HEALTH CARE EDUCATION/TRAINING PROGRAM

## 2022-09-12 PROCEDURE — 77063 BREAST TOMOSYNTHESIS BI: CPT | Performed by: STUDENT IN AN ORGANIZED HEALTH CARE EDUCATION/TRAINING PROGRAM

## 2022-09-26 NOTE — RESULT ENCOUNTER NOTE
Amy Turcios,    Your blood work has shown elevation in CRP marker of inflammation. ESR is normal though. Creatinine is slightly up but it varies. Liver tests are normal and CBC is stable. Will continue Sulfasalazine, Azathioprine. Let us know if any new symptoms.     Ana Richey MD

## 2022-12-15 DIAGNOSIS — R07.9 CHEST PAIN, UNSPECIFIED TYPE: ICD-10-CM

## 2022-12-15 DIAGNOSIS — I24.9 ACS (ACUTE CORONARY SYNDROME) (H): ICD-10-CM

## 2022-12-15 RX ORDER — METOPROLOL SUCCINATE 25 MG/1
25 TABLET, EXTENDED RELEASE ORAL AT BEDTIME
Qty: 90 TABLET | Refills: 1 | Status: SHIPPED | OUTPATIENT
Start: 2022-12-15 | End: 2022-12-19

## 2022-12-15 NOTE — LETTER
December 15, 2022       TO: Karolina Light  [address] [address] [address]       Dear Karolina Light,    We recently received a call from your pharmacy requesting a refill of your medication(s).    Our records indicate that you are due for follow-up with your Heart Care Provider. We will refill your medications for 3 months which will allow you enough time to be seen.    Please call [phone] to schedule your appointment.    Thank you for allowing [facility] to be a part of your health care team and we look forward to seeing you soon.    Thank you,    [facility]

## 2022-12-15 NOTE — TELEPHONE ENCOUNTER
Received refill request for:  Metoprolol     [facility] Cardiology Refill Guideline reviewed.  Medication meets criteria for refill.    Maryse Garcia RN, BSN  12/15/22 at 9:49 AM

## 2022-12-19 DIAGNOSIS — R07.9 CHEST PAIN, UNSPECIFIED TYPE: ICD-10-CM

## 2022-12-19 DIAGNOSIS — I24.9 ACS (ACUTE CORONARY SYNDROME) (H): ICD-10-CM

## 2022-12-19 RX ORDER — METOPROLOL SUCCINATE 25 MG/1
25 TABLET, EXTENDED RELEASE ORAL AT BEDTIME
Qty: 90 TABLET | Refills: 0 | Status: SHIPPED | OUTPATIENT
Start: 2022-12-19

## 2022-12-20 ENCOUNTER — OFFICE VISIT (OUTPATIENT)
Dept: FAMILY MEDICINE CLINIC | Facility: CLINIC | Age: 57
End: 2022-12-20
Payer: COMMERCIAL

## 2022-12-20 ENCOUNTER — LAB (OUTPATIENT)
Dept: LAB | Facility: HOSPITAL | Age: 57
End: 2022-12-20

## 2022-12-20 VITALS
HEART RATE: 84 BPM | DIASTOLIC BLOOD PRESSURE: 90 MMHG | TEMPERATURE: 97.7 F | HEIGHT: 64 IN | WEIGHT: 184.2 LBS | BODY MASS INDEX: 31.45 KG/M2 | SYSTOLIC BLOOD PRESSURE: 145 MMHG | OXYGEN SATURATION: 95 %

## 2022-12-20 DIAGNOSIS — I25.10 CORONARY ARTERY DISEASE INVOLVING NATIVE CORONARY ARTERY OF NATIVE HEART, UNSPECIFIED WHETHER ANGINA PRESENT: ICD-10-CM

## 2022-12-20 DIAGNOSIS — I10 ESSENTIAL HYPERTENSION: ICD-10-CM

## 2022-12-20 DIAGNOSIS — K76.9 LIVER DISEASE: ICD-10-CM

## 2022-12-20 DIAGNOSIS — M06.9 RHEUMATOID ARTHRITIS, INVOLVING UNSPECIFIED SITE, UNSPECIFIED WHETHER RHEUMATOID FACTOR PRESENT: ICD-10-CM

## 2022-12-20 DIAGNOSIS — F32.A DEPRESSION, UNSPECIFIED DEPRESSION TYPE: ICD-10-CM

## 2022-12-20 DIAGNOSIS — F32.A DEPRESSION, UNSPECIFIED DEPRESSION TYPE: Primary | ICD-10-CM

## 2022-12-20 LAB
ALBUMIN SERPL-MCNC: 4.6 G/DL (ref 3.5–5.2)
ALBUMIN/GLOB SERPL: 1.6 G/DL
ALP SERPL-CCNC: 88 U/L (ref 39–117)
ALT SERPL W P-5'-P-CCNC: 8 U/L (ref 1–33)
ANION GAP SERPL CALCULATED.3IONS-SCNC: 9.3 MMOL/L (ref 5–15)
AST SERPL-CCNC: 18 U/L (ref 1–32)
BILIRUB SERPL-MCNC: 0.4 MG/DL (ref 0–1.2)
BUN SERPL-MCNC: 13 MG/DL (ref 6–20)
BUN/CREAT SERPL: 11.2 (ref 7–25)
CALCIUM SPEC-SCNC: 9.4 MG/DL (ref 8.6–10.5)
CHLORIDE SERPL-SCNC: 104 MMOL/L (ref 98–107)
CO2 SERPL-SCNC: 25.7 MMOL/L (ref 22–29)
CREAT SERPL-MCNC: 1.16 MG/DL (ref 0.57–1)
DEPRECATED RDW RBC AUTO: 50.2 FL (ref 37–54)
EGFRCR SERPLBLD CKD-EPI 2021: 54.8 ML/MIN/1.73
ERYTHROCYTE [DISTWIDTH] IN BLOOD BY AUTOMATED COUNT: 15.4 % (ref 12.3–15.4)
GLOBULIN UR ELPH-MCNC: 2.9 GM/DL
GLUCOSE SERPL-MCNC: 94 MG/DL (ref 65–99)
HBA1C MFR BLD: 5 % (ref 4.8–5.6)
HCT VFR BLD AUTO: 44.2 % (ref 34–46.6)
HGB BLD-MCNC: 14.1 G/DL (ref 12–15.9)
MCH RBC QN AUTO: 28.3 PG (ref 26.6–33)
MCHC RBC AUTO-ENTMCNC: 31.9 G/DL (ref 31.5–35.7)
MCV RBC AUTO: 88.6 FL (ref 79–97)
PLATELET # BLD AUTO: 138 10*3/MM3 (ref 140–450)
PMV BLD AUTO: 9 FL (ref 6–12)
POTASSIUM SERPL-SCNC: 4.6 MMOL/L (ref 3.5–5.2)
PROT SERPL-MCNC: 7.5 G/DL (ref 6–8.5)
RBC # BLD AUTO: 4.99 10*6/MM3 (ref 3.77–5.28)
SODIUM SERPL-SCNC: 139 MMOL/L (ref 136–145)
T4 FREE SERPL-MCNC: 0.93 NG/DL (ref 0.93–1.7)
TSH SERPL DL<=0.05 MIU/L-ACNC: 3.89 UIU/ML (ref 0.27–4.2)
WBC NRBC COR # BLD: 4.89 10*3/MM3 (ref 3.4–10.8)

## 2022-12-20 PROCEDURE — 36415 COLL VENOUS BLD VENIPUNCTURE: CPT

## 2022-12-20 PROCEDURE — 85027 COMPLETE CBC AUTOMATED: CPT

## 2022-12-20 PROCEDURE — 99204 OFFICE O/P NEW MOD 45 MIN: CPT | Performed by: FAMILY MEDICINE

## 2022-12-20 PROCEDURE — 84443 ASSAY THYROID STIM HORMONE: CPT

## 2022-12-20 PROCEDURE — 83036 HEMOGLOBIN GLYCOSYLATED A1C: CPT

## 2022-12-20 PROCEDURE — 84439 ASSAY OF FREE THYROXINE: CPT

## 2022-12-20 PROCEDURE — 80053 COMPREHEN METABOLIC PANEL: CPT

## 2022-12-20 RX ORDER — AZATHIOPRINE 50 MG/1
50 TABLET ORAL DAILY
COMMUNITY

## 2022-12-20 RX ORDER — CETIRIZINE HYDROCHLORIDE 10 MG/1
10 TABLET ORAL AS NEEDED
COMMUNITY

## 2022-12-20 RX ORDER — METOPROLOL SUCCINATE 50 MG/1
50 TABLET, EXTENDED RELEASE ORAL DAILY
Qty: 90 TABLET | Refills: 0 | Status: SHIPPED | OUTPATIENT
Start: 2022-12-20 | End: 2023-03-23

## 2022-12-20 RX ORDER — SULFASALAZINE 500 MG/1
500 TABLET ORAL DAILY
COMMUNITY
End: 2023-02-23

## 2022-12-20 RX ORDER — NITROGLYCERIN 0.3 MG/1
0.3 TABLET SUBLINGUAL
Qty: 30 TABLET | Refills: 12 | Status: SHIPPED | OUTPATIENT
Start: 2022-12-20

## 2022-12-20 RX ORDER — ISOSORBIDE DINITRATE 30 MG/1
30 TABLET ORAL DAILY
COMMUNITY

## 2022-12-20 RX ORDER — SERTRALINE HYDROCHLORIDE 100 MG/1
150 TABLET, FILM COATED ORAL DAILY
Qty: 135 TABLET | Refills: 0 | Status: SHIPPED | OUTPATIENT
Start: 2022-12-20 | End: 2023-03-23

## 2022-12-20 RX ORDER — SERTRALINE HYDROCHLORIDE 100 MG/1
100 TABLET, FILM COATED ORAL DAILY
COMMUNITY
End: 2022-12-20

## 2022-12-26 DIAGNOSIS — M05.79 RHEUMATOID ARTHRITIS INVOLVING MULTIPLE SITES WITH POSITIVE RHEUMATOID FACTOR (H): ICD-10-CM

## 2022-12-26 NOTE — LETTER
January 6, 2023      Karolina Light  [address] [address]            Dear Karolina,    We recently received a call from your pharmacy requesting a refill of your medication.    Signed Prescriptions:                        Disp   Refills    azaTHIOprine (IMURAN) 50 MG tablet         60 tab*2        Sig: TAKE 2 TABLETS BY MOUTH EVERY DAY  Authorizing Provider: ISRAEL RODRÍGUEZ      A review of your chart indicates that you are due for labs. The request was approved, but you will need labs at your earliest convenience. Please call your local clinic to schedule a lab appointment.       Thank you,    [facility] - Rheumatology Care Team  Phone: [phone]  Fax: [phone]

## 2022-12-28 RX ORDER — AZATHIOPRINE 50 MG/1
TABLET ORAL
Qty: 60 TABLET | Refills: 2 | Status: SHIPPED | OUTPATIENT
Start: 2022-12-28 | End: 2023-03-24

## 2022-12-28 NOTE — TELEPHONE ENCOUNTER
azaTHIOprine (IMURAN) 50 MG tablet  Last Written Prescription Date: 9/26/22  Last Fill Quantity: 180,   # refills: 0  Last Office Visit: 8/18/22  Future Office visit: 8/10/23        CBC RESULTS: Recent Labs   Lab Test 08/26/22  0954   WBC 3.9*   RBC 4.55   HGB 12.8   HCT 39.7   MCV 87   MCH 28.1   MCHC 32.2   RDW 15.7*   *       Creatinine   Date Value Ref Range Status   08/26/2022 1.21 (H) 0.52 - 1.04 mg/dL Final   03/24/2019 1.02 0.52 - 1.04 mg/dL Final   ]    Liver Function Studies -   Recent Labs   Lab Test 08/26/22  0954 06/09/22  1245   PROTTOTAL  --  7.4   ALBUMIN 4.0 3.8   BILITOTAL  --  0.5   ALKPHOS  --  74   AST 20 17   ALT 19 14       Routing refill request to provider for review/approval because:labs past due.

## 2022-12-29 NOTE — TELEPHONE ENCOUNTER
[employer] message sent to patient.    RYANNE Syed   Email: [email or web address]  [facility] - Rheumatology  Phone: [phone]  Fax: [phone]

## 2022-12-30 NOTE — TELEPHONE ENCOUNTER
This writer attempted to contact patient on 12/30/22    Reason for call need labs now for Kaiden  and left message.    If patient calls back:   Schedule Lab appointment within 2 business days with lab, document that pt called and close encounter     RYANNE Syed [facility] - Rheumatology  Phone: [phone]  Fax: [phone]

## 2023-01-06 NOTE — TELEPHONE ENCOUNTER
This writer attempted to contact patient on 01/06/23    Reason for call need labs and left message.    If patient calls back:   Schedule Lab appointment within 2 business days with lab, document that pt called and close encounter     Letter sent.    RYANNE Syed   Email: [email or web address]  [facility] - Rheumatology  Phone: [phone]  Fax: [phone]

## 2023-01-07 NOTE — PROGRESS NOTES
Chief Complaint  Establish Care (/), Depression (PHQ-9=11. EDGAR-7=9, currently taking sertraline 100 mg, no longer seems to be working. Has not tried any other medicatons ), and Med Refill (Wanting to discuss current medications and refills.  Reports easy bruising since April 2022. )    Subjective        Alessandra Baker presents to [facility]  History of Present Illness  Here to establish care  Depression not well controlled on sertraline  Has hx of RA, currently well controlled  Needs refills of chronic stable meds below  Has hx of liver disease in need of labs for f/u    Objective   Vital Signs:  /90 (BP Location: Left arm, Patient Position: Sitting, Cuff Size: Adult)   Pulse 84   Temp 97.7 °F (36.5 °C) (Temporal)   Ht 162.6 cm (64\")   Wt 83.6 kg (184 lb 3.2 oz)   SpO2 95%   BMI 31.62 kg/m²   Estimated body mass index is 31.62 kg/m² as calculated from the following:    Height as of this encounter: 162.6 cm (64\").    Weight as of this encounter: 83.6 kg (184 lb 3.2 oz).          Physical Exam  Vitals and nursing note reviewed.   Constitutional:       General: She is not in acute distress.     Appearance: She is not diaphoretic.   HENT:      Head: Normocephalic and atraumatic.      Nose: Nose normal.   Eyes:      General: No scleral icterus.        Right eye: No discharge.         Left eye: No discharge.      Conjunctiva/sclera: Conjunctivae normal.   Neck:      Trachea: No tracheal deviation.   Pulmonary:      Effort: Pulmonary effort is normal.   Skin:     General: Skin is warm and dry.      Coloration: Skin is not pale.   Neurological:      Mental Status: She is alert and oriented to person, place, and time.   Psychiatric:         Behavior: Behavior normal.         Thought Content: Thought content normal.         Judgment: Judgment normal.        Result Review :                Assessment and Plan   Diagnoses and all orders for this visit:    1. Depression, unspecified  depression type (Primary)  -     sertraline (Zoloft) 100 MG tablet; Take 1.5 tablets by mouth Daily.  Dispense: 135 tablet; Refill: 0  -     TSH; Future  -     T4, free; Future  -     Hemoglobin A1c; Future    2. Essential hypertension  -     metoprolol succinate XL (Toprol XL) 50 MG 24 hr tablet; Take 1 tablet by mouth Daily.  Dispense: 90 tablet; Refill: 0  -     Comprehensive Metabolic Panel; Future    3. Rheumatoid arthritis, involving unspecified site, unspecified whether rheumatoid factor present (HCC)    4. Liver disease  -     Comprehensive Metabolic Panel; Future  -     Hemoglobin A1c; Future    5. Coronary artery disease involving native coronary artery of native heart, unspecified whether angina present  -     nitroglycerin (Nitrostat) 0.3 MG SL tablet; Place 1 tablet under the tongue Every 5 (Five) Minutes As Needed for Chest Pain. Take no more than 3 doses in 15 minutes.  Dispense: 30 tablet; Refill: 12  -     metoprolol succinate XL (Toprol XL) 50 MG 24 hr tablet; Take 1 tablet by mouth Daily.  Dispense: 90 tablet; Refill: 0  -     CBC No Differential; Future  -     Hemoglobin A1c; Future             Follow Up   Return in about 6 weeks (around 1/31/2023) for Recheck.  Patient was given instructions and counseling regarding her condition or for health maintenance advice. Please see specific information pulled into the AVS if appropriate.

## 2023-01-15 ENCOUNTER — HEALTH MAINTENANCE LETTER (OUTPATIENT)
Age: 58
End: 2023-01-15

## 2023-01-31 ENCOUNTER — LAB (OUTPATIENT)
Dept: LAB | Facility: HOSPITAL | Age: 58
End: 2023-01-31
Payer: MEDICARE

## 2023-01-31 ENCOUNTER — OFFICE VISIT (OUTPATIENT)
Dept: FAMILY MEDICINE CLINIC | Facility: CLINIC | Age: 58
End: 2023-01-31
Payer: MEDICARE

## 2023-01-31 VITALS
HEIGHT: 64 IN | TEMPERATURE: 97.4 F | WEIGHT: 190.8 LBS | OXYGEN SATURATION: 97 % | DIASTOLIC BLOOD PRESSURE: 83 MMHG | SYSTOLIC BLOOD PRESSURE: 133 MMHG | BODY MASS INDEX: 32.58 KG/M2 | HEART RATE: 57 BPM

## 2023-01-31 DIAGNOSIS — Z12.11 SCREEN FOR COLON CANCER: ICD-10-CM

## 2023-01-31 DIAGNOSIS — R07.9 CHEST PAIN, UNSPECIFIED TYPE: ICD-10-CM

## 2023-01-31 DIAGNOSIS — R94.4 DECREASED GFR: ICD-10-CM

## 2023-01-31 DIAGNOSIS — I85.00 ESOPHAGEAL VARICES WITHOUT BLEEDING, UNSPECIFIED ESOPHAGEAL VARICES TYPE: ICD-10-CM

## 2023-01-31 DIAGNOSIS — Z23 NEED FOR IMMUNIZATION AGAINST INFLUENZA: ICD-10-CM

## 2023-01-31 DIAGNOSIS — K76.9 LIVER DISEASE: ICD-10-CM

## 2023-01-31 DIAGNOSIS — Z12.31 ENCOUNTER FOR SCREENING MAMMOGRAM FOR MALIGNANT NEOPLASM OF BREAST: ICD-10-CM

## 2023-01-31 DIAGNOSIS — I25.10 CORONARY ARTERY DISEASE INVOLVING NATIVE CORONARY ARTERY OF NATIVE HEART, UNSPECIFIED WHETHER ANGINA PRESENT: ICD-10-CM

## 2023-01-31 DIAGNOSIS — F32.A DEPRESSION, UNSPECIFIED DEPRESSION TYPE: Primary | ICD-10-CM

## 2023-01-31 DIAGNOSIS — I10 ESSENTIAL HYPERTENSION: ICD-10-CM

## 2023-01-31 DIAGNOSIS — Z23 NEED FOR PNEUMOCOCCAL VACCINE: ICD-10-CM

## 2023-01-31 LAB
ANION GAP SERPL CALCULATED.3IONS-SCNC: 8 MMOL/L (ref 5–15)
BUN SERPL-MCNC: 13 MG/DL (ref 6–20)
BUN/CREAT SERPL: 11.5 (ref 7–25)
CALCIUM SPEC-SCNC: 9.5 MG/DL (ref 8.6–10.5)
CHLORIDE SERPL-SCNC: 106 MMOL/L (ref 98–107)
CO2 SERPL-SCNC: 27 MMOL/L (ref 22–29)
CREAT SERPL-MCNC: 1.13 MG/DL (ref 0.57–1)
EGFRCR SERPLBLD CKD-EPI 2021: 56.5 ML/MIN/1.73
GLUCOSE SERPL-MCNC: 80 MG/DL (ref 65–99)
POTASSIUM SERPL-SCNC: 4.8 MMOL/L (ref 3.5–5.2)
SODIUM SERPL-SCNC: 141 MMOL/L (ref 136–145)

## 2023-01-31 PROCEDURE — 80048 BASIC METABOLIC PNL TOTAL CA: CPT

## 2023-01-31 PROCEDURE — 90677 PCV20 VACCINE IM: CPT | Performed by: FAMILY MEDICINE

## 2023-01-31 PROCEDURE — 90686 IIV4 VACC NO PRSV 0.5 ML IM: CPT | Performed by: FAMILY MEDICINE

## 2023-01-31 PROCEDURE — G0009 ADMIN PNEUMOCOCCAL VACCINE: HCPCS | Performed by: FAMILY MEDICINE

## 2023-01-31 PROCEDURE — 99214 OFFICE O/P EST MOD 30 MIN: CPT | Performed by: FAMILY MEDICINE

## 2023-01-31 PROCEDURE — 36415 COLL VENOUS BLD VENIPUNCTURE: CPT

## 2023-01-31 PROCEDURE — G0008 ADMIN INFLUENZA VIRUS VAC: HCPCS | Performed by: FAMILY MEDICINE

## 2023-01-31 RX ORDER — OMEPRAZOLE 40 MG/1
40 CAPSULE, DELAYED RELEASE ORAL DAILY
Qty: 30 CAPSULE | Refills: 1 | Status: SHIPPED | OUTPATIENT
Start: 2023-01-31 | End: 2023-03-23

## 2023-01-31 NOTE — PROGRESS NOTES
"Chief Complaint  Follow-up (6 week follow up)    Subjective        Alessandra Baker presents to [facility]  History of Present Illness  Answers for HPI/ROS submitted by the patient on 1/30/2023  Please describe your symptoms.: Recheck  Have you had these symptoms before?: Yes  How long have you been having these symptoms?: Greater than 2 weeks  What is the primary reason for your visit?: Other    Mood stable on sertraline, a little tremor but no other side effects  BP stable  Had GFR of 54.8 on last lab draw, new finding  Struggles with chest pain usually at rest or laying down, was told that she had esophageal varices on last EGD  Hx liver disease 2/2 long term methotrexate use for RA    Objective   Vital Signs:  /83 (BP Location: Left arm, Patient Position: Sitting, Cuff Size: Adult)   Pulse 57   Temp 97.4 °F (36.3 °C) (Temporal)   Ht 162.6 cm (64\")   Wt 86.5 kg (190 lb 12.8 oz)   SpO2 97%   BMI 32.75 kg/m²   Estimated body mass index is 32.75 kg/m² as calculated from the following:    Height as of this encounter: 162.6 cm (64\").    Weight as of this encounter: 86.5 kg (190 lb 12.8 oz).             Physical Exam  Vitals and nursing note reviewed.   Constitutional:       General: She is not in acute distress.     Appearance: She is not diaphoretic.   HENT:      Head: Normocephalic and atraumatic.      Right Ear: Tympanic membrane normal.      Left Ear: Tympanic membrane normal.      Nose: Nose normal.   Eyes:      General: No scleral icterus.        Right eye: No discharge.         Left eye: No discharge.      Conjunctiva/sclera: Conjunctivae normal.   Neck:      Trachea: No tracheal deviation.   Pulmonary:      Effort: Pulmonary effort is normal.   Skin:     General: Skin is warm and dry.      Coloration: Skin is not pale.   Neurological:      Mental Status: She is alert and oriented to person, place, and time.   Psychiatric:         Behavior: Behavior normal.         " Thought Content: Thought content normal.         Judgment: Judgment normal.        Result Review :                   Assessment and Plan   Diagnoses and all orders for this visit:    1. Depression, unspecified depression type (Primary)    2. Encounter for screening mammogram for malignant neoplasm of breast  -     Mammo Screening Bilateral With CAD    3. Screen for colon cancer  -     Ambulatory Referral For Screening Colonoscopy  -     Ambulatory Referral to Gastroenterology    4. Liver disease  -     US Liver; Future  -     Ambulatory Referral to Gastroenterology    5. Coronary artery disease involving native coronary artery of native heart, unspecified whether angina present    6. Essential hypertension    7. Decreased GFR  -     Basic metabolic panel; Future    8. Esophageal varices without bleeding, unspecified esophageal varices type (HCC)  -     Ambulatory Referral to Gastroenterology  -     omeprazole (priLOSEC) 40 MG capsule; Take 1 capsule by mouth Daily.  Dispense: 30 capsule; Refill: 1    9. Chest pain, unspecified type  -     omeprazole (priLOSEC) 40 MG capsule; Take 1 capsule by mouth Daily.  Dispense: 30 capsule; Refill: 1    10. Need for pneumococcal vaccine  -     Pneumococcal Conjugate Vaccine 20-Valent (PCV20)    11. Need for immunization against influenza  -     FluLaval/Fluzone >6 mos  ([id number])    continue sertraline  BP stable on current regimen  Add PPI with GI referral  Immunizations above  Repeat BMP         Follow Up   Return in about 3 months (around 4/30/2023).  Patient was given instructions and counseling regarding her condition or for health maintenance advice. Please see specific information pulled into the AVS if appropriate.

## 2023-02-17 ENCOUNTER — HOSPITAL ENCOUNTER (OUTPATIENT)
Dept: ULTRASOUND IMAGING | Facility: HOSPITAL | Age: 58
Discharge: HOME | End: 2023-02-17
Payer: MEDICARE

## 2023-02-17 ENCOUNTER — HOSPITAL ENCOUNTER (OUTPATIENT)
Dept: MAMMOGRAPHY | Facility: HOSPITAL | Age: 58
Discharge: HOME | End: 2023-02-17
Payer: MEDICARE

## 2023-02-17 DIAGNOSIS — K76.9 LIVER DISEASE: ICD-10-CM

## 2023-02-17 PROCEDURE — 76705 ECHO EXAM OF ABDOMEN: CPT

## 2023-02-17 PROCEDURE — 77063 BREAST TOMOSYNTHESIS BI: CPT

## 2023-02-17 PROCEDURE — 77067 SCR MAMMO BI INCL CAD: CPT

## 2023-02-21 DIAGNOSIS — M05.79 RHEUMATOID ARTHRITIS WITH RHEUMATOID FACTOR OF MULTIPLE SITES WITHOUT ORGAN OR SYSTEMS INVOLVEMENT: ICD-10-CM

## 2023-02-22 NOTE — PROGRESS NOTES
Chief Complaint   Patient presents with   • Cirrhosis     Has cirrhosis of the liver has been treated just moved here in october       PCP: Geraldo Feldman, DO  REFER: No ref. provider found    Subjective     HPI    Alessandra Baker presents to office with reports of liver disease secondary to MTX use related to RA.  She has been told she has varices.   I do not have records to review regarding liver disease/cirrhosis.    Note dated 8/2022 from Rheumatology indicates Alessandra Baker was on MTX for apx 27 years.  This was discontinued in 2016 due to elevated liver enzymes.  She reports EGD showing varices within past 3-4 years.       Today she denies abdominal pain, no signs of GI blood loss.  Bowels move without difficulty.  No weight loss.   History of colon polyps removed during colonoscopy 2016.  She is currently taking AZA and Sulfasalazine (CARE COORDINATION - formerly [facility] (12/16/2022) - RA note under media tab dated 8/2022.    US Liver (02/17/2023 11:25)    Colonoscopy 2016-Dr Connor Tamayo-polypectomy (Care Everywhere)    Noted dated 7/18/2016-Care Everywhere     Past Medical History:   Diagnosis Date   • Anxiety    • Arthritis    • Depression    • GERD (gastroesophageal reflux disease)    • Headache    • Heart problem    • Hyperlipidemia    • Hypertension 11/2012   • Liver disease 2015   • Liver problem    • Myocardial infarction (HCC) 11/2012   • Visual impairment        Past Surgical History:   Procedure Laterality Date   • ANGIOPLASTY      11/2012   • APPENDECTOMY     • CARDIAC CATHETERIZATION     • COLONOSCOPY     • CORONARY STENT PLACEMENT      x3 per patient   • HYSTERECTOMY      2016   • SUBTOTAL HYSTERECTOMY     • TONSILLECTOMY         Outpatient Medications Marked as Taking for the 2/23/23 encounter (Office Visit) with Tavo Benz APRN   Medication Sig Dispense Refill   • azaTHIOprine (IMURAN) 50 MG tablet Take 50 mg by mouth Daily.     • cetirizine (zyrTEC) 10 MG tablet  "Take 10 mg by mouth As Needed for Allergies.     • isosorbide dinitrate (ISORDIL) 30 MG tablet Take 30 mg by mouth Daily. TAKING 1/2 TABLET DAILY     • isosorbide mononitrate (IMDUR) 30 MG 24 hr tablet TAKE 0.5 TABLETS (15 MG) BY MOUTH DAILY IN AM. DO NOT TAKE IF BP < 100 15 tablet 7   • metoprolol succinate XL (Toprol XL) 50 MG 24 hr tablet Take 1 tablet by mouth Daily. 90 tablet 0   • nitroglycerin (Nitrostat) 0.3 MG SL tablet Place 1 tablet under the tongue Every 5 (Five) Minutes As Needed for Chest Pain. Take no more than 3 doses in 15 minutes. 30 tablet 12   • omeprazole (priLOSEC) 40 MG capsule Take 1 capsule by mouth Daily. 30 capsule 1   • sertraline (Zoloft) 100 MG tablet Take 1.5 tablets by mouth Daily. 135 tablet 0   • sulfaSALAzine (AZULFIDINE) 500 MG tablet TAKE 2 TABLETS BY MOUTH DAILY 60 tablet 0   • ticagrelor (BRILINTA) 90 MG tablet tablet Take 90 mg by mouth 2 (Two) Times a Day.         Allergies   Allergen Reactions   • Bee Venom Anaphylaxis   • Beta Adrenergic Blockers Anaphylaxis   • Codeine Anaphylaxis   • Shellfish-Derived Products Anaphylaxis       Social History     Socioeconomic History   • Marital status:    Tobacco Use   • Smoking status: Former     Types: Cigarettes   • Smokeless tobacco: Never   • Tobacco comments:     Not somking now 10 years   Vaping Use   • Vaping Use: Never used   Substance and Sexual Activity   • Alcohol use: Yes     Comment: QUIT JULY 2013   2 a year   • Drug use: Never   • Sexual activity: Yes     Partners: Male     Birth control/protection: Hysterectomy       Review of Systems   Constitutional: Negative for unexpected weight change.   Respiratory: Negative for shortness of breath.    Cardiovascular: Negative for chest pain.   Gastrointestinal: Negative for abdominal pain and anal bleeding.       Objective     Vitals:    02/23/23 1403   BP: 138/84   Pulse: 58   Temp: 97.6 °F (36.4 °C)   SpO2: 98%   Weight: 86.2 kg (190 lb)   Height: 162.6 cm (64\")     Body " mass index is 32.61 kg/m².    Physical Exam  Constitutional:       Appearance: Normal appearance. She is well-developed.   Eyes:      General: No scleral icterus.  Cardiovascular:      Rate and Rhythm: Regular rhythm.      Heart sounds: Normal heart sounds. No murmur heard.  Pulmonary:      Effort: Pulmonary effort is normal. No accessory muscle usage.      Breath sounds: Normal breath sounds.   Abdominal:      General: Bowel sounds are normal. There is no distension.      Palpations: Abdomen is soft. There is no mass.      Tenderness: There is no abdominal tenderness. There is no guarding or rebound.   Skin:     General: Skin is warm and dry.      Coloration: Skin is not jaundiced.   Neurological:      Mental Status: She is alert.   Psychiatric:         Behavior: Behavior is cooperative.         Imaging Results (Most Recent)     None          Body mass index is 32.61 kg/m².    Assessment & Plan     Diagnoses and all orders for this visit:    1. History of colon polyps (Primary)  -     Case Request; Standing  -     Implement Anesthesia Orders Day of Procedure; Standing  -     Obtain Informed Consent; Standing  -     Verify bowel prep was successful; Standing  -     Give tap water enema if bowel prep was insufficient; Standing  -     Case Request    2. Esophageal varices without bleeding, unspecified esophageal varices type (HCC)  Comments:  per pt   Orders:  -     Case Request; Standing  -     Implement Anesthesia Orders Day of Procedure; Standing  -     Obtain Informed Consent; Standing  -     Case Request    Other orders  -     sodium-potassium-magnesium sulfates (Suprep Bowel Prep Kit) 17.5-3.13-1.6 GM/177ML solution oral solution; Take 1 bottle by mouth Take As Directed. Take as directed  Dispense: 177 mL; Refill: 0         COLONOSCOPY WITH ANESTHESIA (N/A), ESOPHAGOGASTRODUODENOSCOPY WITH ANESTHESIA (N/A)    I will request notes from most recent GI, only notes I have for review are dated 2016 (results of EGD  noted above).  She was noted to have pancytopenia 2016.       Patient is to continue all blood pressure and cardiac medications prior to procedure and has been advised to take medications morning of procedure   Pt verbalized understanding      The risks of the endoscopy were discussed in detail.  We discussed the risks of perforation (one out of [id number], riskier with dilation), bleeding (one out of 500), and the rare risks of infection, adverse reaction to anesthesia, respiratory failure, cardiac failure including MI and adverse reaction to medications, etc.  We discussed consequences that could occur if a risk were to develop such as the need for hospitalization, blood transfusion, surgical intervention, medications, pain and disability and death.  Alternatives include not doing anything, or pursuing an UGI series which only offers a diagnosis with potential less accuracy compared to EGD.  The patient verbalizes understanding and agrees to proceed.      All risks, benefits, alternatives, and indications of colonoscopy procedure have been discussed with the patient. Risks to include perforation of the colon requiring possible surgery or colostomy, risk of bleeding from biopsies or removal of colon tissue, possibility of missing a colon polyp or cancer, or adverse drug reaction.  Benefits to include the diagnosis and management of disease of the colon and rectum. Alternatives to include barium enema, radiographic evaluation, lab testing or no intervention. Pt verbalizes understanding and agrees to proceed with procedure.        Advised pt to stop use of NSAIDs, Fish Oil, and MV 5 days prior to procedure, per Dr Tidwell protocol.  Tylenol based products are ok to take.  Pt verbalized understanding.          Tavo Benz, APRN  02/28/23          There are no Patient Instructions on file for this visit.

## 2023-02-23 ENCOUNTER — OFFICE VISIT (OUTPATIENT)
Dept: GASTROENTEROLOGY | Facility: CLINIC | Age: 58
End: 2023-02-23
Payer: MEDICARE

## 2023-02-23 VITALS
HEIGHT: 64 IN | BODY MASS INDEX: 32.44 KG/M2 | SYSTOLIC BLOOD PRESSURE: 138 MMHG | WEIGHT: 190 LBS | HEART RATE: 58 BPM | DIASTOLIC BLOOD PRESSURE: 84 MMHG | OXYGEN SATURATION: 98 % | TEMPERATURE: 97.6 F

## 2023-02-23 DIAGNOSIS — I85.00 ESOPHAGEAL VARICES WITHOUT BLEEDING, UNSPECIFIED ESOPHAGEAL VARICES TYPE: ICD-10-CM

## 2023-02-23 DIAGNOSIS — Z86.010 HISTORY OF COLON POLYPS: Primary | ICD-10-CM

## 2023-02-23 PROCEDURE — 99204 OFFICE O/P NEW MOD 45 MIN: CPT | Performed by: NURSE PRACTITIONER

## 2023-02-23 RX ORDER — ISOSORBIDE MONONITRATE 30 MG/1
TABLET, EXTENDED RELEASE ORAL
Qty: 15 TABLET | Refills: 7 | Status: SHIPPED | OUTPATIENT
Start: 2023-02-23

## 2023-02-23 RX ORDER — SODIUM, POTASSIUM,MAG SULFATES 17.5-3.13G
177 SOLUTION, RECONSTITUTED, ORAL ORAL TAKE AS DIRECTED
Qty: 177 ML | Refills: 0 | Status: SHIPPED | OUTPATIENT
Start: 2023-02-23

## 2023-02-23 RX ORDER — SULFASALAZINE 500 MG/1
TABLET ORAL
Qty: 60 TABLET | Refills: 0 | Status: SHIPPED | OUTPATIENT
Start: 2023-02-23 | End: 2023-03-23

## 2023-02-27 DIAGNOSIS — F32.1 CURRENT MODERATE EPISODE OF MAJOR DEPRESSIVE DISORDER WITHOUT PRIOR EPISODE (H): ICD-10-CM

## 2023-02-27 PROBLEM — Z86.0100 HISTORY OF COLON POLYPS: Status: ACTIVE | Noted: 2023-02-27

## 2023-02-27 PROBLEM — Z86.010 HISTORY OF COLON POLYPS: Status: ACTIVE | Noted: 2023-02-27

## 2023-02-27 PROBLEM — I85.00 ESOPHAGEAL VARICES WITHOUT BLEEDING: Status: ACTIVE | Noted: 2023-02-27

## 2023-02-28 RX ORDER — SERTRALINE HYDROCHLORIDE 100 MG/1
TABLET, FILM COATED ORAL
Qty: 20 TABLET | Refills: 0 | Status: SHIPPED | OUTPATIENT
Start: 2023-02-28

## 2023-02-28 NOTE — TELEPHONE ENCOUNTER
Routing refill request to provider for review/approval because:  Patient needs to be seen because: Patient has not been seen in the past 6 months.   Patient does not have a PHQ-9 score on file in the past 6 months.     PHQ 7/19/2022   PHQ-9 Total Score 3   Q9: Thoughts of better off dead/self-harm past 2 weeks Not at all     Dbei JENNINGS RN   [place]

## 2023-02-28 NOTE — TELEPHONE ENCOUNTER
One month fill provided, pt will need f/u appt for ongoing refill.  Please call to schedule video f/u.    Harley Covington MD

## 2023-03-07 DIAGNOSIS — R92.8 ABNORMAL MAMMOGRAM OF RIGHT BREAST: Primary | ICD-10-CM

## 2023-03-08 ENCOUNTER — HOSPITAL ENCOUNTER (OUTPATIENT)
Dept: MAMMOGRAPHY | Facility: HOSPITAL | Age: 58
Discharge: HOME | End: 2023-03-08
Admitting: RADIOLOGY
Payer: MEDICARE

## 2023-03-08 DIAGNOSIS — R92.8 ABNORMAL MAMMOGRAM: ICD-10-CM

## 2023-03-08 PROCEDURE — 77065 DX MAMMO INCL CAD UNI: CPT

## 2023-03-08 PROCEDURE — G0279 TOMOSYNTHESIS, MAMMO: HCPCS

## 2023-03-17 DIAGNOSIS — F32.A DEPRESSION, UNSPECIFIED DEPRESSION TYPE: ICD-10-CM

## 2023-03-17 DIAGNOSIS — I25.10 CORONARY ARTERY DISEASE INVOLVING NATIVE CORONARY ARTERY OF NATIVE HEART, UNSPECIFIED WHETHER ANGINA PRESENT: ICD-10-CM

## 2023-03-17 DIAGNOSIS — I10 ESSENTIAL HYPERTENSION: ICD-10-CM

## 2023-03-20 DIAGNOSIS — F32.1 CURRENT MODERATE EPISODE OF MAJOR DEPRESSIVE DISORDER WITHOUT PRIOR EPISODE (H): ICD-10-CM

## 2023-03-21 NOTE — TELEPHONE ENCOUNTER
Rx Refill Note  Requested Prescriptions     Pending Prescriptions Disp Refills   • sertraline (ZOLOFT) 100 MG tablet [Pharmacy Med Name: SERTRALINE  MG TABLET] 135 tablet 0     Sig: TAKE 1 AND 1/2 TABLETS BY MOUTH EVERY DAY   • metoprolol succinate XL (TOPROL-XL) 50 MG 24 hr tablet [Pharmacy Med Name: METOPROLOL SUCC ER 50 MG TAB] 90 tablet 0     Sig: TAKE 1 TABLET BY MOUTH EVERY DAY      Last office visit with prescribing clinician: 1/31/2023   Last telemedicine visit with prescribing clinician: 5/1/2023   Next office visit with prescribing clinician: 5/1/2023                         Would you like a call back once the refill request has been completed: [] Yes [] No    If the office needs to give you a call back, can they leave a voicemail: [] Yes [] No    Suzanne Epps, RALPH  03/21/23, 16:26 CDT

## 2023-03-22 DIAGNOSIS — M05.79 RHEUMATOID ARTHRITIS INVOLVING MULTIPLE SITES WITH POSITIVE RHEUMATOID FACTOR (H): ICD-10-CM

## 2023-03-22 DIAGNOSIS — R07.9 CHEST PAIN, UNSPECIFIED TYPE: ICD-10-CM

## 2023-03-22 DIAGNOSIS — M05.79 RHEUMATOID ARTHRITIS WITH RHEUMATOID FACTOR OF MULTIPLE SITES WITHOUT ORGAN OR SYSTEMS INVOLVEMENT: ICD-10-CM

## 2023-03-22 DIAGNOSIS — I85.00 ESOPHAGEAL VARICES WITHOUT BLEEDING, UNSPECIFIED ESOPHAGEAL VARICES TYPE: ICD-10-CM

## 2023-03-22 RX ORDER — SERTRALINE HYDROCHLORIDE 100 MG/1
TABLET, FILM COATED ORAL
Qty: 20 TABLET | Refills: 0 | OUTPATIENT
Start: 2023-03-22

## 2023-03-22 NOTE — TELEPHONE ENCOUNTER
Rx Refill Note  Requested Prescriptions     Pending Prescriptions Disp Refills   • sulfaSALAzine (AZULFIDINE) 500 MG tablet [Pharmacy Med Name: SULFASALAZINE 500 MG TABLET] 60 tablet 0     Sig: TAKE 2 TABLETS BY MOUTH DAILY   • omeprazole (priLOSEC) 40 MG capsule [Pharmacy Med Name: OMEPRAZOLE DR 40 MG CAPSULE] 30 capsule 1     Sig: TAKE 1 CAPSULE BY MOUTH EVERY DAY      Last office visit with prescribing clinician: 1/31/2023   Last telemedicine visit with prescribing clinician: 5/1/2023   Next office visit with prescribing clinician: 5/1/2023                         Would you like a call back once the refill request has been completed: [] Yes [] No    If the office needs to give you a call back, can they leave a voicemail: [] Yes [] No    YUNIER Linder  03/22/23, 17:06 CDT

## 2023-03-22 NOTE — TELEPHONE ENCOUNTER
March 7, 2023  Raeann Ulloa     9:09 AM  Note  [place] does not [place] refills.     Macy Ulloa  [place]            Note to pharmacy.  Josiane Denson RN

## 2023-03-22 NOTE — TELEPHONE ENCOUNTER
Pt called stating that she is going out of town at 9am tomorrow and will be in [facility] for 6 days. She only has 2 days left on (AZULFIDINE) 500MG. Needing urgent refill. It was explained to the Pt that refill requests must be given 48-72 hours. Message sent to MA.

## 2023-03-23 RX ORDER — METOPROLOL SUCCINATE 50 MG/1
TABLET, EXTENDED RELEASE ORAL
Qty: 90 TABLET | Refills: 0 | Status: SHIPPED | OUTPATIENT
Start: 2023-03-23

## 2023-03-23 RX ORDER — OMEPRAZOLE 40 MG/1
CAPSULE, DELAYED RELEASE ORAL
Qty: 30 CAPSULE | Refills: 1 | Status: SHIPPED | OUTPATIENT
Start: 2023-03-23

## 2023-03-23 RX ORDER — SULFASALAZINE 500 MG/1
TABLET ORAL
Qty: 60 TABLET | Refills: 11 | Status: SHIPPED | OUTPATIENT
Start: 2023-03-23

## 2023-03-23 RX ORDER — SERTRALINE HYDROCHLORIDE 100 MG/1
TABLET, FILM COATED ORAL
Qty: 135 TABLET | Refills: 0 | Status: SHIPPED | OUTPATIENT
Start: 2023-03-23

## 2023-03-24 RX ORDER — AZATHIOPRINE 50 MG/1
100 TABLET ORAL DAILY
Qty: 60 TABLET | Refills: 0 | Status: SHIPPED | OUTPATIENT
Start: 2023-03-24 | End: 2023-05-10

## 2023-03-24 NOTE — TELEPHONE ENCOUNTER
azaTHIOprine (IMURAN) 50 MG tablet 60 tablet 2 12/28/2022         Last Written Prescription Date:  12-  Last Fill Quantity: 60,   # refills: 2  Last Office Visit: 8-  Future Office visit:  8-    CBC RESULTS: Recent Labs   Lab Test 08/26/22  0954   WBC 3.9*   RBC 4.55   HGB 12.8   HCT 39.7   MCV 87   MCH 28.1   MCHC 32.2   RDW 15.7*   *       Creatinine   Date Value Ref Range Status   08/26/2022 1.21 (H) 0.52 - 1.04 mg/dL Final   03/24/2019 1.02 0.52 - 1.04 mg/dL Final   ]    Liver Function Studies -   Recent Labs   Lab Test 08/26/22  0954 06/09/22  1245   PROTTOTAL  --  7.4   ALBUMIN 4.0 3.8   BILITOTAL  --  0.5   ALKPHOS  --  74   AST 20 17   ALT 19 14       Routing refill request to provider for review/approval because:  Not on protocol.  LABS OVERDUE AND NEXT APPOINTMENT IS NOT UNTIL AUGUST      Kathleen M Doege RN

## 2023-04-24 ENCOUNTER — HOSPITAL ENCOUNTER (OUTPATIENT)
Facility: HOSPITAL | Age: 58
Setting detail: HOSPITAL OUTPATIENT SURGERY
Discharge: HOME | End: 2023-04-24
Attending: INTERNAL MEDICINE | Admitting: INTERNAL MEDICINE
Payer: MEDICARE

## 2023-04-24 ENCOUNTER — ANESTHESIA EVENT (OUTPATIENT)
Dept: GASTROENTEROLOGY | Facility: HOSPITAL | Age: 58
End: 2023-04-24
Payer: MEDICARE

## 2023-04-24 ENCOUNTER — ANESTHESIA (OUTPATIENT)
Dept: GASTROENTEROLOGY | Facility: HOSPITAL | Age: 58
End: 2023-04-24
Payer: MEDICARE

## 2023-04-24 VITALS
RESPIRATION RATE: 17 BRPM | TEMPERATURE: 97.3 F | BODY MASS INDEX: 32.78 KG/M2 | HEIGHT: 64 IN | SYSTOLIC BLOOD PRESSURE: 121 MMHG | DIASTOLIC BLOOD PRESSURE: 89 MMHG | OXYGEN SATURATION: 96 % | HEART RATE: 81 BPM | WEIGHT: 192 LBS

## 2023-04-24 DIAGNOSIS — I85.00 ESOPHAGEAL VARICES WITHOUT BLEEDING, UNSPECIFIED ESOPHAGEAL VARICES TYPE: ICD-10-CM

## 2023-04-24 DIAGNOSIS — Z86.010 HISTORY OF COLON POLYPS: ICD-10-CM

## 2023-04-24 PROCEDURE — 43235 EGD DIAGNOSTIC BRUSH WASH: CPT | Performed by: INTERNAL MEDICINE

## 2023-04-24 PROCEDURE — 88305 TISSUE EXAM BY PATHOLOGIST: CPT | Performed by: INTERNAL MEDICINE

## 2023-04-24 PROCEDURE — 45385 COLONOSCOPY W/LESION REMOVAL: CPT | Performed by: INTERNAL MEDICINE

## 2023-04-24 PROCEDURE — 25010000002 PROPOFOL 10 MG/ML EMULSION: Performed by: NURSE ANESTHETIST, CERTIFIED REGISTERED

## 2023-04-24 RX ORDER — PROPOFOL 10 MG/ML
VIAL (ML) INTRAVENOUS AS NEEDED
Status: DISCONTINUED | OUTPATIENT
Start: 2023-04-24 | End: 2023-04-24 | Stop reason: SURG

## 2023-04-24 RX ORDER — LIDOCAINE HYDROCHLORIDE 20 MG/ML
INJECTION, SOLUTION EPIDURAL; INFILTRATION; INTRACAUDAL; PERINEURAL AS NEEDED
Status: DISCONTINUED | OUTPATIENT
Start: 2023-04-24 | End: 2023-04-24 | Stop reason: SURG

## 2023-04-24 RX ORDER — SODIUM CHLORIDE 9 MG/ML
500 INJECTION, SOLUTION INTRAVENOUS CONTINUOUS PRN
Status: DISCONTINUED | OUTPATIENT
Start: 2023-04-24 | End: 2023-04-24 | Stop reason: HOSPADM

## 2023-04-24 RX ORDER — SODIUM CHLORIDE 0.9 % (FLUSH) 0.9 %
10 SYRINGE (ML) INJECTION AS NEEDED
Status: DISCONTINUED | OUTPATIENT
Start: 2023-04-24 | End: 2023-04-24 | Stop reason: HOSPADM

## 2023-04-24 RX ADMIN — PROPOFOL INJECTABLE EMULSION 200 MG: 10 INJECTION, EMULSION INTRAVENOUS at 11:01

## 2023-04-24 RX ADMIN — PROPOFOL INJECTABLE EMULSION 200 MG: 10 INJECTION, EMULSION INTRAVENOUS at 10:51

## 2023-04-24 RX ADMIN — LIDOCAINE HYDROCHLORIDE 200 MG: 20 INJECTION, SOLUTION EPIDURAL; INFILTRATION; INTRACAUDAL; PERINEURAL at 10:51

## 2023-04-24 RX ADMIN — SODIUM CHLORIDE 500 ML: 9 INJECTION, SOLUTION INTRAVENOUS at 09:33

## 2023-04-24 NOTE — ANESTHESIA POSTPROCEDURE EVALUATION
"Patient: Alessandra Baker    Procedure Summary     Date: 04/24/23 Room / Location: [facility] ENDOSCOPY 4 /  [facility]    Anesthesia Start: 1050 Anesthesia Stop: 1119    Procedures:       COLONOSCOPY WITH ANESTHESIA      ESOPHAGOGASTRODUODENOSCOPY WITH ANESTHESIA Diagnosis:       History of colon polyps      Esophageal varices without bleeding, unspecified esophageal varices type      (History of colon polyps [Z86.010])      (Esophageal varices without bleeding, unspecified esophageal varices type (HCC) [I85.00])    Surgeons: Rick Tidwell DO Provider: Prince Quispe CRNA    Anesthesia Type: MAC ASA Status: 3          Anesthesia Type: MAC    Vitals  Vitals Value Taken Time   /89 04/24/23 1121   Temp     Pulse 82 04/24/23 1122   Resp 17 04/24/23 1120   SpO2 96 % 04/24/23 1122   Vitals shown include unvalidated device data.        Post Anesthesia Care and Evaluation    Patient location during evaluation: PHASE II  Patient participation: complete - patient participated  Level of consciousness: awake and alert  Pain management: adequate    Airway patency: patent  Anesthetic complications: No anesthetic complications    Cardiovascular status: acceptable  Respiratory status: acceptable  Hydration status: acceptable    Comments: Blood pressure 121/89, pulse 81, temperature 97.3 °F (36.3 °C), temperature source Temporal, resp. rate 17, height 162.6 cm (64\"), weight 87.1 kg (192 lb), SpO2 96 %.    Pt discharged from PACU based on shea score >8      "

## 2023-04-24 NOTE — H&P
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   • Liver problem    • Myocardial infarction 11/2012   • Stroke     showed up on mri about 5 years ago with no residual   • Visual impairment        Past Surgical History:  Past Surgical History:   Procedure Laterality Date   • ANGIOPLASTY      11/2012   • APPENDECTOMY     • CARDIAC CATHETERIZATION     • COLONOSCOPY     • CORONARY STENT PLACEMENT      x3 per patient   • ENDOSCOPY     • HYSTERECTOMY      2016   • SUBTOTAL HYSTERECTOMY     • TONSILLECTOMY         Family History:  Family History   Problem Relation Age of Onset   • Hyperlipidemia Mother    • Hypertension Mother    • Stroke Mother    • Diabetes Mother    • Arthritis Mother    • Hyperlipidemia Father    • Hypertension Father    • Heart attack Father    • Arthritis Father    • Early death Father    • Alcohol abuse Brother    • Mental illness Maternal Aunt         Father's  sister   • Breast cancer Maternal Aunt    • Colon cancer Neg Hx    • Colon polyps Neg Hx    • Esophageal cancer Neg Hx        Social History:   reports that she quit smoking about 10 years ago. Her smoking use included cigarettes. She has never used smokeless tobacco. She reports current alcohol use. She reports that she does not use drugs.    Medications:   Prior to Admission medications    Medication Sig Start Date End Date Taking? Authorizing Provider   azaTHIOprine (IMURAN) 50 MG tablet Take 1 tablet by mouth Daily.   Yes Mee Kolb MD   cetirizine (zyrTEC) 10 MG tablet Take 1 tablet by mouth As Needed for Allergies.   Yes Mee Kolb MD  "  isosorbide dinitrate (ISORDIL) 30 MG tablet Take 1 tablet by mouth Daily. TAKING 1/2 TABLET DAILY   Yes Provider, MD Mee   omeprazole (priLOSEC) 40 MG capsule TAKE 1 CAPSULE BY MOUTH EVERY DAY 3/23/23  Yes Geraldo Feldman DO   sertraline (ZOLOFT) 100 MG tablet TAKE 1 AND 1/2 TABLETS BY MOUTH EVERY DAY 3/23/23  Yes Geraldo Feldman DO   isosorbide mononitrate (IMDUR) 30 MG 24 hr tablet TAKE 0.5 TABLETS (15 MG) BY MOUTH DAILY IN AM. DO NOT TAKE IF BP < 100 2/23/23   Geraldo Feldman DO   metoprolol succinate XL (TOPROL-XL) 50 MG 24 hr tablet TAKE 1 TABLET BY MOUTH EVERY DAY 3/23/23   Geraldo Feldman, DO   nitroglycerin (Nitrostat) 0.3 MG SL tablet Place 1 tablet under the tongue Every 5 (Five) Minutes As Needed for Chest Pain. Take no more than 3 doses in 15 minutes. 12/20/22   Geraldo Feldman DO   sodium-potassium-magnesium sulfates (Suprep Bowel Prep Kit) 17.5-3.13-1.6 GM/177ML solution oral solution Take 1 bottle by mouth Take As Directed. Take as directed 2/23/23   Tavo Benz APRN   sulfaSALAzine (AZULFIDINE) 500 MG tablet TAKE 2 TABLETS BY MOUTH DAILY 3/23/23   Geraldo Feldman DO   ticagrelor (BRILINTA) 90 MG tablet tablet Take 1 tablet by mouth 2 (Two) Times a Day.    Provider, MD Mee       Allergies:  Bee venom, Beta adrenergic blockers, Clopidogrel, Codeine, Morphine and related, Penicillin g, Shellfish allergy, Shellfish-derived products, Methotrexate, Penicillin g benzathine, Atorvastatin, Hydrocodone, and Hydroxychloroquine    ROS:    General: Weight stable  Resp: No SOA  Cardiovascular: No CP    Objective     Blood pressure 153/92, pulse 75, temperature 97.3 °F (36.3 °C), temperature source Temporal, resp. rate 16, height 162.6 cm (64\"), weight 87.1 kg (192 lb), SpO2 96 %.    Physical Exam   Constitutional: Pt is oriented to person, place, and in no distress.   HENT: Mouth/Throat: Oropharynx is clear.   Cardiovascular: Normal rate, regular rhythm.  "   Pulmonary/Chest: Effort normal. No respiratory distress. No  wheezes.   Abdominal: Soft. Non-distended.  Skin: Skin is warm and dry.   Psychiatric: Mood, memory, affect and judgment appear normal.     Assessment & Plan     Diagnosis:  Polyps/Cirrhosis    Anticipated Surgical Procedure:  E/C    The risks, benefits, and alternatives of this procedure have been discussed with the patient or the responsible party- the patient understands and agrees to proceed.

## 2023-04-24 NOTE — ANESTHESIA PREPROCEDURE EVALUATION
INFECTIOUS DISEASE PROGRESS NOTE    ASSESSMENT:   1. COVID-19 pneumonia  2. Acute hypoxia, improved  3. Pulmonary embolism  4. Hashimoto's thyroiditis  5. Possible superimposed bacterial pneumonia      PLAN:   Now on room air  Continue dexamethasone day 9 of 10  Completed remdesivir x5 days on 10/14  Complete empiric cefepime day 5 of 5 today  Okay to discharge, follow-up with ID as outpatient, we can provide hospitals     Labs and imaging reviewed.  Discussed with Dr. Dean.    Cesar Morales DO  [phone]      -------------------------------------------------------------------------------------------------------------------    SUBJECTIVE:    Weaned to room air  Feeling better  Passed home O2 eval    OBJECTIVE:  Tmax Temp (24hrs), Av.6 °F (36.4 °C), Min:97.3 °F (36.3 °C), Max:98.1 °F (36.7 °C)     Last Vitals   Vitals:    10/18/21 0818   BP: 100/60   Pulse: (!) 51   Resp: 18   Temp: 98.1 °F (36.7 °C)       Gen: NAD, awake, alert  HEENT: anicteric  CV: RRR, normal S1 S2  Pulm: bibasilar crackles, no increased respiratory effort  Abd: soft, non-tender, non-distended, positive bowel sounds  Ext: no edema  Psych: calm, normal mood and affect  Skin: no rash    ANTIMICROBIALS  Cefepime    LAB:  Recent Labs     10/16/21  0308 10/17/21  0526 10/18/21  0601   WBC 11.8* 10.9 9.8   HGB 13.0 12.1 12.8   HCT 38.6 36.2 38.4    210 200   MCV 89.4 88.9 89.1       Recent Labs   Lab 10/18/21  0601 10/17/21  0526 10/16/21  0308   SODIUM 138 137 138   POTASSIUM 4.7 4.0 4.5   CHLORIDE 104 105 106   CO2 30 27 27   BUN 22* 20 26*   CREATININE 0.67 0.73 0.72   GLUCOSE 84 91 109*   CALCIUM 8.7 8.3* 8.4       Microbiology Results     None            RADIOLOGY: images reviewed       Note:  Assessment and Plan have been moved to the top of the screen for ease of the viewer such that the plan can be seen without scrolling to the bottom of the note.    Anesthesia Evaluation     Patient summary reviewed   no history of anesthetic complications:  NPO Solid Status: > 8 hours             Airway   Mallampati: II  Dental    (+) upper dentures    Pulmonary - negative pulmonary ROS   Cardiovascular   Exercise tolerance: excellent (>7 METS)    (+) hypertension, past MI , cardiac stents (3/2022) more than 12 months ago hyperlipidemia,   (-) angina      Neuro/Psych  (+) TIA,    GI/Hepatic/Renal/Endo    (+)  GERD,  liver disease,     Musculoskeletal     Abdominal    Substance History      OB/GYN          Other                        Anesthesia Plan    ASA 3     MAC       Anesthetic plan, risks, benefits, and alternatives have been provided, discussed and informed consent has been obtained with: patient.        CODE STATUS:

## 2023-04-25 LAB
CYTO UR: NORMAL
LAB AP CASE REPORT: NORMAL
Lab: NORMAL
PATH REPORT.FINAL DX SPEC: NORMAL
PATH REPORT.GROSS SPEC: NORMAL

## 2023-05-01 ENCOUNTER — OFFICE VISIT (OUTPATIENT)
Dept: FAMILY MEDICINE CLINIC | Facility: CLINIC | Age: 58
End: 2023-05-01
Payer: MEDICARE

## 2023-05-01 VITALS
DIASTOLIC BLOOD PRESSURE: 74 MMHG | HEIGHT: 64 IN | TEMPERATURE: 97 F | HEART RATE: 66 BPM | WEIGHT: 195.6 LBS | SYSTOLIC BLOOD PRESSURE: 130 MMHG | BODY MASS INDEX: 33.39 KG/M2 | OXYGEN SATURATION: 97 %

## 2023-05-01 DIAGNOSIS — Z13.220 SCREENING CHOLESTEROL LEVEL: ICD-10-CM

## 2023-05-01 DIAGNOSIS — M05.79 RHEUMATOID ARTHRITIS WITH RHEUMATOID FACTOR OF MULTIPLE SITES WITHOUT ORGAN OR SYSTEMS INVOLVEMENT: ICD-10-CM

## 2023-05-01 DIAGNOSIS — Z23 NEED FOR SHINGLES VACCINE: ICD-10-CM

## 2023-05-01 DIAGNOSIS — Z00.00 MEDICARE ANNUAL WELLNESS VISIT, SUBSEQUENT: Primary | ICD-10-CM

## 2023-05-01 DIAGNOSIS — I25.10 CORONARY ARTERY DISEASE INVOLVING NATIVE HEART WITHOUT ANGINA PECTORIS, UNSPECIFIED VESSEL OR LESION TYPE: ICD-10-CM

## 2023-05-01 NOTE — PROGRESS NOTES
The ABCs of the Annual Wellness Visit  Subsequent Medicare Wellness Visit    Subjective    Alessandra Baker is a 58 y.o. female who presents for a Subsequent Medicare Wellness Visit.    The following portions of the patient's history were reviewed and   updated as appropriate: allergies, current medications, past family history, past medical history, past social history, past surgical history and problem list.    Compared to one year ago, the patient feels her physical   health is the same.    Compared to one year ago, the patient feels her mental   health is the same.    Recent Hospitalizations:  She was not admitted to the hospital during the last year.       Current Medical Providers:  Patient Care Team:  Geraldo Feldman DO as PCP - General (Family Medicine)  Rick Tidwell DO as Consulting Physician (Gastroenterology)    Outpatient Medications Prior to Visit   Medication Sig Dispense Refill   • azaTHIOprine (IMURAN) 50 MG tablet Take 1 tablet by mouth Daily.     • cetirizine (zyrTEC) 10 MG tablet Take 1 tablet by mouth As Needed for Allergies.     • isosorbide mononitrate (IMDUR) 30 MG 24 hr tablet TAKE 0.5 TABLETS (15 MG) BY MOUTH DAILY IN AM. DO NOT TAKE IF BP < 100 (Patient not taking: Reported on 5/4/2023) 15 tablet 7   • nitroglycerin (Nitrostat) 0.3 MG SL tablet Place 1 tablet under the tongue Every 5 (Five) Minutes As Needed for Chest Pain. Take no more than 3 doses in 15 minutes. 30 tablet 12   • omeprazole (priLOSEC) 40 MG capsule TAKE 1 CAPSULE BY MOUTH EVERY DAY 30 capsule 1   • sertraline (ZOLOFT) 100 MG tablet TAKE 1 AND 1/2 TABLETS BY MOUTH EVERY  tablet 0   • sulfaSALAzine (AZULFIDINE) 500 MG tablet TAKE 2 TABLETS BY MOUTH DAILY 60 tablet 11   • ticagrelor (BRILINTA) 90 MG tablet tablet Take 1 tablet by mouth 2 (Two) Times a Day.     • metoprolol succinate XL (TOPROL-XL) 50 MG 24 hr tablet TAKE 1 TABLET BY MOUTH EVERY DAY 90 tablet 0   • isosorbide dinitrate (ISORDIL) 30 MG tablet Take 1  "tablet by mouth Daily. TAKING 1/2 TABLET DAILY     • sodium-potassium-magnesium sulfates (Suprep Bowel Prep Kit) 17.5-3.13-1.6 GM/177ML solution oral solution Take 1 bottle by mouth Take As Directed. Take as directed (Patient not taking: Reported on 5/1/2023) 177 mL 0     No facility-administered medications prior to visit.       No opioid medication identified on active medication list. I have reviewed chart for other potential  high risk medication/s and harmful drug interactions in the elderly.          Aspirin is not on active medication list.  Aspirin use is contraindicated for this patient due to: history of bleeding.  .    Patient Active Problem List   Diagnosis   • History of colon polyps   • Esophageal varices without bleeding     Advance Care Planning   Advance Care Planning     Advance Directive is not on file.  ACP discussion was held with the patient during this visit. Patient does not have an advance directive, information provided.     Objective    Vitals:    05/01/23 1049   BP: 130/74   BP Location: Left arm   Patient Position: Sitting   Cuff Size: Adult   Pulse: 66   Temp: 97 °F (36.1 °C)   TempSrc: Temporal   SpO2: 97%   Weight: 88.7 kg (195 lb 9.6 oz)   Height: 162.6 cm (64\")   PainSc: 0-No pain     Estimated body mass index is 33.57 kg/m² as calculated from the following:    Height as of this encounter: 162.6 cm (64\").    Weight as of this encounter: 88.7 kg (195 lb 9.6 oz).    BMI is >= 30 and <35. (Class 1 Obesity). The following options were offered after discussion;: exercise counseling/recommendations and nutrition counseling/recommendations      Does the patient have evidence of cognitive impairment? No    Lab Results   Component Value Date    TRIG 265 (H) 05/02/2023    HDL 24 (L) 05/02/2023     (H) 05/02/2023    VLDL 48 (H) 05/02/2023        HEALTH RISK ASSESSMENT    Smoking Status:  Social History     Tobacco Use   Smoking Status Former   • Packs/day: 1.00   • Years: 30.00   • Pack " years: 30.00   • Types: Cigarettes   • Start date:    • Quit date:    • Years since quitting: 10.3   • Passive exposure: Past   Smokeless Tobacco Never   Tobacco Comments    Not somking now 10 years     Alcohol Consumption:  Social History     Substance and Sexual Activity   Alcohol Use Not Currently    Comment: QUIT 2013   2 a year     Fall Risk Screen:    LALITO Fall Risk Assessment was completed, and patient is at LOW risk for falls.Assessment completed on:2023    Depression Screenin/1/2023    10:54 AM   PHQ-2/PHQ-9 Depression Screening   Little Interest or Pleasure in Doing Things 1-->several days   Feeling Down, Depressed or Hopeless 1-->several days   Trouble Falling or Staying Asleep, or Sleeping Too Much 3-->nearly every day   Feeling Tired or Having Little Energy 2-->more than half the days   Poor Appetite or Overeating 0-->not at all   Feeling Bad about Yourself - or that You are a Failure or Have Let Yourself or Your Family Down 0-->not at all   Trouble Concentrating on Things, Such as Reading the Newspaper or Watching Television 2-->more than half the days   Moving or Speaking So Slowly that Other People Could Have Noticed? Or the Opposite - Being So Fidgety 1-->several days   PHQ-9: Brief Depression Severity Measure Score 10   If You Checked Off Any Problems, How Difficult Have These Problems Made It For You to Do Your Work, Take Care of Things at Home, or Get Along with Other People? somewhat difficult       Health Habits and Functional and Cognitive Screenin/1/2023    10:56 AM   Functional & Cognitive Status   Do you have difficulty preparing food and eating? No   Do you have difficulty bathing yourself, getting dressed or grooming yourself? No   Do you have difficulty using the toilet? No   Do you have difficulty moving around from place to place? No   Do you have trouble with steps or getting out of a bed or a chair? No   Current Diet Limited Junk Food   Dental  Exam Up to date   Eye Exam Up to date   Exercise (times per week) 2 times per week   Current Exercises Include Walking;Yard Work   Do you need help using the phone?  No   Are you deaf or do you have serious difficulty hearing?  No   Do you need help with transportation? No   Do you need help shopping? No   Do you need help preparing meals?  No   Do you need help with housework?  No   Do you need help with laundry? No   Do you need help taking your medications? No   Do you need help managing money? No   Do you ever drive or ride in a car without wearing a seat belt? No   Have you felt unusual stress, anger or loneliness in the last month? Yes   Who do you live with? Spouse   If you need help, do you have trouble finding someone available to you? No   Have you been bothered in the last four weeks by sexual problems? No   Do you have difficulty concentrating, remembering or making decisions? Yes       Age-appropriate Screening Schedule:  Refer to the list below for future screening recommendations based on patient's age, sex and/or medical conditions. Orders for these recommended tests are listed in the plan section. The patient has been provided with a written plan.    Health Maintenance   Topic Date Due   • Hepatitis B (1 of 3 - 3-dose series) Never done   • ZOSTER VACCINE (1 of 2) Never done   • LUNG CANCER SCREENING  Never done   • COVID-19 Vaccine (3 - Moderna risk series) 09/13/2022   • ANNUAL WELLNESS VISIT  Never done   • INFLUENZA VACCINE  08/01/2023   • LIPID PANEL  05/02/2024   • TDAP/TD VACCINES (2 - Td or Tdap) 11/04/2024   • MAMMOGRAM  03/08/2025   • COLORECTAL CANCER SCREENING  04/24/2033   • HEPATITIS C SCREENING  Completed   • Pneumococcal Vaccine 0-64  Completed                  CMS Preventative Services Quick Reference  Risk Factors Identified During Encounter  Immunizations Discussed/Encouraged: Shingrix  The above risks/problems have been discussed with the patient.  Pertinent information has been  "shared with the patient in the After Visit Summary.  An After Visit Summary and PPPS were made available to the patient.    Follow Up:   Next Medicare Wellness visit to be scheduled in 1 year.       Additional E&M Note during same encounter follows:  Patient has multiple medical problems which are significant and separately identifiable that require additional work above and beyond the Medicare Wellness Visit.      Chief Complaint  Medicare Wellness-Initial Visit (PHQ-9=10, EDGAR-7=10, currently on sertraline 100 mg taking 1.5 tablets daily, wanting to discuss medication. /Requesting cardiology referral )    Subjective        HPI  Alessandra Baker is also being seen today for   History of coronary artery disease, needs referral to cardiology  Has not had any recent labs for monitoring of inflammation in the setting of rheumatoid arthritis  Currently feels well today, no angina, pain currently well controlled  Mood stable on current dose of Zoloft         Objective   Vital Signs:  /74 (BP Location: Left arm, Patient Position: Sitting, Cuff Size: Adult)   Pulse 66   Temp 97 °F (36.1 °C) (Temporal)   Ht 162.6 cm (64\")   Wt 88.7 kg (195 lb 9.6 oz)   SpO2 97%   BMI 33.57 kg/m²     Physical Exam  Vitals and nursing note reviewed.   Constitutional:       General: She is not in acute distress.     Appearance: She is not diaphoretic.   HENT:      Head: Normocephalic and atraumatic.      Nose: Nose normal.   Eyes:      General: No scleral icterus.        Right eye: No discharge.         Left eye: No discharge.      Conjunctiva/sclera: Conjunctivae normal.   Neck:      Trachea: No tracheal deviation.   Pulmonary:      Effort: Pulmonary effort is normal.   Skin:     General: Skin is warm and dry.      Coloration: Skin is not pale.   Neurological:      Mental Status: She is alert and oriented to person, place, and time.   Psychiatric:         Behavior: Behavior normal.         Thought Content: Thought content normal.         " Judgment: Judgment normal.                         Assessment and Plan   Diagnoses and all orders for this visit:    1. Medicare annual wellness visit, subsequent (Primary)    2. Need for shingles vaccine  -     Zoster Vac Recomb Adjuvanted 50 MCG/0.5ML reconstituted suspension; Inject 0.5 mL into the appropriate muscle as directed by prescriber 1 (One) Time for 1 dose.  Dispense: 1 each; Refill: 0    3. Screening cholesterol level  -     Lipid Panel; Future    4. Coronary artery disease involving native heart without angina pectoris, unspecified vessel or lesion type  -     Ambulatory Referral to Cardiology    5. Rheumatoid arthritis with rheumatoid factor of multiple sites without organ or systems involvement  -     CBC No Differential; Future  -     Comprehensive Metabolic Panel; Future  -     Sedimentation rate, automated; Future  -     C-reactive protein; Future             Follow Up   Return in about 3 months (around 8/1/2023).  Patient was given instructions and counseling regarding her condition or for health maintenance advice. Please see specific information pulled into the AVS if appropriate.

## 2023-05-02 ENCOUNTER — LAB (OUTPATIENT)
Dept: LAB | Facility: HOSPITAL | Age: 58
End: 2023-05-02
Payer: MEDICARE

## 2023-05-02 DIAGNOSIS — M05.79 RHEUMATOID ARTHRITIS WITH RHEUMATOID FACTOR OF MULTIPLE SITES WITHOUT ORGAN OR SYSTEMS INVOLVEMENT: ICD-10-CM

## 2023-05-02 DIAGNOSIS — Z13.220 SCREENING CHOLESTEROL LEVEL: ICD-10-CM

## 2023-05-02 LAB
ALBUMIN SERPL-MCNC: 4.6 G/DL (ref 3.5–5.2)
ALBUMIN/GLOB SERPL: 1.6 G/DL
ALP SERPL-CCNC: 87 U/L (ref 39–117)
ALT SERPL W P-5'-P-CCNC: 8 U/L (ref 1–33)
ANION GAP SERPL CALCULATED.3IONS-SCNC: 13 MMOL/L (ref 5–15)
AST SERPL-CCNC: 14 U/L (ref 1–32)
BILIRUB SERPL-MCNC: 0.4 MG/DL (ref 0–1.2)
BUN SERPL-MCNC: 19 MG/DL (ref 6–20)
BUN/CREAT SERPL: 15.8 (ref 7–25)
CALCIUM SPEC-SCNC: 9.4 MG/DL (ref 8.6–10.5)
CHLORIDE SERPL-SCNC: 105 MMOL/L (ref 98–107)
CHOLEST SERPL-MCNC: 200 MG/DL (ref 0–200)
CO2 SERPL-SCNC: 24 MMOL/L (ref 22–29)
CREAT SERPL-MCNC: 1.2 MG/DL (ref 0.57–1)
CRP SERPL-MCNC: 0.71 MG/DL (ref 0–0.5)
DEPRECATED RDW RBC AUTO: 47.8 FL (ref 37–54)
EGFRCR SERPLBLD CKD-EPI 2021: 52.3 ML/MIN/1.73
ERYTHROCYTE [DISTWIDTH] IN BLOOD BY AUTOMATED COUNT: 14.9 % (ref 12.3–15.4)
ERYTHROCYTE [SEDIMENTATION RATE] IN BLOOD: 15 MM/HR (ref 0–30)
GLOBULIN UR ELPH-MCNC: 2.9 GM/DL
GLUCOSE SERPL-MCNC: 95 MG/DL (ref 65–99)
HCT VFR BLD AUTO: 43.8 % (ref 34–46.6)
HDLC SERPL-MCNC: 24 MG/DL (ref 40–60)
HGB BLD-MCNC: 13.6 G/DL (ref 12–15.9)
LDLC SERPL CALC-MCNC: 128 MG/DL (ref 0–100)
LDLC/HDLC SERPL: 5.13 {RATIO}
MCH RBC QN AUTO: 27.5 PG (ref 26.6–33)
MCHC RBC AUTO-ENTMCNC: 31.1 G/DL (ref 31.5–35.7)
MCV RBC AUTO: 88.7 FL (ref 79–97)
PLATELET # BLD AUTO: 126 10*3/MM3 (ref 140–450)
PMV BLD AUTO: 10.1 FL (ref 6–12)
POTASSIUM SERPL-SCNC: 4.4 MMOL/L (ref 3.5–5.2)
PROT SERPL-MCNC: 7.5 G/DL (ref 6–8.5)
RBC # BLD AUTO: 4.94 10*6/MM3 (ref 3.77–5.28)
SODIUM SERPL-SCNC: 142 MMOL/L (ref 136–145)
TRIGL SERPL-MCNC: 265 MG/DL (ref 0–150)
VLDLC SERPL-MCNC: 48 MG/DL (ref 5–40)
WBC NRBC COR # BLD: 3.98 10*3/MM3 (ref 3.4–10.8)

## 2023-05-02 PROCEDURE — 80061 LIPID PANEL: CPT

## 2023-05-02 PROCEDURE — 80053 COMPREHEN METABOLIC PANEL: CPT

## 2023-05-02 PROCEDURE — 86140 C-REACTIVE PROTEIN: CPT

## 2023-05-02 PROCEDURE — 85027 COMPLETE CBC AUTOMATED: CPT

## 2023-05-02 PROCEDURE — 85652 RBC SED RATE AUTOMATED: CPT

## 2023-05-02 PROCEDURE — 36415 COLL VENOUS BLD VENIPUNCTURE: CPT

## 2023-05-04 ENCOUNTER — OFFICE VISIT (OUTPATIENT)
Dept: CARDIOLOGY | Facility: CLINIC | Age: 58
End: 2023-05-04
Payer: MEDICARE

## 2023-05-04 VITALS
SYSTOLIC BLOOD PRESSURE: 144 MMHG | BODY MASS INDEX: 32.95 KG/M2 | HEART RATE: 62 BPM | HEIGHT: 64 IN | WEIGHT: 193 LBS | DIASTOLIC BLOOD PRESSURE: 80 MMHG | OXYGEN SATURATION: 98 %

## 2023-05-04 DIAGNOSIS — I85.00 ESOPHAGEAL VARICES WITHOUT BLEEDING, UNSPECIFIED ESOPHAGEAL VARICES TYPE: ICD-10-CM

## 2023-05-04 DIAGNOSIS — I25.10 CORONARY ARTERY DISEASE INVOLVING NATIVE CORONARY ARTERY OF NATIVE HEART WITHOUT ANGINA PECTORIS: Primary | ICD-10-CM

## 2023-05-04 DIAGNOSIS — Z87.891 FORMER TOBACCO USE: ICD-10-CM

## 2023-05-04 DIAGNOSIS — E78.2 MIXED HYPERLIPIDEMIA: ICD-10-CM

## 2023-05-04 DIAGNOSIS — I10 PRIMARY HYPERTENSION: ICD-10-CM

## 2023-05-04 RX ORDER — CARVEDILOL 6.25 MG/1
6.25 TABLET ORAL 2 TIMES DAILY
Qty: 60 TABLET | Refills: 11 | Status: SHIPPED | OUTPATIENT
Start: 2023-05-04

## 2023-05-04 RX ORDER — ASPIRIN 81 MG/1
81 TABLET ORAL DAILY
Qty: 90 TABLET | Refills: 3 | Status: SHIPPED | OUTPATIENT
Start: 2023-05-04

## 2023-05-04 RX ORDER — EZETIMIBE 10 MG/1
10 TABLET ORAL DAILY
Qty: 30 TABLET | Refills: 11 | Status: SHIPPED | OUTPATIENT
Start: 2023-05-04

## 2023-05-04 NOTE — PROGRESS NOTES
Reason For Visit:  Establish new cardiology care    Subjective        Alessandra Baker is a 58 y.o. female with a PMH of CAD with prior MI and multiple stents, HTN, HLD, RA, cirrhosis with grade 1 esophageal varices who is referred to establish cardiology care.    Prior records from cardiology are reviewed from care everywhere with pertinent findings included in PMH below.    Patient reports that she has overall done well since her most recent stent a little over a year ago.  She denies any significant anginal symptoms.  She has no chest pain, shortness of breath, palpitations, orthopnea, or significant peripheral edema.  She gets occasional orthostatic lightheadedness.  She does mention that she stopped aspirin in January although is not sure why or who told her to.  She has not been checking her BP at home.  She reports consistent intolerance to statins in the past.  She has not been interested in Repatha due to concern that she cannot give herself injections.    ROS: Pertinent findings are included above.    Pertinent PMH  - CAD with prior MI and multiple stents  - 11/2012: NSTEMI with cath revealing 20% LMCA stenosis, no significant LAD stenosis, 70% proximal RCA stenosis, 90% distal RCA stenosis; underwent ZACHARY to both the proximal and distal RCA  - 2015: Cath with 50% proximal RCA ISR (normal IFR) and scattered 5% narrowings in the proximal and mid LAD (normal FFR)  - 2019: Admitted for chest pain, cath showed 75% mid LAD stenosis, 50% mid circumflex stenosis, and 80 to 90% proximal RCA ISR with patent distal RCA stent.  Underwent ZACHARY in proximal LAD and ZACHARY inside prior stent for proximal RCA.  - 4/2022: Cath showed ISR of proximal RCA that was managed with another ZACHARY.  There was moderate flow-limiting mid LAD ISR (IFR 0.88) involving a moderate-sized diagonal branch that was managed medically.  Patient did have an ED visit the day after cath for angina; patient was switched from carvedilol to metoprolol and  "started on Imdur.  - HTN  - HLD with statin intolerance  - Rheumatoid arthritis  - Cirrhosis complicated by grade 1 esophageal varices    Pertinent past medical, surgical, family, and social history were reviewed and updated in the EMR.      Current Outpatient Medications:     azaTHIOprine (IMURAN) 50 MG tablet, Take 1 tablet by mouth Daily., Disp: , Rfl:     cetirizine (zyrTEC) 10 MG tablet, Take 1 tablet by mouth As Needed for Allergies., Disp: , Rfl:     isosorbide dinitrate (ISORDIL) 30 MG tablet, Take 1 tablet by mouth Daily. TAKING 1/2 TABLET DAILY (Patient not taking: Reported on 5/1/2023), Disp: , Rfl:     isosorbide mononitrate (IMDUR) 30 MG 24 hr tablet, TAKE 0.5 TABLETS (15 MG) BY MOUTH DAILY IN AM. DO NOT TAKE IF BP < 100, Disp: 15 tablet, Rfl: 7    metoprolol succinate XL (TOPROL-XL) 50 MG 24 hr tablet, TAKE 1 TABLET BY MOUTH EVERY DAY, Disp: 90 tablet, Rfl: 0    nitroglycerin (Nitrostat) 0.3 MG SL tablet, Place 1 tablet under the tongue Every 5 (Five) Minutes As Needed for Chest Pain. Take no more than 3 doses in 15 minutes., Disp: 30 tablet, Rfl: 12    omeprazole (priLOSEC) 40 MG capsule, TAKE 1 CAPSULE BY MOUTH EVERY DAY, Disp: 30 capsule, Rfl: 1    sertraline (ZOLOFT) 100 MG tablet, TAKE 1 AND 1/2 TABLETS BY MOUTH EVERY DAY, Disp: 135 tablet, Rfl: 0    sodium-potassium-magnesium sulfates (Suprep Bowel Prep Kit) 17.5-3.13-1.6 GM/177ML solution oral solution, Take 1 bottle by mouth Take As Directed. Take as directed (Patient not taking: Reported on 5/1/2023), Disp: 177 mL, Rfl: 0    sulfaSALAzine (AZULFIDINE) 500 MG tablet, TAKE 2 TABLETS BY MOUTH DAILY, Disp: 60 tablet, Rfl: 11    ticagrelor (BRILINTA) 90 MG tablet tablet, Take 1 tablet by mouth 2 (Two) Times a Day., Disp: , Rfl:      Objective   Vital Signs:  /80   Pulse 62   Ht 162.6 cm (64\")   Wt 87.5 kg (193 lb)   SpO2 98%   BMI 33.13 kg/m²   Estimated body mass index is 33.13 kg/m² as calculated from the following:    Height as of " "this encounter: 162.6 cm (64\").    Weight as of this encounter: 87.5 kg (193 lb).      Constitutional:       Appearance: Healthy appearance. Not in distress.   Neck:      Vascular: JVD normal.   Pulmonary:      Effort: Pulmonary effort is normal.      Breath sounds: Normal breath sounds.   Cardiovascular:      Normal rate. Regular rhythm.      Murmurs: There is a grade 2/6 systolic murmur at the URSB.      No gallop.  No click. No rub.   Abdominal:      General: There is no distension.      Palpations: Abdomen is soft.      Tenderness: There is no abdominal tenderness.   Skin:     General: Skin is warm and dry.   Neurological:      Mental Status: Alert and oriented to person, place and time.      Result Review :  The following data was reviewed by: Ricky Slade MD on 05/04/2023:  CMP   CMP          12/20/2022    12:18 1/31/2023    12:01 5/2/2023    08:46   CMP   Glucose 94   80   95     BUN 13   13   19     Creatinine 1.16   1.13   1.20     EGFR 54.8   56.5   52.3     Sodium 139   141   142     Potassium 4.6   4.8   4.4     Chloride 104   106   105     Calcium 9.4   9.5   9.4     Total Protein 7.5    7.5     Albumin 4.60    4.6     Globulin 2.9    2.9     Total Bilirubin 0.4    0.4     Alkaline Phosphatase 88    87     AST (SGOT) 18    14     ALT (SGPT) 8    8     Albumin/Globulin Ratio 1.6    1.6     BUN/Creatinine Ratio 11.2   11.5   15.8     Anion Gap 9.3   8.0   13.0       CBC   CBC          12/20/2022    12:18 5/2/2023    08:46   CBC   WBC 4.89   3.98     RBC 4.99   4.94     Hemoglobin 14.1   13.6     Hematocrit 44.2   43.8     MCV 88.6   88.7     MCH 28.3   27.5     MCHC 31.9   31.1     RDW 15.4   14.9     Platelets 138   126       Lipid Panel   Lipid Panel          5/2/2023    08:46   Lipid Panel   Total Cholesterol 200     Triglycerides 265     HDL Cholesterol 24     VLDL Cholesterol 48     LDL Cholesterol  128     LDL/HDL Ratio 5.13       A1c   Most Recent A1C          12/20/2022    12:18   HGBA1C Most " Recent   Hemoglobin A1C 5.00            ECG 12 Lead    Date/Time: 5/4/2023 3:10 PM  Performed by: Ricky Slade MD  Authorized by: Ricky Slade MD   Previous ECG: no previous ECG available  Rhythm: sinus rhythm  Other findings comments: Nonspecific t-wave abnormality  Clinical impression comment: borderline ECG         4/23/22 Echo:  Left ventricular systolic function is normal. The visual ejection fraction is 60-65%. No regional wall motion abnormalities noted.  The right ventricle is normal in size and function. RV apical hypokinesis.  No hemodynamically significant valvular abnormalities on 2D or color flow imaging.  Compared to the prior study in 2015, LV systolic function is unchanged.      Assessment and Plan   Diagnoses and all orders for this visit:    1. Coronary artery disease involving native coronary artery of native heart without angina pectoris (Primary)  -BP and lipid management as noted below  - Continue ticagrelor 90 mg twice daily  - Restart aspirin 81 mg daily  - Plan for long-term DAPT for now given an adequate lipid management  - On beta-blocker as noted below  - Continue Imdur 30 mg daily    2. Primary hypertension  - BP goal less than 130/80.  Recent BP trend in clinic is above goal.  - Adjusting from metoprolol to equivalent carvedilol, which may allow slightly better BP control.  Patient to keep BP log at home and bring it to follow-up; consider losartan if BP remains above goal (reports intolerance to lisinopril in the past due to cough)    3. Mixed hyperlipidemia  -Reports statin intolerance.  Not interested in Repatha at this time, and unsure about Leqvio given concerns about injections.  Plan to start Zetia 10 mg daily for now and readdress Leqvio at follow-up.  Also would be appropriate for Vascepa given elevated triglycerides.  Agreed on plan for either Vascepa or Leqvio at follow-up pending tolerance of other medication adjustments and patient preference.  LDL goal is less than  70    4. Esophageal varices without bleeding, unspecified esophageal varices type  -Transitioning to carvedilol that can help with variceal bleeding prophylaxis    5. Former tobacco use  -Encouraged continued smoking abstinence    Other orders  -     carvedilol (COREG) 6.25 MG tablet; Take 1 tablet by mouth 2 (Two) Times a Day.  Dispense: 60 tablet; Refill: 11  -     ezetimibe (ZETIA) 10 MG tablet; Take 1 tablet by mouth Daily.  Dispense: 30 tablet; Refill: 11  -     aspirin 81 MG EC tablet; Take 1 tablet by mouth Daily.  Dispense: 90 tablet; Refill: 3    Follow Up   Return in about 6 weeks (around 6/15/2023).  Patient was given instructions and counseling regarding her condition or for health maintenance advice. Please see specific information pulled into the AVS if appropriate.       EMR Dragon/Transcription disclaimer: Much of this encounter note is an electronic transcription/translation of spoken language to printed text. The electronic translation of spoken language may permit erroneous, or at times, nonsensical words or phrases to be inadvertently transcribed; although I have reviewed the note for such errors, some may still exist.

## 2023-05-07 DIAGNOSIS — M05.79 RHEUMATOID ARTHRITIS INVOLVING MULTIPLE SITES WITH POSITIVE RHEUMATOID FACTOR (H): ICD-10-CM

## 2023-05-10 RX ORDER — AZATHIOPRINE 50 MG/1
TABLET ORAL
Qty: 60 TABLET | Refills: 0 | Status: SHIPPED | OUTPATIENT
Start: 2023-05-10 | End: 2023-06-14

## 2023-05-10 NOTE — TELEPHONE ENCOUNTER
azaTHIOprine (IMURAN) 50 MG tablet 60 tablet 0 3/24/2023         Last Written Prescription Date:  3-  Last Fill Quantity: 60,   # refills: 0  Last Office Visit: 8-  Future Office visit:  8-    CBC RESULTS: Recent Labs   Lab Test 08/26/22  0954   WBC 3.9*   RBC 4.55   HGB 12.8   HCT 39.7   MCV 87   MCH 28.1   MCHC 32.2   RDW 15.7*   *       Creatinine   Date Value Ref Range Status   08/26/2022 1.21 (H) 0.52 - 1.04 mg/dL Final   03/24/2019 1.02 0.52 - 1.04 mg/dL Final   ]    Liver Function Studies -   Recent Labs   Lab Test 08/26/22  0954 06/09/22  1245   PROTTOTAL  --  7.4   ALBUMIN 4.0 3.8   BILITOTAL  --  0.5   ALKPHOS  --  74   AST 20 17   ALT 19 14       Routing refill request to provider for review/approval because:  Not on protocol.  LABS ARE OVERDUE      Kathleen M Doege RN

## 2023-05-13 DIAGNOSIS — Z78.9 STATIN INTOLERANCE: ICD-10-CM

## 2023-05-13 DIAGNOSIS — I25.10 CORONARY ARTERY DISEASE INVOLVING NATIVE HEART WITHOUT ANGINA PECTORIS, UNSPECIFIED VESSEL OR LESION TYPE: Primary | ICD-10-CM

## 2023-05-13 DIAGNOSIS — E78.5 HYPERLIPIDEMIA, UNSPECIFIED HYPERLIPIDEMIA TYPE: ICD-10-CM

## 2023-05-15 ENCOUNTER — TELEPHONE (OUTPATIENT)
Dept: FAMILY MEDICINE CLINIC | Facility: CLINIC | Age: 58
End: 2023-05-15
Payer: MEDICARE

## 2023-05-15 ENCOUNTER — TELEPHONE (OUTPATIENT)
Dept: GASTROENTEROLOGY | Facility: CLINIC | Age: 58
End: 2023-05-15
Payer: MEDICARE

## 2023-05-15 DIAGNOSIS — I25.10 CORONARY ARTERY DISEASE INVOLVING NATIVE CORONARY ARTERY OF NATIVE HEART, UNSPECIFIED WHETHER ANGINA PRESENT: Primary | ICD-10-CM

## 2023-05-15 DIAGNOSIS — F32.A DEPRESSION, UNSPECIFIED DEPRESSION TYPE: ICD-10-CM

## 2023-05-15 RX ORDER — SERTRALINE HYDROCHLORIDE 100 MG/1
TABLET, FILM COATED ORAL
Qty: 135 TABLET | Refills: 0 | Status: SHIPPED | OUTPATIENT
Start: 2023-05-15

## 2023-05-15 NOTE — TELEPHONE ENCOUNTER
Caller: Alessandra Baker    Relationship: Self    Best call back number: [phone]    What medications are you currently taking:   Current Outpatient Medications on File Prior to Visit   Medication Sig Dispense Refill   • aspirin 81 MG EC tablet Take 1 tablet by mouth Daily. 90 tablet 3   • azaTHIOprine (IMURAN) 50 MG tablet Take 1 tablet by mouth Daily.     • carvedilol (COREG) 6.25 MG tablet Take 1 tablet by mouth 2 (Two) Times a Day. 60 tablet 11   • cetirizine (zyrTEC) 10 MG tablet Take 1 tablet by mouth As Needed for Allergies.     • Evolocumab (REPATHA) solution prefilled syringe injection Inject 1 mL under the skin into the appropriate area as directed Every 14 (Fourteen) Days. 2 mL 11   • ezetimibe (ZETIA) 10 MG tablet Take 1 tablet by mouth Daily. 30 tablet 11   • isosorbide mononitrate (IMDUR) 30 MG 24 hr tablet TAKE 0.5 TABLETS (15 MG) BY MOUTH DAILY IN AM. DO NOT TAKE IF BP < 100 (Patient not taking: Reported on 5/4/2023) 15 tablet 7   • nitroglycerin (Nitrostat) 0.3 MG SL tablet Place 1 tablet under the tongue Every 5 (Five) Minutes As Needed for Chest Pain. Take no more than 3 doses in 15 minutes. 30 tablet 12   • omeprazole (priLOSEC) 40 MG capsule TAKE 1 CAPSULE BY MOUTH EVERY DAY 30 capsule 1   • sertraline (ZOLOFT) 100 MG tablet TAKE 1 AND 1/2 TABLETS BY MOUTH EVERY  tablet 0   • sulfaSALAzine (AZULFIDINE) 500 MG tablet TAKE 2 TABLETS BY MOUTH DAILY 60 tablet 11   • ticagrelor (BRILINTA) 90 MG tablet tablet Take 1 tablet by mouth 2 (Two) Times a Day.       No current facility-administered medications on file prior to visit.      Which medication are you concerned about: ticagrelor (BRILINTA) 90 MG tablet tablet    What are your concerns: PATIENT STATES HER CARDIOLOGIST IS UNABLE TO REFILL THIS MEDICATION DUE TO IT BEING OVER A YEAR OLD.         
Rx sent per protocol  
Spontaneous, unlabored and symmetrical

## 2023-05-17 DIAGNOSIS — I20.89 STABLE ANGINA PECTORIS (H): ICD-10-CM

## 2023-05-17 NOTE — TELEPHONE ENCOUNTER
[facility] Refill Guideline reviewed.  Medication meets criteria for refill. Patient is due for follow-up. No appointment is scheduled. Second refill letter sent.

## 2023-06-11 DIAGNOSIS — M05.79 RHEUMATOID ARTHRITIS INVOLVING MULTIPLE SITES WITH POSITIVE RHEUMATOID FACTOR (H): ICD-10-CM

## 2023-06-14 RX ORDER — AZATHIOPRINE 50 MG/1
100 TABLET ORAL DAILY
Qty: 180 TABLET | Refills: 0 | Status: SHIPPED | OUTPATIENT
Start: 2023-06-14 | End: 2023-09-15

## 2023-06-14 NOTE — TELEPHONE ENCOUNTER
Medication/Dose: azaTHIOprine (IMURAN) 50 MG tablet    Last Written : 5/10/23  Last Quantity: 60, # refills: 0  Last Office Visit :  8/18/22  Pending appointment:     Aug 10, 2023 12:30 PM  Return Visit with Ana Richey MD  [facility] ([facility] [place] ) [address] [address] [phone]      The following results were copied and pasted from Care Everywhere:      [facility]  Outside Information      Contains abnormal data Comprehensive Metabolic Panel  Order: [id number]   Ref Range & Units 1 mo ago   Glucose 65 - 99 mg/dL 95    BUN 6 - 20 mg/dL 19    Creatinine 0.57 - 1.00 mg/dL 1.20 High     Sodium 136 - 145 mmol/L 142    Potassium 3.5 - 5.2 mmol/L 4.4    Chloride 98 - 107 mmol/L 105    CO2 22.0 - 29.0 mmol/L 24.0    Calcium 8.6 - 10.5 mg/dL 9.4    Total Protein 6.0 - 8.5 g/dL 7.5    Albumin 3.5 - 5.2 g/dL 4.6    ALT (SGPT) 1 - 33 U/L 8    AST (SGOT) 1 - 32 U/L 14    Alkaline Phosphatase 39 - 117 U/L 87    Total Bilirubin 0.0 - 1.2 mg/dL 0.4    Globulin gm/dL 2.9    A/G Ratio g/dL 1.6    BUN/Creatinine Ratio 7.0 - 25.0 15.8    Anion Gap 5.0 - 15.0 mmol/L 13.0    eGFR >60.0 mL/min/1.73 52.3 Low     Resulting Agency  [facility]   Narrative  Performed by [facility]  GFR Normal >60   Chronic Kidney Disease <60   Kidney Failure <15  Specimen Collected: 05/02/23  8:46 AM Last Resulted: 05/02/23  9:22 AM   Received From: [facility]  Result Received: 06/14/23  8:09 AM         Contains abnormal data CBC No Differential  Order: [id number]   suggestion  Information displayed in this report may not trend or trigger automated decision support.      Ref Range & Units 1 mo ago   WBC 3.40 - 10.80 10*3/mm3 3.98    RBC 3.77 - 5.28 10*6/mm3 4.94    Hemoglobin 12.0 - 15.9 g/dL 13.6    Hematocrit 34.0 - 46.6 % 43.8    MCV 79.0 - 97.0 fL 88.7    MCH 26.6 - 33.0 pg 27.5    MCHC  31.5 - 35.7 g/dL 31.1 Low     RDW 12.3 - 15.4 % 14.9    RDW-SD 37.0 - 54.0 fl 47.8    MPV 6.0 - 12.0 fL 10.1    Platelets 140 - 450 10*3/mm3 126 Low     Specimen Collected: 05/02/23  8:46 AM Last Resulted: 05/02/23  8:53 AM   Received From: [facility]  Result Received: 06/14/23  8:09 AM      Sedimentation rate, automated  Order: [id number]   suggestion  Information displayed in this report may not trend or trigger automated decision support.      Ref Range & Units 1 mo ago   Sed Rate 0 - 30 mm/hr 15    Specimen Collected: 05/02/23  8:46 AM Last Resulted: 05/02/23  9:00 AM   Received From: [facility] [facility]  Result Received: 06/14/23  8:09 AM       Contains abnormal data C-reactive protein  Order: [id number]   suggestion  Information displayed in this report may not trend or trigger automated decision support.      Ref Range & Units 1 mo ago   C-Reactive Protein 0.00 - 0.50 mg/dL 0.71 High     Specimen Collected: 05/02/23  8:46 AM Last Resulted: 05/02/23  9:22 AM   Received From: [employer]  Result Received: 06/14/23  8:09 AM        Prescription set up and routed to provider per Refill Protocol    MARTINEZ Barry, RN  [facility] Refill Team

## 2023-08-01 ENCOUNTER — OFFICE VISIT (OUTPATIENT)
Dept: FAMILY MEDICINE CLINIC | Facility: CLINIC | Age: 58
End: 2023-08-01
Payer: MEDICARE

## 2023-08-01 ENCOUNTER — OFFICE VISIT (OUTPATIENT)
Dept: CARDIOLOGY | Facility: CLINIC | Age: 58
End: 2023-08-01
Payer: MEDICARE

## 2023-08-01 VITALS
HEIGHT: 64 IN | DIASTOLIC BLOOD PRESSURE: 80 MMHG | RESPIRATION RATE: 16 BRPM | BODY MASS INDEX: 32.71 KG/M2 | OXYGEN SATURATION: 97 % | SYSTOLIC BLOOD PRESSURE: 134 MMHG | WEIGHT: 191.6 LBS | HEART RATE: 82 BPM

## 2023-08-01 VITALS
SYSTOLIC BLOOD PRESSURE: 122 MMHG | BODY MASS INDEX: 31.49 KG/M2 | DIASTOLIC BLOOD PRESSURE: 76 MMHG | HEART RATE: 81 BPM | HEIGHT: 65 IN | WEIGHT: 189 LBS | OXYGEN SATURATION: 95 %

## 2023-08-01 DIAGNOSIS — I10 PRIMARY HYPERTENSION: ICD-10-CM

## 2023-08-01 DIAGNOSIS — E78.2 MIXED HYPERLIPIDEMIA: ICD-10-CM

## 2023-08-01 DIAGNOSIS — F43.21 GRIEF: ICD-10-CM

## 2023-08-01 DIAGNOSIS — I25.10 CORONARY ARTERY DISEASE INVOLVING NATIVE CORONARY ARTERY OF NATIVE HEART WITHOUT ANGINA PECTORIS: Primary | ICD-10-CM

## 2023-08-01 DIAGNOSIS — J30.2 SEASONAL ALLERGIC RHINITIS, UNSPECIFIED TRIGGER: ICD-10-CM

## 2023-08-01 DIAGNOSIS — F32.A DEPRESSION, UNSPECIFIED DEPRESSION TYPE: Primary | ICD-10-CM

## 2023-08-01 PROCEDURE — 99213 OFFICE O/P EST LOW 20 MIN: CPT

## 2023-08-01 PROCEDURE — 1160F RVW MEDS BY RX/DR IN RCRD: CPT | Performed by: FAMILY MEDICINE

## 2023-08-01 PROCEDURE — 1159F MED LIST DOCD IN RCRD: CPT | Performed by: FAMILY MEDICINE

## 2023-08-01 PROCEDURE — 99214 OFFICE O/P EST MOD 30 MIN: CPT | Performed by: FAMILY MEDICINE

## 2023-08-01 RX ORDER — EPINEPHRINE 0.3 MG/.3ML
INJECTION SUBCUTANEOUS
COMMUNITY
Start: 2023-06-20

## 2023-08-01 RX ORDER — LORAZEPAM 0.5 MG/1
0.5 TABLET ORAL NIGHTLY PRN
Qty: 30 TABLET | Refills: 0 | Status: SHIPPED | OUTPATIENT
Start: 2023-08-01

## 2023-08-01 RX ORDER — SERTRALINE HYDROCHLORIDE 100 MG/1
200 TABLET, FILM COATED ORAL DAILY
Qty: 180 TABLET | Refills: 3 | Status: SHIPPED | OUTPATIENT
Start: 2023-08-01

## 2023-08-01 RX ORDER — FLUTICASONE PROPIONATE 50 MCG
2 SPRAY, SUSPENSION (ML) NASAL DAILY
Qty: 16 G | Refills: 11 | Status: SHIPPED | OUTPATIENT
Start: 2023-08-01

## 2023-08-10 PROBLEM — E78.2 MIXED HYPERLIPIDEMIA: Status: ACTIVE | Noted: 2023-08-10

## 2023-08-10 PROBLEM — I25.10 CORONARY ARTERY DISEASE: Status: ACTIVE | Noted: 2023-08-10

## 2023-08-12 NOTE — PROGRESS NOTES
"Chief Complaint  Follow-up (3 month fu) and Nasal Congestion (Sick for two weeks)    Subjective        Alessandra Baker presents to [facility]  History of Present Illness  Two weeks of nasal congestion without fever or chills  Mood worsened by persistent grief    Objective   Vital Signs:  /80 (BP Location: Right arm, Patient Position: Sitting, Cuff Size: Adult)   Pulse 82   Resp 16   Ht 162.7 cm (64.04\")   Wt 86.9 kg (191 lb 9.6 oz)   SpO2 97%   BMI 32.85 kg/mý   Estimated body mass index is 32.85 kg/mý as calculated from the following:    Height as of this encounter: 162.7 cm (64.04\").    Weight as of this encounter: 86.9 kg (191 lb 9.6 oz).               Physical Exam  Vitals and nursing note reviewed.   Constitutional:       General: She is not in acute distress.     Appearance: She is not diaphoretic.   HENT:      Head: Normocephalic and atraumatic.      Right Ear: Tympanic membrane normal.      Left Ear: Tympanic membrane normal.      Nose: Nose normal. Congestion present.   Eyes:      General: No scleral icterus.        Right eye: No discharge.         Left eye: No discharge.      Conjunctiva/sclera: Conjunctivae normal.   Neck:      Trachea: No tracheal deviation.   Pulmonary:      Effort: Pulmonary effort is normal.   Skin:     General: Skin is warm and dry.      Coloration: Skin is not pale.   Neurological:      Mental Status: She is alert and oriented to person, place, and time.   Psychiatric:         Behavior: Behavior normal.         Thought Content: Thought content normal.         Judgment: Judgment normal.      Result Review :                   Assessment and Plan   Diagnoses and all orders for this visit:    1. Depression, unspecified depression type (Primary)  -     sertraline (ZOLOFT) 100 MG tablet; Take 2 tablets by mouth Daily.  Dispense: 180 tablet; Refill: 3    2. Grief  -     LORazepam (Ativan) 0.5 MG tablet; Take 1 tablet by mouth At Night As Needed for " Anxiety.  Dispense: 30 tablet; Refill: 0    3. Seasonal allergic rhinitis, unspecified trigger  -     fluticasone (FLONASE) 50 MCG/ACT nasal spray; 2 sprays into the nostril(s) as directed by provider Daily.  Dispense: 16 g; Refill: 11             Follow Up   No follow-ups on file.  Patient was given instructions and counseling regarding her condition or for health maintenance advice. Please see specific information pulled into the AVS if appropriate.

## 2023-08-15 ENCOUNTER — INFUSION (OUTPATIENT)
Dept: ONCOLOGY | Facility: HOSPITAL | Age: 58
End: 2023-08-15
Payer: MEDICARE

## 2023-08-15 VITALS
DIASTOLIC BLOOD PRESSURE: 82 MMHG | BODY MASS INDEX: 30.86 KG/M2 | OXYGEN SATURATION: 97 % | RESPIRATION RATE: 18 BRPM | HEIGHT: 66 IN | SYSTOLIC BLOOD PRESSURE: 139 MMHG | WEIGHT: 192 LBS | TEMPERATURE: 97.3 F | HEART RATE: 58 BPM

## 2023-08-15 DIAGNOSIS — I25.10 CORONARY ARTERY DISEASE, UNSPECIFIED VESSEL OR LESION TYPE, UNSPECIFIED WHETHER ANGINA PRESENT, UNSPECIFIED WHETHER NATIVE OR TRANSPLANTED HEART: ICD-10-CM

## 2023-08-15 DIAGNOSIS — E78.2 MIXED HYPERLIPIDEMIA: Primary | ICD-10-CM

## 2023-08-15 PROCEDURE — 25010000002 INCLISIRAN SODIUM 284 MG/1.5ML SOLUTION PREFILLED SYRINGE: Performed by: FAMILY MEDICINE

## 2023-08-15 PROCEDURE — 96372 THER/PROPH/DIAG INJ SC/IM: CPT

## 2023-08-15 RX ADMIN — INCLISIRAN 284 MG: 284 INJECTION, SOLUTION SUBCUTANEOUS at 11:03

## 2023-09-13 DIAGNOSIS — M05.79 RHEUMATOID ARTHRITIS INVOLVING MULTIPLE SITES WITH POSITIVE RHEUMATOID FACTOR (H): ICD-10-CM

## 2023-09-15 RX ORDER — AZATHIOPRINE 50 MG/1
TABLET ORAL
Qty: 180 TABLET | Refills: 0 | Status: SHIPPED | OUTPATIENT
Start: 2023-09-15

## 2023-09-15 NOTE — TELEPHONE ENCOUNTER
Medication/Dose: azaTHIOprine (IMURAN) 50 MG tablet    Last Written : 6/14/23  Last Quantity: 180, # refills: 0  Last Office Visit :  8/18/22  Pending appointment: none on file, did not show for last scheduled appointment on 8/10/23.     Labs reports were copied and pasted from Care Everywhere:      Contains abnormal data Comprehensive Metabolic Panel  Order: [id number]   suggestion  Information displayed in this report may not trend or trigger automated decision support.      Ref Range & Units 4 mo ago   Glucose 65 - 99 mg/dL 95   BUN 6 - 20 mg/dL 19   Creatinine 0.57 - 1.00 mg/dL 1.20 High    Sodium 136 - 145 mmol/L 142   Potassium 3.5 - 5.2 mmol/L 4.4   Chloride 98 - 107 mmol/L 105   CO2 22.0 - 29.0 mmol/L 24.0   Calcium 8.6 - 10.5 mg/dL 9.4   Total Protein 6.0 - 8.5 g/dL 7.5   Albumin 3.5 - 5.2 g/dL 4.6   ALT (SGPT) 1 - 33 U/L 8   AST (SGOT) 1 - 32 U/L 14   Alkaline Phosphatase 39 - 117 U/L 87   Total Bilirubin 0.0 - 1.2 mg/dL 0.4   Globulin gm/dL 2.9   A/G Ratio g/dL 1.6   BUN/Creatinine Ratio 7.0 - 25.0 15.8   Anion Gap 5.0 - 15.0 mmol/L 13.0   eGFR >60.0 mL/min/1.73 52.3 Low    Resulting Agency  [facility]   Narrative  Performed by [facility]  GFR Normal >60  Chronic Kidney Disease <60  Kidney Failure <15    Specimen Collected: 05/02/23  8:46 AM Last Resulted: 05/02/23  9:22 AM   Received From: [employer]  Result Received: 06/14/23  8:09 AM        Contains abnormal data CBC No Differential  Order: [id number]   suggestion  Information displayed in this report may not trend or trigger automated decision support.      Ref Range & Units 4 mo ago   WBC 3.40 - 10.80 10*3/mm3 3.98   RBC 3.77 - 5.28 10*6/mm3 4.94   Hemoglobin 12.0 - 15.9 g/dL 13.6   Hematocrit 34.0 - 46.6 % 43.8   MCV 79.0 - 97.0 fL 88.7   MCH 26.6 - 33.0 pg 27.5   MCHC 31.5 - 35.7 g/dL 31.1 Low    RDW 12.3 - 15.4 % 14.9   RDW-SD 37.0 - 54.0 fl 47.8   MPV 6.0 - 12.0 fL 10.1   Platelets 140 -  450 10*3/mm3 126 Low    Resulting [facility]     Specimen Collected: 05/02/23  8:46 AM Last Resulted: 05/02/23  8:53 AM   Received From: [employer]  Result Received: 06/14/23  8:09 AM       Sedimentation rate, automated  Order: [id number]   suggestion  Information displayed in this report may not trend or trigger automated decision support.      Ref Range & Units 4 mo ago   Sed Rate 0 - 30 mm/hr 15   Resulting [facility]     Specimen Collected: 05/02/23  8:46 AM Last Resulted: 05/02/23  9:00 AM   Received From: [employer]  Result Received: 06/14/23  8:09 AM          Contains abnormal data C-reactive protein  Order: [id number]   suggestion  Information displayed in this report may not trend or trigger automated decision support.      Ref Range & Units 4 mo ago   C-Reactive Protein 0.00 - 0.50 mg/dL 0.71 High    Resulting [facility]     Specimen Collected: 05/02/23  8:46 AM Last Resulted: 05/02/23  9:22 AM   Received From: [employer]  Result Received: 06/14/23  8:09 AM     Prescription set up and routed to provider per Refill Protocol.    INDER BarryN, RN  [facility] Refill Team

## 2023-09-18 DIAGNOSIS — I85.00 ESOPHAGEAL VARICES WITHOUT BLEEDING, UNSPECIFIED ESOPHAGEAL VARICES TYPE: ICD-10-CM

## 2023-09-18 DIAGNOSIS — R07.9 CHEST PAIN, UNSPECIFIED TYPE: ICD-10-CM

## 2023-09-18 NOTE — TELEPHONE ENCOUNTER
Rx Refill Note  Requested Prescriptions     Pending Prescriptions Disp Refills    omeprazole (priLOSEC) 40 MG capsule [Pharmacy Med Name: OMEPRAZOLE DR 40 MG CAPSULE] 30 capsule 2     Sig: TAKE 1 CAPSULE BY MOUTH EVERY DAY      Last office visit with prescribing clinician: 8/1/2023   Last telemedicine visit with prescribing clinician: Visit date not found   Next office visit with prescribing clinician: 11/2/2023                         Would you like a call back once the refill request has been completed: [] Yes [] No    If the office needs to give you a call back, can they leave a voicemail: [] Yes [] No    Dian Sal MA  09/18/23, 16:57 CDT

## 2023-09-20 RX ORDER — OMEPRAZOLE 40 MG/1
CAPSULE, DELAYED RELEASE ORAL
Qty: 30 CAPSULE | Refills: 2 | Status: SHIPPED | OUTPATIENT
Start: 2023-09-20

## 2023-11-02 ENCOUNTER — LAB (OUTPATIENT)
Dept: LAB | Facility: HOSPITAL | Age: 58
End: 2023-11-02
Payer: MEDICARE

## 2023-11-02 ENCOUNTER — OFFICE VISIT (OUTPATIENT)
Dept: FAMILY MEDICINE CLINIC | Facility: CLINIC | Age: 58
End: 2023-11-02
Payer: MEDICARE

## 2023-11-02 VITALS
WEIGHT: 194.8 LBS | SYSTOLIC BLOOD PRESSURE: 131 MMHG | HEIGHT: 66 IN | HEART RATE: 64 BPM | TEMPERATURE: 97.2 F | OXYGEN SATURATION: 98 % | BODY MASS INDEX: 31.31 KG/M2 | DIASTOLIC BLOOD PRESSURE: 83 MMHG

## 2023-11-02 DIAGNOSIS — N18.9 CHRONIC KIDNEY DISEASE, UNSPECIFIED CKD STAGE: ICD-10-CM

## 2023-11-02 DIAGNOSIS — Z23 FLU VACCINE NEED: ICD-10-CM

## 2023-11-02 DIAGNOSIS — R42 DIZZINESS: Primary | ICD-10-CM

## 2023-11-02 LAB
ANION GAP SERPL CALCULATED.3IONS-SCNC: 8 MMOL/L (ref 5–15)
BUN SERPL-MCNC: 15 MG/DL (ref 6–20)
BUN/CREAT SERPL: 11.5 (ref 7–25)
CALCIUM SPEC-SCNC: 9.7 MG/DL (ref 8.6–10.5)
CHLORIDE SERPL-SCNC: 108 MMOL/L (ref 98–107)
CO2 SERPL-SCNC: 27 MMOL/L (ref 22–29)
CREAT SERPL-MCNC: 1.3 MG/DL (ref 0.57–1)
EGFRCR SERPLBLD CKD-EPI 2021: 47.5 ML/MIN/1.73
GLUCOSE SERPL-MCNC: 83 MG/DL (ref 65–99)
POTASSIUM SERPL-SCNC: 4.4 MMOL/L (ref 3.5–5.2)
SODIUM SERPL-SCNC: 143 MMOL/L (ref 136–145)

## 2023-11-02 PROCEDURE — 36415 COLL VENOUS BLD VENIPUNCTURE: CPT

## 2023-11-02 PROCEDURE — 80048 BASIC METABOLIC PNL TOTAL CA: CPT

## 2023-11-02 NOTE — PROGRESS NOTES
"Chief Complaint  Follow-up (3 month follow up - pt states have been feeling dizzy, in the morning mainly when first get up took BP when felt dizzy with no other complaints )    Subjective        Alessandra Baker presents to [facility]  History of Present Illness  Recent onset of worsening dizziness usually in the morning when patient first wakes up.  Otherwise feels well, ROS negative    Objective   Vital Signs:  /83 (BP Location: Right arm, Patient Position: Sitting, Cuff Size: Adult)   Pulse 64   Temp 97.2 °F (36.2 °C)   Ht 167.6 cm (66\")   Wt 88.4 kg (194 lb 12.8 oz)   SpO2 98%   BMI 31.44 kg/m²   Estimated body mass index is 31.44 kg/m² as calculated from the following:    Height as of this encounter: 167.6 cm (66\").    Weight as of this encounter: 88.4 kg (194 lb 12.8 oz).               Physical Exam  Vitals and nursing note reviewed.   Constitutional:       General: She is not in acute distress.     Appearance: She is not diaphoretic.   HENT:      Head: Normocephalic and atraumatic.      Nose: Nose normal.   Eyes:      General: No scleral icterus.        Right eye: No discharge.         Left eye: No discharge.      Conjunctiva/sclera: Conjunctivae normal.   Neck:      Trachea: No tracheal deviation.   Pulmonary:      Effort: Pulmonary effort is normal.   Skin:     General: Skin is warm and dry.      Coloration: Skin is not pale.   Neurological:      Mental Status: She is alert and oriented to person, place, and time.   Psychiatric:         Behavior: Behavior normal.         Thought Content: Thought content normal.         Judgment: Judgment normal.        Result Review :                   Assessment and Plan   Diagnoses and all orders for this visit:    1. Dizziness (Primary)    2. Flu vaccine need  -     Fluzone >6 Months ([id number])    3. Chronic kidney disease, unspecified CKD stage  -     Basic metabolic panel; Future    Home blood pressure monitoring, consider " stopping isosorbide if patient still having orthostatic hypotension.  Follow-up as needed        Answers submitted by the patient for this visit:  Other (Submitted on 11/2/2023)  Please describe your symptoms.: Re check for heart meds  Have you had these symptoms before?: No  How long have you been having these symptoms?: Greater than 2 weeks  Primary Reason for Visit (Submitted on 11/2/2023)  What is the primary reason for your visit?: Other

## 2023-11-08 DIAGNOSIS — N18.31 STAGE 3A CHRONIC KIDNEY DISEASE: Primary | ICD-10-CM

## 2023-11-10 DIAGNOSIS — E78.2 MIXED HYPERLIPIDEMIA: Primary | ICD-10-CM

## 2023-11-10 RX ORDER — ISOSORBIDE MONONITRATE 30 MG/1
TABLET, EXTENDED RELEASE ORAL
Qty: 15 TABLET | Refills: 7 | Status: ON HOLD | OUTPATIENT
Start: 2023-11-10 | End: 2023-11-19

## 2023-11-16 ENCOUNTER — APPOINTMENT (OUTPATIENT)
Dept: GENERAL RADIOLOGY | Facility: HOSPITAL | Age: 58
End: 2023-11-16
Payer: MEDICARE

## 2023-11-16 ENCOUNTER — HOSPITAL ENCOUNTER (EMERGENCY)
Facility: HOSPITAL | Age: 58
Discharge: HOME | End: 2023-11-16
Attending: EMERGENCY MEDICINE
Payer: MEDICARE

## 2023-11-16 VITALS
HEART RATE: 73 BPM | OXYGEN SATURATION: 96 % | HEIGHT: 65 IN | SYSTOLIC BLOOD PRESSURE: 135 MMHG | BODY MASS INDEX: 32.15 KG/M2 | WEIGHT: 193 LBS | TEMPERATURE: 97.5 F | RESPIRATION RATE: 16 BRPM | DIASTOLIC BLOOD PRESSURE: 97 MMHG

## 2023-11-16 DIAGNOSIS — R07.9 CHEST PAIN, UNSPECIFIED TYPE: Primary | ICD-10-CM

## 2023-11-16 LAB
ALBUMIN SERPL-MCNC: 4.1 G/DL (ref 3.5–5.2)
ALBUMIN/GLOB SERPL: 1.4 G/DL
ALP SERPL-CCNC: 79 U/L (ref 39–117)
ALT SERPL W P-5'-P-CCNC: 9 U/L (ref 1–33)
ANION GAP SERPL CALCULATED.3IONS-SCNC: 10 MMOL/L (ref 5–15)
AST SERPL-CCNC: 17 U/L (ref 1–32)
BASOPHILS # BLD AUTO: 0.01 10*3/MM3 (ref 0–0.2)
BASOPHILS NFR BLD AUTO: 0.2 % (ref 0–1.5)
BILIRUB SERPL-MCNC: 0.3 MG/DL (ref 0–1.2)
BUN SERPL-MCNC: 16 MG/DL (ref 6–20)
BUN/CREAT SERPL: 13.3 (ref 7–25)
CALCIUM SPEC-SCNC: 8.8 MG/DL (ref 8.6–10.5)
CHLORIDE SERPL-SCNC: 106 MMOL/L (ref 98–107)
CO2 SERPL-SCNC: 24 MMOL/L (ref 22–29)
CREAT SERPL-MCNC: 1.2 MG/DL (ref 0.57–1)
D DIMER PPP FEU-MCNC: 0.38 MCGFEU/ML (ref 0–0.59)
DEPRECATED RDW RBC AUTO: 49.1 FL (ref 37–54)
EGFRCR SERPLBLD CKD-EPI 2021: 52.3 ML/MIN/1.73
EOSINOPHIL # BLD AUTO: 0.04 10*3/MM3 (ref 0–0.4)
EOSINOPHIL NFR BLD AUTO: 0.9 % (ref 0.3–6.2)
ERYTHROCYTE [DISTWIDTH] IN BLOOD BY AUTOMATED COUNT: 15 % (ref 12.3–15.4)
GEN 5 2HR TROPONIN T REFLEX: <6 NG/L
GLOBULIN UR ELPH-MCNC: 3 GM/DL
GLUCOSE SERPL-MCNC: 111 MG/DL (ref 65–99)
HCT VFR BLD AUTO: 41 % (ref 34–46.6)
HGB BLD-MCNC: 13.2 G/DL (ref 12–15.9)
HOLD SPECIMEN: NORMAL
HOLD SPECIMEN: NORMAL
IMM GRANULOCYTES # BLD AUTO: 0.02 10*3/MM3 (ref 0–0.05)
IMM GRANULOCYTES NFR BLD AUTO: 0.5 % (ref 0–0.5)
LYMPHOCYTES # BLD AUTO: 0.72 10*3/MM3 (ref 0.7–3.1)
LYMPHOCYTES NFR BLD AUTO: 16.9 % (ref 19.6–45.3)
MCH RBC QN AUTO: 29.2 PG (ref 26.6–33)
MCHC RBC AUTO-ENTMCNC: 32.2 G/DL (ref 31.5–35.7)
MCV RBC AUTO: 90.7 FL (ref 79–97)
MONOCYTES # BLD AUTO: 0.26 10*3/MM3 (ref 0.1–0.9)
MONOCYTES NFR BLD AUTO: 6.1 % (ref 5–12)
NEUTROPHILS NFR BLD AUTO: 3.21 10*3/MM3 (ref 1.7–7)
NEUTROPHILS NFR BLD AUTO: 75.4 % (ref 42.7–76)
NRBC BLD AUTO-RTO: 0 /100 WBC (ref 0–0.2)
NT-PROBNP SERPL-MCNC: 335.2 PG/ML (ref 0–900)
PLATELET # BLD AUTO: 114 10*3/MM3 (ref 140–450)
PMV BLD AUTO: 9.1 FL (ref 6–12)
POTASSIUM SERPL-SCNC: 3.8 MMOL/L (ref 3.5–5.2)
PROT SERPL-MCNC: 7.1 G/DL (ref 6–8.5)
RBC # BLD AUTO: 4.52 10*6/MM3 (ref 3.77–5.28)
SODIUM SERPL-SCNC: 140 MMOL/L (ref 136–145)
TROPONIN T DELTA: NORMAL
TROPONIN T SERPL HS-MCNC: 6 NG/L
WBC NRBC COR # BLD: 4.26 10*3/MM3 (ref 3.4–10.8)
WHOLE BLOOD HOLD COAG: NORMAL
WHOLE BLOOD HOLD SPECIMEN: NORMAL

## 2023-11-16 PROCEDURE — 96374 THER/PROPH/DIAG INJ IV PUSH: CPT

## 2023-11-16 PROCEDURE — 99284 EMERGENCY DEPT VISIT MOD MDM: CPT

## 2023-11-16 PROCEDURE — 85379 FIBRIN DEGRADATION QUANT: CPT | Performed by: EMERGENCY MEDICINE

## 2023-11-16 PROCEDURE — 25010000002 FENTANYL CITRATE (PF) 50 MCG/ML SOLUTION: Performed by: EMERGENCY MEDICINE

## 2023-11-16 PROCEDURE — 71045 X-RAY EXAM CHEST 1 VIEW: CPT

## 2023-11-16 PROCEDURE — 36415 COLL VENOUS BLD VENIPUNCTURE: CPT

## 2023-11-16 PROCEDURE — 85025 COMPLETE CBC W/AUTO DIFF WBC: CPT | Performed by: EMERGENCY MEDICINE

## 2023-11-16 PROCEDURE — 84484 ASSAY OF TROPONIN QUANT: CPT | Performed by: EMERGENCY MEDICINE

## 2023-11-16 PROCEDURE — 96375 TX/PRO/DX INJ NEW DRUG ADDON: CPT

## 2023-11-16 PROCEDURE — 93005 ELECTROCARDIOGRAM TRACING: CPT | Performed by: EMERGENCY MEDICINE

## 2023-11-16 PROCEDURE — 83880 ASSAY OF NATRIURETIC PEPTIDE: CPT | Performed by: EMERGENCY MEDICINE

## 2023-11-16 PROCEDURE — 25010000002 ONDANSETRON PER 1 MG: Performed by: EMERGENCY MEDICINE

## 2023-11-16 PROCEDURE — 80053 COMPREHEN METABOLIC PANEL: CPT | Performed by: EMERGENCY MEDICINE

## 2023-11-16 RX ORDER — SODIUM CHLORIDE 0.9 % (FLUSH) 0.9 %
10 SYRINGE (ML) INJECTION AS NEEDED
Status: DISCONTINUED | OUTPATIENT
Start: 2023-11-16 | End: 2023-11-16 | Stop reason: HOSPADM

## 2023-11-16 RX ORDER — METOPROLOL TARTRATE 5 MG/5ML
5 INJECTION INTRAVENOUS ONCE
Status: DISCONTINUED | OUTPATIENT
Start: 2023-11-16 | End: 2023-11-16 | Stop reason: HOSPADM

## 2023-11-16 RX ORDER — FENTANYL CITRATE 50 UG/ML
25 INJECTION, SOLUTION INTRAMUSCULAR; INTRAVENOUS ONCE
Status: COMPLETED | OUTPATIENT
Start: 2023-11-16 | End: 2023-11-16

## 2023-11-16 RX ORDER — ONDANSETRON 2 MG/ML
4 INJECTION INTRAMUSCULAR; INTRAVENOUS ONCE
Status: COMPLETED | OUTPATIENT
Start: 2023-11-16 | End: 2023-11-16

## 2023-11-16 RX ORDER — ASPIRIN 81 MG/1
324 TABLET, CHEWABLE ORAL ONCE
Status: COMPLETED | OUTPATIENT
Start: 2023-11-16 | End: 2023-11-16

## 2023-11-16 RX ADMIN — ONDANSETRON 4 MG: 2 INJECTION INTRAMUSCULAR; INTRAVENOUS at 07:52

## 2023-11-16 RX ADMIN — FENTANYL CITRATE 25 MCG: 50 INJECTION, SOLUTION INTRAMUSCULAR; INTRAVENOUS at 07:53

## 2023-11-16 RX ADMIN — ASPIRIN 324 MG: 81 TABLET, CHEWABLE ORAL at 07:51

## 2023-11-16 NOTE — ED PROVIDER NOTES
Subjective   History of Present Illness  Patient is a 58-year-old who came the ER coming of chest pain and discomfort.  He had a cardiac catheterization and stenting in [place] has got upper lip lipidemia.  Hypertension and coronary disease.    Chest Pain  Pain location:  Substernal area  Pain quality: aching, sharp and tightness    Pain quality: not crushing    Pain radiates to:  L jaw and neck  Pain severity:  Moderate  Onset quality:  Sudden  Duration:  3 hours  Progression:  Worsening  Chronicity:  New  Context: not breathing, not drug use, not raising an arm, not at rest and not stress    Relieved by:  Nothing  Worsened by:  Nothing  Ineffective treatments:  None tried  Associated symptoms: no abdominal pain, no AICD problem, no anorexia, no anxiety, no back pain, no claudication, no cough, no diaphoresis, no dizziness, no dysphagia, no fatigue, no fever, no headache, no heartburn, no lower extremity edema, no nausea, no numbness, no orthopnea, no palpitations, no shortness of breath, no syncope, no vomiting and no weakness    Risk factors: coronary artery disease    Risk factors: no aortic disease, no birth control, not male, no Marfan's syndrome, not obese, not pregnant and no smoking        Review of Systems   Constitutional: Negative.  Negative for activity change, appetite change, chills, diaphoresis, fatigue and fever.   HENT:  Negative for congestion, drooling, ear pain, facial swelling, hearing loss, sinus pressure, sore throat and trouble swallowing.    Eyes: Negative.  Negative for discharge.   Respiratory:  Negative for cough and shortness of breath.    Cardiovascular:  Positive for chest pain. Negative for palpitations, orthopnea, claudication and syncope.   Gastrointestinal:  Negative for abdominal distention, abdominal pain, anorexia, blood in stool, diarrhea, heartburn, nausea and vomiting.   Endocrine: Negative.  Negative for cold intolerance, heat intolerance, polydipsia, polyphagia and  polyuria.   Genitourinary: Negative.  Negative for dysuria, flank pain and urgency.   Musculoskeletal: Negative.  Negative for arthralgias, back pain, myalgias and neck stiffness.   Skin: Negative.  Negative for color change, pallor and rash.   Allergic/Immunologic: Negative.    Neurological: Negative.  Negative for dizziness, seizures, speech difficulty, weakness, numbness and headaches.   Hematological: Negative.  Negative for adenopathy.   All other systems reviewed and are negative.      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    Liver problem     Myocardial infarction 11/2012    NSTEMI    Rheumatoid arthritis     Skin cancer     Stroke     showed up on mri about 5 years ago with no residual    Ulnar neuropathy     Visual impairment        Allergies   Allergen Reactions    Bee Venom Anaphylaxis    Clopidogrel Hives and Unknown (See Comments)     Night terrors      Codeine Anaphylaxis    Insect Extract Anaphylaxis     Bee stings    Morphine And Related Shortness Of Breath, Hives and Unknown (See Comments)     tachycardia      Penicillin G Hives    Shellfish Allergy Anaphylaxis    Shellfish-Derived Products Anaphylaxis    Methotrexate Other (See Comments)     hepatotxicity    Penicillin G Benzathine Unknown (See Comments)    Atorvastatin Myalgia and Unknown (See Comments)    Hydrocodone Palpitations     Heart races and feels like bugs crawling under skin    Hydroxychloroquine Other (See Comments) and Unknown (See Comments)     Night terrors      Menthol Rash       Past Surgical History:   Procedure Laterality Date    ANGIOPLASTY      11/2012    APPENDECTOMY      CARDIAC CATHETERIZATION  07/21/2015     11/1/12, 4/21/22    COLONOSCOPY      COLONOSCOPY N/A 04/24/2023    Procedure: COLONOSCOPY WITH ANESTHESIA;  Surgeon: Rick Tidwell DO;  Location: [facility]COPY;  Service: Gastroenterology;  Laterality: N/A;  preop; hx of polyps   postop; polyps  PCP Eric Feldman     CORONARY STENT PLACEMENT      x3 per patient, X 1 -  11/2012, x 2 - 4/21/22    ENDOSCOPY      ENDOSCOPY N/A 04/24/2023    Procedure: ESOPHAGOGASTRODUODENOSCOPY WITH ANESTHESIA;  Surgeon: Rick Tidwell DO;  Location: [facility] ENDOSCOPY;  Service: Gastroenterology;  Laterality: N/A;  preop; hx of varices   postop; varices   PCP Eric Feldman     HAND RECONSTRUCTION Right 06/15/1967    HYSTERECTOMY      2016    SUBTOTAL HYSTERECTOMY      TONSILLECTOMY         Family History   Problem Relation Age of Onset    Hyperlipidemia Mother     Hypertension Mother     Stroke Mother     Diabetes Mother     Arthritis Mother     Hyperlipidemia Father     Hypertension Father     Heart attack Father     Arthritis Father     Early death Father     Heart failure Sister     Stroke Sister     Heart attack Brother     Alcohol abuse Brother     Heart attack Brother     Mental illness Maternal Aunt         Father's  sister    Breast cancer Maternal Aunt     Heart failure Paternal Aunt     Heart failure Paternal Uncle     Heart failure Paternal Uncle     Colon cancer Neg Hx     Colon polyps Neg Hx     Esophageal cancer Neg Hx        Social History     Socioeconomic History    Marital status:    Tobacco Use    Smoking status: Former     Packs/day: 1.00     Years: 30.00     Additional pack years: 0.00     Total pack years: 30.00     Types: Cigarettes     Start date: 1/1/1993     Quit date: 1/1/2013     Years since quitting: 10.8     Passive exposure: Past    Smokeless tobacco: Never    Tobacco comments:     Not somking now 10 years   Vaping Use    Vaping Use: Former   Substance and Sexual Activity    Alcohol use: Not Currently     Comment: Twice a. Year    Drug  use: Never    Sexual activity: Yes     Partners: Male     Birth control/protection: Hysterectomy     Comment:  to same man for 39 yrs           Objective   Physical Exam  Vitals and nursing note reviewed. Exam conducted with a chaperone present.   Constitutional:       General: She is not in acute distress.     Appearance: Normal appearance. She is well-developed. She is not toxic-appearing or diaphoretic.   HENT:      Head: Normocephalic and atraumatic.      Right Ear: External ear normal.      Nose: Nose normal.      Mouth/Throat:      Mouth: Mucous membranes are moist.   Eyes:      Conjunctiva/sclera: Conjunctivae normal.      Pupils: Pupils are equal, round, and reactive to light.   Cardiovascular:      Rate and Rhythm: Normal rate and regular rhythm.      Chest Wall: PMI is not displaced.      Pulses: Normal pulses. No decreased pulses.      Heart sounds: Normal heart sounds. No murmur heard.  Pulmonary:      Effort: Pulmonary effort is normal. No tachypnea, accessory muscle usage or respiratory distress.      Breath sounds: Normal breath sounds. No stridor. No decreased breath sounds, wheezing, rhonchi or rales.   Chest:      Chest wall: No tenderness or crepitus.   Abdominal:      General: Bowel sounds are normal. There is no distension.      Palpations: Abdomen is soft.      Tenderness: There is no abdominal tenderness.   Musculoskeletal:         General: No swelling or tenderness. Normal range of motion.      Cervical back: Normal range of motion and neck supple. No rigidity.      Comments: Lower extremity exam bilaterally is unremarkable.  There is no right or left calf tenderness .  There is no palpable venous cord.  No obvious difference in the size of the legs.  No pitting edema.  The dorsalis pedis and posterior tibial femoral and popliteal pulses are palpable and +2 bilaterally.  Homans sign is negative   Skin:     General: Skin is warm.      Capillary Refill: Capillary refill takes less than 2  seconds.      Coloration: Skin is not jaundiced.      Findings: No erythema or rash.   Neurological:      General: No focal deficit present.      Mental Status: She is alert and oriented to person, place, and time. Mental status is at baseline.      Cranial Nerves: No cranial nerve deficit.      Motor: No weakness.      Coordination: Coordination normal.      Deep Tendon Reflexes: Reflexes are normal and symmetric. Reflexes normal.   Psychiatric:         Mood and Affect: Mood normal.         Procedures           ED Course  ED Course as of 11/16/23 1145   Thu Nov 16, 2023   0732 .  Status post successful PCI with drug-eluting stent placement in the   proximal and mid RCA for in-stent restenosis.   2.  Moderate flow-limiting mid LAD in-stent restenosis (iFR 0.88)   involving a moderate-sized diagonal branch.   Coronary Findings Diagnostic Dominance: Right   Left Main: Dist LM lesion is 30% stenosed.   Left Anterior Descending: Ost LAD lesion is 30% stenosed. Prox LAD to Mid LAD lesion is 60% stenosed. The lesion was previously treated using a drug-eluting stent. iFR of this lesion is 0.88 indicating it to be flow limiting Dist LAD lesion is 20% stenosed. First Diagonal Branch: 1st Diag lesion is 80% stenosed.   Right Coronary Artery: Ost RCA to Prox RCA lesion is 95% stenosed. The lesion was previously treated using a drug-eluting stent. Previous treatment took place >2 years ago. Dist RCA-1 lesion is 80% stenosed. The lesion was previously treated using a drug-eluting stent. Previous treatment took place 1-2 years ago. Dist RCA-2 lesion is 25% stenosed. Right Posterior Atrioventricular Artery: RPAV lesion is 40% stenosed. The lesion is tubular.   This was a cardiac cath in 4/2023 [TS]   0748 Normal sinus rhythm baseline artifact nonspecific ST changes [TS]   0930 Heart score 4 [TS]   1140 This patient came in with chest pain cardiac markers are negative.  Delta Trope is negative she denies any pain at this time.  I  have offered her further evaluation assessment including a stress test the patient does not want to get a stress test or any further work-up she attributes her pain to recent adjustment done by chiropractor.  Which is a possibility.  I have discussed this at length with her offered her outpatient stress test she does not want to do that either she is just going to talk to her cardiologist first [TS]   1142  at this time she will be discharged home. [TS]      ED Course User Index  [TS] Rachid Pimentel MD                                           Medical Decision Making  Differential Diagnosis:  I considered chest wall pain, muscle strain, costochondritis, pleurisy, rib fracture, herpes zoster, cardiovascular etiology, myocardial infarction, intermediate coronary syndrome, unstable angina, angina, aortic dissection, pericarditis, pulmonary etiology, pulmonary embolism, pneumonia, pneumothorax, lung cancer, gastroesophageal reflux disease, esophagitis, esophageal spasm and gastrointestinal etiology as a possible cause of chest pain in this patient. This is a partial list of diagnoses considered.        Problems Addressed:  Chest pain, unspecified type: complicated acute illness or injury     Details: Patient came in with chest pain work-up is negative bedside markers are negative.  I have discussed this case at length with the patient patient will discharge home with a follow-up.    Amount and/or Complexity of Data Reviewed  Labs: ordered.     Details: Labs reviewed  Radiology: ordered.     Details: X-rays negative  ECG/medicine tests: ordered.     Details: EKG shows normal sinus rhythm  Discussion of management or test interpretation with external provider(s): Case discussed with the patient    Risk  OTC drugs.  Prescription drug management.  Risk Details: This patient presents with chest pain , patient arrived hemodynamically stable was placed on the monitor and IV access obtained EKG was obtained and did not reveal  any malignant/unstable dysrhythmias any acute ST elevation, no evidence of Brugada, or significantly prolonged QT .  Presentation not consistent with other acute, emergent cause of chest pain at this time.  Low suspicion for acute PE is low risk per Wells and Years criteria and no evidence of DVT such as calf swelling, tenderness, palpable tortuous lower extremity vein, or Homans' sign.  Low suspicion for pneumothorax as the patient is saturating well and has no radiographic evidence of a pneumothorax.  Low suspicion for dissection there is no widened mediastinum, hypotension, pulses deficit, and no tearing back/abdominal pain.  Low suspicion for tamponade as there is no JVD, muffled heart sounds, electrical alternans on EKG, and no hypotension.  Low suspicion for pericarditis as there is no diffuse ST elevation or AK depression and the patient is afebrile.  No evidence of GI bleed per patient's history.  Low suspicion for CHF exacerbation as there is no evidence of volume overload and no evidence of severely elevated BNP.  Low suspicion for pneumonia since the patient has no fever no productive cough no chest x-ray findings of pneumonia and no leukocytosis.     Heart score 4  Well score 0  Years Algorithm for Pulmonary Embolus  To be used in hemodynamically stable patient >18 years old    No signs of DVT are present, there is no hemoptysis, PE is not the most likely diagnosis and the D-dimer is not greater or equal to 1000 ng/mL. Therefore PE is excluded . The Years algorithm rules out PE (0.43 % with symptomatic VTE during 3-month follow-up)        Final diagnoses:   Chest pain, unspecified type       ED Disposition  ED Disposition       ED Disposition   Discharge    Condition   Stable    Comment   --               Geraldo Fledman DO  [address] [address]  [address] [place], [place] [id number] [phone]               Medication List      No changes were made to your prescriptions during this visit.             Rachid Pimentel MD  11/16/23 0741       Rachid Pimentel MD  11/16/23 1144       Rachid Pimentel MD  11/16/23 1147

## 2023-11-18 ENCOUNTER — APPOINTMENT (OUTPATIENT)
Dept: GENERAL RADIOLOGY | Facility: HOSPITAL | Age: 58
DRG: 234 | End: 2023-11-18
Payer: MEDICARE

## 2023-11-18 ENCOUNTER — APPOINTMENT (OUTPATIENT)
Dept: CT IMAGING | Facility: HOSPITAL | Age: 58
DRG: 234 | End: 2023-11-18
Payer: MEDICARE

## 2023-11-18 ENCOUNTER — HOSPITAL ENCOUNTER (OUTPATIENT)
Facility: HOSPITAL | Age: 58
Setting detail: OBSERVATION
Discharge: HOME | DRG: 234 | End: 2023-11-20
Attending: EMERGENCY MEDICINE | Admitting: FAMILY MEDICINE
Payer: MEDICARE

## 2023-11-18 DIAGNOSIS — K80.21 CALCULUS OF GALLBLADDER WITH BILIARY OBSTRUCTION BUT WITHOUT CHOLECYSTITIS: ICD-10-CM

## 2023-11-18 DIAGNOSIS — R07.9 CHEST PAIN, UNSPECIFIED TYPE: Primary | ICD-10-CM

## 2023-11-18 DIAGNOSIS — I10 PRIMARY HYPERTENSION: ICD-10-CM

## 2023-11-18 DIAGNOSIS — I77.819 AORTIC ECTASIA: ICD-10-CM

## 2023-11-18 DIAGNOSIS — K76.9 LIVER DISEASE: ICD-10-CM

## 2023-11-18 PROBLEM — M05.79 RHEUMATOID ARTHRITIS INVOLVING MULTIPLE SITES WITH POSITIVE RHEUMATOID FACTOR: Status: ACTIVE | Noted: 2023-11-18

## 2023-11-18 LAB
ALBUMIN SERPL-MCNC: 4.9 G/DL (ref 3.5–5.2)
ALBUMIN/GLOB SERPL: 1.4 G/DL
ALP SERPL-CCNC: 88 U/L (ref 39–117)
ALT SERPL W P-5'-P-CCNC: 9 U/L (ref 1–33)
ANION GAP SERPL CALCULATED.3IONS-SCNC: 10 MMOL/L (ref 5–15)
AST SERPL-CCNC: 14 U/L (ref 1–32)
BASOPHILS # BLD AUTO: 0.01 10*3/MM3 (ref 0–0.2)
BASOPHILS NFR BLD AUTO: 0.2 % (ref 0–1.5)
BILIRUB SERPL-MCNC: 0.6 MG/DL (ref 0–1.2)
BUN SERPL-MCNC: 18 MG/DL (ref 6–20)
BUN/CREAT SERPL: 15.4 (ref 7–25)
CALCIUM SPEC-SCNC: 9.7 MG/DL (ref 8.6–10.5)
CHLORIDE SERPL-SCNC: 102 MMOL/L (ref 98–107)
CO2 SERPL-SCNC: 26 MMOL/L (ref 22–29)
CREAT SERPL-MCNC: 1.17 MG/DL (ref 0.57–1)
CRP SERPL-MCNC: 11.45 MG/DL (ref 0–0.5)
DEPRECATED RDW RBC AUTO: 49.2 FL (ref 37–54)
EGFRCR SERPLBLD CKD-EPI 2021: 53.9 ML/MIN/1.73
EOSINOPHIL # BLD AUTO: 0.04 10*3/MM3 (ref 0–0.4)
EOSINOPHIL NFR BLD AUTO: 0.6 % (ref 0.3–6.2)
ERYTHROCYTE [DISTWIDTH] IN BLOOD BY AUTOMATED COUNT: 15.2 % (ref 12.3–15.4)
GEN 5 2HR TROPONIN T REFLEX: 10 NG/L
GLOBULIN UR ELPH-MCNC: 3.6 GM/DL
GLUCOSE SERPL-MCNC: 111 MG/DL (ref 65–99)
HCT VFR BLD AUTO: 44.6 % (ref 34–46.6)
HGB BLD-MCNC: 14.6 G/DL (ref 12–15.9)
HOLD SPECIMEN: NORMAL
HOLD SPECIMEN: NORMAL
IMM GRANULOCYTES # BLD AUTO: 0.04 10*3/MM3 (ref 0–0.05)
IMM GRANULOCYTES NFR BLD AUTO: 0.6 % (ref 0–0.5)
LYMPHOCYTES # BLD AUTO: 0.58 10*3/MM3 (ref 0.7–3.1)
LYMPHOCYTES NFR BLD AUTO: 9.1 % (ref 19.6–45.3)
MCH RBC QN AUTO: 29 PG (ref 26.6–33)
MCHC RBC AUTO-ENTMCNC: 32.7 G/DL (ref 31.5–35.7)
MCV RBC AUTO: 88.5 FL (ref 79–97)
MONOCYTES # BLD AUTO: 0.43 10*3/MM3 (ref 0.1–0.9)
MONOCYTES NFR BLD AUTO: 6.8 % (ref 5–12)
NEUTROPHILS NFR BLD AUTO: 5.26 10*3/MM3 (ref 1.7–7)
NEUTROPHILS NFR BLD AUTO: 82.7 % (ref 42.7–76)
NRBC BLD AUTO-RTO: 0 /100 WBC (ref 0–0.2)
PLATELET # BLD AUTO: 164 10*3/MM3 (ref 140–450)
PMV BLD AUTO: 9.4 FL (ref 6–12)
POTASSIUM SERPL-SCNC: 4.3 MMOL/L (ref 3.5–5.2)
PROT SERPL-MCNC: 8.5 G/DL (ref 6–8.5)
RBC # BLD AUTO: 5.04 10*6/MM3 (ref 3.77–5.28)
SODIUM SERPL-SCNC: 138 MMOL/L (ref 136–145)
TROPONIN T DELTA: 1 NG/L
TROPONIN T SERPL HS-MCNC: 9 NG/L
WBC NRBC COR # BLD AUTO: 6.36 10*3/MM3 (ref 3.4–10.8)
WHOLE BLOOD HOLD COAG: NORMAL
WHOLE BLOOD HOLD SPECIMEN: NORMAL

## 2023-11-18 PROCEDURE — 71275 CT ANGIOGRAPHY CHEST: CPT

## 2023-11-18 PROCEDURE — G0378 HOSPITAL OBSERVATION PER HR: HCPCS

## 2023-11-18 PROCEDURE — 25010000002 KETOROLAC TROMETHAMINE PER 15 MG: Performed by: FAMILY MEDICINE

## 2023-11-18 PROCEDURE — 93005 ELECTROCARDIOGRAM TRACING: CPT

## 2023-11-18 PROCEDURE — 93005 ELECTROCARDIOGRAM TRACING: CPT | Performed by: EMERGENCY MEDICINE

## 2023-11-18 PROCEDURE — 96375 TX/PRO/DX INJ NEW DRUG ADDON: CPT

## 2023-11-18 PROCEDURE — 86140 C-REACTIVE PROTEIN: CPT | Performed by: EMERGENCY MEDICINE

## 2023-11-18 PROCEDURE — 25010000002 ENOXAPARIN PER 10 MG: Performed by: FAMILY MEDICINE

## 2023-11-18 PROCEDURE — 85025 COMPLETE CBC W/AUTO DIFF WBC: CPT | Performed by: EMERGENCY MEDICINE

## 2023-11-18 PROCEDURE — 25510000001 IOPAMIDOL PER 1 ML: Performed by: EMERGENCY MEDICINE

## 2023-11-18 PROCEDURE — 93010 ELECTROCARDIOGRAM REPORT: CPT | Performed by: INTERNAL MEDICINE

## 2023-11-18 PROCEDURE — 96372 THER/PROPH/DIAG INJ SC/IM: CPT

## 2023-11-18 PROCEDURE — 73030 X-RAY EXAM OF SHOULDER: CPT

## 2023-11-18 PROCEDURE — 80053 COMPREHEN METABOLIC PANEL: CPT | Performed by: EMERGENCY MEDICINE

## 2023-11-18 PROCEDURE — 99285 EMERGENCY DEPT VISIT HI MDM: CPT

## 2023-11-18 PROCEDURE — 25010000002 FENTANYL CITRATE (PF) 50 MCG/ML SOLUTION: Performed by: EMERGENCY MEDICINE

## 2023-11-18 PROCEDURE — 71045 X-RAY EXAM CHEST 1 VIEW: CPT

## 2023-11-18 PROCEDURE — 25010000002 ONDANSETRON PER 1 MG: Performed by: EMERGENCY MEDICINE

## 2023-11-18 PROCEDURE — 84484 ASSAY OF TROPONIN QUANT: CPT | Performed by: EMERGENCY MEDICINE

## 2023-11-18 PROCEDURE — 36415 COLL VENOUS BLD VENIPUNCTURE: CPT

## 2023-11-18 PROCEDURE — 96374 THER/PROPH/DIAG INJ IV PUSH: CPT

## 2023-11-18 RX ORDER — ENOXAPARIN SODIUM 100 MG/ML
40 INJECTION SUBCUTANEOUS EVERY 24 HOURS
Status: DISCONTINUED | OUTPATIENT
Start: 2023-11-18 | End: 2023-11-19

## 2023-11-18 RX ORDER — KETOROLAC TROMETHAMINE 30 MG/ML
30 INJECTION, SOLUTION INTRAMUSCULAR; INTRAVENOUS EVERY 6 HOURS PRN
Status: DISCONTINUED | OUTPATIENT
Start: 2023-11-18 | End: 2023-11-20

## 2023-11-18 RX ORDER — ASPIRIN 81 MG/1
324 TABLET, CHEWABLE ORAL ONCE
Status: COMPLETED | OUTPATIENT
Start: 2023-11-18 | End: 2023-11-18

## 2023-11-18 RX ORDER — POLYETHYLENE GLYCOL 3350 17 G/17G
17 POWDER, FOR SOLUTION ORAL DAILY PRN
Status: DISCONTINUED | OUTPATIENT
Start: 2023-11-18 | End: 2023-11-20 | Stop reason: HOSPADM

## 2023-11-18 RX ORDER — BISACODYL 5 MG/1
5 TABLET, DELAYED RELEASE ORAL DAILY PRN
Status: DISCONTINUED | OUTPATIENT
Start: 2023-11-18 | End: 2023-11-20 | Stop reason: HOSPADM

## 2023-11-18 RX ORDER — BISACODYL 10 MG
10 SUPPOSITORY, RECTAL RECTAL DAILY PRN
Status: DISCONTINUED | OUTPATIENT
Start: 2023-11-18 | End: 2023-11-20 | Stop reason: HOSPADM

## 2023-11-18 RX ORDER — NITROGLYCERIN 0.4 MG/1
0.4 TABLET SUBLINGUAL
Status: DISCONTINUED | OUTPATIENT
Start: 2023-11-18 | End: 2023-11-20 | Stop reason: HOSPADM

## 2023-11-18 RX ORDER — ACETAMINOPHEN 325 MG/1
650 TABLET ORAL EVERY 4 HOURS PRN
Status: DISCONTINUED | OUTPATIENT
Start: 2023-11-18 | End: 2023-11-20 | Stop reason: HOSPADM

## 2023-11-18 RX ORDER — AMOXICILLIN 250 MG
2 CAPSULE ORAL NIGHTLY PRN
Status: DISCONTINUED | OUTPATIENT
Start: 2023-11-18 | End: 2023-11-20 | Stop reason: HOSPADM

## 2023-11-18 RX ORDER — ONDANSETRON 2 MG/ML
4 INJECTION INTRAMUSCULAR; INTRAVENOUS EVERY 6 HOURS PRN
Status: DISCONTINUED | OUTPATIENT
Start: 2023-11-18 | End: 2023-11-20 | Stop reason: HOSPADM

## 2023-11-18 RX ORDER — FENTANYL CITRATE 50 UG/ML
50 INJECTION, SOLUTION INTRAMUSCULAR; INTRAVENOUS ONCE
Status: COMPLETED | OUTPATIENT
Start: 2023-11-18 | End: 2023-11-18

## 2023-11-18 RX ORDER — SODIUM CHLORIDE 0.9 % (FLUSH) 0.9 %
10 SYRINGE (ML) INJECTION AS NEEDED
Status: DISCONTINUED | OUTPATIENT
Start: 2023-11-18 | End: 2023-11-20 | Stop reason: HOSPADM

## 2023-11-18 RX ORDER — ONDANSETRON 2 MG/ML
4 INJECTION INTRAMUSCULAR; INTRAVENOUS ONCE
Status: COMPLETED | OUTPATIENT
Start: 2023-11-18 | End: 2023-11-18

## 2023-11-18 RX ADMIN — FENTANYL CITRATE 50 MCG: 50 INJECTION, SOLUTION INTRAMUSCULAR; INTRAVENOUS at 15:40

## 2023-11-18 RX ADMIN — ASPIRIN 324 MG: 81 TABLET, CHEWABLE ORAL at 15:08

## 2023-11-18 RX ADMIN — IOPAMIDOL 100 ML: 755 INJECTION, SOLUTION INTRAVENOUS at 16:30

## 2023-11-18 RX ADMIN — KETOROLAC TROMETHAMINE 30 MG: 30 INJECTION, SOLUTION INTRAMUSCULAR; INTRAVENOUS at 21:31

## 2023-11-18 RX ADMIN — ENOXAPARIN SODIUM 40 MG: 100 INJECTION SUBCUTANEOUS at 20:08

## 2023-11-18 RX ADMIN — ONDANSETRON 4 MG: 2 INJECTION INTRAMUSCULAR; INTRAVENOUS at 15:41

## 2023-11-18 NOTE — ED PROVIDER NOTES
Subjective   History of Present Illness  Patient is a 58-year-old lady who was seen by me recently for chest pain she attributed this pain to a chiropractor adjustment and did not want to be admitted or stress tested.  Patient was then discharged.  She said that she went home and then saw another chiropractor who adjusted her and the pain got complete relief and today had sudden onset of chest discomfort she is not able to lay flat because of the pain.  Subsignificant in the ED for evaluation hemodynamically stable EKG looks normal.    Chest Pain  Pain location:  Substernal area, L chest and R chest  Pain quality: sharp and stabbing    Pain radiates to:  L shoulder and R shoulder  Pain severity:  Severe  Onset quality:  Sudden  Duration:  5 hours  Timing:  Constant  Progression:  Worsening  Chronicity:  Recurrent  Context: at rest    Context: not breathing, not drug use and not lifting    Relieved by:  Nothing  Worsened by:  Certain positions  Ineffective treatments:  None tried  Associated symptoms: no abdominal pain, no AICD problem, no altered mental status, no anorexia, no anxiety, no back pain, no diaphoresis, no dizziness, no dysphagia, no fatigue, no fever, no headache, no nausea, no numbness, no orthopnea, no palpitations, no PND, no vomiting and no weakness    Risk factors: hypertension    Risk factors: no aortic disease, no birth control, no immobilization, not male and no Marfan's syndrome        Review of Systems   Constitutional: Negative.  Negative for activity change, appetite change, chills, diaphoresis, fatigue and fever.   HENT:  Negative for congestion, drooling, ear pain, facial swelling, hearing loss, sinus pressure, sore throat and trouble swallowing.    Eyes: Negative.  Negative for discharge.   Cardiovascular:  Positive for chest pain. Negative for palpitations, orthopnea and PND.   Gastrointestinal:  Negative for abdominal distention, abdominal pain, anorexia, blood in stool, diarrhea,  nausea and vomiting.   Endocrine: Negative.  Negative for cold intolerance, heat intolerance, polydipsia, polyphagia and polyuria.   Genitourinary: Negative.  Negative for dysuria, flank pain and urgency.   Musculoskeletal: Negative.  Negative for arthralgias, back pain, myalgias and neck stiffness.   Skin: Negative.  Negative for color change, pallor and rash.   Allergic/Immunologic: Negative.    Neurological: Negative.  Negative for dizziness, seizures, speech difficulty, weakness, numbness and headaches.   Hematological: Negative.  Negative for adenopathy.   All other systems reviewed and are negative.      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    Liver problem     Myocardial infarction 11/2012    NSTEMI    Rheumatoid arthritis     Skin cancer     Stroke     showed up on mri about 5 years ago with no residual    Ulnar neuropathy     Visual impairment        Allergies   Allergen Reactions    Bee Venom Anaphylaxis    Clopidogrel Hives and Unknown (See Comments)     Night terrors      Codeine Anaphylaxis    Insect Extract Anaphylaxis     Bee stings    Morphine And Related Shortness Of Breath, Hives and Unknown (See Comments)     tachycardia      Penicillin G Hives    Shellfish Allergy Anaphylaxis    Shellfish-Derived Products Anaphylaxis    Methotrexate Other (See Comments)     hepatotxicity    Penicillin G Benzathine Unknown (See Comments)    Atorvastatin Myalgia and Unknown (See Comments)    Hydrocodone Palpitations     Heart races and feels like bugs crawling under skin    Hydroxychloroquine Other (See Comments) and Unknown (See Comments)     Night terrors      Menthol Rash       Past Surgical History:    Procedure Laterality Date    ANGIOPLASTY      11/2012    APPENDECTOMY      CARDIAC CATHETERIZATION  07/21/2015 11/1/12, 4/21/22    COLONOSCOPY      COLONOSCOPY N/A 04/24/2023    Procedure: COLONOSCOPY WITH ANESTHESIA;  Surgeon: Rick Tidwell DO;  Location: [facility] ENDOSCOPY;  Service: Gastroenterology;  Laterality: N/A;  preop; hx of polyps   postop; polyps  PCP Eric Feldman     CORONARY STENT PLACEMENT      x3 per patient, X 1 -  11/2012, x 2 - 4/21/22    ENDOSCOPY      ENDOSCOPY N/A 04/24/2023    Procedure: ESOPHAGOGASTRODUODENOSCOPY WITH ANESTHESIA;  Surgeon: Rick Tidwell DO;  Location: [facility] ENDOSCOPY;  Service: Gastroenterology;  Laterality: N/A;  preop; hx of varices   postop; varices   PCP Eric Feldman     HAND RECONSTRUCTION Right 06/15/1967    HYSTERECTOMY      2016    SUBTOTAL HYSTERECTOMY      TONSILLECTOMY         Family History   Problem Relation Age of Onset    Hyperlipidemia Mother     Hypertension Mother     Stroke Mother     Diabetes Mother     Arthritis Mother     Hyperlipidemia Father     Hypertension Father     Heart attack Father     Arthritis Father     Early death Father     Heart failure Sister     Stroke Sister     Heart attack Brother     Alcohol abuse Brother     Heart attack Brother     Mental illness Maternal Aunt         Father's  sister    Breast cancer Maternal Aunt     Heart failure Paternal Aunt     Heart failure Paternal Uncle     Heart failure Paternal Uncle     Colon cancer Neg Hx     Colon polyps Neg Hx     Esophageal cancer Neg Hx        Social History     Socioeconomic History    Marital status:    Tobacco Use    Smoking status: Former     Packs/day: 1.00     Years: 30.00     Additional pack years: 0.00     Total pack years: 30.00     Types: Cigarettes     Start date: 1/1/1993     Quit date: 1/1/2013     Years since quitting: 10.8     Passive exposure: Past    Smokeless tobacco: Never    Tobacco comments:     Not somking now 10 years   Vaping Use     Vaping Use: Former   Substance and Sexual Activity    Alcohol use: Not Currently     Comment: Twice a. Year    Drug use: Never    Sexual activity: Yes     Partners: Male     Birth control/protection: Hysterectomy     Comment:  to same man for 39 yrs           Objective   Physical Exam  Vitals and nursing note reviewed. Exam conducted with a chaperone present.   Constitutional:       General: She is not in acute distress.     Appearance: Normal appearance. She is well-developed. She is not toxic-appearing or diaphoretic.   HENT:      Head: Normocephalic and atraumatic.      Right Ear: External ear normal.      Nose: Nose normal.      Mouth/Throat:      Mouth: Mucous membranes are moist.   Eyes:      Conjunctiva/sclera: Conjunctivae normal.      Pupils: Pupils are equal, round, and reactive to light.   Cardiovascular:      Rate and Rhythm: Normal rate and regular rhythm.      Chest Wall: PMI is not displaced.      Pulses: Normal pulses. No decreased pulses.      Heart sounds: Normal heart sounds. No murmur heard.     No systolic murmur is present.      No diastolic murmur is present.      No S3 sounds.   Pulmonary:      Effort: Pulmonary effort is normal. No tachypnea, accessory muscle usage or respiratory distress.      Breath sounds: Normal breath sounds. No stridor. No decreased breath sounds, wheezing, rhonchi or rales.   Chest:      Chest wall: No tenderness or crepitus.   Abdominal:      General: Bowel sounds are normal. There is no distension.      Palpations: Abdomen is soft.      Tenderness: There is no abdominal tenderness. There is no guarding.   Musculoskeletal:         General: No swelling or tenderness. Normal range of motion.      Cervical back: Normal range of motion and neck supple. No rigidity.      Right lower leg: No tenderness.      Left lower leg: No tenderness.      Comments: Lower extremity exam bilaterally is unremarkable.  There is no right or left calf tenderness .  There is no  palpable venous cord.  No obvious difference in the size of the legs.  No pitting edema.  The dorsalis pedis and posterior tibial femoral and popliteal pulses are palpable and +2 bilaterally.  Homans sign is negative   Skin:     General: Skin is warm.      Capillary Refill: Capillary refill takes less than 2 seconds.      Coloration: Skin is not jaundiced.      Findings: No erythema or rash.   Neurological:      General: No focal deficit present.      Mental Status: She is alert and oriented to person, place, and time. Mental status is at baseline.      Cranial Nerves: No cranial nerve deficit.      Motor: No weakness.      Coordination: Coordination normal.      Deep Tendon Reflexes: Reflexes are normal and symmetric. Reflexes normal.   Psychiatric:         Mood and Affect: Mood normal.         Procedures           ED Course  ED Course as of 11/19/23 0731   Sat Nov 18, 2023   1511 Normal sinus rhythm [TS]   1706  The ascending thoracic aorta is ectatic measuring 3.7 cm. No aneurysm  or dissection is seen. There is atheromatous disease of the thoracic  aorta. There are probable coronary stents. There is cardiomegaly. No CT  evidence of pulmonary embolus.  2. Paraseptal and centrilobular emphysema. Dependent infiltrates, likely  atelectasis.  3. Gallstone in the gallbladder.  4. Liver appears macronodular. Splenomegaly. Probable liver cirrhosis.     These findings were discussed with the patient [TS]   1724 CP [TS]      ED Course User Index  [TS] Rachid Pimentel MD                      HEART Score: 4                      Medical Decision Making  Differential Diagnosis:  I considered chest wall pain, muscle strain, costochondritis, pleurisy, rib fracture, herpes zoster, cardiovascular etiology, myocardial infarction, intermediate coronary syndrome, unstable angina, angina, aortic dissection, pericarditis, pulmonary etiology, pulmonary embolism, pneumonia, pneumothorax, lung cancer, gastroesophageal reflux disease,  esophagitis, esophageal spasm and gastrointestinal etiology as a possible cause of chest pain in this patient. This is a partial list of diagnoses considered.        Problems Addressed:  Aortic ectasia: undiagnosed new problem with uncertain prognosis     Details: Aortic ectasia no dissection will need follow-up with the primary MD.  Calculus of gallbladder with biliary obstruction but without cholecystitis:     Details: Incidental finding no evidence of any acute obstruction or infection  Chest pain, unspecified type: complicated acute illness or injury     Details: Nonspecific chest pain cardiac markers are performed.  CT of the chest negative for dissection.  This is her second visit to the ED will be admitted for cardiology evaluation.  Liver disease: chronic illness or injury     Details: To bear she is got a history of cirrhosis.  Primary hypertension: chronic illness or injury     Details: Patient has been elevated will require further evaluation assessment and a blood pressure monitoring at home as an outpatient after discharge.    Amount and/or Complexity of Data Reviewed  Labs: ordered.     Details: Labs reviewed  Radiology: ordered.     Details: CT scan findings reviewed discussed the patient  ECG/medicine tests: ordered.     Details: Nonischemic EKG  Discussion of management or test interpretation with external provider(s): [place] with Dr. Brian Hillman  OTC drugs.  Prescription drug management.  Decision regarding hospitalization.  Risk Details: Well score 0  Heart score 4  Patient with chest pain will be admitted to medicine service.        Final diagnoses:   Chest pain, unspecified type   Aortic ectasia   Primary hypertension   Calculus of gallbladder with biliary obstruction but without cholecystitis   Liver disease       ED Disposition  ED Disposition       ED Disposition   Decision to Admit    Condition   --    Comment   Level of Care: Telemetry [5]   Diagnosis: Chest pain [202814]   Admitting  Physician: FLASH COLLIER [[id number]]   Attending Physician: FLASH COLLIER [[id number]]                 No follow-up provider specified.       Medication List      No changes were made to your prescriptions during this visit.            Rachid Pimentel MD  11/18/23 1511       Rachid Pimentel MD  11/19/23 0731

## 2023-11-18 NOTE — H&P
[facility] Medicine Services  HISTORY AND PHYSICAL    Date of Admission: 11/18/2023  Primary Care Physician: Geraldo Feldman DO    Subjective   Primary Historian: Patient and     Chief Complaint: Chest pain.    History of Present Illness  Patient experienced onset of chest pain at 6:30 AM on 1117.  It was sharp.  It radiated across to her anterior chest from shoulder to shoulder through her clavicles, rated up into her neck bilaterally, and into her jaws.  She did go see her chiropractor to see if he could help.  She got minimal relief.  Her pain was associated with nausea sweating and shortness of breath.    Patient does have a past medical history of coronary artery disease with stent placement x3.        Review of Systems   Otherwise complete ROS reviewed and negative except as mentioned in the HPI.    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 ENDOSCOPY;  Service: Gastroenterology;  Laterality: N/A;  preop; hx of polyps   postop; polyps  PCP Eric Feldman     CORONARY STENT PLACEMENT      x3 per patient, X 1 -   11/2012, x 2 - 4/21/22    ENDOSCOPY      ENDOSCOPY N/A 04/24/2023    Procedure: ESOPHAGOGASTRODUODENOSCOPY WITH ANESTHESIA;  Surgeon: Rick Tidwell DO;  Location: [facility] ENDOSCOPY;  Service: Gastroenterology;  Laterality: N/A;  preop; hx of varices   postop; varices   PCP Eric Feldman     HAND RECONSTRUCTION Right 06/15/1967    HYSTERECTOMY      2016    SUBTOTAL HYSTERECTOMY      TONSILLECTOMY       Social History:  reports that she quit smoking about 10 years ago. Her smoking use included cigarettes. She started smoking about 30 years ago. She has a 30.00 pack-year smoking history. She has been exposed to tobacco smoke. She has never used smokeless tobacco. She reports that she does not currently use alcohol. She reports that she does not use drugs.    Family History: family history includes Alcohol abuse in her brother; Arthritis in her father and mother; Breast cancer in her maternal aunt; Diabetes in her mother; Early death in her father; Heart attack in her brother, brother, and father; Heart failure in her paternal aunt, paternal uncle, paternal uncle, and sister; Hyperlipidemia in her father and mother; Hypertension in her father and mother; Mental illness in her maternal aunt; Stroke in her mother and sister.       Allergies:  Allergies   Allergen Reactions    Bee Venom Anaphylaxis    Clopidogrel Hives and Unknown (See Comments)     Night terrors      Codeine Anaphylaxis    Insect Extract Anaphylaxis     Bee stings    Morphine And Related Shortness Of Breath, Hives and Unknown (See Comments)     tachycardia      Penicillin G Hives    Shellfish Allergy Anaphylaxis    Shellfish-Derived Products Anaphylaxis    Methotrexate Other (See Comments)     hepatotxicity    Penicillin G Benzathine Unknown (See Comments)    Atorvastatin Myalgia and Unknown (See Comments)    Hydrocodone Palpitations     Heart races and feels like bugs crawling under skin    Hydroxychloroquine Other (See Comments) and Unknown (See  Comments)     Night terrors      Menthol Rash       Medications:  Prior to Admission medications    Medication Sig Start Date End Date Taking? Authorizing Provider   aspirin 81 MG EC tablet Take 1 tablet by mouth Daily. 5/4/23   Ricky Slade MD   azaTHIOprine (IMURAN) 50 MG tablet Take 1 tablet by mouth 2 (Two) Times a Day.    ProviderMee MD   carvedilol (COREG) 6.25 MG tablet Take 1 tablet by mouth 2 (Two) Times a Day. 5/4/23   Ricky Slade MD   cetirizine (zyrTEC) 10 MG tablet Take 1 tablet by mouth As Needed for Allergies.    ProviderMee MD   dapagliflozin Propanediol 10 MG tablet Take 10 mg by mouth Daily. 11/8/23   Geraldo Feldman DO   EPINEPHrine (EPIPEN) 0.3 MG/0.3ML solution auto-injector injection If needed 6/20/23   Mee Kolb MD   ezetimibe (ZETIA) 10 MG tablet Take 1 tablet by mouth Daily. 5/4/23   Ricky Slade MD   fluticasone (FLONASE) 50 MCG/ACT nasal spray 2 sprays into the nostril(s) as directed by provider Daily. 8/1/23   Geraldo Feldman DO   Inclisiran Sodium 284 MG/1.5ML solution prefilled syringe Inject  under the skin into the appropriate area as directed.    ProviderMee MD   isosorbide mononitrate (IMDUR) 30 MG 24 hr tablet TAKE 0.5 TABLETS (15 MG) BY MOUTH DAILY IN AM. DO NOT TAKE IF BP < 100 11/10/23   Geraldo Feldman DO   LORazepam (Ativan) 0.5 MG tablet Take 1 tablet by mouth At Night As Needed for Anxiety. 8/1/23   Geraldo Feldman DO   nitroglycerin (Nitrostat) 0.3 MG SL tablet Place 1 tablet under the tongue Every 5 (Five) Minutes As Needed for Chest Pain. Take no more than 3 doses in 15 minutes. 12/20/22   Geraldo Feldman DO   omeprazole (priLOSEC) 40 MG capsule TAKE 1 CAPSULE BY MOUTH EVERY DAY 9/20/23   Geraldo Feldman DO   sertraline (ZOLOFT) 100 MG tablet Take 2 tablets by mouth Daily. 8/1/23   Geraldo Feldman DO   sulfaSALAzine (AZULFIDINE) 500 MG tablet TAKE 2 TABLETS BY MOUTH DAILY 3/23/23   Geraldo Feldman,  "DO   ticagrelor (BRILINTA) 90 MG tablet tablet Take 1 tablet by mouth 2 (Two) Times a Day. 5/15/23   Geraldo Feldman, DO     I have utilized all available immediate resources to obtain, update, or review the patient's current medications (including all prescriptions, over-the-counter products, herbals, cannabis/cannabidiol products, and vitamin/mineral/dietary (nutritional) supplements).    Objective     Vital Signs: /90 (BP Location: Right arm, Patient Position: Sitting)   Pulse 69   Temp 97 °F (36.1 °C) (Temporal)   Resp 18   Ht 165.1 cm (65\")   Wt 87.3 kg (192 lb 8 oz)   SpO2 97%   BMI 32.03 kg/m²   Physical Exam  Vitals and nursing note reviewed.   Constitutional:       General: She is in acute distress.      Appearance: She is well-developed.   HENT:      Head: Normocephalic and atraumatic.      Right Ear: External ear normal.      Left Ear: External ear normal.      Nose: Nose normal.      Mouth/Throat:      Mouth: Mucous membranes are moist.   Eyes:      Extraocular Movements: Extraocular movements intact.      Conjunctiva/sclera: Conjunctivae normal.      Pupils: Pupils are equal, round, and reactive to light.   Neck:      Thyroid: No thyromegaly.      Vascular: No JVD.      Trachea: No tracheal deviation.   Cardiovascular:      Rate and Rhythm: Normal rate and regular rhythm.      Pulses: Normal pulses.      Heart sounds: Normal heart sounds. No murmur heard.     No friction rub. No gallop.   Pulmonary:      Effort: Pulmonary effort is normal.      Breath sounds: Normal breath sounds.   Abdominal:      General: Bowel sounds are normal. There is no distension.      Palpations: Abdomen is soft.      Tenderness: There is no abdominal tenderness.   Musculoskeletal:      Comments: Patient with significant RA changes in hands and feet.    Decreased AROM through shoulders which may also be related to RA   Lymphadenopathy:      Cervical: No cervical adenopathy.   Skin:     General: Skin is warm and " dry.      Capillary Refill: Capillary refill takes less than 2 seconds.   Neurological:      Mental Status: She is alert and oriented to person, place, and time.      Cranial Nerves: No cranial nerve deficit.      Coordination: Coordination normal.   Psychiatric:         Mood and Affect: Mood normal.         Behavior: Behavior normal.              Results Reviewed:  Lab Results (last 24 hours)       Procedure Component Value Units Date/Time    High Sensitivity Troponin T 2Hr [[id number]]  (Normal) Collected: 11/18/23 1724    Specimen: Blood Updated: 11/18/23 1758     HS Troponin T 10 ng/L      Troponin T Delta 1 ng/L     Narrative:      High Sensitive Troponin T Reference Range:  <14.0 ng/L- Negative Female for AMI  <22.0 ng/L- Negative Male for AMI  >=14 - Abnormal Female indicating possible myocardial injury.  >=22 - Abnormal Male indicating possible myocardial injury.   Clinicians would have to utilize clinical acumen, EKG, Troponin, and serial changes to determine if it is an Acute Myocardial Infarction or myocardial injury due to an underlying chronic condition.         [place] Draw [[id number]] Collected: 11/18/23 1513    Specimen: Blood Updated: 11/18/23 1615    Narrative:      The following orders were created for panel order [place] Draw.  Procedure                               Abnormality         Status                     ---------                               -----------         ------                     Green Top (Gel)[[id number]]                                  Final result               Lavender Top[[id number]]                                     Final result               Red Top[[id number]]                                          Final result               Light Blue Top[[id number]]                                   Final result                 Please view results for these tests on the individual orders.    Green Top (Gel) [[id number]] Collected: 11/18/23 1513    Specimen: Blood Updated: 11/18/23  1615     Extra Tube Hold for add-ons.     Comment: Auto resulted.       Lavender Top [[id number]] Collected: 11/18/23 1513    Specimen: Blood Updated: 11/18/23 1615     Extra Tube hold for add-on     Comment: Auto resulted       Red Top [[id number]] Collected: 11/18/23 1513    Specimen: Blood Updated: 11/18/23 1615     Extra Tube Hold for add-ons.     Comment: Auto resulted.       Light Blue Top [[id number]] Collected: 11/18/23 1513    Specimen: Blood Updated: 11/18/23 1615     Extra Tube Hold for add-ons.     Comment: Auto resulted       C-reactive Protein [[id number]]  (Abnormal) Collected: 11/18/23 1513    Specimen: Blood Updated: 11/18/23 1546     C-Reactive Protein 11.45 mg/dL     Comprehensive Metabolic Panel [[id number]]  (Abnormal) Collected: 11/18/23 1513    Specimen: Blood Updated: 11/18/23 1545     Glucose 111 mg/dL      BUN 18 mg/dL      Creatinine 1.17 mg/dL      Sodium 138 mmol/L      Potassium 4.3 mmol/L      Chloride 102 mmol/L      CO2 26.0 mmol/L      Calcium 9.7 mg/dL      Total Protein 8.5 g/dL      Albumin 4.9 g/dL      ALT (SGPT) 9 U/L      AST (SGOT) 14 U/L      Alkaline Phosphatase 88 U/L      Total Bilirubin 0.6 mg/dL      Globulin 3.6 gm/dL      A/G Ratio 1.4 g/dL      BUN/Creatinine Ratio 15.4     Anion Gap 10.0 mmol/L      eGFR 53.9 mL/min/1.73     Narrative:      GFR Normal >60  Chronic Kidney Disease <60  Kidney Failure <15      High Sensitivity Troponin T [[id number]]  (Normal) Collected: 11/18/23 1513    Specimen: Blood Updated: 11/18/23 1542     HS Troponin T 9 ng/L     Narrative:      High Sensitive Troponin T Reference Range:  <14.0 ng/L- Negative Female for AMI  <22.0 ng/L- Negative Male for AMI  >=14 - Abnormal Female indicating possible myocardial injury.  >=22 - Abnormal Male indicating possible myocardial injury.   Clinicians would have to utilize clinical acumen, EKG, Troponin, and serial changes to determine if it is an Acute Myocardial Infarction or myocardial injury due to an  underlying chronic condition.         CBC & Differential [[id number]]  (Abnormal) Collected: 11/18/23 1513    Specimen: Blood Updated: 11/18/23 1524    Narrative:      The following orders were created for panel order CBC & Differential.  Procedure                               Abnormality         Status                     ---------                               -----------         ------                     CBC Auto Differential[[id number]]        Abnormal            Final result                 Please view results for these tests on the individual orders.    CBC Auto Differential [[id number]]  (Abnormal) Collected: 11/18/23 1513    Specimen: Blood Updated: 11/18/23 1524     WBC 6.36 10*3/mm3      RBC 5.04 10*6/mm3      Hemoglobin 14.6 g/dL      Hematocrit 44.6 %      MCV 88.5 fL      MCH 29.0 pg      MCHC 32.7 g/dL      RDW 15.2 %      RDW-SD 49.2 fl      MPV 9.4 fL      Platelets 164 10*3/mm3      Neutrophil % 82.7 %      Lymphocyte % 9.1 %      Monocyte % 6.8 %      Eosinophil % 0.6 %      Basophil % 0.2 %      Immature Grans % 0.6 %      Neutrophils, Absolute 5.26 10*3/mm3      Lymphocytes, Absolute 0.58 10*3/mm3      Monocytes, Absolute 0.43 10*3/mm3      Eosinophils, Absolute 0.04 10*3/mm3      Basophils, Absolute 0.01 10*3/mm3      Immature Grans, Absolute 0.04 10*3/mm3      nRBC 0.0 /100 WBC           Imaging Results (Last 24 Hours)       Procedure Component Value Units Date/Time    CT Angiogram Chest [[id number]] Collected: 11/18/23 1656     Updated: 11/18/23 1705    Narrative:      EXAMINATION:  CT ANGIOGRAM CHEST-  11/18/2023 4:24 PM CST     HISTORY: Acute aortic syndrome (AAS) suspected.     COMPARISON : No comparison study.     DLP: 319 mGy-cm. Automated dosage reduction technique was utilized to  decrease patient dosage.     TECHNIQUE: CT angio was performed of the chest with IV contrast.  Coronal, sagittal and 3-D reconstruction were performed.     INDEPENDENT 3-D WORKSTATION UTILIZED FOR  RECONSTRUCTION: Yes. A  radiologist was not present in the department.     MEDIASTINUM, HEART AND VASCULAR STRUCTURES: There is atheromatous  disease of the thoracic aorta. There are probable left and right  coronary stents. The ascending thoracic aorta is ectatic measuring 3.7  cm. There is no evidence of aortic dissection or aneurysm. The main  pulmonary artery segment measures 2.7 cm. No pulmonary embolus is seen.  There is cardiomegaly. A small amount of pericardial fluid is likely  physiologic.     LUNGS: There is paraseptal and centrilobular emphysema. There are  dependent infiltrates. No pleural effusion is seen.     UPPER ABDOMEN: There is a gallstone in the gallbladder. The liver  appears somewhat macronodular. There is splenomegaly.     BONES: No acute bony abnormality is seen.          Impression:      1. The ascending thoracic aorta is ectatic measuring 3.7 cm. No aneurysm  or dissection is seen. There is atheromatous disease of the thoracic  aorta. There are probable coronary stents. There is cardiomegaly. No CT  evidence of pulmonary embolus.  2. Paraseptal and centrilobular emphysema. Dependent infiltrates, likely  atelectasis.  3. Gallstone in the gallbladder.  4. Liver appears macronodular. Splenomegaly. Probable liver cirrhosis.        The full report of this exam was immediately signed and available to the  emergency room. The patient is currently in the emergency room.     This report was signed and finalized on 11/18/2023 5:01 PM CST by Dr. Job Olmedo MD.       XR Chest 1 View [[id number]] Collected: 11/18/23 1553     Updated: 11/18/23 1558    Narrative:      EXAMINATION:  XR CHEST 1 VW-  11/18/2023 3:37 PM CST     HISTORY: Chest pain protocol. Hyperlipidemia. Hypertension.     COMPARISON: 11/16/2023.     TECHNIQUE: Single view AP image.     FINDINGS: There is hypoventilation with vascular crowding. There are  linear densities in the lung bases. There is mild stable bronchial  wall  thickening. The heart size is normal. There is a probable right coronary  artery stent. No acute appearing bony abnormality is seen.          Impression:      1. Very poor inspiration with vascular crowding.  2. Linear densities in the lung bases, likely atelectasis. Stable mild  bronchial wall thickening.           This report was signed and finalized on 11/18/2023 3:55 PM CST by Dr. Job Olmedo MD.             I have personally reviewed and interpreted the radiology studies and ECG obtained at time of admission.     Assessment / Plan   Assessment:   Active Hospital Problems    Diagnosis     **Chest pain     Rheumatoid arthritis involving multiple sites with positive rheumatoid factor     Coronary artery disease     Mixed hyperlipidemia        Treatment Plan  The patient will be admitted to my service here at [facility].   Admit to tele  Consult cardiology  Xray c spine and shoulders  Resume home medications.     Discussed with patient inadvisability of have HVLA techniques for neck manipulation in a patient with a long term dx of RA.       Medical Decision Making  Number and Complexity of problems:   Chest pain high complexity  Rheumatoid arthritis moderate complexity coronary artery disease moderate complexity mixed hyperlipidemia moderate complexity        Differential Diagnosis: musculoskeletal pain    Conditions and Status        Condition is unchanged.     [facility] Data  External documents reviewed: Dr Hawkins records reviewed  Cardiac tracing (EKG, telemetry) interpretation: reviewed  Radiology interpretation: reviewed  Labs reviewed: reviewed  Any tests that were considered but not ordered: none     Decision rules/scores evaluated (example [id number], [place], etc): none     Discussed with: patient and spouse     Care Planning  Shared decision making: patient and spouse  Code status and discussions: full    Disposition  Social Determinants of Health that impact treatment or disposition:  none  Estimated length of stay is 1-3 days.     I confirmed that the patient's advanced care plan is present, code status is documented, and a surrogate decision maker is listed in the patient's medical record.     The patient's surrogate decision maker is .     The patient was seen and examined by me on 11/18/2023 at 1740.    Electronically signed by Yvonne Stevenson, 11/18/23, 17:59 CST.

## 2023-11-18 NOTE — ED NOTES
Nursing report ED to floor  Alessandra Baker  58 y.o.  female    HPI:   Chief Complaint   Patient presents with    Chest Pain       Admitting doctor:   Yvonne Stevenson    Consulting provider(s):  Consults       No orders found from 10/20/2023 to 11/19/2023.             Admitting diagnosis:   The primary encounter diagnosis was Chest pain, unspecified type. Diagnoses of Aortic ectasia and Primary hypertension were also pertinent to this visit.    Code status:   Current Code Status       Date Active Code Status Order ID Comments User Context       Not on file            Allergies:   Bee venom, Clopidogrel, Codeine, Insect extract, Morphine and related, Penicillin g, Shellfish allergy, Shellfish-derived products, Methotrexate, Penicillin g benzathine, Atorvastatin, Hydrocodone, Hydroxychloroquine, and Menthol    Intake and Output  No intake or output data in the 24 hours ending 11/18/23 1750    Weight:       11/18/23  1458   Weight: 87.3 kg (192 lb 8 oz)       Most recent vitals:   Vitals:    11/18/23 1534 11/18/23 1604 11/18/23 1634 11/18/23 1704   BP:       BP Location:       Patient Position:       Pulse: 70 65 76 69   Resp:       Temp:       TempSrc:       SpO2: 97% 95% 95% 97%   Weight:       Height:         Oxygen Therapy: .    Active LDAs/IV Access:   Lines, Drains & Airways       Active LDAs       Name Placement date Placement time Site Days    Peripheral IV 11/18/23 1511 Right Antecubital 11/18/23  1511  Antecubital  less than 1                    Labs (abnormal labs have a star):   Labs Reviewed   COMPREHENSIVE METABOLIC PANEL - Abnormal; Notable for the following components:       Result Value    Glucose 111 (*)     Creatinine 1.17 (*)     eGFR 53.9 (*)     All other components within normal limits    Narrative:     GFR Normal >60  Chronic Kidney Disease <60  Kidney Failure <15     CBC WITH AUTO DIFFERENTIAL - Abnormal; Notable for the following components:    Neutrophil % 82.7 (*)     Lymphocyte % 9.1 (*)      Immature Grans % 0.6 (*)     Lymphocytes, Absolute 0.58 (*)     All other components within normal limits   C-REACTIVE PROTEIN - Abnormal; Notable for the following components:    C-Reactive Protein 11.45 (*)     All other components within normal limits   TROPONIN - Normal    Narrative:     High Sensitive Troponin T Reference Range:  <14.0 ng/L- Negative Female for AMI  <22.0 ng/L- Negative Male for AMI  >=14 - Abnormal Female indicating possible myocardial injury.  >=22 - Abnormal Male indicating possible myocardial injury.   Clinicians would have to utilize clinical acumen, EKG, Troponin, and serial changes to determine if it is an Acute Myocardial Infarction or myocardial injury due to an underlying chronic condition.        RAINB[place] DRAW    Narrative:     The following orders were created for panel order [place] Draw.  Procedure                               Abnormality         Status                     ---------                               -----------         ------                     Green Top (Gel)[[id number]]                                  Final result               Lavender Top[[id number]]                                     Final result               Red Top[[id number]]                                          Final result               Light Blue Top[[id number]]                                   Final result                 Please view results for these tests on the individual orders.   HIGH SENSITIVITIY TROPONIN T 2HR   CBC AND DIFFERENTIAL    Narrative:     The following orders were created for panel order CBC & Differential.  Procedure                               Abnormality         Status                     ---------                               -----------         ------                     CBC Auto Differential[[id number]]        Abnormal            Final result                 Please view results for these tests on the individual orders.   GREEN TOP   LAVENDER TOP   RED TOP   LIGHT BLUE TOP        Meds given in ED:   Medications   sodium chloride 0.9 % flush 10 mL (has no administration in time range)   aspirin chewable tablet 324 mg (324 mg Oral Given 11/18/23 1508)   fentaNYL citrate (PF) (SUBLIMAZE) injection 50 mcg (50 mcg Intravenous Given 11/18/23 1540)   ondansetron (ZOFRAN) injection 4 mg (4 mg Intravenous Given 11/18/23 1541)   iopamidol (ISOVUE-370) 76 % injection 100 mL (100 mL Intravenous Given 11/18/23 1630)           NIH Stroke Scale:       Isolation/Infection(s):  No active isolations   No active infections     COVID Testing  Collected .  Resulted .    Nursing report ED to floor:  Mental status: .  Ambulatory status: .  Precautions: .    ED nurse phone extentsion- ..

## 2023-11-19 ENCOUNTER — APPOINTMENT (OUTPATIENT)
Dept: CARDIOLOGY | Facility: HOSPITAL | Age: 58
DRG: 234 | End: 2023-11-19
Payer: MEDICARE

## 2023-11-19 ENCOUNTER — APPOINTMENT (OUTPATIENT)
Dept: CT IMAGING | Facility: HOSPITAL | Age: 58
DRG: 234 | End: 2023-11-19
Payer: MEDICARE

## 2023-11-19 PROBLEM — I48.91 ATRIAL FIBRILLATION WITH RVR: Status: ACTIVE | Noted: 2023-11-19

## 2023-11-19 PROBLEM — M48.00 CENTRAL STENOSIS OF SPINAL CANAL: Status: ACTIVE | Noted: 2023-11-19

## 2023-11-19 PROBLEM — M48.02 NEUROFORAMINAL STENOSIS OF CERVICAL SPINE: Status: ACTIVE | Noted: 2023-11-19

## 2023-11-19 LAB
ALBUMIN SERPL-MCNC: 4.2 G/DL (ref 3.5–5.2)
ALBUMIN/GLOB SERPL: 1.4 G/DL
ALP SERPL-CCNC: 70 U/L (ref 39–117)
ALT SERPL W P-5'-P-CCNC: 7 U/L (ref 1–33)
ANION GAP SERPL CALCULATED.3IONS-SCNC: 8 MMOL/L (ref 5–15)
AST SERPL-CCNC: 10 U/L (ref 1–32)
BASOPHILS # BLD AUTO: 0.01 10*3/MM3 (ref 0–0.2)
BASOPHILS NFR BLD AUTO: 0.3 % (ref 0–1.5)
BH CV ECHO MEAS - AO MAX PG: 13.4 MMHG
BH CV ECHO MEAS - AO MEAN PG: 7 MMHG
BH CV ECHO MEAS - AO ROOT DIAM: 3.4 CM
BH CV ECHO MEAS - AO V2 MAX: 183 CM/SEC
BH CV ECHO MEAS - AO V2 VTI: 36.1 CM
BH CV ECHO MEAS - AVA(I,D): 1.94 CM2
BH CV ECHO MEAS - EDV(CUBED): 58.9 ML
BH CV ECHO MEAS - EDV(MOD-SP4): 92.5 ML
BH CV ECHO MEAS - EF(MOD-SP4): 68.2 %
BH CV ECHO MEAS - ESV(CUBED): 17.6 ML
BH CV ECHO MEAS - ESV(MOD-SP4): 29.4 ML
BH CV ECHO MEAS - FS: 33.2 %
BH CV ECHO MEAS - IVS/LVPW: 1.1 CM
BH CV ECHO MEAS - IVSD: 1.28 CM
BH CV ECHO MEAS - LA DIMENSION: 3.8 CM
BH CV ECHO MEAS - LAT PEAK E' VEL: 10.3 CM/SEC
BH CV ECHO MEAS - LV DIASTOLIC VOL/BSA (35-75): 47.8 CM2
BH CV ECHO MEAS - LV MASS(C)D: 162.7 GRAMS
BH CV ECHO MEAS - LV MAX PG: 4.8 MMHG
BH CV ECHO MEAS - LV MEAN PG: 2 MMHG
BH CV ECHO MEAS - LV SYSTOLIC VOL/BSA (12-30): 15.2 CM2
BH CV ECHO MEAS - LV V1 MAX: 109 CM/SEC
BH CV ECHO MEAS - LV V1 VTI: 22.3 CM
BH CV ECHO MEAS - LVIDD: 3.9 CM
BH CV ECHO MEAS - LVIDS: 2.6 CM
BH CV ECHO MEAS - LVOT AREA: 3.1 CM2
BH CV ECHO MEAS - LVOT DIAM: 2 CM
BH CV ECHO MEAS - LVPWD: 1.16 CM
BH CV ECHO MEAS - MED PEAK E' VEL: 7 CM/SEC
BH CV ECHO MEAS - MV A MAX VEL: 108 CM/SEC
BH CV ECHO MEAS - MV DEC TIME: 0.14 SEC
BH CV ECHO MEAS - MV E MAX VEL: 84.8 CM/SEC
BH CV ECHO MEAS - MV E/A: 0.79
BH CV ECHO MEAS - RAP SYSTOLE: 5 MMHG
BH CV ECHO MEAS - RVSP: 22 MMHG
BH CV ECHO MEAS - SI(MOD-SP4): 32.6 ML/M2
BH CV ECHO MEAS - SV(LVOT): 70.1 ML
BH CV ECHO MEAS - SV(MOD-SP4): 63.1 ML
BH CV ECHO MEAS - TR MAX PG: 17 MMHG
BH CV ECHO MEAS - TR MAX VEL: 206 CM/SEC
BH CV ECHO MEASUREMENTS AVERAGE E/E' RATIO: 9.8
BILIRUB SERPL-MCNC: 0.4 MG/DL (ref 0–1.2)
BUN SERPL-MCNC: 22 MG/DL (ref 6–20)
BUN/CREAT SERPL: 14.6 (ref 7–25)
CALCIUM SPEC-SCNC: 9.4 MG/DL (ref 8.6–10.5)
CHLORIDE SERPL-SCNC: 104 MMOL/L (ref 98–107)
CHOLEST SERPL-MCNC: 89 MG/DL (ref 0–200)
CO2 SERPL-SCNC: 27 MMOL/L (ref 22–29)
CREAT SERPL-MCNC: 1.51 MG/DL (ref 0.57–1)
DEPRECATED RDW RBC AUTO: 51.4 FL (ref 37–54)
EGFRCR SERPLBLD CKD-EPI 2021: 39.7 ML/MIN/1.73
EOSINOPHIL # BLD AUTO: 0.06 10*3/MM3 (ref 0–0.4)
EOSINOPHIL NFR BLD AUTO: 1.5 % (ref 0.3–6.2)
ERYTHROCYTE [DISTWIDTH] IN BLOOD BY AUTOMATED COUNT: 15.4 % (ref 12.3–15.4)
GLOBULIN UR ELPH-MCNC: 3 GM/DL
GLUCOSE SERPL-MCNC: 85 MG/DL (ref 65–99)
HCT VFR BLD AUTO: 37.5 % (ref 34–46.6)
HDLC SERPL-MCNC: 29 MG/DL (ref 40–60)
HGB BLD-MCNC: 12 G/DL (ref 12–15.9)
IMM GRANULOCYTES # BLD AUTO: 0.01 10*3/MM3 (ref 0–0.05)
IMM GRANULOCYTES NFR BLD AUTO: 0.3 % (ref 0–0.5)
LDLC SERPL CALC-MCNC: 36 MG/DL (ref 0–100)
LDLC/HDLC SERPL: 1.13 {RATIO}
LEFT ATRIUM VOLUME INDEX: 24.6 ML/M2
LEFT ATRIUM VOLUME: 47.8 ML
LYMPHOCYTES # BLD AUTO: 0.85 10*3/MM3 (ref 0.7–3.1)
LYMPHOCYTES NFR BLD AUTO: 21.7 % (ref 19.6–45.3)
MCH RBC QN AUTO: 29.4 PG (ref 26.6–33)
MCHC RBC AUTO-ENTMCNC: 32 G/DL (ref 31.5–35.7)
MCV RBC AUTO: 91.9 FL (ref 79–97)
MONOCYTES # BLD AUTO: 0.31 10*3/MM3 (ref 0.1–0.9)
MONOCYTES NFR BLD AUTO: 7.9 % (ref 5–12)
NEUTROPHILS NFR BLD AUTO: 2.67 10*3/MM3 (ref 1.7–7)
NEUTROPHILS NFR BLD AUTO: 68.3 % (ref 42.7–76)
NRBC BLD AUTO-RTO: 0 /100 WBC (ref 0–0.2)
PLATELET # BLD AUTO: 123 10*3/MM3 (ref 140–450)
PMV BLD AUTO: 10.1 FL (ref 6–12)
POTASSIUM SERPL-SCNC: 4.5 MMOL/L (ref 3.5–5.2)
PROT SERPL-MCNC: 7.2 G/DL (ref 6–8.5)
QT INTERVAL: 418 MS
QT INTERVAL: 448 MS
QTC INTERVAL: 454 MS
QTC INTERVAL: 465 MS
RBC # BLD AUTO: 4.08 10*6/MM3 (ref 3.77–5.28)
SODIUM SERPL-SCNC: 139 MMOL/L (ref 136–145)
TRIGL SERPL-MCNC: 136 MG/DL (ref 0–150)
VLDLC SERPL-MCNC: 24 MG/DL (ref 5–40)
WBC NRBC COR # BLD AUTO: 3.91 10*3/MM3 (ref 3.4–10.8)

## 2023-11-19 PROCEDURE — 80061 LIPID PANEL: CPT | Performed by: FAMILY MEDICINE

## 2023-11-19 PROCEDURE — 99214 OFFICE O/P EST MOD 30 MIN: CPT | Performed by: NURSE PRACTITIONER

## 2023-11-19 PROCEDURE — 96366 THER/PROPH/DIAG IV INF ADDON: CPT

## 2023-11-19 PROCEDURE — 72125 CT NECK SPINE W/O DYE: CPT

## 2023-11-19 PROCEDURE — 93306 TTE W/DOPPLER COMPLETE: CPT

## 2023-11-19 PROCEDURE — 25010000002 KETOROLAC TROMETHAMINE PER 15 MG: Performed by: FAMILY MEDICINE

## 2023-11-19 PROCEDURE — 96375 TX/PRO/DX INJ NEW DRUG ADDON: CPT

## 2023-11-19 PROCEDURE — G0378 HOSPITAL OBSERVATION PER HR: HCPCS

## 2023-11-19 PROCEDURE — 80053 COMPREHEN METABOLIC PANEL: CPT | Performed by: FAMILY MEDICINE

## 2023-11-19 PROCEDURE — 36415 COLL VENOUS BLD VENIPUNCTURE: CPT | Performed by: FAMILY MEDICINE

## 2023-11-19 PROCEDURE — 96365 THER/PROPH/DIAG IV INF INIT: CPT

## 2023-11-19 PROCEDURE — 25010000002 DIGOXIN PER 500 MCG: Performed by: NURSE PRACTITIONER

## 2023-11-19 PROCEDURE — 85025 COMPLETE CBC W/AUTO DIFF WBC: CPT | Performed by: FAMILY MEDICINE

## 2023-11-19 PROCEDURE — 25010000002 ENOXAPARIN PER 10 MG: Performed by: NURSE PRACTITIONER

## 2023-11-19 PROCEDURE — 96376 TX/PRO/DX INJ SAME DRUG ADON: CPT

## 2023-11-19 PROCEDURE — 93010 ELECTROCARDIOGRAM REPORT: CPT | Performed by: INTERNAL MEDICINE

## 2023-11-19 PROCEDURE — 93306 TTE W/DOPPLER COMPLETE: CPT | Performed by: EMERGENCY MEDICINE

## 2023-11-19 PROCEDURE — 96372 THER/PROPH/DIAG INJ SC/IM: CPT

## 2023-11-19 RX ORDER — DIGOXIN 0.25 MG/ML
250 INJECTION INTRAMUSCULAR; INTRAVENOUS ONCE
Status: COMPLETED | OUTPATIENT
Start: 2023-11-19 | End: 2023-11-19

## 2023-11-19 RX ORDER — SULFASALAZINE 500 MG/1
1000 TABLET ORAL DAILY
Status: ON HOLD | COMMUNITY

## 2023-11-19 RX ORDER — ENOXAPARIN SODIUM 100 MG/ML
1 INJECTION SUBCUTANEOUS EVERY 12 HOURS
Status: DISCONTINUED | OUTPATIENT
Start: 2023-11-19 | End: 2023-11-20

## 2023-11-19 RX ORDER — DILTIAZEM HYDROCHLORIDE 5 MG/ML
5 INJECTION INTRAVENOUS ONCE
Status: COMPLETED | OUTPATIENT
Start: 2023-11-19 | End: 2023-11-19

## 2023-11-19 RX ORDER — DILTIAZEM HCL/D5W 125 MG/125
5-15 PLASTIC BAG, INJECTION (ML) INTRAVENOUS
Status: DISCONTINUED | OUTPATIENT
Start: 2023-11-19 | End: 2023-11-20 | Stop reason: HOSPADM

## 2023-11-19 RX ORDER — ISOSORBIDE MONONITRATE 30 MG/1
15 TABLET, EXTENDED RELEASE ORAL DAILY
Status: ON HOLD | COMMUNITY
End: 2023-11-21

## 2023-11-19 RX ORDER — OMEPRAZOLE 40 MG/1
40 CAPSULE, DELAYED RELEASE ORAL DAILY
Status: ON HOLD | COMMUNITY

## 2023-11-19 RX ADMIN — DIGOXIN 250 MCG: 0.25 INJECTION INTRAMUSCULAR; INTRAVENOUS at 12:17

## 2023-11-19 RX ADMIN — DILTIAZEM HYDROCHLORIDE 5 MG: 5 INJECTION INTRAVENOUS at 07:47

## 2023-11-19 RX ADMIN — Medication 5 MG/HR: at 07:57

## 2023-11-19 RX ADMIN — ENOXAPARIN SODIUM 90 MG: 100 INJECTION SUBCUTANEOUS at 09:56

## 2023-11-19 RX ADMIN — Medication 5 MG/HR: at 18:55

## 2023-11-19 RX ADMIN — KETOROLAC TROMETHAMINE 30 MG: 30 INJECTION, SOLUTION INTRAMUSCULAR; INTRAVENOUS at 09:07

## 2023-11-19 RX ADMIN — DILTIAZEM HYDROCHLORIDE 5 MG: 5 INJECTION INTRAVENOUS at 07:56

## 2023-11-19 RX ADMIN — DILTIAZEM HYDROCHLORIDE 5 MG: 5 INJECTION INTRAVENOUS at 09:04

## 2023-11-19 RX ADMIN — ENOXAPARIN SODIUM 90 MG: 100 INJECTION SUBCUTANEOUS at 20:35

## 2023-11-19 RX ADMIN — Medication 5 MG/HR: at 14:24

## 2023-11-19 NOTE — CONSULTS
[facility] HEART GROUP CONSULT NOTE    Referring Provider: Dr. Stevenson    Reason for Consultation: Chest Pain, A-fib RVR    Chief complaint:   Chief Complaint   Patient presents with    Chest Pain       Subjective .     History of present illness:  Alessandra Baker is a 58 y.o. female who presented to the ER with neck, chest, shoulder pain. This started 11/16/23 and went to chiropractor and was adjusted. She notes some improvement. She went to the ER and was evaluated and discharged. The pain returned and she presented back to the ER. Continues with pain across shoulder, neck and chest pain. Troponin negative and no acute EKG. Patient was treated with fentanyl with complete resolution of pain in the ER. She was admitted. She had a ct of her neck with showed spinal stenosis. At 0650 patient went A-fib RVR. She developed chest pain, diaphoresis, and shortness of breath. Patient has coronary artery disease. Last cath in April 2022 which showed 95% RCA in stent restenosis treated with drug eluting stent. Also noted to have mid LAD in stent restenosis of 60%, first diagonal 80% and distal RCA 80%- manage medically with possible staged procedure. Was noted to be plavix resistant and switched to Brilinta. Patient has chronic kidney disease, hypertension, hyperlipidemia, rheumatoid arthritis, stroke.     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    Liver problem     Myocardial infarction 11/2012    NSTEMI    Rheumatoid arthritis     Skin cancer     Stroke     showed up on mri about 5 years ago with no residual    Ulnar neuropathy     Visual impairment    ,   Past  Surgical History:   Procedure Laterality Date    ANGIOPLASTY      11/2012    APPENDECTOMY      CARDIAC CATHETERIZATION  07/21/2015 11/1/12, 4/21/22    COLONOSCOPY      COLONOSCOPY N/A 04/24/2023    Procedure: COLONOSCOPY WITH ANESTHESIA;  Surgeon: Rick Tidwell DO;  Location: [facility] ENDOSCOPY;  Service: Gastroenterology;  Laterality: N/A;  preop; hx of polyps   postop; polyps  PCP Eric Feldman     CORONARY STENT PLACEMENT      x3 per patient, X 1 -  11/2012, x 2 - 4/21/22    ENDOSCOPY      ENDOSCOPY N/A 04/24/2023    Procedure: ESOPHAGOGASTRODUODENOSCOPY WITH ANESTHESIA;  Surgeon: Rick Tidwell DO;  Location: [facility] ENDOSCOPY;  Service: Gastroenterology;  Laterality: N/A;  preop; hx of varices   postop; varices   PCP Eric Feldman     HAND RECONSTRUCTION Right 06/15/1967    HYSTERECTOMY      2016    SUBTOTAL HYSTERECTOMY      TONSILLECTOMY     ,   Family History   Problem Relation Age of Onset    Hyperlipidemia Mother     Hypertension Mother     Stroke Mother     Diabetes Mother     Arthritis Mother     Hyperlipidemia Father     Hypertension Father     Heart attack Father     Arthritis Father     Early death Father     Heart failure Sister     Stroke Sister     Heart attack Brother     Alcohol abuse Brother     Heart attack Brother     Mental illness Maternal Aunt         Father's  sister    Breast cancer Maternal Aunt     Heart failure Paternal Aunt     Heart failure Paternal Uncle     Heart failure Paternal Uncle     Colon cancer Neg Hx     Colon polyps Neg Hx     Esophageal cancer Neg Hx    ,   Social History     Tobacco Use    Smoking status: Former     Packs/day: 1.00     Years: 30.00     Additional pack years: 0.00     Total pack years: 30.00     Types: Cigarettes     Start date: 1/1/1993     Quit date: 1/1/2013     Years since quitting: 10.8     Passive exposure: Past    Smokeless tobacco: Never    Tobacco comments:     Not somking now 10 years   Vaping Use    Vaping Use: Former   Substance  Use Topics    Alcohol use: Not Currently     Comment: Twice a. Year    Drug use: Never   ,     Medications    Prior to Admission medications    Medication Sig Start Date End Date Taking? Authorizing Provider   aspirin 81 MG EC tablet Take 1 tablet by mouth Daily. 5/4/23  Yes Ricky Slade MD   azaTHIOprine (IMURAN) 50 MG tablet Take 1 tablet by mouth 2 (Two) Times a Day.   Yes Mee Kolb MD   carvedilol (COREG) 6.25 MG tablet Take 1 tablet by mouth 2 (Two) Times a Day. 5/4/23  Yes Ricky Slade MD   cetirizine (zyrTEC) 10 MG tablet Take 1 tablet by mouth As Needed for Allergies.   Yes Mee Kolb MD   dapagliflozin Propanediol 10 MG tablet Take 10 mg by mouth Daily. 11/8/23  Yes Geraldo Feldman, DO   ezetimibe (ZETIA) 10 MG tablet Take 1 tablet by mouth Daily. 5/4/23  Yes Ricky Slade MD   fluticasone (FLONASE) 50 MCG/ACT nasal spray 2 sprays into the nostril(s) as directed by provider Daily.  Patient taking differently: 2 sprays into the nostril(s) as directed by provider Daily. Q 6 months 8/1/23  Yes Geraldo Feldman DO   omeprazole (priLOSEC) 40 MG capsule TAKE 1 CAPSULE BY MOUTH EVERY DAY 9/20/23  Yes Geraldo Feldman DO   sertraline (ZOLOFT) 100 MG tablet Take 2 tablets by mouth Daily. 8/1/23  Yes Geraldo Feldman DO   sulfaSALAzine (AZULFIDINE) 500 MG tablet TAKE 2 TABLETS BY MOUTH DAILY 3/23/23  Yes Geraldo Feldman DO   ticagrelor (BRILINTA) 90 MG tablet tablet Take 1 tablet by mouth 2 (Two) Times a Day. 5/15/23  Yes Geraldo Feldman DO   EPINEPHrine (EPIPEN) 0.3 MG/0.3ML solution auto-injector injection If needed 6/20/23   Mee Kolb MD   Inclisiran Sodium 284 MG/1.5ML solution prefilled syringe Inject  under the skin into the appropriate area as directed.    Mee Kolb MD   isosorbide mononitrate (IMDUR) 30 MG 24 hr tablet TAKE 0.5 TABLETS (15 MG) BY MOUTH DAILY IN AM. DO NOT TAKE IF BP < 100 11/10/23   Geraldo Feldman, DO   LORazepam (Ativan)  0.5 MG tablet Take 1 tablet by mouth At Night As Needed for Anxiety. 8/1/23   Geraldo Feldman DO   nitroglycerin (Nitrostat) 0.3 MG SL tablet Place 1 tablet under the tongue Every 5 (Five) Minutes As Needed for Chest Pain. Take no more than 3 doses in 15 minutes. 12/20/22   Geraldo Feldman DO       Current Facility-Administered Medications   Medication Dose Route Frequency Provider Last Rate Last Admin    acetaminophen (TYLENOL) tablet 650 mg  650 mg Oral Q4H PRN Yvonne Stevenson        sennosides-docusate (PERICOLACE) 8.6-50 MG per tablet 2 tablet  2 tablet Oral Nightly PRN Yvonne Stevenson        And    polyethylene glycol (MIRALAX) packet 17 g  17 g Oral Daily PRN Yvonne Stevenson        And    bisacodyl (DULCOLAX) EC tablet 5 mg  5 mg Oral Daily PRN Yvnone Stevenson        And    bisacodyl (DULCOLAX) suppository 10 mg  10 mg Rectal Daily PRN Yvonne Stevenson        dilTIAZem (CARDIZEM) 125 mg in 125 mL D5W infusion  5-15 mg/hr Intravenous Titrated Dax Gomez DO        Enoxaparin Sodium (LOVENOX) syringe 40 mg  40 mg Subcutaneous Q24H Yvonne Stevenson   40 mg at 11/18/23 2008    ketorolac (TORADOL) injection 30 mg  30 mg Intravenous Q6H PRN Yvonne Stevenson   30 mg at 11/18/23 2131    nitroglycerin (NITROSTAT) SL tablet 0.4 mg  0.4 mg Sublingual Q5 Min PRN Yvonne Stevenson        ondansetron (ZOFRAN) injection 4 mg  4 mg Intravenous Q6H PRN Yvonne Stevenson        Pharmacy to Dose enoxaparin (LOVENOX)   Does not apply Continuous PRN Yvonne Stevenson        sodium chloride 0.9 % flush 10 mL  10 mL Intravenous PRN Rachid Pimentel MD           Allergies:  Bee venom, Clopidogrel, Codeine, Insect extract, Morphine and related, Penicillin g, Shellfish allergy, Shellfish-derived products, Methotrexate, Penicillin g benzathine, Atorvastatin, Hydrocodone, Hydroxychloroquine, and Menthol    Review of Systems  Review of Systems   Constitutional: Positive for  "malaise/fatigue. Negative for chills, decreased appetite, fever, weight gain and weight loss.   HENT:  Negative for nosebleeds.    Eyes:  Negative for visual disturbance.   Cardiovascular:  Positive for chest pain and palpitations (started at 0650 this am). Negative for dyspnea on exertion, leg swelling, near-syncope, orthopnea, paroxysmal nocturnal dyspnea and syncope.   Respiratory:  Negative for cough, hemoptysis, shortness of breath and snoring.    Endocrine: Negative for cold intolerance and heat intolerance.   Hematologic/Lymphatic: Negative for bleeding problem. Does not bruise/bleed easily.   Skin:  Negative for rash.   Musculoskeletal:  Positive for joint pain and neck pain. Negative for back pain and falls.   Gastrointestinal:  Negative for abdominal pain, constipation, diarrhea, heartburn, melena, nausea and vomiting.   Genitourinary:  Negative for hematuria.   Neurological:  Negative for dizziness, headaches and light-headedness.   Psychiatric/Behavioral:  Negative for altered mental status.    Allergic/Immunologic: Negative for persistent infections.       Objective     Physical Exam:  Patient Vitals for the past 24 hrs:   BP Temp Temp src Pulse Resp SpO2 Height Weight   11/19/23 0756 -- -- -- (!) 130 -- -- -- --   11/19/23 0748 -- -- -- (!) 141 -- -- -- --   11/19/23 0740 118/64 -- -- (!) 160 -- 98 % -- --   11/19/23 0653 -- -- -- -- -- -- -- 86.3 kg (190 lb 3.2 oz)   11/19/23 0335 112/60 98.9 °F (37.2 °C) Oral 61 16 95 % -- --   11/19/23 0005 120/63 97.8 °F (36.6 °C) Oral 62 16 95 % -- --   11/18/23 1851 126/72 97.5 °F (36.4 °C) Oral 64 16 96 % 165.1 cm (65\") 86.5 kg (190 lb 9.6 oz)   11/18/23 1704 -- -- -- 69 -- 97 % -- --   11/18/23 1634 -- -- -- 76 -- 95 % -- --   11/18/23 1604 -- -- -- 65 -- 95 % -- --   11/18/23 1534 -- -- -- 70 -- 97 % -- --   11/18/23 1512 -- -- -- -- -- -- 165.1 cm (65\") --   11/18/23 1500 -- 97 °F (36.1 °C) Temporal -- -- -- -- --   11/18/23 1458 146/90 -- -- 71 18 99 % -- " 87.3 kg (192 lb 8 oz)     Constitutional:       Appearance: Healthy appearance. Not in distress.   Eyes:      Pupils: Pupils are equal, round, and reactive to light.   HENT:      Head: Normocephalic and atraumatic.      Nose: Nose normal.    Mouth/Throat:      Dentition: Normal.   Pulmonary:      Effort: Pulmonary effort is normal.      Breath sounds: Normal breath sounds.   Chest:      Chest wall: Not tender to palpatation.   Cardiovascular:      PMI at left midclavicular line. Tachycardia present. Irregular rhythm.      Murmurs: There is no murmur.   Pulses:     Intact distal pulses.   Edema:     Peripheral edema absent.   Abdominal:      General: Bowel sounds are normal.      Palpations: Abdomen is soft.   Musculoskeletal: Normal range of motion.      Cervical back: Normal range of motion. Skin:     General: Skin is warm and dry.   Neurological:      Mental Status: Alert, oriented to person, place, and time and oriented to person, place and time.   Psychiatric:         Attention and Perception: Attention normal.         Mood and Affect: Mood normal.         Results Review:   I reviewed the patient's new clinical results.    Lab Results (last 72 hours)       Procedure Component Value Units Date/Time    CBC Auto Differential [[id number]]  (Abnormal) Collected: 11/19/23 0600    Specimen: Blood Updated: 11/19/23 0750     WBC 3.91 10*3/mm3      RBC 4.08 10*6/mm3      Hemoglobin 12.0 g/dL      Hematocrit 37.5 %      MCV 91.9 fL      MCH 29.4 pg      MCHC 32.0 g/dL      RDW 15.4 %      RDW-SD 51.4 fl      MPV 10.1 fL      Platelets 123 10*3/mm3      Neutrophil % 68.3 %      Lymphocyte % 21.7 %      Monocyte % 7.9 %      Eosinophil % 1.5 %      Basophil % 0.3 %      Immature Grans % 0.3 %      Neutrophils, Absolute 2.67 10*3/mm3      Lymphocytes, Absolute 0.85 10*3/mm3      Monocytes, Absolute 0.31 10*3/mm3      Eosinophils, Absolute 0.06 10*3/mm3      Basophils, Absolute 0.01 10*3/mm3      Immature Grans, Absolute 0.01  10*3/mm3      nRBC 0.0 /100 WBC     Comprehensive Metabolic Panel [[id number]] Collected: 11/19/23 0600    Specimen: Blood Updated: 11/19/23 0742    Lipid Panel [[id number]] Collected: 11/19/23 0600    Specimen: Blood Updated: 11/19/23 0742    High Sensitivity Troponin T 2Hr [[id number]]  (Normal) Collected: 11/18/23 1724    Specimen: Blood Updated: 11/18/23 1758     HS Troponin T 10 ng/L      Troponin T Delta 1 ng/L     Narrative:      High Sensitive Troponin T Reference Range:  <14.0 ng/L- Negative Female for AMI  <22.0 ng/L- Negative Male for AMI  >=14 - Abnormal Female indicating possible myocardial injury.  >=22 - Abnormal Male indicating possible myocardial injury.   Clinicians would have to utilize clinical acumen, EKG, Troponin, and serial changes to determine if it is an Acute Myocardial Infarction or myocardial injury due to an underlying chronic condition.         [place] Draw [[id number]] Collected: 11/18/23 1513    Specimen: Blood Updated: 11/18/23 1615    Narrative:      The following orders were created for panel order [place] Draw.  Procedure                               Abnormality         Status                     ---------                               -----------         ------                     Green Top (Gel)[[id number]]                                  Final result               Lavender Top[[id number]]                                     Final result               Red Top[[id number]]                                          Final result               Light Blue Top[[id number]]                                   Final result                 Please view results for these tests on the individual orders.    Green Top (Gel) [[id number]] Collected: 11/18/23 1513    Specimen: Blood Updated: 11/18/23 1615     Extra Tube Hold for add-ons.     Comment: Auto resulted.       Lavender Top [[id number]] Collected: 11/18/23 1513    Specimen: Blood Updated: 11/18/23 1615     Extra Tube hold for add-on     Comment:  Auto resulted       Red Top [[id number]] Collected: 11/18/23 1513    Specimen: Blood Updated: 11/18/23 1615     Extra Tube Hold for add-ons.     Comment: Auto resulted.       Light Blue Top [[id number]] Collected: 11/18/23 1513    Specimen: Blood Updated: 11/18/23 1615     Extra Tube Hold for add-ons.     Comment: Auto resulted       C-reactive Protein [[id number]]  (Abnormal) Collected: 11/18/23 1513    Specimen: Blood Updated: 11/18/23 1546     C-Reactive Protein 11.45 mg/dL     Comprehensive Metabolic Panel [[id number]]  (Abnormal) Collected: 11/18/23 1513    Specimen: Blood Updated: 11/18/23 1545     Glucose 111 mg/dL      BUN 18 mg/dL      Creatinine 1.17 mg/dL      Sodium 138 mmol/L      Potassium 4.3 mmol/L      Chloride 102 mmol/L      CO2 26.0 mmol/L      Calcium 9.7 mg/dL      Total Protein 8.5 g/dL      Albumin 4.9 g/dL      ALT (SGPT) 9 U/L      AST (SGOT) 14 U/L      Alkaline Phosphatase 88 U/L      Total Bilirubin 0.6 mg/dL      Globulin 3.6 gm/dL      A/G Ratio 1.4 g/dL      BUN/Creatinine Ratio 15.4     Anion Gap 10.0 mmol/L      eGFR 53.9 mL/min/1.73     Narrative:      GFR Normal >60  Chronic Kidney Disease <60  Kidney Failure <15      High Sensitivity Troponin T [[id number]]  (Normal) Collected: 11/18/23 1513    Specimen: Blood Updated: 11/18/23 1542     HS Troponin T 9 ng/L     Narrative:      High Sensitive Troponin T Reference Range:  <14.0 ng/L- Negative Female for AMI  <22.0 ng/L- Negative Male for AMI  >=14 - Abnormal Female indicating possible myocardial injury.  >=22 - Abnormal Male indicating possible myocardial injury.   Clinicians would have to utilize clinical acumen, EKG, Troponin, and serial changes to determine if it is an Acute Myocardial Infarction or myocardial injury due to an underlying chronic condition.         CBC & Differential [[id number]]  (Abnormal) Collected: 11/18/23 1513    Specimen: Blood Updated: 11/18/23 1524    Narrative:      The following orders were created for panel  "order CBC & Differential.  Procedure                               Abnormality         Status                     ---------                               -----------         ------                     CBC Auto Differential[[id number]]        Abnormal            Final result                 Please view results for these tests on the individual orders.    CBC Auto Differential [[id number]]  (Abnormal) Collected: 11/18/23 1513    Specimen: Blood Updated: 11/18/23 1524     WBC 6.36 10*3/mm3      RBC 5.04 10*6/mm3      Hemoglobin 14.6 g/dL      Hematocrit 44.6 %      MCV 88.5 fL      MCH 29.0 pg      MCHC 32.7 g/dL      RDW 15.2 %      RDW-SD 49.2 fl      MPV 9.4 fL      Platelets 164 10*3/mm3      Neutrophil % 82.7 %      Lymphocyte % 9.1 %      Monocyte % 6.8 %      Eosinophil % 0.6 %      Basophil % 0.2 %      Immature Grans % 0.6 %      Neutrophils, Absolute 5.26 10*3/mm3      Lymphocytes, Absolute 0.58 10*3/mm3      Monocytes, Absolute 0.43 10*3/mm3      Eosinophils, Absolute 0.04 10*3/mm3      Basophils, Absolute 0.01 10*3/mm3      Immature Grans, Absolute 0.04 10*3/mm3      nRBC 0.0 /100 WBC             No results found for: \"ECHOEFEST\"    Imaging Results (Last 72 Hours)       Procedure Component Value Units Date/Time    CT Cervical Spine Without Contrast [[id number]] Resulted: 11/19/23 0212     Updated: 11/19/23 0219    XR Shoulder 2+ View Bilateral [[id number]] Collected: 11/18/23 1819     Updated: 11/18/23 1825    Narrative:      EXAMINATION:  XR SHOULDER 2+ VW BILATERAL-  11/18/2023 6:18 PM CST     HISTORY: Shoulder pain and decreased range of motion. R07.9-Chest pain,  unspecified; I77.819-Aortic ectasia, unspecified site; I10-Essential  (primary) hypertension.     COMPARISON: No comparison study.     TECHNIQUE: 2 views were obtained of each shoulder.     LEFT SHOULDER: There is mild narrowing and spurring of the AC joint. The  glenohumeral joint is well-maintained. No acute fracture is seen. The  visualized left " ribs are intact. The visualized left lung demonstrates  poor inspiration.     RIGHT SHOULDER: There is mild narrowing of the AC joint without  significant spurring. The glenohumeral joint is well-maintained. No  acute fracture is seen. The visualized right ribs are intact. The  visualized right lung demonstrates poor inspiration.          Impression:      1. No evidence of acute fracture.  2. Mild narrowing of the AC joints bilaterally. Mild spurring of the  left AC joint.        This report was signed and finalized on 11/18/2023 6:22 PM CST by Dr. Job Olmedo MD.       CT Angiogram Chest [[id number]] Collected: 11/18/23 1656     Updated: 11/18/23 1705    Narrative:      EXAMINATION:  CT ANGIOGRAM CHEST-  11/18/2023 4:24 PM CST     HISTORY: Acute aortic syndrome (AAS) suspected.     COMPARISON : No comparison study.     DLP: 319 mGy-cm. Automated dosage reduction technique was utilized to  decrease patient dosage.     TECHNIQUE: CT angio was performed of the chest with IV contrast.  Coronal, sagittal and 3-D reconstruction were performed.     INDEPENDENT 3-D WORKSTATION UTILIZED FOR RECONSTRUCTION: Yes. A  radiologist was not present in the department.     MEDIASTINUM, HEART AND VASCULAR STRUCTURES: There is atheromatous  disease of the thoracic aorta. There are probable left and right  coronary stents. The ascending thoracic aorta is ectatic measuring 3.7  cm. There is no evidence of aortic dissection or aneurysm. The main  pulmonary artery segment measures 2.7 cm. No pulmonary embolus is seen.  There is cardiomegaly. A small amount of pericardial fluid is likely  physiologic.     LUNGS: There is paraseptal and centrilobular emphysema. There are  dependent infiltrates. No pleural effusion is seen.     UPPER ABDOMEN: There is a gallstone in the gallbladder. The liver  appears somewhat macronodular. There is splenomegaly.     BONES: No acute bony abnormality is seen.          Impression:      1. The ascending  thoracic aorta is ectatic measuring 3.7 cm. No aneurysm  or dissection is seen. There is atheromatous disease of the thoracic  aorta. There are probable coronary stents. There is cardiomegaly. No CT  evidence of pulmonary embolus.  2. Paraseptal and centrilobular emphysema. Dependent infiltrates, likely  atelectasis.  3. Gallstone in the gallbladder.  4. Liver appears macronodular. Splenomegaly. Probable liver cirrhosis.        The full report of this exam was immediately signed and available to the  emergency room. The patient is currently in the emergency room.     This report was signed and finalized on 11/18/2023 5:01 PM CST by Dr. Job Olmedo MD.       XR Chest 1 View [[id number]] Collected: 11/18/23 1553     Updated: 11/18/23 1558    Narrative:      EXAMINATION:  XR CHEST 1 VW-  11/18/2023 3:37 PM CST     HISTORY: Chest pain protocol. Hyperlipidemia. Hypertension.     COMPARISON: 11/16/2023.     TECHNIQUE: Single view AP image.     FINDINGS: There is hypoventilation with vascular crowding. There are  linear densities in the lung bases. There is mild stable bronchial wall  thickening. The heart size is normal. There is a probable right coronary  artery stent. No acute appearing bony abnormality is seen.          Impression:      1. Very poor inspiration with vascular crowding.  2. Linear densities in the lung bases, likely atelectasis. Stable mild  bronchial wall thickening.           This report was signed and finalized on 11/18/2023 3:55 PM CST by Dr. Job Olmedo MD.               Assessment & Plan       Chest pain    Coronary artery disease    Mixed hyperlipidemia    Rheumatoid arthritis involving multiple sites with positive rheumatoid factor    Atrial fibrillation with RVR    Plan:  Chest Pain- some typical and atypical procedures. Relieved with chiropractor adjustment initially. Neck pain and bilateral shoulder. Negative troponin and no acute EKG changes. CT of neck pending. Check echo.     A-fib  RVR- patient went A-fib at 0650 this am. Symptomatic and diaphoretic with chest pain. He notes that this is different than what she was experiencing. IV push Cardizem and start drip. If remains symptomatic may need to consider emergent cardioversion. Full dose Lovenox. [id number] is 5    Coronary Artery Disease- cath done in April last year with stent to RCA in-stent restenosis. Noted to be plavix resistant. Also had LAD and first diagonal disease left for medical management. On Brilinta and Aspirin. Chest Pain - I am concerned this is not cardiac. Elevated CRP relieved with meds in the ER. Once more stable from A-fib will reassess symptoms. Consider Ranexa. Has follow up in December with primary cardiologist.     Spinal Stenosis- very likely may be cause of pain patient has been having    Hyperlipidemia- Intolerant to statins. On Zetia     Rheumatoid Arthritis- elevated CRP    Further orders per Dr. Gomez     Thank you for asking us to follow this patient with you.       Electronically signed by FITZ Ray, 11/19/23, 7:56 AM CST.

## 2023-11-19 NOTE — PLAN OF CARE
Goal Outcome Evaluation: Pt is alert and oriented, pt c/o severe pain that was treated with tordol, pt a-fib has converted to NSR, no s/s of distress, vitals are stable, will cont to monitor  Plan of Care Reviewed With: patient        Progress: improving

## 2023-11-19 NOTE — PLAN OF CARE
Problem: Adult Inpatient Plan of Care  Goal: Plan of Care Review  Outcome: Ongoing, Progressing  Flowsheets (Taken 11/18/2023 2023)  Progress: no change  Goal: Patient-Specific Goal (Individualized)  Outcome: Ongoing, Progressing  Goal: Absence of Hospital-Acquired Illness or Injury  Outcome: Ongoing, Progressing  Goal: Optimal Comfort and Wellbeing  Outcome: Ongoing, Progressing  Goal: Readiness for Transition of Care  Outcome: Ongoing, Progressing  Intervention: Mutually Develop Transition Plan  Recent Flowsheet Documentation  Taken 11/18/2023 2015 by Elizabeth Dasilva, RN  Transportation Anticipated: car, drives self  Patient/Family Anticipated Services at Transition: none  Patient/Family Anticipates Transition to: home  Taken 11/18/2023 2014 by Elizabeth Dasilva, RN  Equipment Currently Used at Home: none   Goal Outcome Evaluation:           Progress: no change

## 2023-11-19 NOTE — PROGRESS NOTES
[facility] Medicine Services  INPATIENT PROGRESS NOTE    Patient Name: Alessandra Baker  Date of Admission: 11/18/2023  Today's Date: 11/19/23  Length of Stay: 0  Primary Care Physician: Geraldo Feldman DO    Subjective   Chief Complaint: fast heart rate this morning  HPI   Patient has complaints of fast heart rate, increased shortness of breath, some chest discomfort.  Patient did go into atrial fibrillation with rapid ventricular response this morning.  Cardiology is nurse practitioner did see the patient.  She was placed on Cardizem drip as well as having a Cardizem IV bolus.  Initial rate was 160.  She has improved down into the 130s.  Currently without nausea.    Patient does have significant cardiac risk factors.      Review of Systems   All pertinent negatives and positives are as above. All other systems have been reviewed and are negative unless otherwise stated.     Objective    Temp:  [97 °F (36.1 °C)-98.9 °F (37.2 °C)] 98.8 °F (37.1 °C)  Heart Rate:  [] 136  Resp:  [16-20] 20  BP: (112-146)/(60-90) 122/72  Physical Exam  Vitals and nursing note reviewed.   Constitutional:       General: She is in acute distress.      Comments: Clammy   HENT:      Head: Normocephalic and atraumatic.      Right Ear: External ear normal.      Left Ear: External ear normal.      Nose: Nose normal.      Mouth/Throat:      Mouth: Mucous membranes are moist.   Eyes:      Extraocular Movements: Extraocular movements intact.      Pupils: Pupils are equal, round, and reactive to light.   Cardiovascular:      Rate and Rhythm: Tachycardia present. Rhythm irregular.      Pulses: Normal pulses.      Heart sounds: Normal heart sounds.   Pulmonary:      Effort: Pulmonary effort is normal.      Breath sounds: Normal breath sounds.   Abdominal:      General: Bowel sounds are normal.      Palpations: Abdomen is soft.   Musculoskeletal:         General: No swelling or tenderness.      Right lower leg:  "No edema.      Left lower leg: No edema.   Lymphadenopathy:      Cervical: No cervical adenopathy.   Skin:     General: Skin is warm and dry.      Capillary Refill: Capillary refill takes less than 2 seconds.   Neurological:      General: No focal deficit present.      Mental Status: She is alert and oriented to person, place, and time.      Cranial Nerves: No cranial nerve deficit.   Psychiatric:      Comments: Anxious           Results Review:  I have reviewed the labs, radiology results, and diagnostic studies.    Laboratory Data:   Results from last 7 days   Lab Units 11/19/23  0600 11/18/23  1513 11/16/23  0750   WBC 10*3/mm3 3.91 6.36 4.26   HEMOGLOBIN g/dL 12.0 14.6 13.2   HEMATOCRIT % 37.5 44.6 41.0   PLATELETS 10*3/mm3 123* 164 114*        Results from last 7 days   Lab Units 11/19/23  0600 11/18/23  1513 11/16/23  0750   SODIUM mmol/L 139 138 140   POTASSIUM mmol/L 4.5 4.3 3.8   CHLORIDE mmol/L 104 102 106   CO2 mmol/L 27.0 26.0 24.0   BUN mg/dL 22* 18 16   CREATININE mg/dL 1.51* 1.17* 1.20*   CALCIUM mg/dL 9.4 9.7 8.8   BILIRUBIN mg/dL 0.4 0.6 0.3   ALK PHOS U/L 70 88 79   ALT (SGPT) U/L 7 9 9   AST (SGOT) U/L 10 14 17   GLUCOSE mg/dL 85 111* 111*       Culture Data:   No results found for: \"BLOODCX\", \"URINECX\", \"WOUNDCX\", \"MRSACX\", \"RESPCX\", \"STOOLCX\"    Radiology Data:   Imaging Results (Last 24 Hours)       Procedure Component Value Units Date/Time    CT Cervical Spine Without Contrast [[id number]] Collected: 11/19/23 0807     Updated: 11/19/23 0823    Narrative:      EXAM/TECHNIQUE: CT cervical spine without contrast     INDICATION: pain neck and jaw   Has had RA for 30+ years; R07.9-Chest  pain, unspecified; I77.819-Aortic ectasia, unspecified site;  I10-Essential (primary) hypertension     COMPARISON: None     DLP: 385 mGy cm. Automated exposure control was also utilized to  decrease patient radiation dose.     FINDINGS:     Skull base relationships are maintained. Odontoid process is intact.  There " is partial fusion of C2 and C3 vertebral bodies. Cervical spine  alignment is anatomic.  Vertebral body heights are maintained.  No acute  fracture or subluxation. Advanced multilevel cervical spine degenerative  change, described in further detail level by level below. No  prevertebral soft tissue swelling.      C2-C3: Central canal and neural foramina are widely patent.     C3-C4: Uncovertebral and facet arthropathy and prominent osteophyte  cause severe left neural foraminal stenosis. Right neural foramen is  widely patent. Central canal is widely patent.     C4-C5: Uncovertebral and facet arthropathy cause mild left neural  foraminal stenosis. Right neural foramina is widely patent. Small disc  bulge causes mild central canal stenosis.     C5-C6: Uncovertebral and facet arthropathy causes moderate to severe  left neural foraminal stenosis and mild right neural foraminal stenosis.  Along with disc osteophyte complex, there is moderate central canal  stenosis.     C6-C7: Neural foramina are widely patent. Disc osteophyte complex causes  mild central canal narrowing.     C7-T1: Central canal and neural foramina are widely patent.       Impression:         1.  No acute osseous finding.  2.  Advanced multilevel cervical spine degenerative change. Of note,  there is severe left neural foraminal stenosis at C3-C4. There is also  moderate to severe left neural foraminal stenosis at C5-C6. Moderate  central canal stenosis at C5-C6. These findings could be further  assessed with MRI if clinically indicated.     This report was signed and finalized on 11/19/2023 8:20 AM CST by Dr. Aurelio Anders MD.       XR Shoulder 2+ View Bilateral [[id number]] Collected: 11/18/23 1819     Updated: 11/18/23 1825    Narrative:      EXAMINATION:  XR SHOULDER 2+ VW BILATERAL-  11/18/2023 6:18 PM CST     HISTORY: Shoulder pain and decreased range of motion. R07.9-Chest pain,  unspecified; I77.819-Aortic ectasia, unspecified site;  I10-Essential  (primary) hypertension.     COMPARISON: No comparison study.     TECHNIQUE: 2 views were obtained of each shoulder.     LEFT SHOULDER: There is mild narrowing and spurring of the AC joint. The  glenohumeral joint is well-maintained. No acute fracture is seen. The  visualized left ribs are intact. The visualized left lung demonstrates  poor inspiration.     RIGHT SHOULDER: There is mild narrowing of the AC joint without  significant spurring. The glenohumeral joint is well-maintained. No  acute fracture is seen. The visualized right ribs are intact. The  visualized right lung demonstrates poor inspiration.          Impression:      1. No evidence of acute fracture.  2. Mild narrowing of the AC joints bilaterally. Mild spurring of the  left AC joint.        This report was signed and finalized on 11/18/2023 6:22 PM CST by Dr. Job Olmedo MD.       CT Angiogram Chest [[id number]] Collected: 11/18/23 1656     Updated: 11/18/23 1705    Narrative:      EXAMINATION:  CT ANGIOGRAM CHEST-  11/18/2023 4:24 PM CST     HISTORY: Acute aortic syndrome (AAS) suspected.     COMPARISON : No comparison study.     DLP: 319 mGy-cm. Automated dosage reduction technique was utilized to  decrease patient dosage.     TECHNIQUE: CT angio was performed of the chest with IV contrast.  Coronal, sagittal and 3-D reconstruction were performed.     INDEPENDENT 3-D WORKSTATION UTILIZED FOR RECONSTRUCTION: Yes. A  radiologist was not present in the department.     MEDIASTINUM, HEART AND VASCULAR STRUCTURES: There is atheromatous  disease of the thoracic aorta. There are probable left and right  coronary stents. The ascending thoracic aorta is ectatic measuring 3.7  cm. There is no evidence of aortic dissection or aneurysm. The main  pulmonary artery segment measures 2.7 cm. No pulmonary embolus is seen.  There is cardiomegaly. A small amount of pericardial fluid is likely  physiologic.     LUNGS: There is paraseptal and  centrilobular emphysema. There are  dependent infiltrates. No pleural effusion is seen.     UPPER ABDOMEN: There is a gallstone in the gallbladder. The liver  appears somewhat macronodular. There is splenomegaly.     BONES: No acute bony abnormality is seen.          Impression:      1. The ascending thoracic aorta is ectatic measuring 3.7 cm. No aneurysm  or dissection is seen. There is atheromatous disease of the thoracic  aorta. There are probable coronary stents. There is cardiomegaly. No CT  evidence of pulmonary embolus.  2. Paraseptal and centrilobular emphysema. Dependent infiltrates, likely  atelectasis.  3. Gallstone in the gallbladder.  4. Liver appears macronodular. Splenomegaly. Probable liver cirrhosis.        The full report of this exam was immediately signed and available to the  emergency room. The patient is currently in the emergency room.     This report was signed and finalized on 11/18/2023 5:01 PM CST by Dr. Job Olmedo MD.       XR Chest 1 View [[id number]] Collected: 11/18/23 1553     Updated: 11/18/23 1558    Narrative:      EXAMINATION:  XR CHEST 1 VW-  11/18/2023 3:37 PM CST     HISTORY: Chest pain protocol. Hyperlipidemia. Hypertension.     COMPARISON: 11/16/2023.     TECHNIQUE: Single view AP image.     FINDINGS: There is hypoventilation with vascular crowding. There are  linear densities in the lung bases. There is mild stable bronchial wall  thickening. The heart size is normal. There is a probable right coronary  artery stent. No acute appearing bony abnormality is seen.          Impression:      1. Very poor inspiration with vascular crowding.  2. Linear densities in the lung bases, likely atelectasis. Stable mild  bronchial wall thickening.           This report was signed and finalized on 11/18/2023 3:55 PM CST by Dr. Job Olmedo MD.               I have reviewed the patient's current medications.     Assessment/Plan   Assessment  Active Hospital Problems    Diagnosis      **Chest pain     Atrial fibrillation with RVR     Central stenosis of spinal canal     Neuroforaminal stenosis of cervical spine     Rheumatoid arthritis involving multiple sites with positive rheumatoid factor     Coronary artery disease     Mixed hyperlipidemia        Treatment Plan  Continue Cardizem.  Awaiting cardiology opinion on need for further stress testing or cath.  We will consult neurosurgery for opinion on findings of cervical spine CT  Continue current medications  BMP, CBC in a.m.    Medical Decision Making  Number and Complexity of problems:   Chest pain high complexity  A-fib with RVR high complexity  Central stenosis of spinal canal high complexity  Neuroforaminal stenosis of cervical spine high complexity  Rheumatoid arthritis moderate complexity  Coronary artery disease moderate complexity  Mixed hyperlipidemia moderate complexity    Differential Diagnosis:     Conditions and Status        Condition is worsening.     MDM Data  External documents reviewed: Reviewed  Cardiac tracing (EKG, telemetry) interpretation: Reviewed  Radiology interpretation: Reviewed  Labs reviewed: Reviewed  Any tests that were considered but not ordered: None     Decision rules/scores evaluated (example [id number], Wells, etc): None     Discussed with: Patient and nursing and cardiology nurse practitioner     Care Planning  Shared decision making: Patient and cardiology nurse practitioner  Code status and discussions: Full    Disposition  Social Determinants of Health that impact treatment or disposition: None  I expect the patient to be discharged to home in 1-2 days.     Electronically signed by Yvonne Stevenson, 11/19/23, 09:15 CST.

## 2023-11-19 NOTE — PROGRESS NOTES
"Pharmacy Dosing Service  Anticoagulant  Enoxaparin    Assessment/Action/Plan:  Lovenox dosed at 40mg sc q24 based on indication and renal function. Pharmacy will follow daily and adjust as needed.      Subjective:  Alessandra Baker is a 58 y.o. female   Pharmacy to dose Lovenox for indication of VTE prophylaxis.    Objective:  [Ht: 165.1 cm (65\"); Wt: 87.3 kg (192 lb 8 oz); BMI: Body mass index is 32.03 kg/m².]  Estimated Creatinine Clearance: 56.5 mL/min (A) (by C-G formula based on SCr of 1.17 mg/dL (H)).     Lab Results   Component Value Date    HGB 14.6 11/18/2023    HGB 13.2 11/16/2023      Lab Results   Component Value Date     11/18/2023     (L) 11/16/2023     MAULIK Hannon, PharmD  11/18/23 18:16 CST     "

## 2023-11-20 ENCOUNTER — READMISSION MANAGEMENT (OUTPATIENT)
Dept: CALL CENTER | Facility: HOSPITAL | Age: 58
End: 2023-11-20
Payer: MEDICARE

## 2023-11-20 VITALS
HEIGHT: 65 IN | DIASTOLIC BLOOD PRESSURE: 58 MMHG | WEIGHT: 187.2 LBS | RESPIRATION RATE: 18 BRPM | HEART RATE: 55 BPM | OXYGEN SATURATION: 95 % | SYSTOLIC BLOOD PRESSURE: 107 MMHG | TEMPERATURE: 97.6 F | BODY MASS INDEX: 31.19 KG/M2

## 2023-11-20 LAB
ALBUMIN SERPL-MCNC: 4 G/DL (ref 3.5–5.2)
ALBUMIN/GLOB SERPL: 1.3 G/DL
ALP SERPL-CCNC: 74 U/L (ref 39–117)
ALT SERPL W P-5'-P-CCNC: 8 U/L (ref 1–33)
ANION GAP SERPL CALCULATED.3IONS-SCNC: 9 MMOL/L (ref 5–15)
AST SERPL-CCNC: 26 U/L (ref 1–32)
BASOPHILS # BLD AUTO: 0.01 10*3/MM3 (ref 0–0.2)
BASOPHILS NFR BLD AUTO: 0.2 % (ref 0–1.5)
BILIRUB SERPL-MCNC: 0.3 MG/DL (ref 0–1.2)
BUN SERPL-MCNC: 21 MG/DL (ref 6–20)
BUN/CREAT SERPL: 15.8 (ref 7–25)
CALCIUM SPEC-SCNC: 9.3 MG/DL (ref 8.6–10.5)
CHLORIDE SERPL-SCNC: 106 MMOL/L (ref 98–107)
CO2 SERPL-SCNC: 26 MMOL/L (ref 22–29)
CREAT SERPL-MCNC: 1.33 MG/DL (ref 0.57–1)
DEPRECATED RDW RBC AUTO: 49.9 FL (ref 37–54)
EGFRCR SERPLBLD CKD-EPI 2021: 46.2 ML/MIN/1.73
EOSINOPHIL # BLD AUTO: 0.09 10*3/MM3 (ref 0–0.4)
EOSINOPHIL NFR BLD AUTO: 2.2 % (ref 0.3–6.2)
ERYTHROCYTE [DISTWIDTH] IN BLOOD BY AUTOMATED COUNT: 15.2 % (ref 12.3–15.4)
GLOBULIN UR ELPH-MCNC: 3 GM/DL
GLUCOSE SERPL-MCNC: 96 MG/DL (ref 65–99)
HCT VFR BLD AUTO: 37.2 % (ref 34–46.6)
HGB BLD-MCNC: 12.1 G/DL (ref 12–15.9)
IMM GRANULOCYTES # BLD AUTO: 0.03 10*3/MM3 (ref 0–0.05)
IMM GRANULOCYTES NFR BLD AUTO: 0.7 % (ref 0–0.5)
LYMPHOCYTES # BLD AUTO: 0.98 10*3/MM3 (ref 0.7–3.1)
LYMPHOCYTES NFR BLD AUTO: 24.1 % (ref 19.6–45.3)
MCH RBC QN AUTO: 29.4 PG (ref 26.6–33)
MCHC RBC AUTO-ENTMCNC: 32.5 G/DL (ref 31.5–35.7)
MCV RBC AUTO: 90.3 FL (ref 79–97)
MONOCYTES # BLD AUTO: 0.3 10*3/MM3 (ref 0.1–0.9)
MONOCYTES NFR BLD AUTO: 7.4 % (ref 5–12)
NEUTROPHILS NFR BLD AUTO: 2.65 10*3/MM3 (ref 1.7–7)
NEUTROPHILS NFR BLD AUTO: 65.4 % (ref 42.7–76)
NRBC BLD AUTO-RTO: 0 /100 WBC (ref 0–0.2)
PLATELET # BLD AUTO: 135 10*3/MM3 (ref 140–450)
PMV BLD AUTO: 9.7 FL (ref 6–12)
POTASSIUM SERPL-SCNC: 4.8 MMOL/L (ref 3.5–5.2)
PROT SERPL-MCNC: 7 G/DL (ref 6–8.5)
QT INTERVAL: 432 MS
QT INTERVAL: 444 MS
QTC INTERVAL: 458 MS
QTC INTERVAL: 462 MS
RBC # BLD AUTO: 4.12 10*6/MM3 (ref 3.77–5.28)
SODIUM SERPL-SCNC: 141 MMOL/L (ref 136–145)
WBC NRBC COR # BLD AUTO: 4.06 10*3/MM3 (ref 3.4–10.8)

## 2023-11-20 PROCEDURE — 63710000001 AZATHIOPRINE PER 50 MG: Performed by: FAMILY MEDICINE

## 2023-11-20 PROCEDURE — 85025 COMPLETE CBC W/AUTO DIFF WBC: CPT | Performed by: FAMILY MEDICINE

## 2023-11-20 PROCEDURE — 25010000002 KETOROLAC TROMETHAMINE PER 15 MG: Performed by: FAMILY MEDICINE

## 2023-11-20 PROCEDURE — 96376 TX/PRO/DX INJ SAME DRUG ADON: CPT

## 2023-11-20 PROCEDURE — 99232 SBSQ HOSP IP/OBS MODERATE 35: CPT | Performed by: INTERNAL MEDICINE

## 2023-11-20 PROCEDURE — G0378 HOSPITAL OBSERVATION PER HR: HCPCS

## 2023-11-20 PROCEDURE — 80053 COMPREHEN METABOLIC PANEL: CPT | Performed by: FAMILY MEDICINE

## 2023-11-20 RX ORDER — CARVEDILOL 12.5 MG/1
12.5 TABLET ORAL 2 TIMES DAILY WITH MEALS
Qty: 60 TABLET | Refills: 2 | Status: ON HOLD | OUTPATIENT
Start: 2023-11-20

## 2023-11-20 RX ORDER — AZATHIOPRINE 50 MG/1
50 TABLET ORAL 2 TIMES DAILY
Status: DISCONTINUED | OUTPATIENT
Start: 2023-11-20 | End: 2023-11-20 | Stop reason: HOSPADM

## 2023-11-20 RX ORDER — ISOSORBIDE MONONITRATE 30 MG/1
30 TABLET, EXTENDED RELEASE ORAL
Status: DISCONTINUED | OUTPATIENT
Start: 2023-11-20 | End: 2023-11-20 | Stop reason: HOSPADM

## 2023-11-20 RX ORDER — PANTOPRAZOLE SODIUM 40 MG/1
40 TABLET, DELAYED RELEASE ORAL
Status: DISCONTINUED | OUTPATIENT
Start: 2023-11-20 | End: 2023-11-20 | Stop reason: HOSPADM

## 2023-11-20 RX ORDER — ASPIRIN 81 MG/1
81 TABLET ORAL DAILY
Status: DISCONTINUED | OUTPATIENT
Start: 2023-11-20 | End: 2023-11-20 | Stop reason: HOSPADM

## 2023-11-20 RX ORDER — SERTRALINE HYDROCHLORIDE 100 MG/1
200 TABLET, FILM COATED ORAL DAILY
Status: DISCONTINUED | OUTPATIENT
Start: 2023-11-20 | End: 2023-11-20 | Stop reason: HOSPADM

## 2023-11-20 RX ORDER — CARVEDILOL 6.25 MG/1
12.5 TABLET ORAL 2 TIMES DAILY WITH MEALS
Status: DISCONTINUED | OUTPATIENT
Start: 2023-11-20 | End: 2023-11-20 | Stop reason: HOSPADM

## 2023-11-20 RX ORDER — ASPIRIN 81 MG/1
81 TABLET ORAL DAILY
Status: DISCONTINUED | OUTPATIENT
Start: 2023-11-20 | End: 2023-11-20 | Stop reason: SDUPTHER

## 2023-11-20 RX ORDER — ISOSORBIDE MONONITRATE 30 MG/1
30 TABLET, EXTENDED RELEASE ORAL
Qty: 30 TABLET | Refills: 2 | Status: ON HOLD | OUTPATIENT
Start: 2023-11-21 | End: 2023-11-22 | Stop reason: DRUGHIGH

## 2023-11-20 RX ORDER — SULFASALAZINE 500 MG/1
1000 TABLET ORAL DAILY
Status: DISCONTINUED | OUTPATIENT
Start: 2023-11-20 | End: 2023-11-20 | Stop reason: HOSPADM

## 2023-11-20 RX ADMIN — CARVEDILOL 12.5 MG: 6.25 TABLET, FILM COATED ORAL at 10:38

## 2023-11-20 RX ADMIN — TICAGRELOR 90 MG: 90 TABLET ORAL at 10:38

## 2023-11-20 RX ADMIN — AZATHIOPRINE 50 MG: 50 TABLET ORAL at 10:40

## 2023-11-20 RX ADMIN — PANTOPRAZOLE SODIUM 40 MG: 40 TABLET, DELAYED RELEASE ORAL at 10:38

## 2023-11-20 RX ADMIN — KETOROLAC TROMETHAMINE 30 MG: 30 INJECTION, SOLUTION INTRAMUSCULAR; INTRAVENOUS at 10:38

## 2023-11-20 RX ADMIN — SULFASALAZINE 1000 MG: 500 TABLET ORAL at 10:40

## 2023-11-20 RX ADMIN — SERTRALINE HYDROCHLORIDE 200 MG: 100 TABLET, FILM COATED ORAL at 10:40

## 2023-11-20 RX ADMIN — EMPAGLIFLOZIN 10 MG: 10 TABLET, FILM COATED ORAL at 10:40

## 2023-11-20 RX ADMIN — ISOSORBIDE MONONITRATE 30 MG: 30 TABLET, EXTENDED RELEASE ORAL at 10:38

## 2023-11-20 NOTE — PLAN OF CARE
Goal Outcome Evaluation:                 Patient awaiting discharge, Cardizem has been discontinued, in NSR 60's.

## 2023-11-20 NOTE — DISCHARGE SUMMARY
[facility] Medicine Services  DISCHARGE SUMMARY       Date of Admission: 11/18/2023  Date of Discharge:  11/20/2023  Primary Care Physician: Geraldo Feldman, DO    Discharge Diagnoses:  Active Hospital Problems    Diagnosis     **Chest pain     Atrial fibrillation with RVR     Central stenosis of spinal canal     Neuroforaminal stenosis of cervical spine     Rheumatoid arthritis involving multiple sites with positive rheumatoid factor     Coronary artery disease     Mixed hyperlipidemia          Presenting Problem/History of Present Illness:  Chest pain [R07.9]     Chief Complaint on Day of Discharge:   Chest discomfort    History of Present Illness on Day of Discharge:   Feels much better.  Chest discomfort today is minimal.  Dr. Castillo has seen and evaluated the patient and recommends anticoagulation after brief run of self-limited atrial fibrillation.  Heart rate is well controlled and she is appropriate for discharge home today.  Cardiac markers have been unremarkable.  Dr. Castillo feels that the patient's chest discomfort is likely pleuritic in origin and may be related to RA.  The patient has been asked to follow-up with Dr. Feldman this week for alternative treatment of inflammatory response, possibly steroids.  Anti-inflammatories would likely not be an appropriate choice at this point given initiation of anticoagulation.  The patient will continue aspirin and Brilinta.  Rate has been increased to 12.5 mg p.o. twice daily and Imdur has been doubled as well.    Hospital Course  Patient experienced onset of chest pain at 6:30 AM on 1117.  It was sharp.  It radiated across to her anterior chest from shoulder to shoulder through her clavicles, rated up into her neck bilaterally, and into her jaws.  She did go see her chiropractor to see if he could help.  She got minimal relief.  Her pain was associated with nausea sweating and shortness of breath.  Treatment Plan  The patient  will be admitted to my service here at [facility].   Admit to tele  Consult cardiology  Xray c spine and shoulders  Resume home medications.      Discussed with patient inadvisability of have HVLA techniques for neck manipulation in a patient with a long term dx of RA.  On the day after admission, the patient had complaints of a rapid heart rate with increased shortness of breath and some chest discomfort.  She was noted to be in atrial fibrillation with RVR.  Cardiology saw the patient and she was placed on a Cardizem drip.  She was also given a Cardizem bolus.  Rate improved significantly and she was transitioned to Cardizem CD on the day of discharge.  Dr. Castillo subsequently followed up with the patient noting that she should be anticoagulated and continue aspirin and Brilinta.  It was felt that the patient's chest discomfort was likely pleuritic in origin.  She is stable today and is appropriate for discharge home with outpatient follow-up with her primary care physician.  She should follow-up with cardiology in 2 weeks.      Consults:   Cardiology:  Assessment & Plan    Chest pain    Coronary artery disease    Mixed hyperlipidemia    Rheumatoid arthritis involving multiple sites with positive rheumatoid factor    Atrial fibrillation with RVR     Plan:  Chest Pain- some typical and atypical procedures. Relieved with chiropractor adjustment initially. Neck pain and bilateral shoulder. Negative troponin and no acute EKG changes. CT of neck pending. Check echo.      A-fib RVR- patient went A-fib at 0650 this am. Symptomatic and diaphoretic with chest pain. He notes that this is different than what she was experiencing. IV push Cardizem and start drip. If remains symptomatic may need to consider emergent cardioversion. Full dose Lovenox. [id number] is 5     Coronary Artery Disease- cath done in April last year with stent to RCA in-stent restenosis. Noted to be plavix resistant. Also had LAD and first  "diagonal disease left for medical management. On Brilinta and Aspirin. Chest Pain - I am concerned this is not cardiac. Elevated CRP relieved with meds in the ER. Once more stable from A-fib will reassess symptoms. Consider Ranexa. Has follow up in December with primary cardiologist.      Spinal Stenosis- very likely may be cause of pain patient has been having     Hyperlipidemia- Intolerant to statins. On Zetia      Rheumatoid Arthritis- elevated CRP     Further orders per Dr. Gomez      Thank you for asking us to follow this patient with you.         Electronically signed by FITZ Ray      Result Review    Result Review:  I have personally reviewed the results from the time of this admission to 11/20/2023 15:47 CST and agree with these findings:  []  Laboratory  []  Microbiology  []  Radiology  []  EKG/Telemetry   []  Cardiology/Vascular   []  Pathology  []  Old records  []  Other:    Condition on Discharge:    Stable and improved    Physical Exam on Discharge:  /58 (BP Location: Left arm, Patient Position: Lying)   Pulse 55   Temp 97.6 °F (36.4 °C) (Oral)   Resp 18   Ht 165.1 cm (65\")   Wt 84.9 kg (187 lb 3.2 oz)   SpO2 95%   BMI 31.15 kg/m²   Physical Exam     Constitutional:       General: She is in no acute distress.   HENT:      Head: Normocephalic and atraumatic.      Right Ear: External ear normal.      Left Ear: External ear normal.      Nose: Nose normal.       Mouth: Mucous membranes are moist.   Eyes:      Extraocular Movements: Extraocular movements intact.      Pupils: Pupils are equal, round, and reactive to light.   Cardiovascular:      Rate and Rhythm: Normal rhythm with normal rate.     Pulses: Normal pulses.      Heart sounds: Normal heart sounds.   Pulmonary:      Effort: Pulmonary effort is normal.      Breath sounds: Normal breath sounds.   Abdominal:      General: Bowel sounds are normal.      Palpations: Abdomen is soft.   Musculoskeletal:         General: No " swelling or tenderness.      Right lower leg: No edema.   Skin:     General: Skin is warm and dry.   Neurological:      General: No focal deficit present.      Mental Status: She is alert and oriented to person, place, and time.   Psychiatric:      Comments: Normal mood      Discharge Disposition:  Home or Self Care    Discharge Medications:     Discharge Medications        New Medications        Instructions Start Date   apixaban 5 MG tablet tablet  Commonly known as: ELIQUIS   5 mg, Oral, Every 12 Hours Scheduled      PHARMACY MEDS TO BED CONSULT   Does not apply, Daily   Start Date: November 21, 2023            Changes to Medications        Instructions Start Date   carvedilol 12.5 MG tablet  Commonly known as: COREG  What changed:   medication strength  how much to take  when to take this   12.5 mg, Oral, 2 Times Daily With Meals      isosorbide mononitrate 30 MG 24 hr tablet  Commonly known as: IMDUR  What changed: Another medication with the same name was added. Make sure you understand how and when to take each.   15 mg, Oral, Daily      isosorbide mononitrate 30 MG 24 hr tablet  Commonly known as: IMDUR  What changed: You were already taking a medication with the same name, and this prescription was added. Make sure you understand how and when to take each.   30 mg, Oral, Every 24 Hours Scheduled   Start Date: November 21, 2023            Continue These Medications        Instructions Start Date   aspirin 81 MG EC tablet   81 mg, Oral, Daily      azaTHIOprine 50 MG tablet  Commonly known as: IMURAN   50 mg, Oral, 2 Times Daily      cetirizine 10 MG tablet  Commonly known as: zyrTEC   10 mg, Oral, Daily PRN      dapagliflozin Propanediol 10 MG tablet   10 mg, Oral, Daily      EPINEPHrine 0.3 MG/0.3ML solution auto-injector injection  Commonly known as: EPIPEN   0.3 mg, Intramuscular, Once, If needed      ezetimibe 10 MG tablet  Commonly known as: ZETIA   10 mg, Oral, Daily      fluticasone 50 MCG/ACT nasal  spray  Commonly known as: FLONASE   2 sprays, Nasal, Daily      Inclisiran Sodium 284 MG/1.5ML solution prefilled syringe   284 mg, Subcutaneous, Next due on 11/30- receives from oncology      LORazepam 0.5 MG tablet  Commonly known as: Ativan   0.5 mg, Oral, Nightly PRN      nitroglycerin 0.3 MG SL tablet  Commonly known as: Nitrostat   0.3 mg, Sublingual, Every 5 Minutes PRN, Take no more than 3 doses in 15 minutes.      omeprazole 40 MG capsule  Commonly known as: priLOSEC   40 mg, Oral, Daily      sertraline 100 MG tablet  Commonly known as: ZOLOFT   200 mg, Oral, Daily      sulfaSALAzine 500 MG tablet  Commonly known as: AZULFIDINE   1,000 mg, Oral, Daily      ticagrelor 90 MG tablet tablet  Commonly known as: BRILINTA   90 mg, Oral, 2 Times Daily               Discharge Diet:   Diet Instructions       Diet: Regular/House Diet; Regular Texture (IDDSI 7); Thin (IDDSI 0)      Discharge Diet: Regular/House Diet    Texture: Regular Texture (IDDSI 7)    Fluid Consistency: Thin (IDDSI 0)            Discharge Care Plan / Instructions:   Discharge home    Activity at Discharge:   Activity Instructions       Activity as Tolerated              Follow-up Appointments:  Follow-up with PCP this week  Follow-up with cardiology in 2 weeks    Electronically signed by Julio Barboza DO, 11/20/23, 15:47 CST.    Time: Discharge over 30 min    Part of this note may be an electronic transcription/translation of spoken language to printed text using the Dragon Dictation system.

## 2023-11-20 NOTE — PROGRESS NOTES
LOS: 0 days   Patient Care Team:  Geraldo Feldman DO as PCP - General (Family Medicine)  Rick Tidwell DO as Consulting Physician (Gastroenterology)    Chief Complaint: Atrial fibrillation     Subjective    Alessandra Baker is a 58 y.o. female who is being seen in follow-up  Overnight feels remarkably better  No chest pain  No palpitation  No presyncope  No syncope  No orthopnea  No paroxysmal nocturnal dyspnea  In sinus rhythm per telemetry  Home medications including antiplatelet regimen has not been resumed  Recent D-dimer is negative  Serial cardiac biomarkers negative  No bleeding issues  No falls    Telemetry: no malignant arrhythmia. No significant pauses.    Review of Systems   Constitutional: No chills   Has fatigue   No fever.   HENT: Negative.    Eyes: Negative.    Respiratory: Negative for cough,   No chest wall soreness,   Shortness of breath,   no wheezing, no stridor.    Cardiovascular: As above  Gastrointestinal: Negative for abdominal distention,  No abdominal pain,   No blood in stool,   No constipation,   No diarrhea,   No nausea   No vomiting.   Endocrine: Negative.    Genitourinary: Negative for difficulty urinating, dysuria, flank pain and hematuria.   Musculoskeletal: Negative.    Skin: Negative for rash and wound.   Allergic/Immunologic: Negative.    Neurological: Negative for dizziness, syncope, weakness,   No light-headedness  No  headaches.   Hematological: Does not bruise/bleed easily.   Psychiatric/Behavioral: Negative for agitation or behavioral problems,   No confusion,   the patient is  nervous/anxious.       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    Liver problem     Myocardial infarction 11/2012    NSTEMI    Rheumatoid arthritis     Skin cancer     Stroke     showed up on mri about 5 years ago with no residual    Ulnar neuropathy     Visual impairment      Past Surgical History:   Procedure Laterality Date    ANGIOPLASTY      11/2012    APPENDECTOMY      CARDIAC CATHETERIZATION  07/21/2015 11/1/12, 4/21/22    COLONOSCOPY      COLONOSCOPY N/A 04/24/2023    Procedure: COLONOSCOPY WITH ANESTHESIA;  Surgeon: Rick Tidwell DO;  Location: [facility] ENDOSCOPY;  Service: Gastroenterology;  Laterality: N/A;  preop; hx of polyps   postop; polyps  PCP Eric Feldman     CORONARY STENT PLACEMENT      x3 per patient, X 1 -  11/2012, x 2 - 4/21/22    ENDOSCOPY      ENDOSCOPY N/A 04/24/2023    Procedure: ESOPHAGOGASTRODUODENOSCOPY WITH ANESTHESIA;  Surgeon: Rick Tidwell DO;  Location: [facility] ENDOSCOPY;  Service: Gastroenterology;  Laterality: N/A;  preop; hx of varices   postop; varices   PCP Eric Feldman     HAND RECONSTRUCTION Right 06/15/1967    HYSTERECTOMY      2016    SUBTOTAL HYSTERECTOMY      TONSILLECTOMY       Social History     Socioeconomic History    Marital status:    Tobacco Use    Smoking status: Former     Packs/day: 1.00     Years: 30.00     Additional pack years: 0.00     Total pack years: 30.00     Types: Cigarettes     Start date: 1/1/1993     Quit date: 1/1/2013     Years since quitting: 10.8     Passive exposure: Past    Smokeless tobacco: Never    Tobacco comments:     Not somking now 10 years   Vaping Use    Vaping Use: Former   Substance and Sexual Activity    Alcohol use: Not Currently     Comment: Twice a. Year    Drug use: Never    Sexual activity: Yes     Partners: Male     Birth control/protection: Hysterectomy     Comment:  to same man for 39 yrs     Family History   Problem Relation Age of Onset    Hyperlipidemia Mother     Hypertension Mother     Stroke Mother     Diabetes Mother      Arthritis Mother     Hyperlipidemia Father     Hypertension Father     Heart attack Father     Arthritis Father     Early death Father     Heart failure Sister     Stroke Sister     Heart attack Brother     Alcohol abuse Brother     Heart attack Brother     Mental illness Maternal Aunt         Father's  sister    Breast cancer Maternal Aunt     Heart failure Paternal Aunt     Heart failure Paternal Uncle     Heart failure Paternal Uncle     Colon cancer Neg Hx     Colon polyps Neg Hx     Esophageal cancer Neg Hx        Labs:  WBC WBC   Date Value Ref Range Status   11/20/2023 4.06 3.40 - 10.80 10*3/mm3 Final   11/19/2023 3.91 3.40 - 10.80 10*3/mm3 Final   11/18/2023 6.36 3.40 - 10.80 10*3/mm3 Final      HGB Hemoglobin   Date Value Ref Range Status   11/20/2023 12.1 12.0 - 15.9 g/dL Final   11/19/2023 12.0 12.0 - 15.9 g/dL Final   11/18/2023 14.6 12.0 - 15.9 g/dL Final      HCT Hematocrit   Date Value Ref Range Status   11/20/2023 37.2 34.0 - 46.6 % Final   11/19/2023 37.5 34.0 - 46.6 % Final   11/18/2023 44.6 34.0 - 46.6 % Final      Platelets Platelets   Date Value Ref Range Status   11/20/2023 135 (L) 140 - 450 10*3/mm3 Final   11/19/2023 123 (L) 140 - 450 10*3/mm3 Final   11/18/2023 164 140 - 450 10*3/mm3 Final      MCV MCV   Date Value Ref Range Status   11/20/2023 90.3 79.0 - 97.0 fL Final   11/19/2023 91.9 79.0 - 97.0 fL Final   11/18/2023 88.5 79.0 - 97.0 fL Final        Results from last 7 days   Lab Units 11/20/23  0538 11/19/23  0600 11/18/23  1513   SODIUM mmol/L 141 139 138   POTASSIUM mmol/L 4.8 4.5 4.3   CHLORIDE mmol/L 106 104 102   CO2 mmol/L 26.0 27.0 26.0   BUN mg/dL 21* 22* 18   CREATININE mg/dL 1.33* 1.51* 1.17*   CALCIUM mg/dL 9.3 9.4 9.7   BILIRUBIN mg/dL 0.3 0.4 0.6   ALK PHOS U/L 74 70 88   ALT (SGPT) U/L 8 7 9   AST (SGOT) U/L 26 10 14   GLUCOSE mg/dL 96 85 111*     Lab Results   Component Value Date    TROPONINI 0.126 (C) 03/25/2019    TROPONINT 10 11/18/2023     PT/INR:  No results found  "for: \"PROTIME\"/No results found for: \"INR\"    Imaging Results (Last 72 Hours)       Procedure Component Value Units Date/Time    CT Cervical Spine Without Contrast [[id number]] Collected: 11/19/23 0807     Updated: 11/19/23 0823    Narrative:      EXAM/TECHNIQUE: CT cervical spine without contrast     INDICATION: pain neck and jaw   Has had RA for 30+ years; R07.9-Chest  pain, unspecified; I77.819-Aortic ectasia, unspecified site;  I10-Essential (primary) hypertension     COMPARISON: None     DLP: 385 mGy cm. Automated exposure control was also utilized to  decrease patient radiation dose.     FINDINGS:     Skull base relationships are maintained. Odontoid process is intact.  There is partial fusion of C2 and C3 vertebral bodies. Cervical spine  alignment is anatomic.  Vertebral body heights are maintained.  No acute  fracture or subluxation. Advanced multilevel cervical spine degenerative  change, described in further detail level by level below. No  prevertebral soft tissue swelling.      C2-C3: Central canal and neural foramina are widely patent.     C3-C4: Uncovertebral and facet arthropathy and prominent osteophyte  cause severe left neural foraminal stenosis. Right neural foramen is  widely patent. Central canal is widely patent.     C4-C5: Uncovertebral and facet arthropathy cause mild left neural  foraminal stenosis. Right neural foramina is widely patent. Small disc  bulge causes mild central canal stenosis.     C5-C6: Uncovertebral and facet arthropathy causes moderate to severe  left neural foraminal stenosis and mild right neural foraminal stenosis.  Along with disc osteophyte complex, there is moderate central canal  stenosis.     C6-C7: Neural foramina are widely patent. Disc osteophyte complex causes  mild central canal narrowing.     C7-T1: Central canal and neural foramina are widely patent.       Impression:         1.  No acute osseous finding.  2.  Advanced multilevel cervical spine degenerative " change. Of note,  there is severe left neural foraminal stenosis at C3-C4. There is also  moderate to severe left neural foraminal stenosis at C5-C6. Moderate  central canal stenosis at C5-C6. These findings could be further  assessed with MRI if clinically indicated.     This report was signed and finalized on 11/19/2023 8:20 AM CST by Dr. Aurelio Anders MD.       XR Shoulder 2+ View Bilateral [[id number]] Collected: 11/18/23 1819     Updated: 11/18/23 1825    Narrative:      EXAMINATION:  XR SHOULDER 2+ VW BILATERAL-  11/18/2023 6:18 PM CST     HISTORY: Shoulder pain and decreased range of motion. R07.9-Chest pain,  unspecified; I77.819-Aortic ectasia, unspecified site; I10-Essential  (primary) hypertension.     COMPARISON: No comparison study.     TECHNIQUE: 2 views were obtained of each shoulder.     LEFT SHOULDER: There is mild narrowing and spurring of the AC joint. The  glenohumeral joint is well-maintained. No acute fracture is seen. The  visualized left ribs are intact. The visualized left lung demonstrates  poor inspiration.     RIGHT SHOULDER: There is mild narrowing of the AC joint without  significant spurring. The glenohumeral joint is well-maintained. No  acute fracture is seen. The visualized right ribs are intact. The  visualized right lung demonstrates poor inspiration.          Impression:      1. No evidence of acute fracture.  2. Mild narrowing of the AC joints bilaterally. Mild spurring of the  left AC joint.        This report was signed and finalized on 11/18/2023 6:22 PM CST by Dr. Job Olmedo MD.       CT Angiogram Chest [[id number]] Collected: 11/18/23 1656     Updated: 11/18/23 1705    Narrative:      EXAMINATION:  CT ANGIOGRAM CHEST-  11/18/2023 4:24 PM CST     HISTORY: Acute aortic syndrome (AAS) suspected.     COMPARISON : No comparison study.     DLP: 319 mGy-cm. Automated dosage reduction technique was utilized to  decrease patient dosage.     TECHNIQUE: CT angio was performed  of the chest with IV contrast.  Coronal, sagittal and 3-D reconstruction were performed.     INDEPENDENT 3-D WORKSTATION UTILIZED FOR RECONSTRUCTION: Yes. A  radiologist was not present in the department.     MEDIASTINUM, HEART AND VASCULAR STRUCTURES: There is atheromatous  disease of the thoracic aorta. There are probable left and right  coronary stents. The ascending thoracic aorta is ectatic measuring 3.7  cm. There is no evidence of aortic dissection or aneurysm. The main  pulmonary artery segment measures 2.7 cm. No pulmonary embolus is seen.  There is cardiomegaly. A small amount of pericardial fluid is likely  physiologic.     LUNGS: There is paraseptal and centrilobular emphysema. There are  dependent infiltrates. No pleural effusion is seen.     UPPER ABDOMEN: There is a gallstone in the gallbladder. The liver  appears somewhat macronodular. There is splenomegaly.     BONES: No acute bony abnormality is seen.          Impression:      1. The ascending thoracic aorta is ectatic measuring 3.7 cm. No aneurysm  or dissection is seen. There is atheromatous disease of the thoracic  aorta. There are probable coronary stents. There is cardiomegaly. No CT  evidence of pulmonary embolus.  2. Paraseptal and centrilobular emphysema. Dependent infiltrates, likely  atelectasis.  3. Gallstone in the gallbladder.  4. Liver appears macronodular. Splenomegaly. Probable liver cirrhosis.        The full report of this exam was immediately signed and available to the  emergency room. The patient is currently in the emergency room.     This report was signed and finalized on 11/18/2023 5:01 PM CST by Dr. Job Olmedo MD.       XR Chest 1 View [[id number]] Collected: 11/18/23 1553     Updated: 11/18/23 1558    Narrative:      EXAMINATION:  XR CHEST 1 VW-  11/18/2023 3:37 PM CST     HISTORY: Chest pain protocol. Hyperlipidemia. Hypertension.     COMPARISON: 11/16/2023.     TECHNIQUE: Single view AP image.     FINDINGS: There is  hypoventilation with vascular crowding. There are  linear densities in the lung bases. There is mild stable bronchial wall  thickening. The heart size is normal. There is a probable right coronary  artery stent. No acute appearing bony abnormality is seen.          Impression:      1. Very poor inspiration with vascular crowding.  2. Linear densities in the lung bases, likely atelectasis. Stable mild  bronchial wall thickening.           This report was signed and finalized on 11/18/2023 3:55 PM CST by Dr. Job Olmedo MD.               Objective     Allergies   Allergen Reactions    Bee Venom Anaphylaxis    Clopidogrel Hives and Unknown (See Comments)     Night terrors      Codeine Anaphylaxis    Insect Extract Anaphylaxis     Bee stings    Morphine And Related Shortness Of Breath, Hives and Unknown (See Comments)     tachycardia      Penicillin G Hives    Shellfish Allergy Anaphylaxis    Shellfish-Derived Products Anaphylaxis    Methotrexate Other (See Comments)     hepatotxicity    Penicillin G Benzathine Unknown (See Comments)    Atorvastatin Myalgia and Unknown (See Comments)    Hydrocodone Palpitations     Heart races and feels like bugs crawling under skin    Hydroxychloroquine Other (See Comments) and Unknown (See Comments)     Night terrors      Menthol Rash       Medication Review: Performed  Current Facility-Administered Medications   Medication Dose Route Frequency Provider Last Rate Last Admin    acetaminophen (TYLENOL) tablet 650 mg  650 mg Oral Q4H PRN Yvonne tSevenson        sennosides-docusate (PERICOLACE) 8.6-50 MG per tablet 2 tablet  2 tablet Oral Nightly PRN Yvonne Stevenson        And    polyethylene glycol (MIRALAX) packet 17 g  17 g Oral Daily PRN Yvonne Stevenson        And    bisacodyl (DULCOLAX) EC tablet 5 mg  5 mg Oral Daily PRN Yvonne Stevenson        And    bisacodyl (DULCOLAX) suppository 10 mg  10 mg Rectal Daily PRN Yvonne Stevenson        dilTIAZem (CARDIZEM) 125  "mg in 125 mL D5W infusion  5-15 mg/hr Intravenous Titrated Dax Gomez DO 5 mL/hr at 11/20/23 0212 5 mg/hr at 11/20/23 0212    Enoxaparin Sodium (LOVENOX) syringe 90 mg  1 mg/kg Subcutaneous Q12H GhazalaDewayne APRN   90 mg at 11/19/23 2035    ketorolac (TORADOL) injection 30 mg  30 mg Intravenous Q6H PRN Yvonne Stevenson   30 mg at 11/19/23 0907    nitroglycerin (NITROSTAT) SL tablet 0.4 mg  0.4 mg Sublingual Q5 Min PRN Yvonne Stevenson        ondansetron (ZOFRAN) injection 4 mg  4 mg Intravenous Q6H PRN Yvonne Stevenson        Pharmacy Consult   Does not apply Once Julio Barboza DO        Pharmacy Meds to Bed Consult   Does not apply Daily Yvonne Stevenson        sodium chloride 0.9 % flush 10 mL  10 mL Intravenous PRN Rachid Pimentel MD           Vital Sign Min/Max for last 24 hours  Temp  Min: 98 °F (36.7 °C)  Max: 98.5 °F (36.9 °C)   BP  Min: 114/60  Max: 131/77   Pulse  Min: 53  Max: 136   Resp  Min: 16  Max: 18   SpO2  Min: 96 %  Max: 97 %   No data recorded   Weight  Min: 84.9 kg (187 lb 3.2 oz)  Max: 86.2 kg (190 lb)     Flowsheet Rows      Flowsheet Row First Filed Value   Admission Height 165.1 cm (65\") Documented at 11/18/2023 1512   Admission Weight 87.3 kg (192 lb 8 oz) Documented at 11/18/2023 1458            Results for orders placed during the hospital encounter of 11/18/23    Adult Transthoracic Echo Complete W/ Cont if Necessary Per Protocol    Interpretation Summary    Left ventricular systolic function is normal. Left ventricular ejection fraction appears to be 66 - 70%.    Left ventricular wall thickness is consistent with mild concentric hypertrophy.    Left ventricular diastolic function was normal.    Normal right ventricular cavity size and systolic function noted.    There is mild thickening of the aortic valve. No aortic valve regurgitation is present. No hemodynamically significant aortic valve stenosis is present.      Physical Exam:    General " Appearance: Awake, alert, in no acute distress  Eyes: Pupils equal and reactive    Ears: Appear intact with no abnormalities noted  Nose: Nares normal, no drainage  Neck: supple, trachea midline, no carotid bruit and no JVD  Back: no kyphosis present,    Lungs: respirations regular, respirations even and respirations unlabored  Heart: normal S1, S2, no significant murmurs   No gallops or rubs  no rub and no click  Abdomen: normal bowel sounds, no tenderness   Skin: no bleeding, bruising or rash  Extremities: no cyanosis  Psychiatric/Behavioral: Negative for agitation, behavioral problems, confusion, the patient does  appear to be nervous/anxious.       Results Review:   I reviewed the patient's new clinical results.  I reviewed the patient's new imaging results and agree with the interpretation.  I reviewed the patient's other test results and agree with the interpretation  I personally viewed and interpreted the patient's EKG/Telemetry data    Discussed with patient  Updated patient regarding any new or relevant abnormalities on review of records or any new findings on physical exam.   Mentioned to patient about purpose of visit and desirable health short and long term goals and objectives.     Reviewed available prior notes, consults, prior visits, laboratory findings, radiology and cardiology relevant reports.   Updated chart as applicable.   I have reviewed the patient's medical history in detail and updated the computerized patient record as relevant.          Assessment & Plan       Chest pain    Coronary artery disease    Mixed hyperlipidemia    Rheumatoid arthritis involving multiple sites with positive rheumatoid factor    Atrial fibrillation with RVR    Central stenosis of spinal canal    Neuroforaminal stenosis of cervical spine      Plan    Overall improved  Home medications have not been resumed  We will start her back on aspirin, Brilinta, increased dose of Coreg to 12.5 mg p.o. twice daily as well as  Imdur  Recommend oral anticoagulation  Results of recent echocardiogram reviewed  Cardiac biomarkers are negative  Her chest pain is predominantly pleuritic  Would increase ambulation  If has exertional chest pain will require ischemia work-up  Recommend short-term follow-up with cardiology within 2 weeks of discharge  Telemetry  Deep vein thrombosis prophylaxis/precautions [on anticoagulation]  Appropriate diet, fluid, sodium, caffeine, stimulants intake   Questions were encouraged, asked and answered to the patient's  understanding and satisfaction.  Compliance to diet and medications   Will likely require ischemia work-up in near future either as inpatient or shortly after discharge    Jayson Castillo MD  11/20/23  08:37 CST    EMR Dragon/Transcription was used to dictate part of this note

## 2023-11-20 NOTE — PLAN OF CARE
Problem: Adult Inpatient Plan of Care  Goal: Plan of Care Review  Outcome: Ongoing, Progressing  Flowsheets (Taken 11/20/2023 0557)  Progress: no change  Plan of Care Reviewed With: patient  Outcome Evaluation: No c/o pain.  VSS.  Patient has remained S 63-83 all night.  She has Cardizem 5 IV running cont. per MD order.  Room air.  No skin issues. Self Turn. Lovenox for VTE.  Cont. to monitor.  Call for concerns.   Goal Outcome Evaluation:  Plan of Care Reviewed With: patient        Progress: no change  Outcome Evaluation: No c/o pain.  VSS.  Patient has remained S 63-83 all night.  She has Cardizem 5 IV running cont. per MD order.  Room air.  No skin issues. Self Turn. Lovenox for VTE.  Cont. to monitor.  Call for concerns.

## 2023-11-21 ENCOUNTER — APPOINTMENT (OUTPATIENT)
Dept: GENERAL RADIOLOGY | Facility: HOSPITAL | Age: 58
DRG: 234 | End: 2023-11-21
Payer: MEDICARE

## 2023-11-21 ENCOUNTER — READMISSION MANAGEMENT (OUTPATIENT)
Dept: CALL CENTER | Facility: HOSPITAL | Age: 58
End: 2023-11-21
Payer: MEDICARE

## 2023-11-21 ENCOUNTER — OFFICE VISIT (OUTPATIENT)
Dept: FAMILY MEDICINE CLINIC | Facility: CLINIC | Age: 58
End: 2023-11-21
Payer: MEDICARE

## 2023-11-21 ENCOUNTER — TRANSITIONAL CARE MANAGEMENT TELEPHONE ENCOUNTER (OUTPATIENT)
Dept: CALL CENTER | Facility: HOSPITAL | Age: 58
End: 2023-11-21
Payer: MEDICARE

## 2023-11-21 ENCOUNTER — HOSPITAL ENCOUNTER (INPATIENT)
Facility: HOSPITAL | Age: 58
LOS: 15 days | Discharge: HOME | DRG: 234 | End: 2023-12-06
Attending: INTERNAL MEDICINE | Admitting: SURGERY
Payer: MEDICARE

## 2023-11-21 VITALS
HEIGHT: 65 IN | OXYGEN SATURATION: 98 % | HEART RATE: 65 BPM | DIASTOLIC BLOOD PRESSURE: 80 MMHG | WEIGHT: 192.5 LBS | TEMPERATURE: 97.4 F | BODY MASS INDEX: 32.07 KG/M2 | SYSTOLIC BLOOD PRESSURE: 120 MMHG

## 2023-11-21 DIAGNOSIS — F43.21 GRIEF: ICD-10-CM

## 2023-11-21 DIAGNOSIS — M79.18 MUSCULOSKELETAL PAIN: ICD-10-CM

## 2023-11-21 DIAGNOSIS — I25.9 CHEST PAIN DUE TO MYOCARDIAL ISCHEMIA, UNSPECIFIED ISCHEMIC CHEST PAIN TYPE: Primary | ICD-10-CM

## 2023-11-21 DIAGNOSIS — Z74.09 IMPAIRED MOBILITY: ICD-10-CM

## 2023-11-21 DIAGNOSIS — M05.79 RHEUMATOID ARTHRITIS WITH RHEUMATOID FACTOR OF MULTIPLE SITES WITHOUT ORGAN OR SYSTEMS INVOLVEMENT: ICD-10-CM

## 2023-11-21 DIAGNOSIS — Z09 HOSPITAL DISCHARGE FOLLOW-UP: Primary | ICD-10-CM

## 2023-11-21 DIAGNOSIS — I48.0 PAROXYSMAL ATRIAL FIBRILLATION: ICD-10-CM

## 2023-11-21 DIAGNOSIS — K76.9 LIVER DISEASE: ICD-10-CM

## 2023-11-21 DIAGNOSIS — I21.4 NSTEMI (NON-ST ELEVATED MYOCARDIAL INFARCTION): ICD-10-CM

## 2023-11-21 DIAGNOSIS — R07.9 CHEST PAIN, UNSPECIFIED TYPE: ICD-10-CM

## 2023-11-21 PROBLEM — N18.31 STAGE 3A CHRONIC KIDNEY DISEASE: Status: ACTIVE | Noted: 2023-11-21

## 2023-11-21 LAB
ALBUMIN SERPL-MCNC: 4.6 G/DL (ref 3.5–5.2)
ALBUMIN/GLOB SERPL: 1.4 G/DL
ALP SERPL-CCNC: 86 U/L (ref 39–117)
ALT SERPL W P-5'-P-CCNC: 16 U/L (ref 1–33)
ANION GAP SERPL CALCULATED.3IONS-SCNC: 10 MMOL/L (ref 5–15)
AST SERPL-CCNC: 31 U/L (ref 1–32)
BASOPHILS # BLD AUTO: 0.01 10*3/MM3 (ref 0–0.2)
BASOPHILS NFR BLD AUTO: 0.2 % (ref 0–1.5)
BILIRUB SERPL-MCNC: 0.4 MG/DL (ref 0–1.2)
BUN SERPL-MCNC: 22 MG/DL (ref 6–20)
BUN/CREAT SERPL: 16.3 (ref 7–25)
CALCIUM SPEC-SCNC: 9.7 MG/DL (ref 8.6–10.5)
CHLORIDE SERPL-SCNC: 104 MMOL/L (ref 98–107)
CO2 SERPL-SCNC: 24 MMOL/L (ref 22–29)
CREAT SERPL-MCNC: 1.35 MG/DL (ref 0.57–1)
DEPRECATED RDW RBC AUTO: 48.2 FL (ref 37–54)
EGFRCR SERPLBLD CKD-EPI 2021: 45.4 ML/MIN/1.73
EOSINOPHIL # BLD AUTO: 0.12 10*3/MM3 (ref 0–0.4)
EOSINOPHIL NFR BLD AUTO: 2.4 % (ref 0.3–6.2)
ERYTHROCYTE [DISTWIDTH] IN BLOOD BY AUTOMATED COUNT: 14.7 % (ref 12.3–15.4)
GEN 5 2HR TROPONIN T REFLEX: 349 NG/L
GLOBULIN UR ELPH-MCNC: 3.3 GM/DL
GLUCOSE SERPL-MCNC: 112 MG/DL (ref 65–99)
HCT VFR BLD AUTO: 41.8 % (ref 34–46.6)
HGB BLD-MCNC: 13.4 G/DL (ref 12–15.9)
HOLD SPECIMEN: NORMAL
IMM GRANULOCYTES # BLD AUTO: 0.02 10*3/MM3 (ref 0–0.05)
IMM GRANULOCYTES NFR BLD AUTO: 0.4 % (ref 0–0.5)
LYMPHOCYTES # BLD AUTO: 1 10*3/MM3 (ref 0.7–3.1)
LYMPHOCYTES NFR BLD AUTO: 20.3 % (ref 19.6–45.3)
MCH RBC QN AUTO: 28.5 PG (ref 26.6–33)
MCHC RBC AUTO-ENTMCNC: 32.1 G/DL (ref 31.5–35.7)
MCV RBC AUTO: 88.7 FL (ref 79–97)
MONOCYTES # BLD AUTO: 0.25 10*3/MM3 (ref 0.1–0.9)
MONOCYTES NFR BLD AUTO: 5.1 % (ref 5–12)
NEUTROPHILS NFR BLD AUTO: 3.52 10*3/MM3 (ref 1.7–7)
NEUTROPHILS NFR BLD AUTO: 71.6 % (ref 42.7–76)
NRBC BLD AUTO-RTO: 0 /100 WBC (ref 0–0.2)
PLATELET # BLD AUTO: 209 10*3/MM3 (ref 140–450)
PMV BLD AUTO: 9.4 FL (ref 6–12)
POTASSIUM SERPL-SCNC: 4.1 MMOL/L (ref 3.5–5.2)
PROT SERPL-MCNC: 7.9 G/DL (ref 6–8.5)
QT INTERVAL: 242 MS
QTC INTERVAL: 373 MS
RBC # BLD AUTO: 4.71 10*6/MM3 (ref 3.77–5.28)
SODIUM SERPL-SCNC: 138 MMOL/L (ref 136–145)
TROPONIN T DELTA: 36 NG/L
TROPONIN T SERPL HS-MCNC: 313 NG/L
TROPONIN T SERPL HS-MCNC: 371 NG/L
WBC NRBC COR # BLD AUTO: 4.92 10*3/MM3 (ref 3.4–10.8)
WHOLE BLOOD HOLD COAG: NORMAL
WHOLE BLOOD HOLD SPECIMEN: NORMAL

## 2023-11-21 PROCEDURE — 85025 COMPLETE CBC W/AUTO DIFF WBC: CPT | Performed by: EMERGENCY MEDICINE

## 2023-11-21 PROCEDURE — 99285 EMERGENCY DEPT VISIT HI MDM: CPT

## 2023-11-21 PROCEDURE — 93005 ELECTROCARDIOGRAM TRACING: CPT

## 2023-11-21 PROCEDURE — 63710000001 AZATHIOPRINE PER 50 MG: Performed by: NURSE PRACTITIONER

## 2023-11-21 PROCEDURE — 84484 ASSAY OF TROPONIN QUANT: CPT | Performed by: EMERGENCY MEDICINE

## 2023-11-21 PROCEDURE — 84484 ASSAY OF TROPONIN QUANT: CPT | Performed by: NURSE PRACTITIONER

## 2023-11-21 PROCEDURE — 93010 ELECTROCARDIOGRAM REPORT: CPT | Performed by: INTERNAL MEDICINE

## 2023-11-21 PROCEDURE — 36415 COLL VENOUS BLD VENIPUNCTURE: CPT

## 2023-11-21 PROCEDURE — 80053 COMPREHEN METABOLIC PANEL: CPT | Performed by: EMERGENCY MEDICINE

## 2023-11-21 PROCEDURE — 71045 X-RAY EXAM CHEST 1 VIEW: CPT

## 2023-11-21 RX ORDER — SODIUM CHLORIDE 0.9 % (FLUSH) 0.9 %
10 SYRINGE (ML) INJECTION EVERY 12 HOURS SCHEDULED
Status: DISCONTINUED | OUTPATIENT
Start: 2023-11-21 | End: 2023-11-29

## 2023-11-21 RX ORDER — NITROGLYCERIN 0.4 MG/1
0.4 TABLET SUBLINGUAL
Status: DISCONTINUED | OUTPATIENT
Start: 2023-11-21 | End: 2023-11-29

## 2023-11-21 RX ORDER — AZATHIOPRINE 50 MG/1
50 TABLET ORAL 2 TIMES DAILY
Status: DISCONTINUED | OUTPATIENT
Start: 2023-11-21 | End: 2023-11-25

## 2023-11-21 RX ORDER — SODIUM CHLORIDE 9 MG/ML
40 INJECTION, SOLUTION INTRAVENOUS AS NEEDED
Status: DISCONTINUED | OUTPATIENT
Start: 2023-11-21 | End: 2023-11-29

## 2023-11-21 RX ORDER — SODIUM CHLORIDE 0.9 % (FLUSH) 0.9 %
10 SYRINGE (ML) INJECTION AS NEEDED
Status: DISCONTINUED | OUTPATIENT
Start: 2023-11-21 | End: 2023-11-29

## 2023-11-21 RX ORDER — POLYETHYLENE GLYCOL 3350 17 G/17G
17 POWDER, FOR SOLUTION ORAL DAILY PRN
Status: DISCONTINUED | OUTPATIENT
Start: 2023-11-21 | End: 2023-11-29

## 2023-11-21 RX ORDER — FLUTICASONE PROPIONATE 50 MCG
2 SPRAY, SUSPENSION (ML) NASAL DAILY
Status: DISCONTINUED | OUTPATIENT
Start: 2023-11-22 | End: 2023-11-29

## 2023-11-21 RX ORDER — BISACODYL 5 MG/1
5 TABLET, DELAYED RELEASE ORAL DAILY PRN
Status: DISCONTINUED | OUTPATIENT
Start: 2023-11-21 | End: 2023-11-29

## 2023-11-21 RX ORDER — ASPIRIN 81 MG/1
324 TABLET, CHEWABLE ORAL ONCE
Status: DISCONTINUED | OUTPATIENT
Start: 2023-11-21 | End: 2023-11-29

## 2023-11-21 RX ORDER — PANTOPRAZOLE SODIUM 40 MG/1
40 TABLET, DELAYED RELEASE ORAL
Status: DISCONTINUED | OUTPATIENT
Start: 2023-11-22 | End: 2023-11-29

## 2023-11-21 RX ORDER — ASPIRIN 81 MG/1
81 TABLET ORAL DAILY
Status: DISCONTINUED | OUTPATIENT
Start: 2023-11-22 | End: 2023-11-29

## 2023-11-21 RX ORDER — SERTRALINE HYDROCHLORIDE 100 MG/1
200 TABLET, FILM COATED ORAL DAILY
Status: DISCONTINUED | OUTPATIENT
Start: 2023-11-21 | End: 2023-11-29

## 2023-11-21 RX ORDER — ONDANSETRON 4 MG/1
4 TABLET, FILM COATED ORAL EVERY 6 HOURS PRN
Status: DISCONTINUED | OUTPATIENT
Start: 2023-11-21 | End: 2023-11-29

## 2023-11-21 RX ORDER — BISACODYL 10 MG
10 SUPPOSITORY, RECTAL RECTAL DAILY PRN
Status: DISCONTINUED | OUTPATIENT
Start: 2023-11-21 | End: 2023-11-29

## 2023-11-21 RX ORDER — SULFASALAZINE 500 MG/1
1000 TABLET ORAL DAILY
Status: DISCONTINUED | OUTPATIENT
Start: 2023-11-21 | End: 2023-11-29

## 2023-11-21 RX ORDER — ISOSORBIDE MONONITRATE 30 MG/1
30 TABLET, EXTENDED RELEASE ORAL
Status: DISCONTINUED | OUTPATIENT
Start: 2023-11-21 | End: 2023-11-22

## 2023-11-21 RX ORDER — ACETAMINOPHEN 650 MG/1
650 SUPPOSITORY RECTAL EVERY 4 HOURS PRN
Status: DISCONTINUED | OUTPATIENT
Start: 2023-11-21 | End: 2023-11-29

## 2023-11-21 RX ORDER — AMOXICILLIN 250 MG
1 CAPSULE ORAL NIGHTLY PRN
Status: DISCONTINUED | OUTPATIENT
Start: 2023-11-21 | End: 2023-11-29

## 2023-11-21 RX ORDER — ACETAMINOPHEN 325 MG/1
650 TABLET ORAL EVERY 4 HOURS PRN
Status: DISCONTINUED | OUTPATIENT
Start: 2023-11-21 | End: 2023-11-22 | Stop reason: SDUPTHER

## 2023-11-21 RX ORDER — ACETAMINOPHEN 160 MG/5ML
650 SOLUTION ORAL EVERY 4 HOURS PRN
Status: DISCONTINUED | OUTPATIENT
Start: 2023-11-21 | End: 2023-11-29

## 2023-11-21 RX ORDER — LORAZEPAM 0.5 MG/1
0.5 TABLET ORAL NIGHTLY PRN
Qty: 30 TABLET | Refills: 0 | Status: ON HOLD | OUTPATIENT
Start: 2023-11-21

## 2023-11-21 RX ORDER — ONDANSETRON 2 MG/ML
4 INJECTION INTRAMUSCULAR; INTRAVENOUS EVERY 6 HOURS PRN
Status: DISCONTINUED | OUTPATIENT
Start: 2023-11-21 | End: 2023-11-29

## 2023-11-21 RX ORDER — METHYLPREDNISOLONE 4 MG/1
TABLET ORAL
Qty: 21 TABLET | Refills: 0 | Status: ON HOLD | OUTPATIENT
Start: 2023-11-21 | End: 2023-11-21

## 2023-11-21 RX ORDER — CETIRIZINE HYDROCHLORIDE 10 MG/1
10 TABLET ORAL DAILY PRN
Status: DISCONTINUED | OUTPATIENT
Start: 2023-11-21 | End: 2023-11-29

## 2023-11-21 RX ORDER — LORAZEPAM 0.5 MG/1
0.5 TABLET ORAL NIGHTLY PRN
Status: DISCONTINUED | OUTPATIENT
Start: 2023-11-21 | End: 2023-11-29

## 2023-11-21 RX ORDER — CARVEDILOL 6.25 MG/1
12.5 TABLET ORAL 2 TIMES DAILY WITH MEALS
Status: DISCONTINUED | OUTPATIENT
Start: 2023-11-21 | End: 2023-11-27

## 2023-11-21 RX ADMIN — AZATHIOPRINE 50 MG: 50 TABLET ORAL at 21:51

## 2023-11-21 RX ADMIN — SERTRALINE HYDROCHLORIDE 200 MG: 100 TABLET, FILM COATED ORAL at 21:51

## 2023-11-21 RX ADMIN — CARVEDILOL 12.5 MG: 6.25 TABLET, FILM COATED ORAL at 21:51

## 2023-11-21 RX ADMIN — Medication 10 ML: at 21:51

## 2023-11-21 RX ADMIN — TICAGRELOR 90 MG: 90 TABLET ORAL at 21:51

## 2023-11-21 RX ADMIN — APIXABAN 5 MG: 5 TABLET, FILM COATED ORAL at 21:50

## 2023-11-21 RX ADMIN — SULFASALAZINE 1000 MG: 500 TABLET ORAL at 21:50

## 2023-11-21 RX ADMIN — ISOSORBIDE MONONITRATE 30 MG: 30 TABLET, EXTENDED RELEASE ORAL at 21:50

## 2023-11-21 NOTE — ED PROVIDER NOTES
Subjective   History of Present Illness  58 year old female discharged from our facility yesterday presents this evening with complaints of irregular heartbeat.  She was recently diagnosed with atrial fibrillation.  Patient reports she was started on eliquis and had her carvedilol increased during her stay.  This evening, she had just finished eating her supper and was sitting on the couch when she felt her heart get out of rhythm.  She reports initially she had chest pain but that has now resolved.  Patient does endorse right side neck and jaw pain during interview.  Patient reports she felt her heart convert back into a normal rhythm on her drive to the hospital.  She denies any shortness of breath.  She is neurologically intact.        Review of Systems   Cardiovascular:  Positive for chest pain and palpitations.   Musculoskeletal:  Positive for neck pain.   All other systems reviewed and are negative.      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    Depression     Esophageal varices     GERD (gastroesophageal reflux disease)     Headache     Heart problem     angina    Hyperlipidemia     Hypertension 11/2012    Liver disease 2015    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    Liver problem     Myocardial infarction 11/2012    NSTEMI    Rheumatoid arthritis     Skin cancer     Stroke     showed up on mri about 5 years ago with no residual    Ulnar neuropathy     Visual impairment        Allergies   Allergen Reactions    Bee Venom Anaphylaxis    Clopidogrel Hives and Unknown (See Comments)     Night terrors      Codeine Anaphylaxis    Insect Extract Anaphylaxis     Bee stings    Morphine And Related Shortness Of Breath, Hives and Unknown (See Comments)     tachycardia      Penicillin G Hives    Shellfish Allergy Anaphylaxis    Shellfish-Derived Products Anaphylaxis    Methotrexate Other (See  Comments)     hepatotxicity    Penicillin G Benzathine Unknown (See Comments)    Atorvastatin Myalgia and Unknown (See Comments)    Hydrocodone Palpitations     Heart races and feels like bugs crawling under skin    Hydroxychloroquine Other (See Comments) and Unknown (See Comments)     Night terrors      Menthol Rash       Past Surgical History:   Procedure Laterality Date    ANGIOPLASTY      11/2012    APPENDECTOMY      CARDIAC CATHETERIZATION  07/21/2015 11/1/12, 4/21/22    COLONOSCOPY      COLONOSCOPY N/A 04/24/2023    Procedure: COLONOSCOPY WITH ANESTHESIA;  Surgeon: Rick Tidwell DO;  Location: [facility] ENDOSCOPY;  Service: Gastroenterology;  Laterality: N/A;  preop; hx of polyps   postop; polyps  PCP Eric Feldman     CORONARY STENT PLACEMENT      x3 per patient, X 1 -  11/2012, x 2 - 4/21/22    ENDOSCOPY      ENDOSCOPY N/A 04/24/2023    Procedure: ESOPHAGOGASTRODUODENOSCOPY WITH ANESTHESIA;  Surgeon: Rick Tidwell DO;  Location: [facility] ENDOSCOPY;  Service: Gastroenterology;  Laterality: N/A;  preop; hx of varices   postop; varices   PCP Eric Feldman     HAND RECONSTRUCTION Right 06/15/1967    HYSTERECTOMY      2016    SUBTOTAL HYSTERECTOMY      TONSILLECTOMY         Family History   Problem Relation Age of Onset    Hyperlipidemia Mother     Hypertension Mother     Stroke Mother     Diabetes Mother     Arthritis Mother     Hyperlipidemia Father     Hypertension Father     Heart attack Father     Arthritis Father     Early death Father     Heart failure Sister     Stroke Sister     Heart attack Brother     Alcohol abuse Brother     Heart attack Brother     Mental illness Maternal Aunt         Father's  sister    Breast cancer Maternal Aunt     Heart failure Paternal Aunt     Heart failure Paternal Uncle     Heart failure Paternal Uncle     Colon cancer Neg Hx     Colon polyps Neg Hx     Esophageal cancer Neg Hx        Social History     Socioeconomic History    Marital status:    Tobacco  Use    Smoking status: Former     Packs/day: 1.00     Years: 30.00     Additional pack years: 0.00     Total pack years: 30.00     Types: Cigarettes     Start date: 1/1/1993     Quit date: 1/1/2013     Years since quitting: 10.8     Passive exposure: Past    Smokeless tobacco: Never    Tobacco comments:     Not somking now 10 years   Vaping Use    Vaping Use: Former   Substance and Sexual Activity    Alcohol use: Not Currently     Comment: Twice a. Year    Drug use: Never    Sexual activity: Yes     Partners: Male     Birth control/protection: Hysterectomy     Comment:  to same man for 39 yrs           Objective   Physical Exam  Constitutional:       Comments: Anxious appearing   HENT:      Head: Normocephalic.      Jaw: There is normal jaw occlusion.      Right Ear: Hearing normal.      Left Ear: Hearing normal.      Nose: Nose normal.   Neck:      Trachea: Trachea and phonation normal.   Cardiovascular:      Rate and Rhythm: Normal rate and regular rhythm.      Pulses: Normal pulses.           Carotid pulses are 2+ on the right side and 2+ on the left side.       Radial pulses are 2+ on the right side and 2+ on the left side.        Femoral pulses are 2+ on the right side and 2+ on the left side.       Popliteal pulses are 2+ on the right side and 2+ on the left side.        Dorsalis pedis pulses are 2+ on the right side and 2+ on the left side.        Posterior tibial pulses are 2+ on the right side and 2+ on the left side.      Heart sounds: Normal heart sounds.   Pulmonary:      Effort: Pulmonary effort is normal.      Breath sounds: Normal breath sounds and air entry.   Musculoskeletal:         General: Normal range of motion.      Cervical back: Full passive range of motion without pain, normal range of motion and neck supple.   Lymphadenopathy:      Cervical: No cervical adenopathy.   Skin:     General: Skin is warm and dry.      Capillary Refill: Capillary refill takes less than 2 seconds.  "  Neurological:      General: No focal deficit present.      Mental Status: She is alert and oriented to person, place, and time.   Psychiatric:         Behavior: Behavior is cooperative.         Procedures           ED Course                                           Medical Decision Making  58 year old female discharged from our facility yesterday presents this evening with complaints of irregular heartbeat.  She was recently diagnosed with atrial fibrillation.  Patient reports she was started on eliquis and had her carvedilol increased during her stay.  This evening, she had just finished eating her supper and was sitting on the couch when she felt her heart get out of rhythm.  She reports initially she had chest pain but that has now resolved.  Patient does endorse right side neck and jaw pain during interview.  Patient reports she felt her heart convert back into a normal rhythm on her drive to the hospital.  She denies any shortness of breath.  She is neurologically intact.     [id number] Score for Atrial Fibrillation Stroke Risk - MDCalc  Calculated on Nov 21 2023 8:28 PM  3 points -> Stroke risk was 3.2% per year in >90,000 patients (the [place] Atrial Fibrillation Cohort Study) and 4.6% risk of stroke/TIA/systemic embolism. One recommendation suggests a 0 score for men or 1 score for women (no clinical risk factors) is \"low\" risk and may not require anticoagulation; a 1 score for men or 2 score for women is \"low-moderate\" risk and should consider antiplatelet or anticoagulation; and a score =2 for men or =3 for women is \"moderate-high\" risk and should otherwise be an anticoagulation candidate.    Problems Addressed:  Chest pain due to myocardial ischemia, unspecified ischemic chest pain type: complicated acute illness or injury  Paroxysmal atrial fibrillation: complicated acute illness or injury    Amount and/or Complexity of Data Reviewed  Labs: ordered.  Radiology: ordered.  ECG/medicine tests: " ordered.  Discussion of management or test interpretation with external provider(s): Discussed with Dr. Emery who agrees to readmission of patient.    Risk  OTC drugs.  Prescription drug management.  Decision regarding hospitalization.        Final diagnoses:   Chest pain due to myocardial ischemia, unspecified ischemic chest pain type   Paroxysmal atrial fibrillation   NSTEMI (non-ST elevated myocardial infarction)       ED Disposition  ED Disposition       ED Disposition   Decision to Admit    Condition   --    Comment   Level of Care: Telemetry [5]   Diagnosis: NSTEMI (non-ST elevated myocardial infarction) [721659]   Admitting Physician: CARINA EMERY [[id number]]   Attending Physician: CARINA EMERY [[id number]]   Certification: I Certify That Inpatient Hospital Services Are Medically Necessary For Greater Than 2 Midnights                 No follow-up provider specified.       Medication List      No changes were made to your prescriptions during this visit.            Ghada Terry, APRN  11/21/23 1936

## 2023-11-21 NOTE — OUTREACH NOTE
HPI: Colette Marcos is a 22 year old female with a history of OMERO-associated anterior uveitis right eye here for routine 6.5 month uveitis clinic follow up. Using prednisolone acetate and Humira as prescribed - no problems. No eye changes or concerns since last visit. She has chronic floaters right eye that have not changed in the last year. No flashes. She also would like an updated glasses prescription.    Current medications: prednisolone acetate three times a day right eye, Humira q2wk (started 6.7.18). IMT prescribed by rheum: Dr. Carolina at [facility].    Prior medications have included various oral NSAIDs for joints and a short course of oral prednisone for joints (patient did not tolerate well, felt sick per mother's report, patient does not recall because it was so long ago). For ease of use she switched to Humira from Remicade 700 mg (~9.5 mg/kg) q4wk (Fall 2013 - 5.7.18), oral methotrexate 10 mg (was on and off methotrexate since age 8-9 years, previously on 15 mg/wk with taper starting early Jan 2019 - stopped July 20, 2019).    Ocular history:   1. 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 Joint disease (RF-negative, polyarticular) first diagnosed at age 5 years (though mother reports that she may have been symptomatic as early as age 2 years with ankle pain and swelling). Follows with Dr. Arnold ([facility] rheum) for joint disease and immunomodulatory therapy. Uveitis first diagnosed at age 8-9 years. Patient and mother report no history of uveitis left eye. Review of electronic records here (through 01/2014) shows no cell left eye.    Most recently had 1+ anterior chamber cell right eye in December 2014.   Most recently had 0.5+ anterior chamber cell right eye in August 2017 (low trace, ~1 cell/hpf, was holding MTX at time of exam) and 0.5+ anterior chamber cell right eye in March 2019 (0-2 cells/hpf) and June/Aug 2020 (1 cell/hpf).  2. History of cystoid macular edema  Prep Survey      Flowsheet Row Responses   [facility] facility patient discharged from? [facility]   Is LACE score < 7 ? No   Eligibility [facility]   Date of Admission 11/18/23   Date of Discharge 11/20/23   Discharge Disposition Home or Self Care   Discharge diagnosis chest pain, A-fib with RVR, Rheumatoid arthritis   Does the patient have one of the following disease processes/diagnoses(primary or secondary)? Other   Does the patient have Home health ordered? No   Is there a DME ordered? No   Prep survey completed? Yes            Amara Coronado Registered Nurse           right eye, s/p intravitreal corticosteroid right eye in September 2013 (Dr. Foafna).  3. Pseudophakia right eye s/p cataract extraction/IOL 12.09.14 ([facility]), s/p YAG capsulotomy 04.24.17.    Ocular Imaging:  Optos fluorescein angiography 6.25.21: no leakage in either eye    Macular OCT 3.10.23: within normal limits and stable both eyes    Central corneal thickness 8.14.20: 571/569  RNFL OCT 3.10.23 within normal limits and stable vs 8.14.20 both eyes    OVF 24-2 3.10.23:  OD - reliable. Diffuse non-specific loss. MD -5.7 **Patient reports that target was difficult to see (bifocal correction was not done for this test - patient has had cataract surgery right eye only and requires bifocal add for visual field testing right eye).  OS - reliable. Normal. MD 0.6    Impression/Plan:   1. OMERO-associated anterior uveitis right eye, quiescent today.   - Continue Humira q2wk   - Continue Predforte right eye three times a day    2. Ocular hypertension both eyes. Repeat retinal nerve fiber layer OCT today is normal and stable. visual field testing right eye shows non-specific loss, likely because bifocal add was not put into rx for visual field testing.   - Intraocular pressure remains at higher end of normal, symmetric despite Predforte only in right eye   - Repeat visual field right eye in 4 months    3. Pseudophakia right eye, stable.   - Monitor    4. Posterior vitreous detachment (PVD) right eye, stable.   - Monitor    Follow up in 4 months, V/T/OVF 24-2 right eye with bifocal correction -patient has had cataract surgery right eye only and requires bifocal add for visual field testing right eye-, sooner with changes    Review of external notes as documented elsewhere in note  I spent a total of 41 minutes on the day of the visit.   Time spent doing chart review, history and exam, documentation and further activities per the note        Time spent on the date of the encounter doing chart review, history and exam,  documentation and further activities as noted above.           Attending Physician Attestation:  Complete documentation of historical and exam elements from today's encounter can be found in the full encounter summary report (not reduplicated in this progress note).  I personally obtained the chief complaint(s) and history of present illness.  I confirmed and edited as necessary the review of systems, past medical/surgical history, family history, social history, and examination findings as documented by others; and I examined the patient myself.  I personally reviewed the relevant tests, images, and reports as documented above.  I formulated and edited as necessary the assessment and plan and discussed the findings and management plan with the patient and family.  - Yaima Maynard M.D.

## 2023-11-21 NOTE — Clinical Note
The DP pulses are +2 bilaterally. The PT pulses are +2 bilaterally. Was ordered on 6/4/2021 prn for leg swelling

## 2023-11-21 NOTE — OUTREACH NOTE
Call Center TCM Note      Flowsheet Row Responses   [facility] patient discharged from? [place]   Does the patient have one of the following disease processes/diagnoses(primary or secondary)? Other   TCM attempt successful? Yes   Call start time 1653   Call end time 1653   Discharge diagnosis chest pain, A-fib with RVR, Rheumatoid arthritis   TCM call completed? Yes   Wrap up additional comments Patient has a completed hospital f/u appt documented.   Call end time 1653   Would this patient benefit from a Referral to [facility] Social Work? No   Is the patient interested in additional calls from an ambulatory ? No            Gloria River RN    11/21/2023, 16:55 CST

## 2023-11-21 NOTE — PROGRESS NOTES
FITZ Barraza  [facility]   Family Medicine  [address]  [place] [address]  Phone: [phone]  Fax: [phone]         Chief Complaint:  Chief Complaint   Patient presents with    Follow-up     Hosp follow up - pt states was in hosp for chest pains, from left and right across chest. That was Friday and went home, and went again on Saturday for more chest pains and ended up admitting the second time, Sunday morning did vitals in hosp and went into Afib         History:  Alessandra Baker is a 58 y.o. female that is an established patient. She  is here for evaluation of the above complaint and management of chronic conditions.    HPI     EDGAR-7 is 4 today    Date of admit: 11-  Date of d/c: 11-  Outreach: 11-, BETINA Chakraborty RN    Alessandra is here for hospital follow-up, she is here with her .  She started having chest pain Friday, November 17, 2023.  Presented to the ED that day was tested and released she presented the next day and was admitted she was found to atrial fibrillation and has been started on Eliquis, aspirin and Neurontin and increase her Coreg to 12.5 mg twice daily.    She was told in the hospital that she has anxiety she states she did not know if she had anxiety or not she does know she has depression and is on Zoloft.  EDGAR 7 is 4 today does have a prescription for diazepam that she has had for a long time when she would use it to sleep at night.  She is actually only taken 1 of that prescription and then has lost and thinks that that bottle of medication got thrown away.  Would like a refill of that.    Guarding her rheumatoid arthritis it has been over a year since she has seen rheumatology she did not know if she should or not but we will put that referral in as it may take a while for her to get an appointment.  States that she has not had pain from RA in the last year.    He is concerned about adding all this medication with a history of  cirrhosis of the liver.  Last liver ultrasound was in January 2023 showed fatty liver and gallstones.  Her functions in the hospital were within normal limits.    She was told that her chest pain was musculoskeletal and is concerned that that is can come back.  We discussed using an anti-inflammatory like an oral corticosteroid because though she is on Eliquis now we will avoid NSAIDs.                 ROS:  Review of Systems   Constitutional: Negative.    HENT: Negative.     Eyes: Negative.    Respiratory: Negative.     Cardiovascular:  Positive for chest pain.   Gastrointestinal: Negative.    Endocrine: Negative.    Genitourinary: Negative.    Musculoskeletal:  Positive for arthralgias.   Skin: Negative.    Neurological: Negative.    Psychiatric/Behavioral:  The patient is nervous/anxious.          reports that she quit smoking about 10 years ago. Her smoking use included cigarettes. She started smoking about 30 years ago. She has a 30.00 pack-year smoking history. She has been exposed to tobacco smoke. She has never used smokeless tobacco. She reports that she does not currently use alcohol. She reports that she does not use drugs.    Current Outpatient Medications   Medication Instructions    aspirin 81 mg, Oral, Daily    azaTHIOprine (IMURAN) 50 mg, Oral, 2 Times Daily    carvedilol (COREG) 12.5 mg, Oral, 2 Times Daily With Meals    cetirizine (ZYRTEC) 10 mg, Oral, Daily PRN    dapagliflozin Propanediol 10 mg, Oral, Daily    Eliquis 5 mg, Oral, Every 12 Hours Scheduled    EPINEPHrine (EPIPEN) 0.3 mg, Intramuscular, Once, If needed    ezetimibe (ZETIA) 10 mg, Oral, Daily    fluticasone (FLONASE) 50 MCG/ACT nasal spray 2 sprays, Nasal, Daily    Inclisiran Sodium 284 mg, Subcutaneous, Next due on 11/30- receives from oncology    isosorbide mononitrate (IMDUR) 15 mg, Oral, Daily    isosorbide mononitrate (IMDUR) 30 mg, Oral, Every 24 Hours Scheduled    LORazepam (ATIVAN) 0.5 mg, Oral, Nightly PRN     "methylPREDNISolone (MEDROL) 4 MG dose pack Take as directed on package instructions.    nitroglycerin (NITROSTAT) 0.3 mg, Sublingual, Every 5 Minutes PRN, Take no more than 3 doses in 15 minutes.    omeprazole (PRILOSEC) 40 mg, Oral, Daily    PHARMACY MEDS TO BED CONSULT Does not apply, Daily    sertraline (ZOLOFT) 200 mg, Oral, Daily    sulfaSALAzine (AZULFIDINE) 1,000 mg, Oral, Daily    ticagrelor (BRILINTA) 90 mg, Oral, 2 Times Daily       OBJECTIVE:  /80   Pulse 65   Temp 97.4 °F (36.3 °C)   Ht 165.1 cm (65\")   Wt 87.3 kg (192 lb 8 oz)   SpO2 98%   BMI 32.03 kg/m²    Physical Exam  Vitals and nursing note reviewed.   Constitutional:       Appearance: Normal appearance.   HENT:      Head: Normocephalic and atraumatic.      Right Ear: Tympanic membrane, ear canal and external ear normal.      Left Ear: Tympanic membrane, ear canal and external ear normal.   Cardiovascular:      Rate and Rhythm: Normal rate and regular rhythm.   Pulmonary:      Effort: Pulmonary effort is normal.      Breath sounds: Normal breath sounds.   Skin:     General: Skin is warm and dry.   Neurological:      General: No focal deficit present.      Mental Status: She is alert and oriented to person, place, and time.   Psychiatric:         Attention and Perception: Attention and perception normal.         Mood and Affect: Affect normal. Mood is anxious.         Speech: Speech normal.         Behavior: Behavior normal. Behavior is cooperative.         Thought Content: Thought content normal.         Cognition and Memory: Cognition and memory normal.         Judgment: Judgment normal.         Procedures    Assessment/Plan:     Diagnoses and all orders for this visit:    1. Hospital discharge follow-up (Primary)    2. Grief  -     LORazepam (Ativan) 0.5 MG tablet; Take 1 tablet by mouth At Night As Needed for Anxiety.  Dispense: 30 tablet; Refill: 0    3. Rheumatoid arthritis with rheumatoid factor of multiple sites without organ or " systems involvement  -     Ambulatory Referral to Rheumatology    4. Musculoskeletal pain  -     methylPREDNISolone (MEDROL) 4 MG dose pack; Take as directed on package instructions.  Dispense: 21 tablet; Refill: 0    5. Chest pain, unspecified type    6. Liver disease      BMI is >= 30 and <35. (Class 1 Obesity). The following options were offered after discussion;: exercise counseling/recommendations and nutrition counseling/recommendations       An After Visit Summary was printed and given to the patient at discharge.  Return for Next scheduled follow up.       Patient Instructions   Medrol dose pack as directed  Lorazepam at night as needed      Discussion:     Return for f/u with Dr. Feldman    I spent 35 minutes caring for Alessandra on this date of service. This time includes time spent by me in the following activities: preparing for the visit, reviewing tests, obtaining and/or reviewing a separately obtained history, performing a medically appropriate examination and/or evaluation, counseling and educating the patient/family/caregiver, ordering medications, tests, or procedures, referring and communicating with other health care professionals, documenting information in the medical record, and independently interpreting results and communicating that information with the patient/family/caregiver     Jerilyn BYRNES 11/21/2023   Electronically signed.

## 2023-11-22 ENCOUNTER — APPOINTMENT (OUTPATIENT)
Dept: ULTRASOUND IMAGING | Facility: HOSPITAL | Age: 58
DRG: 234 | End: 2023-11-22
Payer: MEDICARE

## 2023-11-22 ENCOUNTER — APPOINTMENT (OUTPATIENT)
Dept: CT IMAGING | Facility: HOSPITAL | Age: 58
DRG: 234 | End: 2023-11-22
Payer: MEDICARE

## 2023-11-22 DIAGNOSIS — I21.4 NSTEMI (NON-ST ELEVATED MYOCARDIAL INFARCTION): Primary | ICD-10-CM

## 2023-11-22 LAB
ANION GAP SERPL CALCULATED.3IONS-SCNC: 9 MMOL/L (ref 5–15)
BUN SERPL-MCNC: 22 MG/DL (ref 6–20)
BUN/CREAT SERPL: 17.1 (ref 7–25)
CALCIUM SPEC-SCNC: 8.9 MG/DL (ref 8.6–10.5)
CHLORIDE SERPL-SCNC: 106 MMOL/L (ref 98–107)
CO2 SERPL-SCNC: 26 MMOL/L (ref 22–29)
CREAT SERPL-MCNC: 1.29 MG/DL (ref 0.57–1)
DEPRECATED RDW RBC AUTO: 49.9 FL (ref 37–54)
EGFRCR SERPLBLD CKD-EPI 2021: 47.9 ML/MIN/1.73
ERYTHROCYTE [DISTWIDTH] IN BLOOD BY AUTOMATED COUNT: 14.7 % (ref 12.3–15.4)
GLUCOSE SERPL-MCNC: 98 MG/DL (ref 65–99)
HBA1C MFR BLD: 4.7 % (ref 4.8–5.6)
HCT VFR BLD AUTO: 37.6 % (ref 34–46.6)
HGB BLD-MCNC: 11.8 G/DL (ref 12–15.9)
MCH RBC QN AUTO: 28.8 PG (ref 26.6–33)
MCHC RBC AUTO-ENTMCNC: 31.4 G/DL (ref 31.5–35.7)
MCV RBC AUTO: 91.7 FL (ref 79–97)
PLATELET # BLD AUTO: 155 10*3/MM3 (ref 140–450)
PMV BLD AUTO: 9.1 FL (ref 6–12)
POTASSIUM SERPL-SCNC: 4.5 MMOL/L (ref 3.5–5.2)
RBC # BLD AUTO: 4.1 10*6/MM3 (ref 3.77–5.28)
SODIUM SERPL-SCNC: 141 MMOL/L (ref 136–145)
WBC NRBC COR # BLD AUTO: 3.59 10*3/MM3 (ref 3.4–10.8)

## 2023-11-22 PROCEDURE — 80048 BASIC METABOLIC PNL TOTAL CA: CPT | Performed by: NURSE PRACTITIONER

## 2023-11-22 PROCEDURE — 93459 L HRT ART/GRFT ANGIO: CPT | Performed by: INTERNAL MEDICINE

## 2023-11-22 PROCEDURE — 25010000002 MIDAZOLAM HCL (PF) 5 MG/5ML SOLUTION: Performed by: INTERNAL MEDICINE

## 2023-11-22 PROCEDURE — 25010000002 HEPARIN (PORCINE) 1000-0.9 UT/500ML-% SOLUTION: Performed by: INTERNAL MEDICINE

## 2023-11-22 PROCEDURE — 71250 CT THORAX DX C-: CPT

## 2023-11-22 PROCEDURE — C1760 CLOSURE DEV, VASC: HCPCS | Performed by: INTERNAL MEDICINE

## 2023-11-22 PROCEDURE — 25010000002 METHYLPREDNISOLONE PER 125 MG: Performed by: INTERNAL MEDICINE

## 2023-11-22 PROCEDURE — 25810000003 SODIUM CHLORIDE 0.9 % SOLUTION: Performed by: INTERNAL MEDICINE

## 2023-11-22 PROCEDURE — 25010000002 HEPARIN (PORCINE) 2000-0.9 UNIT/L-% SOLUTION: Performed by: INTERNAL MEDICINE

## 2023-11-22 PROCEDURE — B2161ZZ FLUOROSCOPY OF RIGHT AND LEFT HEART USING LOW OSMOLAR CONTRAST: ICD-10-PCS | Performed by: INTERNAL MEDICINE

## 2023-11-22 PROCEDURE — B2111ZZ FLUOROSCOPY OF MULTIPLE CORONARY ARTERIES USING LOW OSMOLAR CONTRAST: ICD-10-PCS | Performed by: INTERNAL MEDICINE

## 2023-11-22 PROCEDURE — 25510000001 IOPAMIDOL PER 1 ML: Performed by: INTERNAL MEDICINE

## 2023-11-22 PROCEDURE — 4A023N7 MEASUREMENT OF CARDIAC SAMPLING AND PRESSURE, LEFT HEART, PERCUTANEOUS APPROACH: ICD-10-PCS | Performed by: INTERNAL MEDICINE

## 2023-11-22 PROCEDURE — C1769 GUIDE WIRE: HCPCS | Performed by: INTERNAL MEDICINE

## 2023-11-22 PROCEDURE — 25010000002 MORPHINE SULFATE (PF) 2 MG/ML SOLUTION: Performed by: INTERNAL MEDICINE

## 2023-11-22 PROCEDURE — 99222 1ST HOSP IP/OBS MODERATE 55: CPT | Performed by: INTERNAL MEDICINE

## 2023-11-22 PROCEDURE — 85027 COMPLETE CBC AUTOMATED: CPT | Performed by: NURSE PRACTITIONER

## 2023-11-22 PROCEDURE — 36415 COLL VENOUS BLD VENIPUNCTURE: CPT | Performed by: NURSE PRACTITIONER

## 2023-11-22 PROCEDURE — 99152 MOD SED SAME PHYS/QHP 5/>YRS: CPT | Performed by: INTERNAL MEDICINE

## 2023-11-22 PROCEDURE — 25010000002 DIPHENHYDRAMINE PER 50 MG: Performed by: INTERNAL MEDICINE

## 2023-11-22 PROCEDURE — 83036 HEMOGLOBIN GLYCOSYLATED A1C: CPT | Performed by: NURSE PRACTITIONER

## 2023-11-22 PROCEDURE — 25010000002 ENOXAPARIN PER 10 MG: Performed by: INTERNAL MEDICINE

## 2023-11-22 PROCEDURE — C1894 INTRO/SHEATH, NON-LASER: HCPCS | Performed by: INTERNAL MEDICINE

## 2023-11-22 PROCEDURE — 25010000002 FENTANYL CITRATE (PF) 50 MCG/ML SOLUTION: Performed by: INTERNAL MEDICINE

## 2023-11-22 PROCEDURE — 99153 MOD SED SAME PHYS/QHP EA: CPT | Performed by: INTERNAL MEDICINE

## 2023-11-22 PROCEDURE — 63710000001 AZATHIOPRINE PER 50 MG: Performed by: INTERNAL MEDICINE

## 2023-11-22 PROCEDURE — 93458 L HRT ARTERY/VENTRICLE ANGIO: CPT | Performed by: INTERNAL MEDICINE

## 2023-11-22 RX ORDER — TEMAZEPAM 7.5 MG/1
7.5 CAPSULE ORAL NIGHTLY PRN
Status: DISCONTINUED | OUTPATIENT
Start: 2023-11-22 | End: 2023-11-29

## 2023-11-22 RX ORDER — ONDANSETRON 2 MG/ML
4 INJECTION INTRAMUSCULAR; INTRAVENOUS EVERY 6 HOURS PRN
Status: DISCONTINUED | OUTPATIENT
Start: 2023-11-22 | End: 2023-11-22 | Stop reason: SDUPTHER

## 2023-11-22 RX ORDER — METHYLPREDNISOLONE SODIUM SUCCINATE 125 MG/2ML
125 INJECTION, POWDER, LYOPHILIZED, FOR SOLUTION INTRAMUSCULAR; INTRAVENOUS ONCE
Status: DISCONTINUED | OUTPATIENT
Start: 2023-11-22 | End: 2023-11-26

## 2023-11-22 RX ORDER — ONDANSETRON 4 MG/1
4 TABLET, FILM COATED ORAL EVERY 6 HOURS PRN
Status: DISCONTINUED | OUTPATIENT
Start: 2023-11-22 | End: 2023-11-22 | Stop reason: SDUPTHER

## 2023-11-22 RX ORDER — SODIUM CHLORIDE 9 MG/ML
75 INJECTION, SOLUTION INTRAVENOUS CONTINUOUS
Status: DISCONTINUED | OUTPATIENT
Start: 2023-11-22 | End: 2023-11-25

## 2023-11-22 RX ORDER — MIDAZOLAM HYDROCHLORIDE 1 MG/ML
INJECTION, SOLUTION INTRAMUSCULAR; INTRAVENOUS
Status: DISCONTINUED | OUTPATIENT
Start: 2023-11-22 | End: 2023-11-22 | Stop reason: HOSPADM

## 2023-11-22 RX ORDER — HEPARIN SODIUM 200 [USP'U]/100ML
INJECTION, SOLUTION INTRAVENOUS
Status: DISCONTINUED | OUTPATIENT
Start: 2023-11-22 | End: 2023-11-22 | Stop reason: HOSPADM

## 2023-11-22 RX ORDER — MORPHINE SULFATE 2 MG/ML
2 INJECTION, SOLUTION INTRAMUSCULAR; INTRAVENOUS
Status: DISCONTINUED | OUTPATIENT
Start: 2023-11-22 | End: 2023-11-23

## 2023-11-22 RX ORDER — ISOSORBIDE MONONITRATE 30 MG/1
15 TABLET, EXTENDED RELEASE ORAL EVERY MORNING
COMMUNITY
End: 2023-12-06 | Stop reason: HOSPADM

## 2023-11-22 RX ORDER — NITROGLYCERIN 0.4 MG/1
0.4 TABLET SUBLINGUAL
Status: DISCONTINUED | OUTPATIENT
Start: 2023-11-22 | End: 2023-11-22 | Stop reason: SDUPTHER

## 2023-11-22 RX ORDER — ISOSORBIDE MONONITRATE 30 MG/1
15 TABLET, EXTENDED RELEASE ORAL EVERY MORNING
Status: DISCONTINUED | OUTPATIENT
Start: 2023-11-23 | End: 2023-11-25

## 2023-11-22 RX ORDER — ALPRAZOLAM 0.5 MG/1
0.5 TABLET ORAL 3 TIMES DAILY PRN
Status: DISCONTINUED | OUTPATIENT
Start: 2023-11-22 | End: 2023-11-29

## 2023-11-22 RX ORDER — NITROGLYCERIN 0.3 MG/1
0.3 TABLET SUBLINGUAL
Status: DISCONTINUED | OUTPATIENT
Start: 2023-11-22 | End: 2023-11-22 | Stop reason: SDUPTHER

## 2023-11-22 RX ORDER — LIDOCAINE HYDROCHLORIDE 20 MG/ML
INJECTION, SOLUTION INFILTRATION; PERINEURAL
Status: DISCONTINUED | OUTPATIENT
Start: 2023-11-22 | End: 2023-11-22 | Stop reason: HOSPADM

## 2023-11-22 RX ORDER — ENOXAPARIN SODIUM 100 MG/ML
1 INJECTION SUBCUTANEOUS EVERY 12 HOURS
Status: COMPLETED | OUTPATIENT
Start: 2023-11-22 | End: 2023-11-28

## 2023-11-22 RX ORDER — DIPHENHYDRAMINE HYDROCHLORIDE 50 MG/ML
INJECTION INTRAMUSCULAR; INTRAVENOUS
Status: DISCONTINUED | OUTPATIENT
Start: 2023-11-22 | End: 2023-11-22 | Stop reason: HOSPADM

## 2023-11-22 RX ORDER — ACETAMINOPHEN 325 MG/1
650 TABLET ORAL EVERY 4 HOURS PRN
Status: DISCONTINUED | OUTPATIENT
Start: 2023-11-22 | End: 2023-11-29

## 2023-11-22 RX ORDER — FENTANYL CITRATE 50 UG/ML
INJECTION, SOLUTION INTRAMUSCULAR; INTRAVENOUS
Status: DISCONTINUED | OUTPATIENT
Start: 2023-11-22 | End: 2023-11-22 | Stop reason: HOSPADM

## 2023-11-22 RX ORDER — EPINEPHRINE 0.3 MG/.3ML
0.3 INJECTION SUBCUTANEOUS ONCE AS NEEDED
Status: DISCONTINUED | OUTPATIENT
Start: 2023-11-22 | End: 2023-11-29

## 2023-11-22 RX ORDER — DIPHENHYDRAMINE HCL 25 MG
25 CAPSULE ORAL EVERY 6 HOURS PRN
Status: DISCONTINUED | OUTPATIENT
Start: 2023-11-22 | End: 2023-11-29

## 2023-11-22 RX ORDER — METHYLPREDNISOLONE SODIUM SUCCINATE 125 MG/2ML
INJECTION, POWDER, LYOPHILIZED, FOR SOLUTION INTRAMUSCULAR; INTRAVENOUS
Status: DISCONTINUED | OUTPATIENT
Start: 2023-11-22 | End: 2023-11-22 | Stop reason: HOSPADM

## 2023-11-22 RX ORDER — FAMOTIDINE 10 MG/ML
INJECTION, SOLUTION INTRAVENOUS
Status: DISCONTINUED | OUTPATIENT
Start: 2023-11-22 | End: 2023-11-22 | Stop reason: HOSPADM

## 2023-11-22 RX ADMIN — Medication 10 ML: at 21:45

## 2023-11-22 RX ADMIN — ISOSORBIDE MONONITRATE 30 MG: 30 TABLET, EXTENDED RELEASE ORAL at 09:35

## 2023-11-22 RX ADMIN — ENOXAPARIN SODIUM 90 MG: 100 INJECTION SUBCUTANEOUS at 21:44

## 2023-11-22 RX ADMIN — Medication 10 ML: at 09:37

## 2023-11-22 RX ADMIN — SERTRALINE HYDROCHLORIDE 200 MG: 100 TABLET, FILM COATED ORAL at 09:35

## 2023-11-22 RX ADMIN — CARVEDILOL 12.5 MG: 6.25 TABLET, FILM COATED ORAL at 21:44

## 2023-11-22 RX ADMIN — TICAGRELOR 90 MG: 90 TABLET ORAL at 09:35

## 2023-11-22 RX ADMIN — SODIUM CHLORIDE 75 ML/HR: 9 INJECTION, SOLUTION INTRAVENOUS at 15:35

## 2023-11-22 RX ADMIN — CARVEDILOL 12.5 MG: 6.25 TABLET, FILM COATED ORAL at 09:35

## 2023-11-22 RX ADMIN — ASPIRIN 81 MG: 81 TABLET, COATED ORAL at 09:35

## 2023-11-22 RX ADMIN — MORPHINE SULFATE 2 MG: 2 INJECTION, SOLUTION INTRAMUSCULAR; INTRAVENOUS at 12:01

## 2023-11-22 RX ADMIN — PANTOPRAZOLE SODIUM 40 MG: 40 TABLET, DELAYED RELEASE ORAL at 05:27

## 2023-11-22 NOTE — PLAN OF CARE
Goal Outcome Evaluation:  Plan of Care Reviewed With: patient, spouse        Progress: no change     VSS, cath lab today with Anna, CT consult, cath site to right groin c/d/I with gauze and teg, PPP distally, no pain post cath, SB/S 50-60 on telemetry,  at the bedside, neither have any further questions or concerns at this time, safety maintained, will continue to monitor.

## 2023-11-22 NOTE — PAYOR COMM NOTE
"11/22/23 [facility] [phone]     [phone]    PATIENT ER ADMIT TO INPATIENT ON 11/21/23 FAXING FOR INPATIENT REVIEW.              Regina Baker (58 y.o. Female)       Date of Birth   1965    Social Security Number       Address   [address] [place], [place] [address]    Home Phone   [phone]    MRN   [id number]       Denominational   Jewish    Marital Status                               Admission Date   11/21/23    Admission Type   Emergency    Admitting Provider   Julio Barboza DO    Attending Provider   Julio Barboza DO    Department, Room/Bed   [facility] 4B, 445/1       Discharge Date       Discharge Disposition       Discharge Destination                                 Attending Provider: Julio Barboza DO    Allergies: Bee Venom, Clopidogrel, Codeine, Insect Extract, Morphine And Related, Penicillin G, Shellfish Allergy, Shellfish-derived Products, Methotrexate, Penicillin G Benzathine, Atorvastatin, Hydrocodone, Hydroxychloroquine, Menthol    Isolation: None   Infection: None   Code Status: CPR    Ht: 165.1 cm (65\")   Wt: 86.8 kg (191 lb 6.4 oz)    Admission Cmt: None   Principal Problem: NSTEMI (non-ST elevated myocardial infarction) [I21.4]                   Active Insurance as of 11/21/2023       Primary Coverage       Payor Plan Insurance Group Employer/Plan Group    AETNA MEDICARE REPLACEMENT AETNA MEDICARE REPLACEMENT [id number]       Payor Plan Address Payor Plan Phone Number Payor Plan Fax Number Effective Dates    [address] [phone]  1/1/2023 - None Entered    [facility] [id number]         Subscriber Name Subscriber Birth Date Member ID       REGINA BAKER 1965 [id number]                     Emergency Contacts        (Rel.) Home Phone Work Phone Mobile Phone    EDISON BAKER (Spouse) -- -- [phone]    KEL CEBALLOS (Other) -- -- [phone]             [facility] Encounter " Date/Time: 2023 [facility]   Hospital Account: [id number]    MRN: [id number]   Patient:  Regina Baker   Contact Serial #: [id number]   SSN:          ENCOUNTER             Patient Class: Inpatient   Unit: [address] Service: Cardiology     Bed: 445/1   Admitting Provider: Julio Barboza DO   Referring Physician:     Attending Provider: Julio Barboza DO   Adm Diagnosis: NSTEMI (non-ST elevated *               PATIENT          Name: Regina Baker : 1965 (58 yrs)   Address: [address] [place] [address] Sex: Female   City: [place] [address]   County: [facility]   Marital Status:  Ethnicity: NOT                                                                              Race: WHITE   Primary Care Provider: Geraldo Feldman DO Patients Phone: Home Phone: [phone]     Mobile Phone: [phone]   EMERGENCY CONTACT   Contact Name Legal Guardian? Relationship to Patient Home Phone Work Phone   1. EDISON BAKER  2. KEL CEBALLOS      Spouse  Other                GUARANTOR            Guarantor: Regina Baker     : 1965   Address: [address] [place] [address] Sex: [place] [address]     Relation to Patient: Self       Home Phone: [phone]   Guarantor ID: [id number]       Work Phone:     GUARANTOR EMPLOYER   Employer:           Status: DISABLED   COVERAGE          PRIMARY INSURANCE   Payor: AETNA MEDICARE REPLACEMENT Plan: AETNA MEDICARE REPLACEMENT   Group Number: [id number] Insurance Type: INDEMNITY   Subscriber Name: REGINA BAKER Subscriber : 1965   Subscriber ID: [id number] Coverage Address: [facility] [address] [place] [address]   Pat. Rel. to Subscriber: Self Coverage Phone: [phone]   SECONDARY INSURANCE   Payor: N/A Plan: N/A   Group Number:   Insurance Type:     Subscriber Name:   Subscriber :     Subscriber ID:   Coverage Address:     Pat. Rel. to Subscriber:   Coverage Phone:        Contact Serial # ([id number])         2023    Chart ID  ([id number])                Ghada Terry APRN   Nurse Practitioner  Emergency Medicine     ED Provider Notes      Cosign Needed     Date of Service: 11/21/23 1757  Creation Time: 11/21/23 1757     Cosign Needed       Expand All Collapse All       Subjective   History of Present Illness  58 year old female discharged from our facility yesterday presents this evening with complaints of irregular heartbeat.  She was recently diagnosed with atrial fibrillation.  Patient reports she was started on eliquis and had her carvedilol increased during her stay.  This evening, she had just finished eating her supper and was sitting on the couch when she felt her heart get out of rhythm.  She reports initially she had chest pain but that has now resolved.  Patient does endorse right side neck and jaw pain during interview.  Patient reports she felt her heart convert back into a normal rhythm on her drive to the hospital.  She denies any shortness of breath.  She is neurologically intact.          Review of Systems   Cardiovascular:  Positive for chest pain and palpitations.   Musculoskeletal:  Positive for neck pain.   All other systems reviewed and are negative.        Medical History        Past Medical History:   Diagnosis Date    Anxiety      Arthritis      Atrial fibrillation with RVR 11/19/2023    CAD (coronary artery disease)      Cirrhosis       From Methotrexate    Depression      Esophageal varices      GERD (gastroesophageal reflux disease)      Headache      Heart problem       angina    Hyperlipidemia      Hypertension 11/2012    Liver disease 2015     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    Liver problem      Myocardial infarction 11/2012     NSTEMI    Rheumatoid arthritis      Skin cancer      Stroke       showed up on mri about 5 years ago with no residual    Ulnar neuropathy      Visual impairment                    Allergies   Allergen Reactions    Bee Venom Anaphylaxis    Clopidogrel Hives and  Unknown (See Comments)       Night terrors       Codeine Anaphylaxis    Insect Extract Anaphylaxis       Bee stings    Morphine And Related Shortness Of Breath, Hives and Unknown (See Comments)       tachycardia       Penicillin G Hives    Shellfish Allergy Anaphylaxis    Shellfish-Derived Products Anaphylaxis    Methotrexate Other (See Comments)       hepatotxicity    Penicillin G Benzathine Unknown (See Comments)    Atorvastatin Myalgia and Unknown (See Comments)    Hydrocodone Palpitations       Heart races and feels like bugs crawling under skin    Hydroxychloroquine Other (See Comments) and Unknown (See Comments)       Night terrors       Menthol Rash         Surgical History         Past Surgical History:   Procedure Laterality Date    ANGIOPLASTY         11/2012    APPENDECTOMY        CARDIAC CATHETERIZATION   07/21/2015 11/1/12, 4/21/22    COLONOSCOPY        COLONOSCOPY N/A 04/24/2023     Procedure: COLONOSCOPY WITH ANESTHESIA;  Surgeon: Rick Tidwell DO;  Location: [facility] ENDOSCOPY;  Service: Gastroenterology;  Laterality: N/A;  preop; hx of polyps   postop; polyps  PCP Eric Feldman     CORONARY STENT PLACEMENT         x3 per patient, X 1 -  11/2012, x 2 - 4/21/22    ENDOSCOPY        ENDOSCOPY N/A 04/24/2023     Procedure: ESOPHAGOGASTRODUODENOSCOPY WITH ANESTHESIA;  Surgeon: Rick Tidwell DO;  Location: [facility] ENDOSCOPY;  Service: Gastroenterology;  Laterality: N/A;  preop; hx of varices   postop; varices   PCP Eric Feldman     HAND RECONSTRUCTION Right 06/15/1967    HYSTERECTOMY         2016    SUBTOTAL HYSTERECTOMY        TONSILLECTOMY                      Family History   Problem Relation Age of Onset    Hyperlipidemia Mother      Hypertension Mother      Stroke Mother      Diabetes Mother      Arthritis Mother      Hyperlipidemia Father      Hypertension Father      Heart attack Father      Arthritis Father      Early death Father      Heart failure Sister      Stroke Sister       Heart attack Brother      Alcohol abuse Brother      Heart attack Brother      Mental illness Maternal Aunt           Father's  sister    Breast cancer Maternal Aunt      Heart failure Paternal Aunt      Heart failure Paternal Uncle      Heart failure Paternal Uncle      Colon cancer Neg Hx      Colon polyps Neg Hx      Esophageal cancer Neg Hx           Social History   Social History            Socioeconomic History    Marital status:    Tobacco Use    Smoking status: Former       Packs/day: 1.00       Years: 30.00       Additional pack years: 0.00       Total pack years: 30.00       Types: Cigarettes       Start date: 1/1/1993       Quit date: 1/1/2013       Years since quitting: 10.8       Passive exposure: Past    Smokeless tobacco: Never    Tobacco comments:       Not somking now 10 years   Vaping Use    Vaping Use: Former   Substance and Sexual Activity    Alcohol use: Not Currently       Comment: Twice a. Year    Drug use: Never    Sexual activity: Yes       Partners: Male       Birth control/protection: Hysterectomy       Comment:  to same man for 39 yrs                        Objective []Expand by Default  Physical Exam  Constitutional:       Comments: Anxious appearing   HENT:      Head: Normocephalic.      Jaw: There is normal jaw occlusion.      Right Ear: Hearing normal.      Left Ear: Hearing normal.      Nose: Nose normal.   Neck:      Trachea: Trachea and phonation normal.   Cardiovascular:      Rate and Rhythm: Normal rate and regular rhythm.      Pulses: Normal pulses.           Carotid pulses are 2+ on the right side and 2+ on the left side.       Radial pulses are 2+ on the right side and 2+ on the left side.        Femoral pulses are 2+ on the right side and 2+ on the left side.       Popliteal pulses are 2+ on the right side and 2+ on the left side.        Dorsalis pedis pulses are 2+ on the right side and 2+ on the left side.        Posterior tibial pulses are 2+ on the right  "side and 2+ on the left side.      Heart sounds: Normal heart sounds.   Pulmonary:      Effort: Pulmonary effort is normal.      Breath sounds: Normal breath sounds and air entry.   Musculoskeletal:         General: Normal range of motion.      Cervical back: Full passive range of motion without pain, normal range of motion and neck supple.   Lymphadenopathy:      Cervical: No cervical adenopathy.   Skin:     General: Skin is warm and dry.      Capillary Refill: Capillary refill takes less than 2 seconds.   Neurological:      General: No focal deficit present.      Mental Status: She is alert and oriented to person, place, and time.   Psychiatric:         Behavior: Behavior is cooperative.            Procedures                 ED Course                                    Medical Decision Making  58 year old female discharged from our facility yesterday presents this evening with complaints of irregular heartbeat.  She was recently diagnosed with atrial fibrillation.  Patient reports she was started on eliquis and had her carvedilol increased during her stay.  This evening, she had just finished eating her supper and was sitting on the couch when she felt her heart get out of rhythm.  She reports initially she had chest pain but that has now resolved.  Patient does endorse right side neck and jaw pain during interview.  Patient reports she felt her heart convert back into a normal rhythm on her drive to the hospital.  She denies any shortness of breath.  She is neurologically intact.      [id number] Score for Atrial Fibrillation Stroke Risk - MDCalc  Calculated on Nov 21 2023 8:28 PM  3 points -> Stroke risk was 3.2% per year in >90,000 patients (the [place] Atrial Fibrillation Cohort Study) and 4.6% risk of stroke/TIA/systemic embolism. One recommendation suggests a 0 score for men or 1 score for women (no clinical risk factors) is \"low\" risk and may not require anticoagulation; a 1 score for men or 2 score for " "women is \"low-moderate\" risk and should consider antiplatelet or anticoagulation; and a score =2 for men or =3 for women is \"moderate-high\" risk and should otherwise be an anticoagulation candidate.     Problems Addressed:  Chest pain due to myocardial ischemia, unspecified ischemic chest pain type: complicated acute illness or injury  Paroxysmal atrial fibrillation: complicated acute illness or injury     Amount and/or Complexity of Data Reviewed  Labs: ordered.  Radiology: ordered.  ECG/medicine tests: ordered.  Discussion of management or test interpretation with external provider(s): Discussed with Dr. Emery who agrees to readmission of patient.     Risk  OTC drugs.  Prescription drug management.  Decision regarding hospitalization.           Final diagnoses:   Chest pain due to myocardial ischemia, unspecified ischemic chest pain type   Paroxysmal atrial fibrillation   NSTEMI (non-ST elevated myocardial infarction)         ED Disposition  ED Disposition         ED Disposition   Decision to Admit    Condition   --    Comment   Level of Care: Telemetry [5]   Diagnosis: NSTEMI (non-ST elevated myocardial infarction) [670718]   Admitting Physician: CARINA EMERY [[id number]]   Attending Physician: CARINA EMERY [[id number]]   Certification: I Certify That Inpatient Hospital Services Are Medically Necessary For Greater Than 2 Midnights                      No follow-up provider specified.         Medication List       No changes were made to your prescriptions during this visit.                         Yoly Hodges APRN   Nurse Practitioner  Hospitalist     H&P      Cosign Needed     Date of Service: 11/21/23 1930  Creation Time: 11/21/23 1930     Cosign Needed       Expand All Collapse All         [facility] Medicine Services  HISTORY AND PHYSICAL     Date of Admission: 11/21/2023  Primary Care Physician: Geraldo Feldman DO Subjective   Primary Historian: Patient   "   Chief Complaint: Irregular heart rate with associated chest pressure     History of Present Illness  Alessandra Baker is a 58-year-old female with a past medical history of recent diagnosis atrial fibrillation with RVR, chronic kidney disease stage IIIa, coronary artery disease, medication induced cirrhosis of the liver, hypertension, hyperlipidemia, stroke, rheumatoid arthritis undergoing current therapy, see below for complete list.  Patient admitted 11/18 - 11/20, 2023 with chest pain and A-fib RVR.  Patient did report chest pain at that time however cardiac markers were unremarkable.  Dr. Castillo felt pain was likely pleuritic in nature and related to RA.  Patient was started on anticoagulation and continued aspirin and Brilinta.  Also started on carvedilol 12.5 twice daily and Imdur dose increased from 30 to 60 mg a day.  Patient was seen today by PCP for hospital follow-up.  She was started on a Medrol Dosepak for musculoskeletal pain, referral to rheumatology for rheumatoid arthritis, started on Ativan for grief.  No documentation of pain or discomfort at that time.     Patient returns to ER today with currents of palpitations, patient states she knew it was atrial fib.  She had associated chest pressure, diaphoresis, shortness of breathing.  No nausea.  Workup revealed elevated troponin 313.  Repeat completed after hospitalist seen 349 with a delta of 36.  We have reconsulted cardiology.  At time of assessment patient was pain-free, normal sinus rhythm in the 70s.  Home medications restarted, will hold steroids for now.  She is admitted for further evaluation treatment.     Review of Systems   Otherwise complete ROS reviewed and negative except as mentioned in the HPI.     Past Medical History:   Medical History[]Expand by Default        Past Medical History:   Diagnosis Date    Anxiety      Arthritis      Atrial fibrillation with RVR 11/19/2023    CAD (coronary artery disease)      Chronic kidney disease       Cirrhosis       From Methotrexate    Depression      Esophageal varices      GERD (gastroesophageal reflux disease)      Headache      Heart problem       angina    Hyperlipidemia      Hypertension 11/2012    Liver disease 2015     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    Liver problem      Myocardial infarction 11/2012     NSTEMI    Rheumatoid arthritis      Skin cancer      Stroke       showed up on mri about 5 years ago with no residual    Ulnar neuropathy      Visual impairment           Past Surgical History:  Surgical History         Past Surgical History:   Procedure Laterality Date    ANGIOPLASTY         11/2012    APPENDECTOMY        CARDIAC CATHETERIZATION   07/21/2015 11/1/12, 4/21/22    COLONOSCOPY        COLONOSCOPY N/A 04/24/2023     Procedure: COLONOSCOPY WITH ANESTHESIA;  Surgeon: Rick Tidwell DO;  Location: [facility] ENDOSCOPY;  Service: Gastroenterology;  Laterality: N/A;  preop; hx of polyps   postop; polyps  PCP Eric Feldman     CORONARY STENT PLACEMENT         x3 per patient, X 1 -  11/2012, x 2 - 4/21/22    ENDOSCOPY        ENDOSCOPY N/A 04/24/2023     Procedure: ESOPHAGOGASTRODUODENOSCOPY WITH ANESTHESIA;  Surgeon: Rick Tidwell DO;  Location: [facility] ENDOSCOPY;  Service: Gastroenterology;  Laterality: N/A;  preop; hx of varices   postop; varices   PCP Eric Feldman     HAND RECONSTRUCTION Right 06/15/1967    HYSTERECTOMY         2016    SUBTOTAL HYSTERECTOMY        TONSILLECTOMY             Social History:  reports that she quit smoking about 10 years ago. Her smoking use included cigarettes. She started smoking about 30 years ago. She has a 30.00 pack-year smoking history. She has been exposed to tobacco smoke. She has never used smokeless tobacco. She reports that she does not currently use alcohol. She reports that she does not use drugs.     Family History: family history includes Alcohol abuse in her brother; Arthritis in her father and mother; Breast cancer in her maternal aunt;  Diabetes in her mother; Early death in her father; Heart attack in her brother, brother, and father; Heart failure in her paternal aunt, paternal uncle, paternal uncle, and sister; Hyperlipidemia in her father and mother; Hypertension in her father and mother; Mental illness in her maternal aunt; Stroke in her mother and sister.        Allergies:        Allergies   Allergen Reactions    Bee Venom Anaphylaxis    Clopidogrel Hives and Unknown (See Comments)       Night terrors       Codeine Anaphylaxis    Insect Extract Anaphylaxis       Bee stings    Morphine And Related Shortness Of Breath, Hives and Unknown (See Comments)       tachycardia       Penicillin G Hives    Shellfish Allergy Anaphylaxis    Shellfish-Derived Products Anaphylaxis    Methotrexate Other (See Comments)       hepatotxicity    Penicillin G Benzathine Unknown (See Comments)    Atorvastatin Myalgia and Unknown (See Comments)    Hydrocodone Palpitations       Heart races and feels like bugs crawling under skin    Hydroxychloroquine Other (See Comments) and Unknown (See Comments)       Night terrors       Menthol Rash         Medications:          Prior to Admission medications    Medication Sig Start Date End Date Taking? Authorizing Provider   apixaban (ELIQUIS) 5 MG tablet tablet Take 1 tablet by mouth Every 12 (Twelve) Hours. Indications: Atrial Fibrillation 11/20/23     Julio Barboza DO   aspirin 81 MG EC tablet Take 1 tablet by mouth Daily. 5/4/23     Ricky Slade MD   azaTHIOprine (IMURAN) 50 MG tablet Take 1 tablet by mouth 2 (Two) Times a Day.       ProviderMee MD   carvedilol (COREG) 12.5 MG tablet Take 1 tablet by mouth 2 (Two) Times a Day With Meals. 11/20/23     Julio Barboza DO   cetirizine (zyrTEC) 10 MG tablet Take 1 tablet by mouth Daily As Needed for Allergies.       ProviderMee MD   dapagliflozin Propanediol 10 MG tablet Take 10 mg by mouth Daily. 11/8/23     Geraldo Feldman DO   EPINEPHrine  (EPIPEN) 0.3 MG/0.3ML solution auto-injector injection Inject 0.3 mL into the appropriate muscle as directed by prescriber 1 (One) Time. If needed 6/20/23     Mee Kolb MD   ezetimibe (ZETIA) 10 MG tablet Take 1 tablet by mouth Daily. 5/4/23     Ricky Slade MD   fluticasone (FLONASE) 50 MCG/ACT nasal spray 2 sprays into the nostril(s) as directed by provider Daily. 8/1/23     Geraldo Feldman, DO   Inclisiran Sodium 284 MG/1.5ML solution prefilled syringe Inject 1.5 mL under the skin into the appropriate area as directed. Next due on 11/30- receives from oncology       ProviderMee MD   isosorbide mononitrate (IMDUR) 30 MG 24 hr tablet Take 1 tablet by mouth Daily. 11/21/23     Julio Barboza DO   LORazepam (Ativan) 0.5 MG tablet Take 1 tablet by mouth At Night As Needed for Anxiety. 11/21/23     Jerilyn Parisi APRN   methylPREDNISolone (MEDROL) 4 MG dose pack Take as directed on package instructions. 11/21/23     Jerilyn Parisi APRN   nitroglycerin (Nitrostat) 0.3 MG SL tablet Place 1 tablet under the tongue Every 5 (Five) Minutes As Needed for Chest Pain. Take no more than 3 doses in 15 minutes. 12/20/22     Geraldo Feldman DO   omeprazole (priLOSEC) 40 MG capsule Take 1 capsule by mouth Daily.       ProviderMee MD   PHARMACY MEDS TO BED CONSULT Use Daily. 11/21/23     Julio Barboza DO   sertraline (ZOLOFT) 100 MG tablet Take 2 tablets by mouth Daily. 8/1/23     Geraldo Feldman DO   sulfaSALAzine (AZULFIDINE) 500 MG tablet Take 2 tablets by mouth Daily.       ProviderMee MD   ticagrelor (BRILINTA) 90 MG tablet tablet Take 1 tablet by mouth 2 (Two) Times a Day. 5/15/23     Geraldo Feldman DO   LORazepam (Ativan) 0.5 MG tablet Take 1 tablet by mouth At Night As Needed for Anxiety. 8/1/23 11/21/23   Feldman, Geraldo L, DO      I have utilized all available immediate resources to obtain, update, or review the patient's current medications  "(including all prescriptions, over-the-counter products, herbals, cannabis/cannabidiol products, and vitamin/mineral/dietary (nutritional) supplements).     Objective      Vital Signs: /71 (BP Location: Right arm, Patient Position: Lying)   Pulse 70   Temp 98.2 °F (36.8 °C) (Oral)   Resp 18   Ht 165.1 cm (65\")   Wt 86.8 kg (191 lb 6.4 oz)   SpO2 98%   BMI 31.85 kg/m²   Physical Exam  Vitals reviewed.   Constitutional:       Appearance: Normal appearance.   HENT:      Head: Normocephalic and atraumatic.      Mouth/Throat:      Mouth: Mucous membranes are moist.      Pharynx: Oropharynx is clear.   Eyes:      Extraocular Movements: Extraocular movements intact.      Conjunctiva/sclera: Conjunctivae normal.   Cardiovascular:      Rate and Rhythm: Normal rate and regular rhythm.   Pulmonary:      Effort: Pulmonary effort is normal.      Breath sounds: Normal breath sounds.   Abdominal:      General: There is no distension.      Palpations: Abdomen is soft.   Musculoskeletal:      Cervical back: Normal range of motion and neck supple.      Right lower leg: No edema.      Left lower leg: No edema.      Comments: Decreased range of motion due to RA   Skin:     General: Skin is warm and dry.   Neurological:      General: No focal deficit present.      Mental Status: She is alert and oriented to person, place, and time.   Psychiatric:         Mood and Affect: Mood normal.         Behavior: Behavior normal.       Results Reviewed:  Lab Results (last 24 hours)         Procedure Component Value Units Date/Time     High Sensitivity Troponin T [[id number]]  (Abnormal) Collected: 11/21/23 1806     Specimen: Blood Updated: 11/21/23 1848       HS Troponin T 313 ng/L       Comprehensive Metabolic Panel [[id number]]  (Abnormal) Collected: 11/21/23 1806     Specimen: Blood Updated: 11/21/23 1837       Glucose 112 mg/dL         BUN 22 mg/dL         Creatinine 1.35 mg/dL         Sodium 138 mmol/L         Potassium 4.1 mmol/L   "       Chloride 104 mmol/L         CO2 24.0 mmol/L         Calcium 9.7 mg/dL         Total Protein 7.9 g/dL         Albumin 4.6 g/dL         ALT (SGPT) 16 U/L         AST (SGOT) 31 U/L         Alkaline Phosphatase 86 U/L         Total Bilirubin 0.4 mg/dL         Globulin 3.3 gm/dL         A/G Ratio 1.4 g/dL         BUN/Creatinine Ratio 16.3       Anion Gap 10.0 mmol/L         eGFR 45.4 mL/min/1.73       CBC Auto Differential [[id number]]  (Normal) Collected: 11/21/23 1806     Specimen: Blood Updated: 11/21/23 1819       WBC 4.92 10*3/mm3         RBC 4.71 10*6/mm3         Hemoglobin 13.4 g/dL         Hematocrit 41.8 %         MCV 88.7 fL         MCH 28.5 pg         MCHC 32.1 g/dL         RDW 14.7 %         RDW-SD 48.2 fl         MPV 9.4 fL         Platelets 209 10*3/mm3         Neutrophil % 71.6 %         Lymphocyte % 20.3 %         Monocyte % 5.1 %         Eosinophil % 2.4 %         Basophil % 0.2 %         Immature Grans % 0.4 %         Neutrophils, Absolute 3.52 10*3/mm3         Lymphocytes, Absolute 1.00 10*3/mm3         Monocytes, Absolute 0.25 10*3/mm3         Eosinophils, Absolute 0.12 10*3/mm3         Basophils, Absolute 0.01 10*3/mm3         Immature Grans, Absolute 0.02 10*3/mm3         nRBC 0.0 /100 WBC               Imaging Results (Last 24 Hours)         Procedure Component Value Units Date/Time     XR Chest 1 View [[id number]] Collected: 11/21/23 1931       Updated: 11/21/23 1935     Narrative:       EXAMINATION: XR CHEST 1 VW-  11/21/2023 7:31 PM CST     Comparison: 11/18/2023     HISTORY: Chest pain     One-view chest: Upright frontal projection of the chest is obtained. The  lungs are clear with no evidence of acute parenchymal  consolidation. No  pleural effusion or free air is observed. The  mediastinal contours are  within normal limits. A coronary stent projects over the left heart  border.                                                                                                                          Impression:       Impression: No evidence of acute cardiopulmonary disease.     This report was signed and finalized on 11/21/2023 7:32 PM CST by Dr. Sander Caceres MD.                Assessment / Plan   Assessment:        Active Hospital Problems     Diagnosis      **NSTEMI (non-ST elevated myocardial infarction)      Stage 3a chronic kidney disease      Atrial fibrillation with RVR      Rheumatoid arthritis involving multiple sites with positive rheumatoid factor      Coronary artery disease           Treatment Plan  1.  The patient will be admitted to [facility] here at [facility].   2.  Consult cardiology  3.  Home medications reviewed and restarted as appropriate  4.  Continue Eliquis VTE prophylaxis  5.  Repeat troponin 2 hours and in a.m., EKGs as needed  6.  Labs in a.m.  7.  Routine telemetry orders  8.  N.p.o. after midnight for possible cardiac intervention tomorrow     Medical Decision Making  Number and Complexity of problems: 5  Differential Diagnosis: None     Conditions and Status        Condition is unchanged.     MDM Data  External documents reviewed: No  Cardiac tracing (EKG, telemetry) interpretation: Reviewed  Radiology interpretation: Reviewed  Labs reviewed: Yes  Any tests that were considered but not ordered: No     Decision rules/scores evaluated (example [id number], Wells, etc): No     Discussed with: Patient and Dr. Emery     Care Planning  Shared decision making: Patient and Dr. Emery  Code status and discussions: Full     Disposition  Social Determinants of Health that impact treatment or disposition: None  Estimated length of stay is 2+ days.      I confirmed that the patient's advanced care plan is present, code status is documented, and a surrogate decision maker is listed in the patient's medical record.      The patient's surrogate decision maker is .      The patient was seen and examined by me on 11/21/2023 at 7:30 PM.     Electronically  signed by FITZ Villa, 11/21/23, 21:37 CST.                              Jayson Castillo MD   Physician  Cardiology     Consults     Signed     Date of Service: 11/22/23 0653  Creation Time: 11/22/23 0653  Consult Orders   Inpatient Cardiology Consult [[id number]] ordered by Yoyl Hodges APRN at 11/21/23 2018          Signed       Expand All Collapse All        LOS: 1 day   Patient Care Team:  Geraldo Feldman DO as PCP - General (Family Medicine)  Rick Tidwell DO as Consulting Physician (Gastroenterology)     Chief Complaint: Chest pain     Subjective     Alessandra Baker is a 58 y.o. female who is being seen in evaluation  Patient was discharged home  Now has presented back with intermittent palpitations with moderate substernal chest pain reminiscent of prior angina  Troponin is now elevated  No presyncope  No syncope  No orthopnea  No paroxysmal nocturnal dyspnea  Is on uninterrupted Eliquis  Last dose was last evening  Currently feels well  Troponin trends as noted in epic  No anticipated endoscopic or surgical procedure  No bleeding issues  No falls     Telemetry: no malignant arrhythmia. No significant pauses.     Review of Systems   Constitutional: No chills   Has fatigue   No fever.   HENT: Negative.    Eyes: Negative.    Respiratory: Negative for cough,   No chest wall soreness,   Shortness of breath,   no wheezing, no stridor.    Cardiovascular: As above  Gastrointestinal: Negative for abdominal distention,  No abdominal pain,   No blood in stool,   No constipation,   No diarrhea,   No nausea   No vomiting.   Endocrine: Negative.    Genitourinary: Negative for difficulty urinating, dysuria, flank pain and hematuria.   Musculoskeletal: Negative.    Skin: Negative for rash and wound.   Allergic/Immunologic: Negative.    Neurological: Negative for dizziness, syncope, weakness,   No light-headedness  No  headaches.   Hematological: Does not bruise/bleed easily.   Psychiatric/Behavioral:  Negative for agitation or behavioral problems,   No confusion,   the patient is  nervous/anxious.        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    Depression      Esophageal varices      GERD (gastroesophageal reflux disease)      Headache      Heart problem       angina    Hyperlipidemia      Hypertension 11/2012    Liver disease 2015     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    Liver problem      Myocardial infarction 11/2012     NSTEMI    Rheumatoid arthritis      Skin cancer      Stroke       showed up on mri about 5 years ago with no residual    Ulnar neuropathy      Visual impairment           Surgical History         Past Surgical History:   Procedure Laterality Date    ANGIOPLASTY         11/2012    APPENDECTOMY        CARDIAC CATHETERIZATION   07/21/2015 11/1/12, 4/21/22    COLONOSCOPY        COLONOSCOPY N/A 04/24/2023     Procedure: COLONOSCOPY WITH ANESTHESIA;  Surgeon: Rick Tidwell DO;  Location: [facility] ENDOSCOPY;  Service: Gastroenterology;  Laterality: N/A;  preop; hx of polyps   postop; polyps  PCP Eric Feldman     CORONARY STENT PLACEMENT         x3 per patient, X 1 -  11/2012, x 2 - 4/21/22    ENDOSCOPY        ENDOSCOPY N/A 04/24/2023     Procedure: ESOPHAGOGASTRODUODENOSCOPY WITH ANESTHESIA;  Surgeon: Rick Tidwell DO;  Location: [facility] ENDOSCOPY;  Service: Gastroenterology;  Laterality: N/A;  preop; hx of varices   postop; varices   PCP Eric Feldman     HAND RECONSTRUCTION Right 06/15/1967    HYSTERECTOMY         2016    SUBTOTAL HYSTERECTOMY        TONSILLECTOMY             Social History   Social History            Socioeconomic History    Marital status:    Tobacco Use    Smoking status: Former       Packs/day: 1.00       Years: 30.00       Additional pack years: 0.00        Total pack years: 30.00       Types: Cigarettes       Start date: 1/1/1993       Quit date: 1/1/2013       Years since quitting: 10.8       Passive exposure: Past    Smokeless tobacco: Never    Tobacco comments:       Not somking now 10 years   Vaping Use    Vaping Use: Former   Substance and Sexual Activity    Alcohol use: Not Currently       Comment: Twice a. Year    Drug use: Never    Sexual activity: Yes       Partners: Male       Birth control/protection: Hysterectomy       Comment:  to same man for 39 yrs               Family History   Problem Relation Age of Onset    Hyperlipidemia Mother      Hypertension Mother      Stroke Mother      Diabetes Mother      Arthritis Mother      Hyperlipidemia Father      Hypertension Father      Heart attack Father      Arthritis Father      Early death Father      Heart failure Sister      Stroke Sister      Heart attack Brother      Alcohol abuse Brother      Heart attack Brother      Mental illness Maternal Aunt           Father's  sister    Breast cancer Maternal Aunt      Heart failure Paternal Aunt      Heart failure Paternal Uncle      Heart failure Paternal Uncle      Colon cancer Neg Hx      Colon polyps Neg Hx      Esophageal cancer Neg Hx           Labs:  WBC       WBC   Date Value Ref Range Status   11/22/2023 3.59 3.40 - 10.80 10*3/mm3 Final   11/21/2023 4.92 3.40 - 10.80 10*3/mm3 Final   11/20/2023 4.06 3.40 - 10.80 10*3/mm3 Final      HGB       Hemoglobin   Date Value Ref Range Status   11/22/2023 11.8 (L) 12.0 - 15.9 g/dL Final   11/21/2023 13.4 12.0 - 15.9 g/dL Final   11/20/2023 12.1 12.0 - 15.9 g/dL Final      HCT       Hematocrit   Date Value Ref Range Status   11/22/2023 37.6 34.0 - 46.6 % Final   11/21/2023 41.8 34.0 - 46.6 % Final   11/20/2023 37.2 34.0 - 46.6 % Final      Platelets       Platelets   Date Value Ref Range Status   11/22/2023 155 140 - 450 10*3/mm3 Final   11/21/2023 209 140 - 450 10*3/mm3 Final   11/20/2023 135 (L) 140 - 450  "10*3/mm3 Final      MCV       MCV   Date Value Ref Range Status   11/22/2023 91.7 79.0 - 97.0 fL Final   11/21/2023 88.7 79.0 - 97.0 fL Final   11/20/2023 90.3 79.0 - 97.0 fL Final                Results from last 7 days   Lab Units 11/21/23  1806 11/20/23  0538 11/19/23  0600   SODIUM mmol/L 138 141 139   POTASSIUM mmol/L 4.1 4.8 4.5   CHLORIDE mmol/L 104 106 104   CO2 mmol/L 24.0 26.0 27.0   BUN mg/dL 22* 21* 22*   CREATININE mg/dL 1.35* 1.33* 1.51*   CALCIUM mg/dL 9.7 9.3 9.4   BILIRUBIN mg/dL 0.4 0.3 0.4   ALK PHOS U/L 86 74 70   ALT (SGPT) U/L 16 8 7   AST (SGOT) U/L 31 26 10   GLUCOSE mg/dL 112* 96 85            Lab Results   Component Value Date     TROPONINI 0.126 (C) 03/25/2019     TROPONINT 371 (C) 11/21/2023      PT/INR:  No results found for: \"PROTIME\"/No results found for: \"INR\"     Imaging Results (Last 72 Hours)         Procedure Component Value Units Date/Time     XR Chest 1 View [[id number]] Collected: 11/21/23 1931       Updated: 11/21/23 1935     Narrative:       EXAMINATION: XR CHEST 1 VW-  11/21/2023 7:31 PM CST     Comparison: 11/18/2023     HISTORY: Chest pain     One-view chest: Upright frontal projection of the chest is obtained. The  lungs are clear with no evidence of acute parenchymal  consolidation. No  pleural effusion or free air is observed. The  mediastinal contours are  within normal limits. A coronary stent projects over the left heart  border.                                                                                                                         Impression:       Impression: No evidence of acute cardiopulmonary disease.     This report was signed and finalized on 11/21/2023 7:32 PM CST by Dr. Sander Caceres MD.                      Objective []Expand by Default        Allergies   Allergen Reactions    Bee Venom Anaphylaxis    Clopidogrel Hives and Unknown (See Comments)       Night terrors       Codeine Anaphylaxis    Insect Extract Anaphylaxis       Bee stings "    Morphine And Related Shortness Of Breath, Hives and Unknown (See Comments)       tachycardia       Penicillin G Hives    Shellfish Allergy Anaphylaxis    Shellfish-Derived Products Anaphylaxis    Methotrexate Other (See Comments)       hepatotxicity    Penicillin G Benzathine Unknown (See Comments)    Atorvastatin Myalgia and Unknown (See Comments)    Hydrocodone Palpitations       Heart races and feels like bugs crawling under skin    Hydroxychloroquine Other (See Comments) and Unknown (See Comments)       Night terrors       Menthol Rash         Medication Review: Performed  Current Medications             Current Facility-Administered Medications   Medication Dose Route Frequency Provider Last Rate Last Admin    acetaminophen (TYLENOL) tablet 650 mg  650 mg Oral Q4H PRN Yoly Hodges APRN         Or    acetaminophen (TYLENOL) 160 MG/5ML oral solution 650 mg  650 mg Oral Q4H PRN Yoly Hodges APRN         Or    acetaminophen (TYLENOL) suppository 650 mg  650 mg Rectal Q4H PRN Yoly Hodges APRN        apixaban (ELIQUIS) tablet 5 mg  5 mg Oral Q12H Yoly Hodges APRN   5 mg at 11/21/23 2150    aspirin chewable tablet 324 mg  324 mg Oral Once Ghada Terry APRN        aspirin EC tablet 81 mg  81 mg Oral Daily Yoly Hodges APRN        azaTHIOprine (IMURAN) tablet 50 mg  50 mg Oral BID Yoly Hodges APRN   50 mg at 11/21/23 2151    sennosides-docusate (PERICOLACE) 8.6-50 MG per tablet 1 tablet  1 tablet Oral Nightly PRN Yoly Hodges APRN         And    polyethylene glycol (MIRALAX) packet 17 g  17 g Oral Daily PRN Yoly Hodges APRN         And    bisacodyl (DULCOLAX) EC tablet 5 mg  5 mg Oral Daily PRN Yoly Hodges APRN         And    bisacodyl (DULCOLAX) suppository 10 mg  10 mg Rectal Daily PRN Yoly Hodges APRN        carvedilol (COREG) tablet 12.5 mg  12.5 mg Oral BID With Meals Yoly Hodges APRN   12.5 mg at 11/21/23 2151    cetirizine (zyrTEC)  "tablet 10 mg  10 mg Oral Daily PRN Yoly Hodges APRN        fluticasone (FLONASE) 50 MCG/ACT nasal spray 2 spray  2 spray Nasal Daily Yoly Hodges APRN        isosorbide mononitrate (IMDUR) 24 hr tablet 30 mg  30 mg Oral Q24H Yoly Hodges APRN   30 mg at 11/21/23 2150    LORazepam (ATIVAN) tablet 0.5 mg  0.5 mg Oral Nightly PRN Yoly Hodges APRN        nitroglycerin (NITROSTAT) SL tablet 0.4 mg  0.4 mg Sublingual Q5 Min PRN Yoly Hodges APRN        ondansetron (ZOFRAN) tablet 4 mg  4 mg Oral Q6H PRN Yoly Hodges APRN         Or    ondansetron (ZOFRAN) injection 4 mg  4 mg Intravenous Q6H PRN Yoly Hodges APRN        pantoprazole (PROTONIX) EC tablet 40 mg  40 mg Oral Q AM Yoly Hodges APRN   40 mg at 11/22/23 0527    Pharmacy Meds to Bed Consult   Does not apply Daily Yoly Hodges APRN        sertraline (ZOLOFT) tablet 200 mg  200 mg Oral Daily Yoly Hodges APRN   200 mg at 11/21/23 2151    sodium chloride 0.9 % flush 10 mL  10 mL Intravenous PRN Ghada Terry APRN        sodium chloride 0.9 % flush 10 mL  10 mL Intravenous Q12H Yoly Hodges APRN   10 mL at 11/21/23 2151    sodium chloride 0.9 % flush 10 mL  10 mL Intravenous PRN Yoly Hodges APRN        sodium chloride 0.9 % infusion 40 mL  40 mL Intravenous PRN Yoly Hodges APRN        sulfaSALAzine (AZULFIDINE) tablet 1,000 mg  1,000 mg Oral Daily Yoly Hodges APRN   1,000 mg at 11/21/23 2150    ticagrelor (BRILINTA) tablet 90 mg  90 mg Oral BID Yoly Hodges APRN   90 mg at 11/21/23 2151            Vital Sign Min/Max for last 24 hours  Temp  Min: 97.4 °F (36.3 °C)  Max: 98.2 °F (36.8 °C)   BP  Min: 120/80  Max: 166/71   Pulse  Min: 53  Max: 90   Resp  Min: 18  Max: 22   SpO2  Min: 95 %  Max: 99 %   No data recorded   Weight  Min: 86.8 kg (191 lb 6.4 oz)  Max: 87.5 kg (193 lb)      Flowsheet Rows       Flowsheet Row First Filed Value   Admission Height 165.1 cm (65\") " Documented at 11/21/2023 1744   Admission Weight 87.5 kg (193 lb) Documented at 11/21/2023 1744                Results for orders placed during the hospital encounter of 11/18/23     Adult Transthoracic Echo Complete W/ Cont if Necessary Per Protocol     Interpretation Summary    Left ventricular systolic function is normal. Left ventricular ejection fraction appears to be 66 - 70%.    Left ventricular wall thickness is consistent with mild concentric hypertrophy.    Left ventricular diastolic function was normal.    Normal right ventricular cavity size and systolic function noted.    There is mild thickening of the aortic valve. No aortic valve regurgitation is present. No hemodynamically significant aortic valve stenosis is present.        Physical Exam:     General Appearance: Awake, alert, in no acute distress  Eyes: Pupils equal and reactive    Ears: Appear intact with no abnormalities noted  Nose: Nares normal, no drainage  Neck: supple, trachea midline, no carotid bruit and no JVD  Back: no kyphosis present,    Lungs: respirations regular, respirations even and respirations unlabored  Heart: normal S1, S2, no significant murmurs   No gallops or rubs  no rub and no click  Abdomen: normal bowel sounds, no tenderness   Skin: no bleeding, bruising or rash  Extremities: no cyanosis  Psychiatric/Behavioral: Negative for agitation, behavioral problems, confusion, the patient does  appear to be nervous/anxious.        Results Review:   I reviewed the patient's new clinical results.  I reviewed the patient's new imaging results and agree with the interpretation.  I reviewed the patient's other test results and agree with the interpretation  I personally viewed and interpreted the patient's EKG/Telemetry data     Discussed with patient  Updated patient regarding any new or relevant abnormalities on review of records or any new findings on physical exam.   Mentioned to patient about purpose of visit and desirable health  short and long term goals and objectives.      Reviewed available prior notes, consults, prior visits, laboratory findings, radiology and cardiology relevant reports.   Updated chart as applicable.   I have reviewed the patient's medical history in detail and updated the computerized patient record as relevant.                   Assessment & Plan    NSTEMI (non-ST elevated myocardial infarction)    Coronary artery disease    Rheumatoid arthritis involving multiple sites with positive rheumatoid factor    Atrial fibrillation with RVR    Stage 3a chronic kidney disease        Plan     Discontinue Eliquis   Recommend cardiac catheterization tomorrow, selective coronary angiography, left ventriculography and percutaneous coronary intervention with application of arteriotomy hemostatic closure device.     I discussed cardiac catheterization, the procedure, risks (including bleeding, infection, vascular damage [including minor oozing, bruising, bleeding, and up to and including but not limited to the need for vascular surgery, emergency cardiothoracic surgery, contrast reaction, renal failure, respiratory failure, heart attack, stroke, arrhythmia and even death), benefits, and alternatives and the patient has voiced understanding and is willing to proceed.     Adequate pre-hydration and post cardiac catheterization hydration.  Premedications as required and indicated for cardiac catheterization.     No contraindication to drug eluting stent placement if required  Further recommendations pending results of cardiac catheterization    Telemetry  Deep vein thrombosis prophylaxis/precautions  Appropriate diet, fluid, sodium, caffeine, stimulants intake   Questions were encouraged, asked and answered to the patient's  understanding and satisfaction.  Compliance to diet and medications         Jayson Castillo MD  11/22/23  06:53 CST     EMR Dragon/Transcription was used to dictate part of this note                     Shagufta Garcia,  APRN   Nurse Practitioner  Hospitalist     Progress Notes      Cosign Needed     Date of Service: 11/22/23 1112  Creation Time: 11/22/23 1112     Cosign Needed              [facility] Emergency Room Medicine Services  INPATIENT PROGRESS NOTE     Patient Name: Alessandra Baker  Date of Admission: 11/21/2023  Today's Date: 11/22/23  Length of Stay: 1  Primary Care Physician: Geraldo Feldman DO     Subjective   Chief Complaint: chest pressure on admission  HPI   On the evening of 11/21 she had just finished eating her chocolate Parfait when she had sudden palpitations.  She had associated chest pressure, diaphoresis, and shortness of breath.  High-sensitivity troponin now elevated.  Normal sinus rhythm on telemetry.  Dr. Castillo saw her this morning and is planning for cardiac catheterization tomorrow.     Review of Systems   All pertinent negatives and positives are as above. All other systems have been reviewed and are negative unless otherwise stated.      Objective    Temp:  [97.4 °F (36.3 °C)-98.2 °F (36.8 °C)] 98 °F (36.7 °C)  Heart Rate:  [52-90] 52  Resp:  [18-22] 18  BP: (120-166)/() 138/74  Physical Exam  Vitals reviewed.   Constitutional:       General: She is not in acute distress.     Appearance: She is not toxic-appearing.      Comments: Sitting up in bed.  No acute distress.  On room air.  No family at bedside.  Discussed with her nurse Jessica.   HENT:      Head: Normocephalic and atraumatic.      Mouth/Throat:      Mouth: Mucous membranes are moist.      Pharynx: Oropharynx is clear.   Eyes:      Extraocular Movements: Extraocular movements intact.      Conjunctiva/sclera: Conjunctivae normal.      Pupils: Pupils are equal, round, and reactive to light.   Cardiovascular:      Rate and Rhythm: Normal rate and regular rhythm.      Pulses: Normal pulses.      Comments: SB/SR 46-71  Pulmonary:      Effort: Pulmonary effort is normal. No respiratory distress.      Breath sounds: Normal  "breath sounds.   Abdominal:      General: Bowel sounds are normal. There is no distension.      Palpations: Abdomen is soft.      Tenderness: There is no abdominal tenderness.   Musculoskeletal:         General: No swelling or tenderness. Normal range of motion.      Cervical back: Normal range of motion and neck supple. No muscular tenderness.   Skin:     General: Skin is warm and dry.      Findings: No erythema or rash.   Neurological:      General: No focal deficit present.      Mental Status: She is alert and oriented to person, place, and time.      Cranial Nerves: No cranial nerve deficit.      Motor: No weakness.   Psychiatric:         Mood and Affect: Mood normal.         Behavior: Behavior normal.            Results Review:  I have reviewed the labs, radiology results, and diagnostic studies.     Laboratory Data:          Results from last 7 days   Lab Units 11/22/23 0455 11/21/23 1806 11/20/23  0538   WBC 10*3/mm3 3.59 4.92 4.06   HEMOGLOBIN g/dL 11.8* 13.4 12.1   HEMATOCRIT % 37.6 41.8 37.2   PLATELETS 10*3/mm3 155 209 135*                 Results from last 7 days   Lab Units 11/22/23 0455 11/21/23 1806 11/20/23  0538 11/19/23  0600   SODIUM mmol/L 141 138 141 139   POTASSIUM mmol/L 4.5 4.1 4.8 4.5   CHLORIDE mmol/L 106 104 106 104   CO2 mmol/L 26.0 24.0 26.0 27.0   BUN mg/dL 22* 22* 21* 22*   CREATININE mg/dL 1.29* 1.35* 1.33* 1.51*   CALCIUM mg/dL 8.9 9.7 9.3 9.4   BILIRUBIN mg/dL  --  0.4 0.3 0.4   ALK PHOS U/L  --  86 74 70   ALT (SGPT) U/L  --  16 8 7   AST (SGOT) U/L  --  31 26 10   GLUCOSE mg/dL 98 112* 96 85         Culture Data:   No results found for: \"ACANTHNAEG\", \"AFBCX\", \"BPERTUSSISCX\", \"BLOODCX\"  No results found for: \"BCIDPCR\", \"CXREFLEX\", \"CSFCX\", \"CULTURETIS\"  No results found for: \"CULTURES\", \"HSVCX\", \"URCX\"  No results found for: \"EYECULTURE\", \"GCCX\", \"HSVCULTURE\", \"LABHSV\"  No results found for: \"LEGIONELLA\", \"MRSACX\", \"MUMPSCX\", \"MYCOPLASCX\"  No results found for: \"NOCARDIACX\", " "\"STOOLCX\"  No results found for: \"THROATCX\", \"UNSTIMCULT\", \"URINECX\", \"CULTURE\", \"VZVCULTUR\"  No results found for: \"VIRALCULTU\", \"WOUNDCX\"     Radiology Data:   Imaging Results (Last 24 Hours)         Procedure Component Value Units Date/Time     XR Chest 1 View [[id number]] Collected: 11/21/23 1931       Updated: 11/21/23 1935     Narrative:       EXAMINATION: XR CHEST 1 VW-  11/21/2023 7:31 PM CST     Comparison: 11/18/2023     HISTORY: Chest pain     One-view chest: Upright frontal projection of the chest is obtained. The  lungs are clear with no evidence of acute parenchymal  consolidation. No  pleural effusion or free air is observed. The  mediastinal contours are  within normal limits. A coronary stent projects over the left heart  border.                                                                                                                         Impression:       Impression: No evidence of acute cardiopulmonary disease.     This report was signed and finalized on 11/21/2023 7:32 PM CST by Dr. Sander Ccaeres MD.                   I have reviewed the patient's current medications.      Assessment/Plan   Assessment       Active Hospital Problems     Diagnosis      **NSTEMI (non-ST elevated myocardial infarction)      Stage 3a chronic kidney disease      Atrial fibrillation with RVR      Rheumatoid arthritis involving multiple sites with positive rheumatoid factor      Coronary artery disease        Ms. Baker is a 58-year-old female that presented to [facility] on 11/21 for irregular heart rate with associated chest pressure. Patient admitted 11/18 - 11/20, 2023 with chest pain and A-fib RVR.  Patient did report chest pain at that time however cardiac markers were unremarkable.  Dr. Castillo felt pain was likely pleuritic in nature and related to RA.  Patient was started on anticoagulation and continued aspirin and Brilinta.  Also started on carvedilol 12.5 twice daily and Imdur dose " increased from 30 to 60 mg a day.  On the evening of 11/21 she had just finished eating her chocolate Parfait when she had sudden palpitations.  She had associated chest pressure, diaphoresis, and shortness of breath.  No nausea.  In the ED, elevated troponin 313.  Repeat completed after hospitalist seen 349 with a delta of 36.  We have reconsulted cardiology.  Normal sinus rhythm in the 70s.       Treatment Plan  Dr. Castillo consulted with cardiology.  He discontinued patient's Eliquis and placed her on Lovenox.  Continue aspirin and Brilinta.  She is on Zetia as outpatient and has been intolerant to statin therapy in the past.  Planning for cardiac catheterization in a.m.     Atrial fibrillation with rapid ventricular response on admission.  Normal sinus rhythm on telemetry.  Continue Coreg, Lovenox.  Continue telemetry monitoring.     Lovenox will serve as VTE prophylaxis.     Medical Decision Making  Number and Complexity of problems: 2 acute problems in the form of NSTEMI and atrial fibrillation with rapid ventricular response  Differential Diagnosis: None considered at present     Conditions and Status        Condition is unchanged.     [facility] Data  External documents reviewed: Prior epic records  Cardiac tracing (EKG, telemetry) interpretation: Reviewed  Radiology interpretation: Interpreted by radiology  Labs reviewed: As above  Any tests that were considered but not ordered: None considered at present     Decision rules/scores evaluated (example [id number], Wells, etc): None considered at present     Discussed with: Patient and Dr. Barboza     Care Planning  Shared decision making: Patient is agreeable to ongoing workup and treatment  Code status and discussions: Full code with full interventions     Disposition  Social Determinants of Health that impact treatment or disposition: None identified at present  I expect the patient to be discharged to home in 1-2 days.      Electronically signed by Shagufta Garcia  FITZ, 11/22/23, 11:12 CST.                      Contains critical data High Sensitivity Troponin T  Order: [id number]  Status: Final result       Visible to patient: Yes (not seen)       Next appt: 11/30/2023 at 02:45 PM in Oncology ([place] ROOM [facility] ONC)    Specimen Information: Blood   0 Result Notes            Component  Ref Range & Units 1 d ago  (11/21/23) 1 d ago  (11/21/23) 1 d ago  (11/21/23) 4 d ago  (11/18/23) 4 d ago  (11/18/23) 6 d ago  (11/16/23) 6 d ago  (11/16/23)   HS Troponin T  <14 ng/L 371 High Critical  349 High Critical  313 High Critical             e Temp Pulse Resp BP Patient Position Device (Oxygen Therapy) SpO2   11/22/23 0823 98 (36.7) 52 18 138/74 Lying room air 98   11/22/23 0755 -- -- -- -- -- room air --   11/22/23 0326 97.9 (36.6) 53 18 123/70 Lying room air 98   11/21/23 2321 98.1 (36.7) 57 18 122/71 Lying room air 98   11/21/23 2030 -- -- -- -- -- room air --   11/21/23 2002 98.2 (36.8) 70 18 166/71 Lying room air 98   11/21/23 1916 -- 68 -- 131/75 -- -- 95   11/21/23 1816 -- 76 -- 139/83 -- -- 97   11/21/23 1744 97.6 (36.4) 90 22 154/100 -- -- 99                    Current Facility-Administered Medications   Medication Dose Route Frequency Provider Last Rate Last Admin    acetaminophen (TYLENOL) tablet 650 mg  650 mg Oral Q4H PRN Yoly Hodges APRN        Or    acetaminophen (TYLENOL) 160 MG/5ML oral solution 650 mg  650 mg Oral Q4H PRN Yoly Hodges APRN        Or    acetaminophen (TYLENOL) suppository 650 mg  650 mg Rectal Q4H PRN Yoly Hodges APRN        aspirin chewable tablet 324 mg  324 mg Oral Once Ghada Terry APRN        aspirin EC tablet 81 mg  81 mg Oral Daily Yoly Hodges APRN   81 mg at 11/22/23 0935    azaTHIOprine (IMURAN) tablet 50 mg  50 mg Oral BID Yoly Hodges APRN   50 mg at 11/21/23 2151    sennosides-docusate (PERICOLACE) 8.6-50 MG per tablet 1 tablet  1 tablet Oral Nightly PRN Yoly Hodges APRN        And     polyethylene glycol (MIRALAX) packet 17 g  17 g Oral Daily PRN Yoly Hodges APRN        And    bisacodyl (DULCOLAX) EC tablet 5 mg  5 mg Oral Daily PRN Yoly Hodges APRN        And    bisacodyl (DULCOLAX) suppository 10 mg  10 mg Rectal Daily PRN Yoly Hodges, APRN        carvedilol (COREG) tablet 12.5 mg  12.5 mg Oral BID With Meals Yoly Hodges APRN   12.5 mg at 11/22/23 0935    cetirizine (zyrTEC) tablet 10 mg  10 mg Oral Daily PRN Yoly Hodges APRN        Enoxaparin Sodium (LOVENOX) syringe 90 mg  1 mg/kg Subcutaneous Q12H Jayson Castillo MD        fluticasone (FLONASE) 50 MCG/ACT nasal spray 2 spray  2 spray Nasal Daily Yoly Hodges APRN        isosorbide mononitrate (IMDUR) 24 hr tablet 30 mg  30 mg Oral Q24H Yoly Hodges APRN   30 mg at 11/22/23 0935    LORazepam (ATIVAN) tablet 0.5 mg  0.5 mg Oral Nightly PRN Yoly Hodges APRN        methylPREDNISolone sodium succinate (SOLU-Medrol) injection 125 mg  125 mg Intravenous Once Jayson Castillo MD        Morphine sulfate (PF) injection 2 mg  2 mg Intravenous Q2H PRN Jayson Castillo MD   2 mg at 11/22/23 1201    nitroglycerin (NITROSTAT) SL tablet 0.4 mg  0.4 mg Sublingual Q5 Min PRN Yoly Hodges APRN        ondansetron (ZOFRAN) tablet 4 mg  4 mg Oral Q6H PRN Yoly Hodges APRN        Or    ondansetron (ZOFRAN) injection 4 mg  4 mg Intravenous Q6H PRN Yoly Hodges APRN        pantoprazole (PROTONIX) EC tablet 40 mg  40 mg Oral Q AM Yoly Hodges APRN   40 mg at 11/22/23 0527    Pharmacy Meds to Bed Consult   Does not apply Daily Yoly Hodges APRN        promethazine (PHENERGAN) 25 mg in sodium chloride 0.9 % 50 mL  25 mg Intravenous Once Jayson Castillo MD        sertraline (ZOLOFT) tablet 200 mg  200 mg Oral Daily Yoly Hodges APRN   200 mg at 11/22/23 0935    sodium chloride 0.9 % flush 10 mL  10 mL Intravenous PRN Ghada Terry APRN        sodium chloride 0.9 % flush 10 mL  10 mL  Intravenous Q12H Yoly Hodges APRN   10 mL at 11/22/23 0937    sodium chloride 0.9 % flush 10 mL  10 mL Intravenous PRN Yoly Hodges APRN        sodium chloride 0.9 % infusion 40 mL  40 mL Intravenous PRN Yoly Hodges APRN        sulfaSALAzine (AZULFIDINE) tablet 1,000 mg  1,000 mg Oral Daily Yoly Hodges APRN   1,000 mg at 11/21/23 2150    ticagrelor (BRILINTA) tablet 90 mg  90 mg Oral BID Yoly Hodges APRN   90 mg at 11/22/23 0938

## 2023-11-22 NOTE — H&P
[facility] Medicine Services  HISTORY AND PHYSICAL    Date of Admission: 11/21/2023  Primary Care Physician: Geraldo Feldman DO    Subjective   Primary Historian: Patient    Chief Complaint: Irregular heart rate with associated chest pressure    History of Present Illness  Alessandra Baker is a 58-year-old female with a past medical history of recent diagnosis atrial fibrillation with RVR, chronic kidney disease stage IIIa, coronary artery disease, medication induced cirrhosis of the liver, hypertension, hyperlipidemia, stroke, rheumatoid arthritis undergoing current therapy, see below for complete list.  Patient admitted 11/18 - 11/20, 2023 with chest pain and A-fib RVR.  Patient did report chest pain at that time however cardiac markers were unremarkable.  Dr. Castillo felt pain was likely pleuritic in nature and related to RA.  Patient was started on anticoagulation and continued aspirin and Brilinta.  Also started on carvedilol 12.5 twice daily and Imdur dose increased from 30 to 60 mg a day.  Patient was seen today by PCP for hospital follow-up.  She was started on a Medrol Dosepak for musculoskeletal pain, referral to rheumatology for rheumatoid arthritis, started on Ativan for grief.  No documentation of pain or discomfort at that time.    Patient returns to ER today with currents of palpitations, patient states she knew it was atrial fib.  She had associated chest pressure, diaphoresis, shortness of breathing.  No nausea.  Workup revealed elevated troponin 313.  Repeat completed after hospitalist seen 349 with a delta of 36.  We have reconsulted cardiology.  At time of assessment patient was pain-free, normal sinus rhythm in the 70s.  Home medications restarted, will hold steroids for now.  She is admitted for further evaluation treatment.    Review of Systems   Otherwise complete ROS reviewed and negative except as mentioned in the HPI.    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    Depression     Esophageal varices     GERD (gastroesophageal reflux disease)     Headache     Heart problem     angina    Hyperlipidemia     Hypertension 11/2012    Liver disease 2015    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    Liver problem     Myocardial infarction 11/2012    NSTEMI    Rheumatoid arthritis     Skin cancer     Stroke     showed up on mri about 5 years ago with no residual    Ulnar neuropathy     Visual impairment      Past Surgical History:  Past Surgical History:   Procedure Laterality Date    ANGIOPLASTY      11/2012    APPENDECTOMY      CARDIAC CATHETERIZATION  07/21/2015 11/1/12, 4/21/22    COLONOSCOPY      COLONOSCOPY N/A 04/24/2023    Procedure: COLONOSCOPY WITH ANESTHESIA;  Surgeon: Rick Tidwell DO;  Location: [facility] ENDOSCOPY;  Service: Gastroenterology;  Laterality: N/A;  preop; hx of polyps   postop; polyps  PCP Eric Feldman     CORONARY STENT PLACEMENT      x3 per patient, X 1 -  11/2012, x 2 - 4/21/22    ENDOSCOPY      ENDOSCOPY N/A 04/24/2023    Procedure: ESOPHAGOGASTRODUODENOSCOPY WITH ANESTHESIA;  Surgeon: Rick Tidwell DO;  Location: [facility] ENDOSCOPY;  Service: Gastroenterology;  Laterality: N/A;  preop; hx of varices   postop; varices   PCP Eric Feldman     HAND RECONSTRUCTION Right 06/15/1967    HYSTERECTOMY      2016    SUBTOTAL HYSTERECTOMY      TONSILLECTOMY       Social History:  reports that she quit smoking about 10 years ago. Her smoking use included cigarettes. She started smoking about 30 years ago. She has a 30.00 pack-year smoking history. She has been exposed to tobacco smoke. She has never used smokeless tobacco. She reports that she does not currently use alcohol. She reports that she does not use drugs.    Family History: family history includes Alcohol abuse in her  brother; Arthritis in her father and mother; Breast cancer in her maternal aunt; Diabetes in her mother; Early death in her father; Heart attack in her brother, brother, and father; Heart failure in her paternal aunt, paternal uncle, paternal uncle, and sister; Hyperlipidemia in her father and mother; Hypertension in her father and mother; Mental illness in her maternal aunt; Stroke in her mother and sister.       Allergies:  Allergies   Allergen Reactions    Bee Venom Anaphylaxis    Clopidogrel Hives and Unknown (See Comments)     Night terrors      Codeine Anaphylaxis    Insect Extract Anaphylaxis     Bee stings    Morphine And Related Shortness Of Breath, Hives and Unknown (See Comments)     tachycardia      Penicillin G Hives    Shellfish Allergy Anaphylaxis    Shellfish-Derived Products Anaphylaxis    Methotrexate Other (See Comments)     hepatotxicity    Penicillin G Benzathine Unknown (See Comments)    Atorvastatin Myalgia and Unknown (See Comments)    Hydrocodone Palpitations     Heart races and feels like bugs crawling under skin    Hydroxychloroquine Other (See Comments) and Unknown (See Comments)     Night terrors      Menthol Rash       Medications:  Prior to Admission medications    Medication Sig Start Date End Date Taking? Authorizing Provider   apixaban (ELIQUIS) 5 MG tablet tablet Take 1 tablet by mouth Every 12 (Twelve) Hours. Indications: Atrial Fibrillation 11/20/23   Julio Barboza, DO   aspirin 81 MG EC tablet Take 1 tablet by mouth Daily. 5/4/23   Ricky Slade MD   azaTHIOprine (IMURAN) 50 MG tablet Take 1 tablet by mouth 2 (Two) Times a Day.    ProviderMee MD   carvedilol (COREG) 12.5 MG tablet Take 1 tablet by mouth 2 (Two) Times a Day With Meals. 11/20/23   Julio Barboza DO   cetirizine (zyrTEC) 10 MG tablet Take 1 tablet by mouth Daily As Needed for Allergies.    ProviderMee MD   dapagliflozin Propanediol 10 MG tablet Take 10 mg by mouth Daily. 11/8/23    Geraldo Feldman DO   EPINEPHrine (EPIPEN) 0.3 MG/0.3ML solution auto-injector injection Inject 0.3 mL into the appropriate muscle as directed by prescriber 1 (One) Time. If needed 6/20/23   Mee Kolb MD   ezetimibe (ZETIA) 10 MG tablet Take 1 tablet by mouth Daily. 5/4/23   Ricky Slade MD   fluticasone (FLONASE) 50 MCG/ACT nasal spray 2 sprays into the nostril(s) as directed by provider Daily. 8/1/23   Geraldo Feldman DO   Inclisiran Sodium 284 MG/1.5ML solution prefilled syringe Inject 1.5 mL under the skin into the appropriate area as directed. Next due on 11/30- receives from oncology    ProviderMee MD   isosorbide mononitrate (IMDUR) 30 MG 24 hr tablet Take 1 tablet by mouth Daily. 11/21/23   Julio Barboza DO   LORazepam (Ativan) 0.5 MG tablet Take 1 tablet by mouth At Night As Needed for Anxiety. 11/21/23   Jerilyn Parisi APRN   methylPREDNISolone (MEDROL) 4 MG dose pack Take as directed on package instructions. 11/21/23   Jerilyn Parisi APRN   nitroglycerin (Nitrostat) 0.3 MG SL tablet Place 1 tablet under the tongue Every 5 (Five) Minutes As Needed for Chest Pain. Take no more than 3 doses in 15 minutes. 12/20/22   Geraldo Feldman DO   omeprazole (priLOSEC) 40 MG capsule Take 1 capsule by mouth Daily.    ProviderMee MD   PHARMACY MEDS TO BED CONSULT Use Daily. 11/21/23   Julio Barboza DO   sertraline (ZOLOFT) 100 MG tablet Take 2 tablets by mouth Daily. 8/1/23   Geraldo Feldman DO   sulfaSALAzine (AZULFIDINE) 500 MG tablet Take 2 tablets by mouth Daily.    ProviderMee MD   ticagrelor (BRILINTA) 90 MG tablet tablet Take 1 tablet by mouth 2 (Two) Times a Day. 5/15/23   Geraldo Feldman DO   LORazepam (Ativan) 0.5 MG tablet Take 1 tablet by mouth At Night As Needed for Anxiety. 8/1/23 11/21/23  Feldman, Geraldo L, DO     I have utilized all available immediate resources to obtain, update, or review the patient's current medications  "(including all prescriptions, over-the-counter products, herbals, cannabis/cannabidiol products, and vitamin/mineral/dietary (nutritional) supplements).    Objective     Vital Signs: /71 (BP Location: Right arm, Patient Position: Lying)   Pulse 70   Temp 98.2 °F (36.8 °C) (Oral)   Resp 18   Ht 165.1 cm (65\")   Wt 86.8 kg (191 lb 6.4 oz)   SpO2 98%   BMI 31.85 kg/m²   Physical Exam  Vitals reviewed.   Constitutional:       Appearance: Normal appearance.   HENT:      Head: Normocephalic and atraumatic.      Mouth/Throat:      Mouth: Mucous membranes are moist.      Pharynx: Oropharynx is clear.   Eyes:      Extraocular Movements: Extraocular movements intact.      Conjunctiva/sclera: Conjunctivae normal.   Cardiovascular:      Rate and Rhythm: Normal rate and regular rhythm.   Pulmonary:      Effort: Pulmonary effort is normal.      Breath sounds: Normal breath sounds.   Abdominal:      General: There is no distension.      Palpations: Abdomen is soft.   Musculoskeletal:      Cervical back: Normal range of motion and neck supple.      Right lower leg: No edema.      Left lower leg: No edema.      Comments: Decreased range of motion due to RA   Skin:     General: Skin is warm and dry.   Neurological:      General: No focal deficit present.      Mental Status: She is alert and oriented to person, place, and time.   Psychiatric:         Mood and Affect: Mood normal.         Behavior: Behavior normal.      Results Reviewed:  Lab Results (last 24 hours)       Procedure Component Value Units Date/Time    High Sensitivity Troponin T [[id number]]  (Abnormal) Collected: 11/21/23 1806    Specimen: Blood Updated: 11/21/23 1848     HS Troponin T 313 ng/L     Comprehensive Metabolic Panel [[id number]]  (Abnormal) Collected: 11/21/23 1806    Specimen: Blood Updated: 11/21/23 1837     Glucose 112 mg/dL      BUN 22 mg/dL      Creatinine 1.35 mg/dL      Sodium 138 mmol/L      Potassium 4.1 mmol/L      Chloride 104 mmol/L  "     CO2 24.0 mmol/L      Calcium 9.7 mg/dL      Total Protein 7.9 g/dL      Albumin 4.6 g/dL      ALT (SGPT) 16 U/L      AST (SGOT) 31 U/L      Alkaline Phosphatase 86 U/L      Total Bilirubin 0.4 mg/dL      Globulin 3.3 gm/dL      A/G Ratio 1.4 g/dL      BUN/Creatinine Ratio 16.3     Anion Gap 10.0 mmol/L      eGFR 45.4 mL/min/1.73     CBC Auto Differential [[id number]]  (Normal) Collected: 11/21/23 1806    Specimen: Blood Updated: 11/21/23 1819     WBC 4.92 10*3/mm3      RBC 4.71 10*6/mm3      Hemoglobin 13.4 g/dL      Hematocrit 41.8 %      MCV 88.7 fL      MCH 28.5 pg      MCHC 32.1 g/dL      RDW 14.7 %      RDW-SD 48.2 fl      MPV 9.4 fL      Platelets 209 10*3/mm3      Neutrophil % 71.6 %      Lymphocyte % 20.3 %      Monocyte % 5.1 %      Eosinophil % 2.4 %      Basophil % 0.2 %      Immature Grans % 0.4 %      Neutrophils, Absolute 3.52 10*3/mm3      Lymphocytes, Absolute 1.00 10*3/mm3      Monocytes, Absolute 0.25 10*3/mm3      Eosinophils, Absolute 0.12 10*3/mm3      Basophils, Absolute 0.01 10*3/mm3      Immature Grans, Absolute 0.02 10*3/mm3      nRBC 0.0 /100 WBC           Imaging Results (Last 24 Hours)       Procedure Component Value Units Date/Time    XR Chest 1 View [[id number]] Collected: 11/21/23 1931     Updated: 11/21/23 1935    Narrative:      EXAMINATION: XR CHEST 1 VW-  11/21/2023 7:31 PM CST     Comparison: 11/18/2023     HISTORY: Chest pain     One-view chest: Upright frontal projection of the chest is obtained. The  lungs are clear with no evidence of acute parenchymal  consolidation. No  pleural effusion or free air is observed. The  mediastinal contours are  within normal limits. A coronary stent projects over the left heart  border.                                                                                                                        Impression:      Impression: No evidence of acute cardiopulmonary disease.     This report was signed and finalized on 11/21/2023 7:32 PM CST  by Dr. Sander Caceres MD.             Assessment / Plan   Assessment:   Active Hospital Problems    Diagnosis     **NSTEMI (non-ST elevated myocardial infarction)     Stage 3a chronic kidney disease     Atrial fibrillation with RVR     Rheumatoid arthritis involving multiple sites with positive rheumatoid factor     Coronary artery disease        Treatment Plan  1.  The patient will be admitted to the [facility] here at [facility].   2.  Consult cardiology  3.  Home medications reviewed and restarted as appropriate  4.  Continue Eliquis VTE prophylaxis  5.  Repeat troponin 2 hours and in a.m., EKGs as needed  6.  Labs in a.m.  7.  Routine telemetry orders  8.  N.p.o. after midnight for possible cardiac intervention tomorrow    Medical Decision Making  Number and Complexity of problems: 5  Differential Diagnosis: None    Conditions and Status        Condition is unchanged.     [facility] Data  External documents reviewed: No  Cardiac tracing (EKG, telemetry) interpretation: Reviewed  Radiology interpretation: Reviewed  Labs reviewed: Yes  Any tests that were considered but not ordered: No     Decision rules/scores evaluated (example [id number], Wells, etc): No     Discussed with: Patient and Dr. Rupert Romeie Maryjose l is a 58-year-old female with a past medical history of recent diagnosis atrial fibrillation with RVR, chronic kidney disease stage IIIa, coronary artery disease, medication induced cirrhosis of the liver, hypertension, hyperlipidemia, stroke, rheumatoid arthritis undergoing current therapy. Case and plan discussed with NP, and agree. Cardiology consult.    Care Planning  Shared decision making: Patient and Dr. Emery  Code status and discussions: Full    Disposition  Social Determinants of Health that impact treatment or disposition: None  Estimated length of stay is 2+ days.     I confirmed that the patient's advanced care plan is present, code status is documented, and a surrogate decision  maker is listed in the patient's medical record.     The patient's surrogate decision maker is .     The patient was seen and examined by me on 11/21/2023 at 7:30 PM.    Electronically signed by FITZ Villa, 11/21/23, 21:37 CST.

## 2023-11-22 NOTE — CASE MANAGEMENT/SOCIAL WORK
Discharge Planning Assessment  [facility]     Patient Name: Alessandra Baker  MRN: [id number]  Today's Date: 11/22/2023    Admit Date: 11/21/2023        Discharge Needs Assessment       Row Name 11/22/23 0846       Living Environment    People in Home spouse    Name(s) of People in Home Aaron    Current Living Arrangements home    Primary Care Provided by self    Provides Primary Care For no one    Family Caregiver if Needed spouse    Family Caregiver Names Aaron    Able to Return to Prior Arrangements yes       Resource/Environmental Concerns    Resource/Environmental Concerns none       Transition Planning    Patient/Family Anticipates Transition to home with family    Transportation Anticipated family or friend will provide       Discharge Needs Assessment    Readmission Within the Last 30 Days no previous admission in last 30 days    Equipment Currently Used at Home none    Concerns to be Addressed no discharge needs identified    Equipment Needed After Discharge none    Discharge Coordination/Progress spoke to patient who lives with spouse; has RX coverage and PCP; independent at home prior to illness will follow for DC needs      Row Name 11/22/23 0845       Living Environment    People in Home spouse                   Discharge Plan    No documentation.                 Continued Care and Services - Admitted Since 11/21/2023    Coordination has not been started for this encounter.          Demographic Summary    No documentation.                  Functional Status    No documentation.                  Psychosocial    No documentation.                  Abuse/Neglect    No documentation.                  Legal    No documentation.                  Substance Abuse    No documentation.                  Patient Forms    No documentation.                     Madeline Maravilla RN

## 2023-11-22 NOTE — PROGRESS NOTES
[facility] Medicine Services  INPATIENT PROGRESS NOTE    Patient Name: Alessandra Baker  Date of Admission: 11/21/2023  Today's Date: 11/22/23  Length of Stay: 1  Primary Care Physician: Geraldo Feldman DO    Subjective   Chief Complaint: chest pressure on admission  HPI   On the evening of 11/21 she had just finished eating her chocolate Parfait when she had sudden palpitations.  She had associated chest pressure, diaphoresis, and shortness of breath.  High-sensitivity troponin now elevated.  Normal sinus rhythm on telemetry.  Dr. Castillo saw her this morning and is planning for cardiac catheterization tomorrow.    Review of Systems   All pertinent negatives and positives are as above. All other systems have been reviewed and are negative unless otherwise stated.     Objective    Temp:  [97.4 °F (36.3 °C)-98.2 °F (36.8 °C)] 98 °F (36.7 °C)  Heart Rate:  [52-90] 52  Resp:  [18-22] 18  BP: (120-166)/() 138/74  Physical Exam  Vitals reviewed.   Constitutional:       General: She is not in acute distress.     Appearance: She is not toxic-appearing.      Comments: Sitting up in bed.  No acute distress.  On room air.  No family at bedside.  Discussed with her nurse Jessica.   HENT:      Head: Normocephalic and atraumatic.      Mouth/Throat:      Mouth: Mucous membranes are moist.      Pharynx: Oropharynx is clear.   Eyes:      Extraocular Movements: Extraocular movements intact.      Conjunctiva/sclera: Conjunctivae normal.      Pupils: Pupils are equal, round, and reactive to light.   Cardiovascular:      Rate and Rhythm: Normal rate and regular rhythm.      Pulses: Normal pulses.      Comments: SB/SR 46-71  Pulmonary:      Effort: Pulmonary effort is normal. No respiratory distress.      Breath sounds: Normal breath sounds.   Abdominal:      General: Bowel sounds are normal. There is no distension.      Palpations: Abdomen is soft.      Tenderness: There is no abdominal tenderness.  "  Musculoskeletal:         General: No swelling or tenderness. Normal range of motion.      Cervical back: Normal range of motion and neck supple. No muscular tenderness.   Skin:     General: Skin is warm and dry.      Findings: No erythema or rash.   Neurological:      General: No focal deficit present.      Mental Status: She is alert and oriented to person, place, and time.      Cranial Nerves: No cranial nerve deficit.      Motor: No weakness.   Psychiatric:         Mood and Affect: Mood normal.         Behavior: Behavior normal.         Results Review:  I have reviewed the labs, radiology results, and diagnostic studies.    Laboratory Data:   Results from last 7 days   Lab Units 11/22/23  0455 11/21/23 1806 11/20/23  0538   WBC 10*3/mm3 3.59 4.92 4.06   HEMOGLOBIN g/dL 11.8* 13.4 12.1   HEMATOCRIT % 37.6 41.8 37.2   PLATELETS 10*3/mm3 155 209 135*        Results from last 7 days   Lab Units 11/22/23 0455 11/21/23 1806 11/20/23  0538 11/19/23  0600   SODIUM mmol/L 141 138 141 139   POTASSIUM mmol/L 4.5 4.1 4.8 4.5   CHLORIDE mmol/L 106 104 106 104   CO2 mmol/L 26.0 24.0 26.0 27.0   BUN mg/dL 22* 22* 21* 22*   CREATININE mg/dL 1.29* 1.35* 1.33* 1.51*   CALCIUM mg/dL 8.9 9.7 9.3 9.4   BILIRUBIN mg/dL  --  0.4 0.3 0.4   ALK PHOS U/L  --  86 74 70   ALT (SGPT) U/L  --  16 8 7   AST (SGOT) U/L  --  31 26 10   GLUCOSE mg/dL 98 112* 96 85       Culture Data:   No results found for: \"ACANTHNAEG\", \"AFBCX\", \"BPERTUSSISCX\", \"BLOODCX\"  No results found for: \"BCIDPCR\", \"CXREFLEX\", \"CSFCX\", \"CULTURETIS\"  No results found for: \"CULTURES\", \"HSVCX\", \"URCX\"  No results found for: \"EYECULTURE\", \"GCCX\", \"HSVCULTURE\", \"LABHSV\"  No results found for: \"LEGIONELLA\", \"MRSACX\", \"MUMPSCX\", \"MYCOPLASCX\"  No results found for: \"NOCARDIACX\", \"STOOLCX\"  No results found for: \"THROATCX\", \"UNSTIMCULT\", \"URINECX\", \"CULTURE\", \"VZVCULTUR\"  No results found for: \"VIRALCULTU\", \"WOUNDCX\"    Radiology Data:   Imaging Results (Last 24 Hours)       " Procedure Component Value Units Date/Time    XR Chest 1 View [[id number]] Collected: 11/21/23 1931     Updated: 11/21/23 1935    Narrative:      EXAMINATION: XR CHEST 1 VW-  11/21/2023 7:31 PM CST     Comparison: 11/18/2023     HISTORY: Chest pain     One-view chest: Upright frontal projection of the chest is obtained. The  lungs are clear with no evidence of acute parenchymal  consolidation. No  pleural effusion or free air is observed. The  mediastinal contours are  within normal limits. A coronary stent projects over the left heart  border.                                                                                                                        Impression:      Impression: No evidence of acute cardiopulmonary disease.     This report was signed and finalized on 11/21/2023 7:32 PM CST by Dr. Sander Caceres MD.               I have reviewed the patient's current medications.     Assessment/Plan   Assessment  Active Hospital Problems    Diagnosis     **NSTEMI (non-ST elevated myocardial infarction)     Stage 3a chronic kidney disease     Atrial fibrillation with RVR     Rheumatoid arthritis involving multiple sites with positive rheumatoid factor     Coronary artery disease      Ms. Baker is a 58-year-old female that presented to [facility] on 11/21 for irregular heart rate with associated chest pressure. Patient admitted 11/18 - 11/20, 2023 with chest pain and A-fib RVR.  Patient did report chest pain at that time however cardiac markers were unremarkable.  Dr. Castillo felt pain was likely pleuritic in nature and related to RA.  Patient was started on anticoagulation and continued aspirin and Brilinta.  Also started on carvedilol 12.5 twice daily and Imdur dose increased from 30 to 60 mg a day.  On the evening of 11/21 she had just finished eating her chocolate Parfait when she had sudden palpitations.  She had associated chest pressure, diaphoresis, and shortness of breath.  No nausea.  In  the ED, elevated troponin 313.  Repeat completed after hospitalist seen 349 with a delta of 36.  We have reconsulted cardiology.  Normal sinus rhythm in the 70s.       Treatment Plan  Dr. Castillo consulted with cardiology.  He discontinued patient's Eliquis and placed her on Lovenox.  Continue aspirin and Brilinta.  She is on Zetia as outpatient and has been intolerant to statin therapy in the past.  Planning for cardiac catheterization in a.m.    Atrial fibrillation with rapid ventricular response on admission.  Normal sinus rhythm on telemetry.  Continue Coreg, Lovenox.  Continue telemetry monitoring.    Lovenox will serve as VTE prophylaxis.    Medical Decision Making  Number and Complexity of problems: 2 acute problems in the form of NSTEMI and atrial fibrillation with rapid ventricular response  Differential Diagnosis: None considered at present    Conditions and Status        Condition is unchanged.     [facility] Data  External documents reviewed: Prior [facility] records  Cardiac tracing (EKG, telemetry) interpretation: Reviewed  Radiology interpretation: Interpreted by radiology  Labs reviewed: As above  Any tests that were considered but not ordered: None considered at present     Decision rules/scores evaluated (example [id number], [place], etc): None considered at present     Discussed with: Patient and Dr. Barboza     Care Planning  Shared decision making: Patient is agreeable to ongoing workup and treatment  Code status and discussions: Full code with full interventions    Disposition  Social Determinants of Health that impact treatment or disposition: None identified at present  I expect the patient to be discharged to home in 1-2 days.     Electronically signed by FITZ Genao, 11/22/23, 11:12 CST.

## 2023-11-22 NOTE — OUTREACH NOTE
Medical Week 2 Survey      Flowsheet Row Responses   [facility] patient discharged from? [place]   Does the patient have one of the following disease processes/diagnoses(primary or secondary)? Other   Week 2 attempt successful? No   Unsuccessful attempts Attempt 1   Revoke Readmitted            JOSE MANUEL ROSAS - Registered Nurse

## 2023-11-22 NOTE — ED NOTES
"Nursing report ED to floor  Alessandra Baker  58 y.o.  female    HPI:   Chief Complaint   Patient presents with    Irregular Heart Beat       Admitting doctor:   Mesfin Emery DO    Consulting provider(s):  Consults       Date and Time Order Name Status Description    11/18/2023  6:11 PM Inpatient Cardiology Consult Completed              Admitting diagnosis:   The primary encounter diagnosis was Chest pain due to myocardial ischemia, unspecified ischemic chest pain type. Diagnoses of Paroxysmal atrial fibrillation and NSTEMI (non-ST elevated myocardial infarction) were also pertinent to this visit.    Code status:   Current Code Status       Date Active Code Status Order ID Comments User Context       Prior            Allergies:   Bee venom, Clopidogrel, Codeine, Insect extract, Morphine and related, Penicillin g, Shellfish allergy, Shellfish-derived products, Methotrexate, Penicillin g benzathine, Atorvastatin, Hydrocodone, Hydroxychloroquine, and Menthol    Intake and Output  No intake or output data in the 24 hours ending 11/21/23 1939    Weight:       11/21/23  1744   Weight: 87.5 kg (193 lb)       Most recent vitals:   Vitals:    11/21/23 1744 11/21/23 1816 11/21/23 1916   BP: 154/100 139/83 131/75   Pulse: 90 76 68   Resp: 22     Temp: 97.6 °F (36.4 °C)     SpO2: 99% 97% 95%   Weight: 87.5 kg (193 lb)     Height: 165.1 cm (65\")       Oxygen Therapy: .    Active LDAs/IV Access:   Lines, Drains & Airways       Active LDAs       Name Placement date Placement time Site Days    Peripheral IV 11/21/23 1804 Left Antecubital 11/21/23  1804  Antecubital  less than 1                    Labs (abnormal labs have a star):   Labs Reviewed   COMPREHENSIVE METABOLIC PANEL - Abnormal; Notable for the following components:       Result Value    Glucose 112 (*)     BUN 22 (*)     Creatinine 1.35 (*)     eGFR 45.4 (*)     All other components within normal limits    Narrative:     GFR Normal >60  Chronic Kidney Disease " <60  Kidney Failure <15     TROPONIN - Abnormal; Notable for the following components:    HS Troponin T 313 (*)     All other components within normal limits    Narrative:     High Sensitive Troponin T Reference Range:  <14.0 ng/L- Negative Female for AMI  <22.0 ng/L- Negative Male for AMI  >=14 - Abnormal Female indicating possible myocardial injury.  >=22 - Abnormal Male indicating possible myocardial injury.   Clinicians would have to utilize clinical acumen, EKG, Troponin, and serial changes to determine if it is an Acute Myocardial Infarction or myocardial injury due to an underlying chronic condition.        CBC WITH AUTO DIFFERENTIAL - Normal   RAINBOW DRAW    Narrative:     The following orders were created for panel order [place] Draw.  Procedure                               Abnormality         Status                     ---------                               -----------         ------                     Green Top (Gel)[[id number]]                                  Final result               Lavender Top[[id number]]                                     Final result               Red Top[[id number]]                                          Final result               Tate Top[[id number]]                                         In process                 Light Blue Top[[id number]]                                   Final result                 Please view results for these tests on the individual orders.   HIGH SENSITIVITIY TROPONIN T 2HR   GREEN TOP   LAVENDER TOP   RED TOP   LIGHT BLUE TOP   CBC AND DIFFERENTIAL    Narrative:     The following orders were created for panel order CBC & Differential.  Procedure                               Abnormality         Status                     ---------                               -----------         ------                     CBC Auto Differential[[id number]]        Normal              Final result                 Please view results for these tests on the individual  orders.   GRAY TOP       Meds given in ED:   Medications   sodium chloride 0.9 % flush 10 mL (has no administration in time range)   aspirin chewable tablet 324 mg (324 mg Oral Not Given 11/21/23 1830)           NIH Stroke Scale:       Isolation/Infection(s):  No active isolations   No active infections     COVID Testing  Collected .  Resulted .    Nursing report ED to floor:  Mental status: .  Ambulatory status: .  Precautions: .    ED nurse phone extentsion- ..

## 2023-11-22 NOTE — CONSULTS
LOS: 1 day   Patient Care Team:  Geraldo Feldman DO as PCP - General (Family Medicine)  Rick Tidwell DO as Consulting Physician (Gastroenterology)    Chief Complaint: Chest pain     Subjective    Alessandra Baker is a 58 y.o. female who is being seen in evaluation  Patient was discharged home  Now has presented back with intermittent palpitations with moderate substernal chest pain reminiscent of prior angina  Troponin is now elevated  No presyncope  No syncope  No orthopnea  No paroxysmal nocturnal dyspnea  Is on uninterrupted Eliquis  Last dose was last evening  Currently feels well  Troponin trends as noted in epic  No anticipated endoscopic or surgical procedure  No bleeding issues  No falls    Telemetry: no malignant arrhythmia. No significant pauses.    Review of Systems   Constitutional: No chills   Has fatigue   No fever.   HENT: Negative.    Eyes: Negative.    Respiratory: Negative for cough,   No chest wall soreness,   Shortness of breath,   no wheezing, no stridor.    Cardiovascular: As above  Gastrointestinal: Negative for abdominal distention,  No abdominal pain,   No blood in stool,   No constipation,   No diarrhea,   No nausea   No vomiting.   Endocrine: Negative.    Genitourinary: Negative for difficulty urinating, dysuria, flank pain and hematuria.   Musculoskeletal: Negative.    Skin: Negative for rash and wound.   Allergic/Immunologic: Negative.    Neurological: Negative for dizziness, syncope, weakness,   No light-headedness  No  headaches.   Hematological: Does not bruise/bleed easily.   Psychiatric/Behavioral: Negative for agitation or behavioral problems,   No confusion,   the patient is  nervous/anxious.       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    Liver problem     Myocardial infarction 11/2012    NSTEMI    Rheumatoid arthritis     Skin cancer     Stroke     showed up on mri about 5 years ago with no residual    Ulnar neuropathy     Visual impairment      Past Surgical History:   Procedure Laterality Date    ANGIOPLASTY      11/2012    APPENDECTOMY      CARDIAC CATHETERIZATION  07/21/2015 11/1/12, 4/21/22    COLONOSCOPY      COLONOSCOPY N/A 04/24/2023    Procedure: COLONOSCOPY WITH ANESTHESIA;  Surgeon: Rick Tidwell DO;  Location: [facility] ENDOSCOPY;  Service: Gastroenterology;  Laterality: N/A;  preop; hx of polyps   postop; polyps  PCP Eric Feldman     CORONARY STENT PLACEMENT      x3 per patient, X 1 -  11/2012, x 2 - 4/21/22    ENDOSCOPY      ENDOSCOPY N/A 04/24/2023    Procedure: ESOPHAGOGASTRODUODENOSCOPY WITH ANESTHESIA;  Surgeon: Rick Tidwell DO;  Location: [facility] ENDOSCOPY;  Service: Gastroenterology;  Laterality: N/A;  preop; hx of varices   postop; varices   PCP Eric Feldman     HAND RECONSTRUCTION Right 06/15/1967    HYSTERECTOMY      2016    SUBTOTAL HYSTERECTOMY      TONSILLECTOMY       Social History     Socioeconomic History    Marital status:    Tobacco Use    Smoking status: Former     Packs/day: 1.00     Years: 30.00     Additional pack years: 0.00     Total pack years: 30.00     Types: Cigarettes     Start date: 1/1/1993     Quit date: 1/1/2013     Years since quitting: 10.8     Passive exposure: Past    Smokeless tobacco: Never    Tobacco comments:     Not somking now 10 years   Vaping Use    Vaping Use: Former   Substance and Sexual Activity    Alcohol use: Not Currently     Comment: Twice a. Year    Drug use: Never    Sexual activity: Yes     Partners: Male     Birth control/protection: Hysterectomy     Comment:  to same man for 39 yrs     Family  History   Problem Relation Age of Onset    Hyperlipidemia Mother     Hypertension Mother     Stroke Mother     Diabetes Mother     Arthritis Mother     Hyperlipidemia Father     Hypertension Father     Heart attack Father     Arthritis Father     Early death Father     Heart failure Sister     Stroke Sister     Heart attack Brother     Alcohol abuse Brother     Heart attack Brother     Mental illness Maternal Aunt         Father's  sister    Breast cancer Maternal Aunt     Heart failure Paternal Aunt     Heart failure Paternal Uncle     Heart failure Paternal Uncle     Colon cancer Neg Hx     Colon polyps Neg Hx     Esophageal cancer Neg Hx        Labs:  WBC WBC   Date Value Ref Range Status   11/22/2023 3.59 3.40 - 10.80 10*3/mm3 Final   11/21/2023 4.92 3.40 - 10.80 10*3/mm3 Final   11/20/2023 4.06 3.40 - 10.80 10*3/mm3 Final      HGB Hemoglobin   Date Value Ref Range Status   11/22/2023 11.8 (L) 12.0 - 15.9 g/dL Final   11/21/2023 13.4 12.0 - 15.9 g/dL Final   11/20/2023 12.1 12.0 - 15.9 g/dL Final      HCT Hematocrit   Date Value Ref Range Status   11/22/2023 37.6 34.0 - 46.6 % Final   11/21/2023 41.8 34.0 - 46.6 % Final   11/20/2023 37.2 34.0 - 46.6 % Final      Platelets Platelets   Date Value Ref Range Status   11/22/2023 155 140 - 450 10*3/mm3 Final   11/21/2023 209 140 - 450 10*3/mm3 Final   11/20/2023 135 (L) 140 - 450 10*3/mm3 Final      MCV MCV   Date Value Ref Range Status   11/22/2023 91.7 79.0 - 97.0 fL Final   11/21/2023 88.7 79.0 - 97.0 fL Final   11/20/2023 90.3 79.0 - 97.0 fL Final        Results from last 7 days   Lab Units 11/21/23  1806 11/20/23  0538 11/19/23  0600   SODIUM mmol/L 138 141 139   POTASSIUM mmol/L 4.1 4.8 4.5   CHLORIDE mmol/L 104 106 104   CO2 mmol/L 24.0 26.0 27.0   BUN mg/dL 22* 21* 22*   CREATININE mg/dL 1.35* 1.33* 1.51*   CALCIUM mg/dL 9.7 9.3 9.4   BILIRUBIN mg/dL 0.4 0.3 0.4   ALK PHOS U/L 86 74 70   ALT (SGPT) U/L 16 8 7   AST (SGOT) U/L 31 26 10   GLUCOSE mg/dL 112* 96  "85     Lab Results   Component Value Date    TROPONINI 0.126 (C) 03/25/2019    TROPONINT 371 (C) 11/21/2023     PT/INR:  No results found for: \"PROTIME\"/No results found for: \"INR\"    Imaging Results (Last 72 Hours)       Procedure Component Value Units Date/Time    XR Chest 1 View [[id number]] Collected: 11/21/23 1931     Updated: 11/21/23 1935    Narrative:      EXAMINATION: XR CHEST 1 VW-  11/21/2023 7:31 PM CST     Comparison: 11/18/2023     HISTORY: Chest pain     One-view chest: Upright frontal projection of the chest is obtained. The  lungs are clear with no evidence of acute parenchymal  consolidation. No  pleural effusion or free air is observed. The  mediastinal contours are  within normal limits. A coronary stent projects over the left heart  border.                                                                                                                        Impression:      Impression: No evidence of acute cardiopulmonary disease.     This report was signed and finalized on 11/21/2023 7:32 PM CST by Dr. Sander Caceres MD.               Objective     Allergies   Allergen Reactions    Bee Venom Anaphylaxis    Clopidogrel Hives and Unknown (See Comments)     Night terrors      Codeine Anaphylaxis    Insect Extract Anaphylaxis     Bee stings    Morphine And Related Shortness Of Breath, Hives and Unknown (See Comments)     tachycardia      Penicillin G Hives    Shellfish Allergy Anaphylaxis    Shellfish-Derived Products Anaphylaxis    Methotrexate Other (See Comments)     hepatotxicity    Penicillin G Benzathine Unknown (See Comments)    Atorvastatin Myalgia and Unknown (See Comments)    Hydrocodone Palpitations     Heart races and feels like bugs crawling under skin    Hydroxychloroquine Other (See Comments) and Unknown (See Comments)     Night terrors      Menthol Rash       Medication Review: Performed  Current Facility-Administered Medications   Medication Dose Route Frequency Provider Last " Rate Last Admin    acetaminophen (TYLENOL) tablet 650 mg  650 mg Oral Q4H PRN Yoly Hodges APRN        Or    acetaminophen (TYLENOL) 160 MG/5ML oral solution 650 mg  650 mg Oral Q4H PRN Yoly Hodges APRN        Or    acetaminophen (TYLENOL) suppository 650 mg  650 mg Rectal Q4H PRN Yoly Hodges APRN        apixaban (ELIQUIS) tablet 5 mg  5 mg Oral Q12H Yoly Hodges APRN   5 mg at 11/21/23 2150    aspirin chewable tablet 324 mg  324 mg Oral Once Ghada Terry APRN        aspirin EC tablet 81 mg  81 mg Oral Daily Yoly Hodges APRN        azaTHIOprine (IMURAN) tablet 50 mg  50 mg Oral BID Yoly Hodges APRN   50 mg at 11/21/23 2151    sennosides-docusate (PERICOLACE) 8.6-50 MG per tablet 1 tablet  1 tablet Oral Nightly PRN Yoly Hodges APRN        And    polyethylene glycol (MIRALAX) packet 17 g  17 g Oral Daily PRN Yoly Hodges APRN        And    bisacodyl (DULCOLAX) EC tablet 5 mg  5 mg Oral Daily PRN Yoly Hodges APRN        And    bisacodyl (DULCOLAX) suppository 10 mg  10 mg Rectal Daily PRN Yoly Hodges APRN        carvedilol (COREG) tablet 12.5 mg  12.5 mg Oral BID With Meals Yoly Hodges APRN   12.5 mg at 11/21/23 2151    cetirizine (zyrTEC) tablet 10 mg  10 mg Oral Daily PRN Yoly Hodges APRN        fluticasone (FLONASE) 50 MCG/ACT nasal spray 2 spray  2 spray Nasal Daily Yoly Hodges APRN        isosorbide mononitrate (IMDUR) 24 hr tablet 30 mg  30 mg Oral Q24H Yoly Hodges APRN   30 mg at 11/21/23 2150    LORazepam (ATIVAN) tablet 0.5 mg  0.5 mg Oral Nightly PRN Yoly Hodges APRN        nitroglycerin (NITROSTAT) SL tablet 0.4 mg  0.4 mg Sublingual Q5 Min PRN Yoly Hodges APRN        ondansetron (ZOFRAN) tablet 4 mg  4 mg Oral Q6H PRN Yoly Hodges APRN        Or    ondansetron (ZOFRAN) injection 4 mg  4 mg Intravenous Q6H PRN Yoly Hodges, FITZ        pantoprazole (PROTONIX) EC tablet 40 mg  40 mg  "Oral Q AM Yoly Hodges APRN   40 mg at 11/22/23 0527    Pharmacy Meds to Bed Consult   Does not apply Daily Yoly Hodges APRN        sertraline (ZOLOFT) tablet 200 mg  200 mg Oral Daily Yoly Hodges APRN   200 mg at 11/21/23 2151    sodium chloride 0.9 % flush 10 mL  10 mL Intravenous PRN Ghada Terry APRN        sodium chloride 0.9 % flush 10 mL  10 mL Intravenous Q12H Yoly Hodges APRN   10 mL at 11/21/23 2151    sodium chloride 0.9 % flush 10 mL  10 mL Intravenous PRN Yoly Hodges APRN        sodium chloride 0.9 % infusion 40 mL  40 mL Intravenous PRN Yoly Hodges APRN        sulfaSALAzine (AZULFIDINE) tablet 1,000 mg  1,000 mg Oral Daily Yoly Hodges APRN   1,000 mg at 11/21/23 2150    ticagrelor (BRILINTA) tablet 90 mg  90 mg Oral BID Yoly Hodges APRN   90 mg at 11/21/23 2151       Vital Sign Min/Max for last 24 hours  Temp  Min: 97.4 °F (36.3 °C)  Max: 98.2 °F (36.8 °C)   BP  Min: 120/80  Max: 166/71   Pulse  Min: 53  Max: 90   Resp  Min: 18  Max: 22   SpO2  Min: 95 %  Max: 99 %   No data recorded   Weight  Min: 86.8 kg (191 lb 6.4 oz)  Max: 87.5 kg (193 lb)     Flowsheet Rows      Flowsheet Row First Filed Value   Admission Height 165.1 cm (65\") Documented at 11/21/2023 1744   Admission Weight 87.5 kg (193 lb) Documented at 11/21/2023 1744            Results for orders placed during the hospital encounter of 11/18/23    Adult Transthoracic Echo Complete W/ Cont if Necessary Per Protocol    Interpretation Summary    Left ventricular systolic function is normal. Left ventricular ejection fraction appears to be 66 - 70%.    Left ventricular wall thickness is consistent with mild concentric hypertrophy.    Left ventricular diastolic function was normal.    Normal right ventricular cavity size and systolic function noted.    There is mild thickening of the aortic valve. No aortic valve regurgitation is present. No hemodynamically significant aortic valve " stenosis is present.      Physical Exam:    General Appearance: Awake, alert, in no acute distress  Eyes: Pupils equal and reactive    Ears: Appear intact with no abnormalities noted  Nose: Nares normal, no drainage  Neck: supple, trachea midline, no carotid bruit and no JVD  Back: no kyphosis present,    Lungs: respirations regular, respirations even and respirations unlabored  Heart: normal S1, S2, no significant murmurs   No gallops or rubs  no rub and no click  Abdomen: normal bowel sounds, no tenderness   Skin: no bleeding, bruising or rash  Extremities: no cyanosis  Psychiatric/Behavioral: Negative for agitation, behavioral problems, confusion, the patient does  appear to be nervous/anxious.       Results Review:   I reviewed the patient's new clinical results.  I reviewed the patient's new imaging results and agree with the interpretation.  I reviewed the patient's other test results and agree with the interpretation  I personally viewed and interpreted the patient's EKG/Telemetry data    Discussed with patient  Updated patient regarding any new or relevant abnormalities on review of records or any new findings on physical exam.   Mentioned to patient about purpose of visit and desirable health short and long term goals and objectives.     Reviewed available prior notes, consults, prior visits, laboratory findings, radiology and cardiology relevant reports.   Updated chart as applicable.   I have reviewed the patient's medical history in detail and updated the computerized patient record as relevant.          Assessment & Plan       NSTEMI (non-ST elevated myocardial infarction)    Coronary artery disease    Rheumatoid arthritis involving multiple sites with positive rheumatoid factor    Atrial fibrillation with RVR    Stage 3a chronic kidney disease      Plan    Discontinue Eliquis   Recommend cardiac catheterization tomorrow, selective coronary angiography, left ventriculography and percutaneous coronary  intervention with application of arteriotomy hemostatic closure device.    I discussed cardiac catheterization, the procedure, risks (including bleeding, infection, vascular damage [including minor oozing, bruising, bleeding, and up to and including but not limited to the need for vascular surgery, emergency cardiothoracic surgery, contrast reaction, renal failure, respiratory failure, heart attack, stroke, arrhythmia and even death), benefits, and alternatives and the patient has voiced understanding and is willing to proceed.    Adequate pre-hydration and post cardiac catheterization hydration.  Premedications as required and indicated for cardiac catheterization.    No contraindication to drug eluting stent placement if required  Further recommendations pending results of cardiac catheterization    Telemetry  Deep vein thrombosis prophylaxis/precautions  Appropriate diet, fluid, sodium, caffeine, stimulants intake   Questions were encouraged, asked and answered to the patient's  understanding and satisfaction.  Compliance to diet and medications       Jayson Castillo MD  11/22/23  06:53 CST    EMR Dragon/Transcription was used to dictate part of this note

## 2023-11-22 NOTE — PLAN OF CARE
Problem: Adult Inpatient Plan of Care  Goal: Plan of Care Review  Outcome: Ongoing, Progressing  Flowsheets (Taken 11/22/2023 0423)  Progress: no change  Plan of Care Reviewed With: patient  Outcome Evaluation: Patient admitted for NSTEMI. Patient has no c/o pain. NPO for cardiology consult today. Up ad trey. SB/SR 46-74 on tele. VSS. Safety maintained. Will notify MD of any changes.

## 2023-11-23 LAB
ANION GAP SERPL CALCULATED.3IONS-SCNC: 8 MMOL/L (ref 5–15)
BUN SERPL-MCNC: 22 MG/DL (ref 6–20)
BUN/CREAT SERPL: 15.4 (ref 7–25)
CALCIUM SPEC-SCNC: 9.2 MG/DL (ref 8.6–10.5)
CHLORIDE SERPL-SCNC: 108 MMOL/L (ref 98–107)
CO2 SERPL-SCNC: 24 MMOL/L (ref 22–29)
CREAT SERPL-MCNC: 1.43 MG/DL (ref 0.57–1)
DEPRECATED RDW RBC AUTO: 48.6 FL (ref 37–54)
EGFRCR SERPLBLD CKD-EPI 2021: 42.3 ML/MIN/1.73
ERYTHROCYTE [DISTWIDTH] IN BLOOD BY AUTOMATED COUNT: 14.6 % (ref 12.3–15.4)
GLUCOSE SERPL-MCNC: 106 MG/DL (ref 65–99)
HCT VFR BLD AUTO: 35.7 % (ref 34–46.6)
HGB BLD-MCNC: 11.4 G/DL (ref 12–15.9)
MCH RBC QN AUTO: 29.1 PG (ref 26.6–33)
MCHC RBC AUTO-ENTMCNC: 31.9 G/DL (ref 31.5–35.7)
MCV RBC AUTO: 91.1 FL (ref 79–97)
PLATELET # BLD AUTO: 162 10*3/MM3 (ref 140–450)
PMV BLD AUTO: 9.2 FL (ref 6–12)
POTASSIUM SERPL-SCNC: 4.9 MMOL/L (ref 3.5–5.2)
RBC # BLD AUTO: 3.92 10*6/MM3 (ref 3.77–5.28)
SODIUM SERPL-SCNC: 140 MMOL/L (ref 136–145)
WBC NRBC COR # BLD AUTO: 4.72 10*3/MM3 (ref 3.4–10.8)

## 2023-11-23 PROCEDURE — 99223 1ST HOSP IP/OBS HIGH 75: CPT | Performed by: SURGERY

## 2023-11-23 PROCEDURE — 63710000001 AZATHIOPRINE PER 50 MG: Performed by: INTERNAL MEDICINE

## 2023-11-23 PROCEDURE — 80048 BASIC METABOLIC PNL TOTAL CA: CPT | Performed by: INTERNAL MEDICINE

## 2023-11-23 PROCEDURE — 99232 SBSQ HOSP IP/OBS MODERATE 35: CPT | Performed by: INTERNAL MEDICINE

## 2023-11-23 PROCEDURE — 25010000002 ENOXAPARIN PER 10 MG: Performed by: INTERNAL MEDICINE

## 2023-11-23 PROCEDURE — 85027 COMPLETE CBC AUTOMATED: CPT | Performed by: INTERNAL MEDICINE

## 2023-11-23 PROCEDURE — 25810000003 SODIUM CHLORIDE 0.9 % SOLUTION: Performed by: INTERNAL MEDICINE

## 2023-11-23 RX ADMIN — SODIUM CHLORIDE 75 ML/HR: 9 INJECTION, SOLUTION INTRAVENOUS at 08:45

## 2023-11-23 RX ADMIN — SULFASALAZINE 1000 MG: 500 TABLET ORAL at 08:39

## 2023-11-23 RX ADMIN — ACETAMINOPHEN 650 MG: 325 TABLET, FILM COATED ORAL at 12:26

## 2023-11-23 RX ADMIN — ISOSORBIDE MONONITRATE 15 MG: 30 TABLET, EXTENDED RELEASE ORAL at 08:39

## 2023-11-23 RX ADMIN — AZATHIOPRINE 50 MG: 50 TABLET ORAL at 20:04

## 2023-11-23 RX ADMIN — ENOXAPARIN SODIUM 90 MG: 100 INJECTION SUBCUTANEOUS at 06:26

## 2023-11-23 RX ADMIN — Medication 10 ML: at 08:42

## 2023-11-23 RX ADMIN — SERTRALINE HYDROCHLORIDE 200 MG: 100 TABLET, FILM COATED ORAL at 08:39

## 2023-11-23 RX ADMIN — ASPIRIN 81 MG: 81 TABLET, COATED ORAL at 08:40

## 2023-11-23 RX ADMIN — ENOXAPARIN SODIUM 90 MG: 100 INJECTION SUBCUTANEOUS at 18:37

## 2023-11-23 RX ADMIN — ACETAMINOPHEN 650 MG: 325 TABLET, FILM COATED ORAL at 17:55

## 2023-11-23 RX ADMIN — CARVEDILOL 12.5 MG: 6.25 TABLET, FILM COATED ORAL at 17:56

## 2023-11-23 RX ADMIN — FLUTICASONE PROPIONATE 2 SPRAY: 50 SPRAY, METERED NASAL at 08:40

## 2023-11-23 RX ADMIN — Medication 10 ML: at 20:02

## 2023-11-23 RX ADMIN — AZATHIOPRINE 50 MG: 50 TABLET ORAL at 08:41

## 2023-11-23 RX ADMIN — PANTOPRAZOLE SODIUM 40 MG: 40 TABLET, DELAYED RELEASE ORAL at 06:26

## 2023-11-23 RX ADMIN — CARVEDILOL 12.5 MG: 6.25 TABLET, FILM COATED ORAL at 08:40

## 2023-11-23 NOTE — CONSULTS
Referring Provider: Dr. Castillo  Reason for Consultation: Multivessel coronary artery disease    Patient Care Team:  Geraldo Feldman DO as PCP - General (Family Medicine)  Rick Tidwell DO as Consulting Physician (Gastroenterology)    Chief complaint chest pain with radiation to shoulders and jaw    Subjective .     History of present illness:    Ms Alessandra Baker is a 58-year-old  female whom presented to [facility] emergency room on 11/21/2023 with complaints of palpitations, chest pressure, diaphoresis and shortness of air.  She reported experiencing on and off chest pain for 1 week prior to presentation.  She was recently hospitalized from 11/18/2023-11/20/2023 with chest pain and new diagnosis of atrial fibrillation with RVR, she was discharged with medication regimen changes.  Dr. Castillo completed a cardiac catheterization today, 11/23/2023 that revealed multivessel coronary artery disease including the distal left main.  She reported history of tobacco use and quit approximately 10 years ago.  She has significant rheumatoid arthritis and is currently treated with Imuran and Azulfidine.  She has medication (methotrexate) induced cirrhosis with grade 1 esophageal varices that are currently being followed by Dr. Tidwell with last endoscopy on 4/24/2023, she has yet to require intervention for her varices.  She reported a remote history of tobacco use and quit smoking approximately 10 years ago.  She had a hemoglobin A1c of 4.70 on 11/22/2023.    History  Code Status and Medical Interventions:   Ordered at: 11/21/23 2018     Level Of Support Discussed With:    Patient     Code Status (Patient has no pulse and is not breathing):    CPR (Attempt to Resuscitate)     Medical Interventions (Patient has pulse or is breathing):    Full Support         Past Medical History:   Diagnosis Date    Anxiety     Arthritis     Atrial fibrillation with RVR 11/19/2023    CAD (coronary artery disease)     Chronic kidney disease      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    Depression     Esophageal varices     GERD (gastroesophageal reflux disease)     Headache     Heart problem     angina    Hyperlipidemia     Hypertension 11/2012    Liver disease 2015    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    Liver problem     Myocardial infarction 11/2012    NSTEMI    Rheumatoid arthritis     Skin cancer     Stroke     showed up on mri about 5 years ago with no residual    Ulnar neuropathy     Visual impairment    ,   Past Surgical History:   Procedure Laterality Date    ANGIOPLASTY      11/2012    APPENDECTOMY      CARDIAC CATHETERIZATION  07/21/2015 11/1/12, 4/21/22    COLONOSCOPY      COLONOSCOPY N/A 04/24/2023    Procedure: COLONOSCOPY WITH ANESTHESIA;  Surgeon: Rick Tidwell DO;  Location: [facility] ENDOSCOPY;  Service: Gastroenterology;  Laterality: N/A;  preop; hx of polyps   postop; polyps  PCP Eric Feldman     CORONARY STENT PLACEMENT      x3 per patient, X 1 -  11/2012, x 2 - 4/21/22    ENDOSCOPY      ENDOSCOPY N/A 04/24/2023    Procedure: ESOPHAGOGASTRODUODENOSCOPY WITH ANESTHESIA;  Surgeon: Rick Tidwell DO;  Location: [facility] ENDOSCOPY;  Service: Gastroenterology;  Laterality: N/A;  preop; hx of varices   postop; varices   PCP Eric Feldman     HAND RECONSTRUCTION Right 06/15/1967    HYSTERECTOMY      2016    SUBTOTAL HYSTERECTOMY      TONSILLECTOMY     ,   Family History   Problem Relation Age of Onset    Hyperlipidemia Mother     Hypertension Mother     Stroke Mother     Diabetes Mother     Arthritis Mother     Hyperlipidemia Father     Hypertension Father     Heart attack Father     Arthritis Father     Early death Father     Heart failure Sister     Stroke Sister     Heart attack Brother     Alcohol abuse Brother     Heart attack Brother     Mental illness Maternal Aunt         Father's  sister    Breast cancer Maternal Aunt     Heart failure Paternal Aunt     Heart failure Paternal Uncle     Heart failure Paternal Uncle     Colon cancer Neg  Hx     Colon polyps Neg Hx     Esophageal cancer Neg Hx    ,   Social History     Tobacco Use    Smoking status: Former     Packs/day: 1.00     Years: 30.00     Additional pack years: 0.00     Total pack years: 30.00     Types: Cigarettes     Start date: 1/1/1993     Quit date: 1/1/2013     Years since quitting: 10.8     Passive exposure: Past    Smokeless tobacco: Never    Tobacco comments:     Not somking now 10 years   Vaping Use    Vaping Use: Former   Substance Use Topics    Alcohol use: Not Currently     Comment: Twice a. Year    Drug use: Never   ,   Medications Prior to Admission   Medication Sig Dispense Refill Last Dose    apixaban (ELIQUIS) 5 MG tablet tablet Take 1 tablet by mouth Every 12 (Twelve) Hours. Indications: Atrial Fibrillation 60 tablet 2     aspirin 81 MG EC tablet Take 1 tablet by mouth Daily. 90 tablet 3     azaTHIOprine (IMURAN) 50 MG tablet Take 1 tablet by mouth 2 (Two) Times a Day.       carvedilol (COREG) 12.5 MG tablet Take 1 tablet by mouth 2 (Two) Times a Day With Meals. 60 tablet 2     dapagliflozin Propanediol 10 MG tablet Take 10 mg by mouth Daily. 30 tablet 11     ezetimibe (ZETIA) 10 MG tablet Take 1 tablet by mouth Daily. 30 tablet 11     fluticasone (FLONASE) 50 MCG/ACT nasal spray 2 sprays into the nostril(s) as directed by provider Daily. 16 g 11     Inclisiran Sodium 284 MG/1.5ML solution prefilled syringe Inject 1.5 mL under the skin into the appropriate area as directed. Next due on 11/30- receives from oncology       isosorbide mononitrate (IMDUR) 30 MG 24 hr tablet Take 0.5 tablets by mouth Every Morning. Do not take if BP < 100       omeprazole (priLOSEC) 40 MG capsule Take 1 capsule by mouth Daily.       cetirizine (zyrTEC) 10 MG tablet Take 1 tablet by mouth Daily As Needed for Allergies.       EPINEPHrine (EPIPEN) 0.3 MG/0.3ML solution auto-injector injection Inject 0.3 mL into the appropriate muscle as directed by prescriber 1 (One) Time. If needed       LORazepam  (Ativan) 0.5 MG tablet Take 1 tablet by mouth At Night As Needed for Anxiety. 30 tablet 0     nitroglycerin (Nitrostat) 0.3 MG SL tablet Place 1 tablet under the tongue Every 5 (Five) Minutes As Needed for Chest Pain. Take no more than 3 doses in 15 minutes. 30 tablet 12     sertraline (ZOLOFT) 100 MG tablet Take 2 tablets by mouth Daily. 180 tablet 3     sulfaSALAzine (AZULFIDINE) 500 MG tablet Take 2 tablets by mouth Daily.       ticagrelor (BRILINTA) 90 MG tablet tablet Take 1 tablet by mouth 2 (Two) Times a Day. 60 tablet 11    , Allergies: Bee venom, Clopidogrel, Codeine, Insect extract, Morphine and related, Penicillin g, Shellfish allergy, Shellfish-derived products, Methotrexate, Penicillin g benzathine, Atorvastatin, Hydrocodone, Hydroxychloroquine, and Menthol    Review of Systems  Review of Systems   Constitutional:  Positive for fatigue. Negative for activity change, appetite change, chills, diaphoresis and fever.   HENT:  Negative for congestion, ear discharge, ear pain, facial swelling, hearing loss, mouth sores, nosebleeds, postnasal drip, rhinorrhea, sinus pressure, sore throat, tinnitus, trouble swallowing and voice change.    Eyes:  Negative for photophobia, pain, discharge and visual disturbance.   Respiratory:  Positive for chest tightness and shortness of breath. Negative for apnea, cough, wheezing and stridor.    Cardiovascular:  Negative for chest pain, palpitations and leg swelling.   Gastrointestinal:  Negative for abdominal distention, abdominal pain, blood in stool, constipation, diarrhea, nausea, rectal pain and vomiting.   Endocrine: Negative for cold intolerance, heat intolerance, polydipsia, polyphagia and polyuria.   Genitourinary:  Negative for difficulty urinating, dysuria, flank pain, frequency, hematuria and urgency.   Musculoskeletal:  Negative for arthralgias, back pain, gait problem, joint swelling and myalgias.   Skin:  Negative for color change, pallor, rash and wound.  "  Allergic/Immunologic: Negative for environmental allergies, food allergies and immunocompromised state.   Neurological:  Negative for dizziness, tremors, seizures, syncope, speech difficulty, weakness, light-headedness, numbness and headaches.   Hematological:  Negative for adenopathy. Does not bruise/bleed easily.   Psychiatric/Behavioral:  Negative for agitation, confusion, hallucinations, sleep disturbance and suicidal ideas. The patient is not nervous/anxious.         Objective     Vital Signs   Visit Vitals  /65 (BP Location: Right arm, Patient Position: Lying)   Pulse 52   Temp 98.1 °F (36.7 °C) (Oral)   Resp 18   Ht 165.1 cm (65\")   Wt 86.8 kg (191 lb 6.4 oz)   SpO2 98%   BMI 31.85 kg/m²       Physical Exam  Constitutional:       Appearance: Normal appearance. She is well-developed.   HENT:      Head: Normocephalic and atraumatic.      Nose: Nose normal.   Eyes:      General: Lids are normal.      Conjunctiva/sclera: Conjunctivae normal.      Pupils: Pupils are equal, round, and reactive to light.   Neck:      Trachea: Trachea normal.   Cardiovascular:      Rate and Rhythm: Normal rate and regular rhythm.      Heart sounds: Normal heart sounds.   Abdominal:      General: Bowel sounds are normal.      Palpations: Abdomen is soft.   Musculoskeletal:         General: Normal range of motion.      Cervical back: Normal range of motion and neck supple.   Skin:     General: Skin is warm and dry.      Findings: No erythema, petechiae or rash.      Nails: There is no clubbing.   Neurological:      Mental Status: She is alert.      Cranial Nerves: No cranial nerve deficit.      Sensory: No sensory deficit.   Psychiatric:         Speech: Speech normal.         Behavior: Behavior normal. Behavior is cooperative.         Thought Content: Thought content normal.         Judgment: Judgment normal.           LAB:   CBC:  Results from last 7 days   Lab Units 11/23/23  0237 11/22/23  0455 11/21/23  1806   WBC 10*3/mm3 " "4.72 3.59 4.92   HEMATOCRIT % 35.7 37.6 41.8   PLATELETS 10*3/mm3 162 155 209          BMP:)  Results from last 7 days   Lab Units 11/23/23  0237 11/22/23  0455 11/21/23  1806   SODIUM mmol/L 140 141 138   POTASSIUM mmol/L 4.9 4.5 4.1   CHLORIDE mmol/L 108* 106 104   CO2 mmol/L 24.0 26.0 24.0   GLUCOSE mg/dL 106* 98 112*   BUN mg/dL 22* 22* 22*   CREATININE mg/dL 1.43* 1.29* 1.35*           COAG:      Invalid input(s): \"PT\"        IMAGES:       Imaging Results (Last 24 Hours)       Procedure Component Value Units Date/Time    CT Chest Without Contrast Diagnostic [[id number]] Collected: 11/22/23 1938     Updated: 11/22/23 1947    Narrative:      CT CHEST WO CONTRAST DIAGNOSTIC- 11/22/2023 6:55 PM CST     HISTORY: Chest pain (Ped 0-17y); I25.9-Chronic ischemic heart disease,  unspecified; I48.0-Paroxysmal atrial fibrillation; I21.4-Non-ST  elevation (NSTEMI) myocardial infarction     COMPARISON: 11/18/2023     DOSE LENGTH PRODUCT: 216 mGy cm. Automated exposure control was also  utilized to decrease patient radiation dose.     TECHNIQUE: Serial helical tomographic images of the chest were acquired  without contrast. Multiplanar reformatted images were provided for  review.     FINDINGS:     Visualized neck base: The imaged portion of the base of the neck appears  unremarkable.     Lungs: There are changes of paraseptal and centrilobular emphysema.  Bibasilar atelectasis is present. No acute consolidative pneumonia or  pneumothorax.. No pleural effusion is present. The airways are clear.     Heart: The heart is normal in size. There is no pericardial effusion.  Advanced coronary artery atheromatous calcification.     Vasculature: There is calcified atheromatous plaque in the aorta without  aneurysmal dilation. The pulmonary arteries are enlarged, suggestive of  pulmonary arterial hypertension.     Mediastinum and lymph nodes: No suspicious hilar or mediastinal  adenopathy. Incidental granulomatous calcification..   "   Skeletal and soft tissues: Chest wall soft tissues are unremarkable. No  acute bony abnormality. Mild thoracic spine degenerative change.     Upper abdomen: Cholelithiasis is present. No pericholecystic  inflammatory changes. There is retained contrast in the collecting  system of the upper tracts of both kidneys. The spleen is enlarged..          Impression:      1. Bibasilar atelectasis. Lungs are otherwise clear. There are changes  of paraseptal and centrilobular emphysema.  2. Remote granulomatous disease.  3. No evident of cardiac enlargement. Advanced coronary artery  calcifications are present with coronary stents present. No pericardial  effusion.  4. Atheromatous calcification of the thoracic aorta and great vessels  without evidence of aneurysm.  5. Cholelithiasis. Splenomegaly is present.           This report was signed and finalized on 11/22/2023 7:44 PM CST by Dr. Sander Caceres MD.               Cardiac catheterization    11/22/2023     Left ventricle end-diastolic pressure mildly elevated to 17 mmHg with no gradient across aortic valve on pullback.  Left ventricular ejection fraction approximately 55% without significant mitral regurgitation distal left main has 60% stenosis with large atherosclerotic plaque  In-stent restenosis of proximal LAD of approximately 50%  Rest of the vessel does not have any significant disease  No significant disease of diagonal branches  Proximal left circumflex coronary artery has 60% stenosis  Moderate sized obtuse marginal branches without any obstructive disease  Collaterals noted going to the right coronary artery  Right coronary artery has a flush occlusion with multiple stents from the proximal to the the very distal region  Bilateral subclavian arteries are patent with patent bilateral internal mammary arteries that can be used as surgical conduits  Right femoral arteriotomy site was closed using 6 [place] Perclose closure device        Summary:   Multivessel  coronary artery disease including distal left main   Mildly elevated LVEDP with normal left ventricular ejection fraction in the setting of non-ST elevation myocardial infarction                   Assessment & Plan          NSTEMI (non-ST elevated myocardial infarction)    Coronary artery disease    Rheumatoid arthritis involving multiple sites with positive rheumatoid factor    Atrial fibrillation with RVR    Stage 3a chronic kidney disease    Medication induced cirrhosis of the liver with grade 1 esophageal varices    Dr. Granados discussed the patient's findings and my recommendations with Dr. Castillo.    Dr. Granados discussed the patient's findings and my recommendations with patient and daughter, We discussed the options for treatment of coronary artery disease to include medical therapy, coronary stenting, and surgical revascularization.  We discussed the pros and cons of each option and how it pertains to the current case.  The STS Risk score was calculated using the STS Risk Calculator and discussed with the patient and family.  Coronary artery bypass grafting is best option given patient's findings and risk factors.  We discussed the operative conduct and expected hospital and outpatient recovery.  Risks were discussed to include but not limited to bleeding, infection, stroke, heart attack, need for additional procedures, anesthesia risk, organ dysfunction and/or death, prolonged mechanical ventilation, prolonged ICU stay, chronic pain syndromes, sternal nonunion, and/or death.  We discussed the need to utilize all medical treatments additionally prescribed post surgery.       The patient and family understands the risks, benefits, and alternatives and she wishes to proceed forward with surgery.    Tentative plans for aortocoronary bypass surgery, left atrial appendage exclusion and maze procedure next week.  Bilateral carotid ultrasound and ultrasound vein mapping of bilateral lower extremities has been  ordered.    Reta Benson, APRN  11/23/23  13:15 CST

## 2023-11-23 NOTE — PROGRESS NOTES
[facility] Medicine Services  INPATIENT PROGRESS NOTE    Patient Name: Alessandra Baker  Date of Admission: 11/21/2023  Today's Date: 11/23/23  Length of Stay: 2  Primary Care Physician: Geraldo Feldman DO    Subjective   Chief Complaint: chest pressure on admission  HPI   She was sitting up in bed with family at bedside.    She underwent cardiac catheterization yesterday by Dr. Castillo      No chest pain.  No palpitations. CTS consulted.  She just saw Dr. Granados -planning for surgery possibly next Wednesday 11/29.    Review of Systems   All pertinent negatives and positives are as above. All other systems have been reviewed and are negative unless otherwise stated.     Objective    Temp:  [97.6 °F (36.4 °C)-98.4 °F (36.9 °C)] 97.9 °F (36.6 °C)  Heart Rate:  [52-97] 57  Resp:  [18] 18  BP: (102-141)/(61-74) 135/69  Physical Exam  Vitals reviewed.   Constitutional:       General: She is not in acute distress.     Appearance: She is not toxic-appearing.      Comments: Sitting up in bed.  No acute distress.  On room air.  Family at bedside.   HENT:      Head: Normocephalic and atraumatic.      Mouth/Throat:      Mouth: Mucous membranes are moist.      Pharynx: Oropharynx is clear.   Eyes:      Extraocular Movements: Extraocular movements intact.      Conjunctiva/sclera: Conjunctivae normal.      Pupils: Pupils are equal, round, and reactive to light.   Cardiovascular:      Rate and Rhythm: Normal rate and regular rhythm.      Pulses: Normal pulses.      Comments: SB/S 51-70  Pulmonary:      Effort: Pulmonary effort is normal. No respiratory distress.      Breath sounds: Normal breath sounds.   Abdominal:      General: Bowel sounds are normal. There is no distension.      Palpations: Abdomen is soft.      Tenderness: There is no abdominal tenderness.   Musculoskeletal:         General: No swelling or tenderness. Normal range of motion.      Cervical back: Normal range of motion and neck  "supple. No muscular tenderness.   Skin:     General: Skin is warm and dry.      Findings: No erythema or rash.      Comments: She had minor oozing from right femoral arteriotomy site - improved   Neurological:      General: No focal deficit present.      Mental Status: She is alert and oriented to person, place, and time.      Cranial Nerves: No cranial nerve deficit.      Motor: No weakness.   Psychiatric:         Mood and Affect: Mood normal.         Behavior: Behavior normal.         Results Review:  I have reviewed the labs, radiology results, and diagnostic studies.    Laboratory Data:   Results from last 7 days   Lab Units 11/23/23  0237 11/22/23  0455 11/21/23  1806   WBC 10*3/mm3 4.72 3.59 4.92   HEMOGLOBIN g/dL 11.4* 11.8* 13.4   HEMATOCRIT % 35.7 37.6 41.8   PLATELETS 10*3/mm3 162 155 209        Results from last 7 days   Lab Units 11/23/23 0237 11/22/23 0455 11/21/23  1806 11/20/23  0538 11/19/23  0600   SODIUM mmol/L 140 141 138 141 139   POTASSIUM mmol/L 4.9 4.5 4.1 4.8 4.5   CHLORIDE mmol/L 108* 106 104 106 104   CO2 mmol/L 24.0 26.0 24.0 26.0 27.0   BUN mg/dL 22* 22* 22* 21* 22*   CREATININE mg/dL 1.43* 1.29* 1.35* 1.33* 1.51*   CALCIUM mg/dL 9.2 8.9 9.7 9.3 9.4   BILIRUBIN mg/dL  --   --  0.4 0.3 0.4   ALK PHOS U/L  --   --  86 74 70   ALT (SGPT) U/L  --   --  16 8 7   AST (SGOT) U/L  --   --  31 26 10   GLUCOSE mg/dL 106* 98 112* 96 85       Culture Data:   No results found for: \"ACANTHNAEG\", \"AFBCX\", \"BPERTUSSISCX\", \"BLOODCX\"  No results found for: \"BCIDPCR\", \"CXREFLEX\", \"CSFCX\", \"CULTURETIS\"  No results found for: \"CULTURES\", \"HSVCX\", \"URCX\"  No results found for: \"EYECULTURE\", \"GCCX\", \"HSVCULTURE\", \"LABHSV\"  No results found for: \"LEGIONELLA\", \"MRSACX\", \"MUMPSCX\", \"MYCOPLASCX\"  No results found for: \"NOCARDIACX\", \"STOOLCX\"  No results found for: \"THROATCX\", \"UNSTIMCULT\", \"URINECX\", \"CULTURE\", \"VZVCULTUR\"  No results found for: \"VIRALCULTU\", \"WOUNDCX\"    Radiology Data:   Imaging Results (Last " 24 Hours)       Procedure Component Value Units Date/Time    CT Chest Without Contrast Diagnostic [[id number]] Collected: 11/22/23 1938     Updated: 11/22/23 1947    Narrative:      CT CHEST WO CONTRAST DIAGNOSTIC- 11/22/2023 6:55 PM CST     HISTORY: Chest pain (Ped 0-17y); I25.9-Chronic ischemic heart disease,  unspecified; I48.0-Paroxysmal atrial fibrillation; I21.4-Non-ST  elevation (NSTEMI) myocardial infarction     COMPARISON: 11/18/2023     DOSE LENGTH PRODUCT: 216 mGy cm. Automated exposure control was also  utilized to decrease patient radiation dose.     TECHNIQUE: Serial helical tomographic images of the chest were acquired  without contrast. Multiplanar reformatted images were provided for  review.     FINDINGS:     Visualized neck base: The imaged portion of the base of the neck appears  unremarkable.     Lungs: There are changes of paraseptal and centrilobular emphysema.  Bibasilar atelectasis is present. No acute consolidative pneumonia or  pneumothorax.. No pleural effusion is present. The airways are clear.     Heart: The heart is normal in size. There is no pericardial effusion.  Advanced coronary artery atheromatous calcification.     Vasculature: There is calcified atheromatous plaque in the aorta without  aneurysmal dilation. The pulmonary arteries are enlarged, suggestive of  pulmonary arterial hypertension.     Mediastinum and lymph nodes: No suspicious hilar or mediastinal  adenopathy. Incidental granulomatous calcification..     Skeletal and soft tissues: Chest wall soft tissues are unremarkable. No  acute bony abnormality. Mild thoracic spine degenerative change.     Upper abdomen: Cholelithiasis is present. No pericholecystic  inflammatory changes. There is retained contrast in the collecting  system of the upper tracts of both kidneys. The spleen is enlarged..          Impression:      1. Bibasilar atelectasis. Lungs are otherwise clear. There are changes  of paraseptal and centrilobular  emphysema.  2. Remote granulomatous disease.  3. No evident of cardiac enlargement. Advanced coronary artery  calcifications are present with coronary stents present. No pericardial  effusion.  4. Atheromatous calcification of the thoracic aorta and great vessels  without evidence of aneurysm.  5. Cholelithiasis. Splenomegaly is present.           This report was signed and finalized on 11/22/2023 7:44 PM CST by Dr. Sander Caceres MD.               I have reviewed the patient's current medications.     Assessment/Plan   Assessment  Active Hospital Problems    Diagnosis     **NSTEMI (non-ST elevated myocardial infarction)     Stage 3a chronic kidney disease     Atrial fibrillation with RVR     Rheumatoid arthritis involving multiple sites with positive rheumatoid factor     Coronary artery disease      Ms. Baker is a 58-year-old female that presented to [facility] on 11/21 for irregular heart rate with associated chest pressure. Patient admitted 11/18 - 11/20, 2023 with chest pain and A-fib RVR.  Patient did report chest pain at that time however cardiac markers were unremarkable.  Dr. Castillo felt pain was likely pleuritic in nature and related to RA.  Patient was started on anticoagulation and continued aspirin and Brilinta.  Also started on carvedilol 12.5 twice daily and Imdur dose increased from 30 to 60 mg a day.  On the evening of 11/21 she had just finished eating her chocolate Parfait when she had sudden palpitations.  She had associated chest pressure, diaphoresis, and shortness of breath.  No nausea.  In the ED, elevated troponin 313.  Repeat completed after hospitalist seen 349 with a delta of 36.  We have reconsulted cardiology.  Normal sinus rhythm in the 70s.       Treatment Plan  Dr. Castillo consulted with cardiology.  He discontinued patient's Eliquis and placed her on Lovenox.  Brilinta discontinued.  Continue aspirin. She is on Zetia as outpatient and has been intolerant to statin therapy  in the past.        CTS consulted.  She just saw Dr. Granados -planning for surgery possibly next Wednesday 11/29.    Atrial fibrillation with rapid ventricular response on admission.  Normal sinus rhythm on telemetry.  Continue Coreg, Lovenox.  Continue telemetry monitoring.    Lovenox will serve as VTE prophylaxis.    Medical Decision Making  Number and Complexity of problems: 2 acute problems in the form of NSTEMI and atrial fibrillation with rapid ventricular response  Differential Diagnosis: None considered at present    Conditions and Status        Condition is unchanged.     [facility] Data  External documents reviewed: [facility] records  Cardiac tracing (EKG, telemetry) interpretation: Reviewed  Radiology interpretation: Interpreted by radiology  Labs reviewed: As above  Any tests that were considered but not ordered: None considered at present     Decision rules/scores evaluated (example [id number], Wells, etc): None considered at present     Discussed with: Patient and Dr. Barboza     Care Planning  Shared decision making: Patient is agreeable to ongoing workup and treatment  Code status and discussions: Full code with full interventions    Disposition  Social Determinants of Health that impact treatment or disposition: None identified at present  I expect the patient to be discharged to home to be determined.  CTS consulted.     Electronically signed by FITZ Genao, 11/23/23, 07:53 CST.

## 2023-11-23 NOTE — PROGRESS NOTES
LOS: 2 days   Patient Care Team:  Geraldo Feldman DO as PCP - General (Family Medicine)  Rick Tidwell DO as Consulting Physician (Gastroenterology)    Chief Complaint: Chest pain     Subjective    Alessandra Baker is a 58 y.o. female who is being seen in follow-up  Overnight feeling well  No chest pain  No palpitation  No presyncope  No syncope  No orthopnea  No paroxysmal nocturnal dyspnea  No bleeding issues  No significant groin pain redness swelling or discharge  Had minor oozing from right femoral arteriotomy site now improved/resolved  Is on anticoagulant therapy  Brilinta has been discontinued  Labs as referenced below  Creatinine is 1.43 which 4 days ago was 1.5  CBC overall unremarkable  Telemetry: no malignant arrhythmia. No significant pauses.    Review of Systems   Constitutional: No chills   Has fatigue   No fever.   HENT: Negative.    Eyes: Negative.    Respiratory: Negative for cough,   No chest wall soreness,   Shortness of breath,   no wheezing, no stridor.    Cardiovascular: As above  Gastrointestinal: Negative for abdominal distention,  No abdominal pain,   No blood in stool,   No constipation,   No diarrhea,   No nausea   No vomiting.   Endocrine: Negative.    Genitourinary: Negative for difficulty urinating, dysuria, flank pain and hematuria.   Musculoskeletal: Negative.    Skin: Negative for rash and wound.   Allergic/Immunologic: Negative.    Neurological: Negative for dizziness, syncope, weakness,   No light-headedness  No  headaches.   Hematological: Does not bruise/bleed easily.   Psychiatric/Behavioral: Negative for agitation or behavioral problems,   No confusion,   the patient is  nervous/anxious.       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    Liver problem     Myocardial infarction 11/2012    NSTEMI    Rheumatoid arthritis     Skin cancer     Stroke     showed up on mri about 5 years ago with no residual    Ulnar neuropathy     Visual impairment      Past Surgical History:   Procedure Laterality Date    ANGIOPLASTY      11/2012    APPENDECTOMY      CARDIAC CATHETERIZATION  07/21/2015 11/1/12, 4/21/22    COLONOSCOPY      COLONOSCOPY N/A 04/24/2023    Procedure: COLONOSCOPY WITH ANESTHESIA;  Surgeon: Rick Tidwell DO;  Location: [facility] ENDOSCOPY;  Service: Gastroenterology;  Laterality: N/A;  preop; hx of polyps   postop; polyps  PCP Eric Feldman     CORONARY STENT PLACEMENT      x3 per patient, X 1 -  11/2012, x 2 - 4/21/22    ENDOSCOPY      ENDOSCOPY N/A 04/24/2023    Procedure: ESOPHAGOGASTRODUODENOSCOPY WITH ANESTHESIA;  Surgeon: Rick Tidwell DO;  Location: [facility] ENDOSCOPY;  Service: Gastroenterology;  Laterality: N/A;  preop; hx of varices   postop; varices   PCP Eric Feldman     HAND RECONSTRUCTION Right 06/15/1967    HYSTERECTOMY      2016    SUBTOTAL HYSTERECTOMY      TONSILLECTOMY       Social History     Socioeconomic History    Marital status:    Tobacco Use    Smoking status: Former     Packs/day: 1.00     Years: 30.00     Additional pack years: 0.00     Total pack years: 30.00     Types: Cigarettes     Start date: 1/1/1993     Quit date: 1/1/2013     Years since quitting: 10.8     Passive exposure: Past    Smokeless tobacco: Never    Tobacco comments:     Not somking now 10 years   Vaping Use    Vaping Use: Former   Substance and Sexual Activity    Alcohol use: Not Currently     Comment: Twice a. Year    Drug use: Never    Sexual activity: Yes     Partners: Male     Birth control/protection: Hysterectomy     Comment:  to same man for 39 yrs     Family  History   Problem Relation Age of Onset    Hyperlipidemia Mother     Hypertension Mother     Stroke Mother     Diabetes Mother     Arthritis Mother     Hyperlipidemia Father     Hypertension Father     Heart attack Father     Arthritis Father     Early death Father     Heart failure Sister     Stroke Sister     Heart attack Brother     Alcohol abuse Brother     Heart attack Brother     Mental illness Maternal Aunt         Father's  sister    Breast cancer Maternal Aunt     Heart failure Paternal Aunt     Heart failure Paternal Uncle     Heart failure Paternal Uncle     Colon cancer Neg Hx     Colon polyps Neg Hx     Esophageal cancer Neg Hx        Labs:  WBC WBC   Date Value Ref Range Status   11/23/2023 4.72 3.40 - 10.80 10*3/mm3 Final   11/22/2023 3.59 3.40 - 10.80 10*3/mm3 Final   11/21/2023 4.92 3.40 - 10.80 10*3/mm3 Final      HGB Hemoglobin   Date Value Ref Range Status   11/23/2023 11.4 (L) 12.0 - 15.9 g/dL Final   11/22/2023 11.8 (L) 12.0 - 15.9 g/dL Final   11/21/2023 13.4 12.0 - 15.9 g/dL Final      HCT Hematocrit   Date Value Ref Range Status   11/23/2023 35.7 34.0 - 46.6 % Final   11/22/2023 37.6 34.0 - 46.6 % Final   11/21/2023 41.8 34.0 - 46.6 % Final      Platelets Platelets   Date Value Ref Range Status   11/23/2023 162 140 - 450 10*3/mm3 Final   11/22/2023 155 140 - 450 10*3/mm3 Final   11/21/2023 209 140 - 450 10*3/mm3 Final      MCV MCV   Date Value Ref Range Status   11/23/2023 91.1 79.0 - 97.0 fL Final   11/22/2023 91.7 79.0 - 97.0 fL Final   11/21/2023 88.7 79.0 - 97.0 fL Final        Results from last 7 days   Lab Units 11/23/23  0237 11/22/23  0455 11/21/23  1806 11/20/23  0538 11/19/23  0600   SODIUM mmol/L 140 141 138 141 139   POTASSIUM mmol/L 4.9 4.5 4.1 4.8 4.5   CHLORIDE mmol/L 108* 106 104 106 104   CO2 mmol/L 24.0 26.0 24.0 26.0 27.0   BUN mg/dL 22* 22* 22* 21* 22*   CREATININE mg/dL 1.43* 1.29* 1.35* 1.33* 1.51*   CALCIUM mg/dL 9.2 8.9 9.7 9.3 9.4   BILIRUBIN mg/dL  --   --  0.4 0.3  "0.4   ALK PHOS U/L  --   --  86 74 70   ALT (SGPT) U/L  --   --  16 8 7   AST (SGOT) U/L  --   --  31 26 10   GLUCOSE mg/dL 106* 98 112* 96 85     Lab Results   Component Value Date    TROPONINI 0.126 (C) 03/25/2019    TROPONINT 371 (C) 11/21/2023     PT/INR:  No results found for: \"PROTIME\"/No results found for: \"INR\"    Imaging Results (Last 72 Hours)       Procedure Component Value Units Date/Time    CT Chest Without Contrast Diagnostic [[id number]] Collected: 11/22/23 1938     Updated: 11/22/23 1947    Narrative:      CT CHEST WO CONTRAST DIAGNOSTIC- 11/22/2023 6:55 PM CST     HISTORY: Chest pain (Ped 0-17y); I25.9-Chronic ischemic heart disease,  unspecified; I48.0-Paroxysmal atrial fibrillation; I21.4-Non-ST  elevation (NSTEMI) myocardial infarction     COMPARISON: 11/18/2023     DOSE LENGTH PRODUCT: 216 mGy cm. Automated exposure control was also  utilized to decrease patient radiation dose.     TECHNIQUE: Serial helical tomographic images of the chest were acquired  without contrast. Multiplanar reformatted images were provided for  review.     FINDINGS:     Visualized neck base: The imaged portion of the base of the neck appears  unremarkable.     Lungs: There are changes of paraseptal and centrilobular emphysema.  Bibasilar atelectasis is present. No acute consolidative pneumonia or  pneumothorax.. No pleural effusion is present. The airways are clear.     Heart: The heart is normal in size. There is no pericardial effusion.  Advanced coronary artery atheromatous calcification.     Vasculature: There is calcified atheromatous plaque in the aorta without  aneurysmal dilation. The pulmonary arteries are enlarged, suggestive of  pulmonary arterial hypertension.     Mediastinum and lymph nodes: No suspicious hilar or mediastinal  adenopathy. Incidental granulomatous calcification..     Skeletal and soft tissues: Chest wall soft tissues are unremarkable. No  acute bony abnormality. Mild thoracic spine " degenerative change.     Upper abdomen: Cholelithiasis is present. No pericholecystic  inflammatory changes. There is retained contrast in the collecting  system of the upper tracts of both kidneys. The spleen is enlarged..          Impression:      1. Bibasilar atelectasis. Lungs are otherwise clear. There are changes  of paraseptal and centrilobular emphysema.  2. Remote granulomatous disease.  3. No evident of cardiac enlargement. Advanced coronary artery  calcifications are present with coronary stents present. No pericardial  effusion.  4. Atheromatous calcification of the thoracic aorta and great vessels  without evidence of aneurysm.  5. Cholelithiasis. Splenomegaly is present.           This report was signed and finalized on 11/22/2023 7:44 PM CST by Dr. Sander Caceres MD.       XR Chest 1 View [[id number]] Collected: 11/21/23 1931     Updated: 11/21/23 1935    Narrative:      EXAMINATION: XR CHEST 1 VW-  11/21/2023 7:31 PM CST     Comparison: 11/18/2023     HISTORY: Chest pain     One-view chest: Upright frontal projection of the chest is obtained. The  lungs are clear with no evidence of acute parenchymal  consolidation. No  pleural effusion or free air is observed. The  mediastinal contours are  within normal limits. A coronary stent projects over the left heart  border.                                                                                                                        Impression:      Impression: No evidence of acute cardiopulmonary disease.     This report was signed and finalized on 11/21/2023 7:32 PM CST by Dr. Sander Caceres MD.               Objective     Allergies   Allergen Reactions    Bee Venom Anaphylaxis    Clopidogrel Hives and Unknown (See Comments)     Night terrors      Codeine Anaphylaxis    Insect Extract Anaphylaxis     Bee stings    Morphine And Related Shortness Of Breath, Hives and Unknown (See Comments)     tachycardia      Penicillin G Hives    Shellfish  Allergy Anaphylaxis    Shellfish-Derived Products Anaphylaxis    Methotrexate Other (See Comments)     hepatotxicity    Penicillin G Benzathine Unknown (See Comments)    Atorvastatin Myalgia and Unknown (See Comments)    Hydrocodone Palpitations     Heart races and feels like bugs crawling under skin    Hydroxychloroquine Other (See Comments) and Unknown (See Comments)     Night terrors      Menthol Rash       Medication Review: Performed  Current Facility-Administered Medications   Medication Dose Route Frequency Provider Last Rate Last Admin    acetaminophen (TYLENOL) 160 MG/5ML oral solution 650 mg  650 mg Oral Q4H PRN Jayson Castillo MD        Or    acetaminophen (TYLENOL) suppository 650 mg  650 mg Rectal Q4H PRN Jayson Castillo MD        acetaminophen (TYLENOL) tablet 650 mg  650 mg Oral Q4H PRN Jayson Castillo MD        ALPRAZolam (XANAX) tablet 0.5 mg  0.5 mg Oral TID PRN Jayson Castillo MD        aspirin chewable tablet 324 mg  324 mg Oral Once Jayson Castillo MD        aspirin EC tablet 81 mg  81 mg Oral Daily Jayson Castillo MD   81 mg at 11/22/23 0935    azaTHIOprine (IMURAN) tablet 50 mg  50 mg Oral BID Jayson Castillo MD   50 mg at 11/21/23 2151    sennosides-docusate (PERICOLACE) 8.6-50 MG per tablet 1 tablet  1 tablet Oral Nightly PRN Jayson Castillo MD        And    polyethylene glycol (MIRALAX) packet 17 g  17 g Oral Daily PRN Jayson Castillo MD        And    bisacodyl (DULCOLAX) EC tablet 5 mg  5 mg Oral Daily PRN Jayson Castillo MD        And    bisacodyl (DULCOLAX) suppository 10 mg  10 mg Rectal Daily PRN Jayson Castillo MD        carvedilol (COREG) tablet 12.5 mg  12.5 mg Oral BID With Meals Jayson Castillo MD   12.5 mg at 11/22/23 2144    cetirizine (zyrTEC) tablet 10 mg  10 mg Oral Daily PRN Jayson Castillo MD        diphenhydrAMINE (BENADRYL) capsule 25 mg  25 mg Oral Q6H PRN Jayson Castillo MD        Enoxaparin Sodium (LOVENOX) syringe 90 mg  1 mg/kg Subcutaneous Q12H Jayson Castillo MD   90 mg at 11/23/23 4613     EPINEPHrine (EPIPEN) injection 0.3 mg  0.3 mg Intramuscular Once PRN Jayson Castillo MD        fluticasone (FLONASE) 50 MCG/ACT nasal spray 2 spray  2 spray Nasal Daily Jayson Castillo MD        isosorbide mononitrate (IMDUR) 24 hr tablet 15 mg  15 mg Oral QAM Jayson Castillo MD        LORazepam (ATIVAN) tablet 0.5 mg  0.5 mg Oral Nightly PRN Jayson Castillo MD        methylPREDNISolone sodium succinate (SOLU-Medrol) injection 125 mg  125 mg Intravenous Once Jayson Castillo MD        Morphine sulfate (PF) injection 2 mg  2 mg Intravenous Q2H PRN Jayson Castillo MD   2 mg at 11/22/23 1201    nitroglycerin (NITROSTAT) SL tablet 0.4 mg  0.4 mg Sublingual Q5 Min PRN Jayson Castillo MD        ondansetron (ZOFRAN) tablet 4 mg  4 mg Oral Q6H PRN Jayson Castillo MD        Or    ondansetron (ZOFRAN) injection 4 mg  4 mg Intravenous Q6H PRN Jayson Castillo MD        pantoprazole (PROTONIX) EC tablet 40 mg  40 mg Oral Q AM Jayson Castillo MD   40 mg at 11/23/23 0626    Pharmacy Meds to Bed Consult   Does not apply Daily Jayson Castillo MD        promethazine (PHENERGAN) 25 mg in sodium chloride 0.9 % 50 mL  25 mg Intravenous Once Jayson Castillo MD        sertraline (ZOLOFT) tablet 200 mg  200 mg Oral Daily Jayson Castillo MD   200 mg at 11/22/23 0935    sodium chloride 0.9 % flush 10 mL  10 mL Intravenous PRN Jayson Castillo MD        sodium chloride 0.9 % flush 10 mL  10 mL Intravenous Q12H Jayson Castillo MD   10 mL at 11/22/23 2145    sodium chloride 0.9 % flush 10 mL  10 mL Intravenous PRN Jayson Castillo MD        sodium chloride 0.9 % infusion 40 mL  40 mL Intravenous PRN Jayson Castillo MD        sodium chloride 0.9 % infusion  75 mL/hr Intravenous Continuous Jayson Castillo MD 75 mL/hr at 11/22/23 1535 75 mL/hr at 11/22/23 1535    sulfaSALAzine (AZULFIDINE) tablet 1,000 mg  1,000 mg Oral Daily Jayson Castillo MD   1,000 mg at 11/21/23 2150    temazepam (RESTORIL) capsule 7.5 mg  7.5 mg Oral Nightly PRN Jayson Castillo MD           Vital Sign Min/Max for  "last 24 hours  Temp  Min: 97.6 °F (36.4 °C)  Max: 98.4 °F (36.9 °C)   BP  Min: 102/61  Max: 141/67   Pulse  Min: 53  Max: 97   Resp  Min: 18  Max: 18   SpO2  Min: 93 %  Max: 98 %   No data recorded   No data recorded     Flowsheet Rows      Flowsheet Row First Filed Value   Admission Height 165.1 cm (65\") Documented at 11/21/2023 1744   Admission Weight 87.5 kg (193 lb) Documented at 11/21/2023 1744            Results for orders placed during the hospital encounter of 11/18/23    Adult Transthoracic Echo Complete W/ Cont if Necessary Per Protocol    Interpretation Summary    Left ventricular systolic function is normal. Left ventricular ejection fraction appears to be 66 - 70%.    Left ventricular wall thickness is consistent with mild concentric hypertrophy.    Left ventricular diastolic function was normal.    Normal right ventricular cavity size and systolic function noted.    There is mild thickening of the aortic valve. No aortic valve regurgitation is present. No hemodynamically significant aortic valve stenosis is present.  Conclusion of cardiac catheterization    11/22/2023     Left ventricle end-diastolic pressure mildly elevated to 17 mmHg with no gradient across aortic valve on pullback.  Left ventricular ejection fraction approximately 55% without significant mitral regurgitation distal left main has 60% stenosis with large atherosclerotic plaque  In-stent restenosis of proximal LAD of approximately 50%  Rest of the vessel does not have any significant disease  No significant disease of diagonal branches  Proximal left circumflex coronary artery has 60% stenosis  Moderate sized obtuse marginal branches without any obstructive disease  Collaterals noted going to the right coronary artery  Right coronary artery has a flush occlusion with multiple stents from the proximal to the the very distal region  Bilateral subclavian arteries are patent with patent bilateral internal mammary arteries that can be used " as surgical conduits  Right femoral arteriotomy site was closed using 6 [place] Perclose closure device        Summary     Multivessel coronary artery disease including distal left main as described above  Mildly elevated LVEDP with normal left ventricular ejection fraction in the setting of non-ST elevation myocardial infarction        ____________________________________________________________________________________________________________________________________________     Plan after cardiac catheterization     Consideration for aortocoronary bypass surgery as well as Maze procedure and left atrial appendage exclusion  Care plan discussed with patient  Usual post procedure precautions after arteriotomy including monitoring for signs both local and systemic side effects.  On ultimate discharge will receive printed instructions  Intensive risk factor modifications for both primary and secondary prevention if applicable  Hydration   Observation       Physical Exam:    General Appearance: Awake, alert, in no acute distress  Eyes: Pupils equal and reactive    Ears: Appear intact with no abnormalities noted  Nose: Nares normal, no drainage  Neck: supple, trachea midline, no carotid bruit and no JVD  Back: no kyphosis present,    Lungs: respirations regular, respirations even and respirations unlabored  Heart: normal S1, S2, no significant murmurs   No gallops or rubs  no rub and no click  Abdomen: normal bowel sounds, no tenderness   Skin: no bleeding, bruising or rash  Extremities: no cyanosis  Psychiatric/Behavioral: Negative for agitation, behavioral problems, confusion, the patient does  appear to be nervous/anxious.     Right groin minimal oozing    Results Review:   I reviewed the patient's new clinical results.  I reviewed the patient's new imaging results and agree with the interpretation.  I reviewed the patient's other test results and agree with the interpretation  I personally viewed and interpreted the  patient's EKG/Telemetry data    Discussed with patient  Updated patient regarding any new or relevant abnormalities on review of records or any new findings on physical exam.   Mentioned to patient about purpose of visit and desirable health short and long term goals and objectives.     Reviewed available prior notes, consults, prior visits, laboratory findings, radiology and cardiology relevant reports.   Updated chart as applicable.   I have reviewed the patient's medical history in detail and updated the computerized patient record as relevant.          Assessment & Plan       NSTEMI (non-ST elevated myocardial infarction)    Coronary artery disease    Rheumatoid arthritis involving multiple sites with positive rheumatoid factor    Atrial fibrillation with RVR    Stage 3a chronic kidney disease      Plan    Overall improved  Continue current medication  Continue on Lovenox  Continue on aspirin  Discontinue Brilinta  Plans for aortocoronary bypass surgery, left atrial appendage exclusion and maze procedure  Care plan discussed with patient as well as with Dr. Granados    Telemetry  Deep vein thrombosis prophylaxis/precautions  Appropriate diet, fluid, sodium, caffeine, stimulants intake   Questions were encouraged, asked and answered to the patient's  understanding and satisfaction.  Compliance to diet and medications       Jayson Castillo MD  11/23/23  08:27 CST    EMR Dragon/Transcription was used to dictate part of this note

## 2023-11-23 NOTE — PLAN OF CARE
Goal Outcome Evaluation:  VSS. No c/o pain as of this time. R groin with some old drainage, PPP. SB/S 51-70.

## 2023-11-24 ENCOUNTER — APPOINTMENT (OUTPATIENT)
Dept: ULTRASOUND IMAGING | Facility: HOSPITAL | Age: 58
DRG: 234 | End: 2023-11-24
Payer: MEDICARE

## 2023-11-24 LAB
INR PPP: 1.07 (ref 0.91–1.09)
PROTHROMBIN TIME: 14 SECONDS (ref 11.8–14.8)

## 2023-11-24 PROCEDURE — 63710000001 AZATHIOPRINE PER 50 MG: Performed by: INTERNAL MEDICINE

## 2023-11-24 PROCEDURE — 93880 EXTRACRANIAL BILAT STUDY: CPT | Performed by: SURGERY

## 2023-11-24 PROCEDURE — 25010000002 ENOXAPARIN PER 10 MG: Performed by: INTERNAL MEDICINE

## 2023-11-24 PROCEDURE — 85610 PROTHROMBIN TIME: CPT | Performed by: SURGERY

## 2023-11-24 PROCEDURE — 25810000003 SODIUM CHLORIDE 0.9 % SOLUTION: Performed by: INTERNAL MEDICINE

## 2023-11-24 PROCEDURE — 93880 EXTRACRANIAL BILAT STUDY: CPT

## 2023-11-24 PROCEDURE — 93970 EXTREMITY STUDY: CPT

## 2023-11-24 PROCEDURE — 99232 SBSQ HOSP IP/OBS MODERATE 35: CPT | Performed by: SURGERY

## 2023-11-24 PROCEDURE — 93970 EXTREMITY STUDY: CPT | Performed by: SURGERY

## 2023-11-24 RX ADMIN — CARVEDILOL 12.5 MG: 6.25 TABLET, FILM COATED ORAL at 08:42

## 2023-11-24 RX ADMIN — Medication 10 ML: at 22:21

## 2023-11-24 RX ADMIN — ASPIRIN 81 MG: 81 TABLET, COATED ORAL at 08:43

## 2023-11-24 RX ADMIN — PANTOPRAZOLE SODIUM 40 MG: 40 TABLET, DELAYED RELEASE ORAL at 06:01

## 2023-11-24 RX ADMIN — CARVEDILOL 12.5 MG: 6.25 TABLET, FILM COATED ORAL at 17:11

## 2023-11-24 RX ADMIN — AZATHIOPRINE 50 MG: 50 TABLET ORAL at 08:43

## 2023-11-24 RX ADMIN — AZATHIOPRINE 50 MG: 50 TABLET ORAL at 22:16

## 2023-11-24 RX ADMIN — SERTRALINE HYDROCHLORIDE 200 MG: 100 TABLET, FILM COATED ORAL at 08:43

## 2023-11-24 RX ADMIN — ACETAMINOPHEN 650 MG: 325 TABLET, FILM COATED ORAL at 22:20

## 2023-11-24 RX ADMIN — Medication 10 ML: at 08:43

## 2023-11-24 RX ADMIN — ENOXAPARIN SODIUM 90 MG: 100 INJECTION SUBCUTANEOUS at 06:01

## 2023-11-24 RX ADMIN — SODIUM CHLORIDE 75 ML/HR: 9 INJECTION, SOLUTION INTRAVENOUS at 00:28

## 2023-11-24 RX ADMIN — ISOSORBIDE MONONITRATE 15 MG: 30 TABLET, EXTENDED RELEASE ORAL at 08:42

## 2023-11-24 RX ADMIN — SULFASALAZINE 1000 MG: 500 TABLET ORAL at 08:43

## 2023-11-24 RX ADMIN — ACETAMINOPHEN 650 MG: 325 TABLET, FILM COATED ORAL at 10:31

## 2023-11-24 RX ADMIN — ENOXAPARIN SODIUM 90 MG: 100 INJECTION SUBCUTANEOUS at 17:11

## 2023-11-24 NOTE — PLAN OF CARE
Goal Outcome Evaluation:  Plan of Care Reviewed With: patient        Progress: improving  Outcome Evaluation: Patient has had no c/o chest pain this shift. Resting quietly overnight. No oozing noted on right groin, remains soft. Safety maintained. SB/S 49-70 on tele.

## 2023-11-24 NOTE — CASE MANAGEMENT/SOCIAL WORK
Continued Stay Note   [facility]     Patient Name: Alessandra Baker  MRN: [id number]  Today's Date: 11/24/2023    Admit Date: 11/21/2023    Plan: Home   Discharge Plan       Row Name 11/24/23 0935       Plan    Plan Home    Patient/Family in Agreement with Plan yes    Plan Comments Chart reviewed; events noted.  Bipass surgery scheduled for next week.  SW will continue to follow.                   Discharge Codes    No documentation.                 Expected Discharge Date and Time       Expected Discharge Date Expected Discharge Time    Nov 24, 2023               ARLETTE Frances

## 2023-11-24 NOTE — PROGRESS NOTES
[facility] Medicine Services  INPATIENT PROGRESS NOTE    Patient Name: Alessandra Baker  Date of Admission: 11/21/2023  Today's Date: 11/24/23  Length of Stay: 3  Primary Care Physician: Geraldo Feldman DO    Subjective   Chief Complaint: chest pressure on admission  HPI   She was sitting up in bed with daughter at bedside.  No chest pain.  No palpitations.  Normal sinus rhythm on telemetry.  CTS planning for surgery next week.     Review of Systems   All pertinent negatives and positives are as above. All other systems have been reviewed and are negative unless otherwise stated.     Objective    Temp:  [97.7 °F (36.5 °C)-98.4 °F (36.9 °C)] 97.7 °F (36.5 °C)  Heart Rate:  [50-58] 50  Resp:  [16-18] 18  BP: (114-141)/(57-71) 141/67  Physical Exam  Vitals reviewed.   Constitutional:       General: She is not in acute distress.     Appearance: She is not toxic-appearing.      Comments: Sitting up in bed.  No acute distress.  On room air.  Daughter at bedside.   HENT:      Head: Normocephalic and atraumatic.      Mouth/Throat:      Mouth: Mucous membranes are moist.      Pharynx: Oropharynx is clear.   Eyes:      Extraocular Movements: Extraocular movements intact.      Conjunctiva/sclera: Conjunctivae normal.      Pupils: Pupils are equal, round, and reactive to light.   Cardiovascular:      Rate and Rhythm: Normal rate and regular rhythm.      Pulses: Normal pulses.      Comments: SB/S 49-70   Pulmonary:      Effort: Pulmonary effort is normal. No respiratory distress.      Breath sounds: Normal breath sounds.   Abdominal:      General: Bowel sounds are normal. There is no distension.      Palpations: Abdomen is soft.      Tenderness: There is no abdominal tenderness.   Musculoskeletal:         General: No swelling or tenderness. Normal range of motion.      Cervical back: Normal range of motion and neck supple. No muscular tenderness.   Skin:     General: Skin is warm and dry.       "Findings: No erythema or rash.   Neurological:      General: No focal deficit present.      Mental Status: She is alert and oriented to person, place, and time.      Cranial Nerves: No cranial nerve deficit.      Motor: No weakness.   Psychiatric:         Mood and Affect: Mood normal.         Behavior: Behavior normal.         Results Review:  I have reviewed the labs, radiology results, and diagnostic studies.    Laboratory Data:   Results from last 7 days   Lab Units 11/23/23 0237 11/22/23 0455 11/21/23  1806   WBC 10*3/mm3 4.72 3.59 4.92   HEMOGLOBIN g/dL 11.4* 11.8* 13.4   HEMATOCRIT % 35.7 37.6 41.8   PLATELETS 10*3/mm3 162 155 209        Results from last 7 days   Lab Units 11/23/23 0237 11/22/23 0455 11/21/23  1806 11/20/23  0538 11/19/23  0600   SODIUM mmol/L 140 141 138 141 139   POTASSIUM mmol/L 4.9 4.5 4.1 4.8 4.5   CHLORIDE mmol/L 108* 106 104 106 104   CO2 mmol/L 24.0 26.0 24.0 26.0 27.0   BUN mg/dL 22* 22* 22* 21* 22*   CREATININE mg/dL 1.43* 1.29* 1.35* 1.33* 1.51*   CALCIUM mg/dL 9.2 8.9 9.7 9.3 9.4   BILIRUBIN mg/dL  --   --  0.4 0.3 0.4   ALK PHOS U/L  --   --  86 74 70   ALT (SGPT) U/L  --   --  16 8 7   AST (SGOT) U/L  --   --  31 26 10   GLUCOSE mg/dL 106* 98 112* 96 85       Culture Data:   No results found for: \"ACANTHNAEG\", \"AFBCX\", \"BPERTUSSISCX\", \"BLOODCX\"  No results found for: \"BCIDPCR\", \"CXREFLEX\", \"CSFCX\", \"CULTURETIS\"  No results found for: \"CULTURES\", \"HSVCX\", \"URCX\"  No results found for: \"EYECULTURE\", \"GCCX\", \"HSVCULTURE\", \"LABHSV\"  No results found for: \"LEGIONELLA\", \"MRSACX\", \"MUMPSCX\", \"MYCOPLASCX\"  No results found for: \"NOCARDIACX\", \"STOOLCX\"  No results found for: \"THROATCX\", \"UNSTIMCULT\", \"URINECX\", \"CULTURE\", \"VZVCULTUR\"  No results found for: \"VIRALCULTU\", \"WOUNDCX\"    Radiology Data:   Imaging Results (Last 24 Hours)       ** No results found for the last 24 hours. **            I have reviewed the patient's current medications.     Assessment/Plan   Assessment  Active " Hospital Problems    Diagnosis     **NSTEMI (non-ST elevated myocardial infarction)     Stage 3a chronic kidney disease     Atrial fibrillation with RVR     Rheumatoid arthritis involving multiple sites with positive rheumatoid factor     Coronary artery disease      Ms. Baker is a 58-year-old female that presented to [facility] on 11/21 for irregular heart rate with associated chest pressure. Patient admitted 11/18 - 11/20, 2023 with chest pain and A-fib RVR.  Patient did report chest pain at that time however cardiac markers were unremarkable.  Dr. Castillo felt pain was likely pleuritic in nature and related to RA.  Patient was started on anticoagulation and continued aspirin and Brilinta.  Also started on carvedilol 12.5 twice daily and Imdur dose increased from 30 to 60 mg a day.  On the evening of 11/21 she had just finished eating her chocolate Parfait when she had sudden palpitations.  She had associated chest pressure, diaphoresis, and shortness of breath.  No nausea.  In the ED, elevated troponin 313.  Repeat completed after hospitalist seen 349 with a delta of 36.  We have reconsulted cardiology.  Normal sinus rhythm in the 70s.       Treatment Plan  Dr. Castillo consulted with cardiology.  He discontinued patient's Eliquis and placed her on Lovenox.  Brilinta discontinued.  Continue aspirin. She is on Zetia as outpatient and has been intolerant to statin therapy in the past.    She underwent cardiac catheterization by Dr. Castillo on 11/22:      CTS consulted.  She just saw Dr. Granados -planning for surgery possibly next Wednesday 11/29.    Atrial fibrillation with rapid ventricular response on admission.  Normal sinus rhythm on telemetry.  Continue Coreg, Lovenox.  Continue telemetry monitoring.    Lovenox will serve as VTE prophylaxis.    Medical Decision Making  Number and Complexity of problems: 2 acute problems in the form of NSTEMI and atrial fibrillation with rapid ventricular response  Differential  Diagnosis: None considered at present    Conditions and Status        Condition is unchanged.     [facility] Data  External documents reviewed: Prior epic records  Cardiac tracing (EKG, telemetry) interpretation: Reviewed  Radiology interpretation: Interpreted by radiology  Labs reviewed: As above  Any tests that were considered but not ordered: None considered at present     Decision rules/scores evaluated (example [id number], Wells, etc): None considered at present     Discussed with: Patient and Dr. Barboza     Care Planning  Shared decision making: Patient is agreeable to ongoing workup and treatment  Code status and discussions: Full code with full interventions    Disposition  Social Determinants of Health that impact treatment or disposition: None identified at present  I expect the patient to be discharged to home to be determined.  CTS planning for surgery next week.    Electronically signed by FITZ Genao, 11/24/23, 08:47 CST.

## 2023-11-24 NOTE — PROGRESS NOTES
"Patient name: Alessandra Baker  Patient : 1965  VISIT # [id number]  MR #[id number]    Procedure:Procedure(s):  Cardiac Catheterization/Vascular Study  Procedure Date:2023  POD:2 Days Post-Op    Subjective   Chief Complaint   Patient presents with    Irregular Heart Beat       Resting in bed and in no acute distress.  Family at bedside.  Remains on room air.  Currently with no chest pain.      Telemetry: Sinus bradycardia/sinus 49-70       Objective     Visit Vitals  /67 (BP Location: Right arm, Patient Position: Lying)   Pulse 50   Temp 97.7 °F (36.5 °C) (Oral)   Resp 18   Ht 165.1 cm (65\")   Wt 86.8 kg (191 lb 6.4 oz)   SpO2 98%   BMI 31.85 kg/m²       Intake/Output Summary (Last 24 hours) at 2023 0905  Last data filed at 2023 0339  Gross per 24 hour   Intake 840 ml   Output 1002 ml   Net -162 ml       Lab:     CBC:  Results from last 7 days   Lab Units 23  0237 23  0455 23  1806   WBC 10*3/mm3 4.72 3.59 4.92   HEMATOCRIT % 35.7 37.6 41.8   PLATELETS 10*3/mm3 162 155 209          BMP:  Results from last 7 days   Lab Units 23  0237 23  0455 23  1806   SODIUM mmol/L 140 141 138   POTASSIUM mmol/L 4.9 4.5 4.1   CHLORIDE mmol/L 108* 106 104   CO2 mmol/L 24.0 26.0 24.0   GLUCOSE mg/dL 106* 98 112*   BUN mg/dL 22* 22* 22*   CREATININE mg/dL 1.43* 1.29* 1.35*          COAG:  Results from last 7 days   Lab Units 23  0506   INR  1.07       IMAGES:       Imaging Results (Last 24 Hours)       ** No results found for the last 24 hours. **              Physical Exam:  General: Alert, oriented. No apparent distress.   Cardiovascular: Regular rate and rhythm without murmur, rubs, or gallops.    Pulmonary: Clear to auscultation bilaterally without wheezing, rubs, or rales.  Abdomen: Soft, non distended, and non tender.  Extremities: Warm, moves all extremities. No edema.   Neurologic:  Grossly intact with no focal deficits.            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 Granados discussed with patient and family operative intervention at length today including the risk and benefits.  We discussed the risk of surgery including but not limited to bleeding, infection, injury to major organs or vessels, chronic heart failure, arrhythmias, need for pacemaker, prolonged ventilation, renal failure, stroke, risk of anesthesia, risk of sternal wound or bone healing problems, reoperation, and/or death.  We discussed preoperative operative and postoperative expectations she understands and wants to proceed with surgery.  I calculate her patient specific STS risk or discussed this with her and she understands and wants to still proceed with surgery.     Reta Benson, APRN  11/24/23  09:05 CST

## 2023-11-24 NOTE — PLAN OF CARE
"Goal Outcome Evaluation:              Outcome Evaluation: VSS, no c/o of chest pain, does c/o of rt groin aching and tenderness she states from \"all the pushing\"(palpations), has oozed today, safeguard placed, no oozing since safeguard removed, HR SB-S 51-61 wih HR down to 47         "

## 2023-11-25 LAB
ALBUMIN SERPL-MCNC: 3.8 G/DL (ref 3.5–5.2)
ALBUMIN/GLOB SERPL: 1.4 G/DL
ALP SERPL-CCNC: 66 U/L (ref 39–117)
ALT SERPL W P-5'-P-CCNC: 9 U/L (ref 1–33)
ANION GAP SERPL CALCULATED.3IONS-SCNC: 9 MMOL/L (ref 5–15)
AST SERPL-CCNC: 15 U/L (ref 1–32)
BILIRUB SERPL-MCNC: 0.2 MG/DL (ref 0–1.2)
BUN SERPL-MCNC: 16 MG/DL (ref 6–20)
BUN/CREAT SERPL: 13.3 (ref 7–25)
CALCIUM SPEC-SCNC: 8.7 MG/DL (ref 8.6–10.5)
CHLORIDE SERPL-SCNC: 106 MMOL/L (ref 98–107)
CO2 SERPL-SCNC: 25 MMOL/L (ref 22–29)
CREAT SERPL-MCNC: 1.2 MG/DL (ref 0.57–1)
DEPRECATED RDW RBC AUTO: 48.9 FL (ref 37–54)
EGFRCR SERPLBLD CKD-EPI 2021: 52.3 ML/MIN/1.73
ERYTHROCYTE [DISTWIDTH] IN BLOOD BY AUTOMATED COUNT: 14.6 % (ref 12.3–15.4)
GEN 5 2HR TROPONIN T REFLEX: 325 NG/L
GLOBULIN UR ELPH-MCNC: 2.8 GM/DL
GLUCOSE SERPL-MCNC: 102 MG/DL (ref 65–99)
HCT VFR BLD AUTO: 36.3 % (ref 34–46.6)
HGB BLD-MCNC: 11.6 G/DL (ref 12–15.9)
MCH RBC QN AUTO: 29 PG (ref 26.6–33)
MCHC RBC AUTO-ENTMCNC: 32 G/DL (ref 31.5–35.7)
MCV RBC AUTO: 90.8 FL (ref 79–97)
PLATELET # BLD AUTO: 151 10*3/MM3 (ref 140–450)
PMV BLD AUTO: 9 FL (ref 6–12)
POTASSIUM SERPL-SCNC: 4.2 MMOL/L (ref 3.5–5.2)
PREALB SERPL-MCNC: 26.3 MG/DL (ref 20–40)
PROT SERPL-MCNC: 6.6 G/DL (ref 6–8.5)
QT INTERVAL: 428 MS
QTC INTERVAL: 430 MS
RBC # BLD AUTO: 4 10*6/MM3 (ref 3.77–5.28)
SODIUM SERPL-SCNC: 140 MMOL/L (ref 136–145)
TROPONIN T DELTA: 9 NG/L
TROPONIN T SERPL HS-MCNC: 316 NG/L
WBC NRBC COR # BLD AUTO: 4.53 10*3/MM3 (ref 3.4–10.8)

## 2023-11-25 PROCEDURE — 25810000003 SODIUM CHLORIDE 0.9 % SOLUTION: Performed by: INTERNAL MEDICINE

## 2023-11-25 PROCEDURE — 84484 ASSAY OF TROPONIN QUANT: CPT | Performed by: INTERNAL MEDICINE

## 2023-11-25 PROCEDURE — 25010000002 NITROGLYCERIN 200 MCG/ML SOLUTION: Performed by: SURGERY

## 2023-11-25 PROCEDURE — 63710000001 AZATHIOPRINE PER 50 MG: Performed by: INTERNAL MEDICINE

## 2023-11-25 PROCEDURE — 84134 ASSAY OF PREALBUMIN: CPT | Performed by: NURSE PRACTITIONER

## 2023-11-25 PROCEDURE — 80053 COMPREHEN METABOLIC PANEL: CPT | Performed by: NURSE PRACTITIONER

## 2023-11-25 PROCEDURE — 85027 COMPLETE CBC AUTOMATED: CPT | Performed by: NURSE PRACTITIONER

## 2023-11-25 PROCEDURE — 25010000002 ENOXAPARIN PER 10 MG: Performed by: INTERNAL MEDICINE

## 2023-11-25 PROCEDURE — 93010 ELECTROCARDIOGRAM REPORT: CPT | Performed by: HOSPITALIST

## 2023-11-25 PROCEDURE — 93005 ELECTROCARDIOGRAM TRACING: CPT | Performed by: SURGERY

## 2023-11-25 RX ORDER — ISOSORBIDE MONONITRATE 30 MG/1
30 TABLET, EXTENDED RELEASE ORAL EVERY MORNING
Status: DISCONTINUED | OUTPATIENT
Start: 2023-11-26 | End: 2023-11-26

## 2023-11-25 RX ORDER — ISOSORBIDE MONONITRATE 30 MG/1
15 TABLET, EXTENDED RELEASE ORAL ONCE
Status: COMPLETED | OUTPATIENT
Start: 2023-11-25 | End: 2023-11-25

## 2023-11-25 RX ORDER — NITROGLYCERIN 20 MG/100ML
5-200 INJECTION INTRAVENOUS
Status: DISCONTINUED | OUTPATIENT
Start: 2023-11-25 | End: 2023-11-29

## 2023-11-25 RX ADMIN — ACETAMINOPHEN 650 MG: 325 TABLET, FILM COATED ORAL at 19:24

## 2023-11-25 RX ADMIN — LORAZEPAM 0.5 MG: 0.5 TABLET ORAL at 19:31

## 2023-11-25 RX ADMIN — NITROGLYCERIN 0.4 MG: 0.4 TABLET SUBLINGUAL at 10:33

## 2023-11-25 RX ADMIN — NITROGLYCERIN 0.4 MG: 0.4 TABLET SUBLINGUAL at 03:35

## 2023-11-25 RX ADMIN — NITROGLYCERIN 0.4 MG: 0.4 TABLET SUBLINGUAL at 10:21

## 2023-11-25 RX ADMIN — SODIUM CHLORIDE 75 ML/HR: 9 INJECTION, SOLUTION INTRAVENOUS at 08:03

## 2023-11-25 RX ADMIN — SULFASALAZINE 1000 MG: 500 TABLET ORAL at 09:18

## 2023-11-25 RX ADMIN — SERTRALINE HYDROCHLORIDE 200 MG: 100 TABLET, FILM COATED ORAL at 09:18

## 2023-11-25 RX ADMIN — NITROGLYCERIN 0.4 MG: 0.4 TABLET SUBLINGUAL at 03:28

## 2023-11-25 RX ADMIN — PANTOPRAZOLE SODIUM 40 MG: 40 TABLET, DELAYED RELEASE ORAL at 07:35

## 2023-11-25 RX ADMIN — NITROGLYCERIN 5 MCG/MIN: 20 INJECTION INTRAVENOUS at 13:25

## 2023-11-25 RX ADMIN — ASPIRIN 81 MG: 81 TABLET, COATED ORAL at 09:17

## 2023-11-25 RX ADMIN — ENOXAPARIN SODIUM 90 MG: 100 INJECTION SUBCUTANEOUS at 07:13

## 2023-11-25 RX ADMIN — ENOXAPARIN SODIUM 90 MG: 100 INJECTION SUBCUTANEOUS at 17:12

## 2023-11-25 RX ADMIN — ACETAMINOPHEN 650 MG: 325 TABLET, FILM COATED ORAL at 09:25

## 2023-11-25 RX ADMIN — CARVEDILOL 12.5 MG: 6.25 TABLET, FILM COATED ORAL at 17:11

## 2023-11-25 RX ADMIN — ISOSORBIDE MONONITRATE 15 MG: 30 TABLET, EXTENDED RELEASE ORAL at 11:19

## 2023-11-25 RX ADMIN — ISOSORBIDE MONONITRATE 15 MG: 30 TABLET, EXTENDED RELEASE ORAL at 09:18

## 2023-11-25 RX ADMIN — FLUTICASONE PROPIONATE 2 SPRAY: 50 SPRAY, METERED NASAL at 09:16

## 2023-11-25 RX ADMIN — CARVEDILOL 12.5 MG: 6.25 TABLET, FILM COATED ORAL at 09:17

## 2023-11-25 RX ADMIN — AZATHIOPRINE 50 MG: 50 TABLET ORAL at 09:20

## 2023-11-25 NOTE — PROGRESS NOTES
[facility] Medicine Services  INPATIENT PROGRESS NOTE    Patient Name: Alessandra Baker  Date of Admission: 11/21/2023  Today's Date: 11/25/23  Length of Stay: 4  Primary Care Physician: Geraldo Feldman DO    Subjective   Chief Complaint: Follow-up NSTEMI  HPI   Patient examined sitting up in bed on room air in no acute distress.  She denies chest pain or shortness of breath.  She does describe an episode of chest pain she had earlier this morning that she tells me has now been relieved with nitroglycerin.  Cardiology continues to follow.  Patient pending CABG likely on 11/29 or 11/30.  She is agreeable to her plan of care.    Review of Systems   All pertinent negatives and positives are as above. All other systems have been reviewed and are negative unless otherwise stated.     Objective    Temp:  [97.1 °F (36.2 °C)-98.2 °F (36.8 °C)] 98.1 °F (36.7 °C)  Heart Rate:  [52-68] 64  Resp:  [16-20] 18  BP: (103-138)/(59-72) 103/67  Physical Exam  Vitals reviewed.   Constitutional:       General: She is not in acute distress.     Appearance: She is not toxic-appearing.      Comments: Up in bed, room air, no acute distress, daughter at bedside   HENT:      Head: Normocephalic and atraumatic.      Mouth/Throat:      Mouth: Mucous membranes are moist.      Pharynx: Oropharynx is clear.   Eyes:      Extraocular Movements: Extraocular movements intact.      Conjunctiva/sclera: Conjunctivae normal.      Pupils: Pupils are equal, round, and reactive to light.   Cardiovascular:      Rate and Rhythm: Normal rate and regular rhythm.      Pulses: Normal pulses.   Pulmonary:      Effort: Pulmonary effort is normal. No respiratory distress.      Breath sounds: Normal breath sounds.   Abdominal:      General: Bowel sounds are normal. There is no distension.      Palpations: Abdomen is soft.      Tenderness: There is no abdominal tenderness.   Musculoskeletal:         General: No swelling or tenderness.  Received request via: Patient    Was the patient seen in the last year in this department? Yes    Does the patient have an active prescription (recently filled or refills available) for medication(s) requested? No     Normal range of motion.      Cervical back: Normal range of motion and neck supple. No muscular tenderness.   Skin:     General: Skin is warm and dry.      Findings: No erythema or rash.   Neurological:      General: No focal deficit present.      Mental Status: She is alert and oriented to person, place, and time.      Cranial Nerves: No cranial nerve deficit.      Motor: No weakness.   Psychiatric:         Mood and Affect: Mood normal.         Behavior: Behavior normal.         Results Review:  I have reviewed the labs, radiology results, and diagnostic studies.    Laboratory Data:   Results from last 7 days   Lab Units 11/25/23  0353 11/23/23 0237 11/22/23 0455   WBC 10*3/mm3 4.53 4.72 3.59   HEMOGLOBIN g/dL 11.6* 11.4* 11.8*   HEMATOCRIT % 36.3 35.7 37.6   PLATELETS 10*3/mm3 151 162 155        Results from last 7 days   Lab Units 11/25/23  0353 11/23/23  0237 11/22/23 0455 11/21/23  1806 11/20/23  0538   SODIUM mmol/L 140 140 141 138 141   POTASSIUM mmol/L 4.2 4.9 4.5 4.1 4.8   CHLORIDE mmol/L 106 108* 106 104 106   CO2 mmol/L 25.0 24.0 26.0 24.0 26.0   BUN mg/dL 16 22* 22* 22* 21*   CREATININE mg/dL 1.20* 1.43* 1.29* 1.35* 1.33*   CALCIUM mg/dL 8.7 9.2 8.9 9.7 9.3   BILIRUBIN mg/dL 0.2  --   --  0.4 0.3   ALK PHOS U/L 66  --   --  86 74   ALT (SGPT) U/L 9  --   --  16 8   AST (SGOT) U/L 15  --   --  31 26   GLUCOSE mg/dL 102* 106* 98 112* 96     I have reviewed the patient's current medications.     Assessment/Plan   Assessment  Active Hospital Problems    Diagnosis     **NSTEMI (non-ST elevated myocardial infarction)     Stage 3a chronic kidney disease     Atrial fibrillation with RVR     Rheumatoid arthritis involving multiple sites with positive rheumatoid factor     Coronary artery disease      Ms. Baker is a 58-year-old female that presented to [facility] on 11/21 for irregular heart rate with associated chest pressure. Patient admitted 11/18 - 11/20, 2023 with chest pain and A-fib RVR.   Patient did report chest pain at that time however cardiac markers were unremarkable.  Dr. Castillo felt pain was likely pleuritic in nature and related to RA.  Patient was started on anticoagulation and continued aspirin and Brilinta.  Also started on carvedilol 12.5 twice daily and Imdur dose increased from 30 to 60 mg a day.  On the evening of 11/21 she had just finished eating her chocolate Parfait when she had sudden palpitations.  She had associated chest pressure, diaphoresis, and shortness of breath.  No nausea.  In the ED, elevated troponin 313.  Repeat completed after hospitalist seen 349 with a delta of 36.  We have reconsulted cardiology.  Normal sinus rhythm in the 70s.       Treatment Plan  Dr. Castillo consulted with cardiology.  He discontinued patient's Eliquis and placed her on Lovenox.  Brilinta discontinued.  Continue aspirin. She is on Zetia as outpatient and has been intolerant to statin therapy in the past.    She underwent cardiac catheterization by Dr. Castillo on 11/22:      Cardiothoracic surgery consulted and Dr. Granados evaluated patient.  CABG potentially planned for 11/29 or 11/30 of this next week allowing for Eliquis and Brilinta washout.    Atrial fibrillation with rapid ventricular response on admission.  Normal sinus rhythm on telemetry.  Continue Coreg, Lovenox.  Continue telemetry monitoring.    Lovenox will serve as VTE prophylaxis.    Medical Decision Making  Number and Complexity of problems: 2 acute problems in the form of NSTEMI and atrial fibrillation with rapid ventricular response  Differential Diagnosis: None considered at present    Conditions and Status        Condition is unchanged.     MDM Data  External documents reviewed: Prior [facility] records  Cardiac tracing (EKG, telemetry) interpretation: Reviewed  Radiology interpretation: Interpreted by radiology  Labs reviewed: As above  Any tests that were considered but not ordered: None considered at present     Decision rules/scores  evaluated (example [id number], Wells, etc): None considered at present     Discussed with: Patient and Dr. Barboza     Care Planning  Shared decision making: Patient is agreeable to ongoing workup and treatment  Code status and discussions: Full code with full interventions    Disposition  Social Determinants of Health that impact treatment or disposition: None identified at present  I expect the patient to be discharged to home to be determined.  CTS planning for surgery next week.    Electronically signed by FITZ Fernandez, 11/25/23, 14:50 CST.

## 2023-11-25 NOTE — PLAN OF CARE
Goal Outcome Evaluation:  Plan of Care Reviewed With: patient        Progress: no change  Outcome Evaluation: She co chest pain. Rates at 6 out of 10. Sublingual nitro given with some relief, but chest pain came back. Imdur increased and Nitro drip started. Drip is at 10mcg/min. Possible CABG surgery on Wednesday. cont to monitor.

## 2023-11-26 LAB
ALBUMIN SERPL-MCNC: 3.8 G/DL (ref 3.5–5.2)
ALBUMIN/GLOB SERPL: 1.2 G/DL
ALP SERPL-CCNC: 67 U/L (ref 39–117)
ALT SERPL W P-5'-P-CCNC: 10 U/L (ref 1–33)
ANION GAP SERPL CALCULATED.3IONS-SCNC: 11 MMOL/L (ref 5–15)
AST SERPL-CCNC: 17 U/L (ref 1–32)
BASOPHILS # BLD AUTO: 0.01 10*3/MM3 (ref 0–0.2)
BASOPHILS NFR BLD AUTO: 0.3 % (ref 0–1.5)
BILIRUB SERPL-MCNC: 0.3 MG/DL (ref 0–1.2)
BUN SERPL-MCNC: 14 MG/DL (ref 6–20)
BUN/CREAT SERPL: 13.1 (ref 7–25)
CALCIUM SPEC-SCNC: 9 MG/DL (ref 8.6–10.5)
CHLORIDE SERPL-SCNC: 103 MMOL/L (ref 98–107)
CO2 SERPL-SCNC: 24 MMOL/L (ref 22–29)
CREAT SERPL-MCNC: 1.07 MG/DL (ref 0.57–1)
DEPRECATED RDW RBC AUTO: 49.7 FL (ref 37–54)
EGFRCR SERPLBLD CKD-EPI 2021: 60 ML/MIN/1.73
EOSINOPHIL # BLD AUTO: 0.08 10*3/MM3 (ref 0–0.4)
EOSINOPHIL NFR BLD AUTO: 2.1 % (ref 0.3–6.2)
ERYTHROCYTE [DISTWIDTH] IN BLOOD BY AUTOMATED COUNT: 15 % (ref 12.3–15.4)
GLOBULIN UR ELPH-MCNC: 3.1 GM/DL
GLUCOSE SERPL-MCNC: 132 MG/DL (ref 65–99)
HCT VFR BLD AUTO: 37.1 % (ref 34–46.6)
HGB BLD-MCNC: 11.8 G/DL (ref 12–15.9)
IMM GRANULOCYTES # BLD AUTO: 0.01 10*3/MM3 (ref 0–0.05)
IMM GRANULOCYTES NFR BLD AUTO: 0.3 % (ref 0–0.5)
LYMPHOCYTES # BLD AUTO: 0.46 10*3/MM3 (ref 0.7–3.1)
LYMPHOCYTES NFR BLD AUTO: 11.8 % (ref 19.6–45.3)
MCH RBC QN AUTO: 28.9 PG (ref 26.6–33)
MCHC RBC AUTO-ENTMCNC: 31.8 G/DL (ref 31.5–35.7)
MCV RBC AUTO: 90.9 FL (ref 79–97)
MONOCYTES # BLD AUTO: 0.23 10*3/MM3 (ref 0.1–0.9)
MONOCYTES NFR BLD AUTO: 5.9 % (ref 5–12)
NEUTROPHILS NFR BLD AUTO: 3.1 10*3/MM3 (ref 1.7–7)
NEUTROPHILS NFR BLD AUTO: 79.6 % (ref 42.7–76)
NRBC BLD AUTO-RTO: 0 /100 WBC (ref 0–0.2)
PLATELET # BLD AUTO: 129 10*3/MM3 (ref 140–450)
PMV BLD AUTO: 9.5 FL (ref 6–12)
POTASSIUM SERPL-SCNC: 4 MMOL/L (ref 3.5–5.2)
PROT SERPL-MCNC: 6.9 G/DL (ref 6–8.5)
RBC # BLD AUTO: 4.08 10*6/MM3 (ref 3.77–5.28)
SODIUM SERPL-SCNC: 138 MMOL/L (ref 136–145)
WBC NRBC COR # BLD AUTO: 3.89 10*3/MM3 (ref 3.4–10.8)

## 2023-11-26 PROCEDURE — 80053 COMPREHEN METABOLIC PANEL: CPT | Performed by: SURGERY

## 2023-11-26 PROCEDURE — 85025 COMPLETE CBC W/AUTO DIFF WBC: CPT | Performed by: SURGERY

## 2023-11-26 PROCEDURE — 25010000002 NITROGLYCERIN 200 MCG/ML SOLUTION: Performed by: SURGERY

## 2023-11-26 PROCEDURE — 25010000002 ENOXAPARIN PER 10 MG: Performed by: INTERNAL MEDICINE

## 2023-11-26 RX ORDER — ISOSORBIDE MONONITRATE 60 MG/1
60 TABLET, EXTENDED RELEASE ORAL EVERY MORNING
Status: DISCONTINUED | OUTPATIENT
Start: 2023-11-27 | End: 2023-11-29

## 2023-11-26 RX ADMIN — SERTRALINE HYDROCHLORIDE 200 MG: 100 TABLET, FILM COATED ORAL at 08:57

## 2023-11-26 RX ADMIN — CARVEDILOL 12.5 MG: 6.25 TABLET, FILM COATED ORAL at 17:40

## 2023-11-26 RX ADMIN — SULFASALAZINE 1000 MG: 500 TABLET ORAL at 08:57

## 2023-11-26 RX ADMIN — Medication 10 ML: at 21:40

## 2023-11-26 RX ADMIN — ENOXAPARIN SODIUM 90 MG: 100 INJECTION SUBCUTANEOUS at 06:17

## 2023-11-26 RX ADMIN — ISOSORBIDE MONONITRATE 30 MG: 30 TABLET, EXTENDED RELEASE ORAL at 08:57

## 2023-11-26 RX ADMIN — FLUTICASONE PROPIONATE 2 SPRAY: 50 SPRAY, METERED NASAL at 08:58

## 2023-11-26 RX ADMIN — CARVEDILOL 12.5 MG: 6.25 TABLET, FILM COATED ORAL at 08:57

## 2023-11-26 RX ADMIN — PANTOPRAZOLE SODIUM 40 MG: 40 TABLET, DELAYED RELEASE ORAL at 06:16

## 2023-11-26 RX ADMIN — Medication 10 ML: at 06:19

## 2023-11-26 RX ADMIN — ALPRAZOLAM 0.5 MG: 0.5 TABLET ORAL at 21:39

## 2023-11-26 RX ADMIN — ENOXAPARIN SODIUM 90 MG: 100 INJECTION SUBCUTANEOUS at 17:39

## 2023-11-26 RX ADMIN — Medication 10 ML: at 08:58

## 2023-11-26 RX ADMIN — NITROGLYCERIN 15 MCG/MIN: 20 INJECTION INTRAVENOUS at 21:39

## 2023-11-26 RX ADMIN — ASPIRIN 81 MG: 81 TABLET, COATED ORAL at 08:57

## 2023-11-26 NOTE — PROGRESS NOTES
[facility] Medicine Services  INPATIENT PROGRESS NOTE    Patient Name: Alessandra Baker  Date of Admission: 11/21/2023  Today's Date: 11/26/23  Length of Stay: 5  Primary Care Physician: Geraldo Feldman DO    Subjective   Chief Complaint: Follow-up NSTEMI  HPI   Patient examined sitting up in bed on room air in no acute distress.  She denies chest pain or shortness of breath at this time.  She tells me her chest pain overall is markedly improved from yesterday.  Nitroglycerin drip continues.  She is eating breakfast at this time and has no complaints.  Dr. Granados with CT surgery continues to follow for CABG.  Patient is agreeable to her plan of care.    Review of Systems   All pertinent negatives and positives are as above. All other systems have been reviewed and are negative unless otherwise stated.     Objective    Temp:  [98.1 °F (36.7 °C)-99.1 °F (37.3 °C)] 98.5 °F (36.9 °C)  Heart Rate:  [63-75] 75  Resp:  [16-18] 18  BP: ()/(52-78) 115/67  Physical Exam  Vitals reviewed.   Constitutional:       General: She is not in acute distress.     Appearance: She is not toxic-appearing.      Comments: Up in bed, room air, no acute distress, daughter at bedside   HENT:      Head: Normocephalic and atraumatic.      Mouth/Throat:      Mouth: Mucous membranes are moist.      Pharynx: Oropharynx is clear.   Eyes:      Extraocular Movements: Extraocular movements intact.      Conjunctiva/sclera: Conjunctivae normal.      Pupils: Pupils are equal, round, and reactive to light.   Cardiovascular:      Rate and Rhythm: Normal rate and regular rhythm.      Pulses: Normal pulses.   Pulmonary:      Effort: Pulmonary effort is normal. No respiratory distress.      Breath sounds: Normal breath sounds.   Abdominal:      General: Bowel sounds are normal. There is no distension.      Palpations: Abdomen is soft.      Tenderness: There is no abdominal tenderness.   Musculoskeletal:         General: No  swelling or tenderness. Normal range of motion.      Cervical back: Normal range of motion and neck supple. No muscular tenderness.   Skin:     General: Skin is warm and dry.      Findings: No erythema or rash.   Neurological:      General: No focal deficit present.      Mental Status: She is alert and oriented to person, place, and time.      Cranial Nerves: No cranial nerve deficit.      Motor: No weakness.   Psychiatric:         Mood and Affect: Mood normal.         Behavior: Behavior normal.         Results Review:  I have reviewed the labs, radiology results, and diagnostic studies.    Laboratory Data:   Results from last 7 days   Lab Units 11/26/23  0941 11/25/23  0353 11/23/23  0237   WBC 10*3/mm3 3.89 4.53 4.72   HEMOGLOBIN g/dL 11.8* 11.6* 11.4*   HEMATOCRIT % 37.1 36.3 35.7   PLATELETS 10*3/mm3 129* 151 162        Results from last 7 days   Lab Units 11/26/23  0941 11/25/23  0353 11/23/23 0237 11/22/23  0455 11/21/23  1806   SODIUM mmol/L 138 140 140   < > 138   POTASSIUM mmol/L 4.0 4.2 4.9   < > 4.1   CHLORIDE mmol/L 103 106 108*   < > 104   CO2 mmol/L 24.0 25.0 24.0   < > 24.0   BUN mg/dL 14 16 22*   < > 22*   CREATININE mg/dL 1.07* 1.20* 1.43*   < > 1.35*   CALCIUM mg/dL 9.0 8.7 9.2   < > 9.7   BILIRUBIN mg/dL 0.3 0.2  --   --  0.4   ALK PHOS U/L 67 66  --   --  86   ALT (SGPT) U/L 10 9  --   --  16   AST (SGOT) U/L 17 15  --   --  31   GLUCOSE mg/dL 132* 102* 106*   < > 112*    < > = values in this interval not displayed.     I have reviewed the patient's current medications.     Assessment/Plan   Assessment  Active Hospital Problems    Diagnosis     **NSTEMI (non-ST elevated myocardial infarction)     Stage 3a chronic kidney disease     Atrial fibrillation with RVR     Rheumatoid arthritis involving multiple sites with positive rheumatoid factor     Coronary artery disease      Ms. Baker is a 58-year-old female that presented to [facility] on 11/21 for irregular heart rate with associated  chest pressure. Patient admitted 11/18 - 11/20, 2023 with chest pain and A-fib RVR.  Patient did report chest pain at that time however cardiac markers were unremarkable.  Dr. Castillo felt pain was likely pleuritic in nature and related to RA.  Patient was started on anticoagulation and continued aspirin and Brilinta.  Also started on carvedilol 12.5 twice daily and Imdur dose increased from 30 to 60 mg a day.  On the evening of 11/21 she had just finished eating her chocolate Parfait when she had sudden palpitations.  She had associated chest pressure, diaphoresis, and shortness of breath.  No nausea.  In the ED, elevated troponin 313.  Repeat completed after hospitalist seen 349 with a delta of 36.  We have reconsulted cardiology.  Normal sinus rhythm in the 70s.       Treatment Plan  Dr. Castillo consulted with cardiology.  He discontinued patient's Eliquis and placed her on Lovenox.  Brilinta discontinued.  Continue aspirin. She is on Zetia as outpatient and has been intolerant to statin therapy in the past.    She underwent cardiac catheterization by Dr. Castillo on 11/22:      Cardiothoracic surgery consulted and Dr. Granados evaluated patient.  CABG potentially planned for 11/29 or 11/30 of this next week allowing for Eliquis and Brilinta washout.    Atrial fibrillation with rapid ventricular response on admission.  Normal sinus rhythm on telemetry.  Continue Coreg, Lovenox.  Continue telemetry monitoring.    Lovenox will serve as VTE prophylaxis.    Medical Decision Making  Number and Complexity of problems: 2 acute problems in the form of NSTEMI and atrial fibrillation with rapid ventricular response  Differential Diagnosis: None considered at present    Conditions and Status        Condition is unchanged.     [facility] Data  External documents reviewed: Prior [facility] records  Cardiac tracing (EKG, telemetry) interpretation: Reviewed  Radiology interpretation: Interpreted by radiology  Labs reviewed: As above  Any tests that were  considered but not ordered: None considered at present     Decision rules/scores evaluated (example [id number], Wells, etc): None considered at present     Discussed with: Patient and Dr. Barboza     Care Planning  Shared decision making: Patient is agreeable to ongoing workup and treatment  Code status and discussions: Full code with full interventions    Disposition  Social Determinants of Health that impact treatment or disposition: None identified at present  I expect the patient to be discharged to home to be determined.  CTS planning for surgery next week.    Electronically signed by FITZ Fernandez, 11/26/23, 12:11 CST.

## 2023-11-26 NOTE — PROGRESS NOTES
"Patient name: Alessandra Baker  Patient : 1965  VISIT # [id number]  MR #[id number]    Procedure:Procedure(s):  Cardiac Catheterization/Vascular Study  Procedure Date:2023  POD:4 Days Post-Op    Subjective   Chief Complaint   Patient presents with    Irregular Heart Beat       Resting in bed and currently denies chest pain.  Experienced significant chest pain last night requiring use of nitro drip, currently off.    Remains on room air with SPO2 of 95%.  Creatinine 1.07, liver enzymes within normal limits.  Prealbumin 26.3.  Hemoglobin 11.8 and platelet count of 129,000    Telemetry: Sinus 60-75       Objective     Visit Vitals  /67 (BP Location: Right arm, Patient Position: Sitting)   Pulse 75   Temp 98.5 °F (36.9 °C) (Oral)   Resp 18   Ht 165.1 cm (65\")   Wt 86.8 kg (191 lb 6.4 oz)   SpO2 95%   BMI 31.85 kg/m²       Intake/Output Summary (Last 24 hours) at 2023 1207  Last data filed at 2023 0900  Gross per 24 hour   Intake 240 ml   Output 2000 ml   Net -1760 ml       Lab:     CBC:  Results from last 7 days   Lab Units 23  0941 23  0353 23  0237   WBC 10*3/mm3 3.89 4.53 4.72   HEMATOCRIT % 37.1 36.3 35.7   PLATELETS 10*3/mm3 129* 151 162          BMP:  Results from last 7 days   Lab Units 23  0941 23  0353 23  0237   SODIUM mmol/L 138 140 140   POTASSIUM mmol/L 4.0 4.2 4.9   CHLORIDE mmol/L 103 106 108*   CO2 mmol/L 24.0 25.0 24.0   GLUCOSE mg/dL 132* 102* 106*   BUN mg/dL 14 16 22*   CREATININE mg/dL 1.07* 1.20* 1.43*          COAG:  Results from last 7 days   Lab Units 23  0506   INR  1.07       IMAGES:       Imaging Results (Last 24 Hours)       Procedure Component Value Units Date/Time    US Vein Mapping Bilateral [[id number]] Collected: 23 1028     Updated: 23 1031    Narrative:      History:  Pre-operative planning for CABG.      Comments: Duplex ultrasound was used to image and map bilateral lower  extremity greater saphenous " veins.       The right greater saphenous vein is patent.  The diameters are 5.1 mm  proximally, 4.3 mm at the level of the knee, and 2.8 mm distally.      The left greater saphenous vein is patent.   The diameters are 6.6 mm  proximally, 4.1 mm at the level of the knee, and 2.4 mm distally.        Impression:      Impression: Patent bilateral greater saphenous veins with measurements  as described in comments.           This report was signed and finalized on 11/26/2023 10:28 AM CST by Dr. Zain Tejeda MD.       US Carotid Bilateral [[id number]] Collected: 11/26/23 1022     Updated: 11/26/23 1026    Narrative:      History: Carotid occlusive disease       Impression:      Impression:  1. There is 50 to 69% stenosis of the right internal carotid artery.  2. There is less than 50% stenosis of the left internal carotid artery.  3. Antegrade flow is demonstrated in bilateral vertebral arteries.     Comments: Bilateral carotid vertebral arterial duplex scan was  performed.     Grayscale imaging shows intimal thickening and calcified elements at the  carotid bifurcation. The right internal carotid artery peak systolic  velocity is 149.3 cm/sec. The end-diastolic velocity is 51.9 cm/sec. The  right ICA/CCA ratio is approximately 1.9. These findings correlate with  50 to 69% stenosis of the right internal carotid artery.     Grayscale imaging shows intimal thickening and calcified elements at the  carotid bifurcation. The left internal carotid artery peak systolic  velocity is 84.8 cm/sec. The end-diastolic velocity is 32.4 cm/sec. The  left ICA/CCA ratio is approximately 1.5. These findings correlate with  less than 50% stenosis of the left internal carotid artery.     Antegrade flow is demonstrated in bilateral vertebral arteries.        This report was signed and finalized on 11/26/2023 10:23 AM CST by Dr. Zain Tejeda MD.                 Physical Exam:  General: Alert, oriented. No apparent distress.    Cardiovascular: Regular rate and rhythm without murmur, rubs, or gallops.    Pulmonary: Clear to auscultation bilaterally without wheezing, rubs, or rales.  Abdomen: Soft, non distended, and non tender.  Extremities: Warm, moves all extremities. No edema.   Neurologic:  Grossly intact with no focal deficits.            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

## 2023-11-27 ENCOUNTER — APPOINTMENT (OUTPATIENT)
Dept: MRI IMAGING | Facility: HOSPITAL | Age: 58
DRG: 234 | End: 2023-11-27
Payer: MEDICARE

## 2023-11-27 PROCEDURE — 99232 SBSQ HOSP IP/OBS MODERATE 35: CPT | Performed by: SURGERY

## 2023-11-27 PROCEDURE — 25010000002 AMIODARONE IN DEXTROSE 5% 360-4.14 MG/200ML-% SOLUTION: Performed by: INTERNAL MEDICINE

## 2023-11-27 PROCEDURE — 93010 ELECTROCARDIOGRAM REPORT: CPT | Performed by: INTERNAL MEDICINE

## 2023-11-27 PROCEDURE — 25010000002 ENOXAPARIN PER 10 MG: Performed by: INTERNAL MEDICINE

## 2023-11-27 PROCEDURE — 94799 UNLISTED PULMONARY SVC/PX: CPT

## 2023-11-27 PROCEDURE — 93005 ELECTROCARDIOGRAM TRACING: CPT | Performed by: INTERNAL MEDICINE

## 2023-11-27 PROCEDURE — 25010000002 NITROGLYCERIN 200 MCG/ML SOLUTION: Performed by: SURGERY

## 2023-11-27 PROCEDURE — 93005 ELECTROCARDIOGRAM TRACING: CPT | Performed by: FAMILY MEDICINE

## 2023-11-27 RX ORDER — CARVEDILOL 6.25 MG/1
12.5 TABLET ORAL ONCE
Status: COMPLETED | OUTPATIENT
Start: 2023-11-27 | End: 2023-11-27

## 2023-11-27 RX ORDER — CARVEDILOL 25 MG/1
25 TABLET ORAL 2 TIMES DAILY WITH MEALS
Status: DISCONTINUED | OUTPATIENT
Start: 2023-11-27 | End: 2023-11-29

## 2023-11-27 RX ORDER — DILTIAZEM HCL/D5W 125 MG/125
5-15 PLASTIC BAG, INJECTION (ML) INTRAVENOUS
Status: DISCONTINUED | OUTPATIENT
Start: 2023-11-27 | End: 2023-11-29

## 2023-11-27 RX ADMIN — ENOXAPARIN SODIUM 90 MG: 100 INJECTION SUBCUTANEOUS at 06:27

## 2023-11-27 RX ADMIN — SULFASALAZINE 1000 MG: 500 TABLET ORAL at 08:05

## 2023-11-27 RX ADMIN — ASPIRIN 81 MG: 81 TABLET, COATED ORAL at 08:05

## 2023-11-27 RX ADMIN — ACETAMINOPHEN 650 MG: 325 TABLET, FILM COATED ORAL at 17:48

## 2023-11-27 RX ADMIN — NITROGLYCERIN 10 MCG/MIN: 20 INJECTION INTRAVENOUS at 07:57

## 2023-11-27 RX ADMIN — CARVEDILOL 12.5 MG: 6.25 TABLET, FILM COATED ORAL at 08:04

## 2023-11-27 RX ADMIN — PANTOPRAZOLE SODIUM 40 MG: 40 TABLET, DELAYED RELEASE ORAL at 06:26

## 2023-11-27 RX ADMIN — ENOXAPARIN SODIUM 90 MG: 100 INJECTION SUBCUTANEOUS at 18:52

## 2023-11-27 RX ADMIN — ISOSORBIDE MONONITRATE 60 MG: 60 TABLET, EXTENDED RELEASE ORAL at 08:05

## 2023-11-27 RX ADMIN — AMIODARONE HYDROCHLORIDE 1 MG/MIN: 1.8 INJECTION, SOLUTION INTRAVENOUS at 00:52

## 2023-11-27 RX ADMIN — CARVEDILOL 12.5 MG: 25 TABLET, FILM COATED ORAL at 11:42

## 2023-11-27 RX ADMIN — Medication 5 MG/HR: at 02:12

## 2023-11-27 RX ADMIN — CARVEDILOL 25 MG: 25 TABLET, FILM COATED ORAL at 18:52

## 2023-11-27 RX ADMIN — Medication 10 ML: at 21:02

## 2023-11-27 RX ADMIN — Medication 10 ML: at 08:05

## 2023-11-27 RX ADMIN — SERTRALINE HYDROCHLORIDE 200 MG: 100 TABLET, FILM COATED ORAL at 08:05

## 2023-11-27 NOTE — PROGRESS NOTES
[facility] Medicine Services  INPATIENT PROGRESS NOTE    Patient Name: Alessandra Baker  Date of Admission: 11/21/2023  Today's Date: 11/27/23  Length of Stay: 6  Primary Care Physician: Geraldo Feldman DO    Subjective   Chief Complaint: Follow-up NSTEMI  HPI   Patient was transferred to CCU overnight due to atrial fibrillation RVR.  She was started on a Cardizem drip.  She converted to sinus rhythm at 0300.  She went back into atrial fibrillation RVR up to 130s around 8:25.  Cardizem drip was titrated up to 12.5 mg/hour.  She has since converted to sinus rhythm at 8:55.  Currently sinus 60s on telemetry.  She is on Cardizem at 5 mg/hour.  Blood pressure stable.    She was lying in bed with daughter at bedside.  She complains of chest pressure rated 1 out of 10. She is on nitroglycerin at 5 mcg/min.  No shortness of breath.    Review of Systems   All pertinent negatives and positives are as above. All other systems have been reviewed and are negative unless otherwise stated.     Objective    Temp:  [97.8 °F (36.6 °C)-98.6 °F (37 °C)] 98.1 °F (36.7 °C)  Heart Rate:  [] 130  Resp:  [15-18] 16  BP: (102-142)/(60-98) 117/76  Physical Exam  Vitals reviewed.   Constitutional:       General: She is not in acute distress.     Appearance: She is not toxic-appearing.      Comments: Up in bed.  On 2 L.  No acute distress.  Daughter at bedside.  Discussed with her nurse Cassandra.   HENT:      Head: Normocephalic and atraumatic.      Mouth/Throat:      Mouth: Mucous membranes are moist.      Pharynx: Oropharynx is clear.   Eyes:      Extraocular Movements: Extraocular movements intact.      Conjunctiva/sclera: Conjunctivae normal.      Pupils: Pupils are equal, round, and reactive to light.   Cardiovascular:      Rate and Rhythm: Normal rate and regular rhythm.      Pulses: Normal pulses.      Comments: Currently sinus rhythm 60s  Pulmonary:      Effort: Pulmonary effort is normal. No  respiratory distress.      Breath sounds: Normal breath sounds.   Abdominal:      General: Bowel sounds are normal. There is no distension.      Palpations: Abdomen is soft.      Tenderness: There is no abdominal tenderness.   Musculoskeletal:         General: No swelling or tenderness. Normal range of motion.      Cervical back: Normal range of motion and neck supple. No muscular tenderness.   Skin:     General: Skin is warm and dry.      Findings: No erythema or rash.   Neurological:      General: No focal deficit present.      Mental Status: She is alert and oriented to person, place, and time.      Cranial Nerves: No cranial nerve deficit.      Motor: No weakness.   Psychiatric:         Mood and Affect: Mood normal.         Behavior: Behavior normal.       Results Review:  I have reviewed the labs, radiology results, and diagnostic studies.    Laboratory Data:   Results from last 7 days   Lab Units 11/26/23  0941 11/25/23 0353 11/23/23 0237   WBC 10*3/mm3 3.89 4.53 4.72   HEMOGLOBIN g/dL 11.8* 11.6* 11.4*   HEMATOCRIT % 37.1 36.3 35.7   PLATELETS 10*3/mm3 129* 151 162        Results from last 7 days   Lab Units 11/26/23  0941 11/25/23  0353 11/23/23  0237 11/22/23  0455 11/21/23  1806   SODIUM mmol/L 138 140 140   < > 138   POTASSIUM mmol/L 4.0 4.2 4.9   < > 4.1   CHLORIDE mmol/L 103 106 108*   < > 104   CO2 mmol/L 24.0 25.0 24.0   < > 24.0   BUN mg/dL 14 16 22*   < > 22*   CREATININE mg/dL 1.07* 1.20* 1.43*   < > 1.35*   CALCIUM mg/dL 9.0 8.7 9.2   < > 9.7   BILIRUBIN mg/dL 0.3 0.2  --   --  0.4   ALK PHOS U/L 67 66  --   --  86   ALT (SGPT) U/L 10 9  --   --  16   AST (SGOT) U/L 17 15  --   --  31   GLUCOSE mg/dL 132* 102* 106*   < > 112*    < > = values in this interval not displayed.     I have reviewed the patient's current medications.     Assessment/Plan   Assessment  Active Hospital Problems    Diagnosis     **NSTEMI (non-ST elevated myocardial infarction)     Stage 3a chronic kidney disease     Atrial  fibrillation with RVR     Rheumatoid arthritis involving multiple sites with positive rheumatoid factor     Coronary artery disease      Ms. Baker is a 58-year-old female that presented to [facility] on 11/21 for irregular heart rate with associated chest pressure. Patient admitted 11/18 - 11/20, 2023 with chest pain and A-fib RVR.  Patient did report chest pain at that time however cardiac markers were unremarkable.  Dr. Castillo felt pain was likely pleuritic in nature and related to RA.  Patient was started on anticoagulation and continued aspirin and Brilinta.  Also started on carvedilol 12.5 twice daily and Imdur dose increased from 30 to 60 mg a day.  On the evening of 11/21 she had just finished eating her chocolate Parfait when she had sudden palpitations.  She had associated chest pressure, diaphoresis, and shortness of breath.  No nausea.  In the ED, elevated troponin 313 with repeat 349 with a delta of 36.  Cardiology reconsulted.  EKG normal sinus rhythm in the 70s.      Treatment Plan  Cardiology, Dr. Castillo consulted.  He discontinued patient's Eliquis and placed her on Lovenox.  Brilinta discontinued.  Continue aspirin. She is on Zetia as outpatient and has been intolerant to statin therapy in the past.    She underwent cardiac catheterization by Dr. Castillo on 11/22:      Cardiothoracic surgery consulted and Dr. Granados evaluated patient.  CABG potentially planned for 11/29 allowing for Eliquis and Brilinta washout.    Atrial fibrillation with rapid ventricular response.  Overnight she was transferred to CCU due to atrial fibrillation RVR.  She was started on a Cardizem drip.  She converted to sinus rhythm at 0300.  She went back into atrial fibrillation RVR up to 130s around 8:25. She has since converted to sinus rhythm at 8:55.  Currently sinus 60s on telemetry.  She is on Cardizem at 5 mg/hour.  Blood pressure stable.  Discussed with Dr. Stevenson, increase coreg 12.5 mg to 25 mg.    Lovenox will serve  as VTE prophylaxis.    Medical Decision Making  Number and Complexity of problems: 2 acute problems in the form of NSTEMI and atrial fibrillation with rapid ventricular response  Differential Diagnosis: None considered at present    Conditions and Status        Condition is unchanged.     [facility] Data  External documents reviewed: Prior epic records  Cardiac tracing (EKG, telemetry) interpretation: Reviewed  Radiology interpretation: Interpreted by radiology  Labs reviewed: As above  Any tests that were considered but not ordered: None considered at present     Decision rules/scores evaluated (example [id number], Wells, etc): None considered at present     Discussed with: Patient and Dr. Stevenson     Care Planning  Shared decision making: Patient is agreeable to ongoing workup and treatment  Code status and discussions: Full code with full interventions    Disposition  Social Determinants of Health that impact treatment or disposition: None identified at present  I expect the patient to be discharged to home to be determined.  CTS planning for CABG, maze, left atrial appendage exclusion on Wednesday morning by Dr. Granados.    Electronically signed by FITZ Genao, 11/27/23, 08:55 CST.

## 2023-11-27 NOTE — CASE MANAGEMENT/SOCIAL WORK
Continued Stay Note   [facility]     Patient Name: Alessandra Baker  MRN: [id number]  Today's Date: 11/27/2023    Admit Date: 11/21/2023    Plan: Home - OR pending   Discharge Plan       Row Name 11/27/23 0901       Plan    Plan Home - OR pending    Plan Comments Note plans for OR Wednesday.  SW will follow post op.                   Discharge Codes    No documentation.                       ARLETTE Reagan

## 2023-11-27 NOTE — PLAN OF CARE
Goal Outcome Evaluation:  Plan of Care Reviewed With: patient        Progress: no change  Outcome Evaluation: nitro drip was stop, she had no co chest at that time. Chest pain did come back and nitro drip restarted at 10mcg/min. Waiting for CAGB surgery on Wednesday.

## 2023-11-27 NOTE — NURSING NOTE
Pt arrived to CCU at approx. 0030. Nitro drip infusing at 15 on arrival. Pt is alert and answering questions appropriately. Pt denies any chest pain, SOB, or dizziness. BP stable, a fib RVR noted on monitor in rates of 120s. Pt oxygenating well on room air. No distress noted. Second PIV was started and ammio infusion was also started. Pt tolerating well.

## 2023-11-27 NOTE — PLAN OF CARE
Goal Outcome Evaluation:              Outcome Evaluation: (P) Pt. has had a better day today. She was requesting a shower, so before her shower we weened her off nitro and she tolerated without the drip pain free throughout her shower and for some time after. The pain came back, ranked by pt. at a 1. The pt. was placed back on nitro drip to prevent from worsening, she has remained at a 10mcg titration of nitro. Pt. tolerated well and no longer c/o pain. The pt. family remains in room, and CABG still planned for wednesday.

## 2023-11-27 NOTE — PLAN OF CARE
Goal Outcome Evaluation:  Plan of Care Reviewed With: patient           Outcome Evaluation: Pt is resting comfortably. No chest pain, SOB, or dizziness reported. Pt off nitro gtt. and cardizem gtt. Pt converted back to NSR at approx 0300. No urine out put since pt arrived to CCU. Pt remains a febrile, on room air, and A&OX4.

## 2023-11-27 NOTE — PROGRESS NOTES
"Patient name: Alessandra Baker  Patient : 1965  VISIT # [id number]  MR #[id number]    Procedure:Procedure(s):  Cardiac Catheterization/Vascular Study  Procedure Date:2023  POD:5 Days Post-Op    Subjective   Chief Complaint   Patient presents with    Irregular Heart Beat     Patient transferred to CCU yesterday due to A-fib RVR.  Was started on Cardizem drip and has subsequently converted to sinus rhythm at 0300.  Nitro and Cardizem drip are now off.  No chest pain.  Creatinine yesterday 1.07.    ROS: No fevers or chills.  No chest pain.  No shortness of breath  Telemetry: Sinus rhythm 60s       Objective     Visit Vitals  /71   Pulse 63   Temp 98.1 °F (36.7 °C) (Oral)   Resp 16   Ht 165.1 cm (65\")   Wt 85.4 kg (188 lb 4.4 oz)   SpO2 93%   BMI 31.33 kg/m²       Intake/Output Summary (Last 24 hours) at 2023 0749  Last data filed at 2023 0400  Gross per 24 hour   Intake 425.03 ml   Output 1800 ml   Net -1374.97 ml       Lab:     CBC:  Results from last 7 days   Lab Units 23  0941 23  0353 23  0237   WBC 10*3/mm3 3.89 4.53 4.72   HEMATOCRIT % 37.1 36.3 35.7   PLATELETS 10*3/mm3 129* 151 162          BMP:  Results from last 7 days   Lab Units 23  0941 23  0353 23  0237   SODIUM mmol/L 138 140 140   POTASSIUM mmol/L 4.0 4.2 4.9   CHLORIDE mmol/L 103 106 108*   CO2 mmol/L 24.0 25.0 24.0   GLUCOSE mg/dL 132* 102* 106*   BUN mg/dL 14 16 22*   CREATININE mg/dL 1.07* 1.20* 1.43*          COAG:  Results from last 7 days   Lab Units 23  0506   INR  1.07       IMAGES:       Imaging Results (Last 24 Hours)       Procedure Component Value Units Date/Time    US Vein Mapping Bilateral [[id number]] Collected: 23 1028     Updated: 23 1031    Narrative:      History:  Pre-operative planning for CABG.      Comments: Duplex ultrasound was used to image and map bilateral lower  extremity greater saphenous veins.       The right greater saphenous vein is patent. "  The diameters are 5.1 mm  proximally, 4.3 mm at the level of the knee, and 2.8 mm distally.      The left greater saphenous vein is patent.   The diameters are 6.6 mm  proximally, 4.1 mm at the level of the knee, and 2.4 mm distally.        Impression:      Impression: Patent bilateral greater saphenous veins with measurements  as described in comments.           This report was signed and finalized on 11/26/2023 10:28 AM CST by Dr. Zain Tejeda MD.       US Carotid Bilateral [[id number]] Collected: 11/26/23 1022     Updated: 11/26/23 1026    Narrative:      History: Carotid occlusive disease       Impression:      Impression:  1. There is 50 to 69% stenosis of the right internal carotid artery.  2. There is less than 50% stenosis of the left internal carotid artery.  3. Antegrade flow is demonstrated in bilateral vertebral arteries.     Comments: Bilateral carotid vertebral arterial duplex scan was  performed.     Grayscale imaging shows intimal thickening and calcified elements at the  carotid bifurcation. The right internal carotid artery peak systolic  velocity is 149.3 cm/sec. The end-diastolic velocity is 51.9 cm/sec. The  right ICA/CCA ratio is approximately 1.9. These findings correlate with  50 to 69% stenosis of the right internal carotid artery.     Grayscale imaging shows intimal thickening and calcified elements at the  carotid bifurcation. The left internal carotid artery peak systolic  velocity is 84.8 cm/sec. The end-diastolic velocity is 32.4 cm/sec. The  left ICA/CCA ratio is approximately 1.5. These findings correlate with  less than 50% stenosis of the left internal carotid artery.     Antegrade flow is demonstrated in bilateral vertebral arteries.        This report was signed and finalized on 11/26/2023 10:23 AM CST by Dr. Zain Tejeda MD.                 Physical Exam:  General: Alert, oriented. No apparent distress.   Cardiovascular: Regular rate and rhythm without murmur, rubs, or  gallops.    Pulmonary: Clear to auscultation bilaterally without wheezing, rubs, or rales.  Abdomen: Soft, nondistended, and nontender.  Extremities: Warm, moves all extremities. No edema.   Neurologic:  Grossly intact with no focal deficits.            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 Granados after allowing adequate time for antiplatelet and anticoagulation washout.  Discussed with patient.      Madeline Olguin, APRN  11/27/23  07:49 CST

## 2023-11-27 NOTE — PAYOR COMM NOTE
"Plan for CABG, maze, left atrial appendage exclusion on Wednesday morning by Regina Amezcua (58 y.o. Female)       Date of Birth   1965    Social Security Number       Address   [address] [place], [place] [address]    Home Phone   [phone]    MRN   [id number]       Protestant   Christianity    Marital Status                               Admission Date   11/21/23    Admission Type   Emergency    Admitting Provider   Yvonne Stevenson    Attending Provider   Yvonne Stevenson    Department, Room/Bed   [facility] CARDIAC CARE, C002/1       Discharge Date       Discharge Disposition       Discharge Destination                                 Attending Provider: Yvonne Stevenson    Allergies: Bee Venom, Clopidogrel, Codeine, Insect Extract, Morphine And Related, Penicillin G, Shellfish Allergy, Shellfish-derived Products, Methotrexate, Penicillin G Benzathine, Atorvastatin, Hydrocodone, Hydroxychloroquine, Menthol    Isolation: None   Infection: None   Code Status: CPR    Ht: 165.1 cm (65\")   Wt: 85.4 kg (188 lb 4.4 oz)    Admission Cmt: None   Principal Problem: NSTEMI (non-ST elevated myocardial infarction) [I21.4]                   Active Insurance as of 11/21/2023       Primary Coverage       Payor Plan Insurance Group Employer/Plan Group    AETNA MEDICARE REPLACEMENT AETNA MEDICARE REPLACEMENT [id number]       Payor Plan Address Payor Plan Phone Number Payor Plan Fax Number Effective Dates    [address] [phone]  1/1/2023 - None Entered    [facility] [id number]         Subscriber Name Subscriber Birth Date Member ID       REGINA VEE 1965 [id number]                     Emergency Contacts        (Rel.) Home Phone Work Phone Mobile Phone    NANDAEDISON (Spouse) -- -- [phone]    KEL CEBALLOS (Other) -- -- [phone]                 Physician Progress Notes (last 24 hours)        Madeline Olguin APRN at 11/27/23 0748          Patient name: " "Alessandra Baker  Patient : 1965  VISIT # [id number]  MR #[id number]    Procedure:Procedure(s):  Cardiac Catheterization/Vascular Study  Procedure Date:2023  POD:5 Days Post-Op    Subjective   Chief Complaint   Patient presents with    Irregular Heart Beat     Patient transferred to CCU yesterday due to A-fib RVR.  Was started on Cardizem drip and has subsequently converted to sinus rhythm at 0300.  Nitro and Cardizem drip are now off.  No chest pain.  Creatinine yesterday 1.07.    ROS: No fevers or chills.  No chest pain.  No shortness of breath  Telemetry: Sinus rhythm 60s      Objective     Visit Vitals  /71   Pulse 63   Temp 98.1 °F (36.7 °C) (Oral)   Resp 16   Ht 165.1 cm (65\")   Wt 85.4 kg (188 lb 4.4 oz)   SpO2 93%   BMI 31.33 kg/m²       Intake/Output Summary (Last 24 hours) at 2023 0749  Last data filed at 2023 0400  Gross per 24 hour   Intake 425.03 ml   Output 1800 ml   Net -1374.97 ml       Lab:     CBC:  Results from last 7 days   Lab Units 23  0941 23  0353 23  0237   WBC 10*3/mm3 3.89 4.53 4.72   HEMATOCRIT % 37.1 36.3 35.7   PLATELETS 10*3/mm3 129* 151 162          BMP:  Results from last 7 days   Lab Units 23  0941 23  0353 23  0237   SODIUM mmol/L 138 140 140   POTASSIUM mmol/L 4.0 4.2 4.9   CHLORIDE mmol/L 103 106 108*   CO2 mmol/L 24.0 25.0 24.0   GLUCOSE mg/dL 132* 102* 106*   BUN mg/dL 14 16 22*   CREATININE mg/dL 1.07* 1.20* 1.43*          COAG:  Results from last 7 days   Lab Units 23  0506   INR  1.07       IMAGES:       Imaging Results (Last 24 Hours)       Procedure Component Value Units Date/Time    US Vein Mapping Bilateral [[id number]] Collected: 23 1028     Updated: 23 1031    Narrative:      History:  Pre-operative planning for CABG.      Comments: Duplex ultrasound was used to image and map bilateral lower  extremity greater saphenous veins.       The right greater saphenous vein is patent.  The diameters " are 5.1 mm  proximally, 4.3 mm at the level of the knee, and 2.8 mm distally.      The left greater saphenous vein is patent.   The diameters are 6.6 mm  proximally, 4.1 mm at the level of the knee, and 2.4 mm distally.        Impression:      Impression: Patent bilateral greater saphenous veins with measurements  as described in comments.           This report was signed and finalized on 11/26/2023 10:28 AM CST by Dr. Zain Tejeda MD.       US Carotid Bilateral [[id number]] Collected: 11/26/23 1022     Updated: 11/26/23 1026    Narrative:      History: Carotid occlusive disease       Impression:      Impression:  1. There is 50 to 69% stenosis of the right internal carotid artery.  2. There is less than 50% stenosis of the left internal carotid artery.  3. Antegrade flow is demonstrated in bilateral vertebral arteries.     Comments: Bilateral carotid vertebral arterial duplex scan was  performed.     Grayscale imaging shows intimal thickening and calcified elements at the  carotid bifurcation. The right internal carotid artery peak systolic  velocity is 149.3 cm/sec. The end-diastolic velocity is 51.9 cm/sec. The  right ICA/CCA ratio is approximately 1.9. These findings correlate with  50 to 69% stenosis of the right internal carotid artery.     Grayscale imaging shows intimal thickening and calcified elements at the  carotid bifurcation. The left internal carotid artery peak systolic  velocity is 84.8 cm/sec. The end-diastolic velocity is 32.4 cm/sec. The  left ICA/CCA ratio is approximately 1.5. These findings correlate with  less than 50% stenosis of the left internal carotid artery.     Antegrade flow is demonstrated in bilateral vertebral arteries.        This report was signed and finalized on 11/26/2023 10:23 AM CST by Dr. Zain Tejeda MD.                 Physical Exam:  General: Alert, oriented. No apparent distress.   Cardiovascular: Regular rate and rhythm without murmur, rubs, or gallops.     Pulmonary: Clear to auscultation bilaterally without wheezing, rubs, or rales.  Abdomen: Soft, nondistended, and nontender.  Extremities: Warm, moves all extremities. No edema.   Neurologic:  Grossly intact with no focal deficits.           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 Granados after allowing adequate time for antiplatelet and anticoagulation washout.  Discussed with patient.      FITZ Garrett  11/27/23  07:49 CST      Electronically signed by Madeline Olguin APRN at 11/27/23 0752       Wei Arellano APRN at 11/26/23 1211       Attestation signed by Julio Barboza DO at 11/26/23 1348    This visit was performed by both a physician and an APC. I personally evaluated and examined the patient. I performed all aspects of the MDM as documented.    Surgical intervention with CABG planned per CT surgery.  No new changes.  No new complaints.    HRRR  LCTAB  ABD benign    Electronically signed by Julio Barboza DO, 11/26/2023, 13:47 CST.                        [facility] Medicine Services  INPATIENT PROGRESS NOTE    Patient Name: Alessandra Baker  Date of Admission: 11/21/2023  Today's Date: 11/26/23  Length of Stay: 5  Primary Care Physician: Geraldo Feldman DO    Subjective   Chief Complaint: Follow-up NSTEMI  HPI   Patient examined sitting up in bed on room air in no acute distress.  She denies chest pain or shortness of breath at this time.  She tells me her chest pain overall is markedly improved from yesterday.  Nitroglycerin drip continues.  She is eating breakfast  at this time and has no complaints.  Dr. Granados with CT surgery continues to follow for CABG.  Patient is agreeable to her plan of care.    Review of Systems   All pertinent negatives and positives are as above. All other systems have been reviewed and are negative unless otherwise stated.     Objective    Temp:  [98.1 °F (36.7 °C)-99.1 °F (37.3 °C)] 98.5 °F (36.9 °C)  Heart Rate:  [63-75] 75  Resp:  [16-18] 18  BP: ()/(52-78) 115/67  Physical Exam  Vitals reviewed.   Constitutional:       General: She is not in acute distress.     Appearance: She is not toxic-appearing.      Comments: Up in bed, room air, no acute distress, daughter at bedside   HENT:      Head: Normocephalic and atraumatic.      Mouth/Throat:      Mouth: Mucous membranes are moist.      Pharynx: Oropharynx is clear.   Eyes:      Extraocular Movements: Extraocular movements intact.      Conjunctiva/sclera: Conjunctivae normal.      Pupils: Pupils are equal, round, and reactive to light.   Cardiovascular:      Rate and Rhythm: Normal rate and regular rhythm.      Pulses: Normal pulses.   Pulmonary:      Effort: Pulmonary effort is normal. No respiratory distress.      Breath sounds: Normal breath sounds.   Abdominal:      General: Bowel sounds are normal. There is no distension.      Palpations: Abdomen is soft.      Tenderness: There is no abdominal tenderness.   Musculoskeletal:         General: No swelling or tenderness. Normal range of motion.      Cervical back: Normal range of motion and neck supple. No muscular tenderness.   Skin:     General: Skin is warm and dry.      Findings: No erythema or rash.   Neurological:      General: No focal deficit present.      Mental Status: She is alert and oriented to person, place, and time.      Cranial Nerves: No cranial nerve deficit.      Motor: No weakness.   Psychiatric:         Mood and Affect: Mood normal.         Behavior: Behavior normal.         Results Review:  I have reviewed the labs,  radiology results, and diagnostic studies.    Laboratory Data:   Results from last 7 days   Lab Units 11/26/23  0941 11/25/23  0353 11/23/23  0237   WBC 10*3/mm3 3.89 4.53 4.72   HEMOGLOBIN g/dL 11.8* 11.6* 11.4*   HEMATOCRIT % 37.1 36.3 35.7   PLATELETS 10*3/mm3 129* 151 162        Results from last 7 days   Lab Units 11/26/23  0941 11/25/23  0353 11/23/23  0237 11/22/23  0455 11/21/23  1806   SODIUM mmol/L 138 140 140   < > 138   POTASSIUM mmol/L 4.0 4.2 4.9   < > 4.1   CHLORIDE mmol/L 103 106 108*   < > 104   CO2 mmol/L 24.0 25.0 24.0   < > 24.0   BUN mg/dL 14 16 22*   < > 22*   CREATININE mg/dL 1.07* 1.20* 1.43*   < > 1.35*   CALCIUM mg/dL 9.0 8.7 9.2   < > 9.7   BILIRUBIN mg/dL 0.3 0.2  --   --  0.4   ALK PHOS U/L 67 66  --   --  86   ALT (SGPT) U/L 10 9  --   --  16   AST (SGOT) U/L 17 15  --   --  31   GLUCOSE mg/dL 132* 102* 106*   < > 112*    < > = values in this interval not displayed.     I have reviewed the patient's current medications.     Assessment/Plan   Assessment  Active Hospital Problems    Diagnosis     **NSTEMI (non-ST elevated myocardial infarction)     Stage 3a chronic kidney disease     Atrial fibrillation with RVR     Rheumatoid arthritis involving multiple sites with positive rheumatoid factor     Coronary artery disease      Ms. Baker is a 58-year-old female that presented to [facility] on 11/21 for irregular heart rate with associated chest pressure. Patient admitted 11/18 - 11/20, 2023 with chest pain and A-fib RVR.  Patient did report chest pain at that time however cardiac markers were unremarkable.  Dr. Castillo felt pain was likely pleuritic in nature and related to RA.  Patient was started on anticoagulation and continued aspirin and Brilinta.  Also started on carvedilol 12.5 twice daily and Imdur dose increased from 30 to 60 mg a day.  On the evening of 11/21 she had just finished eating her chocolate Parfait when she had sudden palpitations.  She had associated chest  pressure, diaphoresis, and shortness of breath.  No nausea.  In the ED, elevated troponin 313.  Repeat completed after hospitalist seen 349 with a delta of 36.  We have reconsulted cardiology.  Normal sinus rhythm in the 70s.       Treatment Plan  Dr. Castillo consulted with cardiology.  He discontinued patient's Eliquis and placed her on Lovenox.  Brilinta discontinued.  Continue aspirin. She is on Zetia as outpatient and has been intolerant to statin therapy in the past.    She underwent cardiac catheterization by Dr. Castillo on 11/22:      Cardiothoracic surgery consulted and Dr. Granados evaluated patient.  CABG potentially planned for 11/29 or 11/30 of this next week allowing for Eliquis and Brilinta washout.    Atrial fibrillation with rapid ventricular response on admission.  Normal sinus rhythm on telemetry.  Continue Coreg, Lovenox.  Continue telemetry monitoring.    Lovenox will serve as VTE prophylaxis.    Medical Decision Making  Number and Complexity of problems: 2 acute problems in the form of NSTEMI and atrial fibrillation with rapid ventricular response  Differential Diagnosis: None considered at present    Conditions and Status        Condition is unchanged.     [facility] Data  External documents reviewed: Prior [facility] records  Cardiac tracing (EKG, telemetry) interpretation: Reviewed  Radiology interpretation: Interpreted by radiology  Labs reviewed: As above  Any tests that were considered but not ordered: None considered at present     Decision rules/scores evaluated (example [id number], Wells, etc): None considered at present     Discussed with: Patient and Dr. Barboza     Care Planning  Shared decision making: Patient is agreeable to ongoing workup and treatment  Code status and discussions: Full code with full interventions    Disposition  Social Determinants of Health that impact treatment or disposition: None identified at present  I expect the patient to be discharged to home to be determined.  CTS  "planning for surgery next week.    Electronically signed by FITZ Fernandez, 23, 12:11 CST.      Electronically signed by Julio Barboza DO at 23 1348       Reta Benson APRN at 23 1207          Patient name: Alessandra Baker  Patient : 1965  VISIT # [id number]  MR #[id number]    Procedure:Procedure(s):  Cardiac Catheterization/Vascular Study  Procedure Date:2023  POD:4 Days Post-Op    Subjective   Chief Complaint   Patient presents with    Irregular Heart Beat       Resting in bed and currently denies chest pain.  Experienced significant chest pain last night requiring use of nitro drip, currently off.    Remains on room air with SPO2 of 95%.  Creatinine 1.07, liver enzymes within normal limits.  Prealbumin 26.3.  Hemoglobin 11.8 and platelet count of 129,000    Telemetry: Sinus 60-75      Objective     Visit Vitals  /67 (BP Location: Right arm, Patient Position: Sitting)   Pulse 75   Temp 98.5 °F (36.9 °C) (Oral)   Resp 18   Ht 165.1 cm (65\")   Wt 86.8 kg (191 lb 6.4 oz)   SpO2 95%   BMI 31.85 kg/m²       Intake/Output Summary (Last 24 hours) at 2023 1207  Last data filed at 2023 0900  Gross per 24 hour   Intake 240 ml   Output 2000 ml   Net -1760 ml       Lab:     CBC:  Results from last 7 days   Lab Units 23  0941 23  0353 23  0237   WBC 10*3/mm3 3.89 4.53 4.72   HEMATOCRIT % 37.1 36.3 35.7   PLATELETS 10*3/mm3 129* 151 162          BMP:  Results from last 7 days   Lab Units 23  0941 23  0353 23  0237   SODIUM mmol/L 138 140 140   POTASSIUM mmol/L 4.0 4.2 4.9   CHLORIDE mmol/L 103 106 108*   CO2 mmol/L 24.0 25.0 24.0   GLUCOSE mg/dL 132* 102* 106*   BUN mg/dL 14 16 22*   CREATININE mg/dL 1.07* 1.20* 1.43*          COAG:  Results from last 7 days   Lab Units 23  0506   INR  1.07       IMAGES:       Imaging Results (Last 24 Hours)       Procedure Component Value Units Date/Time    US Vein Mapping Bilateral [[id number]] " Collected: 11/26/23 1028     Updated: 11/26/23 1031    Narrative:      History:  Pre-operative planning for CABG.      Comments: Duplex ultrasound was used to image and map bilateral lower  extremity greater saphenous veins.       The right greater saphenous vein is patent.  The diameters are 5.1 mm  proximally, 4.3 mm at the level of the knee, and 2.8 mm distally.      The left greater saphenous vein is patent.   The diameters are 6.6 mm  proximally, 4.1 mm at the level of the knee, and 2.4 mm distally.        Impression:      Impression: Patent bilateral greater saphenous veins with measurements  as described in comments.           This report was signed and finalized on 11/26/2023 10:28 AM CST by Dr. Zain Tejeda MD.       US Carotid Bilateral [[id number]] Collected: 11/26/23 1022     Updated: 11/26/23 1026    Narrative:      History: Carotid occlusive disease       Impression:      Impression:  1. There is 50 to 69% stenosis of the right internal carotid artery.  2. There is less than 50% stenosis of the left internal carotid artery.  3. Antegrade flow is demonstrated in bilateral vertebral arteries.     Comments: Bilateral carotid vertebral arterial duplex scan was  performed.     Grayscale imaging shows intimal thickening and calcified elements at the  carotid bifurcation. The right internal carotid artery peak systolic  velocity is 149.3 cm/sec. The end-diastolic velocity is 51.9 cm/sec. The  right ICA/CCA ratio is approximately 1.9. These findings correlate with  50 to 69% stenosis of the right internal carotid artery.     Grayscale imaging shows intimal thickening and calcified elements at the  carotid bifurcation. The left internal carotid artery peak systolic  velocity is 84.8 cm/sec. The end-diastolic velocity is 32.4 cm/sec. The  left ICA/CCA ratio is approximately 1.5. These findings correlate with  less than 50% stenosis of the left internal carotid artery.     Antegrade flow is demonstrated in  bilateral vertebral arteries.        This report was signed and finalized on 11/26/2023 10:23 AM CST by Dr. Zain Tejeda MD.                 Physical Exam:  General: Alert, oriented. No apparent distress.   Cardiovascular: Regular rate and rhythm without murmur, rubs, or gallops.    Pulmonary: Clear to auscultation bilaterally without wheezing, rubs, or rales.  Abdomen: Soft, non distended, and non tender.  Extremities: Warm, moves all extremities. No edema.   Neurologic:  Grossly intact with no focal deficits.           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

## 2023-11-28 ENCOUNTER — APPOINTMENT (OUTPATIENT)
Dept: GENERAL RADIOLOGY | Facility: HOSPITAL | Age: 58
DRG: 234 | End: 2023-11-28
Payer: MEDICARE

## 2023-11-28 ENCOUNTER — APPOINTMENT (OUTPATIENT)
Dept: MRI IMAGING | Facility: HOSPITAL | Age: 58
DRG: 234 | End: 2023-11-28
Payer: MEDICARE

## 2023-11-28 ENCOUNTER — ANESTHESIA EVENT (OUTPATIENT)
Dept: PERIOP | Facility: HOSPITAL | Age: 58
End: 2023-11-28
Payer: MEDICARE

## 2023-11-28 LAB
ABO GROUP BLD: NORMAL
ANION GAP SERPL CALCULATED.3IONS-SCNC: 10 MMOL/L (ref 5–15)
BLD GP AB SCN SERPL QL: NEGATIVE
BUN SERPL-MCNC: 22 MG/DL (ref 6–20)
BUN/CREAT SERPL: 18.5 (ref 7–25)
CALCIUM SPEC-SCNC: 8.8 MG/DL (ref 8.6–10.5)
CHLORIDE SERPL-SCNC: 105 MMOL/L (ref 98–107)
CO2 SERPL-SCNC: 23 MMOL/L (ref 22–29)
CREAT SERPL-MCNC: 1.19 MG/DL (ref 0.57–1)
EGFRCR SERPLBLD CKD-EPI 2021: 52.8 ML/MIN/1.73
GLUCOSE SERPL-MCNC: 136 MG/DL (ref 65–99)
POTASSIUM SERPL-SCNC: 4.1 MMOL/L (ref 3.5–5.2)
RH BLD: POSITIVE
SODIUM SERPL-SCNC: 138 MMOL/L (ref 136–145)
T&S EXPIRATION DATE: NORMAL

## 2023-11-28 PROCEDURE — 25010000002 PROTAMINE SULFATE PER 10 MG

## 2023-11-28 PROCEDURE — 86920 COMPATIBILITY TEST SPIN: CPT

## 2023-11-28 PROCEDURE — 25010000002 ALBUMIN HUMAN 25% PER 50 ML

## 2023-11-28 PROCEDURE — 99024 POSTOP FOLLOW-UP VISIT: CPT | Performed by: SURGERY

## 2023-11-28 PROCEDURE — 25810000003 SODIUM CHLORIDE 0.9 % SOLUTION

## 2023-11-28 PROCEDURE — 86901 BLOOD TYPING SEROLOGIC RH(D): CPT | Performed by: NURSE PRACTITIONER

## 2023-11-28 PROCEDURE — 86850 RBC ANTIBODY SCREEN: CPT | Performed by: NURSE PRACTITIONER

## 2023-11-28 PROCEDURE — 25010000002 HEPARIN (PORCINE) PER 1000 UNITS

## 2023-11-28 PROCEDURE — 86901 BLOOD TYPING SEROLOGIC RH(D): CPT

## 2023-11-28 PROCEDURE — 25010000002 ENOXAPARIN PER 10 MG: Performed by: INTERNAL MEDICINE

## 2023-11-28 PROCEDURE — 72141 MRI NECK SPINE W/O DYE: CPT

## 2023-11-28 PROCEDURE — 25810000003 LACTATED RINGERS PER 1000 ML

## 2023-11-28 PROCEDURE — 80048 BASIC METABOLIC PNL TOTAL CA: CPT | Performed by: NURSE PRACTITIONER

## 2023-11-28 PROCEDURE — 25010000002 FUROSEMIDE PER 20 MG

## 2023-11-28 PROCEDURE — 25010000002 ENOXAPARIN PER 10 MG: Performed by: NURSE PRACTITIONER

## 2023-11-28 PROCEDURE — 71046 X-RAY EXAM CHEST 2 VIEWS: CPT

## 2023-11-28 PROCEDURE — 86900 BLOOD TYPING SEROLOGIC ABO: CPT

## 2023-11-28 PROCEDURE — 25010000002 POTASSIUM CHLORIDE PER 2 MEQ OF POTASSIUM

## 2023-11-28 PROCEDURE — 25010000002 MANNITOL PER 50 ML

## 2023-11-28 PROCEDURE — 86900 BLOOD TYPING SEROLOGIC ABO: CPT | Performed by: NURSE PRACTITIONER

## 2023-11-28 PROCEDURE — P9047 ALBUMIN (HUMAN), 25%, 50ML: HCPCS

## 2023-11-28 PROCEDURE — 25010000002 NITROGLYCERIN 200 MCG/ML SOLUTION: Performed by: SURGERY

## 2023-11-28 PROCEDURE — 25010000002 PHENYLEPHRINE 10 MG/ML SOLUTION

## 2023-11-28 RX ADMIN — ENOXAPARIN SODIUM 90 MG: 100 INJECTION SUBCUTANEOUS at 05:09

## 2023-11-28 RX ADMIN — Medication 10 ML: at 09:32

## 2023-11-28 RX ADMIN — ACETAMINOPHEN 650 MG: 325 TABLET, FILM COATED ORAL at 17:52

## 2023-11-28 RX ADMIN — FLUTICASONE PROPIONATE 2 SPRAY: 50 SPRAY, METERED NASAL at 09:32

## 2023-11-28 RX ADMIN — CARVEDILOL 25 MG: 25 TABLET, FILM COATED ORAL at 17:52

## 2023-11-28 RX ADMIN — ISOSORBIDE MONONITRATE 60 MG: 60 TABLET, EXTENDED RELEASE ORAL at 09:32

## 2023-11-28 RX ADMIN — SULFASALAZINE 1000 MG: 500 TABLET ORAL at 09:32

## 2023-11-28 RX ADMIN — ALPRAZOLAM 0.5 MG: 0.5 TABLET ORAL at 22:42

## 2023-11-28 RX ADMIN — SERTRALINE HYDROCHLORIDE 200 MG: 100 TABLET, FILM COATED ORAL at 09:32

## 2023-11-28 RX ADMIN — ASPIRIN 81 MG: 81 TABLET, COATED ORAL at 09:32

## 2023-11-28 RX ADMIN — Medication 1 APPLICATION: at 17:52

## 2023-11-28 RX ADMIN — PANTOPRAZOLE SODIUM 40 MG: 40 TABLET, DELAYED RELEASE ORAL at 05:09

## 2023-11-28 RX ADMIN — Medication 10 ML: at 20:13

## 2023-11-28 RX ADMIN — NITROGLYCERIN 15 MCG/MIN: 20 INJECTION INTRAVENOUS at 07:11

## 2023-11-28 RX ADMIN — CARVEDILOL 25 MG: 25 TABLET, FILM COATED ORAL at 09:32

## 2023-11-28 RX ADMIN — ENOXAPARIN SODIUM 90 MG: 100 INJECTION SUBCUTANEOUS at 17:52

## 2023-11-28 NOTE — PROGRESS NOTES
"Patient name: Alessandra Baker  Patient : 1965  VISIT # [id number]  MR #[id number]    Procedure:Procedure(s):  Cardiac Catheterization/Vascular Study  Procedure Date:2023  POD:6 Days Post-Op    Subjective   Chief Complaint   Patient presents with    Irregular Heart Beat     Patient remains in CCU.  On Tridil drip at 15.  Intermittent chest pain overnight and intermittent atrial fibrillation with RVR.  Currently sinus arrhythmia.  Anticipating surgery tomorrow.  MRI of the neck has been ordered to be performed prior to surgery if possible due to cervical spine stenosis.    ROS: No fevers or chills.  No chest pain.  No shortness of breath    Telemetry: Sinus arrhythmia 90s  IV drips: Nitro       Objective     Visit Vitals  /76   Pulse 59   Temp 97.9 °F (36.6 °C) (Oral)   Resp 16   Ht 165.1 cm (65\")   Wt 85.4 kg (188 lb 4.4 oz)   SpO2 97%   BMI 31.33 kg/m²       Intake/Output Summary (Last 24 hours) at 2023 0757  Last data filed at 2023 0600  Gross per 24 hour   Intake 564.34 ml   Output 1525 ml   Net -960.66 ml       Lab:     CBC:  Results from last 7 days   Lab Units 23  0941 23  0353 23  0237   WBC 10*3/mm3 3.89 4.53 4.72   HEMATOCRIT % 37.1 36.3 35.7   PLATELETS 10*3/mm3 129* 151 162          BMP:  Results from last 7 days   Lab Units 23  0941 23  0353 23  0237   SODIUM mmol/L 138 140 140   POTASSIUM mmol/L 4.0 4.2 4.9   CHLORIDE mmol/L 103 106 108*   CO2 mmol/L 24.0 25.0 24.0   GLUCOSE mg/dL 132* 102* 106*   BUN mg/dL 14 16 22*   CREATININE mg/dL 1.07* 1.20* 1.43*          COAG:  Results from last 7 days   Lab Units 23  0506   INR  1.07       IMAGES:       Imaging Results (Last 24 Hours)       Procedure Component Value Units Date/Time    XR Chest PA & Lateral [[id number]] Collected: 11/28/23 0722     Updated: 23    Narrative:      EXAM/TECHNIQUE: XR CHEST PA AND LATERAL-     INDICATION: pre op; I25.9-Chronic ischemic heart disease, " unspecified;  I48.0-Paroxysmal atrial fibrillation; I21.4-Non-ST elevation (NSTEMI)  myocardial infarction     COMPARISON: 11/21/2023     FINDINGS:     Cardiac silhouette is within normal limits and stable. No pleural  effusion, pneumothorax, or focal consolidation. No acute osseous  finding. Coronary artery stenting is noted.       Impression:      No acute findings.     This report was signed and finalized on 11/28/2023 7:23 AM CST by Dr. Aurelio Anders MD.                 Physical Exam:  General: Alert, oriented. No apparent distress.   Cardiovascular: Sinus arrhythmia without murmur, rubs, or gallops.    Pulmonary: Clear to auscultation bilaterally without wheezing, rubs, or rales.  Abdomen: Soft, nondistended, and nontender.  Extremities: Warm, moves all extremities. No edema.   Neurologic:  Grossly intact with no focal deficits.            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 Granados after allowing adequate time for antiplatelet and anticoagulation washout.  Dr. Granados discussed with her that she is at elevated surgical risk due to underlying cirrhosis.  Patient verbalized understanding and is agreeable to proceed with surgery  Discussed with patient and family  N.p.o. after midnight      FITZ Garrett  11/28/23  07:57 CST

## 2023-11-28 NOTE — PLAN OF CARE
Goal Outcome Evaluation:  Plan of Care Reviewed With: patient, spouse, daughter        Progress: no change  Outcome Evaluation: pt with 2 episodes afib rvr with conversion to sinus rhythm both episodes with mild chest pressure. iv cardizem and nitro gtt's. increased po coreg today. ext cath in place with good urine output. 2lnc. aaox4. afebrile. plans for surgery with dr barboza on wednesday. tolerating po diet. mri to be completed unable to perform today due to heartrate will plan for mri tommorrow.

## 2023-11-28 NOTE — PLAN OF CARE
Goal Outcome Evaluation:  Plan of Care Reviewed With: patient, family        Progress: no change  Outcome Evaluation: LOS nutrition assessment. Pt is ordered a healthy heart diet. She is tolerating her diet well. She has consumed 63% of the last 6 documented meals. She reports she has been eating pretty well and her meals have been pretty good. She is aware of alternate food selections if needed. She has orders for NPO status tomorrow in anticipation for planned CABG. No sig weight changes noted. Did discuss potential decline in appetite after her surgery. Pt would like to get heart healthy diet education and information to take home after she is feeling better after her surgery. Will cont to follow.

## 2023-11-28 NOTE — PROGRESS NOTES
[facility]  INPATIENT PROGRESS NOTE    Patient Name: Alessandra Baker  Date of Admission: 11/21/2023  Today's Date: 11/28/23  Length of Stay: 7  Primary Care Physician: Geraldo Feldman DO    Subjective   Chief Complaint: Follow-up NSTEMI  HPI   She has had intermittent chest pain overnight and intermittent atrial fibrillation with RVR.  Currently sinus arrhythmia 70s. She is on nitroglycerin at 20 mcg/min.  No shortness of breath. On room air.  Her main complaint is tiredness she did not sleep overnight.  She is anxious and ready for surgery tomorrow.    Review of Systems   All pertinent negatives and positives are as above. All other systems have been reviewed and are negative unless otherwise stated.     Objective    Temp:  [97.9 °F (36.6 °C)-98.7 °F (37.1 °C)] 97.9 °F (36.6 °C)  Heart Rate:  [] 59  Resp:  [12-19] 16  BP: ()/() 110/76  Physical Exam  Vitals reviewed.   Constitutional:       General: She is not in acute distress.     Appearance: She is not toxic-appearing.      Comments: Up in bed.  On room air.  No acute distress.  No family at bedside.  Discussed with her nurse Cassandra.   HENT:      Head: Normocephalic and atraumatic.      Mouth/Throat:      Mouth: Mucous membranes are moist.      Pharynx: Oropharynx is clear.   Eyes:      Extraocular Movements: Extraocular movements intact.      Conjunctiva/sclera: Conjunctivae normal.      Pupils: Pupils are equal, round, and reactive to light.   Cardiovascular:      Rate and Rhythm: Normal rate.      Pulses: Normal pulses.      Comments: Sinus arrhythmia 70s, intermittent atrial fibrillation with RVR overnight  Pulmonary:      Effort: Pulmonary effort is normal. No respiratory distress.      Breath sounds: Normal breath sounds.   Abdominal:      General: Bowel sounds are normal. There is no distension.      Palpations: Abdomen is soft.      Tenderness: There is no abdominal tenderness.    Musculoskeletal:         General: No swelling or tenderness. Normal range of motion.      Cervical back: Normal range of motion and neck supple. No muscular tenderness.   Skin:     General: Skin is warm and dry.      Findings: No erythema or rash.   Neurological:      General: No focal deficit present.      Mental Status: She is alert and oriented to person, place, and time.      Cranial Nerves: No cranial nerve deficit.      Motor: No weakness.   Psychiatric:         Mood and Affect: Mood normal.         Behavior: Behavior normal.       Results Review:  I have reviewed the labs, radiology results, and diagnostic studies.    Laboratory Data:   Results from last 7 days   Lab Units 11/26/23  0941 11/25/23  0353 11/23/23 0237   WBC 10*3/mm3 3.89 4.53 4.72   HEMOGLOBIN g/dL 11.8* 11.6* 11.4*   HEMATOCRIT % 37.1 36.3 35.7   PLATELETS 10*3/mm3 129* 151 162        Results from last 7 days   Lab Units 11/26/23  0941 11/25/23  0353 11/23/23 0237 11/22/23  0455 11/21/23  1806   SODIUM mmol/L 138 140 140   < > 138   POTASSIUM mmol/L 4.0 4.2 4.9   < > 4.1   CHLORIDE mmol/L 103 106 108*   < > 104   CO2 mmol/L 24.0 25.0 24.0   < > 24.0   BUN mg/dL 14 16 22*   < > 22*   CREATININE mg/dL 1.07* 1.20* 1.43*   < > 1.35*   CALCIUM mg/dL 9.0 8.7 9.2   < > 9.7   BILIRUBIN mg/dL 0.3 0.2  --   --  0.4   ALK PHOS U/L 67 66  --   --  86   ALT (SGPT) U/L 10 9  --   --  16   AST (SGOT) U/L 17 15  --   --  31   GLUCOSE mg/dL 132* 102* 106*   < > 112*    < > = values in this interval not displayed.     I have reviewed the patient's current medications.     Assessment/Plan   Assessment  Active Hospital Problems    Diagnosis     **NSTEMI (non-ST elevated myocardial infarction)     Stage 3a chronic kidney disease     Atrial fibrillation with RVR     Central stenosis of spinal canal     Rheumatoid arthritis involving multiple sites with positive rheumatoid factor     Coronary artery disease      Ms. Baker is a 58-year-old female that presented to  [facility] on 11/21 for irregular heart rate with associated chest pressure. Patient admitted 11/18 - 11/20, 2023 with chest pain and A-fib RVR.  Patient did report chest pain at that time however cardiac markers were unremarkable.  Dr. Castillo felt pain was likely pleuritic in nature and related to RA.  Patient was started on anticoagulation and continued aspirin and Brilinta.  Also started on carvedilol 12.5 twice daily and Imdur dose increased from 30 to 60 mg a day.  On the evening of 11/21 she had just finished eating her chocolate Parfait when she had sudden palpitations.  She had associated chest pressure, diaphoresis, and shortness of breath.  No nausea.  In the ED, elevated troponin 313 with repeat 349 with a delta of 36.  Cardiology reconsulted.  EKG normal sinus rhythm in the 70s.      Treatment Plan  Cardiology, Dr. Castillo consulted.  He discontinued patient's Eliquis and placed her on Lovenox.  Brilinta discontinued.  Continue aspirin. She is on Zetia as outpatient and has been intolerant to statin therapy in the past.    She underwent cardiac catheterization by Dr. Castillo on 11/22:      Cardiothoracic surgery consulted and Dr. Granados evaluated patient.  CABG potentially planned for 11/29 allowing for Eliquis and Brilinta washout.    Atrial fibrillation with rapid ventricular response.  She has continued to have intermittent atrial fibrillation with RVR.  Currently sinus arrhythmia 70s.  Continue Coreg, lovenox.    Lovenox will serve as VTE prophylaxis.    Medical Decision Making  Number and Complexity of problems: 2 acute problems in the form of NSTEMI and atrial fibrillation with rapid ventricular response  Differential Diagnosis: None considered at present    Conditions and Status        Condition is unchanged.     [facility] Data  External documents reviewed: Prior [facility] records  Cardiac tracing (EKG, telemetry) interpretation: Reviewed  Radiology interpretation: Interpreted by radiology  Labs reviewed:  As above  Any tests that were considered but not ordered: None considered at present     Decision rules/scores evaluated (example [id number], Wells, etc): None considered at present     Discussed with: Patient and Dr. Stevenson     Care Planning  Shared decision making: Patient is agreeable to ongoing workup and treatment  Code status and discussions: Full code with full interventions    Disposition  Social Determinants of Health that impact treatment or disposition: None identified at present  I expect the patient to be discharged to home to be determined.  CTS planning for CABG, maze, left atrial appendage exclusion on Wednesday morning by Dr. Granados.    Electronically signed by Shagufta Garcia, FITZ, 11/28/23, 09:21 CST.

## 2023-11-28 NOTE — PLAN OF CARE
Goal Outcome Evaluation:  Plan of Care Reviewed With: patient        Progress: no change  Outcome Evaluation: nitro currently titrated to 15 mcg for CP.  Good UOP.  Pt has had multiple brief episodes of tachycardia/afib reaching 140's, last no more than a couple minutes.  She has also experienced episodes of bradycardia in the 50's.  She is currently resting in bedside chair, denies CP, and is eager for breakfast.  Pre-op chest xray completed this a.m.  Upper dentures removed for cleaning and placed in denture cup. Currently on counter at sink, soaking.

## 2023-11-29 ENCOUNTER — APPOINTMENT (OUTPATIENT)
Dept: GENERAL RADIOLOGY | Facility: HOSPITAL | Age: 58
DRG: 234 | End: 2023-11-29
Payer: MEDICARE

## 2023-11-29 ENCOUNTER — APPOINTMENT (OUTPATIENT)
Dept: CARDIOLOGY | Facility: HOSPITAL | Age: 58
DRG: 234 | End: 2023-11-29
Payer: MEDICARE

## 2023-11-29 ENCOUNTER — ANESTHESIA (OUTPATIENT)
Dept: PERIOP | Facility: HOSPITAL | Age: 58
End: 2023-11-29
Payer: MEDICARE

## 2023-11-29 LAB
ALBUMIN SERPL-MCNC: 3.2 G/DL (ref 3.5–5.2)
ALBUMIN SERPL-MCNC: 3.6 G/DL (ref 3.5–5.2)
ALBUMIN SERPL-MCNC: 3.9 G/DL (ref 3.5–5.2)
ALBUMIN SERPL-MCNC: 3.9 G/DL (ref 3.5–5.2)
ALBUMIN/GLOB SERPL: 1.3 G/DL
ALBUMIN/GLOB SERPL: 2.1 G/DL
ALP SERPL-CCNC: 46 U/L (ref 39–117)
ALP SERPL-CCNC: 60 U/L (ref 39–117)
ALT SERPL W P-5'-P-CCNC: 7 U/L (ref 1–33)
ALT SERPL W P-5'-P-CCNC: 8 U/L (ref 1–33)
ANION GAP SERPL CALCULATED.3IONS-SCNC: 10 MMOL/L (ref 5–15)
ANION GAP SERPL CALCULATED.3IONS-SCNC: 11 MMOL/L (ref 5–15)
ANION GAP SERPL CALCULATED.3IONS-SCNC: 12 MMOL/L (ref 5–15)
ANION GAP SERPL CALCULATED.3IONS-SCNC: 7 MMOL/L (ref 5–15)
APTT PPP: 28.4 SECONDS (ref 24.5–36)
APTT PPP: 31.8 SECONDS (ref 24.5–36)
APTT PPP: 41 SECONDS (ref 24.5–36)
ARTERIAL PATENCY WRIST A: ABNORMAL
AST SERPL-CCNC: 11 U/L (ref 1–32)
AST SERPL-CCNC: 31 U/L (ref 1–32)
ATMOSPHERIC PRESS: 751 MMHG
ATMOSPHERIC PRESS: 751 MMHG
ATMOSPHERIC PRESS: 752 MMHG
ATMOSPHERIC PRESS: 753 MMHG
BASE EXCESS BLDA CALC-SCNC: -0.3 MMOL/L (ref 0–2)
BASE EXCESS BLDA CALC-SCNC: -1.4 MMOL/L (ref 0–2)
BASE EXCESS BLDA CALC-SCNC: -2.1 MMOL/L (ref 0–2)
BASE EXCESS BLDA CALC-SCNC: -2.9 MMOL/L (ref 0–2)
BASE EXCESS BLDA CALC-SCNC: -3.2 MMOL/L (ref 0–2)
BASE EXCESS BLDA CALC-SCNC: -3.3 MMOL/L (ref 0–2)
BASE EXCESS BLDA CALC-SCNC: -3.9 MMOL/L (ref 0–2)
BASE EXCESS BLDA CALC-SCNC: 1 MMOL/L (ref 0–2)
BASOPHILS # BLD AUTO: 0 10*3/MM3 (ref 0–0.2)
BASOPHILS # BLD AUTO: 0.01 10*3/MM3 (ref 0–0.2)
BASOPHILS # BLD AUTO: 0.02 10*3/MM3 (ref 0–0.2)
BASOPHILS NFR BLD AUTO: 0 % (ref 0–1.5)
BASOPHILS NFR BLD AUTO: 0.2 % (ref 0–1.5)
BASOPHILS NFR BLD AUTO: 0.4 % (ref 0–1.5)
BDY SITE: ABNORMAL
BILIRUB SERPL-MCNC: 0.2 MG/DL (ref 0–1.2)
BILIRUB SERPL-MCNC: 0.4 MG/DL (ref 0–1.2)
BODY TEMPERATURE: 37
BUN SERPL-MCNC: 18 MG/DL (ref 6–20)
BUN SERPL-MCNC: 19 MG/DL (ref 6–20)
BUN SERPL-MCNC: 19 MG/DL (ref 6–20)
BUN SERPL-MCNC: 21 MG/DL (ref 6–20)
BUN/CREAT SERPL: 15.4 (ref 7–25)
BUN/CREAT SERPL: 16.1 (ref 7–25)
BUN/CREAT SERPL: 16.7 (ref 7–25)
BUN/CREAT SERPL: 17.9 (ref 7–25)
CA-I BLD-MCNC: 4.16 MG/DL (ref 4.6–5.4)
CA-I BLD-MCNC: 4.34 MG/DL (ref 4.6–5.4)
CA-I BLD-MCNC: 4.47 MG/DL (ref 4.6–5.4)
CA-I BLD-MCNC: 4.69 MG/DL (ref 4.6–5.4)
CA-I BLD-MCNC: 4.76 MG/DL (ref 4.6–5.4)
CA-I BLD-MCNC: 4.88 MG/DL (ref 4.6–5.4)
CA-I BLD-MCNC: 4.91 MG/DL (ref 4.6–5.4)
CALCIUM SPEC-SCNC: 8.5 MG/DL (ref 8.6–10.5)
CALCIUM SPEC-SCNC: 8.5 MG/DL (ref 8.6–10.5)
CALCIUM SPEC-SCNC: 8.6 MG/DL (ref 8.6–10.5)
CALCIUM SPEC-SCNC: 9.1 MG/DL (ref 8.6–10.5)
CHLORIDE SERPL-SCNC: 105 MMOL/L (ref 98–107)
CHLORIDE SERPL-SCNC: 108 MMOL/L (ref 98–107)
CO2 SERPL-SCNC: 21 MMOL/L (ref 22–29)
CO2 SERPL-SCNC: 22 MMOL/L (ref 22–29)
CO2 SERPL-SCNC: 22 MMOL/L (ref 22–29)
CO2 SERPL-SCNC: 24 MMOL/L (ref 22–29)
COHGB MFR BLD: 0.8 % (ref 0–5)
COHGB MFR BLD: 0.9 % (ref 0–5)
COHGB MFR BLD: 1.3 % (ref 0–5)
CREAT SERPL-MCNC: 1.06 MG/DL (ref 0.57–1)
CREAT SERPL-MCNC: 1.17 MG/DL (ref 0.57–1)
CREAT SERPL-MCNC: 1.18 MG/DL (ref 0.57–1)
CREAT SERPL-MCNC: 1.26 MG/DL (ref 0.57–1)
D-LACTATE SERPL-SCNC: 0.8 MMOL/L (ref 0.5–2)
DEPRECATED RDW RBC AUTO: 49.3 FL (ref 37–54)
DEPRECATED RDW RBC AUTO: 49.9 FL (ref 37–54)
DEPRECATED RDW RBC AUTO: 50 FL (ref 37–54)
DEPRECATED RDW RBC AUTO: 50.2 FL (ref 37–54)
EGFRCR SERPLBLD CKD-EPI 2021: 49.3 ML/MIN/1.73
EGFRCR SERPLBLD CKD-EPI 2021: 53.3 ML/MIN/1.73
EGFRCR SERPLBLD CKD-EPI 2021: 53.9 ML/MIN/1.73
EGFRCR SERPLBLD CKD-EPI 2021: 60.6 ML/MIN/1.73
EOSINOPHIL # BLD AUTO: 0.04 10*3/MM3 (ref 0–0.4)
EOSINOPHIL # BLD AUTO: 0.05 10*3/MM3 (ref 0–0.4)
EOSINOPHIL # BLD AUTO: 0.08 10*3/MM3 (ref 0–0.4)
EOSINOPHIL NFR BLD AUTO: 0.7 % (ref 0.3–6.2)
EOSINOPHIL NFR BLD AUTO: 0.9 % (ref 0.3–6.2)
EOSINOPHIL NFR BLD AUTO: 2 % (ref 0.3–6.2)
ERYTHROCYTE [DISTWIDTH] IN BLOOD BY AUTOMATED COUNT: 14.8 % (ref 12.3–15.4)
ERYTHROCYTE [DISTWIDTH] IN BLOOD BY AUTOMATED COUNT: 14.9 % (ref 12.3–15.4)
ERYTHROCYTE [DISTWIDTH] IN BLOOD BY AUTOMATED COUNT: 15 % (ref 12.3–15.4)
ERYTHROCYTE [DISTWIDTH] IN BLOOD BY AUTOMATED COUNT: 15.2 % (ref 12.3–15.4)
FIBRINOGEN PPP-MCNC: 370 MG/DL (ref 240–460)
FIBRINOGEN PPP-MCNC: 384 MG/DL (ref 240–460)
GLOBULIN UR ELPH-MCNC: 1.9 GM/DL
GLOBULIN UR ELPH-MCNC: 2.7 GM/DL
GLUCOSE BLDC GLUCOMTR-MCNC: 131 MG/DL (ref 70–130)
GLUCOSE BLDC GLUCOMTR-MCNC: 131 MG/DL (ref 70–130)
GLUCOSE BLDC GLUCOMTR-MCNC: 132 MG/DL (ref 70–130)
GLUCOSE BLDC GLUCOMTR-MCNC: 145 MG/DL (ref 70–130)
GLUCOSE BLDC GLUCOMTR-MCNC: 165 MG/DL (ref 70–130)
GLUCOSE BLDC GLUCOMTR-MCNC: 167 MG/DL (ref 70–130)
GLUCOSE BLDC GLUCOMTR-MCNC: 167 MG/DL (ref 70–130)
GLUCOSE BLDC GLUCOMTR-MCNC: 168 MG/DL (ref 70–130)
GLUCOSE BLDC GLUCOMTR-MCNC: 199 MG/DL (ref 70–130)
GLUCOSE BLDC GLUCOMTR-MCNC: 207 MG/DL (ref 70–130)
GLUCOSE SERPL-MCNC: 161 MG/DL (ref 65–99)
GLUCOSE SERPL-MCNC: 161 MG/DL (ref 65–99)
GLUCOSE SERPL-MCNC: 191 MG/DL (ref 65–99)
GLUCOSE SERPL-MCNC: 99 MG/DL (ref 65–99)
HCO3 BLDA-SCNC: 20.8 MMOL/L (ref 20–26)
HCO3 BLDA-SCNC: 21.5 MMOL/L (ref 20–26)
HCO3 BLDA-SCNC: 21.9 MMOL/L (ref 20–26)
HCO3 BLDA-SCNC: 22.3 MMOL/L (ref 20–26)
HCO3 BLDA-SCNC: 23.5 MMOL/L (ref 20–26)
HCO3 BLDA-SCNC: 23.9 MMOL/L (ref 20–26)
HCO3 BLDA-SCNC: 24.3 MMOL/L (ref 20–26)
HCO3 BLDA-SCNC: 25.6 MMOL/L (ref 20–26)
HCT VFR BLD AUTO: 23.4 % (ref 34–46.6)
HCT VFR BLD AUTO: 30.5 % (ref 34–46.6)
HCT VFR BLD AUTO: 32.3 % (ref 34–46.6)
HCT VFR BLD AUTO: 35.5 % (ref 34–46.6)
HCT VFR BLD CALC: 24.1 % (ref 38–51)
HCT VFR BLD CALC: 28.3 % (ref 38–51)
HCT VFR BLD CALC: 29 % (ref 38–51)
HCT VFR BLD CALC: 30.5 % (ref 38–51)
HCT VFR BLD CALC: 31.6 % (ref 38–51)
HCT VFR BLD CALC: 32.8 % (ref 38–51)
HGB BLD-MCNC: 10.4 G/DL (ref 12–15.9)
HGB BLD-MCNC: 10.9 G/DL (ref 12–15.9)
HGB BLD-MCNC: 7.3 G/DL (ref 12–15.9)
HGB BLD-MCNC: 9.9 G/DL (ref 12–15.9)
HGB BLDA-MCNC: 10 G/DL (ref 12–16)
HGB BLDA-MCNC: 10.3 G/DL (ref 12–16)
HGB BLDA-MCNC: 10.7 G/DL (ref 12–16)
HGB BLDA-MCNC: 7.8 G/DL (ref 12–16)
HGB BLDA-MCNC: 9.2 G/DL (ref 12–16)
HGB BLDA-MCNC: 9.4 G/DL (ref 12–16)
IMM GRANULOCYTES # BLD AUTO: 0.02 10*3/MM3 (ref 0–0.05)
IMM GRANULOCYTES # BLD AUTO: 0.05 10*3/MM3 (ref 0–0.05)
IMM GRANULOCYTES # BLD AUTO: 0.1 10*3/MM3 (ref 0–0.05)
IMM GRANULOCYTES NFR BLD AUTO: 0.5 % (ref 0–0.5)
IMM GRANULOCYTES NFR BLD AUTO: 0.9 % (ref 0–0.5)
IMM GRANULOCYTES NFR BLD AUTO: 1.8 % (ref 0–0.5)
INHALED O2 CONCENTRATION: 100 %
INHALED O2 CONCENTRATION: 30 %
INHALED O2 CONCENTRATION: 60 %
INHALED O2 CONCENTRATION: 60 %
INR PPP: 1.03 (ref 0.91–1.09)
INR PPP: 1.2 (ref 0.91–1.09)
INR PPP: 1.42 (ref 0.91–1.09)
LYMPHOCYTES # BLD AUTO: 0.34 10*3/MM3 (ref 0.7–3.1)
LYMPHOCYTES # BLD AUTO: 0.35 10*3/MM3 (ref 0.7–3.1)
LYMPHOCYTES # BLD AUTO: 1.05 10*3/MM3 (ref 0.7–3.1)
LYMPHOCYTES NFR BLD AUTO: 25.6 % (ref 19.6–45.3)
LYMPHOCYTES NFR BLD AUTO: 6.2 % (ref 19.6–45.3)
LYMPHOCYTES NFR BLD AUTO: 6.2 % (ref 19.6–45.3)
Lab: ABNORMAL
Lab: NORMAL
MCH RBC QN AUTO: 28.4 PG (ref 26.6–33)
MCH RBC QN AUTO: 29 PG (ref 26.6–33)
MCH RBC QN AUTO: 29.2 PG (ref 26.6–33)
MCH RBC QN AUTO: 29.5 PG (ref 26.6–33)
MCHC RBC AUTO-ENTMCNC: 30.7 G/DL (ref 31.5–35.7)
MCHC RBC AUTO-ENTMCNC: 31.2 G/DL (ref 31.5–35.7)
MCHC RBC AUTO-ENTMCNC: 32.2 G/DL (ref 31.5–35.7)
MCHC RBC AUTO-ENTMCNC: 32.5 G/DL (ref 31.5–35.7)
MCV RBC AUTO: 90.7 FL (ref 79–97)
MCV RBC AUTO: 90.8 FL (ref 79–97)
MCV RBC AUTO: 92.4 FL (ref 79–97)
MCV RBC AUTO: 92.9 FL (ref 79–97)
METHGB BLD QL: 0.9 % (ref 0–3)
METHGB BLD QL: 0.9 % (ref 0–3)
METHGB BLD QL: 1 % (ref 0–3)
METHGB BLD QL: 1.1 % (ref 0–3)
MODALITY: ABNORMAL
MONOCYTES # BLD AUTO: 0.2 10*3/MM3 (ref 0.1–0.9)
MONOCYTES # BLD AUTO: 0.27 10*3/MM3 (ref 0.1–0.9)
MONOCYTES # BLD AUTO: 0.34 10*3/MM3 (ref 0.1–0.9)
MONOCYTES NFR BLD AUTO: 3.5 % (ref 5–12)
MONOCYTES NFR BLD AUTO: 6.2 % (ref 5–12)
MONOCYTES NFR BLD AUTO: 6.6 % (ref 5–12)
NEUTROPHILS NFR BLD AUTO: 2.67 10*3/MM3 (ref 1.7–7)
NEUTROPHILS NFR BLD AUTO: 4.68 10*3/MM3 (ref 1.7–7)
NEUTROPHILS NFR BLD AUTO: 4.97 10*3/MM3 (ref 1.7–7)
NEUTROPHILS NFR BLD AUTO: 65.1 % (ref 42.7–76)
NEUTROPHILS NFR BLD AUTO: 85.6 % (ref 42.7–76)
NEUTROPHILS NFR BLD AUTO: 87.6 % (ref 42.7–76)
NRBC BLD AUTO-RTO: 0 /100 WBC (ref 0–0.2)
OXYHGB MFR BLDV: 94.4 % (ref 94–99)
OXYHGB MFR BLDV: 98.2 % (ref 94–99)
OXYHGB MFR BLDV: 98.3 % (ref 94–99)
OXYHGB MFR BLDV: 98.4 % (ref 94–99)
OXYHGB MFR BLDV: 98.5 % (ref 94–99)
OXYHGB MFR BLDV: 98.7 % (ref 94–99)
PCO2 BLDA: 32.4 MM HG (ref 35–45)
PCO2 BLDA: 35.3 MM HG (ref 35–45)
PCO2 BLDA: 35.8 MM HG (ref 35–45)
PCO2 BLDA: 38.8 MM HG (ref 35–45)
PCO2 BLDA: 39.6 MM HG (ref 35–45)
PCO2 BLDA: 41.3 MM HG (ref 35–45)
PCO2 BLDA: 41.4 MM HG (ref 35–45)
PCO2 BLDA: 48.9 MM HG (ref 35–45)
PCO2 TEMP ADJ BLD: 32.4 MM HG (ref 35–45)
PCO2 TEMP ADJ BLD: 35.3 MM HG (ref 35–45)
PCO2 TEMP ADJ BLD: 35.8 MM HG (ref 35–45)
PCO2 TEMP ADJ BLD: 38.8 MM HG (ref 35–45)
PCO2 TEMP ADJ BLD: 39.6 MM HG (ref 35–45)
PCO2 TEMP ADJ BLD: 41.3 MM HG (ref 35–45)
PCO2 TEMP ADJ BLD: 41.4 MM HG (ref 35–45)
PCO2 TEMP ADJ BLD: 48.9 MM HG (ref 35–45)
PEEP RESPIRATORY: 10 CM[H2O]
PEEP RESPIRATORY: 10 CM[H2O]
PEEP RESPIRATORY: 8 CM[H2O]
PH BLDA: 7.29 PH UNITS (ref 7.35–7.45)
PH BLDA: 7.33 PH UNITS (ref 7.35–7.45)
PH BLDA: 7.37 PH UNITS (ref 7.35–7.45)
PH BLDA: 7.39 PH UNITS (ref 7.35–7.45)
PH BLDA: 7.4 PH UNITS (ref 7.35–7.45)
PH BLDA: 7.41 PH UNITS (ref 7.35–7.45)
PH BLDA: 7.42 PH UNITS (ref 7.35–7.45)
PH BLDA: 7.42 PH UNITS (ref 7.35–7.45)
PH, TEMP CORRECTED: 7.29 PH UNITS (ref 7.35–7.45)
PH, TEMP CORRECTED: 7.33 PH UNITS (ref 7.35–7.45)
PH, TEMP CORRECTED: 7.37 PH UNITS (ref 7.35–7.45)
PH, TEMP CORRECTED: 7.39 PH UNITS (ref 7.35–7.45)
PH, TEMP CORRECTED: 7.4 PH UNITS (ref 7.35–7.45)
PH, TEMP CORRECTED: 7.41 PH UNITS (ref 7.35–7.45)
PH, TEMP CORRECTED: 7.42 PH UNITS (ref 7.35–7.45)
PH, TEMP CORRECTED: 7.42 PH UNITS (ref 7.35–7.45)
PHOSPHATE SERPL-MCNC: 3.3 MG/DL (ref 2.5–4.5)
PHOSPHATE SERPL-MCNC: 4.1 MG/DL (ref 2.5–4.5)
PLATELET # BLD AUTO: 141 10*3/MM3 (ref 140–450)
PLATELET # BLD AUTO: 144 10*3/MM3 (ref 140–450)
PLATELET # BLD AUTO: 152 10*3/MM3 (ref 140–450)
PLATELET # BLD AUTO: 187 10*3/MM3 (ref 140–450)
PMV BLD AUTO: 10 FL (ref 6–12)
PMV BLD AUTO: 9.3 FL (ref 6–12)
PMV BLD AUTO: 9.4 FL (ref 6–12)
PMV BLD AUTO: 9.6 FL (ref 6–12)
PO2 BLDA: 204 MM HG (ref 83–108)
PO2 BLDA: 271 MM HG (ref 83–108)
PO2 BLDA: 357 MM HG (ref 83–108)
PO2 BLDA: 454 MM HG (ref 83–108)
PO2 BLDA: 522 MM HG (ref 83–108)
PO2 BLDA: 542 MM HG (ref 83–108)
PO2 BLDA: 87.8 MM HG (ref 83–108)
PO2 BLDA: 96.8 MM HG (ref 83–108)
PO2 TEMP ADJ BLD: 204 MM HG (ref 83–108)
PO2 TEMP ADJ BLD: 271 MM HG (ref 83–108)
PO2 TEMP ADJ BLD: 357 MM HG (ref 83–108)
PO2 TEMP ADJ BLD: 454 MM HG (ref 83–108)
PO2 TEMP ADJ BLD: 522 MM HG (ref 83–108)
PO2 TEMP ADJ BLD: 542 MM HG (ref 83–108)
PO2 TEMP ADJ BLD: 87.8 MM HG (ref 83–108)
PO2 TEMP ADJ BLD: 96.8 MM HG (ref 83–108)
POTASSIUM BLDA-SCNC: 3.3 MMOL/L (ref 3.5–5.2)
POTASSIUM BLDA-SCNC: 3.9 MMOL/L (ref 3.5–5.2)
POTASSIUM BLDA-SCNC: 4.4 MMOL/L (ref 3.5–5.2)
POTASSIUM BLDA-SCNC: 4.5 MMOL/L (ref 3.5–5.2)
POTASSIUM BLDA-SCNC: 4.7 MMOL/L (ref 3.5–5.2)
POTASSIUM BLDA-SCNC: 5.6 MMOL/L (ref 3.5–5.2)
POTASSIUM SERPL-SCNC: 3.9 MMOL/L (ref 3.5–5.2)
POTASSIUM SERPL-SCNC: 4.6 MMOL/L (ref 3.5–5.2)
POTASSIUM SERPL-SCNC: 4.6 MMOL/L (ref 3.5–5.2)
POTASSIUM SERPL-SCNC: 4.7 MMOL/L (ref 3.5–5.2)
PROT SERPL-MCNC: 5.8 G/DL (ref 6–8.5)
PROT SERPL-MCNC: 6.3 G/DL (ref 6–8.5)
PROTHROMBIN TIME: 13.6 SECONDS (ref 11.8–14.8)
PROTHROMBIN TIME: 15.3 SECONDS (ref 11.8–14.8)
PROTHROMBIN TIME: 17.5 SECONDS (ref 11.8–14.8)
PSV: 10 CMH2O
QT INTERVAL: 316 MS
QT INTERVAL: 406 MS
QT INTERVAL: 410 MS
QTC INTERVAL: 426 MS
QTC INTERVAL: 456 MS
QTC INTERVAL: 466 MS
RBC # BLD AUTO: 2.52 10*6/MM3 (ref 3.77–5.28)
RBC # BLD AUTO: 3.36 10*6/MM3 (ref 3.77–5.28)
RBC # BLD AUTO: 3.56 10*6/MM3 (ref 3.77–5.28)
RBC # BLD AUTO: 3.84 10*6/MM3 (ref 3.77–5.28)
SAO2 % BLDCOA: 96.8 % (ref 94–99)
SAO2 % BLDCOA: 98.1 % (ref 94–99)
SAO2 % BLDCOA: 99.7 % (ref 94–99)
SAO2 % BLDCOA: 99.9 % (ref 94–99)
SAO2 % BLDCOA: >100.1 % (ref 94–99)
SET MECH RESP RATE: 20
SET MECH RESP RATE: 20
SODIUM BLDA-SCNC: 139 MMOL/L (ref 136–145)
SODIUM BLDA-SCNC: 140 MMOL/L (ref 136–145)
SODIUM BLDA-SCNC: 141 MMOL/L (ref 136–145)
SODIUM SERPL-SCNC: 139 MMOL/L (ref 136–145)
SODIUM SERPL-SCNC: 139 MMOL/L (ref 136–145)
SODIUM SERPL-SCNC: 140 MMOL/L (ref 136–145)
SODIUM SERPL-SCNC: 140 MMOL/L (ref 136–145)
VENTILATOR MODE: ABNORMAL
VT ON VENT VENT: 500 ML
VT ON VENT VENT: 500 ML
WBC NRBC COR # BLD AUTO: 10.18 10*3/MM3 (ref 3.4–10.8)
WBC NRBC COR # BLD AUTO: 4.1 10*3/MM3 (ref 3.4–10.8)
WBC NRBC COR # BLD AUTO: 5.47 10*3/MM3 (ref 3.4–10.8)
WBC NRBC COR # BLD AUTO: 5.67 10*3/MM3 (ref 3.4–10.8)

## 2023-11-29 PROCEDURE — P9041 ALBUMIN (HUMAN),5%, 50ML: HCPCS

## 2023-11-29 PROCEDURE — 94799 UNLISTED PULMONARY SVC/PX: CPT

## 2023-11-29 PROCEDURE — 25810000003 LACTATED RINGERS PER 1000 ML: Performed by: SURGERY

## 2023-11-29 PROCEDURE — 83050 HGB METHEMOGLOBIN QUAN: CPT

## 2023-11-29 PROCEDURE — C1760 CLOSURE DEV, VASC: HCPCS | Performed by: SURGERY

## 2023-11-29 PROCEDURE — 25810000003 SODIUM CHLORIDE 0.9 % SOLUTION: Performed by: NURSE PRACTITIONER

## 2023-11-29 PROCEDURE — 25810000003 SODIUM CHLORIDE 0.9 % SOLUTION 250 ML FLEX CONT: Performed by: NURSE ANESTHETIST, CERTIFIED REGISTERED

## 2023-11-29 PROCEDURE — 25010000002 FENTANYL CITRATE (PF) 250 MCG/5ML SOLUTION: Performed by: NURSE ANESTHETIST, CERTIFIED REGISTERED

## 2023-11-29 PROCEDURE — 25010000002 MIDAZOLAM PER 1 MG: Performed by: ANESTHESIOLOGY

## 2023-11-29 PROCEDURE — 82330 ASSAY OF CALCIUM: CPT

## 2023-11-29 PROCEDURE — 02580ZZ DESTRUCTION OF CONDUCTION MECHANISM, OPEN APPROACH: ICD-10-PCS | Performed by: SURGERY

## 2023-11-29 PROCEDURE — 25010000002 ALBUMIN HUMAN 5% PER 50 ML: Performed by: SURGERY

## 2023-11-29 PROCEDURE — C1713 ANCHOR/SCREW BN/BN,TIS/BN: HCPCS | Performed by: SURGERY

## 2023-11-29 PROCEDURE — 85384 FIBRINOGEN ACTIVITY: CPT | Performed by: SURGERY

## 2023-11-29 PROCEDURE — 82375 ASSAY CARBOXYHB QUANT: CPT

## 2023-11-29 PROCEDURE — 94640 AIRWAY INHALATION TREATMENT: CPT

## 2023-11-29 PROCEDURE — 25010000002 CLINDAMYCIN PER 300 MG: Performed by: SURGERY

## 2023-11-29 PROCEDURE — 36430 TRANSFUSION BLD/BLD COMPNT: CPT

## 2023-11-29 PROCEDURE — 82803 BLOOD GASES ANY COMBINATION: CPT

## 2023-11-29 PROCEDURE — 0 BUPIVACAINE LIPOSOME 1.3 % SUSPENSION 20 ML VIAL: Performed by: SURGERY

## 2023-11-29 PROCEDURE — 93325 DOPPLER ECHO COLOR FLOW MAPG: CPT | Performed by: INTERNAL MEDICINE

## 2023-11-29 PROCEDURE — 82805 BLOOD GASES W/O2 SATURATION: CPT

## 2023-11-29 PROCEDURE — 71045 X-RAY EXAM CHEST 1 VIEW: CPT

## 2023-11-29 PROCEDURE — P9035 PLATELET PHERES LEUKOREDUCED: HCPCS

## 2023-11-29 PROCEDURE — P9100 PATHOGEN TEST FOR PLATELETS: HCPCS

## 2023-11-29 PROCEDURE — 02100A9 BYPASS CORONARY ARTERY, ONE ARTERY FROM LEFT INTERNAL MAMMARY WITH AUTOLOGOUS ARTERIAL TISSUE, OPEN APPROACH: ICD-10-PCS | Performed by: SURGERY

## 2023-11-29 PROCEDURE — 25010000002 ALBUMIN HUMAN 5% PER 50 ML

## 2023-11-29 PROCEDURE — P9041 ALBUMIN (HUMAN),5%, 50ML: HCPCS | Performed by: SURGERY

## 2023-11-29 PROCEDURE — 25010000002 MIDAZOLAM PER 1 MG: Performed by: NURSE ANESTHETIST, CERTIFIED REGISTERED

## 2023-11-29 PROCEDURE — 85730 THROMBOPLASTIN TIME PARTIAL: CPT | Performed by: SURGERY

## 2023-11-29 PROCEDURE — C1751 CATH, INF, PER/CENT/MIDLINE: HCPCS | Performed by: SURGERY

## 2023-11-29 PROCEDURE — 93312 ECHO TRANSESOPHAGEAL: CPT | Performed by: INTERNAL MEDICINE

## 2023-11-29 PROCEDURE — 25010000002 PAPAVERINE PER 60 MG: Performed by: SURGERY

## 2023-11-29 PROCEDURE — 85610 PROTHROMBIN TIME: CPT | Performed by: SURGERY

## 2023-11-29 PROCEDURE — 021009W BYPASS CORONARY ARTERY, ONE ARTERY FROM AORTA WITH AUTOLOGOUS VENOUS TISSUE, OPEN APPROACH: ICD-10-PCS | Performed by: SURGERY

## 2023-11-29 PROCEDURE — 33517 CABG ARTERY-VEIN SINGLE: CPT | Performed by: SURGERY

## 2023-11-29 PROCEDURE — 83605 ASSAY OF LACTIC ACID: CPT | Performed by: SURGERY

## 2023-11-29 PROCEDURE — 94761 N-INVAS EAR/PLS OXIMETRY MLT: CPT

## 2023-11-29 PROCEDURE — 25010000002 VANCOMYCIN 10 G RECONSTITUTED SOLUTION: Performed by: NURSE PRACTITIONER

## 2023-11-29 PROCEDURE — 82948 REAGENT STRIP/BLOOD GLUCOSE: CPT

## 2023-11-29 PROCEDURE — 85025 COMPLETE CBC W/AUTO DIFF WBC: CPT | Performed by: NURSE PRACTITIONER

## 2023-11-29 PROCEDURE — 33259 ABLATE ATRIA W/BYPASS ADD-ON: CPT | Performed by: SURGERY

## 2023-11-29 PROCEDURE — 33533 CABG ARTERIAL SINGLE: CPT | Performed by: SURGERY

## 2023-11-29 PROCEDURE — 25810000003 DEXTROSE 5 % WITH KCL 20 MEQ 20 MEQ/L SOLUTION: Performed by: SURGERY

## 2023-11-29 PROCEDURE — 94002 VENT MGMT INPAT INIT DAY: CPT

## 2023-11-29 PROCEDURE — 93355 ECHO TRANSESOPHAGEAL (TEE): CPT

## 2023-11-29 PROCEDURE — 93005 ELECTROCARDIOGRAM TRACING: CPT | Performed by: SURGERY

## 2023-11-29 PROCEDURE — 93318 ECHO TRANSESOPHAGEAL INTRAOP: CPT | Performed by: NURSE ANESTHETIST, CERTIFIED REGISTERED

## 2023-11-29 PROCEDURE — 93005 ELECTROCARDIOGRAM TRACING: CPT | Performed by: NURSE PRACTITIONER

## 2023-11-29 PROCEDURE — 84100 ASSAY OF PHOSPHORUS: CPT | Performed by: NURSE PRACTITIONER

## 2023-11-29 PROCEDURE — 25810000003 LACTATED RINGERS PER 1000 ML: Performed by: ANESTHESIOLOGY

## 2023-11-29 PROCEDURE — 06BP4ZZ EXCISION OF RIGHT SAPHENOUS VEIN, PERCUTANEOUS ENDOSCOPIC APPROACH: ICD-10-PCS | Performed by: SURGERY

## 2023-11-29 PROCEDURE — 93010 ELECTROCARDIOGRAM REPORT: CPT | Performed by: INTERNAL MEDICINE

## 2023-11-29 PROCEDURE — 85027 COMPLETE CBC AUTOMATED: CPT | Performed by: SURGERY

## 2023-11-29 PROCEDURE — 25010000002 HYDROMORPHONE PER 4 MG: Performed by: SURGERY

## 2023-11-29 PROCEDURE — 85610 PROTHROMBIN TIME: CPT | Performed by: NURSE PRACTITIONER

## 2023-11-29 PROCEDURE — 85730 THROMBOPLASTIN TIME PARTIAL: CPT | Performed by: NURSE PRACTITIONER

## 2023-11-29 PROCEDURE — 0 INSULIN REGULAR HUMAN PER 5 UNITS: Performed by: SURGERY

## 2023-11-29 PROCEDURE — 25010000002 NITROGLYCERIN 200 MCG/ML SOLUTION: Performed by: NURSE ANESTHETIST, CERTIFIED REGISTERED

## 2023-11-29 PROCEDURE — C9290 INJ, BUPIVACAINE LIPOSOME: HCPCS | Performed by: SURGERY

## 2023-11-29 PROCEDURE — 25010000002 VANCOMYCIN 1 G RECONSTITUTED SOLUTION 1 EACH VIAL: Performed by: SURGERY

## 2023-11-29 PROCEDURE — 5A1221Z PERFORMANCE OF CARDIAC OUTPUT, CONTINUOUS: ICD-10-PCS | Performed by: SURGERY

## 2023-11-29 PROCEDURE — 02L70CK OCCLUSION OF LEFT ATRIAL APPENDAGE WITH EXTRALUMINAL DEVICE, OPEN APPROACH: ICD-10-PCS | Performed by: SURGERY

## 2023-11-29 PROCEDURE — 25010000002 PROPOFOL 10 MG/ML EMULSION: Performed by: NURSE ANESTHETIST, CERTIFIED REGISTERED

## 2023-11-29 PROCEDURE — 25010000002 HEPARIN (PORCINE) PER 1000 UNITS: Performed by: NURSE ANESTHETIST, CERTIFIED REGISTERED

## 2023-11-29 PROCEDURE — C1751 CATH, INF, PER/CENT/MIDLINE: HCPCS | Performed by: NURSE ANESTHETIST, CERTIFIED REGISTERED

## 2023-11-29 PROCEDURE — 80053 COMPREHEN METABOLIC PANEL: CPT | Performed by: NURSE PRACTITIONER

## 2023-11-29 PROCEDURE — 33508 ENDOSCOPIC VEIN HARVEST: CPT | Performed by: SURGERY

## 2023-11-29 PROCEDURE — A4648 IMPLANTABLE TISSUE MARKER: HCPCS | Performed by: SURGERY

## 2023-11-29 PROCEDURE — 25010000002 HEPARIN (PORCINE) PER 1000 UNITS: Performed by: SURGERY

## 2023-11-29 PROCEDURE — 93010 ELECTROCARDIOGRAM REPORT: CPT | Performed by: HOSPITALIST

## 2023-11-29 PROCEDURE — 25010000002 PROTAMINE SULFATE PER 10 MG: Performed by: NURSE ANESTHETIST, CERTIFIED REGISTERED

## 2023-11-29 PROCEDURE — 85025 COMPLETE CBC W/AUTO DIFF WBC: CPT | Performed by: SURGERY

## 2023-11-29 DEVICE — ADHS SURG BIOGLUE PREF 5ML: Type: IMPLANTABLE DEVICE | Site: HEART | Status: FUNCTIONAL

## 2023-11-29 DEVICE — KT HEMOST ABS SURGIFOAM PORCN 1GRAM: Type: IMPLANTABLE DEVICE | Site: CHEST | Status: FUNCTIONAL

## 2023-11-29 DEVICE — PLEDGET INCISIONLINE REINF TFE SFT PTFE 1.5X3X7MM WHT: Type: IMPLANTABLE DEVICE | Site: HEART | Status: FUNCTIONAL

## 2023-11-29 DEVICE — SEAL HEMO SURG ARISTA/AH ABS/PWDR 3GM: Type: IMPLANTABLE DEVICE | Site: CHEST | Status: FUNCTIONAL

## 2023-11-29 DEVICE — WR SUT NONABS MF SS V40 1/2CIR TC 6/0 18IN M649G: Type: IMPLANTABLE DEVICE | Site: STERNUM | Status: FUNCTIONAL

## 2023-11-29 DEVICE — IMPLANTABLE DEVICE: Type: IMPLANTABLE DEVICE | Site: HEART | Status: FUNCTIONAL

## 2023-11-29 DEVICE — APPL CLIP LAA ATRICLIP FLX 40MM: Type: IMPLANTABLE DEVICE | Site: HEART | Status: FUNCTIONAL

## 2023-11-29 DEVICE — DISK-SHAPED STYLE, SILICONE (1 PER STERILE PKG)
Type: IMPLANTABLE DEVICE | Site: HEART | Status: FUNCTIONAL
Brand: SCANLAN® RADIOMARK® GRAFT MARKERS

## 2023-11-29 DEVICE — WAX BONE HEMO NAT 2.5G: Type: IMPLANTABLE DEVICE | Site: CHEST | Status: FUNCTIONAL

## 2023-11-29 RX ORDER — METOPROLOL TARTRATE 1 MG/ML
INJECTION, SOLUTION INTRAVENOUS AS NEEDED
Status: DISCONTINUED | OUTPATIENT
Start: 2023-11-29 | End: 2023-11-29 | Stop reason: SURG

## 2023-11-29 RX ORDER — NICOTINE POLACRILEX 4 MG
15 LOZENGE BUCCAL
Status: DISCONTINUED | OUTPATIENT
Start: 2023-11-29 | End: 2023-11-29 | Stop reason: HOSPADM

## 2023-11-29 RX ORDER — MIDAZOLAM HYDROCHLORIDE 1 MG/ML
1 INJECTION INTRAMUSCULAR; INTRAVENOUS
Status: DISCONTINUED | OUTPATIENT
Start: 2023-11-29 | End: 2023-11-29 | Stop reason: HOSPADM

## 2023-11-29 RX ORDER — SODIUM CHLORIDE 9 MG/ML
40 INJECTION, SOLUTION INTRAVENOUS AS NEEDED
Status: DISCONTINUED | OUTPATIENT
Start: 2023-11-29 | End: 2023-11-29 | Stop reason: HOSPADM

## 2023-11-29 RX ORDER — MAGNESIUM HYDROXIDE 1200 MG/15ML
LIQUID ORAL AS NEEDED
Status: DISCONTINUED | OUTPATIENT
Start: 2023-11-29 | End: 2023-11-29 | Stop reason: HOSPADM

## 2023-11-29 RX ORDER — SODIUM CHLORIDE 0.9 % (FLUSH) 0.9 %
10 SYRINGE (ML) INJECTION AS NEEDED
Status: DISCONTINUED | OUTPATIENT
Start: 2023-11-29 | End: 2023-11-29 | Stop reason: HOSPADM

## 2023-11-29 RX ORDER — IBUPROFEN 600 MG/1
1 TABLET ORAL
Status: DISCONTINUED | OUTPATIENT
Start: 2023-11-29 | End: 2023-11-29 | Stop reason: HOSPADM

## 2023-11-29 RX ORDER — PROPOFOL 10 MG/ML
VIAL (ML) INTRAVENOUS AS NEEDED
Status: DISCONTINUED | OUTPATIENT
Start: 2023-11-29 | End: 2023-11-29 | Stop reason: SURG

## 2023-11-29 RX ORDER — OXYCODONE HYDROCHLORIDE 5 MG/1
5 TABLET ORAL EVERY 4 HOURS PRN
Status: DISCONTINUED | OUTPATIENT
Start: 2023-11-29 | End: 2023-12-06 | Stop reason: HOSPADM

## 2023-11-29 RX ORDER — SODIUM CHLORIDE 9 MG/ML
30 INJECTION, SOLUTION INTRAVENOUS CONTINUOUS PRN
Status: DISCONTINUED | OUTPATIENT
Start: 2023-11-29 | End: 2023-11-29 | Stop reason: HOSPADM

## 2023-11-29 RX ORDER — SODIUM CHLORIDE, SODIUM LACTATE, POTASSIUM CHLORIDE, CALCIUM CHLORIDE 600; 310; 30; 20 MG/100ML; MG/100ML; MG/100ML; MG/100ML
1000 INJECTION, SOLUTION INTRAVENOUS CONTINUOUS
Status: CANCELLED | OUTPATIENT
Start: 2023-11-29

## 2023-11-29 RX ORDER — CHLORHEXIDINE GLUCONATE 500 MG/1
1 CLOTH TOPICAL EVERY 24 HOURS
Status: DISCONTINUED | OUTPATIENT
Start: 2023-11-30 | End: 2023-12-02

## 2023-11-29 RX ORDER — SODIUM CHLORIDE 0.9 % (FLUSH) 0.9 %
30 SYRINGE (ML) INJECTION ONCE AS NEEDED
Status: DISCONTINUED | OUTPATIENT
Start: 2023-11-29 | End: 2023-11-29 | Stop reason: HOSPADM

## 2023-11-29 RX ORDER — SODIUM CHLORIDE 0.9 % (FLUSH) 0.9 %
10 SYRINGE (ML) INJECTION EVERY 12 HOURS SCHEDULED
Status: DISCONTINUED | OUTPATIENT
Start: 2023-11-29 | End: 2023-11-29 | Stop reason: HOSPADM

## 2023-11-29 RX ORDER — ALBUTEROL SULFATE 2.5 MG/3ML
2.5 SOLUTION RESPIRATORY (INHALATION) EVERY 4 HOURS PRN
Status: ACTIVE | OUTPATIENT
Start: 2023-11-29 | End: 2023-11-30

## 2023-11-29 RX ORDER — HEPARIN SODIUM 1000 [USP'U]/ML
INJECTION, SOLUTION INTRAVENOUS; SUBCUTANEOUS AS NEEDED
Status: DISCONTINUED | OUTPATIENT
Start: 2023-11-29 | End: 2023-11-29 | Stop reason: SURG

## 2023-11-29 RX ORDER — ALBUMIN, HUMAN INJ 5% 5 %
500 SOLUTION INTRAVENOUS ONCE
Status: COMPLETED | OUTPATIENT
Start: 2023-11-29 | End: 2023-11-29

## 2023-11-29 RX ORDER — SUFENTANIL CITRATE 50 UG/ML
INJECTION EPIDURAL; INTRAVENOUS AS NEEDED
Status: DISCONTINUED | OUTPATIENT
Start: 2023-11-29 | End: 2023-11-29 | Stop reason: SURG

## 2023-11-29 RX ORDER — SULFASALAZINE 500 MG/1
500 TABLET ORAL DAILY
Status: DISCONTINUED | OUTPATIENT
Start: 2023-11-30 | End: 2023-12-06 | Stop reason: HOSPADM

## 2023-11-29 RX ORDER — ASPIRIN 81 MG/1
162 TABLET, CHEWABLE ORAL ONCE
Status: COMPLETED | OUTPATIENT
Start: 2023-11-30 | End: 2023-11-30

## 2023-11-29 RX ORDER — AMINOCAPROIC ACID 250 MG/ML
INJECTION, SOLUTION INTRAVENOUS AS NEEDED
Status: DISCONTINUED | OUTPATIENT
Start: 2023-11-29 | End: 2023-11-29 | Stop reason: SURG

## 2023-11-29 RX ORDER — MEPERIDINE HYDROCHLORIDE 50 MG/ML
25 INJECTION INTRAMUSCULAR; INTRAVENOUS; SUBCUTANEOUS
Status: ACTIVE | OUTPATIENT
Start: 2023-11-29 | End: 2023-11-29

## 2023-11-29 RX ORDER — SODIUM CHLORIDE 0.9 % (FLUSH) 0.9 %
3 SYRINGE (ML) INJECTION AS NEEDED
Status: CANCELLED | OUTPATIENT
Start: 2023-11-29

## 2023-11-29 RX ORDER — EPHEDRINE SULFATE 50 MG/ML
INJECTION, SOLUTION INTRAVENOUS AS NEEDED
Status: DISCONTINUED | OUTPATIENT
Start: 2023-11-29 | End: 2023-11-29 | Stop reason: SURG

## 2023-11-29 RX ORDER — NITROGLYCERIN 20 MG/100ML
5 INJECTION INTRAVENOUS CONTINUOUS
Status: DISCONTINUED | OUTPATIENT
Start: 2023-11-29 | End: 2023-12-02

## 2023-11-29 RX ORDER — OXYCODONE HYDROCHLORIDE 5 MG/1
10 TABLET ORAL EVERY 4 HOURS PRN
Status: DISCONTINUED | OUTPATIENT
Start: 2023-11-29 | End: 2023-12-06 | Stop reason: HOSPADM

## 2023-11-29 RX ORDER — LIDOCAINE HYDROCHLORIDE 10 MG/ML
0.5 INJECTION, SOLUTION EPIDURAL; INFILTRATION; INTRACAUDAL; PERINEURAL ONCE AS NEEDED
Status: DISCONTINUED | OUTPATIENT
Start: 2023-11-29 | End: 2023-11-29 | Stop reason: HOSPADM

## 2023-11-29 RX ORDER — SODIUM CHLORIDE, SODIUM LACTATE, POTASSIUM CHLORIDE, CALCIUM CHLORIDE 600; 310; 30; 20 MG/100ML; MG/100ML; MG/100ML; MG/100ML
1000 INJECTION, SOLUTION INTRAVENOUS CONTINUOUS
Status: DISCONTINUED | OUTPATIENT
Start: 2023-11-29 | End: 2023-11-29

## 2023-11-29 RX ORDER — POTASSIUM CHLORIDE, DEXTROSE MONOHYDRATE 150; 5 MG/100ML; G/100ML
30 INJECTION, SOLUTION INTRAVENOUS CONTINUOUS
Status: DISCONTINUED | OUTPATIENT
Start: 2023-11-29 | End: 2023-12-01

## 2023-11-29 RX ORDER — CLINDAMYCIN PHOSPHATE 600 MG/50ML
600 INJECTION, SOLUTION INTRAVENOUS EVERY 8 HOURS
Status: COMPLETED | OUTPATIENT
Start: 2023-11-29 | End: 2023-11-30

## 2023-11-29 RX ORDER — CARVEDILOL 3.12 MG/1
3.12 TABLET ORAL 2 TIMES DAILY WITH MEALS
Status: DISCONTINUED | OUTPATIENT
Start: 2023-11-30 | End: 2023-12-03

## 2023-11-29 RX ORDER — SODIUM CHLORIDE, SODIUM LACTATE, POTASSIUM CHLORIDE, CALCIUM CHLORIDE 600; 310; 30; 20 MG/100ML; MG/100ML; MG/100ML; MG/100ML
100 INJECTION, SOLUTION INTRAVENOUS CONTINUOUS
Status: DISCONTINUED | OUTPATIENT
Start: 2023-11-29 | End: 2023-11-29

## 2023-11-29 RX ORDER — ONDANSETRON 2 MG/ML
4 INJECTION INTRAMUSCULAR; INTRAVENOUS EVERY 6 HOURS PRN
Status: DISCONTINUED | OUTPATIENT
Start: 2023-11-29 | End: 2023-12-06 | Stop reason: HOSPADM

## 2023-11-29 RX ORDER — ASPIRIN 81 MG/1
81 TABLET ORAL DAILY
Status: DISCONTINUED | OUTPATIENT
Start: 2023-12-01 | End: 2023-12-06 | Stop reason: HOSPADM

## 2023-11-29 RX ORDER — CHLORHEXIDINE GLUCONATE ORAL RINSE 1.2 MG/ML
15 SOLUTION DENTAL EVERY 12 HOURS SCHEDULED
Status: DISCONTINUED | OUTPATIENT
Start: 2023-11-29 | End: 2023-11-29 | Stop reason: SDUPTHER

## 2023-11-29 RX ORDER — FENTANYL CITRATE 50 UG/ML
INJECTION, SOLUTION INTRAMUSCULAR; INTRAVENOUS AS NEEDED
Status: DISCONTINUED | OUTPATIENT
Start: 2023-11-29 | End: 2023-11-29 | Stop reason: SURG

## 2023-11-29 RX ORDER — LIDOCAINE HYDROCHLORIDE 10 MG/ML
0.5 INJECTION, SOLUTION EPIDURAL; INFILTRATION; INTRACAUDAL; PERINEURAL ONCE AS NEEDED
Status: CANCELLED | OUTPATIENT
Start: 2023-11-29

## 2023-11-29 RX ORDER — SODIUM CHLORIDE 0.9 % (FLUSH) 0.9 %
3 SYRINGE (ML) INJECTION AS NEEDED
Status: DISCONTINUED | OUTPATIENT
Start: 2023-11-29 | End: 2023-11-29 | Stop reason: HOSPADM

## 2023-11-29 RX ORDER — POLYETHYLENE GLYCOL 3350 17 G/17G
17 POWDER, FOR SOLUTION ORAL DAILY
Status: DISCONTINUED | OUTPATIENT
Start: 2023-11-30 | End: 2023-12-06 | Stop reason: HOSPADM

## 2023-11-29 RX ORDER — CHLORHEXIDINE GLUCONATE ORAL RINSE 1.2 MG/ML
15 SOLUTION DENTAL EVERY 12 HOURS
Status: DISCONTINUED | OUTPATIENT
Start: 2023-11-29 | End: 2023-12-02

## 2023-11-29 RX ORDER — SODIUM CHLORIDE 0.9 % (FLUSH) 0.9 %
3 SYRINGE (ML) INJECTION EVERY 12 HOURS SCHEDULED
Status: DISCONTINUED | OUTPATIENT
Start: 2023-11-29 | End: 2023-11-29 | Stop reason: HOSPADM

## 2023-11-29 RX ORDER — NITROGLYCERIN 0.4 MG/1
0.4 TABLET SUBLINGUAL
Status: DISCONTINUED | OUTPATIENT
Start: 2023-11-29 | End: 2023-12-02

## 2023-11-29 RX ORDER — PREGABALIN 25 MG/1
25 CAPSULE ORAL EVERY 12 HOURS SCHEDULED
Status: DISCONTINUED | OUTPATIENT
Start: 2023-11-30 | End: 2023-12-06 | Stop reason: HOSPADM

## 2023-11-29 RX ORDER — SODIUM CHLORIDE 0.9 % (FLUSH) 0.9 %
10 SYRINGE (ML) INJECTION AS NEEDED
Status: CANCELLED | OUTPATIENT
Start: 2023-11-29

## 2023-11-29 RX ORDER — SODIUM CHLORIDE 0.9 % (FLUSH) 0.9 %
3-10 SYRINGE (ML) INJECTION AS NEEDED
Status: DISCONTINUED | OUTPATIENT
Start: 2023-11-29 | End: 2023-11-29 | Stop reason: HOSPADM

## 2023-11-29 RX ORDER — DEXTROSE MONOHYDRATE 25 G/50ML
10-50 INJECTION, SOLUTION INTRAVENOUS
Status: DISCONTINUED | OUTPATIENT
Start: 2023-11-29 | End: 2023-11-30

## 2023-11-29 RX ORDER — SERTRALINE HYDROCHLORIDE 100 MG/1
200 TABLET, FILM COATED ORAL DAILY
Status: DISCONTINUED | OUTPATIENT
Start: 2023-11-30 | End: 2023-12-06 | Stop reason: HOSPADM

## 2023-11-29 RX ORDER — NOREPINEPHRINE BITARTRATE 0.03 MG/ML
.02-.3 INJECTION, SOLUTION INTRAVENOUS CONTINUOUS PRN
Status: DISCONTINUED | OUTPATIENT
Start: 2023-11-29 | End: 2023-12-02

## 2023-11-29 RX ORDER — MIDAZOLAM HYDROCHLORIDE 1 MG/ML
INJECTION INTRAMUSCULAR; INTRAVENOUS AS NEEDED
Status: DISCONTINUED | OUTPATIENT
Start: 2023-11-29 | End: 2023-11-29 | Stop reason: SURG

## 2023-11-29 RX ORDER — ENOXAPARIN SODIUM 100 MG/ML
40 INJECTION SUBCUTANEOUS DAILY
Status: DISCONTINUED | OUTPATIENT
Start: 2023-11-30 | End: 2023-12-06 | Stop reason: HOSPADM

## 2023-11-29 RX ORDER — ALBUMIN, HUMAN INJ 5% 5 %
SOLUTION INTRAVENOUS
Status: COMPLETED
Start: 2023-11-29 | End: 2023-11-29

## 2023-11-29 RX ORDER — IPRATROPIUM BROMIDE AND ALBUTEROL SULFATE 2.5; .5 MG/3ML; MG/3ML
3 SOLUTION RESPIRATORY (INHALATION)
Status: DISCONTINUED | OUTPATIENT
Start: 2023-11-29 | End: 2023-11-30

## 2023-11-29 RX ORDER — ACETAMINOPHEN 500 MG
1000 TABLET ORAL ONCE
Status: DISCONTINUED | OUTPATIENT
Start: 2023-11-29 | End: 2023-11-29 | Stop reason: HOSPADM

## 2023-11-29 RX ORDER — BISACODYL 5 MG/1
10 TABLET, DELAYED RELEASE ORAL 2 TIMES DAILY
Status: DISCONTINUED | OUTPATIENT
Start: 2023-11-30 | End: 2023-12-06 | Stop reason: HOSPADM

## 2023-11-29 RX ORDER — NICOTINE POLACRILEX 4 MG
15 LOZENGE BUCCAL
Status: DISCONTINUED | OUTPATIENT
Start: 2023-11-29 | End: 2023-11-30

## 2023-11-29 RX ORDER — PROTAMINE SULFATE 10 MG/ML
INJECTION, SOLUTION INTRAVENOUS AS NEEDED
Status: DISCONTINUED | OUTPATIENT
Start: 2023-11-29 | End: 2023-11-29 | Stop reason: SURG

## 2023-11-29 RX ORDER — LIDOCAINE HYDROCHLORIDE 20 MG/ML
INJECTION, SOLUTION EPIDURAL; INFILTRATION; INTRACAUDAL; PERINEURAL AS NEEDED
Status: DISCONTINUED | OUTPATIENT
Start: 2023-11-29 | End: 2023-11-29 | Stop reason: SURG

## 2023-11-29 RX ORDER — ROCURONIUM BROMIDE 10 MG/ML
INJECTION, SOLUTION INTRAVENOUS AS NEEDED
Status: DISCONTINUED | OUTPATIENT
Start: 2023-11-29 | End: 2023-11-29 | Stop reason: SURG

## 2023-11-29 RX ORDER — PRAVASTATIN SODIUM 20 MG
20 TABLET ORAL DAILY
Status: DISCONTINUED | OUTPATIENT
Start: 2023-11-30 | End: 2023-12-06 | Stop reason: HOSPADM

## 2023-11-29 RX ORDER — NITROGLYCERIN 20 MG/100ML
INJECTION INTRAVENOUS CONTINUOUS PRN
Status: DISCONTINUED | OUTPATIENT
Start: 2023-11-29 | End: 2023-11-29 | Stop reason: SURG

## 2023-11-29 RX ORDER — DEXMEDETOMIDINE HYDROCHLORIDE 4 UG/ML
.2-1.5 INJECTION, SOLUTION INTRAVENOUS
Status: DISCONTINUED | OUTPATIENT
Start: 2023-11-29 | End: 2023-12-01

## 2023-11-29 RX ORDER — DEXTROSE MONOHYDRATE 25 G/50ML
10-50 INJECTION, SOLUTION INTRAVENOUS
Status: DISCONTINUED | OUTPATIENT
Start: 2023-11-29 | End: 2023-11-29 | Stop reason: HOSPADM

## 2023-11-29 RX ADMIN — ROCURONIUM BROMIDE 100 MG: 10 INJECTION, SOLUTION INTRAVENOUS at 07:17

## 2023-11-29 RX ADMIN — SODIUM CHLORIDE, POTASSIUM CHLORIDE, SODIUM LACTATE AND CALCIUM CHLORIDE: 600; 310; 30; 20 INJECTION, SOLUTION INTRAVENOUS at 08:02

## 2023-11-29 RX ADMIN — HYDROMORPHONE HYDROCHLORIDE 0.25 MG: 1 INJECTION, SOLUTION INTRAMUSCULAR; INTRAVENOUS; SUBCUTANEOUS at 12:55

## 2023-11-29 RX ADMIN — POTASSIUM CHLORIDE AND DEXTROSE MONOHYDRATE 30 ML/HR: 150; 5 INJECTION, SOLUTION INTRAVENOUS at 11:57

## 2023-11-29 RX ADMIN — MIDAZOLAM HYDROCHLORIDE 5 MG: 1 INJECTION, SOLUTION INTRAMUSCULAR; INTRAVENOUS at 07:17

## 2023-11-29 RX ADMIN — ROCURONIUM BROMIDE 50 MG: 10 INJECTION, SOLUTION INTRAVENOUS at 09:13

## 2023-11-29 RX ADMIN — ALBUMIN (HUMAN) 500 ML: 12.5 INJECTION, SOLUTION INTRAVENOUS at 21:08

## 2023-11-29 RX ADMIN — METOPROLOL TARTRATE 2.5 MG: 5 INJECTION INTRAVENOUS at 07:47

## 2023-11-29 RX ADMIN — SODIUM CHLORIDE 600 MG: 9 INJECTION, SOLUTION INTRAVENOUS at 19:52

## 2023-11-29 RX ADMIN — SUFENTANIL CITRATE 50 MCG: 50 INJECTION EPIDURAL; INTRAVENOUS at 07:17

## 2023-11-29 RX ADMIN — EPHEDRINE SULFATE 10 MG: 50 INJECTION INTRAVENOUS at 07:51

## 2023-11-29 RX ADMIN — AMINOCAPROIC ACID 1 G/HR: 250 INJECTION, SOLUTION INTRAVENOUS at 07:57

## 2023-11-29 RX ADMIN — IPRATROPIUM BROMIDE AND ALBUTEROL SULFATE 3 ML: 2.5; .5 SOLUTION RESPIRATORY (INHALATION) at 14:54

## 2023-11-29 RX ADMIN — SODIUM CHLORIDE, POTASSIUM CHLORIDE, SODIUM LACTATE AND CALCIUM CHLORIDE 1000 ML: 600; 310; 30; 20 INJECTION, SOLUTION INTRAVENOUS at 07:00

## 2023-11-29 RX ADMIN — SODIUM CHLORIDE 3 UNITS/HR: 9 INJECTION, SOLUTION INTRAVENOUS at 13:10

## 2023-11-29 RX ADMIN — CHLORHEXIDINE GLUCONATE 15 ML: 1.2 RINSE ORAL at 17:29

## 2023-11-29 RX ADMIN — ALBUMIN (HUMAN) 500 ML: 12.5 INJECTION, SOLUTION INTRAVENOUS at 12:55

## 2023-11-29 RX ADMIN — ALBUMIN, HUMAN INJ 5% 500 ML: 5 SOLUTION at 12:55

## 2023-11-29 RX ADMIN — DEXMEDETOMIDINE HYDROCHLORIDE 1.5 MCG/KG/HR: 4 INJECTION, SOLUTION INTRAVENOUS at 12:21

## 2023-11-29 RX ADMIN — ALBUMIN (HUMAN) 500 ML: 12.5 INJECTION, SOLUTION INTRAVENOUS at 16:16

## 2023-11-29 RX ADMIN — NITROGLYCERIN 16.67 MCG/MIN: 20 INJECTION INTRAVENOUS at 07:42

## 2023-11-29 RX ADMIN — SODIUM CHLORIDE, POTASSIUM CHLORIDE, SODIUM LACTATE AND CALCIUM CHLORIDE 100 ML/HR: 600; 310; 30; 20 INJECTION, SOLUTION INTRAVENOUS at 06:43

## 2023-11-29 RX ADMIN — FENTANYL CITRATE 500 MCG: 50 INJECTION, SOLUTION INTRAMUSCULAR; INTRAVENOUS at 07:43

## 2023-11-29 RX ADMIN — HEPARIN SODIUM 30000 UNITS: 1000 INJECTION, SOLUTION INTRAVENOUS; SUBCUTANEOUS at 08:34

## 2023-11-29 RX ADMIN — VANCOMYCIN HYDROCHLORIDE 1250 MG: 10 INJECTION, POWDER, LYOPHILIZED, FOR SOLUTION INTRAVENOUS at 06:44

## 2023-11-29 RX ADMIN — CALCIUM CHLORIDE 1000 MG: 100 INJECTION INTRAVENOUS; INTRAVENTRICULAR at 16:43

## 2023-11-29 RX ADMIN — ROCURONIUM BROMIDE 50 MG: 10 INJECTION, SOLUTION INTRAVENOUS at 09:53

## 2023-11-29 RX ADMIN — MIDAZOLAM HYDROCHLORIDE 10 MG: 1 INJECTION, SOLUTION INTRAMUSCULAR; INTRAVENOUS at 09:55

## 2023-11-29 RX ADMIN — AMINOCAPROIC ACID 5 G: 250 INJECTION, SOLUTION INTRAVENOUS at 07:33

## 2023-11-29 RX ADMIN — SODIUM CHLORIDE 600 MG: 9 INJECTION, SOLUTION INTRAVENOUS at 13:37

## 2023-11-29 RX ADMIN — PROTAMINE SULFATE 200 MG: 10 INJECTION, SOLUTION INTRAVENOUS at 10:17

## 2023-11-29 RX ADMIN — DEXMEDETOMIDINE HYDROCHLORIDE 1.5 MCG/KG/HR: 4 INJECTION, SOLUTION INTRAVENOUS at 14:39

## 2023-11-29 RX ADMIN — PROPOFOL 70 MG: 10 INJECTION, EMULSION INTRAVENOUS at 07:17

## 2023-11-29 RX ADMIN — ROCURONIUM BROMIDE 50 MG: 10 INJECTION, SOLUTION INTRAVENOUS at 08:28

## 2023-11-29 RX ADMIN — FENTANYL CITRATE 500 MCG: 50 INJECTION, SOLUTION INTRAMUSCULAR; INTRAVENOUS at 09:54

## 2023-11-29 RX ADMIN — HEPARIN SODIUM 5000 UNITS: 1000 INJECTION, SOLUTION INTRAVENOUS; SUBCUTANEOUS at 08:21

## 2023-11-29 RX ADMIN — LIDOCAINE HYDROCHLORIDE 100 MG: 20 INJECTION, SOLUTION EPIDURAL; INFILTRATION; INTRACAUDAL; PERINEURAL at 07:17

## 2023-11-29 RX ADMIN — MIDAZOLAM HYDROCHLORIDE 5 MG: 1 INJECTION, SOLUTION INTRAMUSCULAR; INTRAVENOUS at 07:58

## 2023-11-29 RX ADMIN — Medication 1 APPLICATION: at 17:28

## 2023-11-29 RX ADMIN — Medication 1 APPLICATION: at 04:54

## 2023-11-29 RX ADMIN — OXYCODONE HYDROCHLORIDE 10 MG: 5 TABLET ORAL at 19:52

## 2023-11-29 RX ADMIN — MIDAZOLAM HYDROCHLORIDE 1 MG: 1 INJECTION, SOLUTION INTRAMUSCULAR; INTRAVENOUS at 06:56

## 2023-11-29 NOTE — ANESTHESIA POSTPROCEDURE EVALUATION
"Patient: Alessandra Baker    Procedure Summary       Date: 11/29/23 Room / Location:  [facility] OR  /  [facility] OR    Anesthesia Start: 0707 Anesthesia Stop: 1135    Procedures:       CORONARY ARTERY BYPASS GRAFTING X2, LEFT INTERNAL MAMMARY ARTERY GRAFT, RIGHT LEG ENDOSCOPIC VEIN HARVEST, TRANSESOPHAGEAL ECHOCARDIOGRAM (Chest)      MAZE PROCEDURE WITH ENCOMPASS CLAMP      ATRIAL APPENDAGE EXCLUSION LEFT WITH 40MM ATRICLIP (Chest) Diagnosis:       NSTEMI (non-ST elevated myocardial infarction)      (NSTEMI (non-ST elevated myocardial infarction) [I21.4])    Surgeons: Hosea Granados MD Provider: Prince Quispe CRNA    Anesthesia Type: general ASA Status: 4            Anesthesia Type: general    Vitals  Vitals Value Taken Time   /76 11/29/23 1146   Temp     Pulse 90 11/29/23 1157   Resp     SpO2 98 % 11/29/23 1157   Vitals shown include unfiled device data.        Post Anesthesia Care and Evaluation    Patient location during evaluation: ICU  Patient participation: waiting for patient participation  Level of consciousness: awake and alert and obtunded/minimal responses  Pain score: 1  Pain management: adequate    Airway patency: patent  Anesthetic complications: No anesthetic complications  PONV Status: NA  Cardiovascular status: acceptable and hemodynamically stable  Respiratory status: acceptable, intubated and ventilator  Hydration status: acceptable    Comments: Blood pressure 123/61, pulse 66, temperature 98.1 °F (36.7 °C), temperature source Oral, resp. rate 15, height 165.1 cm (65\"), weight 85.4 kg (188 lb 4.4 oz), SpO2 95%.    Pt discharged from PACU based on shea score >8    "

## 2023-11-29 NOTE — CASE MANAGEMENT/SOCIAL WORK
Continued Stay Note  JONNY Grimm     Patient Name: Alessandra Baker  MRN: [id number]  Today's Date: 11/29/2023    Admit Date: 11/21/2023    Plan: Hopeful home   Discharge Plan       Row Name 11/29/23 1146       Plan    Plan Hopeful home    Patient/Family in Agreement with Plan yes    Plan Comments Order for d/c planning.  Pt just got back to ICU post CABG on vent.  Will follow closely to see how pt progresses with therapy.  Pt lives at home with spouse and had no needs prior to admission. Will follow.                   Discharge Codes    No documentation.                       FRANKLIN Soria

## 2023-11-29 NOTE — ANESTHESIA PROCEDURE NOTES
Airway  Urgency: elective    Date/Time: 11/29/2023 7:19 AM  Airway not difficult    General Information and Staff    Patient location during procedure: OR  CRNA/CAA: Prince Quispe CRNA    Indications and Patient Condition  Indications for airway management: airway protection    Preoxygenated: yes  MILS maintained throughout  Mask difficulty assessment: 1 - vent by mask    Final Airway Details  Final airway type: endotracheal airway      Successful airway: ETT  Cuffed: yes   Successful intubation technique: direct laryngoscopy  Endotracheal tube insertion site: oral  Blade: Glidescope  Blade size: 3  ETT size (mm): 7.0  Cormack-Lehane Classification: grade I - full view of glottis  Placement verified by: chest auscultation and capnometry   Cuff volume (mL): 5  Measured from: lips  ETT/EBT  to lips (cm): 20  Number of attempts at approach: 1    Additional Comments  Intubated by ANABEL Thompson

## 2023-11-29 NOTE — ANESTHESIA PROCEDURE NOTES
Arterial Line    Pre-sedation assessment completed: 11/29/2023 6:58 AM    Patient reassessed immediately prior to procedure    Patient location during procedure: pre-op  Start time: 11/29/2023 6:58 AM  Stop Time:11/29/2023 6:58 AM       Line placed for hemodynamic monitoring.  Performed By   Anesthesiologist: Shanel Dahl MD   Preanesthetic Checklist  Completed: patient identified, IV checked, site marked, risks and benefits discussed, surgical consent, monitors and equipment checked, pre-op evaluation and timeout performed  Arterial Line Prep    Sterile Tech: gloves  Prep: ChloraPrep  Patient monitoring: continuous pulse oximetry  Arterial Line Procedure   Laterality:left  Location:  radial artery  Catheter size: 20 G   Guidance: ultrasound guided  PROCEDURE NOTE/ULTRASOUND INTERPRETATION.  Using ultrasound guidance the potential vascular sites for insertion of the catheter were visualized to determine the patency of the vessel to be used for vascular access.  After selecting the appropriate site for insertion, the needle was visualized under ultrasound being inserted into the radial artery, followed by ultrasound confirmation of wire and catheter placement. There were no abnormalities seen on ultrasound; an image was taken; and the patient tolerated the procedure with no complications.   Number of attempts: 1  Successful placement: yes Images: still images not obtained  Post Assessment   Dressing Type: secured with tape and wrist guard applied.   Complications no  Circ/Move/Sens Assessment: normal and unchanged.   Patient Tolerance: patient tolerated the procedure well with no apparent complications  Additional Notes  Initial attempt by CRNA student, had flash but unable to pass wire. I took over without removing needle,was able to see calcification, advance needle under direct visualization, wire pass easily and angio cath placed in artery.

## 2023-11-29 NOTE — ANESTHESIA PREPROCEDURE EVALUATION
Anesthesia Evaluation     history of anesthetic complications:  prolonged sedation  NPO Solid Status: > 8 hours  NPO Liquid Status: > 8 hours           Airway   Mallampati: I  No difficulty expected  Dental      Pulmonary    (+) a smoker (quit 2013) Former,  Cardiovascular   Exercise tolerance: poor (<4 METS)    (+) hypertension, past MI , CAD, cardiac stents (3 stents most recent 3/2022) more than 12 months ago , dysrhythmias Atrial Fib, hyperlipidemia    ROS comment: Cath 11/22/23  Distal left main disease  On aspirin, brilinta, off brilinta for surgery, last dose therapeutic lovenox yesterday afternoon    Neuro/Psych  (+) CVA, headaches  (-) seizures, TIA  GI/Hepatic/Renal/Endo    (+) GERD, 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 Will proceed with midesophageal views for CHECO)  intravenous induction     Anesthetic plan, risks, benefits, and alternatives have been provided, discussed and informed consent has been obtained with: patient.    CODE STATUS:    Level Of Support Discussed With: Patient  Code Status (Patient has no pulse and is not breathing): CPR (Attempt to Resuscitate)  Medical Interventions (Patient has pulse or is breathing): Full Support

## 2023-11-29 NOTE — PROGRESS NOTES
Patient currently in OR for CABG, MAZE and LAAE by Dr. Granados.  Dr. Granados will take over as attending.    Hospitalist will sign off.     Electronically signed by FITZ Genao, 11/29/23, 7:56 AM CST.

## 2023-11-29 NOTE — PLAN OF CARE
Problem: Adult Inpatient Plan of Care  Goal: Plan of Care Review  Outcome: Ongoing, Not Progressing  Flowsheets  Taken 11/28/2023 1814 by Cassandra Thomas RN  Progress: no change  Outcome Evaluation: pt has been up in chair today. mri completed. few intermittent short runs of afib conversion quickly not requiring iv cardizem gtt. continues on iv tridil gtt. bp stable. afebrile. up to bedside commode. voided well. bm today. tolerated diet. signed consent for plans for surgery in am. family visiting today and updated.  Taken 11/28/2023 1432 by Lourdes Fenton RDN, LD  Plan of Care Reviewed With:   patient   family  Goal: Patient-Specific Goal (Individualized)  Outcome: Ongoing, Progressing  Goal: Absence of Hospital-Acquired Illness or Injury  Outcome: Ongoing, Progressing  Intervention: Identify and Manage Fall Risk  Recent Flowsheet Documentation  Taken 11/28/2023 1800 by Cassandra Thomas RN  Safety Promotion/Fall Prevention:   room organization consistent   safety round/check completed  Taken 11/28/2023 1600 by Cassandra Thomas RN  Safety Promotion/Fall Prevention:   room organization consistent   safety round/check completed  Taken 11/28/2023 1200 by Cassandra Thomas RN  Safety Promotion/Fall Prevention:   room organization consistent   safety round/check completed  Taken 11/28/2023 0953 by Cassandra Thomas RN  Safety Promotion/Fall Prevention:   room organization consistent   safety round/check completed  Intervention: Prevent Skin Injury  Recent Flowsheet Documentation  Taken 11/28/2023 1800 by Cassandra Thomas RN  Body Position: position changed independently  Taken 11/28/2023 1600 by Cassandra Thomas RN  Body Position:   turned   right  Taken 11/28/2023 1400 by Cassandra Thomas RN  Body Position:   turned   sitting up in bed  Taken 11/28/2023 1200 by Cassandra Thomas RN  Body Position:   turned   dangle, side of bed  Taken 11/28/2023 1000 by Cassandra Thomas RN  Body Position:    turned   left  Taken 11/28/2023 0953 by Cassandra Thomas RN  Body Position:   turned   sitting up in bed  Intervention: Prevent and Manage VTE (Venous Thromboembolism) Risk  Recent Flowsheet Documentation  Taken 11/28/2023 1800 by Cassandra Thomas RN  Activity Management: bedrest  Taken 11/28/2023 1700 by Cassandra Thomas RN  Activity Management: bedrest  Taken 11/28/2023 1600 by Cassandra Thomas RN  Activity Management: bedrest  VTE Prevention/Management: (see mar pt up in chair ambulating in room up to bedside commode) other (see comments)  Taken 11/28/2023 1400 by Cassandra Thomas RN  Activity Management: bedrest  Taken 11/28/2023 1300 by Cassandra Thomas RN  Activity Management: bedrest  Taken 11/28/2023 1200 by Cassandra Thomas RN  Activity Management: bedrest  Taken 11/28/2023 1100 by Cassandra Thomas RN  Activity Management: bedrest  Taken 11/28/2023 1000 by Cassandra Thomas RN  Activity Management: bedrest  Taken 11/28/2023 0953 by Cassandra Thomas RN  Activity Management: bedrest  VTE Prevention/Management: (see mar) other (see comments)  Intervention: Prevent Infection  Recent Flowsheet Documentation  Taken 11/28/2023 1600 by Cassandra Thomas RN  Infection Prevention:   cohorting utilized   rest/sleep promoted  Taken 11/28/2023 0953 by Cassandra Thomas RN  Infection Prevention: single patient room provided  Goal: Optimal Comfort and Wellbeing  Outcome: Ongoing, Progressing  Intervention: Monitor Pain and Promote Comfort  Recent Flowsheet Documentation  Taken 11/28/2023 0934 by Cassandra Thomas RN  Pain Management Interventions:   quiet environment facilitated   see MAR  Intervention: Provide Person-Centered Care  Recent Flowsheet Documentation  Taken 11/28/2023 0953 by Cassandra Thomas RN  Trust Relationship/Rapport: care explained  Goal: Readiness for Transition of Care  Outcome: Ongoing, Progressing     Problem: Adult Inpatient Plan of Care  Goal: Optimal Comfort and  Wellbeing  Outcome: Ongoing, Progressing  Intervention: Monitor Pain and Promote Comfort  Recent Flowsheet Documentation  Taken 11/28/2023 0934 by Cassandra Thomas RN  Pain Management Interventions:   quiet environment facilitated   see MAR  Intervention: Provide Person-Centered Care  Recent Flowsheet Documentation  Taken 11/28/2023 0953 by Cassandra Thomas RN  Trust Relationship/Rapport: care explained     Problem: Chest Pain  Goal: Resolution of Chest Pain Symptoms  Outcome: Ongoing, Progressing  Intervention: Manage Acute Chest Pain  Recent Flowsheet Documentation  Taken 11/28/2023 0934 by Cassandra Thomas RN  Chest Pain Intervention: (titrated up) nitroglycerin drip   Goal Outcome Evaluation:  Plan of Care Reviewed With: patient, spouse, daughter        Progress: no change  Outcome Evaluation: pt has been up in chair today. mri completed. few intermittent short runs of afib conversion quickly not requiring iv cardizem gtt. continues on iv tridil gtt. bp stable. afebrile. up to bedside commode. voided well. bm today. tolerated diet. signed consent for plans for surgery in am. family visiting today and updated.

## 2023-11-29 NOTE — ANESTHESIA PROCEDURE NOTES
Intra-Op Anesthesia CHECO    Procedure Performed: Intra-Op Anesthesia CHECO       Start Time:  11/29/2023 7:36 AM       End Time:   11/29/2023 7:44 AM    Preanesthesia Checklist:  Patient identified, IV assessed, risks and benefits discussed, monitors and equipment assessed, procedure being performed at surgeon's request and anesthesia consent obtained.    General Procedure Information  CHECO Placed for monitoring purposes only -- This is not a diagnostic CHECO  Diagnostic Indications for Echo:  hemodynamic monitoring  Location performed:  OR  Intubated  Bite block placed  Heart visualized  Probe Insertion:  Easy  Probe Type:  Multiplane  Modalities:  2D only and color flow mapping        Anesthesia Information  Performed Personally  Anesthesiologist:  Shanel Dahl MD      Echocardiogram Comments:       CHECO placed without any neck extension. Midesophageal views obtained, I elected not to pass into stomach after discussing varices with Dr Granados

## 2023-11-29 NOTE — OP NOTE
Cardiac Surgery Operative Note     Date of Procedure:  11/29/2023     Preoperative Diagnosis:   Coronary artery disease involving native coronary artery of native heart with NSTEMI  Obese, BMI 31.3  Rheumatoid Arthritis  Cirrhosis d/t Hx of Methotrexate  Hypertension  Hyperlipidemia  Former Smoker  GERD  CVA  CKD  Atrial Fibrillation with RVR  Chronic Anemia     Postoperative Diagnosis:  Same     Procedures Performed:  1. Coronary Artery Bypass, using arterial graft; single arterial graft, CPT 38314  2. Coronary Artery bypass, using venous grafts and arterial grafts, 1 venous graft, CPT +37355  3.  Limited MAZE Procedure, Left Atrial with Encompass Clamp, LAAE with 40mm Atriclip, CPT +89182      Surgeon:  Hosea Granados MD  Assistants:  Yolanda Quispe CFA  Anesthesia Staff:  Shanel Dahl MD  Anesthesia Type:  General     Estimated Blood Loss:  Minimal (Cell Saver)     Drains:  1. 24 Tristanian Abiodun drain-left pleural space  2. 24 Tristanian Abiodun drains x2-anterior and posterior mediastinum     Specimens:  None     Operative Indications: Ms. Baker is a 58-year-old female with a complicated past medical history.  She has a history of coronary disease with multiple PCI's to the right coronary artery.  She has a history of cirrhosis due to methotrexate use that she was taking for her chronic rheumatoid arthritis.  She is on chronic immunosuppressants for this.  She presented with NSTEMI is having continuous chest pain despite nitroglycerin drip.  She also had A-fib with RVR.  She underwent coronary angiography that showed severe distal left main disease, proximal and mid LAD disease as well as severe disease in her RCA that her previous stents but poor distal targets, there were left-to-right collaterals.  She had near normal LV function.  Her cirrhosis was Luis F class a.  We had a long discussion about risk and benefits of proceeding with CABG with maze procedure, she understood the risk and benefits and agreed to  proceed.      Operative Findings:    Extensive intrapericardial adhesions fibrinous material and inflammatory changes consistent with pericarditis as well as a moderate size pericardial effusion    Examined PDA PL branches RCA, all less than 1 mm too small for grafting    Excellent arterial conduit. Good-excellent venous conduit. The LAD at site of grafting measured 2 mm in size and had mild atherosclerotic disease burden.   OM 2 measured 1.5 mm in size and had mild atherosclerotic disease burden, was grafted using saphenous vein graft, excellent flow after grafting at 80 ml/min.      Intermittent A-fib prior to case, and indicates in sinus rhythm in the 60s with limited left atrial maze using encompass clamp, closure left atrial appendage with a 40 mm atrial clip    Transesophageal echocardiography showed mild to moderate LV dysfunction at the beginning of case, at the end of the case improved to near normal.       Total aortic cross-clamp time:  52 minutes  Total cardiac bypass time:  78 minutes     Operative description in detail:  The patient was taken to the operative suite where the patient was placed in a supine position.  Induction of general anesthesia and placement of a single-lumen endotracheal tube was performed without remark.  Appropriate arterial and venous access was established without remark.    A right IJ central venous catheter was placed followed by a Admire pulmonary artery catheter by anesthesia staff. The patient was then prepped and draped in the usual and sterile surgical fashion.  A timeout was performed.  Perioperative antibiotics were administered. Beta blocker was given.     A simultaneous team approach was used to harvest both the internal mammary artery as well as the right saphenous vein.  Right saphenous vein was harvested using endoscopic technique in the standard fashion.  It was an adequate length conduit with some sclerotic segments that were excised.  Side branches were  controlled with a combination of suture and clips.  The leg wounds were hemostatic and were closed in anatomic layers using absorbable suture.     Median sternotomy was performed in standard fashion. Hemostasis was ensured along sternal edges.  A Rultract device was used to expose the posterior sternal table.  The left internal mammary artery was taken down using a standard pedicle technique with a combination of electrocautery and clips to control all side branches.  At that time, the patient was systemically anticoagulated with IV heparin.  A 24 Beninese Abiodun drain was placed in the left pleural space.  After suitable time of circulating heparin, clips were placed doubly onto the mammary artery distally and it was divided proximal to the previously placed clips.  It had excellent arterial inflow.  The mammary artery was controlled distally.  The mammary artery harvest bed was hemostatic.  The Rultract device was removed from the sterile field and a sternal retractor was used for exposure.         Midline mediastinal fat and thymus were divided and pericardium opened.  A pericardial well was created.  There were extensive intrapericardial adhesions and fibrinous material consistent with pericarditis.  The distal ascending aorta and right atrial appendage were cannulated for cardiopulmonary bypass in standard fashion.  Aortic line was tested and was satisfactory.  A combined cardioplegia/aortic root vent set was secured with a horizontal mattress 4-0 Prolene suture.  Retrograde cardioplegia catheter was inserted into the coronary sinus to the free wall of the right atrium.  With an appropriate ACT cardiopulmonary bypass was commenced.  I dissected out the LAD for suitable sites for bypass grafting.    The OM was examined and dissected out for suitable bypass sites.  The RCA was dissected out and all branches were too small for grafting.      I then proceeded with left atrial box lesion set using Encompass Clamp.  A  tunnel was made under the SVC into the transverse sinus as well as through the oblique sinus.  The guide red rubber with magnets were placed through these and then the encompass clamp was introduced and advanced through the transverse and oblique sinuses.  With this it was able to make an excellent box set.  2 ablations were performed at 3 different locations using RF.  With ablation there was immediate restoration of sinus rhythm.  I divided the ligament of Ho.  Encompass clamp was removed from the field.  I then measured the left atrial appendage and sized to a 40 mm atrial clip.  Atrial clip was placed in standard fashion.       With that, I proceeded to apply the aortic cross-clamp and administered cardioplegia utilizing standard cardioplegia.  This was given in a combination of antegrade and retrograde fashion.  I ensured during the entire cardioplegia administration the LV did not distend.  Upon achieving electrical-mechanical arrest, I applied topical hypothermia to the ventricle and total standard dose was administered.  Cardioplegia was administered every 15 to 20 minutes using a combination of antegrade, retrograde and graft plegia.     I then directed my attention to the OM.  Coronary arteriotomy was made and augmented to size with Betancur scissors.  The saphenous vein graft was prepared, it was anastomosed in end-to-side fashion using a running 7-0 Prolene suture.  Anastomosis was assessed for hemostasis which was excellent.  It was not tense or torsed.  A dose of cardioplegia was given.  I then measured the vein graft with heart full for proximal anastomosis.  Aortotomy was made and augmented to size using a 4 mm punch.  Proximal anastomosis was made in an end-to-side fashion using a running 6-0 Prolene suture.  Hemostasis was excellent and the aortic root was de-aired while tying the suture.  Additional cardioplegia dose was given.      I directed my attention towards the LAD.  A coronary  arteriotomy was made and augmented to size with Betancur scissors.  It is as per operative findings.  The LIMA was prepared.  The anastomosis was constructed in an end-to-side orientation with running 7-0 Prolene suture.  The anastomosis was assessed for hemostasis which was excellent. It is without tension or torsion.  I did tack the mammary artery pedicle to the anterior aspect of the heart with 6-0 Prolene suture.     With that being accomplished, a terminal hotshot was administered.  The patient was placed in Trendelenburg position.  Upon completion of terminal hotshot and placement of temporary epicardial pacing wires, with the aortic vent on high and pump flows diminished, the aortic cross-clamp was released.  With that, full support was implemented.  A nonworking beating phase was implemented.  Ventilation restored.  I surveyed each graft and each anastomosis was hemostatic and had excellent lay.  The retrograde cardioplegia catheter was removed and site oversewn as a matter of routine.  With all in readiness, the heart was allowed to fill and then weaned off cardiopulmonary bypass.  We removed the aortic root vent/cardioplegia cannula set.  Its associated pursestring suture was tied securely and this was reinforced as per matter of routine. The table was now placed in neutral position.  I decannulated the venous line and snared down its associated pursestring suture.  Systemic intravenous protamine was administered.  All associated blood volume was returned to the patient. With continued good hemodynamics, I decannulated the arterial line and tied down its associated pursestring sutures.  Aortic cannulation site was reinforced as per routine.  At this time I tied down the previously snared pursestring suture.  The mediastinum was drained with 24 K[place] Abiodun drains placed anteriorly and posteriorly.  I surveyed the chest and hemostasis was pristine.  The sternum was reapproximated with stainless sterile wires  placed in an interrupted fashion.  In layers anatomically the soft tissue planes were reapproximated. Instruments, sharps, and sponge counts were reported as correct.      Complications: None     Disposition: Transferred to ICU in stable and guarded condition.     Hosea Granados MD  Cardiothoracic Surgeon

## 2023-11-29 NOTE — ANESTHESIA PROCEDURE NOTES
Central Line    Pre-sedation assessment completed: 11/29/2023 7:21 AM    Patient reassessed immediately prior to procedure    Patient location during procedure: pre-op  Start time: 11/29/2023 7:22 AM  Stop Time:11/29/2023 7:35 AM  Indications: central pressure monitoring, vascular access and MD/Surgeon request  Staff  Anesthesiologist: Shanel Dahl MD  Preanesthetic Checklist  Completed: patient identified, IV checked, site marked, risks and benefits discussed, surgical consent, monitors and equipment checked, pre-op evaluation and timeout performed  Central Line Prep  Sterile Tech:cap, gloves, gown, mask and sterile barriers  Prep: chloraprep  all elements of maximal sterile barrier technique not followed for medical reasons  Patient monitoring: blood pressure monitoring, continuous pulse oximetry and EKG  Central Line Procedure  Laterality:right  Location:internal jugular  Catheter Type:MAC and [place]  Catheter Size:9 Fr  Guidance:ultrasound guidedImages: still images not obtained  Assessment  Post procedure:biopatch applied and line sutured  Assessement:blood return through all ports and free fluid flow  Complications:no  Patient Tolerance:patient tolerated the procedure well with no apparent complications  Additional Notes  Difficulty floating PAC, with positioning and repeat attempts was able to float correctly

## 2023-11-30 ENCOUNTER — APPOINTMENT (OUTPATIENT)
Dept: GENERAL RADIOLOGY | Facility: HOSPITAL | Age: 58
DRG: 234 | End: 2023-11-30
Payer: MEDICARE

## 2023-11-30 LAB
ALBUMIN SERPL-MCNC: 4.2 G/DL (ref 3.5–5.2)
ALBUMIN SERPL-MCNC: 4.3 G/DL (ref 3.5–5.2)
ALP SERPL-CCNC: 38 U/L (ref 39–117)
ALT SERPL W P-5'-P-CCNC: 7 U/L (ref 1–33)
ANION GAP SERPL CALCULATED.3IONS-SCNC: 10 MMOL/L (ref 5–15)
AST SERPL-CCNC: 27 U/L (ref 1–32)
BASOPHILS # BLD AUTO: 0.01 10*3/MM3 (ref 0–0.2)
BASOPHILS NFR BLD AUTO: 0.1 % (ref 0–1.5)
BH BB BLOOD EXPIRATION DATE: NORMAL
BH BB BLOOD TYPE BARCODE: 6200
BH BB DISPENSE STATUS: NORMAL
BH BB PRODUCT CODE: NORMAL
BH BB UNIT NUMBER: NORMAL
BILIRUB CONJ SERPL-MCNC: <0.2 MG/DL (ref 0–0.3)
BILIRUB INDIRECT SERPL-MCNC: ABNORMAL MG/DL
BILIRUB SERPL-MCNC: 0.4 MG/DL (ref 0–1.2)
BUN SERPL-MCNC: 17 MG/DL (ref 6–20)
BUN/CREAT SERPL: 16.7 (ref 7–25)
CALCIUM SPEC-SCNC: 8.9 MG/DL (ref 8.6–10.5)
CHLORIDE SERPL-SCNC: 106 MMOL/L (ref 98–107)
CO2 SERPL-SCNC: 21 MMOL/L (ref 22–29)
CREAT SERPL-MCNC: 1.02 MG/DL (ref 0.57–1)
DEPRECATED RDW RBC AUTO: 51.7 FL (ref 37–54)
EGFRCR SERPLBLD CKD-EPI 2021: 63.5 ML/MIN/1.73
EOSINOPHIL # BLD AUTO: 0 10*3/MM3 (ref 0–0.4)
EOSINOPHIL NFR BLD AUTO: 0 % (ref 0.3–6.2)
ERYTHROCYTE [DISTWIDTH] IN BLOOD BY AUTOMATED COUNT: 15.5 % (ref 12.3–15.4)
GLUCOSE BLDC GLUCOMTR-MCNC: 123 MG/DL (ref 70–130)
GLUCOSE BLDC GLUCOMTR-MCNC: 140 MG/DL (ref 70–130)
GLUCOSE BLDC GLUCOMTR-MCNC: 147 MG/DL (ref 70–130)
GLUCOSE BLDC GLUCOMTR-MCNC: 164 MG/DL (ref 70–130)
GLUCOSE BLDC GLUCOMTR-MCNC: 168 MG/DL (ref 70–130)
GLUCOSE SERPL-MCNC: 166 MG/DL (ref 65–99)
HCT VFR BLD AUTO: 27.1 % (ref 34–46.6)
HGB BLD-MCNC: 8.5 G/DL (ref 12–15.9)
INR PPP: 1.12 (ref 0.91–1.09)
LYMPHOCYTES # BLD AUTO: 0.35 10*3/MM3 (ref 0.7–3.1)
LYMPHOCYTES NFR BLD AUTO: 4.9 % (ref 19.6–45.3)
MAGNESIUM SERPL-MCNC: 1.9 MG/DL (ref 1.6–2.6)
MCH RBC QN AUTO: 28.8 PG (ref 26.6–33)
MCHC RBC AUTO-ENTMCNC: 31.4 G/DL (ref 31.5–35.7)
MCV RBC AUTO: 91.9 FL (ref 79–97)
MONOCYTES # BLD AUTO: 0.46 10*3/MM3 (ref 0.1–0.9)
MONOCYTES NFR BLD AUTO: 6.4 % (ref 5–12)
NEUTROPHILS NFR BLD AUTO: 6.31 10*3/MM3 (ref 1.7–7)
NEUTROPHILS NFR BLD AUTO: 88 % (ref 42.7–76)
PHOSPHATE SERPL-MCNC: 3.8 MG/DL (ref 2.5–4.5)
PLATELET # BLD AUTO: 162 10*3/MM3 (ref 140–450)
PMV BLD AUTO: 10 FL (ref 6–12)
POTASSIUM SERPL-SCNC: 4.1 MMOL/L (ref 3.5–5.2)
PROT SERPL-MCNC: 6 G/DL (ref 6–8.5)
PROTHROMBIN TIME: 14.5 SECONDS (ref 11.8–14.8)
QT INTERVAL: 372 MS
QT INTERVAL: 382 MS
QTC INTERVAL: 426 MS
QTC INTERVAL: 462 MS
RBC # BLD AUTO: 2.95 10*6/MM3 (ref 3.77–5.28)
SODIUM SERPL-SCNC: 137 MMOL/L (ref 136–145)
UNIT  ABO: NORMAL
UNIT  RH: NORMAL
WBC NRBC COR # BLD AUTO: 7.17 10*3/MM3 (ref 3.4–10.8)

## 2023-11-30 PROCEDURE — 82948 REAGENT STRIP/BLOOD GLUCOSE: CPT

## 2023-11-30 PROCEDURE — 93005 ELECTROCARDIOGRAM TRACING: CPT | Performed by: SURGERY

## 2023-11-30 PROCEDURE — 97116 GAIT TRAINING THERAPY: CPT

## 2023-11-30 PROCEDURE — 25010000002 ENOXAPARIN PER 10 MG: Performed by: SURGERY

## 2023-11-30 PROCEDURE — 25010000002 METOCLOPRAMIDE PER 10 MG: Performed by: NURSE PRACTITIONER

## 2023-11-30 PROCEDURE — 25010000002 MAGNESIUM SULFATE 4 GM/100ML SOLUTION: Performed by: SURGERY

## 2023-11-30 PROCEDURE — 97162 PT EVAL MOD COMPLEX 30 MIN: CPT

## 2023-11-30 PROCEDURE — 80053 COMPREHEN METABOLIC PANEL: CPT | Performed by: SURGERY

## 2023-11-30 PROCEDURE — 94664 DEMO&/EVAL PT USE INHALER: CPT

## 2023-11-30 PROCEDURE — 93010 ELECTROCARDIOGRAM REPORT: CPT | Performed by: HOSPITALIST

## 2023-11-30 PROCEDURE — 83735 ASSAY OF MAGNESIUM: CPT | Performed by: SURGERY

## 2023-11-30 PROCEDURE — 85610 PROTHROMBIN TIME: CPT | Performed by: SURGERY

## 2023-11-30 PROCEDURE — 71045 X-RAY EXAM CHEST 1 VIEW: CPT

## 2023-11-30 PROCEDURE — 85025 COMPLETE CBC W/AUTO DIFF WBC: CPT | Performed by: SURGERY

## 2023-11-30 PROCEDURE — 82248 BILIRUBIN DIRECT: CPT | Performed by: SURGERY

## 2023-11-30 PROCEDURE — 84100 ASSAY OF PHOSPHORUS: CPT | Performed by: SURGERY

## 2023-11-30 PROCEDURE — 94799 UNLISTED PULMONARY SVC/PX: CPT

## 2023-11-30 PROCEDURE — 94761 N-INVAS EAR/PLS OXIMETRY MLT: CPT

## 2023-11-30 PROCEDURE — 25010000002 CLINDAMYCIN PER 300 MG: Performed by: SURGERY

## 2023-11-30 RX ORDER — MAGNESIUM SULFATE HEPTAHYDRATE 40 MG/ML
4 INJECTION, SOLUTION INTRAVENOUS ONCE
Status: COMPLETED | OUTPATIENT
Start: 2023-11-30 | End: 2023-11-30

## 2023-11-30 RX ORDER — METOCLOPRAMIDE HYDROCHLORIDE 5 MG/ML
10 INJECTION INTRAMUSCULAR; INTRAVENOUS EVERY 6 HOURS
Status: COMPLETED | OUTPATIENT
Start: 2023-11-30 | End: 2023-12-01

## 2023-11-30 RX ORDER — ACETAMINOPHEN 325 MG/1
650 TABLET ORAL EVERY 6 HOURS
Status: DISCONTINUED | OUTPATIENT
Start: 2023-11-30 | End: 2023-12-06 | Stop reason: HOSPADM

## 2023-11-30 RX ORDER — METHOCARBAMOL 500 MG/1
500 TABLET, FILM COATED ORAL EVERY 8 HOURS SCHEDULED
Status: DISCONTINUED | OUTPATIENT
Start: 2023-11-30 | End: 2023-12-01

## 2023-11-30 RX ORDER — PANTOPRAZOLE SODIUM 40 MG/1
40 TABLET, DELAYED RELEASE ORAL
Status: DISCONTINUED | OUTPATIENT
Start: 2023-11-30 | End: 2023-12-06 | Stop reason: HOSPADM

## 2023-11-30 RX ORDER — LIDOCAINE 50 MG/G
2 PATCH TOPICAL
Qty: 14 PATCH | Refills: 0 | Status: DISCONTINUED | OUTPATIENT
Start: 2023-11-30 | End: 2023-12-06 | Stop reason: HOSPADM

## 2023-11-30 RX ADMIN — CHLORHEXIDINE GLUCONATE 15 ML: 1.2 RINSE ORAL at 18:08

## 2023-11-30 RX ADMIN — OXYCODONE HYDROCHLORIDE 10 MG: 5 TABLET ORAL at 00:15

## 2023-11-30 RX ADMIN — CARVEDILOL 3.12 MG: 3.12 TABLET, FILM COATED ORAL at 08:10

## 2023-11-30 RX ADMIN — OXYCODONE HYDROCHLORIDE 10 MG: 5 TABLET ORAL at 04:17

## 2023-11-30 RX ADMIN — CHLORHEXIDINE GLUCONATE 15 ML: 1.2 RINSE ORAL at 05:46

## 2023-11-30 RX ADMIN — METOCLOPRAMIDE HYDROCHLORIDE 10 MG: 5 INJECTION INTRAMUSCULAR; INTRAVENOUS at 08:07

## 2023-11-30 RX ADMIN — PRAVASTATIN SODIUM 20 MG: 20 TABLET ORAL at 08:10

## 2023-11-30 RX ADMIN — ENOXAPARIN SODIUM 40 MG: 100 INJECTION SUBCUTANEOUS at 08:11

## 2023-11-30 RX ADMIN — SERTRALINE HYDROCHLORIDE 200 MG: 100 TABLET, FILM COATED ORAL at 08:10

## 2023-11-30 RX ADMIN — ACETAMINOPHEN 650 MG: 325 TABLET, FILM COATED ORAL at 14:22

## 2023-11-30 RX ADMIN — SULFASALAZINE 500 MG: 500 TABLET ORAL at 08:08

## 2023-11-30 RX ADMIN — CHLORHEXIDINE GLUCONATE 1 APPLICATION: 500 CLOTH TOPICAL at 03:30

## 2023-11-30 RX ADMIN — METOCLOPRAMIDE HYDROCHLORIDE 10 MG: 5 INJECTION INTRAMUSCULAR; INTRAVENOUS at 20:11

## 2023-11-30 RX ADMIN — POLYETHYLENE GLYCOL 3350 17 G: 17 POWDER, FOR SOLUTION ORAL at 08:10

## 2023-11-30 RX ADMIN — MAGNESIUM SULFATE HEPTAHYDRATE 4 G: 40 INJECTION, SOLUTION INTRAVENOUS at 05:46

## 2023-11-30 RX ADMIN — ACETAMINOPHEN 650 MG: 325 TABLET, FILM COATED ORAL at 20:11

## 2023-11-30 RX ADMIN — OXYCODONE HYDROCHLORIDE 10 MG: 5 TABLET ORAL at 18:08

## 2023-11-30 RX ADMIN — IPRATROPIUM BROMIDE AND ALBUTEROL SULFATE 3 ML: 2.5; .5 SOLUTION RESPIRATORY (INHALATION) at 06:33

## 2023-11-30 RX ADMIN — BISACODYL 10 MG: 5 TABLET ORAL at 08:08

## 2023-11-30 RX ADMIN — PANTOPRAZOLE SODIUM 40 MG: 40 TABLET, DELAYED RELEASE ORAL at 08:08

## 2023-11-30 RX ADMIN — BISACODYL 10 MG: 5 TABLET ORAL at 20:11

## 2023-11-30 RX ADMIN — SODIUM CHLORIDE 600 MG: 9 INJECTION, SOLUTION INTRAVENOUS at 20:12

## 2023-11-30 RX ADMIN — SODIUM CHLORIDE 600 MG: 9 INJECTION, SOLUTION INTRAVENOUS at 12:45

## 2023-11-30 RX ADMIN — ASPIRIN 162 MG: 81 TABLET, CHEWABLE ORAL at 08:08

## 2023-11-30 RX ADMIN — PREGABALIN 25 MG: 25 CAPSULE ORAL at 08:08

## 2023-11-30 RX ADMIN — Medication 1 APPLICATION: at 18:08

## 2023-11-30 RX ADMIN — Medication 1 APPLICATION: at 05:46

## 2023-11-30 RX ADMIN — CARVEDILOL 3.12 MG: 3.12 TABLET, FILM COATED ORAL at 18:08

## 2023-11-30 RX ADMIN — LIDOCAINE 2 PATCH: 700 PATCH TOPICAL at 04:15

## 2023-11-30 RX ADMIN — OXYCODONE HYDROCHLORIDE 10 MG: 5 TABLET ORAL at 08:08

## 2023-11-30 RX ADMIN — PREGABALIN 25 MG: 25 CAPSULE ORAL at 20:11

## 2023-11-30 RX ADMIN — METOCLOPRAMIDE HYDROCHLORIDE 10 MG: 5 INJECTION INTRAMUSCULAR; INTRAVENOUS at 14:22

## 2023-11-30 RX ADMIN — SODIUM CHLORIDE 600 MG: 9 INJECTION, SOLUTION INTRAVENOUS at 04:15

## 2023-11-30 NOTE — PLAN OF CARE
Goal Outcome Evaluation:  Plan of Care Reviewed With: other (see comments)        Progress: no change  Outcome Evaluation: Ntn follow up. Pt s/p CABG yesterday; extubated overnight. Oral intake insufficient at this time. Will cont to follow for education needs as appropriate. Cont to follow for plan of care.

## 2023-11-30 NOTE — PLAN OF CARE
Goal Outcome Evaluation:  Plan of Care Reviewed With: patient           Outcome Evaluation: PT eval complete. She is alert and oriented x 4. She was educated on sternal precautions. She needs CGA/min assist to stand and to ambulate. She ambulates with a good pace around the unit, 200 ft. Demos generalized weakness and rates her pain 2/10 with activity. PT will cont with mobility, balance, and strengthening. Anticipate she will cont to recover and d.c home with spouse.      Anticipated Discharge Disposition (PT): home with assist

## 2023-11-30 NOTE — PLAN OF CARE
Problem: Adult Inpatient Plan of Care  Goal: Plan of Care Review  Outcome: Ongoing, Not Progressing  Goal: Patient-Specific Goal (Individualized)  Outcome: Ongoing, Not Progressing  Goal: Absence of Hospital-Acquired Illness or Injury  Outcome: Ongoing, Not Progressing  Intervention: Identify and Manage Fall Risk  Recent Flowsheet Documentation  Taken 11/30/2023 0600 by Janneth Martinez RN  Safety Promotion/Fall Prevention: safety round/check completed  Taken 11/30/2023 0500 by Janneth Martinez RN  Safety Promotion/Fall Prevention: safety round/check completed  Taken 11/30/2023 0417 by Janneth Martinez RN  Safety Promotion/Fall Prevention: safety round/check completed  Taken 11/30/2023 0300 by Janneth Martinez RN  Safety Promotion/Fall Prevention: safety round/check completed  Taken 11/30/2023 0202 by Janneth Martinez RN  Safety Promotion/Fall Prevention: safety round/check completed  Taken 11/30/2023 0100 by Janneth Martinez RN  Safety Promotion/Fall Prevention: safety round/check completed  Taken 11/30/2023 0000 by Janneth Martinez RN  Safety Promotion/Fall Prevention: safety round/check completed  Taken 11/29/2023 2300 by Janneth Martinez RN  Safety Promotion/Fall Prevention: safety round/check completed  Taken 11/29/2023 2200 by Janneth Martinez RN  Safety Promotion/Fall Prevention: safety round/check completed  Taken 11/29/2023 2100 by Janneth Martinez RN  Safety Promotion/Fall Prevention: safety round/check completed  Taken 11/29/2023 1952 by Janneth Martinez RN  Safety Promotion/Fall Prevention: safety round/check completed  Intervention: Prevent Skin Injury  Recent Flowsheet Documentation  Taken 11/30/2023 0600 by Janneth Martinez RN  Body Position: weight shifting  Taken 11/30/2023 0417 by Janneth Martinez RN  Body Position: weight shifting  Taken 11/30/2023 0202 by Janneth Martinez RN  Body Position:   tilted   right   upper extremity elevated   lower extremity elevated  Taken  11/30/2023 0000 by Janneth Martinez RN  Body Position: sitting up in bed  Taken 11/29/2023 2200 by Janneth Martinez RN  Body Position:   tilted   left   upper extremity elevated   lower extremity elevated  Taken 11/29/2023 1952 by Janneth Martinez RN  Body Position:   tilted   right   upper extremity elevated   lower extremity elevated  Intervention: Prevent and Manage VTE (Venous Thromboembolism) Risk  Recent Flowsheet Documentation  Taken 11/30/2023 0600 by Janneth Martinez RN  Activity Management: up in chair  Taken 11/30/2023 0417 by Janneth Martinez RN  Activity Management: up in chair  VTE Prevention/Management:   left   sequential compression devices on  Taken 11/30/2023 0202 by Janneth Martinez RN  Activity Management: bedrest  Taken 11/30/2023 0000 by Janneth Martinez RN  Activity Management: bedrest  VTE Prevention/Management:   left   sequential compression devices on  Taken 11/29/2023 2200 by Janneth Martinez RN  Activity Management: bedrest  Taken 11/29/2023 1952 by Janneth Martinez RN  Activity Management: bedrest  VTE Prevention/Management:   left   sequential compression devices on  Range of Motion: active ROM (range of motion) encouraged  Goal: Optimal Comfort and Wellbeing  Outcome: Ongoing, Not Progressing  Intervention: Monitor Pain and Promote Comfort  Recent Flowsheet Documentation  Taken 11/30/2023 0600 by Janneth Martinez RN  Pain Management Interventions: position adjusted  Taken 11/30/2023 0500 by Janneth Martinez RN  Pain Management Interventions: position adjusted  Taken 11/30/2023 0417 by Janneth Martinez RN  Pain Management Interventions:   see MAR   position adjusted  Taken 11/30/2023 0300 by Janneth Martinze RN  Pain Management Interventions: position adjusted  Taken 11/30/2023 0202 by Janneth Martinez RN  Pain Management Interventions: position adjusted  Taken 11/30/2023 0000 by Janneth Martinez RN  Pain Management Interventions:   see MAR   position  adjusted  Taken 11/29/2023 2200 by Janneth Martinez, RN  Pain Management Interventions: position adjusted  Taken 11/29/2023 1952 by Janneth Martinez RN  Pain Management Interventions: see MAR  Intervention: Provide Person-Centered Care  Recent Flowsheet Documentation  Taken 11/30/2023 0417 by Janneth Martinez, RN  Trust Relationship/Rapport:   care explained   questions answered  Taken 11/30/2023 0000 by Janneth Martinez RN  Trust Relationship/Rapport:   care explained   questions answered  Taken 11/29/2023 1952 by Janneth Martinez, RN  Trust Relationship/Rapport:   care explained   questions answered  Goal: Readiness for Transition of Care  Outcome: Ongoing, Not Progressing   Goal Outcome Evaluation:

## 2023-11-30 NOTE — PROGRESS NOTES
"Patient name: Alessandra Baker  Patient : 1965  VISIT # [id number]  MR #[id number]    Procedure:Procedure(s):  CORONARY ARTERY BYPASS GRAFTING X2, LEFT INTERNAL MAMMARY ARTERY GRAFT, RIGHT LEG ENDOSCOPIC VEIN HARVEST, TRANSESOPHAGEAL ECHOCARDIOGRAM  MAZE PROCEDURE WITH ENCOMPASS CLAMP  ATRIAL APPENDAGE EXCLUSION LEFT WITH 40MM ATRICLIP  Procedure Date:2023  POD:1 Day Post-Op    Subjective     Extubated overnight tolerating 2 L nasal cannula.  Weight is up 11 pounds from baseline.  Creatinine today is 1.02.  Due to walk with physical therapy.  Complains of pain this morning.    Telemetry: Paced at 90  IV drips: None           Objective     Visit Vitals  /73   Pulse 90   Temp 98.9 °F (37.2 °C) (Oral)   Resp 12   Ht 165.1 cm (65\")   Wt 91.2 kg (201 lb)   SpO2 97%   BMI 33.45 kg/m²   Baseline weight 190 pounds    Intake/Output Summary (Last 24 hours) at 2023 0830  Last data filed at 2023 0600  Gross per 24 hour   Intake 4467.7 ml   Output 2615 ml   Net 1852.7 ml     MCT: 285 mL in 24 hours, serosanguineous, no airleak  Left pleural drain: 350 mL in 24 hours, serosanguineous, no airleak    Lab:     CBC:  Results from last 7 days   Lab Units 23  0330 23  1531 23  1150   WBC 10*3/mm3 7.17 10.18 5.47   HEMATOCRIT % 27.1* 32.3* 30.5*   PLATELETS 10*3/mm3 162 187 141          BMP:  Results from last 7 days   Lab Units 23  0330 23  1531 23  1157 23  1150   SODIUM mmol/L 137 140  140  --  139   SODIUM, ARTERIAL mmol/L  --   --  139  --    POTASSIUM mmol/L 4.1 4.6  4.6  --  4.7   CHLORIDE mmol/L 106 108*  108*  --  108*   CO2 mmol/L 21.0* 21.0*  22.0  --  24.0   GLUCOSE mg/dL 166* 161*  161*  --  191*   BUN mg/dL 17 18  19  --  19   CREATININE mg/dL 1.02* 1.17*  1.18*  --  1.06*          COAG:  Results from last 7 days   Lab Units 23  0330 23  1150   INR  1.12* 1.20*   APTT seconds  --  31.8       IMAGES:       Imaging Results (Last 24 Hours) "       Procedure Component Value Units Date/Time    XR Chest 1 View [[id number]] Collected: 11/30/23 0715     Updated: 11/30/23 0719    Narrative:      EXAMINATION:  XR CHEST 1 VW-  11/30/2023 3:05 AM CST     HISTORY: Postop heart surgery.     COMPARISON: 11/29/2023.     TECHNIQUE: Single view AP image.     FINDINGS: The patient is extubated. [place] catheter has been removed.  The introducer sheath remains in place. There is continued  hypoventilation with vascular crowding. There is atelectasis in the lung  bases. Heart size upper limits of normal.          Impression:      1. Patient extubated. [place] catheter removed.  2. Hypoventilation with vascular crowding. Atelectasis in both lung  bases.           This report was signed and finalized on 11/30/2023 7:15 AM CST by Dr. Job Olmedo MD.       XR Chest 1 View [[id number]] Collected: 11/29/23 1337     Updated: 11/29/23 1341    Narrative:      EXAMINATION:  XR CHEST 1 VW-  11/29/2023 12:38 PM CST     HISTORY: Post-Op Check Line & Tube Placement; I25.9-Chronic ischemic  heart disease, unspecified; I48.0-Paroxysmal atrial fibrillation;  I21.4-Non-ST elevation (NSTEMI) myocardial infarction.     COMPARISON: 11/28/2023.     TECHNIQUE: Single view AP image.     FINDINGS: The patient is status post heart surgery. There has been  median sternotomy. The patient is intubated with the endotracheal tube  tip at the T3-4 level. There is a right IJ [place] catheter with  catheter tip in the pulmonary outflow tract region. There are 2  mediastinal drains. There is a left chest tube. There is hypoventilation  with vascular crowding. There is mild atelectasis in the lung bases.  Mild bronchial wall thickening is stable. No other acute bony  abnormality is seen other than median sternotomy.          Impression:      1. Status post heart surgery with placement of various lines and tubes,  as described.  2. Hypoventilation with vascular crowding centrally and atelectasis  in  both lung bases.           This report was signed and finalized on 11/29/2023 1:38 PM CST by Dr. Job Olmedo MD.             CXR: Chest tubes in stable position with no pneumothorax.  Bibasilar atelectasis and a large gastric bubble.    Physical Exam:  General: Alert, oriented. No apparent distress.  Obese  Cardiovascular: Regular rate and rhythm without murmur, rubs, or gallops.    Pulmonary: Clear to auscultation bilaterally without wheezing, rubs, or rales.  Chest: Sternotomy incision clean, dry, and intact with Prevena. Sternum stable. No clicks. Chest tubes to 20 cm suction. No air leak. Fluid is serosanguineous.   Abdomen: Soft, nondistended, and nontender.  Extremities: Warm, moves all extremities. No edema. Saphenectomy site clean, dry, and intact with Ace wrap  Neurologic:  Grossly intact with no focal deficits.            Impression:  Coronary artery disease  NSTEMI  Obesity, BMI 31  Rheumatoid arthritis  Cirrhosis due to history of methotrexate use  Hypertension, well controlled  Hyperlipidemia, on statin  Former smoker  GERD, on PPI  Atrial fibrillation with RVR, s/p Maze procedure  Chronic anemia, stable        Plan:  Begin bowel regimen.  Reglan 10 mg IV every 6 hours x1 day  Okay for scheduled Tylenol  Daily CMP for 4 days  Continue pacing  Routine postcardiac surgery care  Add hepatic panel to today's labs  Encourage pulmonary toilet and ambulation  Keep chest tubes today  Remain in ICU  Discussed with patient and nursing      FITZ Garrett  11/30/23  08:30 CST

## 2023-11-30 NOTE — THERAPY EVALUATION
Patient Name: Alessandra Baker  : 1965    MRN: [id number]                              Today's Date: 2023       Admit Date: 2023    Visit Dx:     ICD-10-CM ICD-9-CM   1. Chest pain due to myocardial ischemia, unspecified ischemic chest pain type  I25.9 786.50   2. Paroxysmal atrial fibrillation  I48.0 427.31   3. NSTEMI (non-ST elevated myocardial infarction)  I21.4 410.70   4. Impaired mobility [Z74.09]  Z74.09 799.89     Patient Active Problem List   Diagnosis    History of colon polyps    Esophageal varices without bleeding    Coronary artery disease    Mixed hyperlipidemia    Chest pain    Rheumatoid arthritis involving multiple sites with positive rheumatoid factor    Atrial fibrillation with RVR    Central stenosis of spinal canal    Neuroforaminal stenosis of cervical spine    NSTEMI (non-ST elevated myocardial infarction)    Stage 3a chronic kidney disease     Past Medical History:   Diagnosis Date    Anxiety     Arthritis     Atrial fibrillation with RVR 2023    CAD (coronary artery disease)     Chronic kidney disease     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    Depression     Esophageal varices     GERD (gastroesophageal reflux disease)     Headache     Heart problem     angina    Hyperlipidemia     Hypertension 2012    Liver disease     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    Liver problem     Myocardial infarction 2012    NSTEMI    Rheumatoid arthritis     Skin cancer     Stroke     showed up on mri about 5 years ago with no residual    Ulnar neuropathy     Visual impairment      Past Surgical History:   Procedure Laterality Date    ANGIOPLASTY      2012    APPENDECTOMY      ATRIAL APPENDAGE EXCLUSION LEFT WITH TRANSESOPHAGEAL ECHOCARDIOGRAM N/A 2023    Procedure: ATRIAL APPENDAGE EXCLUSION LEFT WITH 40MM ATRICLIP;  Surgeon: Hosea Granados MD;  Location: [facility];  Service: Cardiothoracic;  Laterality: N/A;    CARDIAC CATHETERIZATION  2015, 22    CARDIAC  CATHETERIZATION Left 11/22/2023    Procedure: Cardiac Catheterization/Vascular Study;  Surgeon: Jayson Castillo MD;  Location: [facility] CATH INVASIVE LOCATION;  Service: Cardiology;  Laterality: Left;    COLONOSCOPY      COLONOSCOPY N/A 04/24/2023    Procedure: COLONOSCOPY WITH ANESTHESIA;  Surgeon: Rick Tidwell DO;  Location: [facility] ENDOSCOPY;  Service: Gastroenterology;  Laterality: N/A;  preop; hx of polyps   postop; polyps  PCP Eric Feldman     CORONARY ARTERY BYPASS GRAFT N/A 11/29/2023    Procedure: CORONARY ARTERY BYPASS GRAFTING X2, LEFT INTERNAL MAMMARY ARTERY GRAFT, RIGHT LEG ENDOSCOPIC VEIN HARVEST, TRANSESOPHAGEAL ECHOCARDIOGRAM, APPLICATION OF PREVENA;  Surgeon: Hosea Granados MD;  Location: [facility] OR;  Service: Cardiothoracic;  Laterality: N/A;    CORONARY STENT PLACEMENT      x3 per patient, X 1 -  11/2012, x 2 - 4/21/22    ENDOSCOPY      ENDOSCOPY N/A 04/24/2023    Procedure: ESOPHAGOGASTRODUODENOSCOPY WITH ANESTHESIA;  Surgeon: Rick Tidwell DO;  Location: [facility] ENDOSCOPY;  Service: Gastroenterology;  Laterality: N/A;  preop; hx of varices   postop; varices   PCP Eric Feldman     HAND RECONSTRUCTION Right 06/15/1967    HYSTERECTOMY      2016    MAZE PROCEDURE N/A 11/29/2023    Procedure: MAZE PROCEDURE WITH ENCOMPASS CLAMP;  Surgeon: Hosea Granados MD;  Location: [facility] OR;  Service: Cardiothoracic;  Laterality: N/A;    SUBTOTAL HYSTERECTOMY      TONSILLECTOMY        General Information       Row Name 11/30/23 0914          Physical Therapy Time and Intention    Document Type evaluation  CAD, NSTEMI s/p 11/29 CABG x 2, R EVH.  -CHELI     Mode of Treatment physical therapy  -CHELI       Row Name 11/30/23 0914          General Information    Patient Profile Reviewed yes  -CHELI     Prior Level of Function independent:;all household mobility;ADL's  -CHELI     Existing Precautions/Restrictions fall;sternal;oxygen therapy device and L/min  chest tube x 2, prevena w/v, external pacemaker  -CHELI      Barriers to Rehab medically complex  -CHELI       Row Name 11/30/23 0914          Living Environment    People in Home spouse  -CHELI       Row Name 11/30/23 0914          Home Main Entrance    Number of Stairs, Main Entrance one  -CHELI       Row Name 11/30/23 0914          Stairs Within Home, Primary    Number of Stairs, Within Home, Primary none  -CHELI       Row Name 11/30/23 0914          Cognition    Orientation Status (Cognition) oriented x 4  -CHELI       Row Name 11/30/23 0914          Safety Issues, Functional Mobility    Impairments Affecting Function (Mobility) balance;endurance/activity tolerance;strength;shortness of breath;pain  -CHELI               User Key  (r) = Recorded By, (t) = Taken By, (c) = Cosigned By      Initials Name Provider Type    Dwight Carbajal, PT DPT Physical Therapist                   Mobility       Row Name 11/30/23 0914          Bed Mobility    Comment, (Bed Mobility) in chair  -CHELI       Row Name 11/30/23 0914          Sit-Stand Transfer    Sit-Stand [place] (Transfers) contact guard;minimum assist (75% patient effort)  -CHELI       Row Name 11/30/23 0914          Gait/Stairs (Locomotion)    [place] Level (Gait) contact guard;minimum assist (75% patient effort)  -CHELI     Distance in Feet (Gait) 200 ft  -CHELI     Comment, (Gait/Stairs) demos a good pace around the unit  -CHELI               User Key  (r) = Recorded By, (t) = Taken By, (c) = Cosigned By      Initials Name Provider Type    Dwight Carbajal, PT DPT Physical Therapist                   Obj/Interventions       Row Name 11/30/23 0914          Range of Motion Comprehensive    General Range of Motion bilateral lower extremity ROM WFL  -CHELI       Row Name 11/30/23 0914          Strength Comprehensive (MMT)    Comment, General Manual Muscle Testing (MMT) Assessment B LE grossly 4/5  -CHELI       Row Name 11/30/23 0914          Balance    Balance Assessment sitting dynamic balance;standing dynamic balance  -CHELI     Dynamic Sitting  Balance supervision  -CHELI     Position, Sitting Balance unsupported;sitting in chair  -CHELI     Dynamic Standing Balance contact guard;minimal assist  -CHELI     Position/Device Used, Standing Balance supported  -CHELI               User Key  (r) = Recorded By, (t) = Taken By, (c) = Cosigned By      Initials Name Provider Type    Dwight Carbajal, PT DPT Physical Therapist                   Goals/Plan       Row Name 11/30/23 0914          Bed Mobility Goal 1 (PT)    Activity/Assistive Device (Bed Mobility Goal 1, PT) sit to supine/supine to sit  -CHELI     [place] Level/Cues Needed (Bed Mobility Goal 1, PT) supervision required  -CHELI     Time Frame (Bed Mobility Goal 1, PT) long term goal (LTG);10 days  -CHELI     Progress/Outcomes (Bed Mobility Goal 1, PT) new goal  -CHELI       Row Name 11/30/23 0914          Transfer Goal 1 (PT)    Activity/Assistive Device (Transfer Goal 1, PT) sit-to-stand/stand-to-sit;bed-to-chair/chair-to-bed  -CHELI     [place] Level/Cues Needed (Transfer Goal 1, PT) supervision required  -CHELI     Time Frame (Transfer Goal 1, PT) long term goal (LTG);10 days  -CHELI     Progress/Outcome (Transfer Goal 1, PT) new goal  -CHELI       Row Name 11/30/23 0914          Gait Training Goal 1 (PT)    Activity/Assistive Device (Gait Training Goal 1, PT) gait (walking locomotion);decrease fall risk;improve balance and speed;increase endurance/gait distance  -CHELI     [place] Level (Gait Training Goal 1, PT) supervision required  -CHELI     Distance (Gait Training Goal 1, PT) 250 ft  -CHELI     Time Frame (Gait Training Goal 1, PT) long term goal (LTG);10 days  -CHELI     Progress/Outcome (Gait Training Goal 1, PT) new goal  -CHELI       Row Name 11/30/23 0914          Stairs Goal 1 (PT)    Activity/Assistive Device (Stairs Goal 1, PT) ascending stairs;descending stairs  -CHELI     [place] Level/Cues Needed (Stairs Goal 1, PT) supervision required  -CHELI     Number of Stairs (Stairs Goal 1, PT) 1-2 steps  -CHELI     Time Frame  (Stairs Goal 1, PT) long term goal (LTG);10 days  -CHELI     Progress/Outcome (Stairs Goal 1, PT) new goal  -CHELI       Row Name 11/30/23 0914          Therapy Assessment/Plan (PT)    Planned Therapy Interventions (PT) bed mobility training;transfer training;gait training;balance training;home exercise program;patient/family education;postural re-education;stair training;strengthening  -CHELI               User Key  (r) = Recorded By, (t) = Taken By, (c) = Cosigned By      Initials Name Provider Type    Dwight Carbajal, PT DPT Physical Therapist                   Clinical Impression       Row Name 11/30/23 0914          Pain    Pretreatment Pain Rating 2/10  -CHELI     Pain Location incisional  -CHELI     Pain Location - chest  -CHELI     Pain Intervention(s) Repositioned;Ambulation/increased activity  -CHELI       Row Name 11/30/23 0914          Plan of Care Review    Plan of Care Reviewed With patient  -CHELI     Outcome Evaluation PT eval complete. She is alert and oriented x 4. She was educated on sternal precautions. She needs CGA/min assist to stand and to ambulate. She ambulates with a good pace around the unit, 200 ft. Demos generalized weakness and rates her pain 2/10 with activity. PT will cont with mobility, balance, and strengthening. Anticipate she will cont to recover and d.c home with spouse.  -CHELI       Row Name 11/30/23 0914          Therapy Assessment/Plan (PT)    Patient/Family Therapy Goals Statement (PT) go home  -CHELI     Rehab Potential (PT) good, to achieve stated therapy goals  -CHELI     Criteria for Skilled Interventions Met (PT) yes;meets criteria;skilled treatment is necessary  -CHELI     Therapy Frequency (PT) 2 times/day  -CHELI     Predicted Duration of Therapy Intervention (PT) until d.c  -CHELI       Row Name 11/30/23 0914          Vital Signs    Pre Systolic BP Rehab 104  -CHELI     Pre Treatment Diastolic BP 73  -CHELI     Post Systolic BP Rehab 99  -CHELI     Post Treatment Diastolic BP 65  -CHELI     Pretreatment Heart  Rate (beats/min) 90  -CHELI     Posttreatment Heart Rate (beats/min) 90  -CHELI     Pre SpO2 (%) 95  -CHELI     O2 Delivery Pre Treatment nasal cannula  -CHELI     O2 Delivery Intra Treatment nasal cannula  -CHELI     Post SpO2 (%) 95  -CHELI     O2 Delivery Post Treatment nasal cannula  -CHELI     Pre Patient Position Sitting  -CHELI     Intra Patient Position Standing  -CHELI     Post Patient Position Sitting  -CHELI       Row Name 11/30/23 0914          Positioning and Restraints    Pre-Treatment Position sitting in chair/recliner  -CHELI     Post Treatment Position chair  -CHELI     In Chair notified nsg;reclined;call light within reach;encouraged to call for assist;patient within staff view;RUE elevated;LUE elevated;heels elevated  -CHELI               User Key  (r) = Recorded By, (t) = Taken By, (c) = Cosigned By      Initials Name Provider Type    Dwight Carbajal, PT DPT Physical Therapist                   Outcome Measures       Row Name 11/30/23 0914 11/30/23 0800       How much help from another person do you currently need...    Turning from your back to your side while in flat bed without using bedrails? 2  -CHELI 3  -SP    Moving from lying on back to sitting on the side of a flat bed without bedrails? 2  -CHELI 3  -SP    Moving to and from a bed to a chair (including a wheelchair)? 3  -CHELI 3  -SP    Standing up from a chair using your arms (e.g., wheelchair, bedside chair)? 3  -CHELI 3  -SP    Climbing 3-5 steps with a railing? 2  -CHELI 3  -SP    To walk in hospital room? 3  -CHELI 3  -SP    AM-PAC 6 Clicks Score (PT) 15  -CHELI 18  -SP    Highest Level of Mobility Goal 4 --> Transfer to chair/commode  -CHELI 6 --> Walk 10 steps or more  -SP      Row Name 11/30/23 0914          Functional Assessment    Outcome Measure Options AM-PAC 6 Clicks Basic Mobility (PT)  -CHELI               User Key  (r) = Recorded By, (t) = Taken By, (c) = Cosigned By      Initials Name Provider Type    Dwight Carbajal, PT DPT Physical Therapist    Miranda Martinez RN  Registered Nurse                                 Physical Therapy Education       Title: PT OT SLP Therapies (In Progress)       Topic: Physical Therapy (In Progress)       Point: Mobility training (Done)       Learning Progress Summary             Patient Acceptance, E, TAMERA,BIJAL by CHELI at 11/30/2023 0914    Comment: benefits of activity, progression of PT, sternal prec                         Point: Home exercise program (Not Started)       Learner Progress:  Not documented in this visit.              Point: Body mechanics (Not Started)       Learner Progress:  Not documented in this visit.              Point: Precautions (Done)       Learning Progress Summary             Patient Acceptance, E, TAMERA,BIJAL by CHELI at 11/30/2023 0914    Comment: benefits of activity, progression of PT, sternal prec                                         User Key       Initials Effective Dates Name Provider Type Discipline    CHELI 02/03/23 -  Dwight Rae, PT DPT Physical Therapist PT                  PT Recommendation and Plan  Planned Therapy Interventions (PT): bed mobility training, transfer training, gait training, balance training, home exercise program, patient/family education, postural re-education, stair training, strengthening  Plan of Care Reviewed With: patient  Outcome Evaluation: PT eval complete. She is alert and oriented x 4. She was educated on sternal precautions. She needs CGA/min assist to stand and to ambulate. She ambulates with a good pace around the unit, 200 ft. Demos generalized weakness and rates her pain 2/10 with activity. PT will cont with mobility, balance, and strengthening. Anticipate she will cont to recover and d.c home with spouse.     Time Calculation:         PT Charges       Row Name 11/30/23 1013             Time Calculation    Start Time 0914  -CHELI      Stop Time 1010  -CHELI      Time Calculation (min) 56 min  -CHELI      PT Received On 11/30/23  -CHELI      PT Goal Re-Cert Due Date 12/10/23  -CHELI                 User Key  (r) = Recorded By, (t) = Taken By, (c) = Cosigned By      Initials Name Provider Type    Dwight Carbajal, PT DPT Physical Therapist                  Therapy Charges for Today       Code Description Service Date Service Provider Modifiers Qty    95522048838 HC PT EVAL MOD COMPLEXITY 4 11/30/2023 Dwight Rae, PT DPT GP 1            PT G-Codes  Outcome Measure Options: AM-PAC 6 Clicks Basic Mobility (PT)  AM-PAC 6 Clicks Score (PT): 15  PT Discharge Summary  Anticipated Discharge Disposition (PT): home with assist    Dwight Rae, PT DPT  11/30/2023     No

## 2023-11-30 NOTE — CASE MANAGEMENT/SOCIAL WORK
Continued Stay Note   [facility]     Patient Name: Alessandra Baker  MRN: [id number]  Today's Date: 11/30/2023    Admit Date: 11/21/2023    Plan: Home   Discharge Plan       Row Name 11/30/23 1106       Plan    Plan Home    Plan Comments Patient continues to plan to return home upon discharge.  SW following.                   Discharge Codes    No documentation.                       ARLETTE Reagan

## 2023-12-01 ENCOUNTER — APPOINTMENT (OUTPATIENT)
Dept: GENERAL RADIOLOGY | Facility: HOSPITAL | Age: 58
DRG: 234 | End: 2023-12-01
Payer: MEDICARE

## 2023-12-01 LAB
ALBUMIN SERPL-MCNC: 4 G/DL (ref 3.5–5.2)
ALBUMIN/GLOB SERPL: 1.6 G/DL
ALP SERPL-CCNC: 48 U/L (ref 39–117)
ALT SERPL W P-5'-P-CCNC: 8 U/L (ref 1–33)
ANION GAP SERPL CALCULATED.3IONS-SCNC: 11 MMOL/L (ref 5–15)
ANION GAP SERPL CALCULATED.3IONS-SCNC: 11 MMOL/L (ref 5–15)
AST SERPL-CCNC: 24 U/L (ref 1–32)
BILIRUB SERPL-MCNC: 0.4 MG/DL (ref 0–1.2)
BUN SERPL-MCNC: 21 MG/DL (ref 6–20)
BUN SERPL-MCNC: 23 MG/DL (ref 6–20)
BUN/CREAT SERPL: 19.4 (ref 7–25)
BUN/CREAT SERPL: 21.5 (ref 7–25)
CALCIUM SPEC-SCNC: 8 MG/DL (ref 8.6–10.5)
CALCIUM SPEC-SCNC: 8.7 MG/DL (ref 8.6–10.5)
CHLORIDE SERPL-SCNC: 100 MMOL/L (ref 98–107)
CHLORIDE SERPL-SCNC: 102 MMOL/L (ref 98–107)
CO2 SERPL-SCNC: 20 MMOL/L (ref 22–29)
CO2 SERPL-SCNC: 21 MMOL/L (ref 22–29)
CREAT SERPL-MCNC: 1.07 MG/DL (ref 0.57–1)
CREAT SERPL-MCNC: 1.08 MG/DL (ref 0.57–1)
DEPRECATED RDW RBC AUTO: 52.9 FL (ref 37–54)
DEPRECATED RDW RBC AUTO: 53.2 FL (ref 37–54)
EGFRCR SERPLBLD CKD-EPI 2021: 59.3 ML/MIN/1.73
EGFRCR SERPLBLD CKD-EPI 2021: 60 ML/MIN/1.73
ERYTHROCYTE [DISTWIDTH] IN BLOOD BY AUTOMATED COUNT: 16 % (ref 12.3–15.4)
ERYTHROCYTE [DISTWIDTH] IN BLOOD BY AUTOMATED COUNT: 16.1 % (ref 12.3–15.4)
GLOBULIN UR ELPH-MCNC: 2.5 GM/DL
GLUCOSE SERPL-MCNC: 115 MG/DL (ref 65–99)
GLUCOSE SERPL-MCNC: 126 MG/DL (ref 65–99)
HCT VFR BLD AUTO: 26.2 % (ref 34–46.6)
HCT VFR BLD AUTO: 27.9 % (ref 34–46.6)
HGB BLD-MCNC: 8.4 G/DL (ref 12–15.9)
HGB BLD-MCNC: 8.9 G/DL (ref 12–15.9)
MAGNESIUM SERPL-MCNC: 2.6 MG/DL (ref 1.6–2.6)
MCH RBC QN AUTO: 28.9 PG (ref 26.6–33)
MCH RBC QN AUTO: 29 PG (ref 26.6–33)
MCHC RBC AUTO-ENTMCNC: 31.9 G/DL (ref 31.5–35.7)
MCHC RBC AUTO-ENTMCNC: 32.1 G/DL (ref 31.5–35.7)
MCV RBC AUTO: 90 FL (ref 79–97)
MCV RBC AUTO: 90.9 FL (ref 79–97)
PLATELET # BLD AUTO: 152 10*3/MM3 (ref 140–450)
PLATELET # BLD AUTO: 196 10*3/MM3 (ref 140–450)
PMV BLD AUTO: 10.1 FL (ref 6–12)
PMV BLD AUTO: 9.7 FL (ref 6–12)
POTASSIUM SERPL-SCNC: 4 MMOL/L (ref 3.5–5.2)
POTASSIUM SERPL-SCNC: 4.4 MMOL/L (ref 3.5–5.2)
PROT SERPL-MCNC: 6.5 G/DL (ref 6–8.5)
RBC # BLD AUTO: 2.91 10*6/MM3 (ref 3.77–5.28)
RBC # BLD AUTO: 3.07 10*6/MM3 (ref 3.77–5.28)
SODIUM SERPL-SCNC: 131 MMOL/L (ref 136–145)
SODIUM SERPL-SCNC: 134 MMOL/L (ref 136–145)
WBC NRBC COR # BLD AUTO: 10.08 10*3/MM3 (ref 3.4–10.8)
WBC NRBC COR # BLD AUTO: 7.32 10*3/MM3 (ref 3.4–10.8)

## 2023-12-01 PROCEDURE — 93010 ELECTROCARDIOGRAM REPORT: CPT | Performed by: INTERNAL MEDICINE

## 2023-12-01 PROCEDURE — 25010000002 FUROSEMIDE PER 20 MG: Performed by: NURSE PRACTITIONER

## 2023-12-01 PROCEDURE — 83735 ASSAY OF MAGNESIUM: CPT | Performed by: SURGERY

## 2023-12-01 PROCEDURE — 85027 COMPLETE CBC AUTOMATED: CPT | Performed by: NURSE PRACTITIONER

## 2023-12-01 PROCEDURE — 97116 GAIT TRAINING THERAPY: CPT

## 2023-12-01 PROCEDURE — 80053 COMPREHEN METABOLIC PANEL: CPT | Performed by: NURSE PRACTITIONER

## 2023-12-01 PROCEDURE — 25010000002 METOCLOPRAMIDE PER 10 MG: Performed by: NURSE PRACTITIONER

## 2023-12-01 PROCEDURE — 71045 X-RAY EXAM CHEST 1 VIEW: CPT

## 2023-12-01 PROCEDURE — 85027 COMPLETE CBC AUTOMATED: CPT | Performed by: SURGERY

## 2023-12-01 PROCEDURE — 25010000002 ENOXAPARIN PER 10 MG: Performed by: SURGERY

## 2023-12-01 PROCEDURE — 97110 THERAPEUTIC EXERCISES: CPT

## 2023-12-01 PROCEDURE — 93005 ELECTROCARDIOGRAM TRACING: CPT | Performed by: SURGERY

## 2023-12-01 RX ORDER — POTASSIUM CHLORIDE 750 MG/1
20 CAPSULE, EXTENDED RELEASE ORAL ONCE
Status: COMPLETED | OUTPATIENT
Start: 2023-12-01 | End: 2023-12-01

## 2023-12-01 RX ORDER — BISACODYL 10 MG
10 SUPPOSITORY, RECTAL RECTAL ONCE
Status: DISCONTINUED | OUTPATIENT
Start: 2023-12-01 | End: 2023-12-04 | Stop reason: SDDI

## 2023-12-01 RX ORDER — FUROSEMIDE 10 MG/ML
20 INJECTION INTRAMUSCULAR; INTRAVENOUS ONCE
Status: COMPLETED | OUTPATIENT
Start: 2023-12-01 | End: 2023-12-01

## 2023-12-01 RX ADMIN — SULFASALAZINE 500 MG: 500 TABLET ORAL at 08:29

## 2023-12-01 RX ADMIN — ASPIRIN 81 MG: 81 TABLET, COATED ORAL at 08:29

## 2023-12-01 RX ADMIN — FUROSEMIDE 20 MG: 10 INJECTION, SOLUTION INTRAMUSCULAR; INTRAVENOUS at 09:54

## 2023-12-01 RX ADMIN — PANTOPRAZOLE SODIUM 40 MG: 40 TABLET, DELAYED RELEASE ORAL at 06:31

## 2023-12-01 RX ADMIN — PREGABALIN 25 MG: 25 CAPSULE ORAL at 20:31

## 2023-12-01 RX ADMIN — ENOXAPARIN SODIUM 40 MG: 100 INJECTION SUBCUTANEOUS at 09:54

## 2023-12-01 RX ADMIN — CHLORHEXIDINE GLUCONATE 15 ML: 1.2 RINSE ORAL at 06:30

## 2023-12-01 RX ADMIN — PRAVASTATIN SODIUM 20 MG: 20 TABLET ORAL at 08:29

## 2023-12-01 RX ADMIN — ACETAMINOPHEN 650 MG: 325 TABLET, FILM COATED ORAL at 15:19

## 2023-12-01 RX ADMIN — OXYCODONE HYDROCHLORIDE 5 MG: 5 TABLET ORAL at 12:12

## 2023-12-01 RX ADMIN — OXYCODONE HYDROCHLORIDE 5 MG: 5 TABLET ORAL at 08:29

## 2023-12-01 RX ADMIN — ACETAMINOPHEN 650 MG: 325 TABLET, FILM COATED ORAL at 08:29

## 2023-12-01 RX ADMIN — PREGABALIN 25 MG: 25 CAPSULE ORAL at 08:29

## 2023-12-01 RX ADMIN — ACETAMINOPHEN 650 MG: 325 TABLET, FILM COATED ORAL at 01:38

## 2023-12-01 RX ADMIN — POTASSIUM CHLORIDE 20 MEQ: 750 CAPSULE, EXTENDED RELEASE ORAL at 09:54

## 2023-12-01 RX ADMIN — CALCIUM CHLORIDE 1000 MG: 100 INJECTION INTRAVENOUS; INTRAVENTRICULAR at 16:38

## 2023-12-01 RX ADMIN — OXYCODONE HYDROCHLORIDE 10 MG: 5 TABLET ORAL at 01:38

## 2023-12-01 RX ADMIN — BISACODYL 10 MG: 5 TABLET ORAL at 08:29

## 2023-12-01 RX ADMIN — CHLORHEXIDINE GLUCONATE 1 APPLICATION: 500 CLOTH TOPICAL at 04:06

## 2023-12-01 RX ADMIN — CHLORHEXIDINE GLUCONATE 15 ML: 1.2 RINSE ORAL at 17:59

## 2023-12-01 RX ADMIN — BISACODYL 10 MG: 5 TABLET ORAL at 20:31

## 2023-12-01 RX ADMIN — SERTRALINE HYDROCHLORIDE 200 MG: 100 TABLET, FILM COATED ORAL at 09:54

## 2023-12-01 RX ADMIN — METOCLOPRAMIDE HYDROCHLORIDE 10 MG: 5 INJECTION INTRAMUSCULAR; INTRAVENOUS at 01:38

## 2023-12-01 RX ADMIN — Medication 1 APPLICATION: at 06:30

## 2023-12-01 RX ADMIN — OXYCODONE HYDROCHLORIDE 10 MG: 5 TABLET ORAL at 20:30

## 2023-12-01 RX ADMIN — ACETAMINOPHEN 650 MG: 325 TABLET, FILM COATED ORAL at 20:30

## 2023-12-01 RX ADMIN — CARVEDILOL 3.12 MG: 3.12 TABLET, FILM COATED ORAL at 08:29

## 2023-12-01 RX ADMIN — CARVEDILOL 3.12 MG: 3.12 TABLET, FILM COATED ORAL at 17:59

## 2023-12-01 RX ADMIN — LIDOCAINE 2 PATCH: 700 PATCH TOPICAL at 06:30

## 2023-12-01 NOTE — THERAPY TREATMENT NOTE
Acute Care - Physical Therapy Treatment Note  [facility]     Patient Name: Alessandra Baker  : 1965  MRN: [id number]  Today's Date: 2023      Visit Dx:     ICD-10-CM ICD-9-CM   1. Chest pain due to myocardial ischemia, unspecified ischemic chest pain type  I25.9 786.50   2. Paroxysmal atrial fibrillation  I48.0 427.31   3. NSTEMI (non-ST elevated myocardial infarction)  I21.4 410.70   4. Impaired mobility [Z74.09]  Z74.09 799.89     Patient Active Problem List   Diagnosis    History of colon polyps    Esophageal varices without bleeding    Coronary artery disease    Mixed hyperlipidemia    Chest pain    Rheumatoid arthritis involving multiple sites with positive rheumatoid factor    Atrial fibrillation with RVR    Central stenosis of spinal canal    Neuroforaminal stenosis of cervical spine    NSTEMI (non-ST elevated myocardial infarction)    Stage 3a chronic kidney disease     Past Medical History:   Diagnosis Date    Anxiety     Arthritis     Atrial fibrillation with RVR 2023    CAD (coronary artery disease)     Chronic kidney disease     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    Depression     Esophageal varices     GERD (gastroesophageal reflux disease)     Headache     Heart problem     angina    Hyperlipidemia     Hypertension 2012    Liver disease     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    Liver problem     Myocardial infarction 2012    NSTEMI    Rheumatoid arthritis     Skin cancer     Stroke     showed up on mri about 5 years ago with no residual    Ulnar neuropathy     Visual impairment      Past Surgical History:   Procedure Laterality Date    ANGIOPLASTY      2012    APPENDECTOMY      ATRIAL APPENDAGE EXCLUSION LEFT WITH TRANSESOPHAGEAL ECHOCARDIOGRAM N/A 2023    Procedure: ATRIAL APPENDAGE EXCLUSION LEFT WITH 40MM ATRICLIP;  Surgeon: Hosea Granados MD;  Location: [facility];  Service: Cardiothoracic;  Laterality: N/A;    CARDIAC CATHETERIZATION  2015, 22    CARDIAC  CATHETERIZATION Left 11/22/2023    Procedure: Cardiac Catheterization/Vascular Study;  Surgeon: Jayson Castillo MD;  Location: [facility] CATH INVASIVE LOCATION;  Service: Cardiology;  Laterality: Left;    COLONOSCOPY      COLONOSCOPY N/A 04/24/2023    Procedure: COLONOSCOPY WITH ANESTHESIA;  Surgeon: Rick Tidwell DO;  Location: [facility] ENDOSCOPY;  Service: Gastroenterology;  Laterality: N/A;  preop; hx of polyps   postop; polyps  PCP Eric Feldman     CORONARY ARTERY BYPASS GRAFT N/A 11/29/2023    Procedure: CORONARY ARTERY BYPASS GRAFTING X2, LEFT INTERNAL MAMMARY ARTERY GRAFT, RIGHT LEG ENDOSCOPIC VEIN HARVEST, TRANSESOPHAGEAL ECHOCARDIOGRAM, APPLICATION OF PREVENA;  Surgeon: Hosea Granados MD;  Location: [facility] OR;  Service: Cardiothoracic;  Laterality: N/A;    CORONARY STENT PLACEMENT      x3 per patient, X 1 -  11/2012, x 2 - 4/21/22    ENDOSCOPY      ENDOSCOPY N/A 04/24/2023    Procedure: ESOPHAGOGASTRODUODENOSCOPY WITH ANESTHESIA;  Surgeon: Rick Tidwell DO;  Location: [facility] ENDOSCOPY;  Service: Gastroenterology;  Laterality: N/A;  preop; hx of varices   postop; varices   PCP Eric Feldman     HAND RECONSTRUCTION Right 06/15/1967    HYSTERECTOMY      2016    MAZE PROCEDURE N/A 11/29/2023    Procedure: MAZE PROCEDURE WITH ENCOMPASS CLAMP;  Surgeon: Hosea Granados MD;  Location: [facility] OR;  Service: Cardiothoracic;  Laterality: N/A;    SUBTOTAL HYSTERECTOMY      TONSILLECTOMY       PT Assessment (last 12 hours)       PT Evaluation and Treatment       Row Name 12/01/23 0815          Physical Therapy Time and Intention    Subjective Information complains of;pain  -AE     Document Type therapy note (daily note)  -AE     Mode of Treatment physical therapy  -AE       Row Name 12/01/23 0815          General Information    Existing Precautions/Restrictions fall;sternal;oxygen therapy device and L/min  -AE       Row Name 12/01/23 0815          Pain    Pretreatment Pain Rating 5/10  -AE     Posttreatment Pain  Rating 7/10  -AE     Pain Location incisional  -AE     Pain Location - chest  -AE     Pain Intervention(s) Repositioned  -AE       Row Name 12/01/23 0815          Bed Mobility    Comment, (Bed Mobility) in chair  -AE       Row Name 12/01/23 0815          Sit-Stand Transfer    Sit-Stand Cornelius (Transfers) contact guard;verbal cues  -AE       Row Name 12/01/23 0815          Gait/Stairs (Locomotion)    Cornelius Level (Gait) contact guard  -AE     Distance in Feet (Gait) 200  x 2 standing rests  -AE       Row Name 12/01/23 0815          Motor Skills    Therapeutic Exercise --  protocol 10 reps  -AE       Row Name             Wound 11/29/23 0800 midline sternal Incision    Wound - Properties Group Placement Date: 11/29/23  -TJ Placement Time: 0800  -TJ Orientation: midline  -TJ Location: sternal  -TJ Primary Wound Type: Incision  -TJ    Retired Wound - Properties Group Placement Date: 11/29/23  -TJ Placement Time: 0800  -TJ Orientation: midline  -TJ Location: sternal  -TJ Primary Wound Type: Incision  -TJ    Retired Wound - Properties Group Date first assessed: 11/29/23  -TJ Time first assessed: 0800  -TJ Location: sternal  -TJ Primary Wound Type: Incision  -TJ      Row Name             Wound 11/29/23 0800 Right leg Incision    Wound - Properties Group Placement Date: 11/29/23  -TJ Placement Time: 0800  -TJ Side: Right  -TJ Location: leg  -TJ Primary Wound Type: Incision  -TJ    Retired Wound - Properties Group Placement Date: 11/29/23  -TJ Placement Time: 0800  -TJ Side: Right  -TJ Location: leg  -TJ Primary Wound Type: Incision  -TJ    Retired Wound - Properties Group Date first assessed: 11/29/23  -TJ Time first assessed: 0800  -TJ Side: Right  -TJ Location: leg  -TJ Primary Wound Type: Incision  -TJ      Row Name             NPWT (Negative Pressure Wound Therapy) 11/29/23 1130 chest    NPWT (Negative Pressure Wound Therapy) - Properties Group Placement Date: 11/29/23  -TJ Placement Time: 1130  -TJ Location:  chest  -TJ Additional Comments: ziggy BRAVO    Retired NPWT (Negative Pressure Wound Therapy) - Properties Group Placement Date: 11/29/23  -TJ Placement Time: 1130  -TJ Location: chest  -TJ Additional Comments: ziggy BRAVO    Retired NPWT (Negative Pressure Wound Therapy) - Properties Group Placement Date: 11/29/23  -TJ Placement Time: 1130  -TJ Location: chest  -TJ Additional Comments: ziggy  -MODE      Row Name 12/01/23 0815          Vital Signs    Pre SpO2 (%) 92  -AE     O2 Delivery Pre Treatment supplemental O2  -AE     Intra SpO2 (%) 85  -AE     O2 Delivery Intra Treatment room air  -AE     Post SpO2 (%) 90  -AE     O2 Delivery Post Treatment supplemental O2  -AE       Row Name 12/01/23 0815          Positioning and Restraints    Pre-Treatment Position sitting in chair/recliner  -AE     Post Treatment Position chair  -AE     In Chair sitting;call light within reach  -AE               User Key  (r) = Recorded By, (t) = Taken By, (c) = Cosigned By      Initials Name Provider Type    Kezia Garcia PTA Physical Therapist Assistant    Lucia Elliott, RN Registered Nurse                    Physical Therapy Education       Title: PT OT SLP Therapies (In Progress)       Topic: Physical Therapy (In Progress)       Point: Mobility training (Done)       Learning Progress Summary             Patient EagerJUAN C VU by DOC at 12/1/2023 0856    Comment: t/f's    Acceptance, ETAMERA DU by CHELI at 11/30/2023 0914    Comment: benefits of activity, progression of PT, sternal prec                         Point: Home exercise program (Not Started)       Learner Progress:  Not documented in this visit.              Point: Body mechanics (Not Started)       Learner Progress:  Not documented in this visit.              Point: Precautions (Done)       Learning Progress Summary             Patient Acceptance, E, VUDU by CHELI at 11/30/2023 0914    Comment: benefits of activity, progression of PT, sternal prec                                          User Key       Initials Effective Dates Name Provider Type Discipline    AE 02/03/23 -  Kezia Islas PTA Physical Therapist Assistant PT    CHELI 02/03/23 -  Dwight Rae PT DPT Physical Therapist PT                  PT Recommendation and Plan     Plan of Care Reviewed With: patient  Progress: improving  Outcome Evaluation: Pt c/o chest pain 5/10 and requested pain meds before walk.She was cga to stand and to walk. She walked 200ft with a slow pace and 2 standing rests.PT will continue to progress patient.       Time Calculation:    PT Charges       Row Name 12/01/23 0857             Time Calculation    Start Time 0815  -AE      Stop Time 0854  -AE      Time Calculation (min) 39 min  -AE      PT Received On 12/01/23  -AE      PT Goal Re-Cert Due Date 12/10/23  -AE         Time Calculation- PT    Total Timed Code Minutes- PT 39 minute(s)  -AE         Timed Charges    82498 - PT Therapeutic Exercise Minutes 9  -AE      52179 - Gait Training Minutes  30  -AE         Total Minutes    Timed Charges Total Minutes 39  -AE       Total Minutes 39  -AE                User Key  (r) = Recorded By, (t) = Taken By, (c) = Cosigned By      Initials Name Provider Type    AE Kezia Islas PTA Physical Therapist Assistant                  Therapy Charges for Today       Code Description Service Date Service Provider Modifiers Qty    15497430157 HC GAIT TRAINING EA 15 MIN 11/30/2023 Kezia Islas PTA GP 2    91653379407 HC GAIT TRAINING EA 15 MIN 12/1/2023 Kezia Islas PTA GP 2    77158731164 HC PT THER PROC EA 15 MIN 12/1/2023 Kezia Islas PTA GP 1            PT G-Codes  Outcome Measure Options: AM-PAC 6 Clicks Basic Mobility (PT)  AM-PAC 6 Clicks Score (PT): 15    Kezia Islas PTA  12/1/2023

## 2023-12-01 NOTE — PROGRESS NOTES
"Patient name: Alessandra Baker  Patient : 1965  VISIT # [id number]  MR #[id number]    Procedure:Procedure(s):  CORONARY ARTERY BYPASS GRAFTING X2, LEFT INTERNAL MAMMARY ARTERY GRAFT, RIGHT LEG ENDOSCOPIC VEIN HARVEST, TRANSESOPHAGEAL ECHOCARDIOGRAM  MAZE PROCEDURE WITH ENCOMPASS CLAMP  ATRIAL APPENDAGE EXCLUSION LEFT WITH 40MM ATRICLIP  Procedure Date:2023  POD:2 Days Post-Op    Subjective     On 2 L nasal cannula.  Weight today is unchanged and she remains 11 pounds above her baseline weight.  Creatinine today is 1.08.  LFTs are stable.  No bowel movement.  Walking 200 feet with physical therapy.  She is in good spirits today and states she is feeling better.    Telemetry: Paced at 90, underlying rhythm sinus rhythm 70s  IV drips: None           Objective     Visit Vitals  /72   Pulse 90   Temp 98.9 °F (37.2 °C) (Oral)   Resp 22   Ht 165.1 cm (65\")   Wt 91.3 kg (201 lb 3.2 oz)   SpO2 94%   BMI 33.48 kg/m²   Baseline weight 190 pounds    Intake/Output Summary (Last 24 hours) at 2023 0849  Last data filed at 2023 0837  Gross per 24 hour   Intake --   Output 1120 ml   Net -1120 ml     MCT: 130 mL in 24 hours, serosanguineous, no airleak  Left pleural drain: 300 mL in 24 hours, serosanguineous, no airleak    Lab:     CBC:  Results from last 7 days   Lab Units 23  0407 23  0330 23  1531   WBC 10*3/mm3 7.32 7.17 10.18   HEMATOCRIT % 26.2* 27.1* 32.3*   PLATELETS 10*3/mm3 152 162 187          BMP:  Results from last 7 days   Lab Units 23  0407 23  0330 23  1531   SODIUM mmol/L 134* 137 140  140   POTASSIUM mmol/L 4.0 4.1 4.6  4.6   CHLORIDE mmol/L 102 106 108*  108*   CO2 mmol/L 21.0* 21.0* 21.0*  22.0   GLUCOSE mg/dL 126* 166* 161*  161*   BUN mg/dL 21* 17 18  19   CREATININE mg/dL 1.08* 1.02* 1.17*  1.18*          COAG:  Results from last 7 days   Lab Units 23  0330 23  1150   INR  1.12* 1.20*   APTT seconds  --  31.8       IMAGES:     "   Imaging Results (Last 24 Hours)       Procedure Component Value Units Date/Time    XR Chest 1 View [[id number]] Collected: 12/01/23 0736     Updated: 12/01/23 0741    Narrative:      EXAM/TECHNIQUE: XR CHEST 1 VW-     INDICATION: Post-Op Heart Surgery     COMPARISON: 11/30/2023     FINDINGS:     Right IJ catheter sheath is stable in position. Mediastinal drains and  left-sided chest tubes are stable in position. Sternotomy wires are  vertically aligned. Cardiac silhouette is prominent but stable. No  significant change in mild bibasilar atelectasis. No large pleural  effusion. A trace right apical pneumothorax is visible with air gap of 2  mm. No left-sided pneumothorax.       Impression:      1.  Remaining support lines/tubes appear stable.  2.  Mild bibasilar atelectasis.  3.  Trace right apical pneumothorax. Recommend special attention on  follow-up radiographs.     This report was signed and finalized on 12/1/2023 7:37 AM CST by Dr. Aurelio Anders MD.             CXR: Chest tubes in stable position with questionable right apical pneumothorax.  Bibasilar atelectasis.    Physical Exam:  General: Alert, oriented. No apparent distress.  Obese  Cardiovascular: Regular rate and rhythm without murmur, rubs, or gallops.    Pulmonary: Clear to auscultation bilaterally without wheezing, rubs, or rales.  Chest: Sternotomy incision clean, dry, and intact with Prevena. Sternum stable. No clicks. Chest tubes to 20 cm suction. No air leak. Fluid is serosanguineous.   Abdomen: Soft, nondistended, and nontender.  Extremities: Warm, moves all extremities.  Mild lower extremity edema saphenectomy site clean, dry, and intact.  Neurologic:  Grossly intact with no focal deficits.            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

## 2023-12-01 NOTE — THERAPY TREATMENT NOTE
Acute Care - Physical Therapy Treatment Note  [facility]     Patient Name: Alessandra Baker  : 1965  MRN: [id number]  Today's Date: 2023      Visit Dx:     ICD-10-CM ICD-9-CM   1. Chest pain due to myocardial ischemia, unspecified ischemic chest pain type  I25.9 786.50   2. Paroxysmal atrial fibrillation  I48.0 427.31   3. NSTEMI (non-ST elevated myocardial infarction)  I21.4 410.70   4. Impaired mobility [Z74.09]  Z74.09 799.89     Patient Active Problem List   Diagnosis    History of colon polyps    Esophageal varices without bleeding    Coronary artery disease    Mixed hyperlipidemia    Chest pain    Rheumatoid arthritis involving multiple sites with positive rheumatoid factor    Atrial fibrillation with RVR    Central stenosis of spinal canal    Neuroforaminal stenosis of cervical spine    NSTEMI (non-ST elevated myocardial infarction)    Stage 3a chronic kidney disease     Past Medical History:   Diagnosis Date    Anxiety     Arthritis     Atrial fibrillation with RVR 2023    CAD (coronary artery disease)     Chronic kidney disease     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    Depression     Esophageal varices     GERD (gastroesophageal reflux disease)     Headache     Heart problem     angina    Hyperlipidemia     Hypertension 2012    Liver disease     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    Liver problem     Myocardial infarction 2012    NSTEMI    Rheumatoid arthritis     Skin cancer     Stroke     showed up on mri about 5 years ago with no residual    Ulnar neuropathy     Visual impairment      Past Surgical History:   Procedure Laterality Date    ANGIOPLASTY      2012    APPENDECTOMY      ATRIAL APPENDAGE EXCLUSION LEFT WITH TRANSESOPHAGEAL ECHOCARDIOGRAM N/A 2023    Procedure: ATRIAL APPENDAGE EXCLUSION LEFT WITH 40MM ATRICLIP;  Surgeon: Hosea Granados MD;  Location: [facility];  Service: Cardiothoracic;  Laterality: N/A;    CARDIAC CATHETERIZATION  2015, 22    CARDIAC  CATHETERIZATION Left 11/22/2023    Procedure: Cardiac Catheterization/Vascular Study;  Surgeon: Jayson Castillo MD;  Location: [facility] CATH INVASIVE LOCATION;  Service: Cardiology;  Laterality: Left;    COLONOSCOPY      COLONOSCOPY N/A 04/24/2023    Procedure: COLONOSCOPY WITH ANESTHESIA;  Surgeon: Rick Tidwell DO;  Location: [facility] ENDOSCOPY;  Service: Gastroenterology;  Laterality: N/A;  preop; hx of polyps   postop; polyps  PCP Eric Feldman     CORONARY ARTERY BYPASS GRAFT N/A 11/29/2023    Procedure: CORONARY ARTERY BYPASS GRAFTING X2, LEFT INTERNAL MAMMARY ARTERY GRAFT, RIGHT LEG ENDOSCOPIC VEIN HARVEST, TRANSESOPHAGEAL ECHOCARDIOGRAM, APPLICATION OF PREVENA;  Surgeon: Hosea Granados MD;  Location: [facility] OR;  Service: Cardiothoracic;  Laterality: N/A;    CORONARY STENT PLACEMENT      x3 per patient, X 1 -  11/2012, x 2 - 4/21/22    ENDOSCOPY      ENDOSCOPY N/A 04/24/2023    Procedure: ESOPHAGOGASTRODUODENOSCOPY WITH ANESTHESIA;  Surgeon: Rick Tidwell DO;  Location: [facility] ENDOSCOPY;  Service: Gastroenterology;  Laterality: N/A;  preop; hx of varices   postop; varices   PCP Eric Feldman     HAND RECONSTRUCTION Right 06/15/1967    HYSTERECTOMY      2016    MAZE PROCEDURE N/A 11/29/2023    Procedure: MAZE PROCEDURE WITH ENCOMPASS CLAMP;  Surgeon: Hosea Granados MD;  Location: [facility] OR;  Service: Cardiothoracic;  Laterality: N/A;    SUBTOTAL HYSTERECTOMY      TONSILLECTOMY       PT Assessment (last 12 hours)       PT Evaluation and Treatment       Row Name 12/01/23 1426 12/01/23 0815       Physical Therapy Time and Intention    Subjective Information complains of;pain  -AE complains of;pain  -AE    Document Type therapy note (daily note)  -AE therapy note (daily note)  -AE    Mode of Treatment physical therapy  -AE physical therapy  -AE      Row Name 12/01/23 1426 12/01/23 0815       General Information    Existing Precautions/Restrictions fall;sternal;oxygen therapy device and L/min  -AE  fall;sternal;oxygen therapy device and L/min  -AE      Row Name 12/01/23 1426 12/01/23 0815       Pain    Pretreatment Pain Rating 0/10 - no pain  -AE 5/10  -AE    Posttreatment Pain Rating 2/10  -AE 7/10  -AE    Pain Location incisional  -AE incisional  -AE    Pain Location - chest  -AE chest  -AE    Pain Intervention(s) -- Repositioned  -AE      Row Name 12/01/23 1426 12/01/23 0815       Bed Mobility    Comment, (Bed Mobility) chair  -AE in chair  -AE      Row Name 12/01/23 1426 12/01/23 0815       Sit-Stand Transfer    Sit-Stand Elbert (Transfers) contact guard;verbal cues  -AE contact guard;verbal cues  -AE      Row Name 12/01/23 1426 12/01/23 0815       Gait/Stairs (Locomotion)    Elbert Level (Gait) contact guard  -AE contact guard  -AE    Distance in Feet (Gait) 200  -  x 2 standing rests  -AE    Deviations/Abnormal Patterns (Gait) stride length decreased  pt ran into objects on R side in hallway  -AE --      Row Name 12/01/23 1426 12/01/23 0815       Motor Skills    Therapeutic Exercise --  protocol 10 reps  -AE --  protocol 10 reps  -AE      Row Name             Wound 11/29/23 0800 midline sternal Incision    Wound - Properties Group Placement Date: 11/29/23  -TJ Placement Time: 0800  -TJ Orientation: midline  -TJ Location: sternal  -TJ Primary Wound Type: Incision  -TJ    Retired Wound - Properties Group Placement Date: 11/29/23  -TJ Placement Time: 0800  -TJ Orientation: midline  -TJ Location: sternal  -TJ Primary Wound Type: Incision  -TJ    Retired Wound - Properties Group Date first assessed: 11/29/23  -TJ Time first assessed: 0800  -TJ Location: sternal  -TJ Primary Wound Type: Incision  -TJ      Row Name             Wound 11/29/23 0800 Right leg Incision    Wound - Properties Group Placement Date: 11/29/23  -TJ Placement Time: 0800  -TJ Side: Right  -TJ Location: leg  -TJ Primary Wound Type: Incision  -TJ    Retired Wound - Properties Group Placement Date: 11/29/23  -TJ Placement  Time: 0800  -TJ Side: Right  -TJ Location: leg  -TJ Primary Wound Type: Incision  -TJ    Retired Wound - Properties Group Date first assessed: 11/29/23  -TJ Time first assessed: 0800  -TJ Side: Right  -TJ Location: leg  -TJ Primary Wound Type: Incision  -TJ      Row Name             NPWT (Negative Pressure Wound Therapy) 11/29/23 1130 chest    NPWT (Negative Pressure Wound Therapy) - Properties Group Placement Date: 11/29/23  -TJ Placement Time: 1130  -TJ Location: chest  -TJ Additional Comments: prevena  -TJ    Retired NPWT (Negative Pressure Wound Therapy) - Properties Group Placement Date: 11/29/23  -TJ Placement Time: 1130  -TJ Location: chest  -TJ Additional Comments: prevena  -TJ    Retired NPWT (Negative Pressure Wound Therapy) - Properties Group Placement Date: 11/29/23  -TJ Placement Time: 1130  -TJ Location: chest  -TJ Additional Comments: prevena  -TJ      Row Name 12/01/23 0815          Vital Signs    Pre SpO2 (%) 92  -AE     O2 Delivery Pre Treatment supplemental O2  -AE     Intra SpO2 (%) 85  -AE     O2 Delivery Intra Treatment room air  -AE     Post SpO2 (%) 90  -AE     O2 Delivery Post Treatment supplemental O2  -AE       Row Name 12/01/23 1426 12/01/23 0815       Positioning and Restraints    Pre-Treatment Position sitting in chair/recliner  -AE sitting in chair/recliner  -AE    Post Treatment Position chair  -AE chair  -AE    In Chair sitting;call light within reach  -AE sitting;call light within reach  -AE              User Key  (r) = Recorded By, (t) = Taken By, (c) = Cosigned By      Initials Name Provider Type    AE Kezia Islas PTA Physical Therapist Assistant    Lucia Elliott, RN Registered Nurse                    Physical Therapy Education       Title: PT OT SLP Therapies (In Progress)       Topic: Physical Therapy (In Progress)       Point: Mobility training (Done)       Learning Progress Summary             Patient JUAN C Aguilera VU by AE at 12/1/2023 0856    Comment: t/f's     Acceptance, E, VU,DU by CHELI at 11/30/2023 0914    Comment: benefits of activity, progression of PT, sternal prec                         Point: Home exercise program (Not Started)       Learner Progress:  Not documented in this visit.              Point: Body mechanics (Not Started)       Learner Progress:  Not documented in this visit.              Point: Precautions (Done)       Learning Progress Summary             Patient Acceptance, E, VU,DU by CHELI at 11/30/2023 0914    Comment: benefits of activity, progression of PT, sternal prec                                         User Key       Initials Effective Dates Name Provider Type Discipline    AE 02/03/23 -  Kezia Islas PTA Physical Therapist Assistant PT    CHELI 02/03/23 -  Dwight Rae PT DPT Physical Therapist PT                  PT Recommendation and Plan     Plan of Care Reviewed With: patient  Progress: improving  Outcome Evaluation: Pt c/o chest pain 5/10 and requested pain meds before walk.She was cga to stand and to walk. She walked 200ft with a slow pace and 2 standing rests.PT will continue to progress patient.       Time Calculation:    PT Charges       Row Name 12/01/23 1444 12/01/23 0857          Time Calculation    Start Time 1426  -AE 0815  -AE     Stop Time 1438  -AE 0854  -AE     Time Calculation (min) 12 min  -AE 39 min  -AE     PT Received On 12/01/23  -AE 12/01/23  -AE     PT Goal Re-Cert Due Date 12/10/23  -AE 12/10/23  -AE        Time Calculation- PT    Total Timed Code Minutes- PT 12 minute(s)  -AE 39 minute(s)  -AE        Timed Charges    93185 - PT Therapeutic Exercise Minutes -- 9  -AE     51161 - Gait Training Minutes  12  -AE 30  -AE        Total Minutes    Timed Charges Total Minutes 12  -AE 39  -AE      Total Minutes 12  -AE 39  -AE               User Key  (r) = Recorded By, (t) = Taken By, (c) = Cosigned By      Initials Name Provider Type    AE Kezia Islas PTA Physical Therapist Assistant                  Therapy  Charges for Today       Code Description Service Date Service Provider Modifiers Qty    88244913753 HC GAIT TRAINING EA 15 MIN 11/30/2023 Kezia Islas, PTA GP 2    81280283895 HC GAIT TRAINING EA 15 MIN 12/1/2023 Kezia Islas, PTA GP 2    86089674075 HC PT THER PROC EA 15 MIN 12/1/2023 Kezia Islas, PTA GP 1    02162617061 HC GAIT TRAINING EA 15 MIN 12/1/2023 Kezia Islas, PTA GP 1            PT G-Codes  Outcome Measure Options: AM-PAC 6 Clicks Basic Mobility (PT)  AM-PAC 6 Clicks Score (PT): 17    Kezia Islas PTA  12/1/2023

## 2023-12-01 NOTE — CASE MANAGEMENT/SOCIAL WORK
Continued Stay Note   [facility]     Patient Name: Alessandra Baker  MRN: [id number]  Today's Date: 12/1/2023    Admit Date: 11/21/2023    Plan: Home   Discharge Plan       Row Name 12/01/23 1050       Plan    Plan Home    Plan Comments Patient continues to plan to return home upon discharge.  SW following.                   Discharge Codes    No documentation.                       ARLETTE Reagan

## 2023-12-01 NOTE — PLAN OF CARE
Goal Outcome Evaluation:  Plan of Care Reviewed With: patient        Progress: no change  Outcome Evaluation: Ntn follow up. Pt s/p CABG x 2 yesterday. Pt reports decreased appetite today. Encouraged oral intake. Obtained food preferences from pt. Boost Glucose Control ordered BID to promote wound healing. Provided pt with cardiac ntn therapy per pt request prior to surgery. Cont to follow for plan of care.

## 2023-12-01 NOTE — PLAN OF CARE
Goal Outcome Evaluation:     Patient is alert and oriented. Walked full lap around the unit. UOP remains 25mL per hour. MST 60mL out, Pleural drain 180mL. VSS will continue to monitor. No overnight events.

## 2023-12-01 NOTE — PLAN OF CARE
Goal Outcome Evaluation:  Plan of Care Reviewed With: patient        Progress: improving  Outcome Evaluation: Pt c/o chest pain 5/10 and requested pain meds before walk.She was cga to stand and to walk. She walked 200ft with a slow pace and 2 standing rests.PT will continue to progress patient.

## 2023-12-02 ENCOUNTER — APPOINTMENT (OUTPATIENT)
Dept: GENERAL RADIOLOGY | Facility: HOSPITAL | Age: 58
DRG: 234 | End: 2023-12-02
Payer: MEDICARE

## 2023-12-02 LAB
ALBUMIN SERPL-MCNC: 3.8 G/DL (ref 3.5–5.2)
ALBUMIN/GLOB SERPL: 1.5 G/DL
ALP SERPL-CCNC: 59 U/L (ref 39–117)
ALT SERPL W P-5'-P-CCNC: 8 U/L (ref 1–33)
ANION GAP SERPL CALCULATED.3IONS-SCNC: 11 MMOL/L (ref 5–15)
AST SERPL-CCNC: 16 U/L (ref 1–32)
BH BB BLOOD EXPIRATION DATE: NORMAL
BH BB BLOOD EXPIRATION DATE: NORMAL
BH BB BLOOD TYPE BARCODE: 6200
BH BB BLOOD TYPE BARCODE: 6200
BH BB DISPENSE STATUS: NORMAL
BH BB DISPENSE STATUS: NORMAL
BH BB PRODUCT CODE: NORMAL
BH BB PRODUCT CODE: NORMAL
BH BB UNIT NUMBER: NORMAL
BH BB UNIT NUMBER: NORMAL
BILIRUB SERPL-MCNC: 0.3 MG/DL (ref 0–1.2)
BUN SERPL-MCNC: 29 MG/DL (ref 6–20)
BUN/CREAT SERPL: 27.1 (ref 7–25)
CALCIUM SPEC-SCNC: 9 MG/DL (ref 8.6–10.5)
CHLORIDE SERPL-SCNC: 100 MMOL/L (ref 98–107)
CO2 SERPL-SCNC: 21 MMOL/L (ref 22–29)
CREAT SERPL-MCNC: 1.07 MG/DL (ref 0.57–1)
CROSSMATCH INTERPRETATION: NORMAL
CROSSMATCH INTERPRETATION: NORMAL
DEPRECATED RDW RBC AUTO: 54 FL (ref 37–54)
EGFRCR SERPLBLD CKD-EPI 2021: 60 ML/MIN/1.73
ERYTHROCYTE [DISTWIDTH] IN BLOOD BY AUTOMATED COUNT: 16.2 % (ref 12.3–15.4)
GLOBULIN UR ELPH-MCNC: 2.6 GM/DL
GLUCOSE BLDC GLUCOMTR-MCNC: 102 MG/DL (ref 70–130)
GLUCOSE BLDC GLUCOMTR-MCNC: 190 MG/DL (ref 70–130)
GLUCOSE SERPL-MCNC: 107 MG/DL (ref 65–99)
HCT VFR BLD AUTO: 24.6 % (ref 34–46.6)
HGB BLD-MCNC: 7.7 G/DL (ref 12–15.9)
MCH RBC QN AUTO: 28.8 PG (ref 26.6–33)
MCHC RBC AUTO-ENTMCNC: 31.3 G/DL (ref 31.5–35.7)
MCV RBC AUTO: 92.1 FL (ref 79–97)
PLATELET # BLD AUTO: 143 10*3/MM3 (ref 140–450)
PMV BLD AUTO: 10.2 FL (ref 6–12)
POTASSIUM SERPL-SCNC: 4.1 MMOL/L (ref 3.5–5.2)
PROT SERPL-MCNC: 6.4 G/DL (ref 6–8.5)
RBC # BLD AUTO: 2.67 10*6/MM3 (ref 3.77–5.28)
SODIUM SERPL-SCNC: 132 MMOL/L (ref 136–145)
UNIT  ABO: NORMAL
UNIT  ABO: NORMAL
UNIT  RH: NORMAL
UNIT  RH: NORMAL
WBC NRBC COR # BLD AUTO: 5.66 10*3/MM3 (ref 3.4–10.8)

## 2023-12-02 PROCEDURE — 25010000002 FUROSEMIDE PER 20 MG: Performed by: THORACIC SURGERY (CARDIOTHORACIC VASCULAR SURGERY)

## 2023-12-02 PROCEDURE — 97116 GAIT TRAINING THERAPY: CPT

## 2023-12-02 PROCEDURE — 25010000002 FUROSEMIDE PER 20 MG: Performed by: NURSE PRACTITIONER

## 2023-12-02 PROCEDURE — 25010000002 ENOXAPARIN PER 10 MG: Performed by: SURGERY

## 2023-12-02 PROCEDURE — 71046 X-RAY EXAM CHEST 2 VIEWS: CPT

## 2023-12-02 PROCEDURE — 80053 COMPREHEN METABOLIC PANEL: CPT | Performed by: NURSE PRACTITIONER

## 2023-12-02 PROCEDURE — 82948 REAGENT STRIP/BLOOD GLUCOSE: CPT

## 2023-12-02 PROCEDURE — 85027 COMPLETE CBC AUTOMATED: CPT | Performed by: SURGERY

## 2023-12-02 RX ORDER — FUROSEMIDE 10 MG/ML
20 INJECTION INTRAMUSCULAR; INTRAVENOUS EVERY 12 HOURS
Status: DISCONTINUED | OUTPATIENT
Start: 2023-12-02 | End: 2023-12-03

## 2023-12-02 RX ORDER — POTASSIUM CHLORIDE 750 MG/1
20 CAPSULE, EXTENDED RELEASE ORAL 2 TIMES DAILY WITH MEALS
Status: DISCONTINUED | OUTPATIENT
Start: 2023-12-02 | End: 2023-12-03

## 2023-12-02 RX ADMIN — ASPIRIN 81 MG: 81 TABLET, COATED ORAL at 09:33

## 2023-12-02 RX ADMIN — OXYCODONE HYDROCHLORIDE 5 MG: 5 TABLET ORAL at 16:09

## 2023-12-02 RX ADMIN — SULFASALAZINE 500 MG: 500 TABLET ORAL at 09:33

## 2023-12-02 RX ADMIN — POTASSIUM CHLORIDE 20 MEQ: 750 CAPSULE, EXTENDED RELEASE ORAL at 09:31

## 2023-12-02 RX ADMIN — POLYETHYLENE GLYCOL 3350 17 G: 17 POWDER, FOR SOLUTION ORAL at 09:34

## 2023-12-02 RX ADMIN — ENOXAPARIN SODIUM 40 MG: 100 INJECTION SUBCUTANEOUS at 09:29

## 2023-12-02 RX ADMIN — CARVEDILOL 3.12 MG: 3.12 TABLET, FILM COATED ORAL at 09:33

## 2023-12-02 RX ADMIN — ACETAMINOPHEN 650 MG: 325 TABLET, FILM COATED ORAL at 02:16

## 2023-12-02 RX ADMIN — CARVEDILOL 3.12 MG: 3.12 TABLET, FILM COATED ORAL at 17:33

## 2023-12-02 RX ADMIN — ACETAMINOPHEN 650 MG: 325 TABLET, FILM COATED ORAL at 20:59

## 2023-12-02 RX ADMIN — OXYCODONE HYDROCHLORIDE 10 MG: 5 TABLET ORAL at 12:44

## 2023-12-02 RX ADMIN — SERTRALINE HYDROCHLORIDE 200 MG: 100 TABLET, FILM COATED ORAL at 09:32

## 2023-12-02 RX ADMIN — BISACODYL 10 MG: 5 TABLET ORAL at 09:32

## 2023-12-02 RX ADMIN — CHLORHEXIDINE GLUCONATE 1 APPLICATION: 500 CLOTH TOPICAL at 03:09

## 2023-12-02 RX ADMIN — PREGABALIN 25 MG: 25 CAPSULE ORAL at 09:32

## 2023-12-02 RX ADMIN — LIDOCAINE 2 PATCH: 700 PATCH TOPICAL at 05:47

## 2023-12-02 RX ADMIN — PANTOPRAZOLE SODIUM 40 MG: 40 TABLET, DELAYED RELEASE ORAL at 05:47

## 2023-12-02 RX ADMIN — FUROSEMIDE 20 MG: 10 INJECTION, SOLUTION INTRAMUSCULAR; INTRAVENOUS at 20:59

## 2023-12-02 RX ADMIN — PRAVASTATIN SODIUM 20 MG: 20 TABLET ORAL at 09:32

## 2023-12-02 RX ADMIN — ACETAMINOPHEN 650 MG: 325 TABLET, FILM COATED ORAL at 09:31

## 2023-12-02 RX ADMIN — FUROSEMIDE 20 MG: 10 INJECTION, SOLUTION INTRAMUSCULAR; INTRAVENOUS at 09:36

## 2023-12-02 RX ADMIN — OXYCODONE HYDROCHLORIDE 10 MG: 5 TABLET ORAL at 00:46

## 2023-12-02 RX ADMIN — POTASSIUM CHLORIDE 20 MEQ: 750 CAPSULE, EXTENDED RELEASE ORAL at 17:33

## 2023-12-02 RX ADMIN — CHLORHEXIDINE GLUCONATE 15 ML: 1.2 RINSE ORAL at 05:47

## 2023-12-02 RX ADMIN — PREGABALIN 25 MG: 25 CAPSULE ORAL at 20:59

## 2023-12-02 RX ADMIN — ACETAMINOPHEN 650 MG: 325 TABLET, FILM COATED ORAL at 16:09

## 2023-12-02 NOTE — PROGRESS NOTES
[facility] Cardiothoracic Surgery  PROGRESS NOTE     Subjective:  Sitting up in chair with family at bedside. Complained of shortness of breath with walking around the unit and had to stop.     Objective:      Intake/Output Summary (Last 24 hours) at 12/2/2023 1105  Last data filed at 12/2/2023 0820  Gross per 24 hour   Intake --   Output 1065 ml   Net -1065 ml       CT Output:   Left CT to 20 cm suction with 270 ml serosanguinous drainage in the past 24 hours. No air leak.   MCT to 20 cm suction with 300 ml serosanguinous drainage in the past 24 hours. No air leak.     Gtt's:  None    Wt preop:193 lb  Wt current:       12/02/23  0500   Weight: 91.1 kg (200 lb 12.8 oz)         PE:  Vitals:    12/02/23 1000   BP: 101/65   Pulse: 76   Resp: 18   Temp:    SpO2: 99%     GENERAL: NAD, resting comfortably, normal color  CARDIOVASCULAR: regular, regular rate, sinus. Sternal incision with prevena dressing intact. Right EVH incision - open to air. Edges well approximated.   PULMONARY: Normal, but diminished bilateral breath sounds, no labored breathing  ABDOMEN: soft, nt/nd  EXTREMITIES: mild peripheral edema, normal pulses, normal ROM        Lab Results (last 72 hours)       Procedure Component Value Units Date/Time    POC Glucose Once [[id number]]  (Normal) Collected: 12/02/23 0744    Specimen: Blood Updated: 12/02/23 0755     Glucose 102 mg/dL      Comment: : [id number] ShakeelelMeter ID: [id number]       Comprehensive Metabolic Panel [[id number]]  (Abnormal) Collected: 12/02/23 0413    Specimen: Blood Updated: 12/02/23 0447     Glucose 107 mg/dL      BUN 29 mg/dL      Creatinine 1.07 mg/dL      Sodium 132 mmol/L      Potassium 4.1 mmol/L      Chloride 100 mmol/L      CO2 21.0 mmol/L      Calcium 9.0 mg/dL      Total Protein 6.4 g/dL      Albumin 3.8 g/dL      ALT (SGPT) 8 U/L      AST (SGOT) 16 U/L      Alkaline Phosphatase 59 U/L      Total Bilirubin 0.3 mg/dL      Globulin 2.6 gm/dL       A/G Ratio 1.5 g/dL      BUN/Creatinine Ratio 27.1     Anion Gap 11.0 mmol/L      eGFR 60.0 mL/min/1.73     Narrative:      GFR Normal >60  Chronic Kidney Disease <60  Kidney Failure <15      CBC (No Diff) [[id number]]  (Abnormal) Collected: 12/02/23 0413    Specimen: Blood Updated: 12/02/23 0428     WBC 5.66 10*3/mm3      RBC 2.67 10*6/mm3      Hemoglobin 7.7 g/dL      Hematocrit 24.6 %      MCV 92.1 fL      MCH 28.8 pg      MCHC 31.3 g/dL      RDW 16.2 %      RDW-SD 54.0 fl      MPV 10.2 fL      Platelets 143 10*3/mm3     Basic Metabolic Panel [[id number]]  (Abnormal) Collected: 12/01/23 1213    Specimen: Blood Updated: 12/01/23 1242     Glucose 115 mg/dL      BUN 23 mg/dL      Creatinine 1.07 mg/dL      Sodium 131 mmol/L      Potassium 4.4 mmol/L      Chloride 100 mmol/L      CO2 20.0 mmol/L      Calcium 8.0 mg/dL      BUN/Creatinine Ratio 21.5     Anion Gap 11.0 mmol/L      eGFR 60.0 mL/min/1.73     Narrative:      GFR Normal >60  Chronic Kidney Disease <60  Kidney Failure <15      CBC (No Diff) [[id number]]  (Abnormal) Collected: 12/01/23 1213    Specimen: Blood Updated: 12/01/23 1229     WBC 10.08 10*3/mm3      RBC 3.07 10*6/mm3      Hemoglobin 8.9 g/dL      Hematocrit 27.9 %      MCV 90.9 fL      MCH 29.0 pg      MCHC 31.9 g/dL      RDW 16.1 %      RDW-SD 53.2 fl      MPV 10.1 fL      Platelets 196 10*3/mm3     Magnesium [[id number]]  (Normal) Collected: 12/01/23 0407    Specimen: Blood Updated: 12/01/23 0455     Magnesium 2.6 mg/dL     Comprehensive Metabolic Panel [[id number]]  (Abnormal) Collected: 12/01/23 0407    Specimen: Blood Updated: 12/01/23 0454     Glucose 126 mg/dL      BUN 21 mg/dL      Creatinine 1.08 mg/dL      Sodium 134 mmol/L      Potassium 4.0 mmol/L      Chloride 102 mmol/L      CO2 21.0 mmol/L      Calcium 8.7 mg/dL      Total Protein 6.5 g/dL      Albumin 4.0 g/dL      ALT (SGPT) 8 U/L      AST (SGOT) 24 U/L      Alkaline Phosphatase 48 U/L      Total Bilirubin 0.4 mg/dL      Globulin 2.5  gm/dL      A/G Ratio 1.6 g/dL      BUN/Creatinine Ratio 19.4     Anion Gap 11.0 mmol/L      eGFR 59.3 mL/min/1.73     Narrative:      GFR Normal >60  Chronic Kidney Disease <60  Kidney Failure <15      CBC (No Diff) [[id number]]  (Abnormal) Collected: 12/01/23 0407    Specimen: Blood Updated: 12/01/23 0438     WBC 7.32 10*3/mm3      RBC 2.91 10*6/mm3      Hemoglobin 8.4 g/dL      Hematocrit 26.2 %      MCV 90.0 fL      MCH 28.9 pg      MCHC 32.1 g/dL      RDW 16.0 %      RDW-SD 52.9 fl      MPV 9.7 fL      Platelets 152 10*3/mm3     POC Glucose Once [[id number]]  (Normal) Collected: 11/30/23 1656    Specimen: Blood Updated: 11/30/23 1707     Glucose 123 mg/dL      Comment: : [id number] Kaylin Larios ID: [id number]       Hepatic Function Panel [[id number]]  (Abnormal) Collected: 11/30/23 0330    Specimen: Blood Updated: 11/30/23 0802     Total Protein 6.0 g/dL      Albumin 4.2 g/dL      ALT (SGPT) 7 U/L      AST (SGOT) 27 U/L      Alkaline Phosphatase 38 U/L      Total Bilirubin 0.4 mg/dL      Bilirubin, Direct <0.2 mg/dL      Bilirubin, Indirect --     Comment: Unable to calculate       POC Glucose Once [[id number]]  (Abnormal) Collected: 11/30/23 0645    Specimen: Blood Updated: 11/30/23 0656     Glucose 140 mg/dL      Comment: : [id number] Janneth CadwellMeter ID: [id number]       POC Glucose Once [[id number]]  (Abnormal) Collected: 11/30/23 0443    Specimen: Blood Updated: 11/30/23 0455     Glucose 147 mg/dL      Comment: : [id number] Janneth CadwellMeter ID: [id number]       Renal Function Panel [[id number]]  (Abnormal) Collected: 11/30/23 0330    Specimen: Blood Updated: 11/30/23 0421     Glucose 166 mg/dL      BUN 17 mg/dL      Creatinine 1.02 mg/dL      Sodium 137 mmol/L      Potassium 4.1 mmol/L      Chloride 106 mmol/L      CO2 21.0 mmol/L      Calcium 8.9 mg/dL      Albumin 4.3 g/dL      Phosphorus 3.8 mg/dL      Anion Gap 10.0 mmol/L      BUN/Creatinine Ratio 16.7     eGFR 63.5 mL/min/1.73      Narrative:      GFR Normal >60  Chronic Kidney Disease <60  Kidney Failure <15      Magnesium [[id number]]  (Normal) Collected: 11/30/23 0330    Specimen: Blood Updated: 11/30/23 0421     Magnesium 1.9 mg/dL     Protime-INR [[id number]]  (Abnormal) Collected: 11/30/23 0330    Specimen: Blood Updated: 11/30/23 0415     Protime 14.5 Seconds      INR 1.12    CBC & Differential [[id number]]  (Abnormal) Collected: 11/30/23 0330    Specimen: Blood Updated: 11/30/23 0359    Narrative:      The following orders were created for panel order CBC & Differential.  Procedure                               Abnormality         Status                     ---------                               -----------         ------                     CBC Auto Differential[[id number]]        Abnormal            Final result                 Please view results for these tests on the individual orders.    CBC Auto Differential [[id number]]  (Abnormal) Collected: 11/30/23 0330    Specimen: Blood Updated: 11/30/23 0359     WBC 7.17 10*3/mm3      RBC 2.95 10*6/mm3      Hemoglobin 8.5 g/dL      Hematocrit 27.1 %      MCV 91.9 fL      MCH 28.8 pg      MCHC 31.4 g/dL      RDW 15.5 %      RDW-SD 51.7 fl      MPV 10.0 fL      Platelets 162 10*3/mm3      Neutrophil % 88.0 %      Lymphocyte % 4.9 %      Monocyte % 6.4 %      Eosinophil % 0.0 %      Basophil % 0.1 %      Neutrophils, Absolute 6.31 10*3/mm3      Lymphocytes, Absolute 0.35 10*3/mm3      Monocytes, Absolute 0.46 10*3/mm3      Eosinophils, Absolute 0.00 10*3/mm3      Basophils, Absolute 0.01 10*3/mm3     POC Glucose Once [[id number]]  (Abnormal) Collected: 11/30/23 0205    Specimen: Blood Updated: 11/30/23 0218     Glucose 164 mg/dL      Comment: : [id number] Janneth PricewellMeter ID: [id number]       POC Glucose Once [[id number]]  (Abnormal) Collected: 11/30/23 0004    Specimen: Blood Updated: 11/30/23 0015     Glucose 168 mg/dL      Comment: : [id number] Mabel RECCYhMeter ID: [id number]        POC Glucose Once [[id number]]  (Abnormal) Collected: 11/29/23 2308    Specimen: Blood Updated: 11/29/23 2320     Glucose 165 mg/dL      Comment: : [id number] Janneth CadwellMeter ID: [id number]       POC Glucose Once [[id number]]  (Abnormal) Collected: 11/29/23 2156    Specimen: Blood Updated: 11/29/23 2208     Glucose 167 mg/dL      Comment: : [id number] Janneth CadwellMeter ID: [id number]       POC Glucose Once [[id number]]  (Abnormal) Collected: 11/29/23 2059    Specimen: Blood Updated: 11/29/23 2110     Glucose 167 mg/dL      Comment: : [id number] Janneth CadwellMeter ID: [id number]       Blood Gas, Arterial - [[id number]]  (Abnormal) Collected: 11/29/23 1938    Specimen: Arterial Blood Updated: 11/29/23 1935     Site Arterial Line     Jeff's Test N/A     pH, Arterial 7.417 pH units      pCO2, Arterial 32.4 mm Hg      Comment: 84 Value below reference range        pO2, Arterial 96.8 mm Hg      HCO3, Arterial 20.8 mmol/L      Base Excess, Arterial -3.2 mmol/L      Comment: 84 Value below reference range        O2 Saturation, Arterial 98.1 %      Temperature 37.0     Barometric Pressure for Blood Gas 751 mmHg      Modality Ventilator     FIO2 30 %      Ventilator Mode PSV     PEEP 8.0     PSV 10.0 cmH2O      Collected by [id number]     Comment: Meter: [id number]     :  [id number]        pCO2, Temperature Corrected 32.4 mm Hg      pH, Temp Corrected 7.417 pH Units      pO2, Temperature Corrected 96.8 mm Hg     POC Glucose Once [[id number]]  (Abnormal) Collected: 11/29/23 1901    Specimen: Blood Updated: 11/29/23 1913     Glucose 131 mg/dL      Comment: : [id number] Janneth CadwellMeter ID: [id number]       POC Glucose Once [[id number]]  (Abnormal) Collected: 11/29/23 1808    Specimen: Blood Updated: 11/29/23 1819     Glucose 131 mg/dL      Comment: : [id number] Madan StacyMeter ID: [id number]       POC Glucose Once [[id number]]  (Abnormal) Collected: 11/29/23 9360    Specimen: Blood Updated:  11/29/23 1720     Glucose 132 mg/dL      Comment: : [id number] Madan StacyMeter ID: [id number]       Comprehensive Metabolic Panel [[id number]]  (Abnormal) Collected: 11/29/23 1531    Specimen: Blood Updated: 11/29/23 1637     Glucose 161 mg/dL      BUN 18 mg/dL      Creatinine 1.17 mg/dL      Sodium 140 mmol/L      Potassium 4.6 mmol/L      Comment: Slight hemolysis detected by analyzer. Result may be falsely elevated.        Chloride 108 mmol/L      CO2 21.0 mmol/L      Calcium 8.5 mg/dL      Total Protein 5.8 g/dL      Albumin 3.9 g/dL      ALT (SGPT) 8 U/L      AST (SGOT) 31 U/L      Alkaline Phosphatase 46 U/L      Total Bilirubin 0.4 mg/dL      Globulin 1.9 gm/dL      A/G Ratio 2.1 g/dL      BUN/Creatinine Ratio 15.4     Anion Gap 11.0 mmol/L      eGFR 53.9 mL/min/1.73     Narrative:      GFR Normal >60  Chronic Kidney Disease <60  Kidney Failure <15      POC Glucose Once [[id number]]  (Abnormal) Collected: 11/29/23 1614    Specimen: Blood Updated: 11/29/23 1625     Glucose 145 mg/dL      Comment: : [id number] Madan StacyMeter ID: [id number]       Renal Function Panel [[id number]]  (Abnormal) Collected: 11/29/23 1531    Specimen: Blood Updated: 11/29/23 1558     Glucose 161 mg/dL      BUN 19 mg/dL      Creatinine 1.18 mg/dL      Sodium 140 mmol/L      Potassium 4.6 mmol/L      Comment: Slight hemolysis detected by analyzer. Result may be falsely elevated.        Chloride 108 mmol/L      CO2 22.0 mmol/L      Calcium 8.5 mg/dL      Albumin 3.9 g/dL      Phosphorus 4.1 mg/dL      Anion Gap 10.0 mmol/L      BUN/Creatinine Ratio 16.1     eGFR 53.3 mL/min/1.73     Narrative:      GFR Normal >60  Chronic Kidney Disease <60  Kidney Failure <15      CBC (No Diff) [[id number]]  (Abnormal) Collected: 11/29/23 1531    Specimen: Blood Updated: 11/29/23 1542     WBC 10.18 10*3/mm3      RBC 3.56 10*6/mm3      Hemoglobin 10.4 g/dL      Hematocrit 32.3 %      MCV 90.7 fL      MCH 29.2 pg      MCHC 32.2 g/dL      RDW 15.2 %       RDW-SD 50.0 fl      MPV 9.3 fL      Platelets 187 10*3/mm3     Blood Gas, Arterial - [[id number]]  (Abnormal) Collected: 11/29/23 1538    Specimen: Arterial Blood Updated: 11/29/23 1536     Site Arterial Line     Jeff's Test N/A     pH, Arterial 7.330 pH units      Comment: 84 Value below reference range        pCO2, Arterial 41.4 mm Hg      pO2, Arterial 204.0 mm Hg      Comment: 83 Value above reference range        HCO3, Arterial 21.9 mmol/L      Base Excess, Arterial -3.9 mmol/L      Comment: 84 Value below reference range        O2 Saturation, Arterial 99.7 %      Comment: 83 Value above reference range        Temperature 37.0     Barometric Pressure for Blood Gas 751 mmHg      Modality Ventilator     FIO2 60 %      Ventilator Mode SIMV     Set Tidal Volume 500     Set Mech Resp Rate 20.0     PEEP 10.0     PSV 10.0 cmH2O      Collected by [id number]     Comment: Meter: [id number]     :  [id number]        pCO2, Temperature Corrected 41.4 mm Hg      pH, Temp Corrected 7.330 pH Units      pO2, Temperature Corrected 204 mm Hg     POC Glucose Once [[id number]]  (Abnormal) Collected: 11/29/23 1505    Specimen: Blood Updated: 11/29/23 1516     Glucose 168 mg/dL      Comment: : [id number] Madan StacyMeter ID: [id number]       POC Glucose Once [[id number]]  (Abnormal) Collected: 11/29/23 1407    Specimen: Blood Updated: 11/29/23 1418     Glucose 199 mg/dL      Comment: : [id number] Madan StacyMeter ID: [id number]       POC Glucose Once [[id number]]  (Abnormal) Collected: 11/29/23 1307    Specimen: Blood Updated: 11/29/23 1318     Glucose 207 mg/dL      Comment: : [id number] Madan StacyMeter ID: [id number]       Renal Function Panel [[id number]]  (Abnormal) Collected: 11/29/23 1150    Specimen: Blood Updated: 11/29/23 1222     Glucose 191 mg/dL      BUN 19 mg/dL      Creatinine 1.06 mg/dL      Sodium 139 mmol/L      Potassium 4.7 mmol/L      Chloride 108 mmol/L      CO2 24.0 mmol/L      Calcium 8.6 mg/dL       Albumin 3.2 g/dL      Phosphorus 3.3 mg/dL      Anion Gap 7.0 mmol/L      BUN/Creatinine Ratio 17.9     eGFR 60.6 mL/min/1.73     Narrative:      GFR Normal >60  Chronic Kidney Disease <60  Kidney Failure <15      Lactic Acid, Plasma [[id number]]  (Normal) Collected: 11/29/23 1150    Specimen: Blood Updated: 11/29/23 1217     Lactate 0.8 mmol/L     Protime-INR [[id number]]  (Abnormal) Collected: 11/29/23 1150    Specimen: Blood Updated: 11/29/23 1214     Protime 15.3 Seconds      INR 1.20    aPTT [[id number]]  (Normal) Collected: 11/29/23 1150    Specimen: Blood Updated: 11/29/23 1214     PTT 31.8 seconds     Fibrinogen [[id number]]  (Normal) Collected: 11/29/23 1150    Specimen: Blood Updated: 11/29/23 1214     Fibrinogen 384 mg/dL     CBC & Differential [[id number]]  (Abnormal) Collected: 11/29/23 1150    Specimen: Blood Updated: 11/29/23 1204    Narrative:      The following orders were created for panel order CBC & Differential.  Procedure                               Abnormality         Status                     ---------                               -----------         ------                     CBC Auto Differential[[id number]]        Abnormal            Final result                 Please view results for these tests on the individual orders.    CBC Auto Differential [[id number]]  (Abnormal) Collected: 11/29/23 1150    Specimen: Blood Updated: 11/29/23 1204     WBC 5.47 10*3/mm3      RBC 3.36 10*6/mm3      Hemoglobin 9.9 g/dL      Hematocrit 30.5 %      MCV 90.8 fL      MCH 29.5 pg      MCHC 32.5 g/dL      RDW 14.8 %      RDW-SD 49.3 fl      MPV 9.4 fL      Platelets 141 10*3/mm3      Neutrophil % 85.6 %      Lymphocyte % 6.2 %      Monocyte % 6.2 %      Eosinophil % 0.7 %      Basophil % 0.4 %      Immature Grans % 0.9 %      Neutrophils, Absolute 4.68 10*3/mm3      Lymphocytes, Absolute 0.34 10*3/mm3      Monocytes, Absolute 0.34 10*3/mm3      Eosinophils, Absolute 0.04 10*3/mm3      Basophils, Absolute 0.02  10*3/mm3      Immature Grans, Absolute 0.05 10*3/mm3      nRBC 0.0 /100 WBC     Calcium, Ionized [[id number]] Collected: 11/29/23 1159    Specimen: Blood Updated: 11/29/23 1156     Ionized Calcium 4.88 mg/dL      Collected by [id number]     Comment: Meter: [id number]     :  [id number]       Blood Gas, Arterial With Co-Ox [[id number]]  (Abnormal) Collected: 11/29/23 1157    Specimen: Arterial Blood Updated: 11/29/23 1155     Site Arterial Line     Jeff's Test N/A     pH, Arterial 7.370 pH units      pCO2, Arterial 41.3 mm Hg      pO2, Arterial 87.8 mm Hg      HCO3, Arterial 23.9 mmol/L      Base Excess, Arterial -1.4 mmol/L      Comment: 84 Value below reference range        O2 Saturation, Arterial 96.8 %      Hemoglobin, Blood Gas 10.3 g/dL      Comment: 84 Value below reference range        Hematocrit, Blood Gas 31.6 %      Comment: 84 Value below reference range        Oxyhemoglobin 94.4 %      Methemoglobin 1.10 %      Carboxyhemoglobin 1.3 %      Temperature 37.0     Sodium, Arterial 139 mmol/L      Potassium, Arterial 4.4 mmol/L      Ionized Calcium 4.91 mg/dL      Barometric Pressure for Blood Gas 752 mmHg      Modality Ventilator     FIO2 60 %      Ventilator Mode SIMV     Set Tidal Volume 500     Set Mech Resp Rate 20.0     PEEP 10.0     PSV 10.0 cmH2O      Collected by [id number]     Comment: Meter: [id number]     :  [id number]        pH, Temp Corrected 7.370 pH Units      pCO2, Temperature Corrected 41.3 mm Hg      pO2, Temperature Corrected 87.8 mm Hg     Fibrinogen [[id number]]  (Normal) Collected: 11/29/23 1030    Specimen: Blood, Arterial Line Updated: 11/29/23 1107     Fibrinogen 370 mg/dL     Protime-INR [[id number]]  (Abnormal) Collected: 11/29/23 1030    Specimen: Blood, Arterial Line Updated: 11/29/23 1107     Protime 17.5 Seconds      INR 1.42    aPTT [[id number]]  (Normal) Collected: 11/29/23 1030    Specimen: Blood, Arterial Line Updated: 11/29/23 1107     PTT 28.4 seconds              Rhythm: sinus rhythm    CXR: normal postoperative changes    Assessment & Plan     NSTEMI associated with CAD - S/P 2V CABG, MAZE, Atrial Occlusion on 11/29/2023 - patient doing well postoperatively. Working well with PT. Currently on ASA 81 mg daily, Plavix 75 mg daily, Coreg 3.125 mg BID  Postoperative Anemia 2' to Acute Blood Loss, Expected - Hgb 7.7 / Hct 24.6  Atrial Fibrillation with RVR on admission - S/P 2V CABG, MAZE, Atrial Occlusion on 11/29/2023 - maintaining SR.   CKD, Stage 3a - CR stable, 1.07 today  Liver Cirrhosis 2' to Methotrexate Use - LFT stable.   Essential HTN - Controlled  Mixed Hyperlipidemia - on Statin therapy  Rheumatoid Arthritis - on sulfasalazine    Will add Lasix 20 mg IV BID and K replacement. D/C camilla. Patient is felt stable for transfer to  for ongoing monitoring and therapy.       FITZ Castro

## 2023-12-02 NOTE — PLAN OF CARE
Goal Outcome Evaluation:      Patient stands and walks 200' with CGA. RA SAT 96%.                   Writer returned patients request for return call. At this time patient states he has been seen by Opthalmology as consult and all questions have been answered.

## 2023-12-02 NOTE — PLAN OF CARE
Problem: Adult Inpatient Plan of Care  Goal: Plan of Care Review  Outcome: Ongoing, Progressing  Goal: Patient-Specific Goal (Individualized)  Outcome: Ongoing, Progressing  Goal: Absence of Hospital-Acquired Illness or Injury  Outcome: Ongoing, Progressing  Intervention: Identify and Manage Fall Risk  Recent Flowsheet Documentation  Taken 12/2/2023 0600 by Emma Joshua RN  Safety Promotion/Fall Prevention:   safety round/check completed   fall prevention program maintained  Taken 12/2/2023 0500 by Emma Joshua RN  Safety Promotion/Fall Prevention:   safety round/check completed   fall prevention program maintained  Taken 12/2/2023 0400 by Emma Joshua RN  Safety Promotion/Fall Prevention:   safety round/check completed   fall prevention program maintained  Taken 12/2/2023 0300 by Emma Joshua RN  Safety Promotion/Fall Prevention:   safety round/check completed   fall prevention program maintained  Taken 12/2/2023 0000 by Emma Joshua RN  Safety Promotion/Fall Prevention:   safety round/check completed   fall prevention program maintained  Taken 12/1/2023 2300 by Emma Joshua RN  Safety Promotion/Fall Prevention:   safety round/check completed   fall prevention program maintained  Taken 12/1/2023 2200 by Emma Joshua RN  Safety Promotion/Fall Prevention:   safety round/check completed   fall prevention program maintained  Taken 12/1/2023 2100 by Emma Joshua RN  Safety Promotion/Fall Prevention:   safety round/check completed   fall prevention program maintained  Taken 12/1/2023 2000 by Emma Joshua RN  Safety Promotion/Fall Prevention:   safety round/check completed   fall prevention program maintained  Taken 12/1/2023 1900 by Emma Joshua RN  Safety Promotion/Fall Prevention:   safety round/check completed   fall prevention program maintained  Intervention: Prevent Skin Injury  Recent Flowsheet Documentation  Taken 12/2/2023 0600 by Emma Joshua RN  Body Position: (up in chair)  other (see comments)  Taken 12/2/2023 0400 by Emma Joshua RN  Body Position: supine  Taken 12/2/2023 0000 by Emma Joshua RN  Body Position: supine  Taken 12/1/2023 2200 by Emma Joshua RN  Body Position:   left   tilted  Taken 12/1/2023 2000 by Emma Joshua RN  Body Position: supine  Intervention: Prevent and Manage VTE (Venous Thromboembolism) Risk  Recent Flowsheet Documentation  Taken 12/2/2023 0600 by Emma Joshua RN  Activity Management: back to bed  Taken 12/2/2023 0400 by Emma Joshua RN  Activity Management: back to bed  VTE Prevention/Management:   bilateral   sequential compression devices on  Range of Motion: active ROM (range of motion) encouraged  Taken 12/2/2023 0000 by Emma Joshua RN  Activity Management: ambulated outside room  VTE Prevention/Management:   bilateral   sequential compression devices on  Range of Motion: active ROM (range of motion) encouraged  Taken 12/1/2023 2200 by Emma Joshua RN  Activity Management: back to bed  Taken 12/1/2023 2000 by Emma Joshua RN  Activity Management: ambulated outside room  VTE Prevention/Management:   bilateral   sequential compression devices on  Range of Motion: active ROM (range of motion) encouraged  Intervention: Prevent Infection  Recent Flowsheet Documentation  Taken 12/2/2023 0600 by Emma Joshua RN  Infection Prevention: single patient room provided  Taken 12/2/2023 0500 by Emma Joshua RN  Infection Prevention: single patient room provided  Taken 12/2/2023 0400 by Emma Joshua RN  Infection Prevention: single patient room provided  Taken 12/2/2023 0300 by Emma Joshua RN  Infection Prevention: single patient room provided  Taken 12/2/2023 0000 by Emma Joshua RN  Infection Prevention: single patient room provided  Taken 12/1/2023 2300 by Emma Joshua RN  Infection Prevention: single patient room provided  Taken 12/1/2023 2200 by Emma Joshua RN  Infection Prevention: single patient room  provided  Taken 12/1/2023 2100 by Emma Joshua RN  Infection Prevention: single patient room provided  Taken 12/1/2023 2000 by Emma Joshua RN  Infection Prevention: single patient room provided  Taken 12/1/2023 1900 by Emma Joshua RN  Infection Prevention: single patient room provided  Goal: Optimal Comfort and Wellbeing  Outcome: Ongoing, Progressing  Intervention: Provide Person-Centered Care  Recent Flowsheet Documentation  Taken 12/2/2023 0400 by Emma Joshua RN  Trust Relationship/Rapport:   care explained   emotional support provided  Taken 12/2/2023 0000 by Emma Joshua RN  Trust Relationship/Rapport:   care explained   emotional support provided  Taken 12/1/2023 2000 by Emma Joshua RN  Trust Relationship/Rapport:   care explained   emotional support provided  Goal: Readiness for Transition of Care  Outcome: Ongoing, Progressing     Problem: Hypertension Comorbidity  Goal: Blood Pressure in Desired Range  Outcome: Ongoing, Progressing  Intervention: Maintain Blood Pressure Management  Recent Flowsheet Documentation  Taken 12/2/2023 0600 by Emma Joshua RN  Medication Review/Management: medications reviewed  Taken 12/2/2023 0500 by Emma Joshua RN  Medication Review/Management: medications reviewed  Taken 12/2/2023 0400 by Emma Joshua RN  Medication Review/Management: medications reviewed  Taken 12/2/2023 0300 by Emma Joshua RN  Medication Review/Management: medications reviewed  Taken 12/2/2023 0000 by Emma Joshua RN  Medication Review/Management: medications reviewed  Taken 12/1/2023 2300 by Emma Joshua RN  Medication Review/Management: medications reviewed  Taken 12/1/2023 2200 by Emma Joshua RN  Medication Review/Management: medications reviewed  Taken 12/1/2023 2100 by Emma Joshua RN  Medication Review/Management: medications reviewed  Taken 12/1/2023 2000 by Emma Joshua RN  Medication Review/Management: medications reviewed  Taken 12/1/2023 1900 by  Emma Joshua RN  Medication Review/Management: medications reviewed     Problem: Fall Injury Risk  Goal: Absence of Fall and Fall-Related Injury  Outcome: Ongoing, Progressing  Intervention: Identify and Manage Contributors  Recent Flowsheet Documentation  Taken 12/2/2023 0600 by Emma Joshua RN  Medication Review/Management: medications reviewed  Taken 12/2/2023 0500 by Emma Joshua RN  Medication Review/Management: medications reviewed  Taken 12/2/2023 0400 by Emma Joshua RN  Medication Review/Management: medications reviewed  Taken 12/2/2023 0300 by Emma Joshua RN  Medication Review/Management: medications reviewed  Taken 12/2/2023 0000 by Emma Joshua RN  Medication Review/Management: medications reviewed  Taken 12/1/2023 2300 by Emma Joshua RN  Medication Review/Management: medications reviewed  Taken 12/1/2023 2200 by Emma Joshua RN  Medication Review/Management: medications reviewed  Taken 12/1/2023 2100 by Emma Joshua RN  Medication Review/Management: medications reviewed  Taken 12/1/2023 2000 by Emma Joshua RN  Medication Review/Management: medications reviewed  Taken 12/1/2023 1900 by Emma Joshua RN  Medication Review/Management: medications reviewed  Intervention: Promote Injury-Free Environment  Recent Flowsheet Documentation  Taken 12/2/2023 0600 by Emma Joshua RN  Safety Promotion/Fall Prevention:   safety round/check completed   fall prevention program maintained  Taken 12/2/2023 0500 by Emma Joshua RN  Safety Promotion/Fall Prevention:   safety round/check completed   fall prevention program maintained  Taken 12/2/2023 0400 by Emma Joshua RN  Safety Promotion/Fall Prevention:   safety round/check completed   fall prevention program maintained  Taken 12/2/2023 0300 by Emma Joshua RN  Safety Promotion/Fall Prevention:   safety round/check completed   fall prevention program maintained  Taken 12/2/2023 0000 by Emma Joshua RN  Safety  Promotion/Fall Prevention:   safety round/check completed   fall prevention program maintained  Taken 12/1/2023 2300 by Emma Joshua RN  Safety Promotion/Fall Prevention:   safety round/check completed   fall prevention program maintained  Taken 12/1/2023 2200 by Emma Joshua RN  Safety Promotion/Fall Prevention:   safety round/check completed   fall prevention program maintained  Taken 12/1/2023 2100 by Emma Joshua RN  Safety Promotion/Fall Prevention:   safety round/check completed   fall prevention program maintained  Taken 12/1/2023 2000 by Emma Joshua RN  Safety Promotion/Fall Prevention:   safety round/check completed   fall prevention program maintained  Taken 12/1/2023 1900 by Emma Joshua RN  Safety Promotion/Fall Prevention:   safety round/check completed   fall prevention program maintained     Problem: Chest Pain  Goal: Resolution of Chest Pain Symptoms  Outcome: Ongoing, Progressing     Problem: Skin Injury Risk Increased  Goal: Skin Health and Integrity  Outcome: Ongoing, Progressing  Intervention: Optimize Skin Protection  Recent Flowsheet Documentation  Taken 12/2/2023 0600 by Emma Joshua RN  Head of Bed (HOB) Positioning: HOB at 30 degrees  Taken 12/2/2023 0400 by Emma Joshua RN  Head of Bed (HOB) Positioning: HOB at 30 degrees  Taken 12/2/2023 0000 by Emma Joshua RN  Head of Bed (HOB) Positioning: HOB at 30 degrees  Taken 12/1/2023 2200 by Emma Joshua RN  Head of Bed (HOB) Positioning: HOB at 30 degrees  Taken 12/1/2023 2000 by Emma Joshua RN  Head of Bed (HOB) Positioning: HOB at 30 degrees

## 2023-12-02 NOTE — THERAPY TREATMENT NOTE
Acute Care - Physical Therapy Treatment Note  [facility]     Patient Name: Alessandra Baker  : 1965  MRN: [id number]  Today's Date: 2023      Visit Dx:     ICD-10-CM ICD-9-CM   1. Chest pain due to myocardial ischemia, unspecified ischemic chest pain type  I25.9 786.50   2. Paroxysmal atrial fibrillation  I48.0 427.31   3. NSTEMI (non-ST elevated myocardial infarction)  I21.4 410.70   4. Impaired mobility [Z74.09]  Z74.09 799.89     Patient Active Problem List   Diagnosis    History of colon polyps    Esophageal varices without bleeding    Coronary artery disease    Mixed hyperlipidemia    Chest pain    Rheumatoid arthritis involving multiple sites with positive rheumatoid factor    Atrial fibrillation with RVR    Central stenosis of spinal canal    Neuroforaminal stenosis of cervical spine    NSTEMI (non-ST elevated myocardial infarction)    Stage 3a chronic kidney disease     Past Medical History:   Diagnosis Date    Anxiety     Arthritis     Atrial fibrillation with RVR 2023    CAD (coronary artery disease)     Chronic kidney disease     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    Depression     Esophageal varices     GERD (gastroesophageal reflux disease)     Headache     Heart problem     angina    Hyperlipidemia     Hypertension 2012    Liver disease     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    Liver problem     Myocardial infarction 2012    NSTEMI    Rheumatoid arthritis     Skin cancer     Stroke     showed up on mri about 5 years ago with no residual    Ulnar neuropathy     Visual impairment      Past Surgical History:   Procedure Laterality Date    ANGIOPLASTY      2012    APPENDECTOMY      ATRIAL APPENDAGE EXCLUSION LEFT WITH TRANSESOPHAGEAL ECHOCARDIOGRAM N/A 2023    Procedure: ATRIAL APPENDAGE EXCLUSION LEFT WITH 40MM ATRICLIP;  Surgeon: Hosea Granados MD;  Location: [facility];  Service: Cardiothoracic;  Laterality: N/A;    CARDIAC CATHETERIZATION  2015, 22    CARDIAC  CATHETERIZATION Left 11/22/2023    Procedure: Cardiac Catheterization/Vascular Study;  Surgeon: Jayson Castillo MD;  Location: [facility] CATH INVASIVE LOCATION;  Service: Cardiology;  Laterality: Left;    COLONOSCOPY      COLONOSCOPY N/A 04/24/2023    Procedure: COLONOSCOPY WITH ANESTHESIA;  Surgeon: Rick Tidwell DO;  Location: [facility] ENDOSCOPY;  Service: Gastroenterology;  Laterality: N/A;  preop; hx of polyps   postop; polyps  PCP Eric Feldman     CORONARY ARTERY BYPASS GRAFT N/A 11/29/2023    Procedure: CORONARY ARTERY BYPASS GRAFTING X2, LEFT INTERNAL MAMMARY ARTERY GRAFT, RIGHT LEG ENDOSCOPIC VEIN HARVEST, TRANSESOPHAGEAL ECHOCARDIOGRAM, APPLICATION OF PREVENA;  Surgeon: Hosea Granados MD;  Location: [facility] OR;  Service: Cardiothoracic;  Laterality: N/A;    CORONARY STENT PLACEMENT      x3 per patient, X 1 -  11/2012, x 2 - 4/21/22    ENDOSCOPY      ENDOSCOPY N/A 04/24/2023    Procedure: ESOPHAGOGASTRODUODENOSCOPY WITH ANESTHESIA;  Surgeon: Rick Tidwell DO;  Location: [facility] ENDOSCOPY;  Service: Gastroenterology;  Laterality: N/A;  preop; hx of varices   postop; varices   PCP Eric Feldman     HAND RECONSTRUCTION Right 06/15/1967    HYSTERECTOMY      2016    MAZE PROCEDURE N/A 11/29/2023    Procedure: MAZE PROCEDURE WITH ENCOMPASS CLAMP;  Surgeon: Hosea Granados MD;  Location: [facility] OR;  Service: Cardiothoracic;  Laterality: N/A;    SUBTOTAL HYSTERECTOMY      TONSILLECTOMY       PT Assessment (last 12 hours)       PT Evaluation and Treatment       Row Name 12/02/23 0902          Physical Therapy Time and Intention    Subjective Information complains of;fatigue;pain  -BLANCA     Document Type therapy note (daily note)  -BLANCA     Mode of Treatment physical therapy  -BLANCA       Row Name 12/02/23 0902          General Information    Existing Precautions/Restrictions fall;sternal  prevena  -BLANCA       Row Name 12/02/23 0902          Pain    Posttreatment Pain Rating 2/10  -BLANCA     Pain Location - chest  -BLANCA        Row Name 12/02/23 0902          Sit-Stand Transfer    Sit-Stand [place] (Transfers) contact guard  -BLANCA       Row Name 12/02/23 0902          Stand-Sit Transfer    Stand-Sit [place] (Transfers) standby assist  -BLANCA       Row Name 12/02/23 0902          Toilet Transfer    Type (Toilet Transfer) sit-stand;stand-sit  -BLANCA     [place] Level (Toilet Transfer) contact guard  -BLANCA     Assistive Device (Toilet Transfer) commode chair  -BLANCA       Row Name 12/02/23 0902          Gait/Stairs (Locomotion)    [place] Level (Gait) contact guard  -BLANCA     Distance in Feet (Gait) 200  -BLANCA     Deviations/Abnormal Patterns (Gait) iesha decreased;stride length decreased  -BLANCA       Row Name 12/02/23 0902          Motor Skills    Comments, Therapeutic Exercise protocol  -BLANCA       Row Name             Wound 11/29/23 0800 midline sternal Incision    Wound - Properties Group Placement Date: 11/29/23  -TJ Placement Time: 0800  -TJ Orientation: midline  -TJ Location: sternal  -TJ Primary Wound Type: Incision  -TJ    Retired Wound - Properties Group Placement Date: 11/29/23  -TJ Placement Time: 0800  -TJ Orientation: midline  -TJ Location: sternal  -TJ Primary Wound Type: Incision  -TJ    Retired Wound - Properties Group Date first assessed: 11/29/23  -TJ Time first assessed: 0800  -TJ Location: sternal  -TJ Primary Wound Type: Incision  -TJ      Row Name             Wound 11/29/23 0800 Right leg Incision    Wound - Properties Group Placement Date: 11/29/23  -TJ Placement Time: 0800  -TJ Side: Right  -TJ Location: leg  -TJ Primary Wound Type: Incision  -TJ    Retired Wound - Properties Group Placement Date: 11/29/23  -TJ Placement Time: 0800  -TJ Side: Right  -TJ Location: leg  -TJ Primary Wound Type: Incision  -TJ    Retired Wound - Properties Group Date first assessed: 11/29/23  -TJ Time first assessed: 0800  -TJ Side: Right  -TJ Location: leg  -TJ Primary Wound Type: Incision  -TJ      Row Name             NPWT (Negative  Pressure Wound Therapy) 11/29/23 1130 chest    NPWT (Negative Pressure Wound Therapy) - Properties Group Placement Date: 11/29/23  -TJ Placement Time: 1130  -TJ Location: chest  -TJ Additional Comments: prevena  -TJ    Retired NPWT (Negative Pressure Wound Therapy) - Properties Group Placement Date: 11/29/23  -TJ Placement Time: 1130  -TJ Location: chest  -TJ Additional Comments: prevena  -TJ    Retired NPWT (Negative Pressure Wound Therapy) - Properties Group Placement Date: 11/29/23  -TJ Placement Time: 1130  -TJ Location: chest  -TJ Additional Comments: prevena  -TJ      Row Name 12/02/23 0902          Vital Signs    Pre Systolic BP Rehab 103  -BLANCA     Pre Treatment Diastolic BP 53  -BLANCA       Row Name 12/02/23 0902          Positioning and Restraints    Pre-Treatment Position sitting in chair/recliner  -BLANCA     On BS commode notified nsg;sitting;call light within reach;encouraged to call for assist  -BLANCA               User Key  (r) = Recorded By, (t) = Taken By, (c) = Cosigned By      Initials Name Provider Type    Cici Giraldo PTA Physical Therapist Assistant    Lucia Elliott, RN Registered Nurse                    Physical Therapy Education       Title: PT OT SLP Therapies (In Progress)       Topic: Physical Therapy (In Progress)       Point: Mobility training (Done)       Learning Progress Summary             Patient Acceptance, E, VU by BLANCA at 12/2/2023 1007    Comment: POC    Eager, E, VU by DOC at 12/1/2023 0856    Comment: t/f's    Acceptance, E, VU,DU by CHELI at 11/30/2023 0914    Comment: benefits of activity, progression of PT, sternal prec                         Point: Home exercise program (Not Started)       Learner Progress:  Not documented in this visit.              Point: Body mechanics (Not Started)       Learner Progress:  Not documented in this visit.              Point: Precautions (Done)       Learning Progress Summary             Patient Acceptance, E, VU,DU by CHELI at 11/30/2023 0914     Comment: benefits of activity, progression of PT, sternal prec                                         User Key       Initials Effective Dates Name Provider Type Discipline    AE 02/03/23 -  Kezia Islas PTA Physical Therapist Assistant PT    CHELI 02/03/23 -  Dwight Rae PT DPT Physical Therapist PT    BLANCA 02/03/23 -  Cici Zamora PTA Physical Therapist Assistant PT                  PT Recommendation and Plan         Outcome Measures       Row Name 12/02/23 1000             How much help from another person do you currently need...    Turning from your back to your side while in flat bed without using bedrails? 2  -BLANCA      Moving from lying on back to sitting on the side of a flat bed without bedrails? 2  -BLANCA      Moving to and from a bed to a chair (including a wheelchair)? 3  -BLANCA      Standing up from a chair using your arms (e.g., wheelchair, bedside chair)? 3  -BLANCA      Climbing 3-5 steps with a railing? 3  -BLANCA      To walk in hospital room? 3  -BLANCA      AM-PAC 6 Clicks Score (PT) 16  -BLANCA      Highest Level of Mobility Goal 5 --> Static standing  -BLANCA                User Key  (r) = Recorded By, (t) = Taken By, (c) = Cosigned By      Initials Name Provider Type    BLANCA Cici Zamora PTA Physical Therapist Assistant                     Time Calculation:    PT Charges       Row Name 12/02/23 1008             Time Calculation    Start Time 0902  -BLANCA      Stop Time 0927  -BLANCA      Time Calculation (min) 25 min  -BLANCA         Timed Charges    15727 - Gait Training Minutes  25  -BLANCA         Total Minutes    Timed Charges Total Minutes 25  -BLANCA       Total Minutes 25  -BLANCA                User Key  (r) = Recorded By, (t) = Taken By, (c) = Cosigned By      Initials Name Provider Type    Cici Giraldo PTA Physical Therapist Assistant                  Therapy Charges for Today       Code Description Service Date Service Provider Modifiers Qty    19453965012 HC GAIT TRAINING EA 15 MIN 12/2/2023 Noah  Cici BRITTON, PTA GP 2            PT G-Codes  Outcome Measure Options: AM-PAC 6 Clicks Basic Mobility (PT)  AM-PAC 6 Clicks Score (PT): 16    Cici Zamora, PTA  12/2/2023

## 2023-12-02 NOTE — PLAN OF CARE
Goal Outcome Evaluation:      Patient stood and ambulated 200' with CGA and a very slow pace. Reviewed sternal restrictions.

## 2023-12-03 ENCOUNTER — APPOINTMENT (OUTPATIENT)
Dept: GENERAL RADIOLOGY | Facility: HOSPITAL | Age: 58
DRG: 234 | End: 2023-12-03
Payer: MEDICARE

## 2023-12-03 LAB
ALBUMIN SERPL-MCNC: 3.6 G/DL (ref 3.5–5.2)
ALBUMIN/GLOB SERPL: 1.3 G/DL
ALP SERPL-CCNC: 77 U/L (ref 39–117)
ALT SERPL W P-5'-P-CCNC: 10 U/L (ref 1–33)
ANION GAP SERPL CALCULATED.3IONS-SCNC: 11 MMOL/L (ref 5–15)
AST SERPL-CCNC: 16 U/L (ref 1–32)
BILIRUB SERPL-MCNC: 0.3 MG/DL (ref 0–1.2)
BUN SERPL-MCNC: 34 MG/DL (ref 6–20)
BUN/CREAT SERPL: 27.2 (ref 7–25)
CALCIUM SPEC-SCNC: 8.7 MG/DL (ref 8.6–10.5)
CHLORIDE SERPL-SCNC: 99 MMOL/L (ref 98–107)
CO2 SERPL-SCNC: 24 MMOL/L (ref 22–29)
CREAT SERPL-MCNC: 1.25 MG/DL (ref 0.57–1)
DEPRECATED RDW RBC AUTO: 54.7 FL (ref 37–54)
EGFRCR SERPLBLD CKD-EPI 2021: 49.8 ML/MIN/1.73
ERYTHROCYTE [DISTWIDTH] IN BLOOD BY AUTOMATED COUNT: 16.2 % (ref 12.3–15.4)
GLOBULIN UR ELPH-MCNC: 2.7 GM/DL
GLUCOSE SERPL-MCNC: 97 MG/DL (ref 65–99)
HCT VFR BLD AUTO: 25.8 % (ref 34–46.6)
HGB BLD-MCNC: 7.8 G/DL (ref 12–15.9)
MCH RBC QN AUTO: 28.3 PG (ref 26.6–33)
MCHC RBC AUTO-ENTMCNC: 30.2 G/DL (ref 31.5–35.7)
MCV RBC AUTO: 93.5 FL (ref 79–97)
PLATELET # BLD AUTO: 188 10*3/MM3 (ref 140–450)
PMV BLD AUTO: 10 FL (ref 6–12)
POTASSIUM SERPL-SCNC: 4.4 MMOL/L (ref 3.5–5.2)
PROT SERPL-MCNC: 6.3 G/DL (ref 6–8.5)
RBC # BLD AUTO: 2.76 10*6/MM3 (ref 3.77–5.28)
SODIUM SERPL-SCNC: 134 MMOL/L (ref 136–145)
WBC NRBC COR # BLD AUTO: 5.1 10*3/MM3 (ref 3.4–10.8)

## 2023-12-03 PROCEDURE — 71045 X-RAY EXAM CHEST 1 VIEW: CPT

## 2023-12-03 PROCEDURE — 80053 COMPREHEN METABOLIC PANEL: CPT | Performed by: NURSE PRACTITIONER

## 2023-12-03 PROCEDURE — 97116 GAIT TRAINING THERAPY: CPT

## 2023-12-03 PROCEDURE — 85027 COMPLETE CBC AUTOMATED: CPT | Performed by: NURSE PRACTITIONER

## 2023-12-03 PROCEDURE — 25010000002 ENOXAPARIN PER 10 MG: Performed by: NURSE PRACTITIONER

## 2023-12-03 RX ORDER — FUROSEMIDE 40 MG/1
40 TABLET ORAL
Status: DISCONTINUED | OUTPATIENT
Start: 2023-12-03 | End: 2023-12-06 | Stop reason: HOSPADM

## 2023-12-03 RX ORDER — METOPROLOL SUCCINATE 25 MG/1
25 TABLET, EXTENDED RELEASE ORAL
Status: DISCONTINUED | OUTPATIENT
Start: 2023-12-03 | End: 2023-12-06 | Stop reason: HOSPADM

## 2023-12-03 RX ORDER — POTASSIUM CHLORIDE 750 MG/1
20 CAPSULE, EXTENDED RELEASE ORAL DAILY
Status: DISCONTINUED | OUTPATIENT
Start: 2023-12-04 | End: 2023-12-04

## 2023-12-03 RX ADMIN — PANTOPRAZOLE SODIUM 40 MG: 40 TABLET, DELAYED RELEASE ORAL at 05:04

## 2023-12-03 RX ADMIN — ENOXAPARIN SODIUM 40 MG: 100 INJECTION SUBCUTANEOUS at 08:47

## 2023-12-03 RX ADMIN — LIDOCAINE 2 PATCH: 700 PATCH TOPICAL at 05:04

## 2023-12-03 RX ADMIN — ACETAMINOPHEN 650 MG: 325 TABLET, FILM COATED ORAL at 20:34

## 2023-12-03 RX ADMIN — OXYCODONE HYDROCHLORIDE 5 MG: 5 TABLET ORAL at 10:22

## 2023-12-03 RX ADMIN — BISACODYL 10 MG: 5 TABLET ORAL at 08:47

## 2023-12-03 RX ADMIN — OXYCODONE HYDROCHLORIDE 5 MG: 5 TABLET ORAL at 00:22

## 2023-12-03 RX ADMIN — FUROSEMIDE 40 MG: 40 TABLET ORAL at 17:05

## 2023-12-03 RX ADMIN — ACETAMINOPHEN 650 MG: 325 TABLET, FILM COATED ORAL at 16:58

## 2023-12-03 RX ADMIN — PREGABALIN 25 MG: 25 CAPSULE ORAL at 08:49

## 2023-12-03 RX ADMIN — PRAVASTATIN SODIUM 20 MG: 20 TABLET ORAL at 08:47

## 2023-12-03 RX ADMIN — PREGABALIN 25 MG: 25 CAPSULE ORAL at 20:34

## 2023-12-03 RX ADMIN — METOPROLOL SUCCINATE 25 MG: 25 TABLET, EXTENDED RELEASE ORAL at 12:00

## 2023-12-03 RX ADMIN — ASPIRIN 81 MG: 81 TABLET, COATED ORAL at 08:48

## 2023-12-03 RX ADMIN — SERTRALINE HYDROCHLORIDE 200 MG: 100 TABLET, FILM COATED ORAL at 08:47

## 2023-12-03 RX ADMIN — FUROSEMIDE 40 MG: 40 TABLET ORAL at 10:42

## 2023-12-03 RX ADMIN — SULFASALAZINE 500 MG: 500 TABLET ORAL at 08:47

## 2023-12-03 RX ADMIN — ACETAMINOPHEN 650 MG: 325 TABLET, FILM COATED ORAL at 08:48

## 2023-12-03 NOTE — PLAN OF CARE
Goal Outcome Evaluation:   This afternoon patient stood and ambulated 300' with SBA. Tolerating walks well!

## 2023-12-03 NOTE — PROGRESS NOTES
[facility] Cardiothoracic Surgery  PROGRESS NOTE     Subjective:  Sitting up in chair. Complaining of increased pain in proximal portion of sternal incision. Very tender to touch.      Objective:      Intake/Output Summary (Last 24 hours) at 12/3/2023 1220  Last data filed at 12/3/2023 0845  Gross per 24 hour   Intake 890 ml   Output 1160 ml   Net -270 ml       CT Output:   Left CT to 20 cm suction with 110 ml serosanguinous drainage in the past 24 hours. No air leak.       Gtt's:  None    Wt preop:193 lb  Wt current:       12/03/23  0518   Weight: 90 kg (198 lb 6.4 oz)         PE:  Vitals:    12/03/23 1158   BP: 107/51   Pulse: 80   Resp: 18   Temp: 98.1 °F (36.7 °C)   SpO2: 95%     GENERAL: NAD, resting comfortably, normal color  CARDIOVASCULAR: regular, regular rate, sinus. Sternal incision with prevena dressing intact. Patient is very tender to touch. Prevena intact without increased drainage. Right EVH incision - open to air. Edges well approximated.   PULMONARY: Normal, but diminished bilateral breath sounds, no labored breathing  ABDOMEN: soft, nt/nd  EXTREMITIES: mild peripheral edema, normal pulses, normal ROM        Lab Results (last 72 hours)       Procedure Component Value Units Date/Time    POC Glucose Once [[id number]]  (Normal) Collected: 12/02/23 0744    Specimen: Blood Updated: 12/02/23 0755     Glucose 102 mg/dL      Comment: : [id number] BeckfordelMeter ID: [id number]       Comprehensive Metabolic Panel [[id number]]  (Abnormal) Collected: 12/02/23 0413    Specimen: Blood Updated: 12/02/23 0447     Glucose 107 mg/dL      BUN 29 mg/dL      Creatinine 1.07 mg/dL      Sodium 132 mmol/L      Potassium 4.1 mmol/L      Chloride 100 mmol/L      CO2 21.0 mmol/L      Calcium 9.0 mg/dL      Total Protein 6.4 g/dL      Albumin 3.8 g/dL      ALT (SGPT) 8 U/L      AST (SGOT) 16 U/L      Alkaline Phosphatase 59 U/L      Total Bilirubin 0.3 mg/dL      Globulin 2.6 gm/dL      A/G Ratio  1.5 g/dL      BUN/Creatinine Ratio 27.1     Anion Gap 11.0 mmol/L      eGFR 60.0 mL/min/1.73     Narrative:      GFR Normal >60  Chronic Kidney Disease <60  Kidney Failure <15      CBC (No Diff) [[id number]]  (Abnormal) Collected: 12/02/23 0413    Specimen: Blood Updated: 12/02/23 0428     WBC 5.66 10*3/mm3      RBC 2.67 10*6/mm3      Hemoglobin 7.7 g/dL      Hematocrit 24.6 %      MCV 92.1 fL      MCH 28.8 pg      MCHC 31.3 g/dL      RDW 16.2 %      RDW-SD 54.0 fl      MPV 10.2 fL      Platelets 143 10*3/mm3     Basic Metabolic Panel [[id number]]  (Abnormal) Collected: 12/01/23 1213    Specimen: Blood Updated: 12/01/23 1242     Glucose 115 mg/dL      BUN 23 mg/dL      Creatinine 1.07 mg/dL      Sodium 131 mmol/L      Potassium 4.4 mmol/L      Chloride 100 mmol/L      CO2 20.0 mmol/L      Calcium 8.0 mg/dL      BUN/Creatinine Ratio 21.5     Anion Gap 11.0 mmol/L      eGFR 60.0 mL/min/1.73     Narrative:      GFR Normal >60  Chronic Kidney Disease <60  Kidney Failure <15      CBC (No Diff) [[id number]]  (Abnormal) Collected: 12/01/23 1213    Specimen: Blood Updated: 12/01/23 1229     WBC 10.08 10*3/mm3      RBC 3.07 10*6/mm3      Hemoglobin 8.9 g/dL      Hematocrit 27.9 %      MCV 90.9 fL      MCH 29.0 pg      MCHC 31.9 g/dL      RDW 16.1 %      RDW-SD 53.2 fl      MPV 10.1 fL      Platelets 196 10*3/mm3     Magnesium [[id number]]  (Normal) Collected: 12/01/23 0407    Specimen: Blood Updated: 12/01/23 0455     Magnesium 2.6 mg/dL     Comprehensive Metabolic Panel [[id number]]  (Abnormal) Collected: 12/01/23 0407    Specimen: Blood Updated: 12/01/23 0454     Glucose 126 mg/dL      BUN 21 mg/dL      Creatinine 1.08 mg/dL      Sodium 134 mmol/L      Potassium 4.0 mmol/L      Chloride 102 mmol/L      CO2 21.0 mmol/L      Calcium 8.7 mg/dL      Total Protein 6.5 g/dL      Albumin 4.0 g/dL      ALT (SGPT) 8 U/L      AST (SGOT) 24 U/L      Alkaline Phosphatase 48 U/L      Total Bilirubin 0.4 mg/dL      Globulin 2.5 gm/dL       A/G Ratio 1.6 g/dL      BUN/Creatinine Ratio 19.4     Anion Gap 11.0 mmol/L      eGFR 59.3 mL/min/1.73     Narrative:      GFR Normal >60  Chronic Kidney Disease <60  Kidney Failure <15      CBC (No Diff) [[id number]]  (Abnormal) Collected: 12/01/23 0407    Specimen: Blood Updated: 12/01/23 0438     WBC 7.32 10*3/mm3      RBC 2.91 10*6/mm3      Hemoglobin 8.4 g/dL      Hematocrit 26.2 %      MCV 90.0 fL      MCH 28.9 pg      MCHC 32.1 g/dL      RDW 16.0 %      RDW-SD 52.9 fl      MPV 9.7 fL      Platelets 152 10*3/mm3     POC Glucose Once [[id number]]  (Normal) Collected: 11/30/23 1656    Specimen: Blood Updated: 11/30/23 1707     Glucose 123 mg/dL      Comment: : [id number] Jinglove RezaRicheter ID: [id number]       Hepatic Function Panel [[id number]]  (Abnormal) Collected: 11/30/23 0330    Specimen: Blood Updated: 11/30/23 0802     Total Protein 6.0 g/dL      Albumin 4.2 g/dL      ALT (SGPT) 7 U/L      AST (SGOT) 27 U/L      Alkaline Phosphatase 38 U/L      Total Bilirubin 0.4 mg/dL      Bilirubin, Direct <0.2 mg/dL      Bilirubin, Indirect --     Comment: Unable to calculate       POC Glucose Once [[id number]]  (Abnormal) Collected: 11/30/23 0645    Specimen: Blood Updated: 11/30/23 0656     Glucose 140 mg/dL      Comment: : [id number] Janneth CadwellMeter ID: [id number]       POC Glucose Once [[id number]]  (Abnormal) Collected: 11/30/23 0443    Specimen: Blood Updated: 11/30/23 0455     Glucose 147 mg/dL      Comment: : [id number] Janneth CadwellMeter ID: [id number]       Renal Function Panel [[id number]]  (Abnormal) Collected: 11/30/23 0330    Specimen: Blood Updated: 11/30/23 0421     Glucose 166 mg/dL      BUN 17 mg/dL      Creatinine 1.02 mg/dL      Sodium 137 mmol/L      Potassium 4.1 mmol/L      Chloride 106 mmol/L      CO2 21.0 mmol/L      Calcium 8.9 mg/dL      Albumin 4.3 g/dL      Phosphorus 3.8 mg/dL      Anion Gap 10.0 mmol/L      BUN/Creatinine Ratio 16.7     eGFR 63.5 mL/min/1.73     Narrative:       GFR Normal >60  Chronic Kidney Disease <60  Kidney Failure <15      Magnesium [[id number]]  (Normal) Collected: 11/30/23 0330    Specimen: Blood Updated: 11/30/23 0421     Magnesium 1.9 mg/dL     Protime-INR [[id number]]  (Abnormal) Collected: 11/30/23 0330    Specimen: Blood Updated: 11/30/23 0415     Protime 14.5 Seconds      INR 1.12    CBC & Differential [[id number]]  (Abnormal) Collected: 11/30/23 0330    Specimen: Blood Updated: 11/30/23 0359    Narrative:      The following orders were created for panel order CBC & Differential.  Procedure                               Abnormality         Status                     ---------                               -----------         ------                     CBC Auto Differential[[id number]]        Abnormal            Final result                 Please view results for these tests on the individual orders.    CBC Auto Differential [[id number]]  (Abnormal) Collected: 11/30/23 0330    Specimen: Blood Updated: 11/30/23 0359     WBC 7.17 10*3/mm3      RBC 2.95 10*6/mm3      Hemoglobin 8.5 g/dL      Hematocrit 27.1 %      MCV 91.9 fL      MCH 28.8 pg      MCHC 31.4 g/dL      RDW 15.5 %      RDW-SD 51.7 fl      MPV 10.0 fL      Platelets 162 10*3/mm3      Neutrophil % 88.0 %      Lymphocyte % 4.9 %      Monocyte % 6.4 %      Eosinophil % 0.0 %      Basophil % 0.1 %      Neutrophils, Absolute 6.31 10*3/mm3      Lymphocytes, Absolute 0.35 10*3/mm3      Monocytes, Absolute 0.46 10*3/mm3      Eosinophils, Absolute 0.00 10*3/mm3      Basophils, Absolute 0.01 10*3/mm3     POC Glucose Once [[id number]]  (Abnormal) Collected: 11/30/23 0205    Specimen: Blood Updated: 11/30/23 0218     Glucose 164 mg/dL      Comment: : [id number] Janneth CadwellMeter ID: [id number]       POC Glucose Once [[id number]]  (Abnormal) Collected: 11/30/23 0004    Specimen: Blood Updated: 11/30/23 0015     Glucose 168 mg/dL      Comment: : [id number] Mabel MariahMeter ID: [id number]       POC Glucose  Once [[id number]]  (Abnormal) Collected: 11/29/23 2308    Specimen: Blood Updated: 11/29/23 2320     Glucose 165 mg/dL      Comment: : [id number] Janneth CadwellMeter ID: [id number]       POC Glucose Once [[id number]]  (Abnormal) Collected: 11/29/23 2156    Specimen: Blood Updated: 11/29/23 2208     Glucose 167 mg/dL      Comment: : [id number] Janneth CadwellMeter ID: [id number]       POC Glucose Once [[id number]]  (Abnormal) Collected: 11/29/23 2059    Specimen: Blood Updated: 11/29/23 2110     Glucose 167 mg/dL      Comment: : [id number] Janneth CadwellMeter ID: [id number]       Blood Gas, Arterial - [[id number]]  (Abnormal) Collected: 11/29/23 1938    Specimen: Arterial Blood Updated: 11/29/23 1935     Site Arterial Line     Jeff's Test N/A     pH, Arterial 7.417 pH units      pCO2, Arterial 32.4 mm Hg      Comment: 84 Value below reference range        pO2, Arterial 96.8 mm Hg      HCO3, Arterial 20.8 mmol/L      Base Excess, Arterial -3.2 mmol/L      Comment: 84 Value below reference range        O2 Saturation, Arterial 98.1 %      Temperature 37.0     Barometric Pressure for Blood Gas 751 mmHg      Modality Ventilator     FIO2 30 %      Ventilator Mode PSV     PEEP 8.0     PSV 10.0 cmH2O      Collected by [id number]     Comment: Meter: [id number]     :  [id number]        pCO2, Temperature Corrected 32.4 mm Hg      pH, Temp Corrected 7.417 pH Units      pO2, Temperature Corrected 96.8 mm Hg     POC Glucose Once [[id number]]  (Abnormal) Collected: 11/29/23 1901    Specimen: Blood Updated: 11/29/23 1913     Glucose 131 mg/dL      Comment: : [id number] Janneth CadwellMeter ID: [id number]       POC Glucose Once [[id number]]  (Abnormal) Collected: 11/29/23 1808    Specimen: Blood Updated: 11/29/23 1819     Glucose 131 mg/dL      Comment: : [id number] Madan StacyMeter ID: [id number]       POC Glucose Once [[id number]]  (Abnormal) Collected: 11/29/23 1709    Specimen: Blood Updated: 11/29/23 8314      Glucose 132 mg/dL      Comment: : [id number] Madan StacyMeter ID: [id number]       Comprehensive Metabolic Panel [[id number]]  (Abnormal) Collected: 11/29/23 1531    Specimen: Blood Updated: 11/29/23 1637     Glucose 161 mg/dL      BUN 18 mg/dL      Creatinine 1.17 mg/dL      Sodium 140 mmol/L      Potassium 4.6 mmol/L      Comment: Slight hemolysis detected by analyzer. Result may be falsely elevated.        Chloride 108 mmol/L      CO2 21.0 mmol/L      Calcium 8.5 mg/dL      Total Protein 5.8 g/dL      Albumin 3.9 g/dL      ALT (SGPT) 8 U/L      AST (SGOT) 31 U/L      Alkaline Phosphatase 46 U/L      Total Bilirubin 0.4 mg/dL      Globulin 1.9 gm/dL      A/G Ratio 2.1 g/dL      BUN/Creatinine Ratio 15.4     Anion Gap 11.0 mmol/L      eGFR 53.9 mL/min/1.73     Narrative:      GFR Normal >60  Chronic Kidney Disease <60  Kidney Failure <15      POC Glucose Once [[id number]]  (Abnormal) Collected: 11/29/23 1614    Specimen: Blood Updated: 11/29/23 1625     Glucose 145 mg/dL      Comment: : [id number] Madan StacyMeter ID: [id number]       Renal Function Panel [[id number]]  (Abnormal) Collected: 11/29/23 1531    Specimen: Blood Updated: 11/29/23 1558     Glucose 161 mg/dL      BUN 19 mg/dL      Creatinine 1.18 mg/dL      Sodium 140 mmol/L      Potassium 4.6 mmol/L      Comment: Slight hemolysis detected by analyzer. Result may be falsely elevated.        Chloride 108 mmol/L      CO2 22.0 mmol/L      Calcium 8.5 mg/dL      Albumin 3.9 g/dL      Phosphorus 4.1 mg/dL      Anion Gap 10.0 mmol/L      BUN/Creatinine Ratio 16.1     eGFR 53.3 mL/min/1.73     Narrative:      GFR Normal >60  Chronic Kidney Disease <60  Kidney Failure <15      CBC (No Diff) [[id number]]  (Abnormal) Collected: 11/29/23 1531    Specimen: Blood Updated: 11/29/23 1542     WBC 10.18 10*3/mm3      RBC 3.56 10*6/mm3      Hemoglobin 10.4 g/dL      Hematocrit 32.3 %      MCV 90.7 fL      MCH 29.2 pg      MCHC 32.2 g/dL      RDW 15.2 %      RDW-SD 50.0 fl       MPV 9.3 fL      Platelets 187 10*3/mm3     Blood Gas, Arterial - [[id number]]  (Abnormal) Collected: 11/29/23 1538    Specimen: Arterial Blood Updated: 11/29/23 1536     Site Arterial Line     Jeff's Test N/A     pH, Arterial 7.330 pH units      Comment: 84 Value below reference range        pCO2, Arterial 41.4 mm Hg      pO2, Arterial 204.0 mm Hg      Comment: 83 Value above reference range        HCO3, Arterial 21.9 mmol/L      Base Excess, Arterial -3.9 mmol/L      Comment: 84 Value below reference range        O2 Saturation, Arterial 99.7 %      Comment: 83 Value above reference range        Temperature 37.0     Barometric Pressure for Blood Gas 751 mmHg      Modality Ventilator     FIO2 60 %      Ventilator Mode SIMV     Set Tidal Volume 500     Set Mech Resp Rate 20.0     PEEP 10.0     PSV 10.0 cmH2O      Collected by [id number]     Comment: Meter: [id number]     :  [id number]        pCO2, Temperature Corrected 41.4 mm Hg      pH, Temp Corrected 7.330 pH Units      pO2, Temperature Corrected 204 mm Hg     POC Glucose Once [[id number]]  (Abnormal) Collected: 11/29/23 1505    Specimen: Blood Updated: 11/29/23 1516     Glucose 168 mg/dL      Comment: : [id number] Madan StacyMeter ID: [id number]       POC Glucose Once [[id number]]  (Abnormal) Collected: 11/29/23 1407    Specimen: Blood Updated: 11/29/23 1418     Glucose 199 mg/dL      Comment: : [id number] Madan StacyMeter ID: [id number]       POC Glucose Once [[id number]]  (Abnormal) Collected: 11/29/23 1307    Specimen: Blood Updated: 11/29/23 1318     Glucose 207 mg/dL      Comment: : [id number] Madan StacyMeter ID: [id number]       Renal Function Panel [[id number]]  (Abnormal) Collected: 11/29/23 1150    Specimen: Blood Updated: 11/29/23 1222     Glucose 191 mg/dL      BUN 19 mg/dL      Creatinine 1.06 mg/dL      Sodium 139 mmol/L      Potassium 4.7 mmol/L      Chloride 108 mmol/L      CO2 24.0 mmol/L      Calcium 8.6 mg/dL      Albumin 3.2 g/dL       Phosphorus 3.3 mg/dL      Anion Gap 7.0 mmol/L      BUN/Creatinine Ratio 17.9     eGFR 60.6 mL/min/1.73     Narrative:      GFR Normal >60  Chronic Kidney Disease <60  Kidney Failure <15      Lactic Acid, Plasma [[id number]]  (Normal) Collected: 11/29/23 1150    Specimen: Blood Updated: 11/29/23 1217     Lactate 0.8 mmol/L     Protime-INR [[id number]]  (Abnormal) Collected: 11/29/23 1150    Specimen: Blood Updated: 11/29/23 1214     Protime 15.3 Seconds      INR 1.20    aPTT [[id number]]  (Normal) Collected: 11/29/23 1150    Specimen: Blood Updated: 11/29/23 1214     PTT 31.8 seconds     Fibrinogen [[id number]]  (Normal) Collected: 11/29/23 1150    Specimen: Blood Updated: 11/29/23 1214     Fibrinogen 384 mg/dL     CBC & Differential [[id number]]  (Abnormal) Collected: 11/29/23 1150    Specimen: Blood Updated: 11/29/23 1204    Narrative:      The following orders were created for panel order CBC & Differential.  Procedure                               Abnormality         Status                     ---------                               -----------         ------                     CBC Auto Differential[[id number]]        Abnormal            Final result                 Please view results for these tests on the individual orders.    CBC Auto Differential [[id number]]  (Abnormal) Collected: 11/29/23 1150    Specimen: Blood Updated: 11/29/23 1204     WBC 5.47 10*3/mm3      RBC 3.36 10*6/mm3      Hemoglobin 9.9 g/dL      Hematocrit 30.5 %      MCV 90.8 fL      MCH 29.5 pg      MCHC 32.5 g/dL      RDW 14.8 %      RDW-SD 49.3 fl      MPV 9.4 fL      Platelets 141 10*3/mm3      Neutrophil % 85.6 %      Lymphocyte % 6.2 %      Monocyte % 6.2 %      Eosinophil % 0.7 %      Basophil % 0.4 %      Immature Grans % 0.9 %      Neutrophils, Absolute 4.68 10*3/mm3      Lymphocytes, Absolute 0.34 10*3/mm3      Monocytes, Absolute 0.34 10*3/mm3      Eosinophils, Absolute 0.04 10*3/mm3      Basophils, Absolute 0.02 10*3/mm3       Immature Grans, Absolute 0.05 10*3/mm3      nRBC 0.0 /100 WBC     Calcium, Ionized [[id number]] Collected: 11/29/23 1159    Specimen: Blood Updated: 11/29/23 1156     Ionized Calcium 4.88 mg/dL      Collected by [id number]     Comment: Meter: [id number]     :  [id number]       Blood Gas, Arterial With Co-Ox [[id number]]  (Abnormal) Collected: 11/29/23 1157    Specimen: Arterial Blood Updated: 11/29/23 1155     Site Arterial Line     Jeff's Test N/A     pH, Arterial 7.370 pH units      pCO2, Arterial 41.3 mm Hg      pO2, Arterial 87.8 mm Hg      HCO3, Arterial 23.9 mmol/L      Base Excess, Arterial -1.4 mmol/L      Comment: 84 Value below reference range        O2 Saturation, Arterial 96.8 %      Hemoglobin, Blood Gas 10.3 g/dL      Comment: 84 Value below reference range        Hematocrit, Blood Gas 31.6 %      Comment: 84 Value below reference range        Oxyhemoglobin 94.4 %      Methemoglobin 1.10 %      Carboxyhemoglobin 1.3 %      Temperature 37.0     Sodium, Arterial 139 mmol/L      Potassium, Arterial 4.4 mmol/L      Ionized Calcium 4.91 mg/dL      Barometric Pressure for Blood Gas 752 mmHg      Modality Ventilator     FIO2 60 %      Ventilator Mode SIMV     Set Tidal Volume 500     Set Mech Resp Rate 20.0     PEEP 10.0     PSV 10.0 cmH2O      Collected by [id number]     Comment: Meter: [id number]     :  [id number]        pH, Temp Corrected 7.370 pH Units      pCO2, Temperature Corrected 41.3 mm Hg      pO2, Temperature Corrected 87.8 mm Hg     Fibrinogen [[id number]]  (Normal) Collected: 11/29/23 1030    Specimen: Blood, Arterial Line Updated: 11/29/23 1107     Fibrinogen 370 mg/dL     Protime-INR [[id number]]  (Abnormal) Collected: 11/29/23 1030    Specimen: Blood, Arterial Line Updated: 11/29/23 1107     Protime 17.5 Seconds      INR 1.42    aPTT [[id number]]  (Normal) Collected: 11/29/23 1030    Specimen: Blood, Arterial Line Updated: 11/29/23 1107     PTT 28.4 seconds             Rhythm:  sinus rhythm    CXR: normal postoperative changes    Assessment & Plan     NSTEMI associated with CAD - S/P 2V CABG, MAZE, Atrial Occlusion on 11/29/2023 - patient doing well postoperatively. Working well with PT. Currently on ASA 81 mg daily, Plavix 75 mg daily, Coreg 3.125 mg BID  Postoperative Anemia 2' to Acute Blood Loss, Expected - Hgb 7.7 / Hct 24.6  Atrial Fibrillation with RVR on admission - S/P 2V CABG, MAZE, Atrial Occlusion on 11/29/2023 - maintaining SR.   CKD, Stage 3a - CR stable, 1.25 today. Will discontinue IV lasix and change to oral.   Liver Cirrhosis 2' to Methotrexate Use - LFT stable.   Essential HTN - Controlled  Mixed Hyperlipidemia - on Statin therapy  Rheumatoid Arthritis - on sulfasalazine    Discontinue IV lasix and change to oral. Reduce K replacement to daily. D/C Coreg and add Toprol XL 25 mg daily. Recheck labs in AM.     Anticipate discharge in 1 - 2 days.       FITZ Castro

## 2023-12-03 NOTE — PLAN OF CARE
"Goal Outcome Evaluation:  Plan of Care Reviewed With: patient        Progress: improving  Outcome Evaluation: VSS. Sinus 80-95 on tele. BP improved. C/o some mild pain today in chest (incisional) and back (\"spasm\"). One dose Leidy given this morning, otherwise well managed with scheduled meds and warm blanket. Good urine output today. Large BM. Ambulating well. Good appetite. CT has had 130ML output this shift. Safety maintained.         "

## 2023-12-03 NOTE — THERAPY TREATMENT NOTE
Acute Care - Physical Therapy Treatment Note  [facility]     Patient Name: Alessandra Baker  : 1965  MRN: [id number]  Today's Date: 12/3/2023      Visit Dx:     ICD-10-CM ICD-9-CM   1. Chest pain due to myocardial ischemia, unspecified ischemic chest pain type  I25.9 786.50   2. Paroxysmal atrial fibrillation  I48.0 427.31   3. NSTEMI (non-ST elevated myocardial infarction)  I21.4 410.70   4. Impaired mobility [Z74.09]  Z74.09 799.89     Patient Active Problem List   Diagnosis    History of colon polyps    Esophageal varices without bleeding    Coronary artery disease    Mixed hyperlipidemia    Chest pain    Rheumatoid arthritis involving multiple sites with positive rheumatoid factor    Atrial fibrillation with RVR    Central stenosis of spinal canal    Neuroforaminal stenosis of cervical spine    NSTEMI (non-ST elevated myocardial infarction)    Stage 3a chronic kidney disease     Past Medical History:   Diagnosis Date    Anxiety     Arthritis     Atrial fibrillation with RVR 2023    CAD (coronary artery disease)     Chronic kidney disease     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    Depression     Esophageal varices     GERD (gastroesophageal reflux disease)     Headache     Heart problem     angina    Hyperlipidemia     Hypertension 2012    Liver disease     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    Liver problem     Myocardial infarction 2012    NSTEMI    Rheumatoid arthritis     Skin cancer     Stroke     showed up on mri about 5 years ago with no residual    Ulnar neuropathy     Visual impairment      Past Surgical History:   Procedure Laterality Date    ANGIOPLASTY      2012    APPENDECTOMY      ATRIAL APPENDAGE EXCLUSION LEFT WITH TRANSESOPHAGEAL ECHOCARDIOGRAM N/A 2023    Procedure: ATRIAL APPENDAGE EXCLUSION LEFT WITH 40MM ATRICLIP;  Surgeon: Hosea Granados MD;  Location: [facility];  Service: Cardiothoracic;  Laterality: N/A;    CARDIAC CATHETERIZATION  2015, 22    CARDIAC  CATHETERIZATION Left 11/22/2023    Procedure: Cardiac Catheterization/Vascular Study;  Surgeon: Jayson Castillo MD;  Location: [facility] CATH INVASIVE LOCATION;  Service: Cardiology;  Laterality: Left;    COLONOSCOPY      COLONOSCOPY N/A 04/24/2023    Procedure: COLONOSCOPY WITH ANESTHESIA;  Surgeon: Rick Tidwell DO;  Location: [facility] ENDOSCOPY;  Service: Gastroenterology;  Laterality: N/A;  preop; hx of polyps   postop; polyps  PCP Eric Feldman     CORONARY ARTERY BYPASS GRAFT N/A 11/29/2023    Procedure: CORONARY ARTERY BYPASS GRAFTING X2, LEFT INTERNAL MAMMARY ARTERY GRAFT, RIGHT LEG ENDOSCOPIC VEIN HARVEST, TRANSESOPHAGEAL ECHOCARDIOGRAM, APPLICATION OF PREVENA;  Surgeon: Hosea Granados MD;  Location: [facility] OR;  Service: Cardiothoracic;  Laterality: N/A;    CORONARY STENT PLACEMENT      x3 per patient, X 1 -  11/2012, x 2 - 4/21/22    ENDOSCOPY      ENDOSCOPY N/A 04/24/2023    Procedure: ESOPHAGOGASTRODUODENOSCOPY WITH ANESTHESIA;  Surgeon: Rick Tidwell DO;  Location: [facility] ENDOSCOPY;  Service: Gastroenterology;  Laterality: N/A;  preop; hx of varices   postop; varices   PCP Eric Feldman     HAND RECONSTRUCTION Right 06/15/1967    HYSTERECTOMY      2016    MAZE PROCEDURE N/A 11/29/2023    Procedure: MAZE PROCEDURE WITH ENCOMPASS CLAMP;  Surgeon: Hosea Graandos MD;  Location: [facility] OR;  Service: Cardiothoracic;  Laterality: N/A;    SUBTOTAL HYSTERECTOMY      TONSILLECTOMY       PT Assessment (last 12 hours)       PT Evaluation and Treatment       Row Name 12/03/23 1108          Physical Therapy Time and Intention    Subjective Information no complaints  -BLANCA     Document Type therapy note (daily note)  -BLANCA     Mode of Treatment physical therapy  -BLANCA     Comment reviewed sternal restrictions  -BLANCA       Row Name 12/03/23 1108          General Information    Existing Precautions/Restrictions fall;sternal  prevena/ chest tube  -BLANCA       Row Name 12/03/23 1108          Pain    Pretreatment Pain Rating  2/10  -BLANCA     Posttreatment Pain Rating 2/10  -BLANCA     Pain Location - chest  -BLANCA       Row Name 12/03/23 1108          Sit-Stand Transfer    Sit-Stand Barton (Transfers) standby assist  -BLANCA       Row Name 12/03/23 1108          Stand-Sit Transfer    Stand-Sit Barton (Transfers) verbal cues;standby assist  -BLANCA       Row Name 12/03/23 1108          Gait/Stairs (Locomotion)    Barton Level (Gait) contact guard  -BLANCA     Distance in Feet (Gait) 240  -BLANCA     Deviations/Abnormal Patterns (Gait) iesha decreased  -BLANCA       Row Name 12/03/23 1108          Motor Skills    Comments, Therapeutic Exercise protocol x15  -BLANCA       Row Name             Wound 11/29/23 0800 midline sternal Incision    Wound - Properties Group Placement Date: 11/29/23  -TJ Placement Time: 0800  -TJ Orientation: midline  -TJ Location: sternal  -TJ Primary Wound Type: Incision  -TJ    Retired Wound - Properties Group Placement Date: 11/29/23  -TJ Placement Time: 0800  -TJ Orientation: midline  -TJ Location: sternal  -TJ Primary Wound Type: Incision  -TJ    Retired Wound - Properties Group Date first assessed: 11/29/23  -TJ Time first assessed: 0800  -TJ Location: sternal  -TJ Primary Wound Type: Incision  -TJ      Row Name             Wound 11/29/23 0800 Right leg Incision    Wound - Properties Group Placement Date: 11/29/23  -TJ Placement Time: 0800  -TJ Side: Right  -TJ Location: leg  -TJ Primary Wound Type: Incision  -TJ    Retired Wound - Properties Group Placement Date: 11/29/23  -TJ Placement Time: 0800  -TJ Side: Right  -TJ Location: leg  -TJ Primary Wound Type: Incision  -TJ    Retired Wound - Properties Group Date first assessed: 11/29/23  -TJ Time first assessed: 0800  -TJ Side: Right  -TJ Location: leg  -TJ Primary Wound Type: Incision  -TJ      Row Name             NPWT (Negative Pressure Wound Therapy) 11/29/23 1130 chest    NPWT (Negative Pressure Wound Therapy) - Properties Group Placement Date: 11/29/23  -TJ Placement  Time: 1130 -TJ Location: chest  -TJ Additional Comments: prevena  -TJ    Retired NPWT (Negative Pressure Wound Therapy) - Properties Group Placement Date: 11/29/23  -TJ Placement Time: 1130 -TJ Location: chest  -TJ Additional Comments: prevena  -TJ    Retired NPWT (Negative Pressure Wound Therapy) - Properties Group Placement Date: 11/29/23  -TJ Placement Time: 1130  -TJ Location: chest  -TJ Additional Comments: prevena  -TJ      Row Name 12/03/23 1108          Positioning and Restraints    Pre-Treatment Position sitting in chair/recliner  -BLANCA     In Chair reclined;notified nsg;call light within reach;encouraged to call for assist;RUE elevated;LUE elevated  -BLANCA               User Key  (r) = Recorded By, (t) = Taken By, (c) = Cosigned By      Initials Name Provider Type    Cici Giraldo PTA Physical Therapist Assistant    Lucia Elliott, RN Registered Nurse                    Physical Therapy Education       Title: PT OT SLP Therapies (In Progress)       Topic: Physical Therapy (In Progress)       Point: Mobility training (Done)       Learning Progress Summary             Patient Acceptance, E, VU by BLANCA at 12/2/2023 1007    Comment: POC    Eager, E, VU by DOC at 12/1/2023 0856    Comment: t/f's    Acceptance, E, VU,DU by CHELI at 11/30/2023 0914    Comment: benefits of activity, progression of PT, sternal prec                         Point: Home exercise program (Not Started)       Learner Progress:  Not documented in this visit.              Point: Body mechanics (Not Started)       Learner Progress:  Not documented in this visit.              Point: Precautions (Done)       Learning Progress Summary             Patient Acceptance, E,D, NR,VU by BLANCA at 12/3/2023 1605    Comment: sternal restrictions    Acceptance, E, VU,DU by CHELI at 11/30/2023 0914    Comment: benefits of activity, progression of PT, sternal prec                                         User Key       Initials Effective Dates Name Provider  Type Discipline    AE 02/03/23 -  Kezia Islas PTA Physical Therapist Assistant PT    CHELI 02/03/23 -  Dwight Rae PT DPT Physical Therapist PT    BLANCA 02/03/23 -  Cici Zamora PTA Physical Therapist Assistant PT                  PT Recommendation and Plan         Outcome Measures       Row Name 12/03/23 1600 12/02/23 1000          How much help from another person do you currently need...    Turning from your back to your side while in flat bed without using bedrails? 3  -BLANCA 2  -BLANCA     Moving from lying on back to sitting on the side of a flat bed without bedrails? 3  -BLANCA 2  -BLANCA     Moving to and from a bed to a chair (including a wheelchair)? 3  -BLANCA 3  -BLANCA     Standing up from a chair using your arms (e.g., wheelchair, bedside chair)? 4  no arms  -BLANCA 3  -BLANCA     Climbing 3-5 steps with a railing? 3  -BLANCA 3  -BLANCA     To walk in hospital room? -- 3  -BLANCA     AM-PAC 6 Clicks Score (PT) -- 16  -BLANCA     Highest Level of Mobility Goal -- 5 --> Static standing  -BLANCA               User Key  (r) = Recorded By, (t) = Taken By, (c) = Cosigned By      Initials Name Provider Type    Cici Giraldo PTA Physical Therapist Assistant                     Time Calculation:    PT Charges       Row Name 12/03/23 1607             Time Calculation    Start Time 1108  -BLANCA      Stop Time 1123  -BLANCA      Time Calculation (min) 15 min  -BLANCA         Timed Charges    26326 - Gait Training Minutes  15  -BLANCA         Total Minutes    Timed Charges Total Minutes 15  -BLANCA       Total Minutes 15  -BLANCA                User Key  (r) = Recorded By, (t) = Taken By, (c) = Cosigned By      Initials Name Provider Type    Cici Giraldo PTA Physical Therapist Assistant                  Therapy Charges for Today       Code Description Service Date Service Provider Modifiers Qty    99985610854 HC GAIT TRAINING EA 15 MIN 12/2/2023 Cici Zamora PTA GP 2    30138956195 HC GAIT TRAINING EA 15 MIN 12/2/2023 Cici Zamora PTA GP 1     32004903749 HC GAIT TRAINING EA 15 MIN 12/3/2023 Cici Zamora, PTA GP 1    02534880559 HC GAIT TRAINING EA 15 MIN 12/3/2023 Cici Zamora, PTA GP 1            PT G-Codes  Outcome Measure Options: AM-PAC 6 Clicks Basic Mobility (PT)  AM-PAC 6 Clicks Score (PT): 20    Cici Zamora, PTA  12/3/2023

## 2023-12-03 NOTE — PLAN OF CARE
Goal Outcome Evaluation:      Patient standswith SBA. Ambulated 240' with CGA. Good pace, good step length. Reviwed restrictions. Does use Ue's!!

## 2023-12-03 NOTE — PLAN OF CARE
Goal Outcome Evaluation:    Problem: Adult Inpatient Plan of Care  Goal: Plan of Care Review  Outcome: Ongoing, Progressing  Flowsheets (Taken 12/3/2023 0934)  Progress: no change  Outcome Evaluation: PRN pain meds given with relief. Prevena dressing covered with lidoderm patch, may need re dressed. PL chest tube dressing c/d/i, draining. S 75-80.

## 2023-12-04 ENCOUNTER — APPOINTMENT (OUTPATIENT)
Dept: GENERAL RADIOLOGY | Facility: HOSPITAL | Age: 58
DRG: 234 | End: 2023-12-04
Payer: MEDICARE

## 2023-12-04 LAB
ALBUMIN SERPL-MCNC: 3.5 G/DL (ref 3.5–5.2)
ALBUMIN/GLOB SERPL: 1.3 G/DL
ALP SERPL-CCNC: 82 U/L (ref 39–117)
ALT SERPL W P-5'-P-CCNC: 20 U/L (ref 1–33)
ANION GAP SERPL CALCULATED.3IONS-SCNC: 12 MMOL/L (ref 5–15)
AST SERPL-CCNC: 36 U/L (ref 1–32)
BILIRUB SERPL-MCNC: 0.3 MG/DL (ref 0–1.2)
BUN SERPL-MCNC: 29 MG/DL (ref 6–20)
BUN/CREAT SERPL: 26.9 (ref 7–25)
CALCIUM SPEC-SCNC: 8.6 MG/DL (ref 8.6–10.5)
CHLORIDE SERPL-SCNC: 101 MMOL/L (ref 98–107)
CO2 SERPL-SCNC: 21 MMOL/L (ref 22–29)
CREAT SERPL-MCNC: 1.08 MG/DL (ref 0.57–1)
DEPRECATED RDW RBC AUTO: 52.3 FL (ref 37–54)
EGFRCR SERPLBLD CKD-EPI 2021: 59.3 ML/MIN/1.73
ERYTHROCYTE [DISTWIDTH] IN BLOOD BY AUTOMATED COUNT: 16.3 % (ref 12.3–15.4)
GLOBULIN UR ELPH-MCNC: 2.7 GM/DL
GLUCOSE SERPL-MCNC: 102 MG/DL (ref 65–99)
HCT VFR BLD AUTO: 24.1 % (ref 34–46.6)
HGB BLD-MCNC: 7.7 G/DL (ref 12–15.9)
MCH RBC QN AUTO: 28.6 PG (ref 26.6–33)
MCHC RBC AUTO-ENTMCNC: 32 G/DL (ref 31.5–35.7)
MCV RBC AUTO: 89.6 FL (ref 79–97)
PLATELET # BLD AUTO: 169 10*3/MM3 (ref 140–450)
PMV BLD AUTO: 10.1 FL (ref 6–12)
POTASSIUM SERPL-SCNC: 3.7 MMOL/L (ref 3.5–5.2)
POTASSIUM SERPL-SCNC: 3.8 MMOL/L (ref 3.5–5.2)
PROT SERPL-MCNC: 6.2 G/DL (ref 6–8.5)
QT INTERVAL: 324 MS
QT INTERVAL: 348 MS
QT INTERVAL: 366 MS
QT INTERVAL: 408 MS
QTC INTERVAL: 383 MS
QTC INTERVAL: 416 MS
QTC INTERVAL: 427 MS
QTC INTERVAL: 463 MS
RBC # BLD AUTO: 2.69 10*6/MM3 (ref 3.77–5.28)
SODIUM SERPL-SCNC: 134 MMOL/L (ref 136–145)
WBC NRBC COR # BLD AUTO: 4.53 10*3/MM3 (ref 3.4–10.8)

## 2023-12-04 PROCEDURE — 80053 COMPREHEN METABOLIC PANEL: CPT | Performed by: NURSE PRACTITIONER

## 2023-12-04 PROCEDURE — 85027 COMPLETE CBC AUTOMATED: CPT | Performed by: NURSE PRACTITIONER

## 2023-12-04 PROCEDURE — 71045 X-RAY EXAM CHEST 1 VIEW: CPT

## 2023-12-04 PROCEDURE — 97530 THERAPEUTIC ACTIVITIES: CPT

## 2023-12-04 PROCEDURE — 97116 GAIT TRAINING THERAPY: CPT

## 2023-12-04 PROCEDURE — 25010000002 ENOXAPARIN PER 10 MG: Performed by: NURSE PRACTITIONER

## 2023-12-04 PROCEDURE — 84132 ASSAY OF SERUM POTASSIUM: CPT | Performed by: SURGERY

## 2023-12-04 RX ORDER — POTASSIUM CHLORIDE 750 MG/1
20 CAPSULE, EXTENDED RELEASE ORAL 2 TIMES DAILY
Status: DISCONTINUED | OUTPATIENT
Start: 2023-12-04 | End: 2023-12-06 | Stop reason: HOSPADM

## 2023-12-04 RX ORDER — POTASSIUM CHLORIDE 750 MG/1
20 CAPSULE, EXTENDED RELEASE ORAL ONCE
Status: COMPLETED | OUTPATIENT
Start: 2023-12-04 | End: 2023-12-04

## 2023-12-04 RX ORDER — POTASSIUM CHLORIDE 750 MG/1
20 CAPSULE, EXTENDED RELEASE ORAL ONCE
Status: DISCONTINUED | OUTPATIENT
Start: 2023-12-04 | End: 2023-12-04

## 2023-12-04 RX ADMIN — ASPIRIN 81 MG: 81 TABLET, COATED ORAL at 08:28

## 2023-12-04 RX ADMIN — POTASSIUM CHLORIDE 20 MEQ: 750 CAPSULE, EXTENDED RELEASE ORAL at 08:28

## 2023-12-04 RX ADMIN — PREGABALIN 25 MG: 25 CAPSULE ORAL at 08:27

## 2023-12-04 RX ADMIN — SERTRALINE HYDROCHLORIDE 200 MG: 100 TABLET, FILM COATED ORAL at 08:27

## 2023-12-04 RX ADMIN — ENOXAPARIN SODIUM 40 MG: 100 INJECTION SUBCUTANEOUS at 08:29

## 2023-12-04 RX ADMIN — PANTOPRAZOLE SODIUM 40 MG: 40 TABLET, DELAYED RELEASE ORAL at 05:31

## 2023-12-04 RX ADMIN — LIDOCAINE 2 PATCH: 700 PATCH TOPICAL at 05:31

## 2023-12-04 RX ADMIN — METOPROLOL SUCCINATE 25 MG: 25 TABLET, EXTENDED RELEASE ORAL at 08:28

## 2023-12-04 RX ADMIN — FUROSEMIDE 40 MG: 40 TABLET ORAL at 08:27

## 2023-12-04 RX ADMIN — POTASSIUM CHLORIDE 20 MEQ: 750 CAPSULE, EXTENDED RELEASE ORAL at 22:03

## 2023-12-04 RX ADMIN — ACETAMINOPHEN 650 MG: 325 TABLET, FILM COATED ORAL at 08:27

## 2023-12-04 RX ADMIN — FUROSEMIDE 40 MG: 40 TABLET ORAL at 17:23

## 2023-12-04 RX ADMIN — POTASSIUM CHLORIDE 20 MEQ: 10 CAPSULE, COATED, EXTENDED RELEASE ORAL at 05:31

## 2023-12-04 RX ADMIN — SULFASALAZINE 500 MG: 500 TABLET ORAL at 08:28

## 2023-12-04 RX ADMIN — ACETAMINOPHEN 650 MG: 325 TABLET, FILM COATED ORAL at 20:41

## 2023-12-04 RX ADMIN — ACETAMINOPHEN 650 MG: 325 TABLET, FILM COATED ORAL at 15:35

## 2023-12-04 RX ADMIN — OXYCODONE HYDROCHLORIDE 5 MG: 5 TABLET ORAL at 11:45

## 2023-12-04 RX ADMIN — PREGABALIN 25 MG: 25 CAPSULE ORAL at 20:41

## 2023-12-04 RX ADMIN — PRAVASTATIN SODIUM 20 MG: 20 TABLET ORAL at 08:29

## 2023-12-04 NOTE — DISCHARGE INSTRUCTIONS
Someone from the [facility] Call Center will be contacting you after discharge to check on your recovery.

## 2023-12-04 NOTE — PROGRESS NOTES
"Patient name: Alessandra Baker  Patient : 1965  VISIT # [id number]  MR #[id number]    Procedure:Procedure(s):  CORONARY ARTERY BYPASS GRAFTING X2, LEFT INTERNAL MAMMARY ARTERY GRAFT, RIGHT LEG ENDOSCOPIC VEIN HARVEST, TRANSESOPHAGEAL ECHOCARDIOGRAM  MAZE PROCEDURE WITH ENCOMPASS CLAMP  ATRIAL APPENDAGE EXCLUSION LEFT WITH 40MM ATRICLIP  Procedure Date:2023  POD:5 Days Post-Op    Subjective     Sitting up in the chair.  Tolerating room air.  Pain is well controlled.  Weight is down 2 pounds today and she is 6 pounds above her baseline weight.  Creatinine is improved at 1.08 on oral diuresis.  Walked 350 feet with physical therapy yesterday.  LFTs are stable.  H/H stable at 7.7/24.1       Telemetry: Sinus 80-84  IV drips: None       Objective     Visit Vitals  /68 (BP Location: Left arm, Patient Position: Sitting)   Pulse 80   Temp 98.2 °F (36.8 °C) (Oral)   Resp 16   Ht 165.1 cm (65\")   Wt 89 kg (196 lb 3.2 oz)   SpO2 94%   BMI 32.65 kg/m²   Baseline weight 190 pounds    Intake/Output Summary (Last 24 hours) at 2023 1032  Last data filed at 2023 0950  Gross per 24 hour   Intake 1030 ml   Output 3775 ml   Net -2745 ml     Left pleural drain: 0 mL in 24 hours    Lab:     CBC:  Results from last 7 days   Lab Units 23  0153 23  0335 23  0413   WBC 10*3/mm3 4.53 5.10 5.66   HEMATOCRIT % 24.1* 25.8* 24.6*   PLATELETS 10*3/mm3 169 188 143          BMP:  Results from last 7 days   Lab Units 23  0153 23  0335 23  0413   SODIUM mmol/L 134* 134* 132*   POTASSIUM mmol/L 3.8 4.4 4.1   CHLORIDE mmol/L 101 99 100   CO2 mmol/L 21.0* 24.0 21.0*   GLUCOSE mg/dL 102* 97 107*   BUN mg/dL 29* 34* 29*   CREATININE mg/dL 1.08* 1.25* 1.07*          COAG:  Results from last 7 days   Lab Units 23  0330 23  1150   INR  1.12* 1.20*   APTT seconds  --  31.8       IMAGES:       Imaging Results (Last 24 Hours)       Procedure Component Value Units Date/Time    XR Chest 1 View " [[id number]] Collected: 12/04/23 0706     Updated: 12/04/23 0710    Narrative:      EXAM: XR CHEST 1 VW-      DATE: 12/4/2023 3:15 AM     HISTORY: Post-Op Heart Surgery       COMPARISON: 12/3/2023.     TECHNIQUE:  Single view. Frontal view of the chest. 1 images.       FINDINGS:    No pneumothorax, pleural effusion or focal consolidation. Perihilar  vascular prominence with mild interstitial opacities appear mildly  increased compared to 12/3/2023.     Similar prominent cardiac silhouette. Upper mediastinum appears within  normal limits. Sternotomy wires. LEFT atrial appendage occlusion device.  Binder closure artifacts partially limit evaluation. Epicardial pacing  leads present.          Impression:      1. Perihilar vascular prominence and mild interstitial opacities appear  mildly increased compared to 12/3/2023, favor pulmonary edema.     This report was signed and finalized on 12/4/2023 7:07 AM by Dr Berkley Valdivia MD.             CXR: Chest tubes in stable position.  No pneumothorax.  Bibasilar atelectasis.    Physical Exam:  General: Alert, oriented. No apparent distress.  Obese  Cardiovascular: Regular rate and rhythm without murmur, rubs, or gallops.    Pulmonary: Clear to auscultation bilaterally without wheezing, rubs, or rales.  Chest: Sternotomy incision clean, dry, and intact with Prevena. Sternum stable. No clicks.   Abdomen: Soft, nondistended, and nontender.  Extremities: Warm, moves all extremities.  Mild lower extremity edema saphenectomy site clean, dry, and intact.  Neurologic:  Grossly intact with no focal deficits.            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

## 2023-12-04 NOTE — PLAN OF CARE
Goal Outcome Evaluation:  Plan of Care Reviewed With: patient        Progress: improving  Outcome Evaluation: Pt. improving with mobility. She was Supervision for transfers from chair She walked 350' with SBA. Pt. c/o fatigue and getting SOA with activity. O2 sats remined in the 90's while on RA. Explained to pt the common symptoms of recovery from heart surgery. Will continue to work on progressive ambulation.

## 2023-12-04 NOTE — PLAN OF CARE
Goal Outcome Evaluation:    Problem: Adult Inpatient Plan of Care  Goal: Plan of Care Review  Outcome: Ongoing, Progressing  Flowsheets (Taken 12/4/2023 1717)  Progress: no change  Plan of Care Reviewed With: patient  Outcome Evaluation: BP on the soft side, pt asymptomatic. No c/o pain as of this time. Good urine output. Ambulating well. S 80-84. Safety maintained.

## 2023-12-04 NOTE — THERAPY TREATMENT NOTE
Acute Care - Physical Therapy Treatment Note  [facility]     Patient Name: Alessandra Baker  : 1965  MRN: [id number]  Today's Date: 2023      Visit Dx:     ICD-10-CM ICD-9-CM   1. Chest pain due to myocardial ischemia, unspecified ischemic chest pain type  I25.9 786.50   2. Paroxysmal atrial fibrillation  I48.0 427.31   3. NSTEMI (non-ST elevated myocardial infarction)  I21.4 410.70   4. Impaired mobility [Z74.09]  Z74.09 799.89     Patient Active Problem List   Diagnosis    History of colon polyps    Esophageal varices without bleeding    Coronary artery disease    Mixed hyperlipidemia    Chest pain    Rheumatoid arthritis involving multiple sites with positive rheumatoid factor    Atrial fibrillation with RVR    Central stenosis of spinal canal    Neuroforaminal stenosis of cervical spine    NSTEMI (non-ST elevated myocardial infarction)    Stage 3a chronic kidney disease     Past Medical History:   Diagnosis Date    Anxiety     Arthritis     Atrial fibrillation with RVR 2023    CAD (coronary artery disease)     Chronic kidney disease     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    Depression     Esophageal varices     GERD (gastroesophageal reflux disease)     Headache     Heart problem     angina    Hyperlipidemia     Hypertension 2012    Liver disease     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    Liver problem     Myocardial infarction 2012    NSTEMI    Rheumatoid arthritis     Skin cancer     Stroke     showed up on mri about 5 years ago with no residual    Ulnar neuropathy     Visual impairment      Past Surgical History:   Procedure Laterality Date    ANGIOPLASTY      2012    APPENDECTOMY      ATRIAL APPENDAGE EXCLUSION LEFT WITH TRANSESOPHAGEAL ECHOCARDIOGRAM N/A 2023    Procedure: ATRIAL APPENDAGE EXCLUSION LEFT WITH 40MM ATRICLIP;  Surgeon: Hosea Granados MD;  Location: [facility];  Service: Cardiothoracic;  Laterality: N/A;    CARDIAC CATHETERIZATION  2015, 22    CARDIAC  CATHETERIZATION Left 11/22/2023    Procedure: Cardiac Catheterization/Vascular Study;  Surgeon: Jayson Castillo MD;  Location: [facility] CATH INVASIVE LOCATION;  Service: Cardiology;  Laterality: Left;    COLONOSCOPY      COLONOSCOPY N/A 04/24/2023    Procedure: COLONOSCOPY WITH ANESTHESIA;  Surgeon: Rick Tidwell DO;  Location: [facility] ENDOSCOPY;  Service: Gastroenterology;  Laterality: N/A;  preop; hx of polyps   postop; polyps  PCP Eric Feldman     CORONARY ARTERY BYPASS GRAFT N/A 11/29/2023    Procedure: CORONARY ARTERY BYPASS GRAFTING X2, LEFT INTERNAL MAMMARY ARTERY GRAFT, RIGHT LEG ENDOSCOPIC VEIN HARVEST, TRANSESOPHAGEAL ECHOCARDIOGRAM, APPLICATION OF PREVENA;  Surgeon: Hosea Granados MD;  Location: [facility] OR;  Service: Cardiothoracic;  Laterality: N/A;    CORONARY STENT PLACEMENT      x3 per patient, X 1 -  11/2012, x 2 - 4/21/22    ENDOSCOPY      ENDOSCOPY N/A 04/24/2023    Procedure: ESOPHAGOGASTRODUODENOSCOPY WITH ANESTHESIA;  Surgeon: Rick Tidwell DO;  Location: [facility] ENDOSCOPY;  Service: Gastroenterology;  Laterality: N/A;  preop; hx of varices   postop; varices   PCP Eric Feldman     HAND RECONSTRUCTION Right 06/15/1967    HYSTERECTOMY      2016    MAZE PROCEDURE N/A 11/29/2023    Procedure: MAZE PROCEDURE WITH ENCOMPASS CLAMP;  Surgeon: Hosea Granados MD;  Location: [facility] OR;  Service: Cardiothoracic;  Laterality: N/A;    SUBTOTAL HYSTERECTOMY      TONSILLECTOMY       PT Assessment (last 12 hours)       PT Evaluation and Treatment       Row Name 12/04/23 0846          Physical Therapy Time and Intention    Subjective Information complains of;pain;fatigue;swelling  -     Document Type therapy note (daily note)  -     Mode of Treatment physical therapy  -       Row Name 12/04/23 0846          General Information    Existing Precautions/Restrictions fall;sternal  chest tube, prevena  -       Row Name 12/04/23 0846          Pain    Pretreatment Pain Rating 2/10  -     Posttreatment  Pain Rating 2/10  -     Pain Location incisional  -     Pain Location - chest  -     Pain Intervention(s) Repositioned;Ambulation/increased activity  -       Row Name 12/04/23 0846          Bed Mobility    Comment, (Bed Mobility) chair  -       Row Name 12/04/23 0846          Sit-Stand Transfer    Sit-Stand [place] (Transfers) supervision  -       Row Name 12/04/23 0846          Stand-Sit Transfer    Stand-Sit [place] (Transfers) supervision  -       Row Name 12/04/23 0846          Gait/Stairs (Locomotion)    [place] Level (Gait) standby assist  -     Distance in Feet (Gait) 350  -     Deviations/Abnormal Patterns (Gait) iesha decreased;stride length decreased  -       Row Name 12/04/23 0846          Motor Skills    Comments, Therapeutic Exercise cardiac warm ups  -       Row Name             Wound 11/29/23 0800 midline sternal Incision    Wound - Properties Group Placement Date: 11/29/23  -TJ Placement Time: 0800  -TJ Orientation: midline  -TJ Location: sternal  -TJ Primary Wound Type: Incision  -TJ    Retired Wound - Properties Group Placement Date: 11/29/23  -TJ Placement Time: 0800  -TJ Orientation: midline  -TJ Location: sternal  -TJ Primary Wound Type: Incision  -TJ    Retired Wound - Properties Group Date first assessed: 11/29/23  -TJ Time first assessed: 0800  -TJ Location: sternal  -TJ Primary Wound Type: Incision  -TJ      Row Name             Wound 11/29/23 0800 Right leg Incision    Wound - Properties Group Placement Date: 11/29/23  -TJ Placement Time: 0800  -TJ Side: Right  -TJ Location: leg  -TJ Primary Wound Type: Incision  -TJ    Retired Wound - Properties Group Placement Date: 11/29/23  -TJ Placement Time: 0800  -TJ Side: Right  -TJ Location: leg  -TJ Primary Wound Type: Incision  -TJ    Retired Wound - Properties Group Date first assessed: 11/29/23  -TJ Time first assessed: 0800  -TJ Side: Right  -TJ Location: leg  -TJ Primary Wound Type: Incision  -TJ       Row Name             NPWT (Negative Pressure Wound Therapy) 11/29/23 1130 chest    NPWT (Negative Pressure Wound Therapy) - Properties Group Placement Date: 11/29/23  -TJ Placement Time: 1130 -TJ Location: chest  -TJ Additional Comments: ziggy BRAVO    Retired NPWT (Negative Pressure Wound Therapy) - Properties Group Placement Date: 11/29/23  -TJ Placement Time: 1130  -TJ Location: chest  -TJ Additional Comments: ziggy BRAVO    Retired NPWT (Negative Pressure Wound Therapy) - Properties Group Placement Date: 11/29/23  -TJ Placement Time: 1130  -TJ Location: chest  -TJ Additional Comments: prevcourtney  -MODE      Row Name 12/04/23 0846          Plan of Care Review    Plan of Care Reviewed With patient  -MF     Progress improving  -MF     Outcome Evaluation Pt. improving with mobility. She was Supervision for transfers from chair She walked 350' with SBA. Pt. c/o fatigue and getting SOA with activity. O2 sats remined in the 90's while on RA. Explained to pt the common symptoms of recovery from heart surgery. Will continue to work on progressive ambulation.  -       Row Name 12/04/23 0846          Vital Signs    Posttreatment Heart Rate (beats/min) 108  -MF     Pre SpO2 (%) 95  -MF     O2 Delivery Pre Treatment room air  -MF     O2 Delivery Intra Treatment room air  -MF     Post SpO2 (%) 94  -MF     O2 Delivery Post Treatment room air  -MF     Pre Patient Position Sitting  -MF     Intra Patient Position Standing  -MF     Post Patient Position Sitting  -       Row Name 12/04/23 0846          Positioning and Restraints    Pre-Treatment Position sitting in chair/recliner  -MF     Post Treatment Position chair  -MF     In Chair reclined;call light within reach;encouraged to call for assist  -MF               User Key  (r) = Recorded By, (t) = Taken By, (c) = Cosigned By      Initials Name Provider Type    Mai Dalton PTA Physical Therapist Assistant    Lucia Elliott, RN Registered Nurse                     Physical Therapy Education       Title: PT OT SLP Therapies (In Progress)       Topic: Physical Therapy (In Progress)       Point: Mobility training (Done)       Learning Progress Summary             Patient Acceptance, E, VU by BLANCA at 12/2/2023 1007    Comment: POC    Eager, E, VU by DOC at 12/1/2023 0856    Comment: t/f's    Acceptance, E, VU,DU by CHELI at 11/30/2023 0914    Comment: benefits of activity, progression of PT, sternal prec                         Point: Home exercise program (Not Started)       Learner Progress:  Not documented in this visit.              Point: Body mechanics (Not Started)       Learner Progress:  Not documented in this visit.              Point: Precautions (Done)       Learning Progress Summary             Patient Acceptance, E,D, NR,VU by BLANCA at 12/3/2023 1605    Comment: sternal restrictions    Acceptance, E, VU,DU by CHELI at 11/30/2023 0914    Comment: benefits of activity, progression of PT, sternal prec                                         User Key       Initials Effective Dates Name Provider Type Discipline    AE 02/03/23 -  Kezia Islas, PTA Physical Therapist Assistant PT    CHELI 02/03/23 -  Dwight Rae, PT DPT Physical Therapist PT    BLANCA 02/03/23 -  Cici Zamora, PTA Physical Therapist Assistant PT                  PT Recommendation and Plan     Plan of Care Reviewed With: patient  Progress: improving  Outcome Evaluation: Pt. improving with mobility. She was Supervision for transfers from chair She walked 350' with SBA. Pt. c/o fatigue and getting SOA with activity. O2 sats remined in the 90's while on RA. Explained to pt the common symptoms of recovery from heart surgery. Will continue to work on progressive ambulation.   Outcome Measures       Row Name 12/04/23 0846 12/03/23 1600 12/02/23 1000       How much help from another person do you currently need...    Turning from your back to your side while in flat bed without using bedrails? 3  -MF 3  -BLANCA 2   -BLANCA    Moving from lying on back to sitting on the side of a flat bed without bedrails? 3  -MF 3  -BLANCA 2  -BLANCA    Moving to and from a bed to a chair (including a wheelchair)? 4  -MF 3  -BLANCA 3  -BLANCA    Standing up from a chair using your arms (e.g., wheelchair, bedside chair)? 4  -MF 4  no arms  -BLANCA 3  -BLANCA    Climbing 3-5 steps with a railing? 3  -MF 3  -BLANCA 3  -BLANCA    To walk in hospital room? 3  -MF -- 3  -BLANCA    AM-PAC 6 Clicks Score (PT) 20  -MF -- 16  -BLANCA    Highest Level of Mobility Goal 6 --> Walk 10 steps or more  -MF -- 5 --> Static standing  -BLANCA       Functional Assessment    Outcome Measure Options AM-PAC 6 Clicks Basic Mobility (PT)  - -- --              User Key  (r) = Recorded By, (t) = Taken By, (c) = Cosigned By      Initials Name Provider Type    Cici Giraldo PTA Physical Therapist Assistant     Mai Lyons PTA Physical Therapist Assistant                     Time Calculation:    PT Charges       Row Name 12/04/23 0911             Time Calculation    Start Time 0846  -      Stop Time 0910  -      Time Calculation (min) 24 min  -      PT Received On 12/04/23  -         Time Calculation- PT    Total Timed Code Minutes- PT 24 minute(s)  -         Timed Charges    99760 - Gait Training Minutes  24  -MF         Total Minutes    Timed Charges Total Minutes 24  -MF       Total Minutes 24  -MF                User Key  (r) = Recorded By, (t) = Taken By, (c) = Cosigned By      Initials Name Provider Type     Mai Lyons PTA Physical Therapist Assistant                  Therapy Charges for Today       Code Description Service Date Service Provider Modifiers Qty    [id number] HC GAIT TRAINING EA 15 MIN 12/4/2023 Mai Lyons PTA GP 2            PT G-Codes  Outcome Measure Options: AM-PAC 6 Clicks Basic Mobility (PT)  AM-PAC 6 Clicks Score (PT): 20    Mai Lyons PTA  12/4/2023

## 2023-12-04 NOTE — THERAPY TREATMENT NOTE
Acute Care - Physical Therapy Treatment Note  [facility]     Patient Name: Alessandra Baker  : 1965  MRN: [id number]  Today's Date: 2023      Visit Dx:     ICD-10-CM ICD-9-CM   1. Chest pain due to myocardial ischemia, unspecified ischemic chest pain type  I25.9 786.50   2. Paroxysmal atrial fibrillation  I48.0 427.31   3. NSTEMI (non-ST elevated myocardial infarction)  I21.4 410.70   4. Impaired mobility [Z74.09]  Z74.09 799.89     Patient Active Problem List   Diagnosis    History of colon polyps    Esophageal varices without bleeding    Coronary artery disease    Mixed hyperlipidemia    Chest pain    Rheumatoid arthritis involving multiple sites with positive rheumatoid factor    Atrial fibrillation with RVR    Central stenosis of spinal canal    Neuroforaminal stenosis of cervical spine    NSTEMI (non-ST elevated myocardial infarction)    Stage 3a chronic kidney disease     Past Medical History:   Diagnosis Date    Anxiety     Arthritis     Atrial fibrillation with RVR 2023    CAD (coronary artery disease)     Chronic kidney disease     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    Depression     Esophageal varices     GERD (gastroesophageal reflux disease)     Headache     Heart problem     angina    Hyperlipidemia     Hypertension 2012    Liver disease     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    Liver problem     Myocardial infarction 2012    NSTEMI    Rheumatoid arthritis     Skin cancer     Stroke     showed up on mri about 5 years ago with no residual    Ulnar neuropathy     Visual impairment      Past Surgical History:   Procedure Laterality Date    ANGIOPLASTY      2012    APPENDECTOMY      ATRIAL APPENDAGE EXCLUSION LEFT WITH TRANSESOPHAGEAL ECHOCARDIOGRAM N/A 2023    Procedure: ATRIAL APPENDAGE EXCLUSION LEFT WITH 40MM ATRICLIP;  Surgeon: Hosea Granados MD;  Location: [facility];  Service: Cardiothoracic;  Laterality: N/A;    CARDIAC CATHETERIZATION  2015, 22    CARDIAC  CATHETERIZATION Left 11/22/2023    Procedure: Cardiac Catheterization/Vascular Study;  Surgeon: Jayson Castillo MD;  Location: [facility] CATH INVASIVE LOCATION;  Service: Cardiology;  Laterality: Left;    COLONOSCOPY      COLONOSCOPY N/A 04/24/2023    Procedure: COLONOSCOPY WITH ANESTHESIA;  Surgeon: Rick Tidwell DO;  Location: [facility] ENDOSCOPY;  Service: Gastroenterology;  Laterality: N/A;  preop; hx of polyps   postop; polyps  PCP Eric Feldman     CORONARY ARTERY BYPASS GRAFT N/A 11/29/2023    Procedure: CORONARY ARTERY BYPASS GRAFTING X2, LEFT INTERNAL MAMMARY ARTERY GRAFT, RIGHT LEG ENDOSCOPIC VEIN HARVEST, TRANSESOPHAGEAL ECHOCARDIOGRAM, APPLICATION OF PREVENA;  Surgeon: Hosea Granados MD;  Location: [facility] OR;  Service: Cardiothoracic;  Laterality: N/A;    CORONARY STENT PLACEMENT      x3 per patient, X 1 -  11/2012, x 2 - 4/21/22    ENDOSCOPY      ENDOSCOPY N/A 04/24/2023    Procedure: ESOPHAGOGASTRODUODENOSCOPY WITH ANESTHESIA;  Surgeon: Rick Tidwell DO;  Location: [facility] ENDOSCOPY;  Service: Gastroenterology;  Laterality: N/A;  preop; hx of varices   postop; varices   PCP Eric Feldman     HAND RECONSTRUCTION Right 06/15/1967    HYSTERECTOMY      2016    MAZE PROCEDURE N/A 11/29/2023    Procedure: MAZE PROCEDURE WITH ENCOMPASS CLAMP;  Surgeon: Hosea Granados MD;  Location: [facility] OR;  Service: Cardiothoracic;  Laterality: N/A;    SUBTOTAL HYSTERECTOMY      TONSILLECTOMY       PT Assessment (last 12 hours)       PT Evaluation and Treatment       Row Name 12/04/23 1314 12/04/23 0846       Physical Therapy Time and Intention    Subjective Information pain;no complaints  - complains of;pain;fatigue;swelling  -    Document Type therapy note (daily note)  - therapy note (daily note)  -    Mode of Treatment physical therapy  - physical therapy  -      Row Name 12/04/23 1314 12/04/23 0846       General Information    Existing Precautions/Restrictions fall;sternal  prevena  - fall;sternal   Unknown chest tube, prevena  -      Row Name 12/04/23 1314 12/04/23 0846       Pain    Pretreatment Pain Rating 2/10  - 2/10  -MF    Posttreatment Pain Rating 2/10  - 2/10  -MF    Pain Location incisional  -MF incisional  -    Pain Location - chest  - chest  -MF    Pain Intervention(s) Repositioned;Ambulation/increased activity  - Repositioned;Ambulation/increased activity  -      Row Name 12/04/23 1314 12/04/23 0846       Bed Mobility    Supine-Sit [place] (Bed Mobility) verbal cues;contact guard;standby assist  - --    Sit-Supine [place] (Bed Mobility) verbal cues;contact guard;standby assist  - --    Comment, (Bed Mobility) practiced on flat bed  - chair  -      Row Name 12/04/23 1314 12/04/23 0846       Sit-Stand Transfer    Sit-Stand [place] (Transfers) independent  - supervision  -      Row Name 12/04/23 1314 12/04/23 0846       Stand-Sit Transfer    Stand-Sit [place] (Transfers) independent  - supervision  -      Row Name 12/04/23 1314 12/04/23 0846       Gait/Stairs (Locomotion)    [place] Level (Gait) supervision  - standby assist  -    Distance in Feet (Gait) 500  - 350  -    Deviations/Abnormal Patterns (Gait) iesha decreased;stride length decreased  - iesha decreased;stride length decreased  -      Row Name 12/04/23 0846          Motor Skills    Comments, Therapeutic Exercise cardiac warm ups  -       Row Name             Wound 11/29/23 0800 midline sternal Incision    Wound - Properties Group Placement Date: 11/29/23  -TJ Placement Time: 0800  -TJ Orientation: midline  -TJ Location: sternal  -TJ Primary Wound Type: Incision  -TJ    Retired Wound - Properties Group Placement Date: 11/29/23  -TJ Placement Time: 0800  -TJ Orientation: midline  -TJ Location: sternal  -TJ Primary Wound Type: Incision  -TJ    Retired Wound - Properties Group Date first assessed: 11/29/23  -TJ Time first assessed: 0800  -TJ Location: sternal  -TJ Primary Wound  Type: Incision  -TJ      Row Name             Wound 11/29/23 0800 Right leg Incision    Wound - Properties Group Placement Date: 11/29/23  -TJ Placement Time: 0800  -TJ Side: Right  -TJ Location: leg  -TJ Primary Wound Type: Incision  -TJ    Retired Wound - Properties Group Placement Date: 11/29/23  -TJ Placement Time: 0800  -TJ Side: Right  -TJ Location: leg  -TJ Primary Wound Type: Incision  -TJ    Retired Wound - Properties Group Date first assessed: 11/29/23  -TJ Time first assessed: 0800  -TJ Side: Right  -TJ Location: leg  -TJ Primary Wound Type: Incision  -TJ      Row Name             NPWT (Negative Pressure Wound Therapy) 11/29/23 1130 chest    NPWT (Negative Pressure Wound Therapy) - Properties Group Placement Date: 11/29/23  -TJ Placement Time: 1130  -TJ Location: chest  -TJ Additional Comments: prevena  -TJ    Retired NPWT (Negative Pressure Wound Therapy) - Properties Group Placement Date: 11/29/23  -TJ Placement Time: 1130  -TJ Location: chest  -TJ Additional Comments: prevena  -TJ    Retired NPWT (Negative Pressure Wound Therapy) - Properties Group Placement Date: 11/29/23  -TJ Placement Time: 1130  -TJ Location: chest  -TJ Additional Comments: prevena  -TJ      Row Name 12/04/23 0846          Plan of Care Review    Plan of Care Reviewed With patient  -MF     Progress improving  -     Outcome Evaluation Pt. improving with mobility. She was Supervision for transfers from chair She walked 350' with SBA. Pt. c/o fatigue and getting SOA with activity. O2 sats remined in the 90's while on RA. Explained to pt the common symptoms of recovery from heart surgery. Will continue to work on progressive ambulation.  -       Row Name 12/04/23 0846          Vital Signs    Posttreatment Heart Rate (beats/min) 108  -MF     Pre SpO2 (%) 95  -MF     O2 Delivery Pre Treatment room air  -MF     O2 Delivery Intra Treatment room air  -MF     Post SpO2 (%) 94  -MF     O2 Delivery Post Treatment room air  -MF     Pre  Patient Position Sitting  -MF     Intra Patient Position Standing  -MF     Post Patient Position Sitting  -MF       Row Name 12/04/23 1314 12/04/23 0846       Positioning and Restraints    Pre-Treatment Position sitting in chair/recliner  -MF sitting in chair/recliner  -MF    Post Treatment Position chair  -MF chair  -MF    In Chair reclined;call light within reach;encouraged to call for assist;with family/caregiver  -MF reclined;call light within reach;encouraged to call for assist  -MF              User Key  (r) = Recorded By, (t) = Taken By, (c) = Cosigned By      Initials Name Provider Type    MF Mai Lyons PTA Physical Therapist Assistant    Lucia Elliott, RN Registered Nurse                    Physical Therapy Education       Title: PT OT SLP Therapies (In Progress)       Topic: Physical Therapy (In Progress)       Point: Mobility training (Done)       Learning Progress Summary             Patient Acceptance, E, VU by BLANCA at 12/2/2023 1007    Comment: POC    Eager, E, VU by DOC at 12/1/2023 0856    Comment: t/f's    Acceptance, E, VU,DU by CHELI at 11/30/2023 0914    Comment: benefits of activity, progression of PT, sternal prec                         Point: Home exercise program (Not Started)       Learner Progress:  Not documented in this visit.              Point: Body mechanics (Not Started)       Learner Progress:  Not documented in this visit.              Point: Precautions (Done)       Learning Progress Summary             Patient Acceptance, E,D, NR,VU by BLANCA at 12/3/2023 1605    Comment: sternal restrictions    Acceptance, E, VU,DU by CHELI at 11/30/2023 0914    Comment: benefits of activity, progression of PT, sternal prec                                         User Key       Initials Effective Dates Name Provider Type Discipline    AE 02/03/23 -  Kezia Islas PTA Physical Therapist Assistant PT    CHELI 02/03/23 -  Dwight Rae PT DPT Physical Therapist PT    BLANCA 02/03/23 -  Noah  Cici BRITTON, REYES Physical Therapist Assistant PT                  PT Recommendation and Plan     Plan of Care Reviewed With: patient  Progress: improving  Outcome Evaluation: Pt. improving with mobility. She was Supervision for transfers from chair She walked 350' with SBA. Pt. c/o fatigue and getting SOA with activity. O2 sats remined in the 90's while on RA. Explained to pt the common symptoms of recovery from heart surgery. Will continue to work on progressive ambulation.   Outcome Measures       Row Name 12/04/23 0846 12/03/23 1600 12/02/23 1000       How much help from another person do you currently need...    Turning from your back to your side while in flat bed without using bedrails? 3  -MF 3  -BLANCA 2  -BLANCA    Moving from lying on back to sitting on the side of a flat bed without bedrails? 3  -MF 3  -BLANCA 2  -BLANCA    Moving to and from a bed to a chair (including a wheelchair)? 4  -MF 3  -BLANCA 3  -BLANCA    Standing up from a chair using your arms (e.g., wheelchair, bedside chair)? 4  -MF 4  no arms  -BLANCA 3  -BLANCA    Climbing 3-5 steps with a railing? 3  -MF 3  -BLANCA 3  -BLANCA    To walk in hospital room? 3  -MF -- 3  -BLANCA    AM-PAC 6 Clicks Score (PT) 20  -MF -- 16  -BLANCA    Highest Level of Mobility Goal 6 --> Walk 10 steps or more  - -- 5 --> Static standing  -BLANCA       Functional Assessment    Outcome Measure Options AM-PAC 6 Clicks Basic Mobility (PT)  - -- --              User Key  (r) = Recorded By, (t) = Taken By, (c) = Cosigned By      Initials Name Provider Type    Ciic Giraldo, REYES Physical Therapist Assistant    Mai Dalton, REYES Physical Therapist Assistant                     Time Calculation:    PT Charges       Row Name 12/04/23 1345 12/04/23 0911          Time Calculation    Start Time 1314  - 0846  -     Stop Time 1344  - 0910  -     Time Calculation (min) 30 min  - 24 min  -     PT Received On -- 12/04/23  -        Time Calculation- PT    Total Timed Code Minutes- PT 30 minute(s)   -MF 24 minute(s)  -MF        Timed Charges    51567 - Gait Training Minutes  15  -MF 24  -MF     52822 - PT Therapeutic Activity Minutes 15  -MF --        Total Minutes    Timed Charges Total Minutes 30  -MF 24  -MF      Total Minutes 30  -MF 24  -MF               User Key  (r) = Recorded By, (t) = Taken By, (c) = Cosigned By      Initials Name Provider Type    Mai Dalton PTA Physical Therapist Assistant                  Therapy Charges for Today       Code Description Service Date Service Provider Modifiers Qty    96683907095 HC GAIT TRAINING EA 15 MIN 12/4/2023 Mai Lyons, REYES GP 2    60017134244 HC GAIT TRAINING EA 15 MIN 12/4/2023 Mai Lyons, PTA GP 1    56029555257 HC PT THERAPEUTIC ACT EA 15 MIN 12/4/2023 Mai Lyons, PTA GP 1            PT G-Codes  Outcome Measure Options: AM-PAC 6 Clicks Basic Mobility (PT)  AM-PAC 6 Clicks Score (PT): 20    Mai Lyons PTA  12/4/2023

## 2023-12-04 NOTE — CASE MANAGEMENT/SOCIAL WORK
Continued Stay Note   [facility]     Patient Name: Alessandra Baker  MRN: [id number]  Today's Date: 12/4/2023    Admit Date: 11/21/2023    Plan: Home   Discharge Plan       Row Name 12/04/23 1029       Plan    Plan Home    Plan Comments Chart reviewed. Patient plans to return home with her  at discharge. No discharge planning needs identified at this time.    Final Discharge Disposition Code 01 - home or self-care             DONNIE Shaw

## 2023-12-05 ENCOUNTER — APPOINTMENT (OUTPATIENT)
Dept: GENERAL RADIOLOGY | Facility: HOSPITAL | Age: 58
DRG: 234 | End: 2023-12-05
Payer: MEDICARE

## 2023-12-05 LAB
ABO GROUP BLD: NORMAL
ALBUMIN SERPL-MCNC: 3.5 G/DL (ref 3.5–5.2)
ALBUMIN/GLOB SERPL: 1.4 G/DL
ALP SERPL-CCNC: 78 U/L (ref 39–117)
ALT SERPL W P-5'-P-CCNC: 21 U/L (ref 1–33)
ANION GAP SERPL CALCULATED.3IONS-SCNC: 10 MMOL/L (ref 5–15)
ANION GAP SERPL CALCULATED.3IONS-SCNC: 11 MMOL/L (ref 5–15)
AST SERPL-CCNC: 23 U/L (ref 1–32)
BILIRUB SERPL-MCNC: 0.3 MG/DL (ref 0–1.2)
BLD GP AB SCN SERPL QL: NEGATIVE
BUN SERPL-MCNC: 21 MG/DL (ref 6–20)
BUN SERPL-MCNC: 24 MG/DL (ref 6–20)
BUN/CREAT SERPL: 16.8 (ref 7–25)
BUN/CREAT SERPL: 20.5 (ref 7–25)
CALCIUM SPEC-SCNC: 8.6 MG/DL (ref 8.6–10.5)
CALCIUM SPEC-SCNC: 8.8 MG/DL (ref 8.6–10.5)
CHLORIDE SERPL-SCNC: 104 MMOL/L (ref 98–107)
CHLORIDE SERPL-SCNC: 105 MMOL/L (ref 98–107)
CO2 SERPL-SCNC: 24 MMOL/L (ref 22–29)
CO2 SERPL-SCNC: 24 MMOL/L (ref 22–29)
CREAT SERPL-MCNC: 1.17 MG/DL (ref 0.57–1)
CREAT SERPL-MCNC: 1.25 MG/DL (ref 0.57–1)
DEPRECATED RDW RBC AUTO: 53.1 FL (ref 37–54)
DEPRECATED RDW RBC AUTO: 55.2 FL (ref 37–54)
EGFRCR SERPLBLD CKD-EPI 2021: 49.8 ML/MIN/1.73
EGFRCR SERPLBLD CKD-EPI 2021: 53.9 ML/MIN/1.73
ERYTHROCYTE [DISTWIDTH] IN BLOOD BY AUTOMATED COUNT: 15.9 % (ref 12.3–15.4)
ERYTHROCYTE [DISTWIDTH] IN BLOOD BY AUTOMATED COUNT: 16.7 % (ref 12.3–15.4)
GLOBULIN UR ELPH-MCNC: 2.5 GM/DL
GLUCOSE SERPL-MCNC: 110 MG/DL (ref 65–99)
GLUCOSE SERPL-MCNC: 149 MG/DL (ref 65–99)
HCT VFR BLD AUTO: 23.2 % (ref 34–46.6)
HCT VFR BLD AUTO: 28.4 % (ref 34–46.6)
HGB BLD-MCNC: 7.1 G/DL (ref 12–15.9)
HGB BLD-MCNC: 8.4 G/DL (ref 12–15.9)
MCH RBC QN AUTO: 27.7 PG (ref 26.6–33)
MCH RBC QN AUTO: 28.6 PG (ref 26.6–33)
MCHC RBC AUTO-ENTMCNC: 29.6 G/DL (ref 31.5–35.7)
MCHC RBC AUTO-ENTMCNC: 30.6 G/DL (ref 31.5–35.7)
MCV RBC AUTO: 93.5 FL (ref 79–97)
MCV RBC AUTO: 93.7 FL (ref 79–97)
PLATELET # BLD AUTO: 148 10*3/MM3 (ref 140–450)
PLATELET # BLD AUTO: 167 10*3/MM3 (ref 140–450)
PMV BLD AUTO: 9.3 FL (ref 6–12)
PMV BLD AUTO: 9.8 FL (ref 6–12)
POTASSIUM SERPL-SCNC: 4.2 MMOL/L (ref 3.5–5.2)
POTASSIUM SERPL-SCNC: 4.2 MMOL/L (ref 3.5–5.2)
PROT SERPL-MCNC: 6 G/DL (ref 6–8.5)
RBC # BLD AUTO: 2.48 10*6/MM3 (ref 3.77–5.28)
RBC # BLD AUTO: 3.03 10*6/MM3 (ref 3.77–5.28)
RH BLD: POSITIVE
SODIUM SERPL-SCNC: 139 MMOL/L (ref 136–145)
SODIUM SERPL-SCNC: 139 MMOL/L (ref 136–145)
T&S EXPIRATION DATE: NORMAL
WBC NRBC COR # BLD AUTO: 2.88 10*3/MM3 (ref 3.4–10.8)
WBC NRBC COR # BLD AUTO: 3.22 10*3/MM3 (ref 3.4–10.8)

## 2023-12-05 PROCEDURE — 80053 COMPREHEN METABOLIC PANEL: CPT

## 2023-12-05 PROCEDURE — 97116 GAIT TRAINING THERAPY: CPT

## 2023-12-05 PROCEDURE — 86923 COMPATIBILITY TEST ELECTRIC: CPT

## 2023-12-05 PROCEDURE — 85027 COMPLETE CBC AUTOMATED: CPT | Performed by: SURGERY

## 2023-12-05 PROCEDURE — 25010000002 FUROSEMIDE PER 20 MG: Performed by: NURSE PRACTITIONER

## 2023-12-05 PROCEDURE — 71045 X-RAY EXAM CHEST 1 VIEW: CPT

## 2023-12-05 PROCEDURE — 86900 BLOOD TYPING SEROLOGIC ABO: CPT | Performed by: NURSE PRACTITIONER

## 2023-12-05 PROCEDURE — 86900 BLOOD TYPING SEROLOGIC ABO: CPT

## 2023-12-05 PROCEDURE — 86901 BLOOD TYPING SEROLOGIC RH(D): CPT | Performed by: NURSE PRACTITIONER

## 2023-12-05 PROCEDURE — 25010000002 ENOXAPARIN PER 10 MG: Performed by: NURSE PRACTITIONER

## 2023-12-05 PROCEDURE — 85027 COMPLETE CBC AUTOMATED: CPT | Performed by: NURSE PRACTITIONER

## 2023-12-05 PROCEDURE — 86850 RBC ANTIBODY SCREEN: CPT | Performed by: NURSE PRACTITIONER

## 2023-12-05 PROCEDURE — 36430 TRANSFUSION BLD/BLD COMPNT: CPT

## 2023-12-05 PROCEDURE — P9016 RBC LEUKOCYTES REDUCED: HCPCS

## 2023-12-05 RX ORDER — FUROSEMIDE 10 MG/ML
20 INJECTION INTRAMUSCULAR; INTRAVENOUS ONCE
Status: COMPLETED | OUTPATIENT
Start: 2023-12-05 | End: 2023-12-05

## 2023-12-05 RX ADMIN — PRAVASTATIN SODIUM 20 MG: 20 TABLET ORAL at 08:55

## 2023-12-05 RX ADMIN — ASPIRIN 81 MG: 81 TABLET, COATED ORAL at 08:53

## 2023-12-05 RX ADMIN — FUROSEMIDE 20 MG: 10 INJECTION, SOLUTION INTRAMUSCULAR; INTRAVENOUS at 18:24

## 2023-12-05 RX ADMIN — POTASSIUM CHLORIDE 20 MEQ: 750 CAPSULE, EXTENDED RELEASE ORAL at 20:07

## 2023-12-05 RX ADMIN — OXYCODONE HYDROCHLORIDE 5 MG: 5 TABLET ORAL at 12:37

## 2023-12-05 RX ADMIN — ACETAMINOPHEN 650 MG: 325 TABLET, FILM COATED ORAL at 08:54

## 2023-12-05 RX ADMIN — METOPROLOL SUCCINATE 25 MG: 25 TABLET, EXTENDED RELEASE ORAL at 08:54

## 2023-12-05 RX ADMIN — OXYCODONE HYDROCHLORIDE 5 MG: 5 TABLET ORAL at 18:23

## 2023-12-05 RX ADMIN — FUROSEMIDE 40 MG: 40 TABLET ORAL at 18:23

## 2023-12-05 RX ADMIN — ACETAMINOPHEN 650 MG: 325 TABLET, FILM COATED ORAL at 20:07

## 2023-12-05 RX ADMIN — ACETAMINOPHEN 650 MG: 325 TABLET, FILM COATED ORAL at 02:29

## 2023-12-05 RX ADMIN — PREGABALIN 25 MG: 25 CAPSULE ORAL at 08:58

## 2023-12-05 RX ADMIN — ACETAMINOPHEN 650 MG: 325 TABLET, FILM COATED ORAL at 14:27

## 2023-12-05 RX ADMIN — SULFASALAZINE 500 MG: 500 TABLET ORAL at 08:55

## 2023-12-05 RX ADMIN — PREGABALIN 25 MG: 25 CAPSULE ORAL at 20:07

## 2023-12-05 RX ADMIN — SERTRALINE HYDROCHLORIDE 200 MG: 100 TABLET, FILM COATED ORAL at 08:55

## 2023-12-05 RX ADMIN — POTASSIUM CHLORIDE 20 MEQ: 750 CAPSULE, EXTENDED RELEASE ORAL at 08:54

## 2023-12-05 RX ADMIN — ENOXAPARIN SODIUM 40 MG: 100 INJECTION SUBCUTANEOUS at 08:58

## 2023-12-05 RX ADMIN — FUROSEMIDE 40 MG: 40 TABLET ORAL at 08:54

## 2023-12-05 RX ADMIN — PANTOPRAZOLE SODIUM 40 MG: 40 TABLET, DELAYED RELEASE ORAL at 06:03

## 2023-12-05 NOTE — THERAPY DISCHARGE NOTE
Acute Care - Physical Therapy Discharge Summary  [facility]       Patient Name: Alessandra Baker  : 1965  MRN: [id number]    Today's Date: 2023                 Admit Date: 2023      PT Recommendation and Plan    Visit Dx:    ICD-10-CM ICD-9-CM   1. Chest pain due to myocardial ischemia, unspecified ischemic chest pain type  I25.9 786.50   2. Paroxysmal atrial fibrillation  I48.0 427.31   3. NSTEMI (non-ST elevated myocardial infarction)  I21.4 410.70   4. Impaired mobility [Z74.09]  Z74.09 799.89        Outcome Measures       Row Name 23 0846 23 1600          How much help from another person do you currently need...    Turning from your back to your side while in flat bed without using bedrails? 3  -MF 3  -BLANCA     Moving from lying on back to sitting on the side of a flat bed without bedrails? 3  -MF 3  -BLANCA     Moving to and from a bed to a chair (including a wheelchair)? 4  -MF 3  -BLANCA     Standing up from a chair using your arms (e.g., wheelchair, bedside chair)? 4  -MF 4  no arms  -BLANCA     Climbing 3-5 steps with a railing? 3  -MF 3  -BLANCA     To walk in hospital room? 3  -MF --     AM-PAC 6 Clicks Score (PT) 20  -MF --     Highest Level of Mobility Goal 6 --> Walk 10 steps or more  -MF --        Functional Assessment    Outcome Measure Options AM-PAC 6 Clicks Basic Mobility (PT)  -MF --               User Key  (r) = Recorded By, (t) = Taken By, (c) = Cosigned By      Initials Name Provider Type    Cici Giraldo PTA Physical Therapist Assistant    Mai Dalton PTA Physical Therapist Assistant                     PT Charges       Row Name 23 1205             Time Calculation    Start Time 1030  -BLANCA      Stop Time 1045  -BLANCA      Time Calculation (min) 15 min  -BLANCA         Timed Charges    94310 - Gait Training Minutes  15  -BLANCA         Total Minutes    Timed Charges Total Minutes 15  -BLANCA       Total Minutes 15  -BLANCA                User Key  (r) = Recorded By, (t) = Taken  By, (c) = Cosigned By      Initials Name Provider Type    Cici Giraldo PTA Physical Therapist Assistant                     PT Rehab Goals       Row Name 12/05/23 1600             Bed Mobility Goal 1 (PT)    [place] Level/Cues Needed (Bed Mobility Goal 1, PT) supervision required  supervisio  -BLANCA      Progress/Outcomes (Bed Mobility Goal 1, PT) goal met  -BLANCA         Transfer Goal 1 (PT)    [place] Level/Cues Needed (Transfer Goal 1, PT) independent  Goal: supervision  -BLANCA      Progress/Outcome (Transfer Goal 1, PT) goal met  -BLANCA         Gait Training Goal 1 (PT)    [place] Level (Gait Training Goal 1, PT) independent  -BLANCA      Distance (Gait Training Goal 1, PT) 500  -BLANCA      Progress/Outcome (Gait Training Goal 1, PT) goal met  -BLANCA         Stairs Goal 1 (PT)    [place] Level/Cues Needed (Stairs Goal 1, PT) --  Not attempted. Pt denied opportunity.  -BLANCA      Number of Stairs (Stairs Goal 1, PT) 0  -BLANCA      Progress/Outcome (Stairs Goal 1, PT) goal not met  -BLANCA                User Key  (r) = Recorded By, (t) = Taken By, (c) = Cosigned By      Initials Name Provider Type Discipline    Cici Giraldo PTA Physical Therapist Assistant PT                    Therapy Charges for Today       Code Description Service Date Service Provider Modifiers Qty    02279227484 HC GAIT TRAINING EA 15 MIN 12/5/2023 Cici Zamora PTA GP 1            PT Discharge Summary  Anticipated Discharge Disposition (PT): home with assist  Reason for Discharge: All goals achieved  Outcomes Achieved: Able to achieve all goals within established timeline  Discharge Destination: Home with assist      Cici Zamora PTA   12/5/2023

## 2023-12-05 NOTE — PLAN OF CARE
Goal Outcome Evaluation:      Patient is independent with transfers and walking 525'. Tolerating activity very well.

## 2023-12-05 NOTE — PLAN OF CARE
Goal Outcome Evaluation:              Outcome Evaluation: NTN follow up. Pt reported good appetite. Ate 100% of lunch. Dislikes ONS and dislikes anythin with artificial sweeteners. Pt does not identify as food allergy; however, pt stated artificial sweeteners cause GI distress. Shellfish allergy endorsed. Pt and RD discussed healthy snacks, ONS as tolerated at discharge, and adequate hydration. Pt provided with Ensure coupons. Average PO 81% meals, 800 mL oral fluid/day. NTN following per protocol.

## 2023-12-05 NOTE — DISCHARGE SUMMARY
[facility] Cardiothoracic Surgery  DISCHARGE SUMMARY        Date of Admission: 11/21/2023  Date of Discharge:  12/6/2023  Primary Care Physician: Geraldo Feldman,     Discharge Diagnoses:  Active Hospital Problems    Diagnosis     **NSTEMI (non-ST elevated myocardial infarction)     Chronic anemia     Stage 3a chronic kidney disease     Atrial fibrillation with RVR     Central stenosis of spinal canal     Rheumatoid arthritis involving multiple sites with positive rheumatoid factor     Coronary artery disease        Procedures Performed:   Coronary Artery Bypass, using arterial graft; single arterial graft, Coronary Artery bypass, using venous grafts and arterial grafts, 1 venous graft, Limited MAZE Procedure, Left Atrial with Encompass Clamp, LAAE with 40mm Atriclip, by Dr. Granados on 11/29/2023    HPI:  Ms. Alessandra Baker is a 58 y.o. female with a history of coronary artery disease with multiple PCI's to the RCA.  She does have a history of cirrhosis due to the methotrexate use that she was taking due to chronic rheumatoid arthritis.  She is on chronic immunosuppressants for this.  She presented to [facility] with NSTEMI and continuous chest pain despite nitroglycerin drip.  She also had intermittent episodes of atrial fibrillation with RVR.  She underwent coronary angiography revealing multivessel coronary artery disease.  Cardiothoracic surgery was consulted for consideration for CABG.  She had near normal LV function.  Dr. Granados had a long discussion with her regarding the risk and benefits of proceeding with CABG with maze procedure, she understood these risk and agreed to proceed.  Adequate time was allowed for Eliquis and Brilinta washout prior to proceeding with surgery.    Hospital Course: On 11/29/2023, Ms. Baker was taken to the operating room for coronary artery bypass grafting, Maze procedure with encompass clamp, and left atrial appendage exclusion.  See separate op  note by Dr. Granados detailing the operation.  Following surgery she was transferred to the ICU in stable but guarded condition.  She remained hemodynamically stable and was extubated without remark during the evening hours following surgery.  She demonstrated an intact neurological exam and was tolerating nasal cannula.  Mediastinal chest tubes and left pleural drain were removed without remark.  After close monitoring of renal function and liver function, patient met criteria for transfer to  on postop day 3.  The remainder of her hospital stay was significant for encouraging pulmonary toilet and ambulation, diuresis, and pain control.  She is eating well and is demonstrated adequate bowel function.  She is tolerating room air and has met all physical therapy goals.  She was given 1 unit PRBC on postop day 6 due to hemoglobin of 7.1 with appropriate hemoglobin response.  Pacing wires and Prevena dressing were removed without remark and she meets criteria for discharge home on postop day 7.   The patient is agreeable to this plan.    She will resume Eliquis and Brilinta at discharge.  She will take Lasix 40 mg daily and follow-up with me on Monday, 12/11/2023 with labs prior to appointment.  Follow-up with Dr. Castillo in 2 weeks for ongoing heart failure medication management.  No ACE inhibitor at the time of discharge due to relative hypotension postoperatively.  We will reassess at postop follow-up visit.  Continue sulfasalazine at half her home dose.  Hold Imuran and will reassess resuming this medication at 1 month postop as long as surgical wounds are healing appropriately    Condition on Discharge:  Neurologically intact and has good pain control.  She is eating well and has demonstrable good bowel function.  The sternum is stable without clicks and the saphenectomy incisions are healing nicely.  The heart is in normal sinus rhythm.  She has met all physical therapy criteria and verbalizes understanding of  sternotomy precautions.   She verbalizes understanding of a separately handed out cardiac surgery handout.       Discharge Disposition:  Home or Self Care [1]    Discharge Medications:     Discharge Medications        New Medications        Instructions Start Date   furosemide 40 MG tablet  Commonly known as: LASIX   40 mg, Oral, Daily      metoprolol succinate XL 25 MG 24 hr tablet  Commonly known as: TOPROL-XL   25 mg, Oral, Every 24 Hours Scheduled      potassium chloride 10 MEQ CR capsule  Commonly known as: MICRO-K   20 mEq, Oral, Daily      traMADol 50 MG tablet  Commonly known as: ULTRAM   50 mg, Oral, Every 6 Hours PRN             Changes to Medications        Instructions Start Date   sulfaSALAzine 500 MG tablet  Commonly known as: AZULFIDINE  What changed: how much to take   500 mg, Oral, Daily   Start Date: December 7, 2023            Continue These Medications        Instructions Start Date   cetirizine 10 MG tablet  Commonly known as: zyrTEC   10 mg, Oral, Daily PRN      dapagliflozin Propanediol 10 MG tablet   10 mg, Oral, Daily      Eliquis 5 MG tablet tablet  Generic drug: apixaban   5 mg, Oral, Every 12 Hours Scheduled      EPINEPHrine 0.3 MG/0.3ML solution auto-injector injection  Commonly known as: EPIPEN   0.3 mg, Intramuscular, Once, If needed      ezetimibe 10 MG tablet  Commonly known as: ZETIA   10 mg, Oral, Daily      fluticasone 50 MCG/ACT nasal spray  Commonly known as: FLONASE   2 sprays, Nasal, Daily      Inclisiran Sodium 284 MG/1.5ML solution prefilled syringe   284 mg, Subcutaneous, Next due on 11/30- receives from oncology      omeprazole 40 MG capsule  Commonly known as: priLOSEC   40 mg, Oral, Daily      sertraline 100 MG tablet  Commonly known as: ZOLOFT   200 mg, Oral, Daily      ticagrelor 90 MG tablet tablet  Commonly known as: BRILINTA   90 mg, Oral, 2 Times Daily             Stop These Medications      aspirin 81 MG EC tablet     azaTHIOprine 50 MG tablet  Commonly known as:  IMURAN     carvedilol 12.5 MG tablet  Commonly known as: COREG     isosorbide mononitrate 30 MG 24 hr tablet  Commonly known as: IMDUR     LORazepam 0.5 MG tablet  Commonly known as: Ativan     nitroglycerin 0.3 MG SL tablet  Commonly known as: Nitrostat              Discharge Diet: No concentrated sweets diet with an effort to maintain acceptable glycemic control.      Discharge Care Plan / Instructions: Please see the separately handed out cardiac surgery handout.  She will not be referred to cardiac rehab at this time due to recent sternotomy.  We will reassess at her postop follow-up visit.    Activity at Discharge:   No heavy lifting greater than 5 pounds or a gallon of milk while maintaining sternal precautions.  Alessandra Baker has been instructed on an exercise  regiment as detailed in a handed out cardiac surgery handout.    Tobacco:  The patient does not use tobacco products and therefore does not need tobacco cessation education.    BMI:  Body mass index is 32.28 kg/m².  Defer BMI management to PCP      Follow-up Appointments: Alessandra Baker  is requested to see Geraldo Feldman DO within 1-2 weeks from time of discharge, to see Madeline BYRNES on 12/7/2023 with labs prior to appointment, to follow-up with Dr. Granados in 6 weeks, and to follow-up with Dr. Castillo of the cardiology service in 2 weeks.

## 2023-12-05 NOTE — PAYOR COMM NOTE
"12/5/23. [facility] [phone]  [phone]    FAXING UPDATE CLINICAL                 Regina Baker (58 y.o. Female)       Date of Birth   1965    Social Security Number       Address   [address] [place], [place] [address]    Home Phone   [phone]    MRN   [id number]       Holiness   Anglican    Marital Status                               Admission Date   11/21/23    Admission Type   Emergency    Admitting Provider   Hosea Granados MD    Attending Provider   Hosea Granados MD    Department, Room/Bed   [facility] 4B, 431/1       Discharge Date       Discharge Disposition       Discharge Destination                                 Attending Provider: Hosea Granados MD    Allergies: Bee Venom, Clopidogrel, Codeine, Insect Extract, Morphine And Related, Penicillin G, Shellfish Allergy, Shellfish-derived Products, Methotrexate, Penicillin G Benzathine, Atorvastatin, Hydrocodone, Hydroxychloroquine, Menthol    Isolation: None   Infection: None   Code Status: CPR    Ht: 165.1 cm (65\")   Wt: 88.5 kg (195 lb 3.2 oz)    Admission Cmt: None   Principal Problem: NSTEMI (non-ST elevated myocardial infarction) [I21.4]                   Active Insurance as of 11/21/2023       Primary Coverage       Payor Plan Insurance Group Employer/Plan Group    AETNA MEDICARE REPLACEMENT AETNA MEDICARE REPLACEMENT [id number]       Payor Plan Address Payor Plan Phone Number Payor Plan Fax Number Effective Dates    [address] [phone]  1/1/2023 - None Entered    [facility] [id number]         Subscriber Name Subscriber Birth Date Member ID       REGINA BAKER 1965 [id number]                     Emergency Contacts        (Rel.) Home Phone Work Phone Mobile Phone    EDISON BAKER (Spouse) -- -- [phone]    KEL CEBALLOS (Other) -- -- [phone]             [facility] Encounter Date/Time: 11/21/2023 1746   Hospital Account: [id number]    MRN: " [id number]   Patient:  Regina Baker   Contact Serial #: [id number]   SSN:          ENCOUNTER             Patient Class: Inpatient   Unit: [address] Service: Cardiothoracic Surgery     Bed: 431/1   Admitting Provider: Hosea Granados MD   Referring Physician:     Attending Provider: Hosea Granados MD   Adm Diagnosis: NSTEMI (non-ST elevated *               PATIENT          Name: Regina Baker : 1965 (58 yrs)   Address: [address] [place] [address] Sex: Female   City: [place] [address]   County: [facility]   Marital Status:  Ethnicity: NOT                                                                              Race: WHITE   Primary Care Provider: Geraldo Feldman DO Patients Phone: Home Phone: [phone]     Mobile Phone: [phone]   EMERGENCY CONTACT   Contact Name Legal Guardian? Relationship to Patient Home Phone Work Phone   1. EDISON BAKER  2. KEL CEBALLOS      Spouse  Other                GUARANTOR            Guarantor: Regina Baker     : 1965   Address: [address] [place] [address] Sex: [place] [address]     Relation to Patient: Self       Home Phone: [phone]   Guarantor ID: [id number]       Work Phone:     GUARANTOR EMPLOYER   Employer:           Status: DISABLED   COVERAGE          PRIMARY INSURANCE   Payor: AETNA MEDICARE REPLACEMENT Plan: AETNA MEDICARE REPLACEMENT   Group Number: [id number] Insurance Type: INDEMNITY   Subscriber Name: REGINA BAKER Subscriber : 1965   Subscriber ID: [id number] Coverage Address: [facility] [id number] [place] [address]   Pat. Rel. to Subscriber: Self Coverage Phone: [phone]   SECONDARY INSURANCE   Payor: N/A Plan: N/A   Group Number:   Insurance Type:     Subscriber Name:   Subscriber :     Subscriber ID:   Coverage Address:     Pat. Rel. to Subscriber:   Coverage Phone:        Contact Serial # ([id number])         2023    Chart ID ([id number])        23 0736 98.2 (36.8) 84 16 110/63 Lying  room air 94   12/05/23 0358 98.2 (36.8) 77 16 98/56 Lying room air 92   12/04/23 2300 98.4 (36.9) 86 16 96/53  Lying room air 94   BP: Supriya HICKEY notified at 12/04/23 2300   12/04/23 2041 -- -- -- -- -- room air --   12/04/23 2028 98.2 (36.8) 87 16 111/61 Lying room air 97   12/04/23 1642 98.1 (36.7) 88 16 111/66 Sitting room air 94   12/04/23 1113 98.4 (36.9) 79 16 103/60 Sitting room air 92   12/04/23 0750 -- -- -- -- -- room air --   12/04/23 0719 98.2 (36.8) 80 16 120/68 Sitting room air 94   12             iff)  Order: [id number]  Status: Final result       Visible to patient: Yes (not seen)       Next appt: 12/07/2023 at 09:00 AM in Cardiothoracic Surgery (Madeline Olguin, FITZ)    Specimen Information: Blood   0 Result Notes            Component  Ref Range & Units 03:03  (12/5/23) 1 d ago  (12/4/23) 2 d ago  (12/3/23) 3 d ago  (12/2/23) 4 d ago  (12/1/23) 4 d ago  (12/1/23) 5 d ago  (11/30/23)   WBC  3.40 - 10.80 10*3/mm3 2.88 Low  4.53 5.10 5.66 10.08 7.32 7.17   RBC  3.77 - 5.28 10*6/mm3 2.48 Low  2.69 Low  2.76 Low  2.67 Low  3.07 Low  2.91 Low  2.95 Low    Hemoglobin  12.0 - 15.9 g/dL 7.1 Low  7.7 Low  7.8 Low  7.7 Low  8.9 Low  8.4 Low  8.5 Low    Hematocrit  34.0 - 46.6 % 23.2 Low  24.1 Low  25.8 Low  24.6 Low  27.9 Low  26.2 Low  27.1 Low    MCV  79.0 - 97.0 fL 93.5 89.6 93.5 92.1 90.9 90.0 91.9   MCH  26.6 - 33.0 pg 28.6 28.6 28.3 28.8 29.0 28.9 28.8   MCHC  31.5 - 35.7 g/dL 30.6 Low  32.0 3                    nsive Metabolic Panel  Order: [id number]  Status: Final result       Visible to patient: Yes (not seen)       Next appt: 12/07/2023 at 09:00 AM in Cardiothoracic Surgery (FITZ Garrett)    Specimen Information: Blood   0 Result Notes            Component  Ref Range & Units 03:03  (12/5/23) 1 d ago  (12/4/23) 1 d ago  (12/4/23) 2 d ago  (12/3/23) 3 d ago  (12/2/23) 3 d ago  (12/2/23) 3 d ago  (12/2/23)   Glucose  65 - 99 mg/dL 110 High   102 High  97 190 High  R,  R,  High     BUN  6 - 20 mg/dL 24 High   29 High  34 High    29 High    Creatinine  0.57 - 1.00 mg/dL 1.17 High                 Prepare RBC, 1 Units [[id number]] (Order [id number])  Order  Date: 2023 Department: [address] Released By/Authorizing: Madeline Olguin APRN (auto-released)     Results  Prepare RBC, 1 Units (Order [id number]              Olguin, Madeline, APRN   Nurse Practitioner  Cardiothoracic Surgery     Progress Notes     Attested     Date of Service: 23  Creation Time: 23     Attested           Attestation signed by Hosea Granados MD at 23     I have personally seen and examined Alessandra Baker and reviewed the record. Agree with the aforementioned plan rendered jointly with Madeline Olguin.     Ms. Baker is doing well today.  Going to give her 1 unit of blood given her hemoglobin of 7.1 with 1 dose of Lasix.  Repeat labs later today.  If these are stable likely discharge home later today.  Plan on home with 40 mg of Lasix daily Eliquis and Brilinta, needs to follow-up with Dr. Castillo office in a couple weeks and follow-up with our office on Thursday with repeat labs.  Pacing wires removed this morning and Prevena dressing change.  Very happy with how she is done given her extensive comorbidities prior to surgery.     Hosea Granados M.D.  Cardiothoracic Surgeon                  Expand All Collapse All    Patient name: Alessandra Baker  Patient : 1965  VISIT # [id number]  MR #[id number]     Procedure:Procedure(s):  CORONARY ARTERY BYPASS GRAFTING X2, LEFT INTERNAL MAMMARY ARTERY GRAFT, RIGHT LEG ENDOSCOPIC VEIN HARVEST, TRANSESOPHAGEAL ECHOCARDIOGRAM  MAZE PROCEDURE WITH ENCOMPASS CLAMP  ATRIAL APPENDAGE EXCLUSION LEFT WITH 40MM ATRICLIP  Procedure Date:2023  POD:6 Days Post-Op        Subjective   Tolerating room air.  Weight is down 1 pound and she remains 5 pounds above her baseline weight.  Creatinine today 1.17.  WBC 2.8, hemoglobin 7.1.  She has had a bowel  "movement.  Walking 350 feet with physical therapy.        Telemetry:  Sinus rhythm 70s  IV drips: None              Objective []Expand by Default  Visit Vitals  /63 (BP Location: Right arm, Patient Position: Lying)   Pulse 84   Temp 98.2 °F (36.8 °C) (Oral)   Resp 16   Ht 165.1 cm (65\")   Wt 88.5 kg (195 lb 3.2 oz)   SpO2 94%   BMI 32.48 kg/m²   Baseline weight 190 pounds     Intake/Output Summary (Last 24 hours) at 12/5/2023 0850  Last data filed at 12/5/2023 0235      Gross per 24 hour   Intake --   Output 2150 ml   Net -2150 ml            Lab:     CBC:         Results from last 7 days   Lab Units 12/05/23  0303 12/04/23  0153 12/03/23  0335   WBC 10*3/mm3 2.88* 4.53 5.10   HEMATOCRIT % 23.2* 24.1* 25.8*   PLATELETS 10*3/mm3 148 169 188            BMP:          Results from last 7 days   Lab Units 12/05/23  0303 12/04/23  1020 12/04/23  0153 12/03/23  0335   SODIUM mmol/L 139  --  134* 134*   POTASSIUM mmol/L 4.2 3.7 3.8 4.4   CHLORIDE mmol/L 105  --  101 99   CO2 mmol/L 24.0  --  21.0* 24.0   GLUCOSE mg/dL 110*  --  102* 97   BUN mg/dL 24*  --  29* 34*   CREATININE mg/dL 1.17*  --  1.08* 1.25*            COAG:        Results from last 7 days   Lab Units 11/30/23  0330 11/29/23  1150   INR   1.12* 1.20*   APTT seconds  --  31.8         IMAGES:       Imaging Results (Last 24 Hours)         Procedure Component Value Units Date/Time     XR Chest 1 View [[id number]] Collected: 12/05/23 0638       Updated: 12/05/23 0646     Narrative:       EXAM: XR CHEST 1 VW- 12/5/2023 3:35 AM     HISTORY: Post-Op Heart Surgery       COMPARISON: 2/4/2023.     TECHNIQUE: Single frontal radiograph of the chest was obtained.     FINDINGS:      Support Devices: Median sternotomy wires. Left atrial appendage clip.  Interval removal of left basilar chest tube. Binder partially obscures  the mediastinum.     Cardiac and Mediastinal Silhouettes: Unchanged     Lungs/Pleura: Stable left perihilar streaky opacities and left " basilar  parenchymal opacities. Trace left pleural effusion. No visible  pneumothorax.     Osseous structures: Unchanged        Impression:          Interval removal of left basilar chest tube.     Otherwise stable chest radiograph.        This report was signed and finalized on 12/5/2023 6:43 AM by Hsoea Curtis.                CXR: Small left pleural effusion.  Left basilar atelectasis.  No pneumothorax.     Physical Exam:  General: Alert, oriented. No apparent distress.  Obese  Cardiovascular: Regular rate and rhythm without murmur, rubs, or gallops.    Pulmonary: Clear to auscultation bilaterally without wheezing, rubs, or rales.  Chest: Sternotomy incision clean, dry, and intact with Prevena. Sternum stable. No clicks.   Abdomen: Soft, nondistended, and nontender.  Extremities: Warm, moves all extremities.  Mild lower extremity edema saphenectomy site clean, dry, and intact.  Neurologic:  Grossly intact with no focal deficits.                 Impression:  Coronary artery disease  NSTEMI  Obesity, BMI 31  Rheumatoid arthritis  Cirrhosis due to history of methotrexate use  Hypertension, well controlled  Hyperlipidemia, on statin  Former smoker  GERD, on PPI  Atrial fibrillation with RVR, s/p Maze procedure  Chronic anemia, stable           Plan:  Transfuse 1 unit PRBC this morning.  Give Lasix 20 mg IV x1 dose with blood  Repeat labs today at 1300  Discontinue pacing wires  Discontinue Prevena dressing  Routine postcardiac surgery care  Encourage pulmonary toilet and ambulation  If repeat labs this afternoon are improved, anticipate discharge home  She will be discharged with Lasix 40 mg daily.  She will resume Eliquis and Brilinta at discharge.  Follow-up with me on Thursday, 12/7/2023 with repeat labs  Follow-up with Dr. Castillo in 2 weeks  Discussed with patient and nursing        FITZ Garrett  12/05/23  08:50 CST               Cosigned by: Hosea Granados MD at 12/05/23 2713             Current  Facility-Administered Medications   Medication Dose Route Frequency Provider Last Rate Last Admin    acetaminophen (TYLENOL) tablet 650 mg  650 mg Oral Q6H Marta Braga APRN   650 mg at 12/05/23 0854    aspirin EC tablet 81 mg  81 mg Oral Daily Marta Braga APRN   81 mg at 12/05/23 0853    bisacodyl (DULCOLAX) EC tablet 10 mg  10 mg Oral BID Marta Braga APRN   10 mg at 12/03/23 0847    Enoxaparin Sodium (LOVENOX) syringe 40 mg  40 mg Subcutaneous Daily Marta Braga APRN   40 mg at 12/05/23 0858    furosemide (LASIX) injection 20 mg  20 mg Intravenous Once Madeline Olguin APRN        furosemide (LASIX) tablet 40 mg  40 mg Oral BID Aureliano Castillo MD   40 mg at 12/05/23 0854    lidocaine (LIDODERM) 5 % 2 patch  2 patch Transdermal Q24H Marta Braga APRN   2 patch at 12/04/23 0531    Magnesium Cardiology Dose Replacement - Follow Nurse / BPA Driven Protocol   Does not apply PRN Marta Braga APRN        metoprolol succinate XL (TOPROL-XL) 24 hr tablet 25 mg  25 mg Oral Q24H Aureliano Castillo MD   25 mg at 12/05/23 0854    ondansetron (ZOFRAN) injection 4 mg  4 mg Intravenous Q6H PRN Marta Braga APRN        oxyCODONE (ROXICODONE) immediate release tablet 10 mg  10 mg Oral Q4H PRN Marta Braga APRN   10 mg at 12/02/23 1244    oxyCODONE (ROXICODONE) immediate release tablet 5 mg  5 mg Oral Q4H PRN Marta Braga APRN   5 mg at 12/04/23 1145    pantoprazole (PROTONIX) EC tablet 40 mg  40 mg Oral Q AM Marta Braga APRN   40 mg at 12/05/23 0603    Phosphorus Replacement - Follow Nurse / BPA Driven Protocol   Does not apply PRN Marta Braga APRN        polyethylene glycol (MIRALAX) packet 17 g  17 g Oral Daily Marta Braga APRN   17 g at 12/02/23 0934    potassium chloride (MICRO-K/KLOR-CON) CR capsule  20 mEq Oral BID Madeline Brito, FITZ   20 mEq at 12/05/23 0854    Potassium Replacement - Follow Nurse / BPA Driven Protocol   Does not apply PRN Marta Braga  FITZ LAGOS        pravastatin (PRAVACHOL) tablet 20 mg  20 mg Oral Daily Marta Braga APRN   20 mg at 12/05/23 0855    pregabalin (LYRICA) capsule 25 mg  25 mg Oral Q12H Marta Braga APRN   25 mg at 12/05/23 0858    sertraline (ZOLOFT) tablet 200 mg  200 mg Oral Daily Marta Braga APRN   200 mg at 12/05/23 0855    sulfaSALAzine (AZULFIDINE) tablet 500 mg  500 mg Oral Daily Marta Braga APRN   500 mg at 12/05/23 0855

## 2023-12-05 NOTE — PROGRESS NOTES
"Patient name: Alessandra Baker  Patient : 1965  VISIT # [id number]  MR #[id number]    Procedure:Procedure(s):  CORONARY ARTERY BYPASS GRAFTING X2, LEFT INTERNAL MAMMARY ARTERY GRAFT, RIGHT LEG ENDOSCOPIC VEIN HARVEST, TRANSESOPHAGEAL ECHOCARDIOGRAM  MAZE PROCEDURE WITH ENCOMPASS CLAMP  ATRIAL APPENDAGE EXCLUSION LEFT WITH 40MM ATRICLIP  Procedure Date:2023  POD:6 Days Post-Op    Subjective     Tolerating room air.  Weight is down 1 pound and she remains 5 pounds above her baseline weight.  Creatinine today 1.17.  WBC 2.8, hemoglobin 7.1.  She has had a bowel movement.  Walking 350 feet with physical therapy.       Telemetry:  Sinus rhythm 70s  IV drips: None       Objective     Visit Vitals  /63 (BP Location: Right arm, Patient Position: Lying)   Pulse 84   Temp 98.2 °F (36.8 °C) (Oral)   Resp 16   Ht 165.1 cm (65\")   Wt 88.5 kg (195 lb 3.2 oz)   SpO2 94%   BMI 32.48 kg/m²   Baseline weight 190 pounds    Intake/Output Summary (Last 24 hours) at 2023 0850  Last data filed at 2023 0235  Gross per 24 hour   Intake --   Output 2150 ml   Net -2150 ml         Lab:     CBC:  Results from last 7 days   Lab Units 23  0303 23  0153 23  0335   WBC 10*3/mm3 2.88* 4.53 5.10   HEMATOCRIT % 23.2* 24.1* 25.8*   PLATELETS 10*3/mm3 148 169 188          BMP:  Results from last 7 days   Lab Units 23  0303 23  1020 23  0153 23  0335   SODIUM mmol/L 139  --  134* 134*   POTASSIUM mmol/L 4.2 3.7 3.8 4.4   CHLORIDE mmol/L 105  --  101 99   CO2 mmol/L 24.0  --  21.0* 24.0   GLUCOSE mg/dL 110*  --  102* 97   BUN mg/dL 24*  --  29* 34*   CREATININE mg/dL 1.17*  --  1.08* 1.25*          COAG:  Results from last 7 days   Lab Units 23  0330 23  1150   INR  1.12* 1.20*   APTT seconds  --  31.8       IMAGES:       Imaging Results (Last 24 Hours)       Procedure Component Value Units Date/Time    XR Chest 1 View [[id number]] Collected: 23 0638     Updated: 23 " 0646    Narrative:      EXAM: XR CHEST 1 VW- 12/5/2023 3:35 AM     HISTORY: Post-Op Heart Surgery       COMPARISON: 2/4/2023.     TECHNIQUE: Single frontal radiograph of the chest was obtained.     FINDINGS:      Support Devices: Median sternotomy wires. Left atrial appendage clip.  Interval removal of left basilar chest tube. Binder partially obscures  the mediastinum.     Cardiac and Mediastinal Silhouettes: Unchanged     Lungs/Pleura: Stable left perihilar streaky opacities and left basilar  parenchymal opacities. Trace left pleural effusion. No visible  pneumothorax.     Osseous structures: Unchanged       Impression:         Interval removal of left basilar chest tube.     Otherwise stable chest radiograph.        This report was signed and finalized on 12/5/2023 6:43 AM by Hosea Curtis.             CXR: Small left pleural effusion.  Left basilar atelectasis.  No pneumothorax.    Physical Exam:  General: Alert, oriented. No apparent distress.  Obese  Cardiovascular: Regular rate and rhythm without murmur, rubs, or gallops.    Pulmonary: Clear to auscultation bilaterally without wheezing, rubs, or rales.  Chest: Sternotomy incision clean, dry, and intact with Prevena. Sternum stable. No clicks.   Abdomen: Soft, nondistended, and nontender.  Extremities: Warm, moves all extremities.  Mild lower extremity edema saphenectomy site clean, dry, and intact.  Neurologic:  Grossly intact with no focal deficits.            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  Give Lasix 20 mg IV x1 dose with blood  Repeat labs today at 1300  Discontinue pacing wires  Discontinue Prevena dressing  Routine postcardiac surgery  care  Encourage pulmonary toilet and ambulation  If repeat labs this afternoon are improved, anticipate discharge home  She will be discharged with Lasix 40 mg daily.  She will resume Eliquis and Brilinta at discharge.  Follow-up with me on Thursday, 12/7/2023 with repeat labs  Follow-up with Dr. Castillo in 2 weeks  Discussed with patient and nursing      FITZ Garrett  12/05/23  08:50 CST

## 2023-12-06 ENCOUNTER — READMISSION MANAGEMENT (OUTPATIENT)
Dept: CALL CENTER | Facility: HOSPITAL | Age: 58
End: 2023-12-06
Payer: MEDICARE

## 2023-12-06 ENCOUNTER — APPOINTMENT (OUTPATIENT)
Dept: GENERAL RADIOLOGY | Facility: HOSPITAL | Age: 58
DRG: 234 | End: 2023-12-06
Payer: MEDICARE

## 2023-12-06 ENCOUNTER — NURSE TRIAGE (OUTPATIENT)
Dept: CALL CENTER | Facility: HOSPITAL | Age: 58
End: 2023-12-06
Payer: MEDICARE

## 2023-12-06 VITALS
BODY MASS INDEX: 32.32 KG/M2 | TEMPERATURE: 98.4 F | RESPIRATION RATE: 18 BRPM | HEIGHT: 65 IN | WEIGHT: 194 LBS | OXYGEN SATURATION: 95 % | HEART RATE: 87 BPM | SYSTOLIC BLOOD PRESSURE: 101 MMHG | DIASTOLIC BLOOD PRESSURE: 65 MMHG

## 2023-12-06 PROBLEM — D64.9 CHRONIC ANEMIA: Status: ACTIVE | Noted: 2023-12-06

## 2023-12-06 LAB
ANION GAP SERPL CALCULATED.3IONS-SCNC: 10 MMOL/L (ref 5–15)
BH BB BLOOD EXPIRATION DATE: NORMAL
BH BB BLOOD TYPE BARCODE: 6200
BH BB DISPENSE STATUS: NORMAL
BH BB PRODUCT CODE: NORMAL
BH BB UNIT NUMBER: NORMAL
BUN SERPL-MCNC: 19 MG/DL (ref 6–20)
BUN/CREAT SERPL: 17.4 (ref 7–25)
CALCIUM SPEC-SCNC: 8.6 MG/DL (ref 8.6–10.5)
CHLORIDE SERPL-SCNC: 102 MMOL/L (ref 98–107)
CO2 SERPL-SCNC: 26 MMOL/L (ref 22–29)
CREAT SERPL-MCNC: 1.09 MG/DL (ref 0.57–1)
CROSSMATCH INTERPRETATION: NORMAL
DEPRECATED RDW RBC AUTO: 56.5 FL (ref 37–54)
EGFRCR SERPLBLD CKD-EPI 2021: 58.6 ML/MIN/1.73
ERYTHROCYTE [DISTWIDTH] IN BLOOD BY AUTOMATED COUNT: 17.3 % (ref 12.3–15.4)
GLUCOSE SERPL-MCNC: 92 MG/DL (ref 65–99)
HCT VFR BLD AUTO: 28.9 % (ref 34–46.6)
HGB BLD-MCNC: 9.1 G/DL (ref 12–15.9)
MCH RBC QN AUTO: 28.7 PG (ref 26.6–33)
MCHC RBC AUTO-ENTMCNC: 31.5 G/DL (ref 31.5–35.7)
MCV RBC AUTO: 91.2 FL (ref 79–97)
PLATELET # BLD AUTO: 185 10*3/MM3 (ref 140–450)
PMV BLD AUTO: 9.6 FL (ref 6–12)
POTASSIUM SERPL-SCNC: 4.2 MMOL/L (ref 3.5–5.2)
RBC # BLD AUTO: 3.17 10*6/MM3 (ref 3.77–5.28)
SODIUM SERPL-SCNC: 138 MMOL/L (ref 136–145)
UNIT  ABO: NORMAL
UNIT  RH: NORMAL
WBC NRBC COR # BLD AUTO: 3.41 10*3/MM3 (ref 3.4–10.8)

## 2023-12-06 PROCEDURE — 25010000002 ENOXAPARIN PER 10 MG: Performed by: NURSE PRACTITIONER

## 2023-12-06 PROCEDURE — 85027 COMPLETE CBC AUTOMATED: CPT | Performed by: NURSE PRACTITIONER

## 2023-12-06 PROCEDURE — 71045 X-RAY EXAM CHEST 1 VIEW: CPT

## 2023-12-06 PROCEDURE — 80048 BASIC METABOLIC PNL TOTAL CA: CPT | Performed by: NURSE PRACTITIONER

## 2023-12-06 RX ORDER — FUROSEMIDE 40 MG/1
40 TABLET ORAL DAILY
Qty: 7 TABLET | Refills: 0 | Status: SHIPPED | OUTPATIENT
Start: 2023-12-06 | End: 2023-12-13

## 2023-12-06 RX ORDER — POTASSIUM CHLORIDE 750 MG/1
20 CAPSULE, EXTENDED RELEASE ORAL DAILY
Qty: 14 CAPSULE | Refills: 0 | Status: SHIPPED | OUTPATIENT
Start: 2023-12-06 | End: 2023-12-13

## 2023-12-06 RX ORDER — METOPROLOL SUCCINATE 25 MG/1
25 TABLET, EXTENDED RELEASE ORAL
Qty: 30 TABLET | Refills: 2 | Status: SHIPPED | OUTPATIENT
Start: 2023-12-06

## 2023-12-06 RX ORDER — SULFASALAZINE 500 MG/1
500 TABLET ORAL DAILY
Qty: 30 TABLET | Refills: 0 | Status: SHIPPED | OUTPATIENT
Start: 2023-12-07

## 2023-12-06 RX ORDER — TRAMADOL HYDROCHLORIDE 50 MG/1
50 TABLET ORAL EVERY 6 HOURS PRN
Qty: 25 TABLET | Refills: 0 | Status: SHIPPED | OUTPATIENT
Start: 2023-12-06

## 2023-12-06 RX ADMIN — POTASSIUM CHLORIDE 20 MEQ: 750 CAPSULE, EXTENDED RELEASE ORAL at 08:09

## 2023-12-06 RX ADMIN — FUROSEMIDE 40 MG: 40 TABLET ORAL at 08:08

## 2023-12-06 RX ADMIN — ASPIRIN 81 MG: 81 TABLET, COATED ORAL at 08:08

## 2023-12-06 RX ADMIN — METOPROLOL SUCCINATE 25 MG: 25 TABLET, EXTENDED RELEASE ORAL at 08:08

## 2023-12-06 RX ADMIN — ACETAMINOPHEN 650 MG: 325 TABLET, FILM COATED ORAL at 08:08

## 2023-12-06 RX ADMIN — PANTOPRAZOLE SODIUM 40 MG: 40 TABLET, DELAYED RELEASE ORAL at 05:39

## 2023-12-06 RX ADMIN — SERTRALINE HYDROCHLORIDE 200 MG: 100 TABLET, FILM COATED ORAL at 08:08

## 2023-12-06 RX ADMIN — ACETAMINOPHEN 650 MG: 325 TABLET, FILM COATED ORAL at 01:01

## 2023-12-06 RX ADMIN — PREGABALIN 25 MG: 25 CAPSULE ORAL at 08:08

## 2023-12-06 RX ADMIN — PRAVASTATIN SODIUM 20 MG: 20 TABLET ORAL at 08:09

## 2023-12-06 RX ADMIN — LIDOCAINE 2 PATCH: 700 PATCH TOPICAL at 05:39

## 2023-12-06 RX ADMIN — SULFASALAZINE 500 MG: 500 TABLET ORAL at 08:09

## 2023-12-06 RX ADMIN — ENOXAPARIN SODIUM 40 MG: 100 INJECTION SUBCUTANEOUS at 08:09

## 2023-12-06 NOTE — PLAN OF CARE
Goal Outcome Evaluation:    Problem: Adult Inpatient Plan of Care  Goal: Plan of Care Review  Outcome: Ongoing, Progressing  Flowsheets (Taken 12/6/2023 2047)  Progress: no change  Plan of Care Reviewed With: patient  Outcome Evaluation: No c/o pain as of this time. VSS. Pt ambulating well. SR 83-96, 6R pvc.

## 2023-12-06 NOTE — OUTREACH NOTE
Prep Survey      Flowsheet Row Responses   [facility] patient discharged from? [place]   Is LACE score < 7 ? No   Eligibility [facility]   Date of Admission 11/21/23   Date of Discharge 12/06/23   Discharge Disposition Home or Self Care   Discharge diagnosis NSTEMI, CAD, CABG x 2 using right leg vein harvest   Does the patient have one of the following disease processes/diagnoses(primary or secondary)? Cardiothoracic surgery   Does the patient have Home health ordered? No   Is there a DME ordered? No   Prep survey completed? Yes            Amara Coronado Registered Nurse

## 2023-12-06 NOTE — TELEPHONE ENCOUNTER
"Discharged from hospital today and needed clarification if she is supposed to take her dapagliflozin Propanediol 10 MG tablet daily still. Please call to clarify at [phone]. If to resume, she needs a refill.     Reason for Disposition   [1] Caller requesting NON-URGENT health information AND [2] PCP's office is the best resource    Additional Information   Negative: [1] Caller is not with the adult (patient) AND [2] reporting urgent symptoms   Negative: Lab result questions   Negative: Medication questions   Negative: Caller can't be reached by phone   Negative: Caller has already spoken to PCP or another triager   Negative: RN needs further essential information from caller in order to complete triage   Negative: Requesting regular office appointment    Answer Assessment - Initial Assessment Questions  1. REASON FOR CALL or QUESTION: \"What is your reason for calling today?\" or \"How can I best help you?\" or \"What question do you have that I can help answer?\"      Discharged from hospital today and needed clarification if she is supposed to take her dapagliflozin Propanediol 10 MG tablet daily still. Please call to clarify at [phone]. If to resume, she needs a refill.    Protocols used: Information Only Call - No Triage-ADULT-    "

## 2023-12-06 NOTE — PROGRESS NOTES
"Patient name: Alessandra Baker  Patient : 1965  VISIT # [id number]  MR #[id number]    Procedure:Procedure(s):  CORONARY ARTERY BYPASS GRAFTING X2, LEFT INTERNAL MAMMARY ARTERY GRAFT, RIGHT LEG ENDOSCOPIC VEIN HARVEST, TRANSESOPHAGEAL ECHOCARDIOGRAM  MAZE PROCEDURE WITH ENCOMPASS CLAMP  ATRIAL APPENDAGE EXCLUSION LEFT WITH 40MM ATRICLIP  Procedure Date:2023  POD:7 Days Post-Op    Subjective     Tolerating room air.  Weight is down 1 pound and she remains 4 pounds above her baseline weight.  Creatinine today improved at 1.09.  Hemoglobin improved at 9.1.  Walking 525 feet with physical therapy.  She is having bowel movements.  She is hopeful for discharge home today.       Telemetry:  Sinus rhythm 83-96  IV drips: None       Objective     Visit Vitals  /65 (BP Location: Left arm, Patient Position: Lying)   Pulse 87   Temp 98.4 °F (36.9 °C) (Oral)   Resp 18   Ht 165.1 cm (65\")   Wt 88 kg (194 lb)   SpO2 95%   BMI 32.28 kg/m²   Baseline weight 190 pounds    Intake/Output Summary (Last 24 hours) at 2023 0836  Last data filed at 2023 0827  Gross per 24 hour   Intake 300 ml   Output 1550 ml   Net -1250 ml         Lab:     CBC:  Results from last 7 days   Lab Units 23  1845 23  0303   WBC 10*3/mm3 3.41 3.22* 2.88*   HEMATOCRIT % 28.9* 28.4* 23.2*   PLATELETS 10*3/mm3 185 167 148          BMP:  Results from last 7 days   Lab Units 23  1845 23  0303   SODIUM mmol/L 138 139 139   POTASSIUM mmol/L 4.2 4.2 4.2   CHLORIDE mmol/L 102 104 105   CO2 mmol/L 26.0 24.0 24.0   GLUCOSE mg/dL 92 149* 110*   BUN mg/dL 19 21* 24*   CREATININE mg/dL 1.09* 1.25* 1.17*          COAG:  Results from last 7 days   Lab Units 23  0330 23  1150   INR  1.12* 1.20*   APTT seconds  --  31.8       IMAGES:       Imaging Results (Last 24 Hours)       Procedure Component Value Units Date/Time    XR Chest 1 View [[id number]] Collected: 23 0707     Updated: " 12/06/23 0712    Narrative:      EXAM: XR CHEST 1 VW- 12/6/2023 3:25 AM     HISTORY: Post-Op Heart Surgery       COMPARISON: 12/5/2023.     TECHNIQUE: Single frontal radiograph of the chest was obtained.     FINDINGS:      Support Devices: Left atrial appendage clip. Binder partially obscures  the mediastinum.     Cardiac and Mediastinal Silhouettes: Unchanged     Lungs/Pleura: Stable left basilar parenchymal opacities. Possible trace  left pleural effusion. No visible pneumothorax.     Osseous structures: Unchanged     Other: None.       Impression:         Stable left basilar parenchymal opacities, probable atelectasis.     Possible trace left pleural effusion.           This report was signed and finalized on 12/6/2023 7:09 AM by Hosea Curtis.             CXR: Stable left basilar atelectasis with trace left pleural effusion.  No pneumothorax.    Physical Exam:  General: Alert, oriented. No apparent distress.  Obese  Cardiovascular: Regular rate and rhythm without murmur, rubs, or gallops.    Pulmonary: Clear to auscultation bilaterally without wheezing, rubs, or rales.  Chest: Sternotomy incision clean, dry, and intact. Sternum stable. No clicks.   Abdomen: Soft, nondistended, and nontender.  Extremities: Warm, moves all extremities.  Mild lower extremity edema saphenectomy site clean, dry, and intact.  Neurologic:  Grossly intact with no focal deficits.            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  She will be discharged with Lasix 40 mg daily.  She will resume Eliquis and Brilinta at discharge.  Follow-up with me on Monday 12/11/2023 with repeat labs  Follow-up with Dr. Castillo in 2 weeks  Discussed with  patient and nursing      Madeline Olguin, APRN  12/06/23  08:36 CST

## 2023-12-07 ENCOUNTER — TRANSITIONAL CARE MANAGEMENT TELEPHONE ENCOUNTER (OUTPATIENT)
Dept: CALL CENTER | Facility: HOSPITAL | Age: 58
End: 2023-12-07
Payer: MEDICARE

## 2023-12-07 DIAGNOSIS — I25.118 CORONARY ARTERY DISEASE INVOLVING NATIVE CORONARY ARTERY OF NATIVE HEART WITH OTHER FORM OF ANGINA PECTORIS: Primary | ICD-10-CM

## 2023-12-07 DIAGNOSIS — N18.31 STAGE 3A CHRONIC KIDNEY DISEASE: ICD-10-CM

## 2023-12-07 NOTE — OUTREACH NOTE
Call Center TCM Note      Flowsheet Row Responses   [facility] patient discharged from? [facility]   Does the patient have one of the following disease processes/diagnoses(primary or secondary)? Cardiothoracic surgery   TCM attempt successful? Yes   Call end time 1231   Discharge diagnosis NSTEMI, CAD, CABG x 2 using right leg vein harvest   Person spoke with today (if not patient) and relationship Spouse- Aaron Dangelo reviewed with patient/caregiver? Yes   Does the patient have all medications related to this admission filled (includes all antibiotics, pain medications, cardiac medications, etc.) Yes   Prescription comments Needs Refill- No Change Dapagliflozin Propanediol 10 mg Oral Daily Sending message to pcp   Is the patient taking all medications as directed (includes completed medication regime)? Yes   Comments Appt tomorrow @ 5500   Does the patient have an appointment with their PCP within 7-14 days of discharge? Yes   Has home health visited the patient within 72 hours of discharge? N/A   Psychosocial issues? No   Did the patient receive a copy of their discharge instructions? Yes   Nursing interventions Reviewed instructions with patient   What is the patient's perception of their health status since discharge? Improving   Nursing interventions Nurse provided patient education   Nursing interventions Reassured on normal signs of recovery   Is the patient /caregiver able to teach back basic post-op care? No tub bath, swimming, or hot tub until instructed by MD   Is the patient/caregiver able to teach back signs and symptoms of incisional infection? Increased redness, swelling or pain at the incisonal site, Increased drainage or bleeding, Pus or odor from incision, Incisional warmth, Fever   Is the patient/caregiver able to teach back steps to recovery at home? Set small, achievable goals for return to baseline health, Rest and rebuild strength, gradually increase activity   Is the patient/caregiver able  to teach back the hierarchy of who to call/visit for symptoms/problems? PCP, Specialist, Home health nurse, Urgent Care, ED, 911 Yes   TCM call completed? Yes   Wrap up additional comments Patient doing well per spouse- getting around the home well. Incision is looking well, WDL no s/s of infection noted. no concerns or questions.   Call end time 1231   Would this patient benefit from a Referral to [facility] Social Work? No   Is the patient interested in additional calls from an ambulatory ? No            Madeline Vale RN    12/7/2023, 12:33 CST

## 2023-12-07 NOTE — PROGRESS NOTES
Subjective   Chief Complaint   Patient presents with    Post-op Follow-up     Pt had CABG x 2 on 11/29/2023  Pt is here for follow up w/ labs       Patient ID: Alessandra Baker is a 58 y.o. female who is here for follow-up having had Coronary Artery Bypass, using arterial graft; single arterial graft, Coronary Artery bypass, using venous grafts and arterial grafts, 1 venous graft, Limited MAZE Procedure, Left Atrial with [employer] Clamp, LAAE with 40mm Atriclip, by Dr. Granados on 11/29/2023     History of Present Illness  Ms. Baker is a 58-year-old female who underwent the above listed procedure by Dr. Granados on 11/29/2023.  Despite multiple comorbidities preoperatively, she recovered without remark.  Liver function remained normal.  She was given 1 unit PRBC on postop day 6 due to hemoglobin of 7.1 with appropriate hemoglobin response.  She met criteria for discharge home on postop day 7.  Weight at the time of discharge was 4 pounds above her baseline and she was discharged with Lasix 40 mg daily.  Weight today is down 6 pounds since discharge and she is below her baseline weight.  No ACE inhibitor at discharge due to relative hypotension postoperatively.  She has resumed sulfasalazine at half dose and has been advised to hold Imuran until at least 4 weeks postoperatively.  Will consider restarting Imuran at that time if surgical incision is healing without remark.  She is here today for 1 week postop follow-up with labs prior to appointment.    Post operative recovery was uneventful without any major complications.  Pain control has been good.  Only using Tylenol. No fevers/sweats/chills. No drainage from incisions.  No sternal clicks. No chest pain or shortness of breath. Appetite is good. She is not diabetic. Denies tobacco use.  Ambulating 10-15 minutes twice daily.     The following portions of the patient's history were reviewed and updated as appropriate: allergies, current medications, past family history, past  "medical history, past social history, past surgical history and problem list.    Review of Systems   Constitutional:  Positive for fatigue. Negative for chills, diaphoresis and fever.   HENT:  Negative for trouble swallowing and voice change.    Eyes:  Negative for visual disturbance.   Respiratory:  Negative for chest tightness and shortness of breath.    Cardiovascular:  Negative for chest pain, palpitations and leg swelling.   Gastrointestinal:  Negative for abdominal pain, diarrhea, nausea and vomiting.   Musculoskeletal:  Negative for arthralgias and myalgias.   Skin:  Negative for color change, pallor, rash and wound.   Neurological:  Negative for dizziness, syncope and light-headedness.   Psychiatric/Behavioral:  Negative for agitation, confusion and sleep disturbance.        Objective   Visit Vitals  /58 (BP Location: Right arm, Patient Position: Sitting, Cuff Size: Adult)   Pulse 97   Ht 165.1 cm (65\")   Wt 85.3 kg (188 lb)   SpO2 99%   BMI 31.28 kg/m²       Physical Exam  Vitals reviewed.   Constitutional:       General: She is not in acute distress.  HENT:      Head: Normocephalic.   Eyes:      Pupils: Pupils are equal, round, and reactive to light.   Cardiovascular:      Rate and Rhythm: Normal rate and regular rhythm.      Heart sounds: Normal heart sounds. No murmur heard.  Pulmonary:      Breath sounds: Normal breath sounds. No wheezing or rales.   Abdominal:      General: There is no distension.      Palpations: Abdomen is soft.      Tenderness: There is no abdominal tenderness.   Musculoskeletal:         General: No swelling or tenderness.   Skin:     General: Skin is warm and dry.      Comments: Sternum is stable, no clicks.  Sternal incision is clean dry and intact and healing nicely.  Saphenectomy site is clean dry and intact.   Neurological:      General: No focal deficit present.      Mental Status: She is alert and oriented to person, place, and time.   Psychiatric:         Mood and " Affect: Mood normal.         Behavior: Behavior normal.         Thought Content: Thought content normal.         Judgment: Judgment normal.             Assessment & Plan           Diagnoses and all orders for this visit:    1. NSTEMI (non-ST elevated myocardial infarction) (Primary)    2. Coronary artery disease involving native coronary artery of native heart with other form of angina pectoris    3. Atrial fibrillation with RVR    4. Stage 3a chronic kidney disease    5. Chronic anemia    6. Rheumatoid arthritis involving multiple sites with positive rheumatoid factor           Overall, Alessandra Baker is doing well.  Creatinine today is 1.2.  She is below her baseline weight.  I have advised her to hold Lasix for now.  Weigh herself daily at home.  She does have follow-up with FITZ Pedro on Thursday of this week for heart failure management and I have advised her to keep this appointment.  Continue to hold ACE inhibitor for now due to hypotension.  She states she has been prescribed Farxiga in the past but has never picked this up from the pharmacy.  I have advised her to pick this up and begin taking it.  Keep lower extremities elevated at all times when sitting.  Increase ambulation as tolerated.  She should continue on half dose sulfasalazine for now and continue to hold Imuran on.  Will consider restarting Imuran at 4 weeks postop if surgical incision is healing well.  We discussed current sternotomy precautions and how these will not be advanced yet. Following post op cardiac surgery home instructions. Provided support and encouragement. All questions have been answered to the best of my ability. Will discuss starting cardiac rehabilitation at official 1 month post op visit. Patient has follow up with Dr. Granados in a few weeks.  Patient has been instructed to contact our office with any questions or concerns should they arise prior to the next office visit.  Overall very happy with her progress.  She is to  call the office should any issues arise prior to next scheduled follow-up.  Keep follow-up with Dr. Granados next month.  Patient verbalized understanding and is agreeable.    Alessandra Baker is a non-smoker and therefore does not need tobacco cessation education/counseling.

## 2023-12-07 NOTE — PAYOR COMM NOTE
"REF:   [id number]     [facility]  FAX  [phone]     Regina Baker (58 y.o. Female)       Date of Birth   1965    Social Security Number       Address   [address] [place] [id number]    Home Phone   [phone]    MRN   [id number]       Caodaism   Shinto    Marital Status                               Admission Date   11/21/23    Admission Type   Emergency    Admitting Provider   Hosea Granados MD    Attending Provider       Department, Room/Bed   [facility] 4B, 431/1       Discharge Date   12/6/2023    Discharge Disposition   Home or Self Care    Discharge Destination                                 Attending Provider: (none)   Allergies: Bee Venom, Clopidogrel, Codeine, Insect Extract, Morphine And Related, Penicillin G, Shellfish Allergy, Shellfish-derived Products, Methotrexate, Penicillin G Benzathine, Atorvastatin, Hydrocodone, Hydroxychloroquine, Menthol    Isolation: None   Infection: None   Code Status: Prior    Ht: 165.1 cm (65\")   Wt: 88 kg (194 lb)    Admission Cmt: None   Principal Problem: NSTEMI (non-ST elevated myocardial infarction) [I21.4]                   Active Insurance as of 11/21/2023       Primary Coverage       Payor Plan Insurance Group Employer/Plan Group    AETNA MEDICARE REPLACEMENT AETNA MEDICARE REPLACEMENT [id number]       Payor Plan Address Payor Plan Phone Number Payor Plan Fax Number Effective Dates    [address] [phone]  1/1/2023 - None Entered    [facility] [id number]         Subscriber Name Subscriber Birth Date Member ID       REGINA BAKER 1965 [id number]                     Emergency Contacts        (Rel.) Home Phone Work Phone Mobile Phone    EDISON BAKER (Spouse) -- -- [phone]    KEL CEBALLOS (Other) -- -- [phone]                 Discharge Summary        Madeline Olguin APRN at 12/06/23 0802       Attestation signed by Hosea Granados MD at 12/06/23 1232    I have personally seen " and examined Alessandra Baker and reviewed the record. Agree with the aforementioned plan rendered jointly with Madeline Olguin.    Hosea Granados M.D.  Cardiothoracic Surgeon                          [facility] Cardiothoracic Surgery  DISCHARGE SUMMARY        Date of Admission: 11/21/2023  Date of Discharge:  12/6/2023  Primary Care Physician: Geraldo Feldman DO    Discharge Diagnoses:  Active Hospital Problems    Diagnosis     **NSTEMI (non-ST elevated myocardial infarction)     Chronic anemia     Stage 3a chronic kidney disease     Atrial fibrillation with RVR     Central stenosis of spinal canal     Rheumatoid arthritis involving multiple sites with positive rheumatoid factor     Coronary artery disease        Procedures Performed:   Coronary Artery Bypass, using arterial graft; single arterial graft, Coronary Artery bypass, using venous grafts and arterial grafts, 1 venous graft, Limited MAZE Procedure, Left Atrial with Encompass Clamp, LAAE with 40mm Atriclip, by Dr. Granados on 11/29/2023    HPI:  Ms. Alessandra Baker is a 58 y.o. female with a history of coronary artery disease with multiple PCI's to the RCA.  She does have a history of cirrhosis due to the methotrexate use that she was taking due to chronic rheumatoid arthritis.  She is on chronic immunosuppressants for this.  She presented to [facility] with NSTEMI and continuous chest pain despite nitroglycerin drip.  She also had intermittent episodes of atrial fibrillation with RVR.  She underwent coronary angiography revealing multivessel coronary artery disease.  Cardiothoracic surgery was consulted for consideration for CABG.  She had near normal LV function.  Dr. Granados had a long discussion with her regarding the risk and benefits of proceeding with CABG with maze procedure, she understood these risk and agreed to proceed.  Adequate time was allowed for Eliquis and Brilinta washout prior to proceeding with surgery.    Hospital Course:  On 11/29/2023, Ms. Baker was taken to the operating room for coronary artery bypass grafting, Maze procedure with encompass clamp, and left atrial appendage exclusion.  See separate op note by Dr. Granados detailing the operation.  Following surgery she was transferred to the ICU in stable but guarded condition.  She remained hemodynamically stable and was extubated without remark during the evening hours following surgery.  She demonstrated an intact neurological exam and was tolerating nasal cannula.  Mediastinal chest tubes and left pleural drain were removed without remark.  After close monitoring of renal function and liver function, patient met criteria for transfer to  on postop day 3.  The remainder of her hospital stay was significant for encouraging pulmonary toilet and ambulation, diuresis, and pain control.  She is eating well and is demonstrated adequate bowel function.  She is tolerating room air and has met all physical therapy goals.  She was given 1 unit PRBC on postop day 6 due to hemoglobin of 7.1 with appropriate hemoglobin response.  Pacing wires and Prevena dressing were removed without remark and she meets criteria for discharge home on postop day 7.   The patient is agreeable to this plan.    She will resume Eliquis and Brilinta at discharge.  She will take Lasix 40 mg daily and follow-up with me on Monday, 12/11/2023 with labs prior to appointment.  Follow-up with Dr. Castillo in 2 weeks for ongoing heart failure medication management.  No ACE inhibitor at the time of discharge due to relative hypotension postoperatively.  We will reassess at postop follow-up visit.  Continue sulfasalazine at half her home dose.  Hold Imuran and will reassess resuming this medication at 1 month postop as long as surgical wounds are healing appropriately    Condition on Discharge:  Neurologically intact and has good pain control.  She is eating well and has demonstrable good bowel function.  The sternum is stable  without clicks and the saphenectomy incisions are healing nicely.  The heart is in normal sinus rhythm.  She has met all physical therapy criteria and verbalizes understanding of sternotomy precautions.   She verbalizes understanding of a separately handed out cardiac surgery handout.       Discharge Disposition:  Home or Self Care [1]    Discharge Medications:     Discharge Medications        New Medications        Instructions Start Date   furosemide 40 MG tablet  Commonly known as: LASIX   40 mg, Oral, Daily      metoprolol succinate XL 25 MG 24 hr tablet  Commonly known as: TOPROL-XL   25 mg, Oral, Every 24 Hours Scheduled      potassium chloride 10 MEQ CR capsule  Commonly known as: MICRO-K   20 mEq, Oral, Daily      traMADol 50 MG tablet  Commonly known as: ULTRAM   50 mg, Oral, Every 6 Hours PRN             Changes to Medications        Instructions Start Date   sulfaSALAzine 500 MG tablet  Commonly known as: AZULFIDINE  What changed: how much to take   500 mg, Oral, Daily   Start Date: December 7, 2023            Continue These Medications        Instructions Start Date   cetirizine 10 MG tablet  Commonly known as: zyrTEC   10 mg, Oral, Daily PRN      dapagliflozin Propanediol 10 MG tablet   10 mg, Oral, Daily      Eliquis 5 MG tablet tablet  Generic drug: apixaban   5 mg, Oral, Every 12 Hours Scheduled      EPINEPHrine 0.3 MG/0.3ML solution auto-injector injection  Commonly known as: EPIPEN   0.3 mg, Intramuscular, Once, If needed      ezetimibe 10 MG tablet  Commonly known as: ZETIA   10 mg, Oral, Daily      fluticasone 50 MCG/ACT nasal spray  Commonly known as: FLONASE   2 sprays, Nasal, Daily      Inclisiran Sodium 284 MG/1.5ML solution prefilled syringe   284 mg, Subcutaneous, Next due on 11/30- receives from oncology      omeprazole 40 MG capsule  Commonly known as: priLOSEC   40 mg, Oral, Daily      sertraline 100 MG tablet  Commonly known as: ZOLOFT   200 mg, Oral, Daily      ticagrelor 90 MG tablet  tablet  Commonly known as: BRILINTA   90 mg, Oral, 2 Times Daily             Stop These Medications      aspirin 81 MG EC tablet     azaTHIOprine 50 MG tablet  Commonly known as: IMURAN     carvedilol 12.5 MG tablet  Commonly known as: COREG     isosorbide mononitrate 30 MG 24 hr tablet  Commonly known as: IMDUR     LORazepam 0.5 MG tablet  Commonly known as: Ativan     nitroglycerin 0.3 MG SL tablet  Commonly known as: Nitrostat              Discharge Diet: No concentrated sweets diet with an effort to maintain acceptable glycemic control.      Discharge Care Plan / Instructions: Please see the separately handed out cardiac surgery handout.  She will not be referred to cardiac rehab at this time due to recent sternotomy.  We will reassess at her postop follow-up visit.    Activity at Discharge:   No heavy lifting greater than 5 pounds or a gallon of milk while maintaining sternal precautions.  Alessandra Baker has been instructed on an exercise  regiment as detailed in a handed out cardiac surgery handout.    Tobacco:  The patient does not use tobacco products and therefore does not need tobacco cessation education.    BMI:  Body mass index is 32.28 kg/m².  Defer BMI management to PCP      Follow-up Appointments: Alessandra Baker  is requested to see Geraldo Feldman DO within 1-2 weeks from time of discharge, to see Madeline BYRNES on 12/7/2023 with labs prior to appointment, to follow-up with Dr. Granados in 6 weeks, and to follow-up with Dr. Castillo of the cardiology service in 2 weeks.        Electronically signed by Luann Granados MD at 12/06/23 1239       Discharge Order (From admission, onward)       Start     Ordered    12/06/23 0840  Discharge patient  Once        Expected Discharge Date: 12/06/23   Discharge Disposition: Home or Self Care   Physician of Record for Attribution - Please select from Treatment Team: LUANN GRANADOS [[id number]]   Review needed by CMO to determine Physician of Record: No      Question Answer  Comment   Physician of Record for Attribution - Please select from Treatment Team RON, LUANN EL    Review needed by CMO to determine Physician of Record No        12/06/23 0895

## 2023-12-08 ENCOUNTER — LAB (OUTPATIENT)
Dept: LAB | Facility: HOSPITAL | Age: 58
End: 2023-12-08
Payer: MEDICARE

## 2023-12-08 ENCOUNTER — TELEPHONE (OUTPATIENT)
Dept: CARDIAC SURGERY | Facility: CLINIC | Age: 58
End: 2023-12-08
Payer: MEDICARE

## 2023-12-08 ENCOUNTER — NURSE TRIAGE (OUTPATIENT)
Dept: CALL CENTER | Facility: HOSPITAL | Age: 58
End: 2023-12-08
Payer: MEDICARE

## 2023-12-08 ENCOUNTER — HOSPITAL ENCOUNTER (OUTPATIENT)
Dept: GENERAL RADIOLOGY | Facility: HOSPITAL | Age: 58
Discharge: HOME | End: 2023-12-08
Payer: MEDICARE

## 2023-12-08 ENCOUNTER — TELEPHONE (OUTPATIENT)
Dept: CARDIAC SURGERY | Facility: CLINIC | Age: 58
End: 2023-12-08

## 2023-12-08 ENCOUNTER — OFFICE VISIT (OUTPATIENT)
Dept: CARDIAC SURGERY | Facility: CLINIC | Age: 58
End: 2023-12-08
Payer: MEDICARE

## 2023-12-08 ENCOUNTER — TELEPHONE (OUTPATIENT)
Dept: FAMILY MEDICINE CLINIC | Facility: CLINIC | Age: 58
End: 2023-12-08

## 2023-12-08 VITALS
SYSTOLIC BLOOD PRESSURE: 102 MMHG | HEART RATE: 88 BPM | BODY MASS INDEX: 31.62 KG/M2 | HEIGHT: 65 IN | WEIGHT: 189.8 LBS | DIASTOLIC BLOOD PRESSURE: 70 MMHG | OXYGEN SATURATION: 97 %

## 2023-12-08 DIAGNOSIS — I48.91 ATRIAL FIBRILLATION WITH RVR: ICD-10-CM

## 2023-12-08 DIAGNOSIS — I25.118 CORONARY ARTERY DISEASE INVOLVING NATIVE CORONARY ARTERY OF NATIVE HEART WITH OTHER FORM OF ANGINA PECTORIS: Primary | ICD-10-CM

## 2023-12-08 DIAGNOSIS — I21.4 NSTEMI (NON-ST ELEVATED MYOCARDIAL INFARCTION): ICD-10-CM

## 2023-12-08 DIAGNOSIS — N18.31 STAGE 3A CHRONIC KIDNEY DISEASE: ICD-10-CM

## 2023-12-08 DIAGNOSIS — I25.118 CORONARY ARTERY DISEASE INVOLVING NATIVE CORONARY ARTERY OF NATIVE HEART WITH OTHER FORM OF ANGINA PECTORIS: ICD-10-CM

## 2023-12-08 DIAGNOSIS — E66.09 CLASS 1 OBESITY DUE TO EXCESS CALORIES WITH SERIOUS COMORBIDITY AND BODY MASS INDEX (BMI) OF 31.0 TO 31.9 IN ADULT: ICD-10-CM

## 2023-12-08 PROBLEM — I25.10 CORONARY ARTERY DISEASE: Status: ACTIVE | Noted: 2023-12-08

## 2023-12-08 PROBLEM — E66.811 CLASS 1 OBESITY DUE TO EXCESS CALORIES WITH SERIOUS COMORBIDITY AND BODY MASS INDEX (BMI) OF 31.0 TO 31.9 IN ADULT: Status: ACTIVE | Noted: 2023-12-08

## 2023-12-08 LAB
ANION GAP SERPL CALCULATED.3IONS-SCNC: 11 MMOL/L (ref 5–15)
BASOPHILS # BLD AUTO: 0.02 10*3/MM3 (ref 0–0.2)
BASOPHILS NFR BLD AUTO: 0.5 % (ref 0–1.5)
BUN SERPL-MCNC: 16 MG/DL (ref 6–20)
BUN/CREAT SERPL: 13.2 (ref 7–25)
CALCIUM SPEC-SCNC: 9.3 MG/DL (ref 8.6–10.5)
CHLORIDE SERPL-SCNC: 102 MMOL/L (ref 98–107)
CO2 SERPL-SCNC: 26 MMOL/L (ref 22–29)
CREAT SERPL-MCNC: 1.21 MG/DL (ref 0.57–1)
DEPRECATED RDW RBC AUTO: 55 FL (ref 37–54)
EGFRCR SERPLBLD CKD-EPI 2021: 51.7 ML/MIN/1.73
EOSINOPHIL # BLD AUTO: 0.05 10*3/MM3 (ref 0–0.4)
EOSINOPHIL NFR BLD AUTO: 1.2 % (ref 0.3–6.2)
ERYTHROCYTE [DISTWIDTH] IN BLOOD BY AUTOMATED COUNT: 16.5 % (ref 12.3–15.4)
GLUCOSE SERPL-MCNC: 103 MG/DL (ref 65–99)
HCT VFR BLD AUTO: 33.1 % (ref 34–46.6)
HGB BLD-MCNC: 10.3 G/DL (ref 12–15.9)
IMM GRANULOCYTES # BLD AUTO: 0.02 10*3/MM3 (ref 0–0.05)
IMM GRANULOCYTES NFR BLD AUTO: 0.5 % (ref 0–0.5)
LYMPHOCYTES # BLD AUTO: 0.55 10*3/MM3 (ref 0.7–3.1)
LYMPHOCYTES NFR BLD AUTO: 13.6 % (ref 19.6–45.3)
MCH RBC QN AUTO: 28.5 PG (ref 26.6–33)
MCHC RBC AUTO-ENTMCNC: 31.1 G/DL (ref 31.5–35.7)
MCV RBC AUTO: 91.7 FL (ref 79–97)
MONOCYTES # BLD AUTO: 0.29 10*3/MM3 (ref 0.1–0.9)
MONOCYTES NFR BLD AUTO: 7.2 % (ref 5–12)
NEUTROPHILS NFR BLD AUTO: 3.11 10*3/MM3 (ref 1.7–7)
NEUTROPHILS NFR BLD AUTO: 77 % (ref 42.7–76)
NRBC BLD AUTO-RTO: 0 /100 WBC (ref 0–0.2)
PLATELET # BLD AUTO: 190 10*3/MM3 (ref 140–450)
PMV BLD AUTO: 9.3 FL (ref 6–12)
POTASSIUM SERPL-SCNC: 4.6 MMOL/L (ref 3.5–5.2)
RBC # BLD AUTO: 3.61 10*6/MM3 (ref 3.77–5.28)
SODIUM SERPL-SCNC: 139 MMOL/L (ref 136–145)
WBC NRBC COR # BLD AUTO: 4.04 10*3/MM3 (ref 3.4–10.8)

## 2023-12-08 PROCEDURE — 36415 COLL VENOUS BLD VENIPUNCTURE: CPT

## 2023-12-08 PROCEDURE — 80048 BASIC METABOLIC PNL TOTAL CA: CPT

## 2023-12-08 PROCEDURE — 99024 POSTOP FOLLOW-UP VISIT: CPT

## 2023-12-08 PROCEDURE — 71046 X-RAY EXAM CHEST 2 VIEWS: CPT

## 2023-12-08 PROCEDURE — 85025 COMPLETE CBC W/AUTO DIFF WBC: CPT

## 2023-12-08 NOTE — TELEPHONE ENCOUNTER
"Reason for Disposition   [1] MODERATE dizziness (e.g., interferes with normal activities) AND [2] has NOT been evaluated by doctor (or NP/PA) for this  (Exception: Dizziness caused by heat exposure, sudden standing, or poor fluid intake.)    Additional Information   Negative: SEVERE difficulty breathing (e.g., struggling for each breath, speaks in single words)   Negative: [1] Difficulty breathing or swallowing AND [2] started suddenly after medicine, an allergic food or bee sting   Negative: Shock suspected (e.g., cold/pale/clammy skin, too weak to stand, low BP, rapid pulse)   Negative: Difficult to awaken or acting confused (e.g., disoriented, slurred speech)   Negative: [1] Weakness (i.e., paralysis, loss of muscle strength) of the face, arm or leg on one side of the body AND [2] sudden onset AND [3] present now   Negative: [1] Numbness (i.e., loss of sensation) of the face, arm or leg on one side of the body AND [2] sudden onset AND [3] present now   Negative: [1] Loss of speech or garbled speech AND [2] sudden onset AND [3] present now   Negative: Overdose (accidental or intentional) of medications   Negative: [1] Fainted > 15 minutes ago AND [2] still feels too weak or dizzy to stand   Negative: Heart beating < 50 beats per minute OR > 140 beats per minute   Negative: Sounds like a life-threatening emergency to the triager   Negative: Chest pain   Negative: Rectal bleeding, bloody stool, or tarry-black stool   Negative: [1] Vomiting AND [2] contains red blood or black (\"coffee ground\") material   Negative: Vomiting is main symptom   Negative: Diarrhea is main symptom   Negative: Headache is main symptom   Negative: Patient states that they are having an anxiety or panic attack   Negative: Dizziness from low blood sugar (i.e., < 60 mg/dl or 3.5 mmol/l)   Negative: Dizziness is described as a spinning sensation (i.e., vertigo)   Negative: Heat exhaustion suspected (i.e., dehydration from heat exposure)   " "Negative: Difficulty breathing   Negative: SEVERE dizziness (e.g., unable to stand, requires support to walk, feels like passing out now)   Negative: Extra heartbeats, irregular heart beating, or heart is beating very fast  (i.e., \"palpitations\")   Negative: [1] Drinking very little AND [2] dehydration suspected (e.g., no urine > 12 hours, very dry mouth, very lightheaded)   Negative: [1] Weakness (i.e., paralysis, loss of muscle strength) of the face, arm / hand, or leg / foot on one side of the body AND [2] sudden onset AND [3] brief (now gone)   Negative: [1] Numbness (i.e., loss of sensation) of the face, arm / hand, or leg / foot on one side of the body AND [2] sudden onset AND [3] brief (now gone)   Negative: [1] Loss of speech or garbled speech AND [2] sudden onset AND [3] brief (now gone)   Negative: Loss of vision or double vision  (Exception: Similar to previous migraines.)   Negative: Patient sounds very sick or weak to the triager   Negative: [1] Dizziness caused by heat exposure, sudden standing, or poor fluid intake AND [2] no improvement after 2 hours of rest and fluids   Negative: [1] Fever > 103 F (39.4 C) AND [2] not able to get the fever down using Fever Care Advice   Negative: [1] Fever > 101 F (38.3 C) AND [2] age > 60 years   Negative: [1] Fever > 100.0 F (37.8 C) AND [2] bedridden (e.g., CVA, chronic illness, recovering from surgery)   Negative: [1] Fever > 100.0 F (37.8 C) AND [2] diabetes mellitus or weak immune system (e.g., HIV positive, cancer chemo, splenectomy, organ transplant, chronic steroids)    Answer Assessment - Initial Assessment Questions  1. DESCRIPTION: \"Describe your dizziness.\"      \"Woozy\"   2. LIGHTHEADED: \"Do you feel lightheaded?\" (e.g., somewhat faint, woozy, weak upon standing)      Sort of  3. VERTIGO: \"Do you feel like either you or the room is spinning or tilting?\" (i.e. vertigo)      no  4. SEVERITY: \"How bad is it?\"  \"Do you feel like you are going to faint?\" " "\"Can you stand and walk?\"    - MILD: Feels slightly dizzy, but walking normally.    - MODERATE: Feels unsteady when walking, but not falling; interferes with normal activities (e.g., school, work).    - SEVERE: Unable to walk without falling, or requires assistance to walk without falling; feels like passing out now.       mild  5. ONSET:  \"When did the dizziness begin?\"      This started just a little bit ago   6. AGGRAVATING FACTORS: \"Does anything make it worse?\" (e.g., standing, change in head position)      Worse on standing  7. HEART RATE: \"Can you tell me your heart rate?\" \"How many beats in 15 seconds?\"  (Note: not all patients can do this)        90 states her \"heart feels like it is beating hard, not fast\"  8. CAUSE: \"What do you think is causing the dizziness?\"      unknown  9. RECURRENT SYMPTOM: \"Have you had dizziness before?\" If Yes, ask: \"When was the last time?\" \"What happened that time?\"      Yes, before surgery   10. OTHER SYMPTOMS: \"Do you have any other symptoms?\" (e.g., fever, chest pain, vomiting, diarrhea, bleeding)        Not now  11. PREGNANCY: \"Is there any chance you are pregnant?\" \"When was your last menstrual period?\"        no    Protocols used: Dizziness - Lightheadedness-ADULT-AH    "

## 2023-12-08 NOTE — TELEPHONE ENCOUNTER
"Dizziness 12/08/2023 Caller states she is light headed \"woozy\". States she had Open heart on 11/29/2023. States she had a \"sweating spell\" like what happened in the hospital when she had the afib. Now she is shaky.   Had her check her BP while I waited 120/90 HR 90 States she got very warm and sweaty and then got \"woozy. Reviewed guideline with caller, advises she be seen within 24 hours. I have routed this to the CTS office in case they need to evaluate these symptoms. Just had 2 vessel CABG on 11/21/2023    "

## 2023-12-08 NOTE — PROGRESS NOTES
"Subjective   Chief Complaint   Patient presents with    Post-op Follow-up     Pt is here for follow up for feeling \"woozy, hot flashes, prominent heart beat\"       Patient ID: Alessandra Baker is a 58 y.o. female who is here for follow-up having had coronary artery bypass, using arterial graft; single arterial graft, coronary artery bypass, using venous graft and arterial grafts, 1 venous graft, limited maze procedure, left atrial with encompass clamp, LAAE with 40 mm AtriClip, by Dr. Granados on 11/29/2023.    History of Present Illness  Ms. Bailey is a 58-year-old female presents today with a complaint of an episode of lightheadedness upon standing.  She also reports that she was feeling \"extra heartbeats\" at home.  When discussing further with her she states that she is not feeling extra heartbeats, she just feels as if her heart is beating harder.  She reports that this morning with standing she had an episode of chest pressure, lightheadedness, hot flashes and shortness of breath.  She currently is symptom-free.    Post operative recovery was uneventful without any major complications. Sleep habits are good. Pain control has been good. No fevers/sweats/chills. No drainage from incisions.  No sternal clicks. No chest pain or shortness of breath. Appetite is fair and is unchanged since discharge. Glycemic control is good. Denies tobacco.  She walked around her block x 2 yesterday.    The following portions of the patient's history were reviewed and updated as appropriate: allergies, current medications, past family history, past medical history, past social history, past surgical history and problem list.    Review of Systems   Constitutional:  Positive for fatigue. Negative for chills and fever.   HENT:  Negative for sore throat and trouble swallowing.    Respiratory:  Positive for shortness of breath.    Cardiovascular:  Positive for chest pain.        Reports she feels an \"extra heartbeat\" occasionally. " "  Gastrointestinal:  Negative for abdominal distention and abdominal pain.   Endocrine: Negative for cold intolerance and heat intolerance.   Neurological:  Positive for light-headedness.   Psychiatric/Behavioral:  Negative for agitation and behavioral problems.        Objective   Visit Vitals  /70 (BP Location: Right arm, Patient Position: Sitting, Cuff Size: Adult)   Pulse 88   Ht 165.1 cm (65\")   Wt 86.1 kg (189 lb 12.8 oz)   SpO2 97%   BMI 31.58 kg/m²       Physical Exam  Constitutional:       General: She is not in acute distress.     Appearance: She is obese. She is not toxic-appearing.   HENT:      Head: Normocephalic and atraumatic.   Eyes:      Pupils: Pupils are equal, round, and reactive to light.   Cardiovascular:      Rate and Rhythm: Normal rate and regular rhythm.      Heart sounds: No murmur heard.  Pulmonary:      Effort: Pulmonary effort is normal. No respiratory distress.      Breath sounds: Normal breath sounds. No wheezing, rhonchi or rales.   Abdominal:      General: There is no distension.      Palpations: Abdomen is soft.      Tenderness: There is no abdominal tenderness.   Musculoskeletal:         General: Normal range of motion.      Cervical back: Normal range of motion and neck supple.      Right lower leg: Edema present.      Left lower leg: Edema present.   Skin:     General: Skin is warm and dry.      Comments: Sternotomy incision clean, dry, and intact without signs of infection.  Right saphenectomy site clean, dry, and intact without signs of infection.   Neurological:      General: No focal deficit present.      Mental Status: She is alert and oriented to person, place, and time.   Psychiatric:         Mood and Affect: Mood normal.         Behavior: Behavior normal.     Imaging:    Independent Review of Radiographic Studies:   Trace left pleural effusion, no visible pneumothorax  Labs:          Assessment & Plan     Diagnoses and all orders for this visit:    1. Coronary " "artery disease involving native coronary artery of native heart with other form of angina pectoris (Primary)    2. Atrial fibrillation with RVR    3. Class 1 obesity due to excess calories with serious comorbidity and body mass index (BMI) of 31.0 to 31.9 in adult      Ms. Baker presents today with a complaint of an episode of lightheadedness, hot flashes, shortness of breath, and chest pressure upon standing earlier today.  She reports after this episode she checked her heart rate and blood pressure.  She reports a heart rate of 90 bpm and a blood pressure of 120/67 at 1130 today.  She did report that she was feeling \"extra heartbeats\" occasionally but upon questioning her she states she feels as if her heart is beating harder.  Blood pressure in office is 102/70.  An EKG was performed showing normal sinus rhythm reviewed by Dr. Garcia, [facility] CT surgeon.  Labs were reviewed and are stable at this time.  Creatinine went from 1.09-1.21 since discharge but this is close to her baseline. She continues to have bilateral lower extremity edema. Chest x-ray is stable with a trace left pleural effusion.  No changes made at this time.  She is encouraged to monitor her blood pressure at home.  She is to call the office with any questions or concerns should they arise prior to her next office visit.  She is to keep her scheduled follow-up with FITZ Garrett on Monday, December 11.    Surgical incisions are clean and dry without evidence of infection. We discussed current sternotomy precautions and how these will not be advanced yet. Continue post op surgical wound care: shower daily with antibacterial soap and water, avoid use of lotions, creams, ointments, powders etc, allow steri strips to fall off on their own. Following post op cardiac surgery home instructions.     We discussed the importance of strict glycemic control in the post operative setting and this impacts wound healing, infection risk, and future " cardiovascular disease. Continue to follow with primary care for management of diabetes mellitus.     Provided support and encouragement. All questions have been answered to the best of my ability. Will discuss starting cardiac rehabilitation at official 1 month post op visit. Patient has follow up with Dr. Castillo in a few weeks. Patient has follow up with Cardiology in a few weeks. Patient has been instructed to contact our office with any questions or concerns should they arise prior to the next office visit.      Alessandra Baker is a non-smoker and therefore does not need tobacco cessation education/counseling.      Body mass index is 31.58 kg/m².    Advance Care Planning   NA. Patient is under 65 years of age.

## 2023-12-08 NOTE — TELEPHONE ENCOUNTER
Called pt in regards of canceling apt today because dr nur is out of office, left voicemail stating to call the office back to reschedule.

## 2023-12-08 NOTE — TELEPHONE ENCOUNTER
Spoke with patient.  She reports occasional chest pressure, lightheadedness, shortness of breath.  She states that she is feeling extra heartbeats occasionally.  She also states that she is getting dizzy when she stands up.  I have ordered her a chest x-ray and labs.  She is to see me in the clinic at 1 PM today.

## 2023-12-08 NOTE — TELEPHONE ENCOUNTER
Pt calling today to report that she has been symptomatic since surgery 11/29/2023. She has experienced hot flashes, feeling woozy/drunk/light headed. She has experienced pressure in her chest, without pain. She reports her heart rate is 90 bpm and /67 at 11:30 AM. She states her pulse is prominent. She spoke with phone nurse prior to calling our office and this has been documented. Informed her that FITZ Garrett and Dr. Granados are out of the office until next week, but I will put in a message for FITZ Blanton to review. Advised her that if she has further chest pressure/pain to go to the emergency room.

## 2023-12-11 ENCOUNTER — LAB (OUTPATIENT)
Dept: LAB | Facility: HOSPITAL | Age: 58
End: 2023-12-11
Payer: MEDICARE

## 2023-12-11 ENCOUNTER — OFFICE VISIT (OUTPATIENT)
Dept: CARDIAC SURGERY | Facility: CLINIC | Age: 58
End: 2023-12-11
Payer: MEDICARE

## 2023-12-11 VITALS
HEIGHT: 65 IN | SYSTOLIC BLOOD PRESSURE: 104 MMHG | WEIGHT: 188 LBS | OXYGEN SATURATION: 99 % | BODY MASS INDEX: 31.32 KG/M2 | HEART RATE: 97 BPM | DIASTOLIC BLOOD PRESSURE: 58 MMHG

## 2023-12-11 DIAGNOSIS — D64.9 CHRONIC ANEMIA: ICD-10-CM

## 2023-12-11 DIAGNOSIS — I21.4 NSTEMI (NON-ST ELEVATED MYOCARDIAL INFARCTION): ICD-10-CM

## 2023-12-11 DIAGNOSIS — N18.31 STAGE 3A CHRONIC KIDNEY DISEASE: ICD-10-CM

## 2023-12-11 DIAGNOSIS — I25.118 CORONARY ARTERY DISEASE INVOLVING NATIVE CORONARY ARTERY OF NATIVE HEART WITH OTHER FORM OF ANGINA PECTORIS: ICD-10-CM

## 2023-12-11 DIAGNOSIS — M05.79 RHEUMATOID ARTHRITIS INVOLVING MULTIPLE SITES WITH POSITIVE RHEUMATOID FACTOR: ICD-10-CM

## 2023-12-11 DIAGNOSIS — I21.4 NSTEMI (NON-ST ELEVATED MYOCARDIAL INFARCTION): Primary | ICD-10-CM

## 2023-12-11 DIAGNOSIS — I48.91 ATRIAL FIBRILLATION WITH RVR: ICD-10-CM

## 2023-12-11 DIAGNOSIS — I25.10 CORONARY ARTERY DISEASE INVOLVING NATIVE CORONARY ARTERY OF NATIVE HEART, UNSPECIFIED WHETHER ANGINA PRESENT: ICD-10-CM

## 2023-12-11 LAB
ALBUMIN SERPL-MCNC: 4.3 G/DL (ref 3.5–5.2)
ALBUMIN/GLOB SERPL: 1.4 G/DL
ALP SERPL-CCNC: 87 U/L (ref 39–117)
ALT SERPL W P-5'-P-CCNC: 13 U/L (ref 1–33)
ANION GAP SERPL CALCULATED.3IONS-SCNC: 9 MMOL/L (ref 5–15)
AST SERPL-CCNC: 17 U/L (ref 1–32)
BASOPHILS # BLD AUTO: 0.02 10*3/MM3 (ref 0–0.2)
BASOPHILS NFR BLD AUTO: 0.4 % (ref 0–1.5)
BILIRUB SERPL-MCNC: 0.4 MG/DL (ref 0–1.2)
BUN SERPL-MCNC: 17 MG/DL (ref 6–20)
BUN/CREAT SERPL: 13.9 (ref 7–25)
CALCIUM SPEC-SCNC: 9.5 MG/DL (ref 8.6–10.5)
CHLORIDE SERPL-SCNC: 103 MMOL/L (ref 98–107)
CO2 SERPL-SCNC: 27 MMOL/L (ref 22–29)
CREAT SERPL-MCNC: 1.22 MG/DL (ref 0.57–1)
DEPRECATED RDW RBC AUTO: 53.1 FL (ref 37–54)
EGFRCR SERPLBLD CKD-EPI 2021: 51.2 ML/MIN/1.73
EOSINOPHIL # BLD AUTO: 0.06 10*3/MM3 (ref 0–0.4)
EOSINOPHIL NFR BLD AUTO: 1.1 % (ref 0.3–6.2)
ERYTHROCYTE [DISTWIDTH] IN BLOOD BY AUTOMATED COUNT: 16.1 % (ref 12.3–15.4)
GLOBULIN UR ELPH-MCNC: 3.1 GM/DL
GLUCOSE SERPL-MCNC: 101 MG/DL (ref 65–99)
HCT VFR BLD AUTO: 35.5 % (ref 34–46.6)
HGB BLD-MCNC: 11.2 G/DL (ref 12–15.9)
IMM GRANULOCYTES # BLD AUTO: 0.02 10*3/MM3 (ref 0–0.05)
IMM GRANULOCYTES NFR BLD AUTO: 0.4 % (ref 0–0.5)
LYMPHOCYTES # BLD AUTO: 0.74 10*3/MM3 (ref 0.7–3.1)
LYMPHOCYTES NFR BLD AUTO: 13 % (ref 19.6–45.3)
MCH RBC QN AUTO: 28.9 PG (ref 26.6–33)
MCHC RBC AUTO-ENTMCNC: 31.5 G/DL (ref 31.5–35.7)
MCV RBC AUTO: 91.5 FL (ref 79–97)
MONOCYTES # BLD AUTO: 0.35 10*3/MM3 (ref 0.1–0.9)
MONOCYTES NFR BLD AUTO: 6.1 % (ref 5–12)
NEUTROPHILS NFR BLD AUTO: 4.52 10*3/MM3 (ref 1.7–7)
NEUTROPHILS NFR BLD AUTO: 79 % (ref 42.7–76)
NRBC BLD AUTO-RTO: 0 /100 WBC (ref 0–0.2)
PLATELET # BLD AUTO: 193 10*3/MM3 (ref 140–450)
PMV BLD AUTO: 9.5 FL (ref 6–12)
POTASSIUM SERPL-SCNC: 4.5 MMOL/L (ref 3.5–5.2)
PROT SERPL-MCNC: 7.4 G/DL (ref 6–8.5)
RBC # BLD AUTO: 3.88 10*6/MM3 (ref 3.77–5.28)
SODIUM SERPL-SCNC: 139 MMOL/L (ref 136–145)
WBC NRBC COR # BLD AUTO: 5.71 10*3/MM3 (ref 3.4–10.8)

## 2023-12-11 PROCEDURE — 99024 POSTOP FOLLOW-UP VISIT: CPT | Performed by: NURSE PRACTITIONER

## 2023-12-11 PROCEDURE — 36415 COLL VENOUS BLD VENIPUNCTURE: CPT

## 2023-12-11 PROCEDURE — 1160F RVW MEDS BY RX/DR IN RCRD: CPT | Performed by: NURSE PRACTITIONER

## 2023-12-11 PROCEDURE — 85025 COMPLETE CBC W/AUTO DIFF WBC: CPT

## 2023-12-11 PROCEDURE — 1159F MED LIST DOCD IN RCRD: CPT | Performed by: NURSE PRACTITIONER

## 2023-12-11 PROCEDURE — 80053 COMPREHEN METABOLIC PANEL: CPT

## 2023-12-11 RX ORDER — CARVEDILOL 6.25 MG/1
6.25 TABLET ORAL
COMMUNITY
Start: 2023-12-10 | End: 2023-12-12

## 2023-12-11 RX ORDER — ISOSORBIDE MONONITRATE 30 MG/1
15 TABLET, EXTENDED RELEASE ORAL DAILY
COMMUNITY
Start: 2023-12-10 | End: 2023-12-14

## 2023-12-12 ENCOUNTER — OFFICE VISIT (OUTPATIENT)
Dept: FAMILY MEDICINE CLINIC | Facility: CLINIC | Age: 58
End: 2023-12-12
Payer: MEDICARE

## 2023-12-12 VITALS
HEIGHT: 65 IN | WEIGHT: 186 LBS | SYSTOLIC BLOOD PRESSURE: 110 MMHG | DIASTOLIC BLOOD PRESSURE: 70 MMHG | HEART RATE: 83 BPM | TEMPERATURE: 98.2 F | BODY MASS INDEX: 30.99 KG/M2 | OXYGEN SATURATION: 98 %

## 2023-12-12 DIAGNOSIS — Z95.1 S/P CABG X 2: ICD-10-CM

## 2023-12-12 DIAGNOSIS — F32.A DEPRESSION, UNSPECIFIED DEPRESSION TYPE: ICD-10-CM

## 2023-12-12 DIAGNOSIS — Z09 HOSPITAL DISCHARGE FOLLOW-UP: Primary | ICD-10-CM

## 2023-12-12 DIAGNOSIS — I25.10 CORONARY ARTERY DISEASE INVOLVING NATIVE CORONARY ARTERY OF NATIVE HEART, UNSPECIFIED WHETHER ANGINA PRESENT: ICD-10-CM

## 2023-12-12 NOTE — PROGRESS NOTES
FITZ Barraza  [facility]   Family Medicine  [address]  [place] [address]  Phone: [phone]  Fax: [phone]         Chief Complaint:  Chief Complaint   Patient presents with    Follow-up     Hosp follow up - had open heart surgery bypass went to er for the pain, Sunday am went into Afib went home, comes back Tuesday am. Did cath and did bypass on Wednesday         History:  Alessandra Baker is a 58 y.o. female that is an established patient. She  is here for evaluation of the above complaint and management of chronic conditions.    HPI   Date of admit: 11/21/2023  Date of D/c:  12/6/2023  TCM: 12-7-2023, RUPERTO Michaels    Alessandra is s/p CABG X2 after cardiac cath on 11/29/2023 per Dr. Hosea Granados. Postoperative course has been unremarkable, has taken 2 doses of Percocet. Midsternal incision dry, intact, covered in steri strips;  right lower leg incision clean and dry.           ROS:  Review of Systems   Constitutional:  Positive for fatigue.   HENT: Negative.     Respiratory: Negative.     Cardiovascular: Negative.    Gastrointestinal: Negative.    Genitourinary: Negative.    Skin:  Positive for wound.         reports that she quit smoking about 5 months ago. Her smoking use included cigarettes. She started smoking about 30 years ago. She has a 30.00 pack-year smoking history. She has been exposed to tobacco smoke. She has never used smokeless tobacco. She reports that she does not currently use alcohol. She reports that she does not use drugs.    Current Outpatient Medications   Medication Instructions    cetirizine (ZYRTEC) 10 mg, Oral, Daily PRN    dapagliflozin Propanediol 10 mg, Oral, Daily    Eliquis 5 mg, Oral, Every 12 Hours Scheduled    EPINEPHrine (EPIPEN) 0.3 mg, Intramuscular, Once, If needed    ezetimibe (ZETIA) 10 mg, Oral, Daily    fluticasone (FLONASE) 50 MCG/ACT nasal spray 2 sprays, Nasal, Daily    furosemide (LASIX) 40 mg, Oral, Daily    Inclisiran Sodium 284 mg,  "Subcutaneous, Next due on 11/30- receives from oncology    isosorbide mononitrate (IMDUR) 15 mg, Oral, Daily    metoprolol succinate XL (TOPROL-XL) 25 mg, Oral, Every 24 Hours Scheduled    omeprazole (PRILOSEC) 40 mg, Oral, Daily    potassium chloride (MICRO-K) 10 MEQ CR capsule 20 mEq, Oral, Daily    sertraline (ZOLOFT) 200 mg, Oral, Daily    sulfaSALAzine (AZULFIDINE) 500 mg, Oral, Daily    ticagrelor (BRILINTA) 90 mg, Oral, 2 Times Daily    traMADol (ULTRAM) 50 mg, Oral, Every 6 Hours PRN       OBJECTIVE:  /70   Pulse 83   Temp 98.2 °F (36.8 °C)   Ht 165.1 cm (65\")   Wt 84.4 kg (186 lb)   SpO2 98%   BMI 30.95 kg/m²    Physical Exam  Vitals and nursing note reviewed.   Constitutional:       Appearance: Normal appearance.   HENT:      Head: Normocephalic and atraumatic.   Cardiovascular:      Rate and Rhythm: Normal rate and regular rhythm.   Pulmonary:      Effort: Pulmonary effort is normal.      Breath sounds: Normal breath sounds.   Abdominal:      General: Bowel sounds are normal. There is no distension.      Tenderness: There is no abdominal tenderness. There is no right CVA tenderness or left CVA tenderness.   Skin:            Comments: Surgical incisions clean, dry, no erythema or drainage   Neurological:      General: No focal deficit present.      Mental Status: She is alert and oriented to person, place, and time.   Psychiatric:         Mood and Affect: Mood normal.         Behavior: Behavior normal.         Procedures    Assessment/Plan:     Diagnoses and all orders for this visit:    1. Hospital discharge follow-up (Primary)    2. Coronary artery disease involving native coronary artery of native heart, unspecified whether angina present    3. Depression, unspecified depression type    4. S/P CABG x 2      BMI is >= 30 and <35. (Class 1 Obesity). The following options were offered after discussion;: exercise counseling/recommendations and nutrition counseling/recommendations       An After " Visit Summary was printed and given to the patient at discharge.  Return for Next scheduled follow up.       There are no Patient Instructions on file for this visit.      Discussion:     I spent 32 minutes caring for Alessandra on this date of service. This time includes time spent by me in the following activities: preparing for the visit, reviewing tests, obtaining and/or reviewing a separately obtained history, performing a medically appropriate examination and/or evaluation, counseling and educating the patient/family/caregiver, documenting information in the medical record, and independently interpreting results and communicating that information with the patient/family/caregiver     Jerilyn BYRNES 12/13/2023   Electronically signed.

## 2023-12-13 DIAGNOSIS — M05.79 RHEUMATOID ARTHRITIS INVOLVING MULTIPLE SITES WITH POSITIVE RHEUMATOID FACTOR (H): ICD-10-CM

## 2023-12-14 ENCOUNTER — OFFICE VISIT (OUTPATIENT)
Dept: CARDIOLOGY | Facility: CLINIC | Age: 58
End: 2023-12-14
Payer: MEDICARE

## 2023-12-14 VITALS
SYSTOLIC BLOOD PRESSURE: 112 MMHG | HEIGHT: 65 IN | WEIGHT: 186 LBS | DIASTOLIC BLOOD PRESSURE: 78 MMHG | HEART RATE: 84 BPM | OXYGEN SATURATION: 97 % | BODY MASS INDEX: 30.99 KG/M2

## 2023-12-14 DIAGNOSIS — E66.09 CLASS 1 OBESITY DUE TO EXCESS CALORIES WITH SERIOUS COMORBIDITY AND BODY MASS INDEX (BMI) OF 30.0 TO 30.9 IN ADULT: ICD-10-CM

## 2023-12-14 DIAGNOSIS — I48.0 PAF (PAROXYSMAL ATRIAL FIBRILLATION): ICD-10-CM

## 2023-12-14 DIAGNOSIS — E78.2 MIXED HYPERLIPIDEMIA: ICD-10-CM

## 2023-12-14 DIAGNOSIS — I25.10 CORONARY ARTERY DISEASE INVOLVING NATIVE CORONARY ARTERY OF NATIVE HEART WITHOUT ANGINA PECTORIS: Primary | ICD-10-CM

## 2023-12-14 DIAGNOSIS — I10 PRIMARY HYPERTENSION: ICD-10-CM

## 2023-12-14 RX ORDER — POTASSIUM CHLORIDE 750 MG/1
20 CAPSULE, EXTENDED RELEASE ORAL DAILY PRN
Qty: 60 CAPSULE | Refills: 11 | Status: SHIPPED | OUTPATIENT
Start: 2023-12-14

## 2023-12-14 RX ORDER — FUROSEMIDE 40 MG/1
40 TABLET ORAL DAILY PRN
Qty: 30 TABLET | Refills: 11 | Status: SHIPPED | OUTPATIENT
Start: 2023-12-14

## 2023-12-14 NOTE — PROGRESS NOTES
"  Subjective:     Encounter Date:12/14/2023      Patient ID: Alessandra Baker is a 58 y.o. female.    Chief Complaint:\"no complaints\"  Coronary Artery Disease  Presents for follow-up visit. Pertinent negatives include no chest pain, dizziness, leg swelling, palpitations, shortness of breath or weight gain. Risk factors include hyperlipidemia. The symptoms have been stable.   Atrial Fibrillation  Presents for follow-up visit. Symptoms are negative for chest pain, dizziness, palpitations, shortness of breath, syncope and weakness. The symptoms have been stable. Past medical history includes atrial fibrillation, CAD and hyperlipidemia.   Hypertension  This is a chronic problem. The problem has been rapidly improving since onset. The problem is controlled. Pertinent negatives include no chest pain, malaise/fatigue, orthopnea, palpitations, PND or shortness of breath.   Hyperlipidemia  This is a chronic problem. The current episode started more than 1 year ago. The problem is controlled. Recent lipid tests were reviewed and are low. Pertinent negatives include no chest pain or shortness of breath.     Patient presents today as a hospital follow up. She has been followed by Dr. Slade since May for CAD s/p multiple PCI's. She was admitted to the hospital 2 weeks ago with a NSTEMI and underwent a C with angiography revealing multivessel CAD. CTS was consulted which ultimately resulted in a 2v CABG (LIMA-LAD & SVG-OM), LAAE with a 40mm Atriclip and MAZE per Dr. Granados on 11/29. She was noted to have intermittent episodes of Afib with RVR prior to her CABG and was also diuresed post-CABG.      She reports she has been well. She denies cardiac chest pain and notes her chest incision is uncomfortable. She notes daily episodes of feeling her heart racing that lasts 3-5 minutes and self resolves. She denies associated symptoms with her palpitations. She has been walking daily. She has been monitoring her weights daily with log " reflecting a weight loss from 191 to 183 and is up 4lbs today from Tuesday (she forgot to weigh yesterday). She is inquiring about changing Brilinta to Effient due to cost of Brilinta however does note she currently has a 3 month supply of Brilinta at home. She also reports her Farxiga, which was started per her PCP, is also expensive.      The following portions of the patient's history were reviewed and updated as appropriate: allergies, current medications, past family history, past medical history, past social history, past surgical history and problem list.    Allergies   Allergen Reactions    Bee Venom Anaphylaxis    Clopidogrel Hives and Unknown (See Comments)     Night terrors      Codeine Anaphylaxis    Insect Extract Anaphylaxis     Bee stings    Morphine And Related Shortness Of Breath, Hives and Unknown (See Comments)     tachycardia      Penicillin G Hives    Shellfish Allergy Anaphylaxis    Shellfish-Derived Products Anaphylaxis    Methotrexate Other (See Comments)     hepatotxicity    Penicillin G Benzathine Unknown (See Comments)    Atorvastatin Myalgia and Unknown (See Comments)    Hydrocodone Palpitations     Heart races and feels like bugs crawling under skin    Hydroxychloroquine Other (See Comments) and Unknown (See Comments)     Night terrors      Menthol Rash       Current Outpatient Medications:     apixaban (ELIQUIS) 5 MG tablet tablet, Take 1 tablet by mouth Every 12 (Twelve) Hours. Indications: Atrial Fibrillation, Disp: 60 tablet, Rfl: 2    cetirizine (zyrTEC) 10 MG tablet, Take 1 tablet by mouth Daily As Needed for Allergies., Disp: , Rfl:     dapagliflozin Propanediol 10 MG tablet, Take 10 mg by mouth Daily., Disp: 30 tablet, Rfl: 11    EPINEPHrine (EPIPEN) 0.3 MG/0.3ML solution auto-injector injection, Inject 0.3 mL into the appropriate muscle as directed by prescriber 1 (One) Time. If needed, Disp: , Rfl:     ezetimibe (ZETIA) 10 MG tablet, Take 1 tablet by mouth Daily., Disp: 30  tablet, Rfl: 11    fluticasone (FLONASE) 50 MCG/ACT nasal spray, 2 sprays into the nostril(s) as directed by provider Daily., Disp: 16 g, Rfl: 11    furosemide (LASIX) 40 MG tablet, Take 1 tablet by mouth Daily As Needed (weight gain, shortness of breath or swelling)., Disp: 30 tablet, Rfl: 11    Inclisiran Sodium 284 MG/1.5ML solution prefilled syringe, Inject 1.5 mL under the skin into the appropriate area as directed. Next due on 11/30- receives from oncology, Disp: , Rfl:     metoprolol succinate XL (TOPROL-XL) 25 MG 24 hr tablet, Take 1 tablet by mouth Daily., Disp: 30 tablet, Rfl: 2    omeprazole (priLOSEC) 40 MG capsule, Take 1 capsule by mouth Daily., Disp: , Rfl:     potassium chloride (MICRO-K) 10 MEQ CR capsule, Take 2 capsules by mouth Daily As Needed (when taking PRN Lasix)., Disp: 60 capsule, Rfl: 11    sertraline (ZOLOFT) 100 MG tablet, Take 2 tablets by mouth Daily., Disp: 180 tablet, Rfl: 3    sulfaSALAzine (AZULFIDINE) 500 MG tablet, Take 1 tablet by mouth Daily., Disp: 30 tablet, Rfl: 0    ticagrelor (BRILINTA) 90 MG tablet tablet, Take 1 tablet by mouth 2 (Two) Times a Day., Disp: 60 tablet, Rfl: 11    traMADol (ULTRAM) 50 MG tablet, Take 1 tablet by mouth Every 6 (Six) Hours As Needed for Moderate Pain., Disp: 25 tablet, Rfl: 0  Past Medical History:   Diagnosis Date    Anxiety     Arthritis     Atrial fibrillation with RVR 11/19/2023    CAD (coronary artery disease)     Chronic kidney disease     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    Depression     Esophageal varices     GERD (gastroesophageal reflux disease)     Headache     Heart problem     angina    Hyperlipidemia     Hypertension 11/2012    Liver disease 2015    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    Liver problem     Myocardial infarction 11/2012    NSTEMI    Rheumatoid arthritis     Skin cancer     Stroke     showed up on mri about 5 years ago with no residual    Ulnar neuropathy     Visual impairment        Social History     Socioeconomic History     Marital status:    Tobacco Use    Smoking status: Former     Packs/day: 1.00     Years: 30.00     Additional pack years: 0.00     Total pack years: 30.00     Types: Cigarettes     Start date: 1/1/1993     Quit date: 7/13/2013     Years since quitting: 10.4     Passive exposure: Past    Smokeless tobacco: Never    Tobacco comments:     Not somking now 10 years   Vaping Use    Vaping Use: Former   Substance and Sexual Activity    Alcohol use: Not Currently     Comment: Twice a. Year    Drug use: Never    Sexual activity: Yes     Partners: Male     Birth control/protection: Hysterectomy     Comment:  to same man for 39 yrs       Review of Systems   Constitutional: Negative for malaise/fatigue, weight gain and weight loss.   Cardiovascular:  Negative for chest pain, dyspnea on exertion, irregular heartbeat, leg swelling, near-syncope, orthopnea, palpitations, paroxysmal nocturnal dyspnea and syncope.   Respiratory:  Negative for cough, shortness of breath, sleep disturbances due to breathing, sputum production and wheezing.    Skin:  Negative for dry skin, flushing, itching and rash.   Gastrointestinal:  Negative for hematemesis and hematochezia.   Neurological:  Negative for dizziness, light-headedness, loss of balance and weakness.   All other systems reviewed and are negative.         Objective:     Vitals reviewed.   Constitutional:       General: Not in acute distress.     Appearance: Healthy appearance. Well-developed. Not diaphoretic.   Eyes:      General: No scleral icterus.     Conjunctiva/sclera: Conjunctivae normal.      Pupils: Pupils are equal, round, and reactive to light.   HENT:      Head: Normocephalic.    Mouth/Throat:      Pharynx: No oropharyngeal exudate.   Neck:      Vascular: No JVR.   Pulmonary:      Effort: Pulmonary effort is normal. No respiratory distress.      Breath sounds: Normal breath sounds. No wheezing. No rhonchi. No rales.   Chest:      Chest wall: Not tender to  "palpatation.   Cardiovascular:      Normal rate. Regular rhythm.   Pulses:     Intact distal pulses.   Edema:     Peripheral edema absent.   Abdominal:      General: Bowel sounds are normal. There is no distension.      Palpations: Abdomen is soft.      Tenderness: There is no abdominal tenderness.   Musculoskeletal: Normal range of motion.      Cervical back: Normal range of motion and neck supple. Skin:     General: Skin is warm and dry.      Coloration: Skin is not pale.      Findings: No erythema or rash.        Neurological:      Mental Status: Alert, oriented to person, place, and time and oriented to person, place and time.      Deep Tendon Reflexes: Reflexes are normal and symmetric.   Psychiatric:         Behavior: Behavior normal.           Procedures  /78 (BP Location: Left arm, Patient Position: Sitting, Cuff Size: Adult)   Pulse 84   Ht 165.1 cm (65\")   Wt 84.4 kg (186 lb)   SpO2 97%   BMI 30.95 kg/m²     Lab Review:   I have reviewed previous office and hospital notes, recent labs and recent cardiac testing.     Lab Results   Component Value Date    CHOL 89 11/19/2023    TRIG 136 11/19/2023    HDL 29 (L) 11/19/2023    LDL 36 11/19/2023     Results for orders placed during the hospital encounter of 11/21/23    Intra-Op TAVR Transesophageal Echo    Interpretation Summary    Left ventricular systolic function is normal.    Cath 11/22/23:  Summary     Multivessel coronary artery disease including distal left main as described above  Mildly elevated LVEDP with normal left ventricular ejection fraction in the setting of non-ST elevation myocardial infarction     Echo 11/19/23:  Interpretation Summary         Left ventricular systolic function is normal. Left ventricular ejection fraction appears to be 66 - 70%.    Left ventricular wall thickness is consistent with mild concentric hypertrophy.    Left ventricular diastolic function was normal.    Normal right ventricular cavity size and systolic " function noted.    There is mild thickening of the aortic valve. No aortic valve regurgitation is present. No hemodynamically significant aortic valve stenosis is present.      Assessment:          Diagnosis Plan   1. Coronary artery disease involving native coronary artery of native heart without angina pectoris        2. PAF (paroxysmal atrial fibrillation)        3. Primary hypertension        4. Mixed hyperlipidemia        5. Class 1 obesity due to excess calories with serious comorbidity and body mass index (BMI) of 30.0 to 30.9 in adult               Plan:       1. CAD- s/p 2V CABG on 11/29. Previous stenting to the RCA. Denies ischemic complaints. C/o Brilinta being costly and would like an alternative. It would be reasonable to change Brilinta to Plavix or Effient. She will follow up with Dr. Slade in 5 weeks at which time she can discuss the alternative with him as he is her primary cardiologist. Continue Eliquis, Brilinta, Toprol and Leqvio.   2. PAF- regular rhythm per exam today. Noted episodes of RVR while inpatient. C/o palpitations that may be Afib related however are short lived. S/p MAZE and LAAE at the time of CABG. If her palpitations worsen would consider a holter monitor. Continue Eliquis for now. Will defer duration of Eliquis use to primary cardiologist and CTS.   3. HLD- controlled with LDL 36 at the time of NSTEMI. Due to history of cirrhosis, she has been started on Leqvio rather than statin. She missed her 2nd dose due to being hospitalized. Recommend she call the cancer center to reschedule her injection.  4. HTN- controlled. Continue current therapy.   5. BMI- BMI is >= 30 and <35. (Class 1 Obesity). The following options were offered after discussion;: weight loss educational material (shared in after visit summary), exercise counseling/recommendations, and nutrition counseling/recommendations        Of note, I sent in a script for Lasix 40mg daily PRN and K+ 10meq, 2 tablets with PRN  lasix for weight gain, dyspnea or edema. She does not appear volume overloaded per exam today. She also has no history of CHF and has a normal LVEF and diastolic function. She did require diuresis post-op for volume overload secondary to surgery and was prescribed Lasix for 2 weeks post-op per CTS. Recommend she continue daily weights.     Follow up in 5 weeks with Dr. Slade, her primary cardiologist or sooner if symptoms worsen.     I spent 30 minutes caring for Alessandra on this date of service. This time includes time spent by me in the following activities:preparing for the visit, reviewing tests, obtaining and/or reviewing a separately obtained history, performing a medically appropriate examination and/or evaluation , counseling and educating the patient/family/caregiver, documenting information in the medical record, and care coordination

## 2023-12-15 NOTE — TELEPHONE ENCOUNTER
AZATHIOPRINE 50 MG TABLET       Last Written Prescription Date:  9-15-23  Last Fill Quantity: 180,   # refills: 0  Last Office Visit: 8-18-22  Future Office visit:  none    RECENT OUTSIDE LABS AVAILABLE IN THE LAB TAB 5-2-23 CBC CR, COMPREHENSIVE METABOLIC-LFTS    CBC RESULTS:   Recent Labs   Lab Test 08/26/22  0954   WBC 3.9*   RBC 4.55   HGB 12.8   HCT 39.7   MCV 87   MCH 28.1   MCHC 32.2   RDW 15.7*   *       Creatinine   Date Value Ref Range Status   08/26/2022 1.21 (H) 0.52 - 1.04 mg/dL Final   03/24/2019 1.02 0.52 - 1.04 mg/dL Final   ]    Liver Function Studies -   Recent Labs   Lab Test 08/26/22  0954 06/09/22  1245   PROTTOTAL  --  7.4   ALBUMIN 4.0 3.8   BILITOTAL  --  0.5   ALKPHOS  --  74   AST 20 17   ALT 19 14       Routing refill request to provider for review/approval because:  Per protocol  Past due labs- FYI to clinic  Scheduling has been notified to contact the pt for appointment.

## 2023-12-16 RX ORDER — AZATHIOPRINE 50 MG/1
TABLET ORAL
Qty: 180 TABLET | OUTPATIENT
Start: 2023-12-16

## 2023-12-17 DIAGNOSIS — R07.9 CHEST PAIN, UNSPECIFIED: ICD-10-CM

## 2023-12-17 DIAGNOSIS — I85.00 ESOPHAGEAL VARICES WITHOUT BLEEDING: ICD-10-CM

## 2023-12-18 NOTE — TELEPHONE ENCOUNTER
Rx Refill Note  Requested Prescriptions     Pending Prescriptions Disp Refills    omeprazole (priLOSEC) 40 MG capsule [Pharmacy Med Name: OMEPRAZOLE DR 40 MG CAPSULE] 30 capsule 2     Sig: TAKE 1 CAPSULE BY MOUTH EVERY DAY      Last office visit with prescribing clinician: 11/2/2023   Last telemedicine visit with prescribing clinician: Visit date not found   Next office visit with prescribing clinician: 2/2/2024                         Would you like a call back once the refill request has been completed: [] Yes [] No    If the office needs to give you a call back, can they leave a voicemail: [] Yes [] No    Joe Carodna MA  12/18/23, 09:30 CST

## 2023-12-18 NOTE — TELEPHONE ENCOUNTER
Spoke to patient's  who reports they moved to [place] 14 months ago and patient is recovering from open heart surgery she has three weeks ago. He confirmed that patient is working to establish care with a rheumatologist in [place]. Placed call to pharmacy to explain that patient is no longer under the care of Dr. Richey.   Conchis Quintanilla RN  [facility]

## 2023-12-19 RX ORDER — OMEPRAZOLE 40 MG/1
40 CAPSULE, DELAYED RELEASE ORAL DAILY
Qty: 30 CAPSULE | Refills: 2 | Status: SHIPPED | OUTPATIENT
Start: 2023-12-19

## 2023-12-22 ENCOUNTER — INFUSION (OUTPATIENT)
Dept: ONCOLOGY | Facility: HOSPITAL | Age: 58
End: 2023-12-22
Payer: MEDICARE

## 2023-12-22 VITALS
RESPIRATION RATE: 22 BRPM | HEART RATE: 105 BPM | DIASTOLIC BLOOD PRESSURE: 86 MMHG | OXYGEN SATURATION: 99 % | WEIGHT: 184 LBS | BODY MASS INDEX: 30.66 KG/M2 | TEMPERATURE: 97.6 F | SYSTOLIC BLOOD PRESSURE: 131 MMHG | HEIGHT: 65 IN

## 2023-12-22 DIAGNOSIS — I25.10 CORONARY ARTERY DISEASE, UNSPECIFIED VESSEL OR LESION TYPE, UNSPECIFIED WHETHER ANGINA PRESENT, UNSPECIFIED WHETHER NATIVE OR TRANSPLANTED HEART: ICD-10-CM

## 2023-12-22 DIAGNOSIS — E78.2 MIXED HYPERLIPIDEMIA: Primary | ICD-10-CM

## 2023-12-22 PROCEDURE — 96372 THER/PROPH/DIAG INJ SC/IM: CPT

## 2023-12-22 NOTE — PROGRESS NOTES
Patient started having tremors and c/o of being nauseous and feeling clammy half way through the injection. Injection was stopped and Dr. Slade was [facility]. I spoke with Denise and she explained that Dr. Slade was at the heart failure clinic. I called the heart failure clinic and was told that she was not a patient of the the heart failure clinic and to call the office. I explained that I had and they had told me to call there. Dr. Slade is not here today and is on call. I checked on the patient who said that she had thrown up and felt better and felt that all her symptoms were from the juice she had drank. Patient requested to receive the rest of the injection. It was given and patient stayed a few minutes afterwards to rest. Upon leaving she felt fine with no apparent symptoms from earlier. Ivonne Torrez RN

## 2023-12-28 ENCOUNTER — TELEPHONE (OUTPATIENT)
Dept: CARDIAC SURGERY | Facility: CLINIC | Age: 58
End: 2023-12-28
Payer: MEDICARE

## 2023-12-28 NOTE — TELEPHONE ENCOUNTER
Discussed with Dr. Granados.  Patient should continue anticoagulation and Brilinta.  Continue Toprol-XL.  LAAE performed at the time of surgery.  Can consider EP referral if she continues to have intermittent a fib.  Pt notified

## 2023-12-28 NOTE — TELEPHONE ENCOUNTER
"CABG x 2 11/29/2023    Pt calling stating that Dr. Granados instructed her to contact our office if she experiences any afib s/p surgery. She states that she had afib last night on 12/27/2023. Pt states that she had \"irregular heart beat and it raced for a little while.\" \"The irregular heartbeat has continued until 0830 this morning (12/28/2023)..... and it's doing it right now.\" \"My heart would race a little bit and then it would calm down. I was sleeping when it started.\" HR got up to 120 \"and then it calmed down to 90\" bpm.    Pt did not check her BP during this. She checked BP during this phone call.  Current BP: 145/92  HR: 99    Pt can be reached at [phone]  "

## 2024-01-17 ENCOUNTER — OFFICE VISIT (OUTPATIENT)
Dept: CARDIOLOGY | Facility: CLINIC | Age: 59
End: 2024-01-17
Payer: MEDICARE

## 2024-01-17 ENCOUNTER — LAB (OUTPATIENT)
Dept: LAB | Facility: HOSPITAL | Age: 59
End: 2024-01-17
Payer: MEDICARE

## 2024-01-17 VITALS
DIASTOLIC BLOOD PRESSURE: 82 MMHG | HEART RATE: 93 BPM | OXYGEN SATURATION: 99 % | SYSTOLIC BLOOD PRESSURE: 120 MMHG | HEIGHT: 65 IN | BODY MASS INDEX: 30.06 KG/M2 | RESPIRATION RATE: 18 BRPM | WEIGHT: 180.4 LBS

## 2024-01-17 DIAGNOSIS — T45.1X5A CIRRHOSIS OF LIVER DUE TO METHOTREXATE: ICD-10-CM

## 2024-01-17 DIAGNOSIS — I25.10 CORONARY ARTERY DISEASE INVOLVING NATIVE CORONARY ARTERY OF NATIVE HEART WITHOUT ANGINA PECTORIS: Primary | ICD-10-CM

## 2024-01-17 DIAGNOSIS — I25.10 CORONARY ARTERY DISEASE INVOLVING NATIVE CORONARY ARTERY OF NATIVE HEART WITHOUT ANGINA PECTORIS: ICD-10-CM

## 2024-01-17 DIAGNOSIS — I10 PRIMARY HYPERTENSION: ICD-10-CM

## 2024-01-17 DIAGNOSIS — N18.31 STAGE 3A CHRONIC KIDNEY DISEASE: ICD-10-CM

## 2024-01-17 DIAGNOSIS — E78.2 MIXED HYPERLIPIDEMIA: ICD-10-CM

## 2024-01-17 DIAGNOSIS — K71.7 CIRRHOSIS OF LIVER DUE TO METHOTREXATE: ICD-10-CM

## 2024-01-17 DIAGNOSIS — I10 PRIMARY HYPERTENSION: Primary | ICD-10-CM

## 2024-01-17 DIAGNOSIS — I48.0 PAF (PAROXYSMAL ATRIAL FIBRILLATION): ICD-10-CM

## 2024-01-17 LAB
ANION GAP SERPL CALCULATED.3IONS-SCNC: 12 MMOL/L (ref 5–15)
BUN SERPL-MCNC: 21 MG/DL (ref 6–20)
BUN/CREAT SERPL: 16.2 (ref 7–25)
CALCIUM SPEC-SCNC: 9.8 MG/DL (ref 8.6–10.5)
CHLORIDE SERPL-SCNC: 102 MMOL/L (ref 98–107)
CO2 SERPL-SCNC: 28 MMOL/L (ref 22–29)
CREAT SERPL-MCNC: 1.3 MG/DL (ref 0.57–1)
EGFRCR SERPLBLD CKD-EPI 2021: 47.5 ML/MIN/1.73
GLUCOSE SERPL-MCNC: 108 MG/DL (ref 65–99)
POTASSIUM SERPL-SCNC: 4.5 MMOL/L (ref 3.5–5.2)
SODIUM SERPL-SCNC: 142 MMOL/L (ref 136–145)

## 2024-01-17 PROCEDURE — 80048 BASIC METABOLIC PNL TOTAL CA: CPT

## 2024-01-17 PROCEDURE — 36415 COLL VENOUS BLD VENIPUNCTURE: CPT

## 2024-01-17 RX ORDER — POTASSIUM CHLORIDE 750 MG/1
CAPSULE, EXTENDED RELEASE ORAL
COMMUNITY
Start: 2024-01-10 | End: 2024-01-17 | Stop reason: SDUPTHER

## 2024-01-17 RX ORDER — SPIRONOLACTONE 25 MG/1
12.5 TABLET ORAL DAILY
Qty: 30 TABLET | Refills: 5 | Status: SHIPPED | OUTPATIENT
Start: 2024-01-17

## 2024-01-17 RX ORDER — FUROSEMIDE 40 MG/1
TABLET ORAL
COMMUNITY
Start: 2024-01-10

## 2024-01-17 RX ORDER — POTASSIUM CHLORIDE 750 MG/1
10 CAPSULE, EXTENDED RELEASE ORAL AS NEEDED
Start: 2024-01-17

## 2024-01-17 NOTE — PROGRESS NOTES
Reason For Visit:  CAD    Subjective        Alessandra Baker is a 58 y.o. female with the below pertinent PMH who presents for follow-up of CAD.    Since her last visit with me, the patient was admitted in November and underwent CABG.  Since that time, she has been doing well and denies any recent chest pain or shortness of breath.  She did follow-up with Christi Sr on 12/14 and was prescribed PRN Lasix/potassium due to some intermittent swelling.  Recently, the patient states that she has been taking Lasix almost daily for the past week due to weight gain with minimal swelling.  She has had occasional lightheadedness but nothing consistent.  She previously was having palpitations, but this has improved.    ROS: Pertinent findings are included above.    Cardiac Studies  Cath 11/2012: NSTEMI.20% LMCA stenosis, no significant LAD stenosis, 70% proximal RCA stenosis, 90% distal RCA stenosis; underwent ZACHARY to both the proximal and distal RCA.  Cath 2015: 50% proximal RCA ISR (normal IFR) and scattered 5% narrowings in the proximal and mid LAD (normal FFR).  Cath 2019: 75% mid LAD stenosis, 50% mid circumflex stenosis, and 80 to 90% proximal RCA ISR with patent distal RCA stent.  Underwent ZACHARY in proximal LAD and ZACHARY inside prior stent for proximal RCA.  Cath 4/2022: ISR of proximal RCA that was managed with another ZACHARY.  There was moderate flow-limiting mid LAD ISR (IFR 0.88) involving a moderate-sized diagonal branch that was managed medically.    Cath 11/22/2023: LVEDP 17, LVEF 55% without significant MR, distal LMCA 60% stenosis with large atherosclerotic plaque, ISR of proximal LAD (50% stenosis), no significant diagonal branch disease, proximal LCx 60% stenosis, moderate-sized OM without any obstructive disease, RCA is flush occlusion with multiple stents from the proximal to distal region with some distal collateral filling  Echo 11/19/2023: LVEF 66-70%, mild concentric LVH, normal diastolic function, normal RV  size/function, no hemodynamically significant valve pathology.    Pertinent PMH  CAD with prior MI, multiple stents, and CABG (LIMA-LAD, SVT-OM, 11/29/23, Dr. Granados)  Paroxysmal atrial fibrillation s/p MAZE and RONALD clipping (11/29/23)  Hypertension  Hyperlipidemia with statin intolerance  Prior stroke  Rheumatoid arthritis  Cirrhosis complicated by grade 1 esophageal varices    Pertinent past medical, surgical, family, and social history were reviewed.      Current Outpatient Medications:     apixaban (ELIQUIS) 5 MG tablet tablet, Take 1 tablet by mouth Every 12 (Twelve) Hours. Indications: Atrial Fibrillation, Disp: 60 tablet, Rfl: 2    cetirizine (zyrTEC) 10 MG tablet, Take 1 tablet by mouth Daily As Needed for Allergies., Disp: , Rfl:     dapagliflozin Propanediol 10 MG tablet, Take 10 mg by mouth Daily., Disp: 30 tablet, Rfl: 11    EPINEPHrine (EPIPEN) 0.3 MG/0.3ML solution auto-injector injection, Inject 0.3 mL into the appropriate muscle as directed by prescriber 1 (One) Time. If needed, Disp: , Rfl:     ezetimibe (ZETIA) 10 MG tablet, Take 1 tablet by mouth Daily., Disp: 30 tablet, Rfl: 11    fluticasone (FLONASE) 50 MCG/ACT nasal spray, 2 sprays into the nostril(s) as directed by provider Daily., Disp: 16 g, Rfl: 11    furosemide (LASIX) 40 MG tablet, TAKE 1 TABLET BY MOUTH DAILY AS NEEDED (WEIGHT GAIN, SHORTNESS OF BREATH OR SWELLING)., Disp: , Rfl:     Inclisiran Sodium 284 MG/1.5ML solution prefilled syringe, Inject 1.5 mL under the skin into the appropriate area as directed. Next due on 11/30- receives from oncology, Disp: , Rfl:     metoprolol succinate XL (TOPROL-XL) 25 MG 24 hr tablet, Take 1 tablet by mouth Daily., Disp: 30 tablet, Rfl: 2    omeprazole (priLOSEC) 40 MG capsule, Take 1 capsule by mouth Daily., Disp: 30 capsule, Rfl: 2    potassium chloride (MICRO-K) 10 MEQ CR capsule, TAKE 2 CAPSULES BY MOUTH DAILY AS NEEDED (WHEN TAKING AS NEEDED LASIX)., Disp: , Rfl:     sertraline (ZOLOFT) 100 MG  "tablet, Take 2 tablets by mouth Daily., Disp: 180 tablet, Rfl: 3    sulfaSALAzine (AZULFIDINE) 500 MG tablet, Take 1 tablet by mouth Daily., Disp: 30 tablet, Rfl: 0    ticagrelor (BRILINTA) 90 MG tablet tablet, Take 1 tablet by mouth 2 (Two) Times a Day., Disp: 60 tablet, Rfl: 11    traMADol (ULTRAM) 50 MG tablet, Take 1 tablet by mouth Every 6 (Six) Hours As Needed for Moderate Pain. (Patient not taking: Reported on 1/17/2024), Disp: 25 tablet, Rfl: 0     Objective   Vital Signs:  /82   Pulse 93   Resp 18   Ht 165.1 cm (65\")   Wt 81.8 kg (180 lb 6.4 oz)   SpO2 99%   BMI 30.02 kg/m²   Estimated body mass index is 30.02 kg/m² as calculated from the following:    Height as of this encounter: 165.1 cm (65\").    Weight as of this encounter: 81.8 kg (180 lb 6.4 oz).      Constitutional:       Appearance: Healthy appearance. Not in distress.   Neck:      Vascular: JVD normal.   Pulmonary:      Effort: Pulmonary effort is normal.      Breath sounds: Normal breath sounds.   Cardiovascular:      Normal rate. Regular rhythm.      Murmurs: There is no murmur.      No gallop.  No click. No rub.   Edema:     Peripheral edema absent.   Abdominal:      General: There is no distension.      Palpations: Abdomen is soft.      Tenderness: There is no abdominal tenderness.   Skin:     General: Skin is warm and dry.   Neurological:      Mental Status: Alert and oriented to person, place and time.        Result Review :           ECG 12 Lead    Date/Time: 1/17/2024 12:09 PM  Performed by: Ricky Slade MD    Authorized by: Ricky Slade MD  Comparison: compared with previous ECG from 12/8/2023  Comparison to previous ECG: Nonspecific T waves have replaced inverted T waves in the inferolateral leads  Rhythm: sinus rhythm  Rate: normal  BPM: 93  Conduction: conduction normal  QRS axis: normal  Other findings: non-specific ST-T wave changes    Clinical impression: abnormal EKG            Assessment and Plan   Diagnoses and " all orders for this visit:    1. Coronary artery disease involving native coronary artery of native heart without angina pectoris (Primary)  2. Primary hypertension  3. PAF (paroxysmal atrial fibrillation)  4. Mixed hyperlipidemia  5. 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  CKD 3a  -Overall doing well without angina recently.  She does have some intermittent weight gain/swelling that may be more related to cirrhosis.  BP adequately controlled.  She is in sinus rhythm on ECG today.  - Continue Leqvio and Zetia; most recent lipids with good LDL control  - Continue Eliquis 5 mg twice daily  - Continue ticagrelor 90 mg twice daily; patient asked about transitioning to an alternative, but she has a history of stroke and is not a good candidate for Effient and also has history of hives with Plavix  - Start spironolactone 12.5 mg daily; obtaining BMP today and likely will obtain follow-up labs in the next 2 weeks to monitor renal function/potassium  - Continue Toprol-XL 25 mg daily; could consider transition back to carvedilol in the future when stable  - Continue Farxiga 10 mg daily  - Continue Lasix/potassium PRN    Follow Up   Return in about 6 weeks (around 2/28/2024).  Patient was given instructions and counseling regarding her condition or for health maintenance advice. Please see specific information pulled into the AVS if appropriate.       EMR Dragon/Transcription disclaimer: Much of this encounter note is an electronic transcription/translation of spoken language to printed text. The electronic translation of spoken language may permit erroneous, or at times, nonsensical words or phrases to be inadvertently transcribed; although I have reviewed the note for such errors, some may still exist.

## 2024-01-22 ENCOUNTER — OFFICE VISIT (OUTPATIENT)
Dept: CARDIAC SURGERY | Facility: CLINIC | Age: 59
End: 2024-01-22
Payer: MEDICARE

## 2024-01-22 VITALS
BODY MASS INDEX: 30.52 KG/M2 | OXYGEN SATURATION: 97 % | HEART RATE: 101 BPM | WEIGHT: 183.2 LBS | SYSTOLIC BLOOD PRESSURE: 119 MMHG | DIASTOLIC BLOOD PRESSURE: 77 MMHG | HEIGHT: 65 IN

## 2024-01-22 DIAGNOSIS — I21.4 NSTEMI (NON-ST ELEVATED MYOCARDIAL INFARCTION): Primary | ICD-10-CM

## 2024-01-22 DIAGNOSIS — I48.0 PAF (PAROXYSMAL ATRIAL FIBRILLATION): Primary | ICD-10-CM

## 2024-01-22 DIAGNOSIS — E78.2 MIXED HYPERLIPIDEMIA: ICD-10-CM

## 2024-01-22 DIAGNOSIS — I25.118 CORONARY ARTERY DISEASE INVOLVING NATIVE CORONARY ARTERY OF NATIVE HEART WITH OTHER FORM OF ANGINA PECTORIS: ICD-10-CM

## 2024-01-22 PROCEDURE — 99024 POSTOP FOLLOW-UP VISIT: CPT | Performed by: SURGERY

## 2024-01-22 PROCEDURE — 3074F SYST BP LT 130 MM HG: CPT | Performed by: SURGERY

## 2024-01-22 PROCEDURE — 3078F DIAST BP <80 MM HG: CPT | Performed by: SURGERY

## 2024-01-22 NOTE — LETTER
January 30, 2024       No Recipients    Patient: Alessandra Baker   YOB: 1965   Date of Visit: 1/22/2024       Dear Geraldo Feldman, ,    Alessandra Baker was in my office today. Below are the relevant portions of my assessment and plan of care.    Alessandra Baker is a 58-year-old female who is now 6 weeks status post CABG times 2, limited maze procedure and left atrial appendage closure. She has had no further heart failure or chest pain symptoms. She had a history of paroxysmal atrial fibrillation with RVR prior to surgery. She has had no recurrent symptoms of this since. She has a history of cirrhosis due to medication use in the past.    I have advised her it is okay to return to her medication for rheumatoid arthritis (RA). She also had a history of esophageal varices and given this; I would consider her high-risk to remain on a NOAC especially in the setting of Brilinta at the same time. Given this, I would like to proceed with a ZIO patch for 14 days. If this shows no evidence of atrial fibrillation burden, I believe it is okay to stop her NOAC. If there is evidence of atrial fibrillation  burden, she should have CHECO to ensure atrial appendage occlusion, then we will be okay to stop her NOAC. Discussed this with her, she understands. She has had follow-up with Dr. Slade and has continued follow-up with him with another appointment in several weeks. Overall, I am very happy with how she has done.     We discussed returning to normal activities on a progressive basis. She understands and agrees. Okay to return to driving. I will make referral to [facility] cardiac rehabilitation. She can follow up with me on an as needed basis. Thank you for trusting me in the care of Ms. Baker. Please do not hesitate to call with questions or concerns.         Sincerely,        Hosea Granados MD        CC:   No Recipients

## 2024-01-23 NOTE — PROGRESS NOTES
"[facility] Cardiothoracic Surgery  PROGRESS NOTE          Procedure Performed:  CABG x2 (LIMA to LAD, SVG to OM2) limited left atrial maze with encompass and left atrial appendage closure with 40 mm atrial clip.  Date of Procedure: 11/29/2023    Subjective:  Alessandra Baker is a 58-year-old female who presents for 6-week cardiac follow-up status post coronary artery bypass graft times 2.     The patient reports she feels \"great.\" She denies any problems. Her energy is a lot better. She denies any chest pain. Her surgical incisions have healed well. Her leg wound still has a scab on it.     She queries how to tell if the injectable she is taking is helping her cholesterol. She notes that cardiologist Dr. Slade wants her to stop Lasix because it is harder on her liver, and he wants to put her on a different medication. She inquires if she can resume her rheumatoid arthritis medication.    She inquiries about any restrictions.      The patient has a new puppy and a pet porcupine.     The patient is currently taking inclisiran sodium for hyperlipidemia. She takes Lasix. She currently takes blood thinner Eliquis for atrial fibrillation.      Objective:      01/22/24  1452   Weight: 83.1 kg (183 lb 3.2 oz)              PE:  Visit Vitals  /77 (BP Location: Right arm, Patient Position: Sitting, Cuff Size: Adult)   Pulse 101   Ht 165.1 cm (65\")   Wt 83.1 kg (183 lb 3.2 oz)   SpO2 97%   BMI 30.49 kg/m²        GENERAL: NAD, resting comfortably, normal color  CARDIOVASCULAR: regular, regular rate, sinus, well-healed sternotomy with stable sternum  PULMONARY: Normal bilateral breath sounds, no labored breathing  ABDOMEN: soft, nt/nd  EXTREMITIES: mild peripheral edema, normal pulses, normal ROM               Lab Results (last 72 hours)      ** No results found for the last 72 hours. **             Assessment/Plan         Alessandra Baker is a 58-year-old female who is now 6 weeks status post CABG times 2, limited " maze procedure and left atrial appendage closure. She has had no further heart failure or chest pain symptoms. She had a history of paroxysmal atrial fibrillation with RVR prior to surgery. She has had no recurrent symptoms of this since. She has a history of cirrhosis due to medication use in the past.    I have advised her it is okay to return to her medication for rheumatoid arthritis (RA). She also had a history of esophageal varices and given this; I would consider her high-risk to remain on a NOAC especially in the setting of Brilinta at the same time. Given this, I would like to proceed with a ZIO patch for 14 days. If this shows no evidence of atrial fibrillation burden, I believe it is okay to stop her NOAC. If there is evidence of atrial fibrillation  burden, she should have CHECO to ensure atrial appendage occlusion, then we will be okay to stop her NOAC. Discussed this with her, she understands. She has had follow-up with Dr. Slade and has continued follow-up with him with another appointment in several weeks. Overall, I am very happy with how she has done.     We discussed returning to normal activities on a progressive basis. She understands and agrees. Okay to return to driving. I will make referral to [facility] cardiac rehabilitation. She can follow up with me on an as needed basis. Thank you for trusting me in the care of Ms. Baker. Please do not hesitate to call with questions or concerns.          Hosea Granados MD  Cardiothoracic Surgeon    Transcribed from ambient dictation for Hosea Granados MD by Caitlin Baires.  01/22/24   18:05 CST    Patient or patient representative verbalized consent to the visit recording.  I have personally performed the services described in this document as transcribed by the above individual, and it is both accurate and complete.

## 2024-02-02 ENCOUNTER — LAB (OUTPATIENT)
Dept: LAB | Facility: HOSPITAL | Age: 59
End: 2024-02-02
Payer: MEDICARE

## 2024-02-02 ENCOUNTER — OFFICE VISIT (OUTPATIENT)
Dept: FAMILY MEDICINE CLINIC | Facility: CLINIC | Age: 59
End: 2024-02-02
Payer: MEDICARE

## 2024-02-02 VITALS
WEIGHT: 184 LBS | SYSTOLIC BLOOD PRESSURE: 122 MMHG | OXYGEN SATURATION: 96 % | HEIGHT: 65 IN | BODY MASS INDEX: 30.66 KG/M2 | DIASTOLIC BLOOD PRESSURE: 80 MMHG | TEMPERATURE: 96.8 F | HEART RATE: 87 BPM | RESPIRATION RATE: 18 BRPM

## 2024-02-02 DIAGNOSIS — I25.118 CORONARY ARTERY DISEASE INVOLVING NATIVE CORONARY ARTERY OF NATIVE HEART WITH OTHER FORM OF ANGINA PECTORIS: ICD-10-CM

## 2024-02-02 DIAGNOSIS — I10 PRIMARY HYPERTENSION: ICD-10-CM

## 2024-02-02 DIAGNOSIS — I25.118 CORONARY ARTERY DISEASE INVOLVING NATIVE CORONARY ARTERY OF NATIVE HEART WITH OTHER FORM OF ANGINA PECTORIS: Primary | ICD-10-CM

## 2024-02-02 DIAGNOSIS — M05.79 RHEUMATOID ARTHRITIS INVOLVING MULTIPLE SITES WITH POSITIVE RHEUMATOID FACTOR: ICD-10-CM

## 2024-02-02 LAB
ANION GAP SERPL CALCULATED.3IONS-SCNC: 11 MMOL/L (ref 5–15)
BUN SERPL-MCNC: 17 MG/DL (ref 6–20)
BUN/CREAT SERPL: 11.7 (ref 7–25)
CALCIUM SPEC-SCNC: 9.8 MG/DL (ref 8.6–10.5)
CHLORIDE SERPL-SCNC: 107 MMOL/L (ref 98–107)
CHOLEST SERPL-MCNC: 114 MG/DL (ref 0–200)
CO2 SERPL-SCNC: 25 MMOL/L (ref 22–29)
CREAT SERPL-MCNC: 1.45 MG/DL (ref 0.57–1)
EGFRCR SERPLBLD CKD-EPI 2021: 41.6 ML/MIN/1.73
GLUCOSE SERPL-MCNC: 90 MG/DL (ref 65–99)
HDLC SERPL-MCNC: 28 MG/DL (ref 40–60)
LDLC SERPL CALC-MCNC: 40 MG/DL (ref 0–100)
LDLC/HDLC SERPL: 0.89 {RATIO}
POTASSIUM SERPL-SCNC: 4.9 MMOL/L (ref 3.5–5.2)
SODIUM SERPL-SCNC: 143 MMOL/L (ref 136–145)
TRIGL SERPL-MCNC: 306 MG/DL (ref 0–150)
VLDLC SERPL-MCNC: 46 MG/DL (ref 5–40)

## 2024-02-02 PROCEDURE — 80048 BASIC METABOLIC PNL TOTAL CA: CPT

## 2024-02-02 PROCEDURE — 80061 LIPID PANEL: CPT

## 2024-02-02 PROCEDURE — 36415 COLL VENOUS BLD VENIPUNCTURE: CPT

## 2024-02-05 DIAGNOSIS — N18.31 STAGE 3A CHRONIC KIDNEY DISEASE: ICD-10-CM

## 2024-02-05 NOTE — TELEPHONE ENCOUNTER
Caller: Alessandra Baker    Relationship: Self    Best call back number: [phone]     Requested Prescriptions:   Requested Prescriptions     Pending Prescriptions Disp Refills    dapagliflozin Propanediol 10 MG tablet 30 tablet 11     Sig: Take 10 mg by mouth Daily.        Pharmacy where request should be sent: [facility]/PHARMACY #[id number] - [facility] [place] - [address] - [phone] [facility] [phone] FX     Last office visit with prescribing clinician: 2/2/2024   Last telemedicine visit with prescribing clinician: Visit date not found   Next office visit with prescribing clinician: 8/2/2024     Does the patient have less than a 3 day supply:  [x] Yes  [] No    Would you like a call back once the refill request has been completed: [x] Yes [] No    If the office needs to give you a call back, can they leave a voicemail: [x] Yes [] No    Star Verdugo Rep   02/05/24 10:52 CST

## 2024-02-06 DIAGNOSIS — I25.118 CORONARY ARTERY DISEASE INVOLVING NATIVE CORONARY ARTERY OF NATIVE HEART WITH OTHER FORM OF ANGINA PECTORIS: Primary | ICD-10-CM

## 2024-02-20 ENCOUNTER — LAB REQUISITION (OUTPATIENT)
Dept: LAB | Facility: HOSPITAL | Age: 59
End: 2024-02-20
Payer: MEDICARE

## 2024-02-20 ENCOUNTER — OFFICE VISIT (OUTPATIENT)
Dept: FAMILY MEDICINE CLINIC | Facility: CLINIC | Age: 59
End: 2024-02-20
Payer: MEDICARE

## 2024-02-20 VITALS
TEMPERATURE: 97.3 F | BODY MASS INDEX: 30.66 KG/M2 | OXYGEN SATURATION: 99 % | RESPIRATION RATE: 18 BRPM | DIASTOLIC BLOOD PRESSURE: 70 MMHG | HEIGHT: 65 IN | SYSTOLIC BLOOD PRESSURE: 112 MMHG | HEART RATE: 80 BPM | WEIGHT: 184 LBS

## 2024-02-20 DIAGNOSIS — N94.10 DYSPAREUNIA IN FEMALE: ICD-10-CM

## 2024-02-20 DIAGNOSIS — N30.01 ACUTE CYSTITIS WITH HEMATURIA: ICD-10-CM

## 2024-02-20 DIAGNOSIS — N95.9 MENOPAUSAL PROBLEM: ICD-10-CM

## 2024-02-20 DIAGNOSIS — R30.0 DYSURIA: ICD-10-CM

## 2024-02-20 DIAGNOSIS — R30.0 DYSURIA: Primary | ICD-10-CM

## 2024-02-20 LAB
BACTERIA UR QL AUTO: ABNORMAL /HPF
BILIRUB BLD-MCNC: NEGATIVE MG/DL
BILIRUB UR QL STRIP: NEGATIVE
CLARITY UR: ABNORMAL
CLARITY, POC: ABNORMAL
COLOR UR: ABNORMAL
COLOR UR: YELLOW
GLUCOSE UR STRIP-MCNC: ABNORMAL MG/DL
GLUCOSE UR STRIP-MCNC: ABNORMAL MG/DL
HGB UR QL STRIP.AUTO: ABNORMAL
HYALINE CASTS UR QL AUTO: ABNORMAL /LPF
KETONES UR QL STRIP: NEGATIVE
KETONES UR QL: NEGATIVE
LEUKOCYTE EST, POC: ABNORMAL
LEUKOCYTE ESTERASE UR QL STRIP.AUTO: ABNORMAL
NITRITE UR QL STRIP: NEGATIVE
NITRITE UR-MCNC: NEGATIVE MG/ML
PH UR STRIP.AUTO: 5.5 [PH] (ref 5–8)
PH UR: 6 [PH] (ref 5–8)
PROT UR QL STRIP: ABNORMAL
PROT UR STRIP-MCNC: NEGATIVE MG/DL
RBC # UR STRIP: ABNORMAL /HPF
RBC # UR STRIP: ABNORMAL /UL
REF LAB TEST METHOD: ABNORMAL
SP GR UR STRIP: 1.03 (ref 1–1.03)
SP GR UR: 1.03 (ref 1–1.03)
SQUAMOUS #/AREA URNS HPF: ABNORMAL /HPF
UROBILINOGEN UR QL STRIP: ABNORMAL
UROBILINOGEN UR QL: ABNORMAL
WBC # UR STRIP: ABNORMAL /HPF

## 2024-02-20 PROCEDURE — 81001 URINALYSIS AUTO W/SCOPE: CPT | Performed by: NURSE PRACTITIONER

## 2024-02-20 PROCEDURE — 87077 CULTURE AEROBIC IDENTIFY: CPT | Performed by: NURSE PRACTITIONER

## 2024-02-20 PROCEDURE — 87186 SC STD MICRODIL/AGAR DIL: CPT | Performed by: NURSE PRACTITIONER

## 2024-02-20 PROCEDURE — 87086 URINE CULTURE/COLONY COUNT: CPT | Performed by: NURSE PRACTITIONER

## 2024-02-20 RX ORDER — CEFDINIR 300 MG/1
300 CAPSULE ORAL 2 TIMES DAILY
Qty: 14 CAPSULE | Refills: 0 | Status: SHIPPED | OUTPATIENT
Start: 2024-02-20 | End: 2024-02-27

## 2024-02-20 NOTE — PROGRESS NOTES
"Chief Complaint  Follow-up (3 MONTH FOLLOW UP - pt states has been noticing pain in wrist and fingers. Wants to discuss cholesterol )    Subjective        Alessandra Baker presents to [facility]  History of Present Illness  Needs referral for cardiac rehab, no chest pain currently  Struggling with more wrist pain 2/2 RA today     Objective   Vital Signs:  /80   Pulse 87   Temp 96.8 °F (36 °C)   Resp 18   Ht 165.1 cm (65\")   Wt 83.5 kg (184 lb)   SpO2 96%   BMI 30.62 kg/m²   Estimated body mass index is 30.62 kg/m² as calculated from the following:    Height as of this encounter: 165.1 cm (65\").    Weight as of this encounter: 83.5 kg (184 lb).               Physical Exam  Vitals and nursing note reviewed.   Constitutional:       General: She is not in acute distress.     Appearance: She is not diaphoretic.   HENT:      Head: Normocephalic and atraumatic.      Nose: Nose normal.   Eyes:      General: No scleral icterus.        Right eye: No discharge.         Left eye: No discharge.      Conjunctiva/sclera: Conjunctivae normal.   Neck:      Trachea: No tracheal deviation.   Pulmonary:      Effort: Pulmonary effort is normal.   Skin:     General: Skin is warm and dry.      Coloration: Skin is not pale.   Neurological:      Mental Status: She is alert and oriented to person, place, and time.   Psychiatric:         Behavior: Behavior normal.         Thought Content: Thought content normal.         Judgment: Judgment normal.        Result Review :                     Assessment and Plan     Diagnoses and all orders for this visit:    1. Coronary artery disease involving native coronary artery of native heart with other form of angina pectoris (Primary)  -     Lipid panel; Future  -     Ambulatory Referral to Cardiac Rehab    2. Rheumatoid arthritis involving multiple sites with positive rheumatoid factor    Pt will contact rheumatology for RA flare  Continue metoprolol, Brilinta " for coronary artery disease, previous allergic reactions to Plavix and atorvastatin  Follow-up 3 months

## 2024-02-20 NOTE — PROGRESS NOTES
FITZ Barraza  [facility]   Family Medicine  [address]  [place] [address]  Phone: [phone]  Fax: [phone]         Chief Complaint:  Chief Complaint   Patient presents with    Urinary Tract Infection        History:  Alessandra Baker is a 58 y.o. female that is an established patient. She  is here for evaluation of the above complaint.    HPI     She is here for burning with urination, has had UTI in the past. She is having frequency. POC dipstick shows hematuria. She states the urine has a strong smell. No flank tenderness. No fever.    She has been referred to cardiac rehab, hasn't been contacted yet. She is interested in hormone replacement therapy; she had a benign hysterectomy about 12 years ago with conservation of the ovaries. She has had lack of libido and dyspareunia since her surgery, which we discussed will take time. She hasn't tried water-based vaginal lubricants            ROS:  Review of Systems   Constitutional: Negative.  Negative for fever.   Respiratory: Negative.     Gastrointestinal: Negative.    Genitourinary:  Positive for dyspareunia, dysuria and frequency. Negative for flank pain.         reports that she quit smoking about 10 years ago. Her smoking use included cigarettes. She started smoking about 31 years ago. She has a 30.00 pack-year smoking history. She has been exposed to tobacco smoke. She has never used smokeless tobacco. She reports that she does not currently use alcohol. She reports that she does not use drugs.    Current Outpatient Medications   Medication Instructions    apixaban (ELIQUIS) 5 mg, Oral, Every 12 Hours Scheduled    cefdinir (OMNICEF) 300 mg, Oral, 2 Times Daily    cetirizine (ZYRTEC) 10 mg, Oral, Daily PRN    dapagliflozin Propanediol 10 mg, Oral, Daily    EPINEPHrine (EPIPEN) 0.3 mg, Intramuscular, Once, If needed    ezetimibe (ZETIA) 10 mg, Oral, Daily    fluticasone (FLONASE) 50 MCG/ACT nasal spray 2 sprays, Nasal, Daily  "   furosemide (LASIX) 40 MG tablet TAKE 1 TABLET BY MOUTH DAILY AS NEEDED (WEIGHT GAIN, SHORTNESS OF BREATH OR SWELLING).    Inclisiran Sodium 284 mg    metoprolol succinate XL (TOPROL-XL) 25 mg, Oral, Every 24 Hours Scheduled    omeprazole (PRILOSEC) 40 mg, Oral, Daily    potassium chloride (MICRO-K) 10 MEQ CR capsule 10 mEq, Oral, As Needed    sertraline (ZOLOFT) 200 mg, Oral, Daily    spironolactone (ALDACTONE) 12.5 mg, Oral, Daily    sulfaSALAzine (AZULFIDINE) 500 mg, Oral, Daily    ticagrelor (BRILINTA) 90 mg, Oral, 2 Times Daily    traMADol (ULTRAM) 50 mg, Oral, Every 6 Hours PRN       OBJECTIVE:  /70   Pulse 80   Temp 97.3 °F (36.3 °C) (Temporal)   Resp 18   Ht 165.1 cm (65\")   Wt 83.5 kg (184 lb)   SpO2 99%   BMI 30.62 kg/m²    Physical Exam  Vitals and nursing note reviewed.   Constitutional:       Appearance: Normal appearance.   HENT:      Head: Normocephalic and atraumatic.      Nose: Nose normal.   Eyes:      Conjunctiva/sclera: Conjunctivae normal.   Cardiovascular:      Rate and Rhythm: Normal rate and regular rhythm.   Pulmonary:      Effort: Pulmonary effort is normal.      Breath sounds: Normal breath sounds.   Abdominal:      Tenderness: There is no right CVA tenderness or left CVA tenderness.   Neurological:      General: No focal deficit present.      Mental Status: She is alert and oriented to person, place, and time.   Psychiatric:         Mood and Affect: Mood normal.         Behavior: Behavior normal.         Procedures    Assessment/Plan:     Diagnoses and all orders for this visit:    1. Dysuria (Primary)  -     POC Urinalysis Dipstick, Multipro  -     Urinalysis With Culture If Indicated -; Future  -     cefdinir (OMNICEF) 300 MG capsule; Take 1 capsule by mouth 2 (Two) Times a Day for 7 days.  Dispense: 14 capsule; Refill: 0    2. Acute cystitis with hematuria  -     cefdinir (OMNICEF) 300 MG capsule; Take 1 capsule by mouth 2 (Two) Times a Day for 7 days.  Dispense: 14 " capsule; Refill: 0    3. Menopausal problem  -     Ambulatory Referral to Obstetrics / Gynecology    4. Dyspareunia in female    -water-based lubricant    BMI is >= 30 and <35. (Class 1 Obesity). The following options were offered after discussion;: exercise counseling/recommendations and nutrition counseling/recommendations       An After Visit Summary was printed and given to the patient at discharge.  No follow-ups on file.       There are no Patient Instructions on file for this visit.      Discussion:     I spent 32 minutes caring for Alessandra on this date of service. This time includes time spent by me in the following activities: preparing for the visit, reviewing tests, obtaining and/or reviewing a separately obtained history, performing a medically appropriate examination and/or evaluation, counseling and educating the patient/family/caregiver, ordering medications, tests, or procedures, referring and communicating with other health care professionals, documenting information in the medical record, and independently interpreting results and communicating that information with the patient/family/caregiver     Jerilyn BYRNES 2/20/2024   Electronically signed.

## 2024-02-22 DIAGNOSIS — N30.90 KLEBSIELLA CYSTITIS: Primary | ICD-10-CM

## 2024-02-22 DIAGNOSIS — B96.1 KLEBSIELLA CYSTITIS: Primary | ICD-10-CM

## 2024-02-22 LAB — BACTERIA SPEC AEROBE CULT: ABNORMAL

## 2024-02-22 RX ORDER — LEVOFLOXACIN 250 MG/1
250 TABLET, FILM COATED ORAL DAILY
Qty: 3 TABLET | Refills: 0 | Status: SHIPPED | OUTPATIENT
Start: 2024-02-22 | End: 2024-02-25

## 2024-02-22 NOTE — PROGRESS NOTES
Please let her know the culture shows she will need a different antibiotic. Have her stop the macrobid and start Levofloxacin 250mg once/day for 3 days. I'll send it in.

## 2024-02-29 ENCOUNTER — OFFICE VISIT (OUTPATIENT)
Dept: CARDIOLOGY | Facility: CLINIC | Age: 59
End: 2024-02-29
Payer: MEDICARE

## 2024-02-29 ENCOUNTER — OFFICE VISIT (OUTPATIENT)
Dept: OBSTETRICS AND GYNECOLOGY | Age: 59
End: 2024-02-29
Payer: MEDICARE

## 2024-02-29 ENCOUNTER — LAB (OUTPATIENT)
Dept: LAB | Facility: HOSPITAL | Age: 59
End: 2024-02-29
Payer: MEDICARE

## 2024-02-29 VITALS
BODY MASS INDEX: 30.82 KG/M2 | HEART RATE: 95 BPM | SYSTOLIC BLOOD PRESSURE: 120 MMHG | OXYGEN SATURATION: 99 % | DIASTOLIC BLOOD PRESSURE: 86 MMHG | HEIGHT: 65 IN | WEIGHT: 185 LBS | RESPIRATION RATE: 18 BRPM

## 2024-02-29 VITALS
HEIGHT: 65 IN | BODY MASS INDEX: 30.82 KG/M2 | DIASTOLIC BLOOD PRESSURE: 72 MMHG | WEIGHT: 185 LBS | SYSTOLIC BLOOD PRESSURE: 118 MMHG

## 2024-02-29 DIAGNOSIS — E78.2 MIXED HYPERLIPIDEMIA: ICD-10-CM

## 2024-02-29 DIAGNOSIS — R45.86 MOOD SWINGS: ICD-10-CM

## 2024-02-29 DIAGNOSIS — I25.118 CORONARY ARTERY DISEASE INVOLVING NATIVE CORONARY ARTERY OF NATIVE HEART WITH OTHER FORM OF ANGINA PECTORIS: ICD-10-CM

## 2024-02-29 DIAGNOSIS — I10 PRIMARY HYPERTENSION: ICD-10-CM

## 2024-02-29 DIAGNOSIS — R68.82 DECREASED LIBIDO: ICD-10-CM

## 2024-02-29 DIAGNOSIS — Z95.5 S/P DRUG ELUTING CORONARY STENT PLACEMENT: ICD-10-CM

## 2024-02-29 DIAGNOSIS — K71.7 CIRRHOSIS OF LIVER DUE TO METHOTREXATE: ICD-10-CM

## 2024-02-29 DIAGNOSIS — N89.8 VAGINAL DRYNESS: Primary | ICD-10-CM

## 2024-02-29 DIAGNOSIS — Z12.31 ENCOUNTER FOR SCREENING MAMMOGRAM FOR BREAST CANCER: ICD-10-CM

## 2024-02-29 DIAGNOSIS — I25.118 CORONARY ARTERY DISEASE INVOLVING NATIVE CORONARY ARTERY OF NATIVE HEART WITH OTHER FORM OF ANGINA PECTORIS: Primary | ICD-10-CM

## 2024-02-29 DIAGNOSIS — Z86.73 HISTORY OF STROKE: ICD-10-CM

## 2024-02-29 DIAGNOSIS — I48.0 PAF (PAROXYSMAL ATRIAL FIBRILLATION): ICD-10-CM

## 2024-02-29 DIAGNOSIS — Z95.1 S/P CABG (CORONARY ARTERY BYPASS GRAFT): Primary | ICD-10-CM

## 2024-02-29 DIAGNOSIS — Z78.0 MENOPAUSE: ICD-10-CM

## 2024-02-29 DIAGNOSIS — T45.1X5A CIRRHOSIS OF LIVER DUE TO METHOTREXATE: ICD-10-CM

## 2024-02-29 DIAGNOSIS — N18.31 STAGE 3A CHRONIC KIDNEY DISEASE: ICD-10-CM

## 2024-02-29 LAB
ANION GAP SERPL CALCULATED.3IONS-SCNC: 12 MMOL/L (ref 5–15)
BUN SERPL-MCNC: 23 MG/DL (ref 6–20)
BUN/CREAT SERPL: 16.2 (ref 7–25)
CALCIUM SPEC-SCNC: 10 MG/DL (ref 8.6–10.5)
CHLORIDE SERPL-SCNC: 104 MMOL/L (ref 98–107)
CO2 SERPL-SCNC: 26 MMOL/L (ref 22–29)
CREAT SERPL-MCNC: 1.42 MG/DL (ref 0.57–1)
EGFRCR SERPLBLD CKD-EPI 2021: 42.7 ML/MIN/1.73
GLUCOSE SERPL-MCNC: 106 MG/DL (ref 65–99)
POTASSIUM SERPL-SCNC: 4.5 MMOL/L (ref 3.5–5.2)
SODIUM SERPL-SCNC: 142 MMOL/L (ref 136–145)

## 2024-02-29 PROCEDURE — 80048 BASIC METABOLIC PNL TOTAL CA: CPT

## 2024-02-29 PROCEDURE — 36415 COLL VENOUS BLD VENIPUNCTURE: CPT

## 2024-02-29 RX ORDER — METOPROLOL SUCCINATE 25 MG/1
37.5 TABLET, EXTENDED RELEASE ORAL
Qty: 135 TABLET | Refills: 3 | Status: SHIPPED | OUTPATIENT
Start: 2024-02-29 | End: 2025-02-28

## 2024-02-29 RX ORDER — ASPIRIN 81 MG/1
81 TABLET ORAL DAILY
Start: 2024-02-29

## 2024-02-29 NOTE — PROGRESS NOTES
"Chief Complaint   Patient presents with    Establish Care     New patient here to establish care, patient has had a hysterectomy, last mammogram 2/17/23, last colonoscopy 4/24/23, patient is wanting to discuss menopause issues. Patient is wanting to schedule her annual after today.         Subjective     Alessandra Baker is a 58 y.o. female    History of Present Illness  Pt comes in today to establish care. She didn't want to do annual wellness and get undressed today. Wishes to schedule that. Pt had hysterectomy 12 years ago. They left her ovaries. Since that time she has had issues with decreased libido and vaginal dryness. Also feels like she has issues with mood.       /72 (BP Location: Left arm, Patient Position: Sitting, Cuff Size: Adult)   Ht 165.1 cm (65\")   Wt 83.9 kg (185 lb)   BMI 30.79 kg/m²     Outpatient Encounter Medications as of 2/29/2024   Medication Sig Dispense Refill    aspirin 81 MG EC tablet Take 1 tablet by mouth Daily.      cetirizine (zyrTEC) 10 MG tablet Take 1 tablet by mouth Daily As Needed for Allergies.      dapagliflozin Propanediol 10 MG tablet Take 10 mg by mouth Daily. 30 tablet 11    EPINEPHrine (EPIPEN) 0.3 MG/0.3ML solution auto-injector injection Inject 0.3 mL into the appropriate muscle as directed by prescriber 1 (One) Time. If needed      ezetimibe (ZETIA) 10 MG tablet Take 1 tablet by mouth Daily. 30 tablet 11    fluticasone (FLONASE) 50 MCG/ACT nasal spray 2 sprays into the nostril(s) as directed by provider Daily. 16 g 11    Inclisiran Sodium 284 MG/1.5ML solution prefilled syringe Inject 1.5 mL under the skin into the appropriate area as directed. Next due on 11/30- receives from oncology      metoprolol succinate XL (TOPROL-XL) 25 MG 24 hr tablet Take 1.5 tablets by mouth Daily. 135 tablet 3    omeprazole (priLOSEC) 40 MG capsule Take 1 capsule by mouth Daily. 30 capsule 2    sertraline (ZOLOFT) 100 MG tablet Take 2 tablets by mouth Daily. 180 tablet 3    " spironolactone (ALDACTONE) 25 MG tablet Take 0.5 tablets by mouth Daily. 30 tablet 5    sulfaSALAzine (AZULFIDINE) 500 MG tablet Take 1 tablet by mouth Daily. 30 tablet 0    ticagrelor (BRILINTA) 90 MG tablet tablet Take 1 tablet by mouth 2 (Two) Times a Day. 60 tablet 11    [DISCONTINUED] apixaban (ELIQUIS) 5 MG tablet tablet Take 1 tablet by mouth Every 12 (Twelve) Hours. Indications: Atrial Fibrillation 60 tablet 2    [DISCONTINUED] furosemide (LASIX) 40 MG tablet       [DISCONTINUED] metoprolol succinate XL (TOPROL-XL) 25 MG 24 hr tablet Take 1 tablet by mouth Daily. 30 tablet 2    [DISCONTINUED] potassium chloride (MICRO-K) 10 MEQ CR capsule Take 1 capsule by mouth As Needed (When taking lasix).      [DISCONTINUED] traMADol (ULTRAM) 50 MG tablet Take 1 tablet by mouth Every 6 (Six) Hours As Needed for Moderate Pain. 25 tablet 0     No facility-administered encounter medications on file as of 2/29/2024.       Past Medical History  Past Medical History:   Diagnosis Date    Anxiety     Arthritis     Atrial fibrillation with RVR 11/19/2023    CAD (coronary artery disease)     Chronic kidney disease     Cirrhosis     Depression     Esophageal varices     GERD (gastroesophageal reflux disease)     Headache     Heart problem     Hyperlipidemia     Hypertension 11/2012    Liver disease 2015    Liver problem     Myocardial infarction 11/2012    Rheumatoid arthritis     Skin cancer     Stroke     Ulnar neuropathy     Visual impairment         Surgical History  Past Surgical History:   Procedure Laterality Date    ANGIOPLASTY      11/2012    APPENDECTOMY      ATRIAL APPENDAGE EXCLUSION LEFT WITH TRANSESOPHAGEAL ECHOCARDIOGRAM N/A 11/29/2023    Procedure: ATRIAL APPENDAGE EXCLUSION LEFT WITH 40MM ATRICLIP;  Surgeon: Hosea Granados MD;  Location: [facility];  Service: Cardiothoracic;  Laterality: N/A;    CARDIAC CATHETERIZATION  07/21/2015 11/1/12, 4/21/22    CARDIAC CATHETERIZATION Left 11/22/2023    Procedure: Cardiac  Catheterization/Vascular Study;  Surgeon: Jayson Castillo MD;  Location: [facility] CATH INVASIVE LOCATION;  Service: Cardiology;  Laterality: Left;    COLONOSCOPY      COLONOSCOPY N/A 04/24/2023    Procedure: COLONOSCOPY WITH ANESTHESIA;  Surgeon: Rick Tidwell DO;  Location: [facility] ENDOSCOPY;  Service: Gastroenterology;  Laterality: N/A;  preop; hx of polyps   postop; polyps  PCP Eric Feldman     CORONARY ARTERY BYPASS GRAFT N/A 11/29/2023    Procedure: CORONARY ARTERY BYPASS GRAFTING X2, LEFT INTERNAL MAMMARY ARTERY GRAFT, RIGHT LEG ENDOSCOPIC VEIN HARVEST, TRANSESOPHAGEAL ECHOCARDIOGRAM, APPLICATION OF PREVENA;  Surgeon: Hosea Granados MD;  Location: [facility] OR;  Service: Cardiothoracic;  Laterality: N/A;    CORONARY STENT PLACEMENT      x3 per patient, X 1 -  11/2012, x 2 - 4/21/22    ENDOSCOPY      ENDOSCOPY N/A 04/24/2023    Procedure: ESOPHAGOGASTRODUODENOSCOPY WITH ANESTHESIA;  Surgeon: Rick Tidwell DO;  Location: [facility] ENDOSCOPY;  Service: Gastroenterology;  Laterality: N/A;  preop; hx of varices   postop; varices   PCP Eric Feldman     HAND RECONSTRUCTION Right 06/15/1967    HYSTERECTOMY      2016    MAZE PROCEDURE N/A 11/29/2023    Procedure: MAZE PROCEDURE WITH ENCOMPASS CLAMP;  Surgeon: Hosea Granados MD;  Location: [facility] OR;  Service: Cardiothoracic;  Laterality: N/A;    TONSILLECTOMY         Family History  Family History   Problem Relation Age of Onset    Hyperlipidemia Mother     Hypertension Mother     Stroke Mother     Diabetes Mother     Arthritis Mother     Hyperlipidemia Father     Hypertension Father     Heart attack Father     Arthritis Father     Early death Father     Heart failure Sister     Stroke Sister     Heart attack Brother     Alcohol abuse Brother     Heart attack Brother     Mental illness Maternal Aunt         Father's  sister    Breast cancer Maternal Aunt     Heart failure Paternal Aunt     Heart failure Paternal Uncle     Heart failure Paternal Uncle     Colon  cancer Neg Hx     Colon polyps Neg Hx     Esophageal cancer Neg Hx        The following portions of the patient's history were reviewed and updated as appropriate: allergies, current medications, past family history, past medical history, past social history, past surgical history, and problem list.    Review of Systems   Constitutional:  Negative for activity change and unexpected weight loss.   Cardiovascular:  Negative for chest pain.   Gastrointestinal:  Negative for blood in stool, constipation and diarrhea.   Endocrine: Negative for cold intolerance and heat intolerance.   Genitourinary:  Positive for decreased libido and vaginal pain. Negative for dyspareunia, pelvic pain and vaginal discharge.   Musculoskeletal:  Negative for arthralgias, back pain, neck pain and neck stiffness.   Skin:  Negative for rash.   Neurological:  Negative for dizziness and headache.   Psychiatric/Behavioral:  Positive for agitation. Negative for sleep disturbance. The patient is not nervous/anxious.        Objective   Physical Exam  Vitals and nursing note reviewed.   Constitutional:       Appearance: She is well-developed.   HENT:      Head: Normocephalic and atraumatic.   Eyes:      General:         Right eye: No discharge.         Left eye: No discharge.      Conjunctiva/sclera: Conjunctivae normal.   Neck:      Thyroid: No thyromegaly.   Cardiovascular:      Rate and Rhythm: Normal rate and regular rhythm.      Heart sounds: Normal heart sounds. No murmur heard.  Pulmonary:      Effort: Pulmonary effort is normal.      Breath sounds: Normal breath sounds.   Musculoskeletal:      Cervical back: Normal range of motion and neck supple.   Skin:     General: Skin is warm and dry.   Neurological:      Mental Status: She is alert and oriented to person, place, and time.   Psychiatric:         Mood and Affect: Mood normal.         Behavior: Behavior normal.         Thought Content: Thought content normal.         Judgment: Judgment  normal.         Assessment & Plan   Diagnoses and all orders for this visit:    1. Vaginal dryness (Primary)  -     DHEA-Sulfate  -     Cortisol  -     Estradiol  -     Follicle Stimulating Hormone  -     Luteinizing Hormone  -     Progesterone  -     Testosterone  -     T4, Free  -     T3, Uptake  -     TSH    2. Decreased libido  -     DHEA-Sulfate  -     Cortisol  -     Estradiol  -     Follicle Stimulating Hormone  -     Luteinizing Hormone  -     Progesterone  -     Testosterone  -     T4, Free  -     T3, Uptake  -     TSH    3. Mood swings  -     DHEA-Sulfate  -     Cortisol  -     Estradiol  -     Follicle Stimulating Hormone  -     Luteinizing Hormone  -     Progesterone  -     Testosterone  -     T4, Free  -     T3, Uptake  -     TSH    4. Encounter for screening mammogram for breast cancer  -     Mammo Screening Digital Tomosynthesis Bilateral With CAD; Future    5. Menopause  -     DEXA Bone Density Axial; Future    6. History of stroke    7. Coronary artery disease involving native coronary artery of native heart with other form of angina pectoris    8. PAF (paroxysmal atrial fibrillation)    9. Mixed hyperlipidemia    Pt comes in today to establish care. Biggest complaint is that of what she feels are hormonal symptoms. Pt declined exam today and will schedule to return for it.     Pt had hysterectomy 12 years ago due to bleeding and fibroids. Negative history of abnormal paps and normal pathology per pt. She does still have her ovaries. Since that time, she feels like she has had increased issues with vaginal dryness that cause issues with intimacy. Also complains of mood swings and decreased libido. Is wanting to have hormones checked. Discussed possible treatment options pending these results. Pt has significant heart history as well as history of a stroke and chronic kidney disease. I discussed the contraindication of testosterone and estrogen containing hormones. Did discuss possibility of [facility]  pharmacy compounding something without hormones that could benefit her as well. Also discussed estrace vaginal cream that could benefit her dryness symptoms or a compounded that  pharmacy may recommend. At this time, pt requests to proceed with hormone labs to see if any significant abnormalities and also confirm menopause. Will call with those results and decide on next steps from there.          Coleen Rodarte, APRN  2/29/2024    Return in about 4 weeks (around 3/28/2024) for Annual physical.

## 2024-02-29 NOTE — PROGRESS NOTES
Reason For Visit:  Atrial Fibrillation (6 WK FU )     Subjective        Alessandra Baker is a 58 y.o. female with the below pertinent PMH who presents for follow-up of CAD and A-fib.    Patient was last seen in the cardiology clinic 1/17/2024.  At that time the patient reported taking Lasix almost daily due to weight gain with some minimal swelling.  She was started on spironolactone 12.5 mg daily.  Subsequent lab work showed slightly decreased kidney function, however regimen is maintained and a follow-up appointment.  Patient is followed with cardiothoracic surgery and they have stopped her Eliquis given her high risk for bleeding given her history of esophageal varices.  She was maintained on dual antiplatelet therapy of aspirin and Brilinta.    Since her [facility] appointment she been doing well.  She denies any significant cardiac complaints.  She does have some intermittent fatigue but this is not activity limiting.  She says she never got the voicemail and is maintained on Eliquis and Brilinta at this time.  No bleeding concerns.  Denies any chest pain or shortness of breath.  Weight has been stable.  She is still having good urinary output.  She has not taken any of her as needed Lasix.    ROS: Pertinent findings as noted above    Pertinent PMH  CAD with prior MI, multiple stents, and CABG (LIMA-LAD, SVT-OM, 11/29/23, Dr. Granados)  Paroxysmal atrial fibrillation s/p MAZE and RONALD clipping (11/29/23)  Hypertension  Hyperlipidemia with statin intolerance  Prior stroke  Rheumatoid arthritis  Cirrhosis complicated by grade 1 esophageal varices    Pertinent past medical, surgical, family, and social history were reviewed.      Current Outpatient Medications:     cetirizine (zyrTEC) 10 MG tablet, Take 1 tablet by mouth Daily As Needed for Allergies., Disp: , Rfl:     dapagliflozin Propanediol 10 MG tablet, Take 10 mg by mouth Daily., Disp: 30 tablet, Rfl: 11    EPINEPHrine (EPIPEN) 0.3 MG/0.3ML solution auto-injector injection,  "Inject 0.3 mL into the appropriate muscle as directed by prescriber 1 (One) Time. If needed, Disp: , Rfl:     ezetimibe (ZETIA) 10 MG tablet, Take 1 tablet by mouth Daily., Disp: 30 tablet, Rfl: 11    fluticasone (FLONASE) 50 MCG/ACT nasal spray, 2 sprays into the nostril(s) as directed by provider Daily., Disp: 16 g, Rfl: 11    Inclisiran Sodium 284 MG/1.5ML solution prefilled syringe, Inject 1.5 mL under the skin into the appropriate area as directed. Next due on 11/30- receives from oncology, Disp: , Rfl:     metoprolol succinate XL (TOPROL-XL) 25 MG 24 hr tablet, Take 1.5 tablets by mouth Daily., Disp: 135 tablet, Rfl: 3    omeprazole (priLOSEC) 40 MG capsule, Take 1 capsule by mouth Daily., Disp: 30 capsule, Rfl: 2    sertraline (ZOLOFT) 100 MG tablet, Take 2 tablets by mouth Daily., Disp: 180 tablet, Rfl: 3    spironolactone (ALDACTONE) 25 MG tablet, Take 0.5 tablets by mouth Daily., Disp: 30 tablet, Rfl: 5    sulfaSALAzine (AZULFIDINE) 500 MG tablet, Take 1 tablet by mouth Daily., Disp: 30 tablet, Rfl: 0    ticagrelor (BRILINTA) 90 MG tablet tablet, Take 1 tablet by mouth 2 (Two) Times a Day., Disp: 60 tablet, Rfl: 11    aspirin 81 MG EC tablet, Take 1 tablet by mouth Daily., Disp: , Rfl:     furosemide (LASIX) 40 MG tablet, , Disp: , Rfl:      Objective   Vital Signs:  /86 (BP Location: Right arm, Patient Position: Sitting)   Pulse 95   Resp 18   Ht 165.1 cm (65\")   Wt 83.9 kg (185 lb)   SpO2 99%   BMI 30.79 kg/m²   Estimated body mass index is 30.79 kg/m² as calculated from the following:    Height as of this encounter: 165.1 cm (65\").    Weight as of this encounter: 83.9 kg (185 lb).      Constitutional:       Appearance: Healthy appearance. Not in distress.   Pulmonary:      Effort: Pulmonary effort is normal.      Breath sounds: Normal breath sounds.   Cardiovascular:      PMI at left midclavicular line. Normal rate. Regular rhythm.      Murmurs: There is no murmur.      No gallop.  No click. " No rub.   Edema:     Peripheral edema absent.   Abdominal:      General: Bowel sounds are normal.   Musculoskeletal: Normal range of motion.      Cervical back: Normal range of motion and neck supple. Skin:     General: Skin is warm.   Neurological:      Mental Status: Alert and oriented to person, place and time.        Result Review :  The following data was reviewed by: FITZ May on 02/29/2024:  CMP   CMP          12/11/2023    14:13 1/17/2024    12:22 2/2/2024    11:42   CMP   Glucose 101  108  90    BUN 17  21  17    Creatinine 1.22  1.30  1.45    EGFR 51.2  47.5  41.6    Sodium 139  142  143    Potassium 4.5  4.5  4.9    Chloride 103  102  107    Calcium 9.5  9.8  9.8    Total Protein 7.4      Albumin 4.3      Globulin 3.1      Total Bilirubin 0.4      Alkaline Phosphatase 87      AST (SGOT) 17      ALT (SGPT) 13      Albumin/Globulin Ratio 1.4      BUN/Creatinine Ratio 13.9  16.2  11.7    Anion Gap 9.0  12.0  11.0      Lipid Panel   Lipid Panel          5/2/2023    08:46 11/19/2023    06:00 2/2/2024    11:42   Lipid Panel   Total Cholesterol 200  89  114    Triglycerides 265  136  306    HDL Cholesterol 24  29  28    VLDL Cholesterol 48  24  46    LDL Cholesterol  128  36  40    LDL/HDL Ratio 5.13  1.13  0.89      BMP   BMP          12/11/2023    14:13 1/17/2024    12:22 2/2/2024    11:42   BMP   BUN 17  21  17    Creatinine 1.22  1.30  1.45    Sodium 139  142  143    Potassium 4.5  4.5  4.9    Chloride 103  102  107    CO2 27.0  28.0  25.0    Calcium 9.5  9.8  9.8      Data reviewed : Cardiology studies echo, Holter monitor      ECG 12 Lead    Date/Time: 2/29/2024 11:11 AM  Performed by: Trev Lyon APRN    Authorized by: Trev Lyon APRN  Comparison: compared with previous ECG from 1/17/2024  Similar to previous ECG  Comparison to previous ECG: Normal sinus rhythm with nonspecific T wave abnormalities  Rhythm: sinus rhythm  Rate: normal  T inversion: V1  QRS axis: normal    Clinical  impression: abnormal EKG            Assessment and Plan   Diagnoses and all orders for this visit:    1. Coronary artery disease involving native coronary artery of native heart with other form of angina pectoris (Primary)  2. Primary hypertension  3. Mixed hyperlipidemia  4. PAF (paroxysmal atrial fibrillation)  5. 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 Stage 3a chronic kidney disease  -Patient is doing well that any angina as of late.  Tolerating regimen well without any significant issues.  EKG today normal sinus rhythm.  - Will make official change today discontinuing Eliquis in favor of aspirin 81 mg daily and ticagrelor 90 mg twice daily  - Will check lab work today to make sure that kidney function is improved on spironolactone therapy  - Given her high normal right we will increase her Toprol-XL to 37.5 mg daily and see if she tolerates this  - Continue on Leqvio as well as Zetia, recent LDL well below 55.  -Will reach out about why the patient has not been scheduled for cardiac rehab yet.  -Could discuss coming off DAPT 1 year after surgery, however if there are no bleeding concerns at that time would favor continuing.           I spent 2 minutes on the separately reported service of EKG interpretation. This time is not included in the time used to support the E/M service also reported today.      Follow Up   Return in about 6 weeks (around 4/11/2024).  Patient was given instructions and counseling regarding her condition or for health maintenance advice. Please see specific information pulled into the AVS if appropriate.       Part of this note may be an electronic transcription/translation of spoken language to printed text using the Dragon Dictation System.

## 2024-03-01 DIAGNOSIS — N18.31 STAGE 3A CHRONIC KIDNEY DISEASE: Primary | ICD-10-CM

## 2024-03-01 LAB
CORTIS SERPL-MCNC: 8.4 UG/DL (ref 6.2–19.4)
DHEA-S SERPL-MCNC: 39.3 UG/DL (ref 29.4–220.5)
ESTRADIOL SERPL-MCNC: 7.5 PG/ML
FSH SERPL-ACNC: 99.1 MIU/ML
LH SERPL-ACNC: 67.1 MIU/ML
PROGEST SERPL-MCNC: 0.1 NG/ML
T3RU NFR SERPL: 25 % (ref 24–39)
T4 FREE SERPL-MCNC: 0.92 NG/DL (ref 0.93–1.7)
TESTOST SERPL-MCNC: 15 NG/DL (ref 4–50)
TSH SERPL DL<=0.005 MIU/L-ACNC: 2.09 UIU/ML (ref 0.27–4.2)

## 2024-03-05 RX ORDER — METOPROLOL SUCCINATE 25 MG/1
25 TABLET, EXTENDED RELEASE ORAL DAILY
Qty: 30 TABLET | Refills: 1 | Status: SHIPPED | OUTPATIENT
Start: 2024-03-05

## 2024-03-05 NOTE — TELEPHONE ENCOUNTER
Rx Refill Note  Requested Prescriptions     Pending Prescriptions Disp Refills    metoprolol succinate XL (TOPROL-XL) 25 MG 24 hr tablet [Pharmacy Med Name: METOPROLOL SUCC ER 25 MG TAB] 30 tablet 1     Sig: TAKE 1 TABLET BY MOUTH DAILY      Last office visit with prescribing clinician: 2/29/2024      Next office visit with prescribing clinician: 4/11/2024            Gianni Lazo MA  03/05/24, 11:02 CST

## 2024-03-09 LAB
NCCN CRITERIA FLAG: NORMAL
TYRER CUZICK SCORE: 9.6

## 2024-03-13 ENCOUNTER — OFFICE VISIT (OUTPATIENT)
Dept: OBSTETRICS AND GYNECOLOGY | Age: 59
End: 2024-03-13
Payer: MEDICARE

## 2024-03-13 VITALS
WEIGHT: 186 LBS | SYSTOLIC BLOOD PRESSURE: 128 MMHG | HEIGHT: 65 IN | DIASTOLIC BLOOD PRESSURE: 92 MMHG | BODY MASS INDEX: 30.99 KG/M2

## 2024-03-13 DIAGNOSIS — Z01.419 WELL WOMAN EXAM WITH ROUTINE GYNECOLOGICAL EXAM: Primary | ICD-10-CM

## 2024-03-13 DIAGNOSIS — N89.8 VAGINAL IRRITATION: ICD-10-CM

## 2024-03-13 DIAGNOSIS — N89.8 VAGINAL DRYNESS: ICD-10-CM

## 2024-03-13 RX ORDER — ESTRADIOL 0.1 MG/G
CREAM VAGINAL
Qty: 42.5 G | Refills: 2 | Status: SHIPPED | OUTPATIENT
Start: 2024-03-14

## 2024-03-13 NOTE — PROGRESS NOTES
"Chief Complaint   Patient presents with    Gynecologic Exam     Patient here for annual, patient has had a hysterectomy, last mammogram 2/17/23, last colonoscopy 04/2023, patient denies any problems or concerns.     Lab Results     Patient also is here to discuss labs and hormone therapy.         Subjective     Alessandra Baker is a 58 y.o. female    History of Present Illness  Pt comes in today for annual wellness exam. Also wants to discuss previous hormones and possibly trying vaginal cream.     /92 (BP Location: Left arm, Patient Position: Sitting, Cuff Size: Adult)   Ht 165.1 cm (65\")   Wt 84.4 kg (186 lb)   BMI 30.95 kg/m²     Outpatient Encounter Medications as of 3/13/2024   Medication Sig Dispense Refill    aspirin 81 MG EC tablet Take 1 tablet by mouth Daily.      cetirizine (zyrTEC) 10 MG tablet Take 1 tablet by mouth Daily As Needed for Allergies.      dapagliflozin Propanediol 10 MG tablet Take 10 mg by mouth Daily. 30 tablet 11    EPINEPHrine (EPIPEN) 0.3 MG/0.3ML solution auto-injector injection Inject 0.3 mL into the appropriate muscle as directed by prescriber 1 (One) Time. If needed      ezetimibe (ZETIA) 10 MG tablet Take 1 tablet by mouth Daily. 30 tablet 11    fluticasone (FLONASE) 50 MCG/ACT nasal spray 2 sprays into the nostril(s) as directed by provider Daily. 16 g 11    Inclisiran Sodium 284 MG/1.5ML solution prefilled syringe Inject 1.5 mL under the skin into the appropriate area as directed. Next due on 11/30- receives from oncology      metoprolol succinate XL (TOPROL-XL) 25 MG 24 hr tablet TAKE 1 TABLET BY MOUTH DAILY 30 tablet 1    omeprazole (priLOSEC) 40 MG capsule Take 1 capsule by mouth Daily. 30 capsule 2    sertraline (ZOLOFT) 100 MG tablet Take 2 tablets by mouth Daily. 180 tablet 3    spironolactone (ALDACTONE) 25 MG tablet Take 0.5 tablets by mouth Daily. 30 tablet 5    sulfaSALAzine (AZULFIDINE) 500 MG tablet Take 1 tablet by mouth Daily. 30 tablet 0    ticagrelor " (BRILINTA) 90 MG tablet tablet Take 1 tablet by mouth 2 (Two) Times a Day. 60 tablet 11    [START ON 3/14/2024] estradiol (ESTRACE) 0.1 MG/GM vaginal cream Use 1 gram vaginally nightly x 2 weeks then 2-3 times per week 42.5 g 2     No facility-administered encounter medications on file as of 3/13/2024.       Past Medical History  Past Medical History:   Diagnosis Date    Anxiety     Arthritis     Atrial fibrillation with RVR 11/19/2023    CAD (coronary artery disease)     Chronic kidney disease     Cirrhosis     Depression     Esophageal varices     GERD (gastroesophageal reflux disease)     Headache     Heart problem     Hyperlipidemia     Hypertension 11/2012    Liver disease 2015    Liver problem     Myocardial infarction 11/2012    Rheumatoid arthritis     Skin cancer     Stroke     Ulnar neuropathy     Visual impairment         Surgical History  Past Surgical History:   Procedure Laterality Date    ANGIOPLASTY      11/2012    APPENDECTOMY      ATRIAL APPENDAGE EXCLUSION LEFT WITH TRANSESOPHAGEAL ECHOCARDIOGRAM N/A 11/29/2023    Procedure: ATRIAL APPENDAGE EXCLUSION LEFT WITH 40MM ATRICLIP;  Surgeon: Hosea Granados MD;  Location: [facility] OR;  Service: Cardiothoracic;  Laterality: N/A;    CARDIAC CATHETERIZATION  07/21/2015 11/1/12, 4/21/22    CARDIAC CATHETERIZATION Left 11/22/2023    Procedure: Cardiac Catheterization/Vascular Study;  Surgeon: Jayson Castillo MD;  Location: [facility] CATH INVASIVE LOCATION;  Service: Cardiology;  Laterality: Left;    COLONOSCOPY      COLONOSCOPY N/A 04/24/2023    Procedure: COLONOSCOPY WITH ANESTHESIA;  Surgeon: Rick Tidwell DO;  Location: [facility] ENDOSCOPY;  Service: Gastroenterology;  Laterality: N/A;  preop; hx of polyps   postop; polyps  PCP Eric Feldman     CORONARY ARTERY BYPASS GRAFT N/A 11/29/2023    Procedure: CORONARY ARTERY BYPASS GRAFTING X2, LEFT INTERNAL MAMMARY ARTERY GRAFT, RIGHT LEG ENDOSCOPIC VEIN HARVEST, TRANSESOPHAGEAL ECHOCARDIOGRAM, APPLICATION OF  PREVENA;  Surgeon: Hosea Granados MD;  Location:  [facility] OR;  Service: Cardiothoracic;  Laterality: N/A;    CORONARY STENT PLACEMENT      x3 per patient, X 1 -  11/2012, x 2 - 4/21/22    ENDOSCOPY      ENDOSCOPY N/A 04/24/2023    Procedure: ESOPHAGOGASTRODUODENOSCOPY WITH ANESTHESIA;  Surgeon: Rick Tidwell DO;  Location:  [facility] ENDOSCOPY;  Service: Gastroenterology;  Laterality: N/A;  preop; hx of varices   postop; varices   PCP Eric Feldman     HAND RECONSTRUCTION Right 06/15/1967    HYSTERECTOMY      2016    MAZE PROCEDURE N/A 11/29/2023    Procedure: MAZE PROCEDURE WITH ENCOMPASS CLAMP;  Surgeon: Hosea Granados MD;  Location:  [facility] OR;  Service: Cardiothoracic;  Laterality: N/A;    TONSILLECTOMY         Family History  Family History   Problem Relation Age of Onset    Hyperlipidemia Mother     Hypertension Mother     Stroke Mother     Diabetes Mother     Arthritis Mother     Hyperlipidemia Father     Hypertension Father     Heart attack Father     Arthritis Father     Early death Father     Heart failure Sister     Stroke Sister     Heart attack Brother     Alcohol abuse Brother     Heart attack Brother     Mental illness Maternal Aunt         Father's  sister    Breast cancer Maternal Aunt     Heart failure Paternal Aunt     Heart failure Paternal Uncle     Heart failure Paternal Uncle     Colon cancer Neg Hx     Colon polyps Neg Hx     Esophageal cancer Neg Hx        The following portions of the patient's history were reviewed and updated as appropriate: allergies, current medications, past family history, past medical history, past social history, past surgical history, and problem list.    Review of Systems   Constitutional:  Negative for activity change and unexpected weight loss.   Cardiovascular:  Negative for chest pain.   Gastrointestinal:  Negative for blood in stool, constipation and diarrhea.   Endocrine: Negative for cold intolerance and heat intolerance.   Genitourinary:  Positive for  vaginal pain. Negative for dyspareunia, pelvic pain and vaginal discharge.   Musculoskeletal:  Negative for arthralgias, back pain, neck pain and neck stiffness.   Skin:  Negative for rash.   Neurological:  Negative for dizziness and headache.   Psychiatric/Behavioral:  Negative for sleep disturbance. The patient is not nervous/anxious.        Objective   Physical Exam  Vitals and nursing note reviewed. Exam conducted with a chaperone present.   Constitutional:       General: She is not in acute distress.     Appearance: She is well-developed.   HENT:      Head: Normocephalic and atraumatic.   Neck:      Thyroid: No thyromegaly.   Cardiovascular:      Rate and Rhythm: Normal rate and regular rhythm.      Heart sounds: No murmur heard.  Pulmonary:      Effort: Pulmonary effort is normal.      Breath sounds: Normal breath sounds.   Chest:   Breasts:     Right: No inverted nipple or mass.      Left: No inverted nipple or mass.   Abdominal:      General: There is no distension.      Palpations: Abdomen is soft.      Tenderness: There is no abdominal tenderness.   Genitourinary:     Exam position: Supine.      Labia:         Right: No tenderness or lesion.         Left: No tenderness or lesion.       Vagina: Normal. No vaginal discharge or bleeding.      Uterus: Absent.       Adnexa:         Right: No tenderness or fullness.          Left: No tenderness or fullness.        Rectum: Normal anal tone.      Comments: Patient s/ophysterectomy, Vaginal cuff unremarkable. Labia normal, no lesions noted. Urethral meatus unremarkable, no prolapse of mucosa. Anus/perineum normal    Vaginal dryness noted.   Musculoskeletal:         General: Normal range of motion.      Cervical back: Normal range of motion and neck supple.   Skin:     General: Skin is warm and dry.   Neurological:      Mental Status: She is alert and oriented to person, place, and time.   Psychiatric:         Behavior: Behavior normal.         Judgment: Judgment  normal.         Assessment & Plan   Diagnoses and all orders for this visit:    1. Well woman exam with routine gynecological exam (Primary)    2. Vaginal dryness  -     estradiol (ESTRACE) 0.1 MG/GM vaginal cream; Use 1 gram vaginally nightly x 2 weeks then 2-3 times per week  Dispense: 42.5 g; Refill: 2  -     NuSwab BV & Candida - Swab, Vagina    3. Vaginal irritation  -     NuSwab BV & Candida - Swab, Vagina         Normal GYN exam. Encouraged SBE, pt is aware how to do self breast exam and the importance of same. Discussed weight management and importance of maintaining a healthy weight. Discussed Vitamin D intake and the importance of adequate vitamin D for both bone health and a healthy immune system.  Discussed daily exercise and the importance of same, in regards to a healthy heart as well as helping to maintain her weight and improving her mental health.  Colonoscopy is up to date. Mammogram  is scheduled . Bone density  is scheduled . Pap smear is not done per ASCCP guidelines. HPV is not done. Lab work up is up to date.      Pt previously seen for hormone work up. It had been discussed at that time that due to her significant health history that estrogen or testosterone wouldn't be recommended for her. Lab results were called to patient and discussed today. One of biggest complaints was vaginal dryness. Discussed trialing estrace vaginal cream which pt does wish to proceed with.     Pt does wish to be checked for vaginal infection due to irritation. I did discuss that this could be related to dryness but swab collected.    Patient denies history of abnormal paps. Had hysterectomy for fibroids.       Coleen Rodarte, FITZ  3/13/2024    Return in about 3 months (around 6/13/2024) for Recheck med check.

## 2024-03-15 LAB
A VAGINAE DNA VAG QL NAA+PROBE: NORMAL SCORE
BVAB2 DNA VAG QL NAA+PROBE: NORMAL SCORE
C ALBICANS DNA VAG QL NAA+PROBE: NEGATIVE
C GLABRATA DNA VAG QL NAA+PROBE: NEGATIVE
MEGA1 DNA VAG QL NAA+PROBE: NORMAL SCORE

## 2024-04-11 ENCOUNTER — OFFICE VISIT (OUTPATIENT)
Dept: CARDIOLOGY | Facility: CLINIC | Age: 59
End: 2024-04-11
Payer: MEDICARE

## 2024-04-11 ENCOUNTER — TRANSCRIBE ORDERS (OUTPATIENT)
Dept: ADMINISTRATIVE | Facility: HOSPITAL | Age: 59
End: 2024-04-11
Payer: MEDICARE

## 2024-04-11 VITALS
RESPIRATION RATE: 18 BRPM | OXYGEN SATURATION: 98 % | DIASTOLIC BLOOD PRESSURE: 88 MMHG | BODY MASS INDEX: 31.32 KG/M2 | HEART RATE: 88 BPM | HEIGHT: 65 IN | SYSTOLIC BLOOD PRESSURE: 140 MMHG | WEIGHT: 188 LBS

## 2024-04-11 DIAGNOSIS — I48.0 PAF (PAROXYSMAL ATRIAL FIBRILLATION): ICD-10-CM

## 2024-04-11 DIAGNOSIS — N18.32 STAGE 3B CHRONIC KIDNEY DISEASE: Primary | ICD-10-CM

## 2024-04-11 DIAGNOSIS — T45.1X5A CIRRHOSIS OF LIVER DUE TO METHOTREXATE: ICD-10-CM

## 2024-04-11 DIAGNOSIS — E78.2 MIXED HYPERLIPIDEMIA: ICD-10-CM

## 2024-04-11 DIAGNOSIS — I25.118 CORONARY ARTERY DISEASE INVOLVING NATIVE CORONARY ARTERY OF NATIVE HEART WITH OTHER FORM OF ANGINA PECTORIS: Primary | ICD-10-CM

## 2024-04-11 DIAGNOSIS — K71.7 CIRRHOSIS OF LIVER DUE TO METHOTREXATE: ICD-10-CM

## 2024-04-11 DIAGNOSIS — I10 PRIMARY HYPERTENSION: ICD-10-CM

## 2024-04-11 DIAGNOSIS — N18.31 STAGE 3A CHRONIC KIDNEY DISEASE: ICD-10-CM

## 2024-04-11 RX ORDER — CARVEDILOL 3.12 MG/1
3.12 TABLET ORAL 2 TIMES DAILY
Qty: 60 TABLET | Refills: 11 | Status: SHIPPED | OUTPATIENT
Start: 2024-04-11

## 2024-04-11 NOTE — PROGRESS NOTES
Reason For Visit:  Coronary artery disease involving native coronary artery of (6 week follow up)     Subjective        Alessandra Baker is a 59 y.o. female with the below pertinent PMH who presents for follow-up of CAD and A-fib.    Patient was last seen 2/29/2024.  At that time she was doing well without any significant cardiac complaints.  I discontinued her Eliquis that day in favor of being on dual antiplatelet therapy of aspirin and ticagrelor.  I also increased her Toprol-XL to 37.5 mg daily.    Since her previous appointment she been doing well from a cardiac standpoint.  She denies any chest pain or ongoing shortness of breath.  She is tolerating her regimen well without any significant side effects.  She did not increase her metoprolol is only been taking the 1 tablet (25 mg daily).  Blood pressure will be elevated in the clinic today.  Heart rate remains high normal as well.  No excessive bleeding concerns on the dual antiplatelet therapy.      ROS: Pertinent findings as noted above    Pertinent PMH  CAD with prior MI, multiple stents, and CABG (LIMA-LAD, SVT-OM, 11/29/23, Dr. Granados)  Paroxysmal atrial fibrillation s/p MAZE and RONALD clipping (11/29/23)  Hypertension  Hyperlipidemia with statin intolerance  Prior stroke  Rheumatoid arthritis  Cirrhosis complicated by grade 1 esophageal varices    Pertinent past medical, surgical, family, and social history were reviewed.      Current Outpatient Medications:     aspirin 81 MG EC tablet, Take 1 tablet by mouth Daily., Disp: , Rfl:     cetirizine (zyrTEC) 10 MG tablet, Take 1 tablet by mouth Daily As Needed for Allergies., Disp: , Rfl:     dapagliflozin Propanediol 10 MG tablet, Take 10 mg by mouth Daily., Disp: 30 tablet, Rfl: 11    EPINEPHrine (EPIPEN) 0.3 MG/0.3ML solution auto-injector injection, Inject 0.3 mL into the appropriate muscle as directed by prescriber 1 (One) Time. If needed, Disp: , Rfl:     estradiol (ESTRACE) 0.1 MG/GM vaginal cream, Use 1 gram  "vaginally nightly x 2 weeks then 2-3 times per week, Disp: 42.5 g, Rfl: 2    ezetimibe (ZETIA) 10 MG tablet, Take 1 tablet by mouth Daily., Disp: 30 tablet, Rfl: 11    fluticasone (FLONASE) 50 MCG/ACT nasal spray, 2 sprays into the nostril(s) as directed by provider Daily., Disp: 16 g, Rfl: 11    Inclisiran Sodium 284 MG/1.5ML solution prefilled syringe, Inject 1.5 mL under the skin into the appropriate area as directed. Next due on 11/30- receives from oncology, Disp: , Rfl:     metoprolol succinate XL (TOPROL-XL) 25 MG 24 hr tablet, TAKE 1 TABLET BY MOUTH DAILY, Disp: 30 tablet, Rfl: 1    omeprazole (priLOSEC) 40 MG capsule, Take 1 capsule by mouth Daily., Disp: 30 capsule, Rfl: 2    sertraline (ZOLOFT) 100 MG tablet, Take 2 tablets by mouth Daily., Disp: 180 tablet, Rfl: 3    spironolactone (ALDACTONE) 25 MG tablet, Take 0.5 tablets by mouth Daily., Disp: 30 tablet, Rfl: 5    sulfaSALAzine (AZULFIDINE) 500 MG tablet, Take 1 tablet by mouth Daily., Disp: 30 tablet, Rfl: 0    ticagrelor (BRILINTA) 90 MG tablet tablet, Take 1 tablet by mouth 2 (Two) Times a Day., Disp: 60 tablet, Rfl: 11     Objective   Vital Signs:  /88   Pulse 88   Resp 18   Ht 165.1 cm (65\")   Wt 85.3 kg (188 lb)   SpO2 98%   BMI 31.28 kg/m²   Estimated body mass index is 31.28 kg/m² as calculated from the following:    Height as of this encounter: 165.1 cm (65\").    Weight as of this encounter: 85.3 kg (188 lb).      Constitutional:       Appearance: Healthy appearance. Not in distress.   Pulmonary:      Effort: Pulmonary effort is normal.      Breath sounds: Normal breath sounds.   Cardiovascular:      PMI at left midclavicular line. Normal rate. Regular rhythm.      Murmurs: There is no murmur.      No gallop.  No click. No rub.   Edema:     Peripheral edema absent.   Abdominal:      General: Bowel sounds are normal.   Musculoskeletal: Normal range of motion.      Cervical back: Normal range of motion and neck supple. Skin:     " General: Skin is warm.   Neurological:      Mental Status: Alert and oriented to person, place and time.        Result Review :  The following data was reviewed by: FITZ May on 04/11/2024:  CMP   CMP          1/17/2024    12:22 2/2/2024    11:42 2/29/2024    11:23   CMP   Glucose 108  90  106    BUN 21  17  23    Creatinine 1.30  1.45  1.42    EGFR 47.5  41.6  42.7    Sodium 142  143  142    Potassium 4.5  4.9  4.5    Chloride 102  107  104    Calcium 9.8  9.8  10.0    BUN/Creatinine Ratio 16.2  11.7  16.2    Anion Gap 12.0  11.0  12.0      Lipid Panel   Lipid Panel          5/2/2023    08:46 11/19/2023    06:00 2/2/2024    11:42   Lipid Panel   Total Cholesterol 200  89  114    Triglycerides 265  136  306    HDL Cholesterol 24  29  28    VLDL Cholesterol 48  24  46    LDL Cholesterol  128  36  40    LDL/HDL Ratio 5.13  1.13  0.89      BMP   BMP          1/17/2024    12:22 2/2/2024    11:42 2/29/2024    11:23   BMP   BUN 21  17  23    Creatinine 1.30  1.45  1.42    Sodium 142  143  142    Potassium 4.5  4.9  4.5    Chloride 102  107  104    CO2 28.0  25.0  26.0    Calcium 9.8  9.8  10.0      Data reviewed : Cardiology studies echo      Results for orders placed during the hospital encounter of 11/21/23    Intra-Op TAVR Transesophageal Echo    Interpretation Summary    Left ventricular systolic function is normal.         Assessment and Plan   Diagnoses and all orders for this visit:    1. Coronary artery disease involving native coronary artery of native heart with other form of angina pectoris (Primary)  2. Primary hypertension  3. Mixed hyperlipidemia  4. PAF (paroxysmal atrial fibrillation)  5. 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 Stage 3a chronic kidney disease  -No anginal symptoms as of late and tolerating her regimen well  - Continue on dual antiplatelet therapy of aspirin 81 mg daily and Brilinta 90 twice daily (given her history of PAF with recent RONALD occlusion may keep on dual  antiplatelet therapy indefinitely, could consider reduction to 1 if bleeding concerns arise)  - Will transition metoprolol to carvedilol 3.125 mg twice daily  - LDL well below 55, continue Leqvio as well as Zetia  - Patient had recent evaluation by nephrology with no significant changes to her regimen.           .      Follow Up   No follow-ups on file.  Patient was given instructions and counseling regarding her condition or for health maintenance advice. Please see specific information pulled into the AVS if appropriate.       Part of this note may be an electronic transcription/translation of spoken language to printed text using the Dragon Dictation System.

## 2024-04-15 DIAGNOSIS — M05.79 RHEUMATOID ARTHRITIS INVOLVING MULTIPLE SITES WITH POSITIVE RHEUMATOID FACTOR: Primary | ICD-10-CM

## 2024-04-15 RX ORDER — SULFASALAZINE 500 MG/1
500 TABLET ORAL DAILY
Qty: 30 TABLET | Refills: 0 | Status: CANCELLED | OUTPATIENT
Start: 2024-04-15

## 2024-04-15 NOTE — TELEPHONE ENCOUNTER
Rx Refill Note  Requested Prescriptions     Pending Prescriptions Disp Refills    sulfaSALAzine (AZULFIDINE) 500 MG tablet 30 tablet 0     Sig: Take 1 tablet by mouth Daily.      Last office visit with prescribing clinician: 2/2/2024   Last telemedicine visit with prescribing clinician: Visit date not found   Next office visit with prescribing clinician: 8/2/2024                         Would you like a call back once the refill request has been completed: [] Yes [] No    If the office needs to give you a call back, can they leave a voicemail: [] Yes [] No    Debbie Benson MA  04/15/24, 11:11 CDT

## 2024-04-17 RX ORDER — SULFASALAZINE 500 MG/1
500 TABLET ORAL DAILY
Qty: 90 TABLET | Refills: 0 | Status: SHIPPED | OUTPATIENT
Start: 2024-04-17

## 2024-05-03 DIAGNOSIS — I85.00 ESOPHAGEAL VARICES WITHOUT BLEEDING: ICD-10-CM

## 2024-05-03 DIAGNOSIS — R07.9 CHEST PAIN, UNSPECIFIED: ICD-10-CM

## 2024-05-03 RX ORDER — OMEPRAZOLE 40 MG/1
40 CAPSULE, DELAYED RELEASE ORAL DAILY
Qty: 30 CAPSULE | Refills: 2 | Status: SHIPPED | OUTPATIENT
Start: 2024-05-03

## 2024-05-23 ENCOUNTER — TRANSCRIBE ORDERS (OUTPATIENT)
Dept: ADMINISTRATIVE | Facility: HOSPITAL | Age: 59
End: 2024-05-23
Payer: MEDICARE

## 2024-05-23 DIAGNOSIS — N18.31 STAGE 3A CHRONIC KIDNEY DISEASE (CKD): Primary | ICD-10-CM

## 2024-05-31 ENCOUNTER — TELEPHONE (OUTPATIENT)
Dept: CARDIOLOGY | Facility: CLINIC | Age: 59
End: 2024-05-31
Payer: MEDICARE

## 2024-05-31 DIAGNOSIS — I25.10 CORONARY ARTERY DISEASE INVOLVING NATIVE CORONARY ARTERY OF NATIVE HEART, UNSPECIFIED WHETHER ANGINA PRESENT: ICD-10-CM

## 2024-05-31 NOTE — TELEPHONE ENCOUNTER
Caller: Alessandra Baker    Relationship: Self    Best call back number: [phone]     Which medication are you concerned about: ticagrelor (BRILINTA) (UNKNOWN MG)     Who prescribed you this medication: DR. ROSE     When did you start taking this medication: 2-3 YEARS     What are your concerns: PT IS REQUESTING A RX BE SENT IN  TO THE [facility], [place] IF PROVIDER WOULD LIKE HER TO CONTINUE BEING ON THIS MED. PLEASE ADVISE.

## 2024-05-31 NOTE — TELEPHONE ENCOUNTER
Ricky Slade MD  You3 minutes ago (3:36 PM)     Please let her know she should stay on it. I sent a prescription to her pharmacy. Thank you!       PATIENT NOTIFIED.

## 2024-06-03 ENCOUNTER — HOSPITAL ENCOUNTER (OUTPATIENT)
Dept: ULTRASOUND IMAGING | Facility: HOSPITAL | Age: 59
Discharge: HOME | End: 2024-06-03
Admitting: INTERNAL MEDICINE
Payer: MEDICARE

## 2024-06-03 DIAGNOSIS — N18.32 STAGE 3B CHRONIC KIDNEY DISEASE: ICD-10-CM

## 2024-06-03 PROCEDURE — 76775 US EXAM ABDO BACK WALL LIM: CPT

## 2024-06-06 ENCOUNTER — OFFICE VISIT (OUTPATIENT)
Dept: OBSTETRICS AND GYNECOLOGY | Age: 59
End: 2024-06-06
Payer: MEDICARE

## 2024-06-06 VITALS
DIASTOLIC BLOOD PRESSURE: 96 MMHG | HEIGHT: 65 IN | BODY MASS INDEX: 30.66 KG/M2 | SYSTOLIC BLOOD PRESSURE: 138 MMHG | WEIGHT: 184 LBS

## 2024-06-06 DIAGNOSIS — N89.8 VAGINAL DRYNESS: Primary | ICD-10-CM

## 2024-06-06 PROCEDURE — 3080F DIAST BP >= 90 MM HG: CPT | Performed by: NURSE PRACTITIONER

## 2024-06-06 PROCEDURE — 99213 OFFICE O/P EST LOW 20 MIN: CPT | Performed by: NURSE PRACTITIONER

## 2024-06-06 PROCEDURE — 3075F SYST BP GE 130 - 139MM HG: CPT | Performed by: NURSE PRACTITIONER

## 2024-06-06 RX ORDER — METOPROLOL SUCCINATE 25 MG/1
1 TABLET, EXTENDED RELEASE ORAL DAILY
COMMUNITY
Start: 2024-05-24

## 2024-06-06 RX ORDER — AZATHIOPRINE 50 MG/1
1 TABLET ORAL EVERY 12 HOURS SCHEDULED
COMMUNITY
Start: 2024-06-03

## 2024-06-06 RX ORDER — ERGOCALCIFEROL 1.25 MG/1
1 CAPSULE ORAL WEEKLY
COMMUNITY
Start: 2024-05-21

## 2024-06-06 RX ORDER — ESTRADIOL 0.1 MG/G
CREAM VAGINAL
Qty: 42.5 G | Refills: 2 | Status: SHIPPED | OUTPATIENT
Start: 2024-06-06

## 2024-06-06 NOTE — PROGRESS NOTES
Chief Complaint   Patient presents with    Follow-up     Patient here following up since starting estrace on 3/13/24. Patient reports that she does forget it, but the couple times she has used it, it has helped.        History:  Alessandra Baker is a 59 y.o. female who presents today for follow-up for evaluation of the above:  PT comes in today for follow up after starting estrace cream 3 months ago.         ROS:  Review of Systems   Constitutional:  Negative for activity change and unexpected weight loss.   Cardiovascular:  Negative for chest pain.   Gastrointestinal:  Negative for blood in stool, constipation and diarrhea.   Endocrine: Negative for cold intolerance and heat intolerance.   Genitourinary:  Negative for dyspareunia, pelvic pain and vaginal discharge.   Musculoskeletal:  Negative for arthralgias, back pain, neck pain and neck stiffness.   Skin:  Negative for rash.   Neurological:  Negative for dizziness and headache.   Psychiatric/Behavioral:  Negative for sleep disturbance. The patient is not nervous/anxious.        Ms. Baker  reports that she quit smoking about 10 years ago. Her smoking use included cigarettes. She started smoking about 31 years ago. She has a 30 pack-year smoking history. She has been exposed to tobacco smoke. She has never used smokeless tobacco. She reports that she does not currently use alcohol. She reports that she does not use drugs.      Current Outpatient Medications:     aspirin 81 MG EC tablet, Take 1 tablet by mouth Daily., Disp: , Rfl:     azaTHIOprine (IMURAN) 50 MG tablet, Take 1 tablet by mouth Every 12 (Twelve) Hours., Disp: , Rfl:     carvedilol (COREG) 3.125 MG tablet, Take 1 tablet by mouth 2 (Two) Times a Day., Disp: 60 tablet, Rfl: 11    cetirizine (zyrTEC) 10 MG tablet, Take 1 tablet by mouth Daily As Needed for Allergies., Disp: , Rfl:     dapagliflozin Propanediol 10 MG tablet, Take 10 mg by mouth Daily., Disp: 30 tablet, Rfl: 11    EPINEPHrine (EPIPEN) 0.3  "MG/0.3ML solution auto-injector injection, Inject 0.3 mL into the appropriate muscle as directed by prescriber 1 (One) Time. If needed, Disp: , Rfl:     estradiol (ESTRACE) 0.1 MG/GM vaginal cream, Use 1 gram vaginally nightly x 2 weeks then 2-3 times per week, Disp: 42.5 g, Rfl: 2    ezetimibe (ZETIA) 10 MG tablet, Take 1 tablet by mouth Daily., Disp: 30 tablet, Rfl: 11    fluticasone (FLONASE) 50 MCG/ACT nasal spray, 2 sprays into the nostril(s) as directed by provider Daily., Disp: 16 g, Rfl: 11    Inclisiran Sodium 284 MG/1.5ML solution prefilled syringe, Inject 1.5 mL under the skin into the appropriate area as directed. Next due on 11/30- receives from oncology, Disp: , Rfl:     metoprolol succinate XL (TOPROL-XL) 25 MG 24 hr tablet, Take 1 tablet by mouth Daily., Disp: , Rfl:     omeprazole (priLOSEC) 40 MG capsule, TAKE 1 CAPSULE BY MOUTH EVERY DAY, Disp: 30 capsule, Rfl: 2    sertraline (ZOLOFT) 100 MG tablet, Take 2 tablets by mouth Daily., Disp: 180 tablet, Rfl: 3    spironolactone (ALDACTONE) 25 MG tablet, Take 0.5 tablets by mouth Daily., Disp: 30 tablet, Rfl: 5    sulfaSALAzine (AZULFIDINE) 500 MG tablet, Take 1 tablet by mouth Daily., Disp: 90 tablet, Rfl: 0    ticagrelor (BRILINTA) 90 MG tablet tablet, Take 1 tablet by mouth 2 (Two) Times a Day., Disp: 60 tablet, Rfl: 11    vitamin D (ERGOCALCIFEROL) 1.25 MG (70152 UT) capsule capsule, Take 1 capsule by mouth 1 (One) Time Per Week., Disp: , Rfl:       OBJECTIVE:  /96 (BP Location: Left arm, Patient Position: Sitting, Cuff Size: Adult)   Ht 165.1 cm (65\")   Wt 83.5 kg (184 lb)   BMI 30.62 kg/m²    Physical Exam  Vitals and nursing note reviewed.   Constitutional:       Appearance: She is well-developed.   HENT:      Head: Normocephalic and atraumatic.   Eyes:      General:         Right eye: No discharge.         Left eye: No discharge.      Conjunctiva/sclera: Conjunctivae normal.   Neck:      Thyroid: No thyromegaly.   Cardiovascular:      " Rate and Rhythm: Normal rate and regular rhythm.      Heart sounds: Normal heart sounds. No murmur heard.  Pulmonary:      Effort: Pulmonary effort is normal.      Breath sounds: Normal breath sounds.   Musculoskeletal:      Cervical back: Normal range of motion and neck supple.   Skin:     General: Skin is warm and dry.   Neurological:      Mental Status: She is alert and oriented to person, place, and time.   Psychiatric:         Mood and Affect: Mood normal.         Behavior: Behavior normal.         Thought Content: Thought content normal.         Judgment: Judgment normal.         Assessment/Plan    Diagnoses and all orders for this visit:    1. Vaginal dryness (Primary)  -     estradiol (ESTRACE) 0.1 MG/GM vaginal cream; Use 1 gram vaginally nightly x 2 weeks then 2-3 times per week  Dispense: 42.5 g; Refill: 2    Pt comes in today to follow up on vaginal dryness. Pt has only used the estrace vaginal cream twice since visit here 3 months ago. She states she just forgets about it. She feels like it has helped. Encouraged to use at least weekly if not twice weekly to get full benefit.        An After Visit Summary was printed and given to the patient at discharge.  Return for Annual physical after 3/13/2025. Sooner if problems arise.          FITZ Mueller  Electronically Signed

## 2024-06-07 RX ORDER — EZETIMIBE 10 MG/1
10 TABLET ORAL DAILY
Qty: 30 TABLET | Refills: 11 | Status: SHIPPED | OUTPATIENT
Start: 2024-06-07

## 2024-06-24 ENCOUNTER — PATIENT MESSAGE (OUTPATIENT)
Dept: OBSTETRICS AND GYNECOLOGY | Age: 59
End: 2024-06-24
Payer: MEDICARE

## 2024-06-24 ENCOUNTER — INFUSION (OUTPATIENT)
Dept: ONCOLOGY | Facility: HOSPITAL | Age: 59
End: 2024-06-24
Payer: MEDICARE

## 2024-06-24 VITALS
DIASTOLIC BLOOD PRESSURE: 88 MMHG | RESPIRATION RATE: 18 BRPM | HEART RATE: 94 BPM | BODY MASS INDEX: 31.49 KG/M2 | HEIGHT: 65 IN | TEMPERATURE: 96.5 F | WEIGHT: 189 LBS | SYSTOLIC BLOOD PRESSURE: 150 MMHG | OXYGEN SATURATION: 98 %

## 2024-06-24 DIAGNOSIS — I25.10 CORONARY ARTERY DISEASE, UNSPECIFIED VESSEL OR LESION TYPE, UNSPECIFIED WHETHER ANGINA PRESENT, UNSPECIFIED WHETHER NATIVE OR TRANSPLANTED HEART: ICD-10-CM

## 2024-06-24 DIAGNOSIS — E78.2 MIXED HYPERLIPIDEMIA: Primary | ICD-10-CM

## 2024-06-24 PROCEDURE — 25010000002 INCLISIRAN SODIUM 284 MG/1.5ML SOLUTION PREFILLED SYRINGE

## 2024-06-24 PROCEDURE — 96372 THER/PROPH/DIAG INJ SC/IM: CPT

## 2024-06-24 RX ADMIN — INCLISIRAN 284 MG: 284 INJECTION, SOLUTION SUBCUTANEOUS at 10:07

## 2024-07-06 ENCOUNTER — HEALTH MAINTENANCE LETTER (OUTPATIENT)
Age: 59
End: 2024-07-06

## 2024-07-15 RX ORDER — SALICYLIC ACID 40 %
81 ADHESIVE PATCH, MEDICATED TOPICAL DAILY
Qty: 90 TABLET | Refills: 3 | Status: SHIPPED | OUTPATIENT
Start: 2024-07-15

## 2024-07-15 NOTE — TELEPHONE ENCOUNTER
Rx Refill Note  Requested Prescriptions     Pending Prescriptions Disp Refills    [facility] Aspirin Low Dose 81 MG EC tablet [Pharmacy Med Name: [facility] ASPIRIN EC 81 MG TABLET] 90 tablet 3     Sig: TAKE 1 TABLET BY MOUTH EVERY DAY      Last office visit with prescribing clinician: 1/17/2024      Next office visit with prescribing clinician: 8/13/2024            Gianni Lazo MA  07/15/24, 09:10 CDT

## 2024-07-27 DIAGNOSIS — R07.9 CHEST PAIN, UNSPECIFIED: ICD-10-CM

## 2024-07-27 DIAGNOSIS — I85.00 ESOPHAGEAL VARICES WITHOUT BLEEDING: ICD-10-CM

## 2024-07-29 DIAGNOSIS — R07.9 CHEST PAIN, UNSPECIFIED: ICD-10-CM

## 2024-07-29 DIAGNOSIS — I85.00 ESOPHAGEAL VARICES WITHOUT BLEEDING: ICD-10-CM

## 2024-07-29 RX ORDER — OMEPRAZOLE 40 MG/1
40 CAPSULE, DELAYED RELEASE ORAL DAILY
Qty: 30 CAPSULE | Refills: 2 | Status: SHIPPED | OUTPATIENT
Start: 2024-07-29

## 2024-07-29 RX ORDER — METOPROLOL SUCCINATE 25 MG/1
25 TABLET, EXTENDED RELEASE ORAL DAILY
Qty: 30 TABLET | Refills: 1 | OUTPATIENT
Start: 2024-07-29

## 2024-07-29 RX ORDER — OMEPRAZOLE 40 MG/1
40 CAPSULE, DELAYED RELEASE ORAL DAILY
Qty: 30 CAPSULE | Refills: 2 | OUTPATIENT
Start: 2024-07-29

## 2024-07-29 NOTE — TELEPHONE ENCOUNTER
As of my last office note it appears she should be on Coreg, I will not fill this metoprolol this time.  Can you call the patient and confirm that she is taking carvedilol and not metoprolol.

## 2024-08-02 ENCOUNTER — OFFICE VISIT (OUTPATIENT)
Dept: FAMILY MEDICINE CLINIC | Facility: CLINIC | Age: 59
End: 2024-08-02
Payer: MEDICARE

## 2024-08-02 VITALS
OXYGEN SATURATION: 99 % | HEIGHT: 65 IN | HEART RATE: 86 BPM | DIASTOLIC BLOOD PRESSURE: 102 MMHG | SYSTOLIC BLOOD PRESSURE: 154 MMHG | WEIGHT: 191 LBS | TEMPERATURE: 97.7 F | BODY MASS INDEX: 31.82 KG/M2 | RESPIRATION RATE: 18 BRPM

## 2024-08-02 DIAGNOSIS — I10 PRIMARY HYPERTENSION: ICD-10-CM

## 2024-08-02 DIAGNOSIS — Z00.00 MEDICARE ANNUAL WELLNESS VISIT, SUBSEQUENT: Primary | ICD-10-CM

## 2024-08-02 DIAGNOSIS — I25.10 CORONARY ARTERY DISEASE INVOLVING NATIVE CORONARY ARTERY OF NATIVE HEART, UNSPECIFIED WHETHER ANGINA PRESENT: ICD-10-CM

## 2024-08-02 DIAGNOSIS — G47.33 OSA (OBSTRUCTIVE SLEEP APNEA): ICD-10-CM

## 2024-08-02 PROCEDURE — 1170F FXNL STATUS ASSESSED: CPT | Performed by: FAMILY MEDICINE

## 2024-08-02 PROCEDURE — G0439 PPPS, SUBSEQ VISIT: HCPCS | Performed by: FAMILY MEDICINE

## 2024-08-02 PROCEDURE — 99214 OFFICE O/P EST MOD 30 MIN: CPT | Performed by: FAMILY MEDICINE

## 2024-08-02 PROCEDURE — 1126F AMNT PAIN NOTED NONE PRSNT: CPT | Performed by: FAMILY MEDICINE

## 2024-08-02 PROCEDURE — 3077F SYST BP >= 140 MM HG: CPT | Performed by: FAMILY MEDICINE

## 2024-08-02 PROCEDURE — 3080F DIAST BP >= 90 MM HG: CPT | Performed by: FAMILY MEDICINE

## 2024-08-02 RX ORDER — LOSARTAN POTASSIUM 50 MG/1
50 TABLET ORAL DAILY
Qty: 90 TABLET | Refills: 0 | Status: SHIPPED | OUTPATIENT
Start: 2024-08-02

## 2024-08-02 NOTE — PROGRESS NOTES
Subjective   The ABCs of the Annual Wellness Visit  Medicare Wellness Visit      Alessandra Baker is a 59 y.o. patient who presents for a Medicare Wellness Visit.    The following portions of the patient's history were reviewed and   updated as appropriate: allergies, current medications, past family history, past medical history, past social history, past surgical history, and problem list.    Compared to one year ago, the patient's physical   health is worse.  Compared to one year ago, the patient's mental   health is the same.    Recent Hospitalizations:  This patient has had a [facility] admission record on file within the last 365 days.  Current Medical Providers:  Patient Care Team:  Geraldo Feldman DO as PCP - General (Family Medicine)  Rick Tidwell DO as Consulting Physician (Gastroenterology)  Hosea Granados MD as Consulting Physician (Cardiothoracic Surgery)  Christi Sr APRN as Nurse Practitioner (Cardiology)  Trev Lyon APRN as Nurse Practitioner (Cardiology)  Ricky Slade MD as Cardiologist (Cardiology)    Outpatient Medications Prior to Visit   Medication Sig Dispense Refill    azaTHIOprine (IMURAN) 50 MG tablet Take 1 tablet by mouth Every 12 (Twelve) Hours.      carvedilol (COREG) 3.125 MG tablet Take 1 tablet by mouth 2 (Two) Times a Day. 60 tablet 11    cetirizine (zyrTEC) 10 MG tablet Take 1 tablet by mouth Daily As Needed for Allergies.      [facility] Aspirin Low Dose 81 MG EC tablet TAKE 1 TABLET BY MOUTH EVERY DAY 90 tablet 3    dapagliflozin Propanediol 10 MG tablet Take 10 mg by mouth Daily. 30 tablet 11    EPINEPHrine (EPIPEN) 0.3 MG/0.3ML solution auto-injector injection Inject 0.3 mL into the appropriate muscle as directed by prescriber 1 (One) Time. If needed      estradiol (ESTRACE) 0.1 MG/GM vaginal cream Use 1 gram vaginally nightly x 2 weeks then 2-3 times per week 42.5 g 2    ezetimibe (ZETIA) 10 MG tablet Take 1 tablet by mouth Daily. 30 tablet 11    fluticasone  (FLONASE) 50 MCG/ACT nasal spray 2 sprays into the nostril(s) as directed by provider Daily. 16 g 11    Inclisiran Sodium 284 MG/1.5ML solution prefilled syringe Inject 1.5 mL under the skin into the appropriate area as directed. Next due on 11/30- receives from oncology      omeprazole (priLOSEC) 40 MG capsule Take 1 capsule by mouth Daily. 30 capsule 2    sertraline (ZOLOFT) 100 MG tablet Take 2 tablets by mouth Daily. 180 tablet 3    spironolactone (ALDACTONE) 25 MG tablet Take 0.5 tablets by mouth Daily. 30 tablet 5    sulfaSALAzine (AZULFIDINE) 500 MG tablet Take 1 tablet by mouth Daily. 90 tablet 0    ticagrelor (BRILINTA) 90 MG tablet tablet Take 1 tablet by mouth 2 (Two) Times a Day. 60 tablet 11    vitamin D (ERGOCALCIFEROL) 1.25 MG (58642 UT) capsule capsule Take 1 capsule by mouth 1 (One) Time Per Week.      metoprolol succinate XL (TOPROL-XL) 25 MG 24 hr tablet Take 1 tablet by mouth Daily. (Patient not taking: Reported on 8/2/2024)       No facility-administered medications prior to visit.     No opioid medication identified on active medication list. I have reviewed chart for other potential  high risk medication/s and harmful drug interactions in the elderly.      Aspirin is on active medication list. Aspirin use is indicated based on review of current medical condition/s. Pros and cons of this therapy have been discussed today. Benefits of this medication outweigh potential harm.  Patient has been encouraged to continue taking this medication.  .      Patient Active Problem List   Diagnosis    History of colon polyps    Esophageal varices without bleeding    Coronary artery disease    Mixed hyperlipidemia    Chest pain    Rheumatoid arthritis involving multiple sites with positive rheumatoid factor    PAF (paroxysmal atrial fibrillation)    Central stenosis of spinal canal    Neuroforaminal stenosis of cervical spine    NSTEMI (non-ST elevated myocardial infarction)    Stage 3a chronic kidney disease  "   Chronic anemia    Coronary artery disease    Class 1 obesity due to excess calories with serious comorbidity and body mass index (BMI) of 30.0 to 30.9 in adult    Primary hypertension     Advance Care Planning (Click this link to access ACP Navigator)  Advance Directive is not on file.  ACP discussion was held with the patient during this visit. Patient does not have an advance directive, information provided.        Objective   Vitals:    24 1110   BP: (!) 154/102   Pulse: 86   Resp: 18   Temp: 97.7 °F (36.5 °C)   SpO2: 99%   Weight: 86.6 kg (191 lb)   Height: 165.1 cm (65\")       Estimated body mass index is 31.78 kg/m² as calculated from the following:    Height as of this encounter: 165.1 cm (65\").    Weight as of this encounter: 86.6 kg (191 lb).             Does the patient have evidence of cognitive impairment? No                                                                                                Health  Risk Assessment    Smoking Status:  Social History     Tobacco Use   Smoking Status Former    Current packs/day: 0.00    Average packs/day: 1 pack/day for 30.0 years (30.0 ttl pk-yrs)    Types: Cigarettes    Start date: 1993    Quit date: 2013    Years since quittin.0    Passive exposure: Past   Smokeless Tobacco Never   Tobacco Comments    Not somking now 10 years     Alcohol Consumption:  Social History     Substance and Sexual Activity   Alcohol Use Not Currently     Fall Risk Screen:  LALITO Fall Risk Assessment was completed, and patient is at LOW risk for falls.Assessment completed on:2024    Depression Screenin/2/2024    11:12 AM   PHQ-2/PHQ-9 Depression Screening   Little Interest or Pleasure in Doing Things 0-->not at all   Feeling Down, Depressed or Hopeless 0-->not at all   PHQ-9: Brief Depression Severity Measure Score 0     Health Habits and Functional and Cognitive Screenin/2/2024     9:17 AM   Functional & Cognitive Status   Do you have " difficulty preparing food and eating? No   Do you have difficulty bathing yourself, getting dressed or grooming yourself? No   Do you have difficulty using the toilet? No   Do you have difficulty moving around from place to place? No   Do you have trouble with steps or getting out of a bed or a chair? No   Current Diet Unhealthy Diet   Dental Exam Up to date   Eye Exam Up to date   Exercise (times per week) Other   Current Exercises Include Walking;Yard Work   Do you need help using the phone?  No   Are you deaf or do you have serious difficulty hearing?  No   Do you need help to go to places out of walking distance? No   Do you need help shopping? No   Do you need help preparing meals?  No   Do you need help with housework?  No   Do you need help with laundry? No   Do you need help taking your medications? No   Do you need help managing money? No   Do you ever drive or ride in a car without wearing a seat belt? No   Have you felt unusual stress, anger or loneliness in the last month? Yes   Who do you live with? Spouse   If you need help, do you have trouble finding someone available to you? No   Have you been bothered in the last four weeks by sexual problems? No   Do you have difficulty concentrating, remembering or making decisions? Yes             Age-appropriate Screening Schedule:  Refer to the list below for future screening recommendations based on patient's age, sex and/or medical conditions. Orders for these recommended tests are listed in the plan section. The patient has been provided with a written plan.    Health Maintenance List  Health Maintenance   Topic Date Due    INFLUENZA VACCINE  08/01/2024    TDAP/TD VACCINES (2 - Td or Tdap) 11/04/2024    LUNG CANCER SCREENING  11/22/2024    LIPID PANEL  02/02/2025    BMI FOLLOWUP  02/20/2025    MAMMOGRAM  03/08/2025    ANNUAL WELLNESS VISIT  08/02/2025    COLORECTAL CANCER SCREENING  04/24/2028    HEPATITIS C SCREENING  Completed    Hepatitis B  Completed     "Pneumococcal Vaccine 0-64  Completed    COVID-19 Vaccine  Discontinued    ZOSTER VACCINE  Discontinued                                                                                                                                                CMS Preventative Services Quick Reference  Risk Factors Identified During Encounter  None Identified    The above risks/problems have been discussed with the patient.  Pertinent information has been shared with the patient in the After Visit Summary.  An After Visit Summary and PPPS were made available to the patient.    Follow Up:   Next Medicare Wellness visit to be scheduled in 1 year.         Additional E&M Note during same encounter follows:  Patient has additional, significant, and separately identifiable condition(s)/problem(s) that require work above and beyond the Medicare Wellness Visit     Chief Complaint  Medicare Wellness-subsequent (Pt states when stands up she feels like she is going to fall over, pt stated she does not know if it is the medication. ) and Headache (Pt states she feels that she is getting more frequent headaches )    Subjective   HPI  Alessandra is also being seen today for additional medical problem/s.        BP elevated, struggling with aching morning HA,  reports snoring  [place]:  Sitting and Reading: 3  Watching TV: 0  Sitting, inactive in a public place: 0  As a passenger in a car for an hour without a break: 2  Lying down to rest in the afternoon: 2  Sitting and talking to someone: 0  Sitting quietly after a lunch without alcohol: 0  In a car, while stopped for a few minutes in traffic: 0  Total: 9          Objective   Vital Signs:  BP (!) 154/102   Pulse 86   Temp 97.7 °F (36.5 °C)   Resp 18   Ht 165.1 cm (65\")   Wt 86.6 kg (191 lb)   SpO2 99%   BMI 31.78 kg/m²   Physical Exam  Vitals and nursing note reviewed.   Constitutional:       General: She is not in acute distress.     Appearance: She is not diaphoretic.   HENT:      " Head: Normocephalic and atraumatic.      Nose: Nose normal.   Eyes:      General: No scleral icterus.        Right eye: No discharge.         Left eye: No discharge.      Conjunctiva/sclera: Conjunctivae normal.   Neck:      Trachea: No tracheal deviation.   Pulmonary:      Effort: Pulmonary effort is normal.   Skin:     General: Skin is warm and dry.      Coloration: Skin is not pale.   Neurological:      Mental Status: She is alert and oriented to person, place, and time.   Psychiatric:         Behavior: Behavior normal.         Thought Content: Thought content normal.         Judgment: Judgment normal.                 Assessment and Plan               Medicare annual wellness visit, subsequent    CASSANDRA (obstructive sleep apnea)    Primary hypertension    Coronary artery disease involving native coronary artery of native heart, unspecified whether angina present      Orders Placed This Encounter   Procedures    Basic metabolic panel     Standing Status:   Future     Standing Expiration Date:   8/2/2025     Order Specific Question:   Release to patient     Answer:   Routine Release [[id number]]    Overnight Sleep Oximetry Study     Standing Status:   Future     Standing Expiration Date:   8/2/2025    Home Sleep Study     Standing Status:   Future     Standing Expiration Date:   8/2/2025     Order Specific Question:   May take own meds     Answer:   Yes     Order Specific Question:   If any contraindications for HST are checked, patient may be recommended for in-lab testing. HST is indicated for patients in whom CASSANDRA is  suspected diagnosis.     Answer:   Does not apply     Order Specific Question:   Release to patient     Answer:   Routine Release [[id number]]     New Medications Ordered This Visit   Medications    losartan (Cozaar) 50 MG tablet     Sig: Take 1 tablet by mouth Daily.     Dispense:  90 tablet     Refill:  0          Follow Up   Return in about 3 months (around 11/2/2024).  Patient was given  instructions and counseling regarding her condition or for health maintenance advice. Please see specific information pulled into the AVS if appropriate.

## 2024-08-12 ENCOUNTER — LAB (OUTPATIENT)
Dept: LAB | Facility: HOSPITAL | Age: 59
End: 2024-08-12
Payer: MEDICARE

## 2024-08-12 DIAGNOSIS — I10 PRIMARY HYPERTENSION: ICD-10-CM

## 2024-08-12 LAB
ANION GAP SERPL CALCULATED.3IONS-SCNC: 7 MMOL/L (ref 5–15)
BUN SERPL-MCNC: 17 MG/DL (ref 8–23)
BUN/CREAT SERPL: 14 (ref 7–25)
CALCIUM SPEC-SCNC: 9.2 MG/DL (ref 8.6–10.5)
CHLORIDE SERPL-SCNC: 106 MMOL/L (ref 98–107)
CO2 SERPL-SCNC: 29 MMOL/L (ref 22–29)
CREAT SERPL-MCNC: 1.21 MG/DL (ref 0.57–1)
EGFRCR SERPLBLD CKD-EPI 2021: 51.4 ML/MIN/1.73
GLUCOSE SERPL-MCNC: 101 MG/DL (ref 65–99)
POTASSIUM SERPL-SCNC: 3.9 MMOL/L (ref 3.5–5.2)
SODIUM SERPL-SCNC: 142 MMOL/L (ref 136–145)

## 2024-08-12 PROCEDURE — 80048 BASIC METABOLIC PNL TOTAL CA: CPT

## 2024-08-12 PROCEDURE — 36415 COLL VENOUS BLD VENIPUNCTURE: CPT

## 2024-08-13 ENCOUNTER — OFFICE VISIT (OUTPATIENT)
Dept: CARDIOLOGY | Facility: CLINIC | Age: 59
End: 2024-08-13
Payer: MEDICARE

## 2024-08-13 VITALS
WEIGHT: 190 LBS | RESPIRATION RATE: 18 BRPM | HEART RATE: 75 BPM | BODY MASS INDEX: 31.65 KG/M2 | DIASTOLIC BLOOD PRESSURE: 82 MMHG | SYSTOLIC BLOOD PRESSURE: 122 MMHG | OXYGEN SATURATION: 97 % | HEIGHT: 65 IN

## 2024-08-13 DIAGNOSIS — I10 PRIMARY HYPERTENSION: ICD-10-CM

## 2024-08-13 DIAGNOSIS — T45.1X5A CIRRHOSIS OF LIVER DUE TO METHOTREXATE: ICD-10-CM

## 2024-08-13 DIAGNOSIS — R29.818 SUSPECTED SLEEP APNEA: ICD-10-CM

## 2024-08-13 DIAGNOSIS — I48.0 PAF (PAROXYSMAL ATRIAL FIBRILLATION): ICD-10-CM

## 2024-08-13 DIAGNOSIS — K71.7 CIRRHOSIS OF LIVER DUE TO METHOTREXATE: ICD-10-CM

## 2024-08-13 DIAGNOSIS — E78.2 MIXED HYPERLIPIDEMIA: ICD-10-CM

## 2024-08-13 DIAGNOSIS — I25.10 CORONARY ARTERY DISEASE INVOLVING NATIVE CORONARY ARTERY OF NATIVE HEART WITHOUT ANGINA PECTORIS: Primary | ICD-10-CM

## 2024-08-13 PROCEDURE — 93000 ELECTROCARDIOGRAM COMPLETE: CPT | Performed by: HOSPITALIST

## 2024-08-13 PROCEDURE — 3079F DIAST BP 80-89 MM HG: CPT | Performed by: HOSPITALIST

## 2024-08-13 PROCEDURE — 3074F SYST BP LT 130 MM HG: CPT | Performed by: HOSPITALIST

## 2024-08-13 PROCEDURE — 99214 OFFICE O/P EST MOD 30 MIN: CPT | Performed by: HOSPITALIST

## 2024-08-13 RX ORDER — POTASSIUM CHLORIDE 750 MG/1
10 TABLET, FILM COATED, EXTENDED RELEASE ORAL EVERY 12 HOURS SCHEDULED
COMMUNITY
End: 2024-08-13

## 2024-08-13 NOTE — PROGRESS NOTES
Reason For Visit:  CAD    Subjective        Alessandra Baker is a 59 y.o. female with the below pertinent PMH who presents for follow-up of the above issue.    Alessandra Baker was most recently seen in cardiology clinic/11/24 at which time she reported overall doing well without any chest pain or shortness of breath.  BP remained elevated, and she was transitioned to carvedilol.    Today, the patient states that she has been having issues with headaches in the morning as well as consistently more elevated BP.  She has felt fatigued.  She denies any significant chest pain or abnormal shortness of breath.  She also has not had any recent leg swelling or lightheadedness.  She recently followed up with her PCP this month and was started on losartan 50 mg daily.  She also was started on nocturnal oxygen and was scheduled for sleep study.  She mentions that she does snore a lot and quite loudly at night.    ROS: Pertinent findings are included above.    Cardiac Studies  Cath 11/2012: NSTEMI.20% LMCA stenosis, no significant LAD stenosis, 70% proximal RCA stenosis, 90% distal RCA stenosis; underwent ZACHARY to both the proximal and distal RCA.  Cath 2015: 50% proximal RCA ISR (normal IFR) and scattered 5% narrowings in the proximal and mid LAD (normal FFR).  Cath 2019: 75% mid LAD stenosis, 50% mid circumflex stenosis, and 80 to 90% proximal RCA ISR with patent distal RCA stent.  Underwent ZACHARY in proximal LAD and ZACHARY inside prior stent for proximal RCA.  Cath 4/2022: ISR of proximal RCA that was managed with another ZACHARY.  There was moderate flow-limiting mid LAD ISR (IFR 0.88) involving a moderate-sized diagonal branch that was managed medically.    Cath 11/22/2023: LVEDP 17, LVEF 55% without significant MR, distal LMCA 60% stenosis with large atherosclerotic plaque, ISR of proximal LAD (50% stenosis), no significant diagonal branch disease, proximal LCx 60% stenosis, moderate-sized OM without any obstructive disease, RCA is flush  occlusion with multiple stents from the proximal to distal region with some distal collateral filling  Echo 11/19/2023: LVEF 66-70%, mild concentric LVH, normal diastolic function, normal RV size/function, no hemodynamically significant valve pathology.  Cardiac monitor 2/13/2024: Relatively benign.  Predominantly sinus with average rate 89 () BPM.  No sustained arrhythmias, frequent ectopy, high-grade AV block, or pauses detected.  1 episode of paroxysmal SVT lasting 6 beats.  No reported symptoms.    Pertinent PMH  CAD with prior MI, multiple stents, and CABG (LIMA-LAD, SVT-OM, 11/29/23, Dr. Granados)  Paroxysmal atrial fibrillation s/p MAZE and RONALD clipping (11/29/23)-anticoagulant discontinued after clipping given increased bleeding risk with esophageal varices  Hypertension  Hyperlipidemia with statin intolerance  Prior stroke  CKD 3a  Rheumatoid arthritis  Cirrhosis complicated by grade 1 esophageal varices    Pertinent past medical, surgical, family, and social history were reviewed.      Current Outpatient Medications:     azaTHIOprine (IMURAN) 50 MG tablet, Take 1 tablet by mouth Every 12 (Twelve) Hours., Disp: , Rfl:     carvedilol (COREG) 3.125 MG tablet, Take 1 tablet by mouth 2 (Two) Times a Day., Disp: 60 tablet, Rfl: 11    cetirizine (zyrTEC) 10 MG tablet, Take 1 tablet by mouth Daily As Needed for Allergies., Disp: , Rfl:     [facility] Aspirin Low Dose 81 MG EC tablet, TAKE 1 TABLET BY MOUTH EVERY DAY, Disp: 90 tablet, Rfl: 3    dapagliflozin Propanediol 10 MG tablet, Take 10 mg by mouth Daily., Disp: 30 tablet, Rfl: 11    EPINEPHrine (EPIPEN) 0.3 MG/0.3ML solution auto-injector injection, Inject 0.3 mL into the appropriate muscle as directed by prescriber 1 (One) Time. If needed, Disp: , Rfl:     ezetimibe (ZETIA) 10 MG tablet, Take 1 tablet by mouth Daily., Disp: 30 tablet, Rfl: 11    fluticasone (FLONASE) 50 MCG/ACT nasal spray, 2 sprays into the nostril(s) as directed by provider Daily., Disp: 16 g,  "Rfl: 11    Inclisiran Sodium 284 MG/1.5ML solution prefilled syringe, Inject 1.5 mL under the skin into the appropriate area as directed. Next due on 11/30- receives from oncology, Disp: , Rfl:     losartan (Cozaar) 50 MG tablet, Take 1 tablet by mouth Daily., Disp: 90 tablet, Rfl: 0    omeprazole (priLOSEC) 40 MG capsule, Take 1 capsule by mouth Daily., Disp: 30 capsule, Rfl: 2    sertraline (ZOLOFT) 100 MG tablet, Take 2 tablets by mouth Daily., Disp: 180 tablet, Rfl: 3    spironolactone (ALDACTONE) 25 MG tablet, Take 0.5 tablets by mouth Daily., Disp: 30 tablet, Rfl: 5    sulfaSALAzine (AZULFIDINE) 500 MG tablet, Take 1 tablet by mouth Daily., Disp: 90 tablet, Rfl: 0    ticagrelor (BRILINTA) 90 MG tablet tablet, Take 1 tablet by mouth 2 (Two) Times a Day., Disp: 60 tablet, Rfl: 11    estradiol (ESTRACE) 0.1 MG/GM vaginal cream, Use 1 gram vaginally nightly x 2 weeks then 2-3 times per week (Patient not taking: Reported on 8/13/2024), Disp: 42.5 g, Rfl: 2    vitamin D (ERGOCALCIFEROL) 1.25 MG (35775 UT) capsule capsule, Take 1 capsule by mouth 1 (One) Time Per Week. (Patient not taking: Reported on 8/13/2024), Disp: , Rfl:      Objective   Vital Signs:  /82   Pulse 75   Resp 18   Ht 165.1 cm (65\")   Wt 86.2 kg (190 lb)   SpO2 97%   BMI 31.62 kg/m²   Estimated body mass index is 31.62 kg/m² as calculated from the following:    Height as of this encounter: 165.1 cm (65\").    Weight as of this encounter: 86.2 kg (190 lb).      Constitutional:       Appearance: Healthy appearance. Not in distress.   Neck:      Vascular: JVD normal.   Pulmonary:      Effort: Pulmonary effort is normal.      Breath sounds: Normal breath sounds.   Cardiovascular:      Normal rate. Regular rhythm.      Murmurs: There is no murmur.      No gallop.  No click. No rub.   Edema:     Peripheral edema absent.   Abdominal:      General: There is no distension.      Palpations: Abdomen is soft.      Tenderness: There is no abdominal " tenderness.   Skin:     General: Skin is warm and dry.   Neurological:      Mental Status: Alert and oriented to person, place and time.        Result Review :  The following data was reviewed by: Ricky Slade MD on 08/13/2024:  BMP   BMP          2/2/2024    11:42 2/29/2024    11:23 8/12/2024    10:24   BMP   BUN 17  23  17    Creatinine 1.45  1.42  1.21    Sodium 143  142  142    Potassium 4.9  4.5  3.9    Chloride 107  104  106    CO2 25.0  26.0  29.0    Calcium 9.8  10.0  9.2          ECG 12 Lead    Date/Time: 8/13/2024 9:35 AM  Performed by: Ricky Slade MD    Authorized by: Ricky Slade MD  Comparison: compared with previous ECG from 2/29/2024  Similar to previous ECG  Rhythm: sinus rhythm  Rate: normal  BPM: 75  Conduction: conduction normal  QRS axis: normal  Other findings: low voltage  Other findings comments: Nonspecific T wave abnormality  Clinical impression comment: Borderline ECG            Assessment and Plan   Diagnoses and all orders for this visit:    1. Coronary artery disease involving native coronary artery of native heart without angina pectoris (Primary)    2. Mixed hyperlipidemia  -     Comprehensive Metabolic Panel; Future  -     Lipid Panel; Future    3. Primary hypertension    4. PAF (paroxysmal atrial fibrillation)    5. 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 Suspected sleep apnea    Other orders  -     ECG 12 Lead      -Currently without significant angina or clear cardiac symptoms.  She has having some issues with elevated BP and morning headaches; I agree with Dr. Feldman that she likely has untreated sleep apnea.  She does mention that some of the symptoms have improved since starting nocturnal oxygen, and she is pending a sleep study later this month.  - Continue losartan 50 mg daily; I will not make adjustments to her antihypertensive regimen given that she just darted losartan.  - Continue carvedilol 3.125 mg twice daily that is beneficial for both her hypertension  and cardiovascular disease as well as her cirrhosis with esophageal varices  - Continue aspirin 81 mg daily  - Continue ticagrelor 90 mg twice daily although consider dose reduction at her next visit.  - Continue spironolactone 12.5 mg daily  - Continue Leqvio  - Given that her most recent lipids showed LDL well below goal and she would like to cut back on medications, will stop Zetia and repeat a fasting lipid panel at her next visit.  This also will allow recheck of her triglycerides that were elevated on her most recent lipid panel although unclear if this was fasting.    Follow Up   Return in about 4 months (around 12/13/2024).  Patient was given instructions and counseling regarding her condition or for health maintenance advice. Please see specific information pulled into the AVS if appropriate.       EMR Dragon/Transcription disclaimer: Much of this encounter note is an electronic transcription/translation of spoken language to printed text. The electronic translation of spoken language may permit erroneous, or at times, nonsensical words or phrases to be inadvertently transcribed; although I have reviewed the note for such errors, some may still exist.

## 2024-08-18 DIAGNOSIS — F32.A DEPRESSION, UNSPECIFIED DEPRESSION TYPE: ICD-10-CM

## 2024-08-19 RX ORDER — SERTRALINE HYDROCHLORIDE 100 MG/1
200 TABLET, FILM COATED ORAL DAILY
Qty: 180 TABLET | Refills: 3 | Status: SHIPPED | OUTPATIENT
Start: 2024-08-19

## 2024-09-04 ENCOUNTER — TELEPHONE (OUTPATIENT)
Dept: FAMILY MEDICINE CLINIC | Facility: CLINIC | Age: 59
End: 2024-09-04
Payer: MEDICARE

## 2024-09-04 NOTE — TELEPHONE ENCOUNTER
Caller:     Alessandra Baker        Relationship: SELF     Best call back number: [phone]    What is the best time to reach you:  ANYTIME   Who are you requesting to speak with (clinical staff, provider,  specific staff member): CLINICAL     Do you know the name of the person who called: CLINICAL     What was the call regarding: SHE STATES SHE HAS RECEIVED A CALL FOR A SLEEP STUDY TO GO TO THE CLINIC SHE STATES SHE THOUGHT SHE WAS SUPPOSE TO HAVE THE SLEEP STUDY AT HOME     Is it okay if the provider responds through MyChart: CALL BACK REQUEST

## 2024-09-04 NOTE — TELEPHONE ENCOUNTER
Spoke with patient and informed her the sleep study would be performed at home through [facility]. Patient verbalized understanding.

## 2024-10-08 DIAGNOSIS — I85.00 ESOPHAGEAL VARICES WITHOUT BLEEDING: ICD-10-CM

## 2024-10-08 DIAGNOSIS — R07.9 CHEST PAIN, UNSPECIFIED: ICD-10-CM

## 2024-10-09 RX ORDER — OMEPRAZOLE 40 MG/1
40 CAPSULE, DELAYED RELEASE ORAL DAILY
Qty: 30 CAPSULE | Refills: 2 | Status: SHIPPED | OUTPATIENT
Start: 2024-10-09

## 2024-10-10 ENCOUNTER — TELEPHONE (OUTPATIENT)
Dept: CARDIOLOGY | Facility: CLINIC | Age: 59
End: 2024-10-10
Payer: MEDICARE

## 2024-10-10 NOTE — TELEPHONE ENCOUNTER
A request for pt to have 4 extractions under local anesthesia to be done when clearance is excepted.  They need to know if pt needs to be off blood thinner (Aspirin 81). And if so for how long?  Does the pt require antibiotic prophylaxis? Are there any restrictions with anesthetic for pt?  Martín Lugo, CMA     Last ov 8/13/24

## 2024-10-14 NOTE — TELEPHONE ENCOUNTER
Ricky Slade MD [place], Joni M, [facility]  Caller: Unspecified (4 days ago, 10:22 AM)    - She may hold Ticagrelor for up to 5 days if needed  - She should continue aspirin 81 mg daily throughout without missing any doses.  - She does not require antibiotic prophylasix  - No local anesthetic restrictions from a cardiac perspective.  - No further cardiac testing or interventions needed prior to proceeding with the planned procedure.  - Ideal to wait until 11/29/24 (1 year after her CABG) if elective but if more urgent it is reasonable to proceed with the procedure sooner.    Thanks!  Ricky Slade MD

## 2024-10-19 DIAGNOSIS — I10 PRIMARY HYPERTENSION: ICD-10-CM

## 2024-10-21 RX ORDER — LOSARTAN POTASSIUM 50 MG/1
50 TABLET ORAL DAILY
Qty: 90 TABLET | Refills: 0 | Status: SHIPPED | OUTPATIENT
Start: 2024-10-21

## 2024-11-11 ENCOUNTER — OFFICE VISIT (OUTPATIENT)
Dept: FAMILY MEDICINE CLINIC | Facility: CLINIC | Age: 59
End: 2024-11-11
Payer: MEDICARE

## 2024-11-11 VITALS
TEMPERATURE: 97.3 F | HEART RATE: 81 BPM | SYSTOLIC BLOOD PRESSURE: 131 MMHG | OXYGEN SATURATION: 98 % | BODY MASS INDEX: 32.92 KG/M2 | DIASTOLIC BLOOD PRESSURE: 91 MMHG | WEIGHT: 197.6 LBS | HEIGHT: 65 IN

## 2024-11-11 DIAGNOSIS — Z12.2 ENCOUNTER FOR SCREENING FOR LUNG CANCER: ICD-10-CM

## 2024-11-11 DIAGNOSIS — Z87.891 PERSONAL HISTORY OF NICOTINE DEPENDENCE: ICD-10-CM

## 2024-11-11 DIAGNOSIS — Z23 FLU VACCINE NEED: ICD-10-CM

## 2024-11-11 DIAGNOSIS — G47.34 NOCTURNAL HYPOXIA: ICD-10-CM

## 2024-11-11 DIAGNOSIS — I10 ESSENTIAL HYPERTENSION: Primary | ICD-10-CM

## 2024-11-11 DIAGNOSIS — G47.33 OSA ON CPAP: ICD-10-CM

## 2024-11-11 DIAGNOSIS — Z23 NEED FOR DIPHTHERIA-TETANUS-PERTUSSIS (TDAP) VACCINE: ICD-10-CM

## 2024-11-11 PROCEDURE — 90715 TDAP VACCINE 7 YRS/> IM: CPT | Performed by: FAMILY MEDICINE

## 2024-11-11 PROCEDURE — 99214 OFFICE O/P EST MOD 30 MIN: CPT | Performed by: FAMILY MEDICINE

## 2024-11-11 PROCEDURE — 3080F DIAST BP >= 90 MM HG: CPT | Performed by: FAMILY MEDICINE

## 2024-11-11 PROCEDURE — 1160F RVW MEDS BY RX/DR IN RCRD: CPT | Performed by: FAMILY MEDICINE

## 2024-11-11 PROCEDURE — 90656 IIV3 VACC NO PRSV 0.5 ML IM: CPT | Performed by: FAMILY MEDICINE

## 2024-11-11 PROCEDURE — 90471 IMMUNIZATION ADMIN: CPT | Performed by: FAMILY MEDICINE

## 2024-11-11 PROCEDURE — 3075F SYST BP GE 130 - 139MM HG: CPT | Performed by: FAMILY MEDICINE

## 2024-11-11 PROCEDURE — G0008 ADMIN INFLUENZA VIRUS VAC: HCPCS | Performed by: FAMILY MEDICINE

## 2024-11-11 PROCEDURE — 1159F MED LIST DOCD IN RCRD: CPT | Performed by: FAMILY MEDICINE

## 2024-11-11 PROCEDURE — 1126F AMNT PAIN NOTED NONE PRSNT: CPT | Performed by: FAMILY MEDICINE

## 2024-11-11 RX ORDER — CARVEDILOL 6.25 MG/1
6.25 TABLET ORAL 2 TIMES DAILY
Qty: 180 TABLET | Refills: 3 | Status: SHIPPED | OUTPATIENT
Start: 2024-11-11

## 2024-11-26 NOTE — PROGRESS NOTES
"Chief Complaint  Sleep Apnea (3 month follow up)    Subjective        Alessandra Baker presents to [facility]  History of Present Illness  Patient overall feels better for CPAP for CASSANDRA  Blood pressure has been stable at home despite diastolic elevation today, needs refill of Coreg  Compliant with O2 at night    Objective   Vital Signs:  /91 (BP Location: Right arm, Patient Position: Sitting, Cuff Size: Adult)   Pulse 81   Temp 97.3 °F (36.3 °C) (Temporal)   Ht 165.1 cm (65\")   Wt 89.6 kg (197 lb 9.6 oz)   SpO2 98%   BMI 32.88 kg/m²   Estimated body mass index is 32.88 kg/m² as calculated from the following:    Height as of this encounter: 165.1 cm (65\").    Weight as of this encounter: 89.6 kg (197 lb 9.6 oz).            Physical Exam  Vitals and nursing note reviewed.   Constitutional:       General: She is not in acute distress.     Appearance: She is not diaphoretic.   HENT:      Head: Normocephalic and atraumatic.      Nose: Nose normal.   Eyes:      General: No scleral icterus.        Right eye: No discharge.         Left eye: No discharge.      Conjunctiva/sclera: Conjunctivae normal.   Neck:      Trachea: No tracheal deviation.   Pulmonary:      Effort: Pulmonary effort is normal.   Skin:     General: Skin is warm and dry.      Coloration: Skin is not pale.   Neurological:      Mental Status: She is alert and oriented to person, place, and time.   Psychiatric:         Behavior: Behavior normal.         Thought Content: Thought content normal.         Judgment: Judgment normal.        Result Review :                Assessment and Plan   Diagnoses and all orders for this visit:    1. Essential hypertension (Primary)  -     carvedilol (COREG) 6.25 MG tablet; Take 1 tablet by mouth 2 (Two) Times a Day.  Dispense: 180 tablet; Refill: 3    2. Encounter for screening for lung cancer  -      CT Chest Low Dose Cancer Screening WO; Future    3. Flu vaccine need  -     Fluzone " >6mos ([id number])    4. Need for diphtheria-tetanus-pertussis (Tdap) vaccine  -     Tdap Vaccine Greater Than or Equal To 8yo IM    5. Personal history of nicotine dependence  -      CT Chest Low Dose Cancer Screening WO; Future    6. CASSANDRA on CPAP    7. Nocturnal hypoxia    Continue CPAP with O2  Refill carvedilol, home blood pressure monitoring         Follow Up   Return in about 6 months (around 5/11/2025).  Patient was given instructions and counseling regarding her condition or for health maintenance advice. Please see specific information pulled into the AVS if appropriate.

## 2024-12-30 ENCOUNTER — INFUSION (OUTPATIENT)
Dept: ONCOLOGY | Facility: HOSPITAL | Age: 59
End: 2024-12-30
Payer: MEDICARE

## 2024-12-30 VITALS
BODY MASS INDEX: 33.66 KG/M2 | RESPIRATION RATE: 18 BRPM | SYSTOLIC BLOOD PRESSURE: 135 MMHG | HEART RATE: 88 BPM | HEIGHT: 65 IN | DIASTOLIC BLOOD PRESSURE: 86 MMHG | WEIGHT: 202 LBS | OXYGEN SATURATION: 95 % | TEMPERATURE: 96.7 F

## 2024-12-30 DIAGNOSIS — I25.10 CORONARY ARTERY DISEASE, UNSPECIFIED VESSEL OR LESION TYPE, UNSPECIFIED WHETHER ANGINA PRESENT, UNSPECIFIED WHETHER NATIVE OR TRANSPLANTED HEART: ICD-10-CM

## 2024-12-30 DIAGNOSIS — E78.2 MIXED HYPERLIPIDEMIA: Primary | ICD-10-CM

## 2024-12-30 PROCEDURE — 96372 THER/PROPH/DIAG INJ SC/IM: CPT

## 2024-12-30 PROCEDURE — 25010000002 INCLISIRAN SODIUM 284 MG/1.5ML SOLUTION PREFILLED SYRINGE

## 2024-12-30 RX ADMIN — INCLISIRAN 284 MG: 284 INJECTION, SOLUTION SUBCUTANEOUS at 09:18

## 2025-01-24 ENCOUNTER — OFFICE VISIT (OUTPATIENT)
Dept: FAMILY MEDICINE CLINIC | Facility: CLINIC | Age: 60
End: 2025-01-24
Payer: MEDICARE

## 2025-01-24 ENCOUNTER — LAB (OUTPATIENT)
Dept: LAB | Facility: HOSPITAL | Age: 60
End: 2025-01-24
Payer: MEDICARE

## 2025-01-24 ENCOUNTER — OFFICE VISIT (OUTPATIENT)
Dept: CARDIOLOGY | Facility: CLINIC | Age: 60
End: 2025-01-24
Payer: MEDICARE

## 2025-01-24 VITALS
HEART RATE: 68 BPM | BODY MASS INDEX: 33.66 KG/M2 | HEIGHT: 65 IN | OXYGEN SATURATION: 98 % | SYSTOLIC BLOOD PRESSURE: 124 MMHG | DIASTOLIC BLOOD PRESSURE: 76 MMHG | WEIGHT: 202 LBS

## 2025-01-24 VITALS
HEART RATE: 77 BPM | OXYGEN SATURATION: 97 % | SYSTOLIC BLOOD PRESSURE: 126 MMHG | TEMPERATURE: 96.6 F | HEIGHT: 65 IN | DIASTOLIC BLOOD PRESSURE: 84 MMHG | WEIGHT: 199 LBS | BODY MASS INDEX: 33.15 KG/M2

## 2025-01-24 DIAGNOSIS — I48.0 PAF (PAROXYSMAL ATRIAL FIBRILLATION): ICD-10-CM

## 2025-01-24 DIAGNOSIS — I25.10 CORONARY ARTERY DISEASE INVOLVING NATIVE CORONARY ARTERY OF NATIVE HEART WITHOUT ANGINA PECTORIS: Primary | ICD-10-CM

## 2025-01-24 DIAGNOSIS — R30.0 DYSURIA: Primary | ICD-10-CM

## 2025-01-24 DIAGNOSIS — T45.1X5A CIRRHOSIS OF LIVER DUE TO METHOTREXATE: ICD-10-CM

## 2025-01-24 DIAGNOSIS — E78.2 MIXED HYPERLIPIDEMIA: ICD-10-CM

## 2025-01-24 DIAGNOSIS — R39.89 SUSPECTED UTI: ICD-10-CM

## 2025-01-24 DIAGNOSIS — G47.33 OSA (OBSTRUCTIVE SLEEP APNEA): ICD-10-CM

## 2025-01-24 DIAGNOSIS — K71.7 CIRRHOSIS OF LIVER DUE TO METHOTREXATE: ICD-10-CM

## 2025-01-24 DIAGNOSIS — I10 PRIMARY HYPERTENSION: ICD-10-CM

## 2025-01-24 LAB
ALBUMIN SERPL-MCNC: 4.4 G/DL (ref 3.5–5.2)
ALBUMIN/GLOB SERPL: 1.5 G/DL
ALP SERPL-CCNC: 89 U/L (ref 39–117)
ALT SERPL W P-5'-P-CCNC: 12 U/L (ref 1–33)
ANION GAP SERPL CALCULATED.3IONS-SCNC: 12 MMOL/L (ref 5–15)
AST SERPL-CCNC: 22 U/L (ref 1–32)
BACTERIA UR QL AUTO: ABNORMAL /HPF
BILIRUB BLD-MCNC: NEGATIVE MG/DL
BILIRUB SERPL-MCNC: 0.5 MG/DL (ref 0–1.2)
BILIRUB UR QL STRIP: NEGATIVE
BUN SERPL-MCNC: 21 MG/DL (ref 8–23)
BUN/CREAT SERPL: 17.2 (ref 7–25)
CALCIUM SPEC-SCNC: 9.5 MG/DL (ref 8.6–10.5)
CHLORIDE SERPL-SCNC: 104 MMOL/L (ref 98–107)
CHOLEST SERPL-MCNC: 135 MG/DL (ref 0–200)
CLARITY UR: ABNORMAL
CLARITY, POC: ABNORMAL
CO2 SERPL-SCNC: 26 MMOL/L (ref 22–29)
COLOR UR: ABNORMAL
COLOR UR: YELLOW
CREAT SERPL-MCNC: 1.22 MG/DL (ref 0.57–1)
EGFRCR SERPLBLD CKD-EPI 2021: 50.9 ML/MIN/1.73
GLOBULIN UR ELPH-MCNC: 3 GM/DL
GLUCOSE SERPL-MCNC: 102 MG/DL (ref 65–99)
GLUCOSE UR STRIP-MCNC: ABNORMAL MG/DL
GLUCOSE UR STRIP-MCNC: ABNORMAL MG/DL
HDLC SERPL-MCNC: 29 MG/DL (ref 40–60)
HGB UR QL STRIP.AUTO: ABNORMAL
HYALINE CASTS UR QL AUTO: ABNORMAL /LPF
KETONES UR QL STRIP: ABNORMAL
KETONES UR QL: NEGATIVE
LDLC SERPL CALC-MCNC: 69 MG/DL (ref 0–100)
LDLC/HDLC SERPL: 2.12 {RATIO}
LEUKOCYTE EST, POC: ABNORMAL
LEUKOCYTE ESTERASE UR QL STRIP.AUTO: ABNORMAL
NITRITE UR QL STRIP: POSITIVE
NITRITE UR-MCNC: NEGATIVE MG/ML
PH UR STRIP.AUTO: 5.5 [PH] (ref 5–8)
PH UR: 6 [PH] (ref 5–8)
POTASSIUM SERPL-SCNC: 3.9 MMOL/L (ref 3.5–5.2)
PROT SERPL-MCNC: 7.4 G/DL (ref 6–8.5)
PROT UR QL STRIP: ABNORMAL
PROT UR STRIP-MCNC: ABNORMAL MG/DL
RBC # UR STRIP: ABNORMAL /HPF
RBC # UR STRIP: ABNORMAL /UL
REF LAB TEST METHOD: ABNORMAL
SODIUM SERPL-SCNC: 142 MMOL/L (ref 136–145)
SP GR UR STRIP: >1.03 (ref 1–1.03)
SP GR UR: 1.03 (ref 1–1.03)
SQUAMOUS #/AREA URNS HPF: ABNORMAL /HPF
TRIGL SERPL-MCNC: 222 MG/DL (ref 0–150)
UROBILINOGEN UR QL STRIP: ABNORMAL
UROBILINOGEN UR QL: ABNORMAL
VLDLC SERPL-MCNC: 37 MG/DL (ref 5–40)
WBC # UR STRIP: ABNORMAL /HPF

## 2025-01-24 PROCEDURE — 87086 URINE CULTURE/COLONY COUNT: CPT | Performed by: NURSE PRACTITIONER

## 2025-01-24 PROCEDURE — 36415 COLL VENOUS BLD VENIPUNCTURE: CPT

## 2025-01-24 PROCEDURE — 87186 SC STD MICRODIL/AGAR DIL: CPT | Performed by: NURSE PRACTITIONER

## 2025-01-24 PROCEDURE — 81003 URINALYSIS AUTO W/O SCOPE: CPT | Performed by: NURSE PRACTITIONER

## 2025-01-24 PROCEDURE — 81001 URINALYSIS AUTO W/SCOPE: CPT | Performed by: NURSE PRACTITIONER

## 2025-01-24 PROCEDURE — 3079F DIAST BP 80-89 MM HG: CPT | Performed by: NURSE PRACTITIONER

## 2025-01-24 PROCEDURE — 80053 COMPREHEN METABOLIC PANEL: CPT

## 2025-01-24 PROCEDURE — 80061 LIPID PANEL: CPT

## 2025-01-24 PROCEDURE — 99213 OFFICE O/P EST LOW 20 MIN: CPT | Performed by: NURSE PRACTITIONER

## 2025-01-24 PROCEDURE — 3074F SYST BP LT 130 MM HG: CPT | Performed by: NURSE PRACTITIONER

## 2025-01-24 PROCEDURE — 87088 URINE BACTERIA CULTURE: CPT | Performed by: NURSE PRACTITIONER

## 2025-01-24 PROCEDURE — 1126F AMNT PAIN NOTED NONE PRSNT: CPT | Performed by: NURSE PRACTITIONER

## 2025-01-24 RX ORDER — ERGOCALCIFEROL 1.25 MG/1
50000 CAPSULE, LIQUID FILLED ORAL WEEKLY
Qty: 12 CAPSULE | Refills: 0 | Status: SHIPPED | OUTPATIENT
Start: 2025-01-24

## 2025-01-24 RX ORDER — NITROFURANTOIN 25; 75 MG/1; MG/1
100 CAPSULE ORAL 2 TIMES DAILY
Qty: 10 CAPSULE | Refills: 0 | Status: SHIPPED | OUTPATIENT
Start: 2025-01-24 | End: 2025-01-29

## 2025-01-24 NOTE — TELEPHONE ENCOUNTER
Caller: Alessandra Baker    Relationship: Self    Best call back number: [phone]     Requested Prescriptions:   Requested Prescriptions     Pending Prescriptions Disp Refills    vitamin D (ERGOCALCIFEROL) 1.25 MG (90985 UT) capsule capsule 5 capsule      Sig: Take 1 capsule by mouth 1 (One) Time Per Week.        Pharmacy where request should be sent: [id number] [place] [facility] [address]  [phone] [facility] [phone]      Last office visit with prescribing clinician: 11/11/2024   Last telemedicine visit with prescribing clinician: Visit date not found   Next office visit with prescribing clinician: 5/14/2025     Additional details provided by patient: OUT    Does the patient have less than a 3 day supply:  [x] Yes  [] No    Would you like a call back once the refill request has been completed: [] Yes [x] No    If the office needs to give you a call back, can they leave a voicemail: [] Yes [x] No    Star Diehl Rep   01/24/25 10:31 CST

## 2025-01-24 NOTE — PROGRESS NOTES
FITZ Barraza  [facility]   Family Medicine  [address]  [place] [address]  Phone: [phone]  Fax: [phone]         Chief Complaint:  Chief Complaint   Patient presents with    Urinary Tract Infection        History:  Alessandra Baker is a 59 y.o. female that is an established patient. She  is here for evaluation of the above complaint.    HPI   History of Present Illness  The patient presents for evaluation of urinary tract infection, health maintenance, and sleep apnea.    She began experiencing dysuria 4 days ago, characterized by a burning sensation at the end of urination. She also reports a change in urine odor. She has not experienced any fever. Additionally, she reports some discomfort in the kidney region but no lower back pain. Her last UTI occurred approximately 4 to 5 years ago, prior to her relocation to [place]. She had a history of recurrent UTIs, occurring biweekly, which were resolved following a hysterectomy. She has not been prescribed any hormonal treatments by her OB-GYN.    She has not yet undergone the low-dose CT scan of her lungs ordered by Dr. Feldman. She also needs to schedule her mammogram. She is uncertain about the need for a colonoscopy this year, as she does not recall when her last one was performed. She had a consultation with her cardiologist this morning, who advised her to reduce her Brilinta dosage from 90 mg to 60 mg due to her overall good health.    We discussed the importance of both low dose chest CT and Mammogram.      ALLERGIES  The patient is allergic to BEE STINGS and PENICILLIN G.    MEDICATIONS  Current: Brilinta      Results  Laboratory Studies  Urine test shows a little bit of blood and a trace of leukocytes.       ROS:  Review of Systems   Constitutional: Negative.    HENT: Negative.     Respiratory: Negative.     Cardiovascular: Negative.    Genitourinary:  Positive for dysuria.   Musculoskeletal:  Positive for back pain.     "     reports that she quit smoking about 11 years ago. Her smoking use included cigarettes. She started smoking about 32 years ago. She has a 30 pack-year smoking history. She has been exposed to tobacco smoke. She has never used smokeless tobacco. She reports that she does not currently use alcohol. She reports that she does not use drugs.    Current Outpatient Medications   Medication Instructions    azaTHIOprine (IMURAN) 50 MG tablet 1 tablet, Every 12 Hours Scheduled    carvedilol (COREG) 6.25 mg, Oral, 2 Times Daily    cetirizine (ZYRTEC) 10 mg, Daily PRN    [employer] Aspirin Low Dose 81 mg, Oral, Daily    dapagliflozin Propanediol 10 mg, Oral, Daily    EPINEPHrine (EPIPEN) 0.3 mg, Once    estradiol (ESTRACE) 0.1 MG/GM vaginal cream Use 1 gram vaginally nightly x 2 weeks then 2-3 times per week    fluticasone (FLONASE) 50 MCG/ACT nasal spray 2 sprays, Nasal, Daily    Inclisiran Sodium 284 mg    losartan (COZAAR) 50 mg, Oral, Daily    nitrofurantoin (macrocrystal-monohydrate) (MACROBID) 100 mg, Oral, 2 Times Daily    omeprazole (PRILOSEC) 40 mg, Oral, Daily    sertraline (ZOLOFT) 200 mg, Oral, Daily    spironolactone (ALDACTONE) 12.5 mg, Oral, Daily    sulfaSALAzine (AZULFIDINE) 500 mg, Oral, Daily    ticagrelor (BRILINTA) 60 mg, Oral, 2 Times Daily    vitamin D (ERGOCALCIFEROL) 50,000 Units, Oral, Weekly       OBJECTIVE:  /84   Pulse 77   Temp 96.6 °F (35.9 °C)   Ht 165.1 cm (65\")   Wt 90.3 kg (199 lb)   SpO2 97%   BMI 33.12 kg/m²    Physical Exam  Vitals and nursing note reviewed.   Constitutional:       Appearance: Normal appearance.   HENT:      Head: Normocephalic and atraumatic.      Nose: Nose normal.   Eyes:      Conjunctiva/sclera: Conjunctivae normal.   Cardiovascular:      Rate and Rhythm: Normal rate and regular rhythm.   Pulmonary:      Effort: Pulmonary effort is normal. No respiratory distress.      Breath sounds: Normal breath sounds. No wheezing or rales.   Abdominal:      Tenderness: " There is no right CVA tenderness or left CVA tenderness.   Neurological:      General: No focal deficit present.      Mental Status: She is alert and oriented to person, place, and time.   Psychiatric:         Mood and Affect: Mood normal.         Behavior: Behavior normal.       Physical Exam      Procedures    Assessment/Plan:     Diagnoses and all orders for this visit:    1. Dysuria (Primary)  -     POC Urinalysis Dipstick, Multipro  -     Urinalysis With Culture If Indicated -; Future  -     Urinalysis With Culture If Indicated -    2. Suspected UTI  -     nitrofurantoin, macrocrystal-monohydrate, (Macrobid) 100 MG capsule; Take 1 capsule by mouth 2 (Two) Times a Day for 5 days.  Dispense: 10 capsule; Refill: 0  -     Urinalysis With Culture If Indicated -; Future  -     Urinalysis With Culture If Indicated -           Assessment & Plan  1. Urinary Tract Infection (UTI).  Symptoms include pain during urination, burning sensation, and a change in odor. Urinalysis shows a trace of leukocytes and a small amount of blood, indicating a UTI. The last UTI occurred approximately 4-5 years ago. A broad-spectrum antibiotic will be prescribed, to be taken twice daily for 5 days. A urine culture will be sent for further analysis. The prescription will be [place] to [facility] [place]. If the culture results indicate a different bacterium, the antibiotic may be changed.    2. Health Maintenance.  A low-dose CT scan of the lungs and a mammogram have been recommended. The contact number for scheduling these appointments will be provided via [employer] and on a posted note. The next colonoscopy is due in 2028.    3. Sleep Apnea.  The patient mentioned needing a follow-up for CPAP monitoring. Previous notes will be reviewed to determine the necessary steps, and coordination with Dr. Feldman will be done to reorder any required tests or equipment.    PROCEDURE  The patient underwent a hysterectomy approximately 4 to 5 years  ago.    An After Visit Summary was printed and given to the patient at discharge.  No follow-ups on file.       Patient Instructions   Call scheduling [phone] for chest CT and mammogram schedule      Discussion:     I spent 28 minutes caring for Alessandra on this date of service. This time includes time spent by me in the following activities: preparing for the visit, reviewing tests, performing a medically appropriate examination and/or evaluation, counseling and educating the patient/family/caregiver, documenting information in the medical record, independently interpreting results and communicating that information with the patient/family/caregiver, care coordination, ordering medications, ordering test(s), obtaining a separately obtained history, and reviewing a separately obtained history   Patient or patient representative verbalized consent for the use of Ambient Listening during the visit with  FITZ Barraza for chart documentation. 1/24/2025  12:49 CST    Jerilyn BYRNES 1/24/2025   Electronically signed.

## 2025-01-24 NOTE — PROGRESS NOTES
Reason For Visit:  Coronary Artery Disease, medications (Wants to discuss brilinta and coreg), Dizziness, Urinary Tract Infection (Has not contacted PCP about it), sleep apnea (Newly diagnosed since last visit), and leqvio (Gets these injections now instead of taking zetia)     Subjective        Alessandra Baker is a 59 y.o. female with the below pertinent PMH who presents for follow-up of CAD.    Patient last seen 8/13/2024.  Main complaint at that point was headaches associated with elevated BP.  Also feeling fatigued.  Denied any significant chest pain or shortness of breath.  She was pending a sleep study at that time.  No medication adjustments were made.    Since her previous appointment she has been doing well.  Her PCP increased her carvedilol for ongoing hypertension.  She tolerated this dose well.  She denies any bleeding issues.  Denies any exertional chest pain.      ROS: Pertinent findings as noted above    Cardiac Studies  Cath 11/2012: NSTEMI.20% LMCA stenosis, no significant LAD stenosis, 70% proximal RCA stenosis, 90% distal RCA stenosis; underwent ZACHARY to both the proximal and distal RCA.  Cath 2015: 50% proximal RCA ISR (normal IFR) and scattered 5% narrowings in the proximal and mid LAD (normal FFR).  Cath 2019: 75% mid LAD stenosis, 50% mid circumflex stenosis, and 80 to 90% proximal RCA ISR with patent distal RCA stent.  Underwent ZACHARY in proximal LAD and ZACHARY inside prior stent for proximal RCA.  Cath 4/2022: ISR of proximal RCA that was managed with another ZACHARY.  There was moderate flow-limiting mid LAD ISR (IFR 0.88) involving a moderate-sized diagonal branch that was managed medically.    Cath 11/22/2023: LVEDP 17, LVEF 55% without significant MR, distal LMCA 60% stenosis with large atherosclerotic plaque, ISR of proximal LAD (50% stenosis), no significant diagonal branch disease, proximal LCx 60% stenosis, moderate-sized OM without any obstructive disease, RCA is flush occlusion with multiple  stents from the proximal to distal region with some distal collateral filling  Echo 11/19/2023: LVEF 66-70%, mild concentric LVH, normal diastolic function, normal RV size/function, no hemodynamically significant valve pathology.  Cardiac monitor 2/13/2024: Relatively benign.  Predominantly sinus with average rate 89 () BPM.  No sustained arrhythmias, frequent ectopy, high-grade AV block, or pauses detected.  1 episode of paroxysmal SVT lasting 6 beats.  No reported symptoms.     Pertinent PMH  CAD with prior MI, multiple stents, and CABG (LIMA-LAD, SVT-OM, 11/29/23, Dr. Granados)  Paroxysmal atrial fibrillation s/p MAZE and RONALD clipping (11/29/23)-anticoagulant discontinued after clipping given increased bleeding risk with esophageal varices  Hypertension  Hyperlipidemia with statin intolerance  Prior stroke  CKD 3a  Rheumatoid arthritis  Cirrhosis complicated by grade 1 esophageal varices  CASSANDRA on CPAP    Pertinent past medical, surgical, family, and social history were reviewed.      Current Outpatient Medications:     azaTHIOprine (IMURAN) 50 MG tablet, Take 1 tablet by mouth Every 12 (Twelve) Hours., Disp: , Rfl:     carvedilol (COREG) 6.25 MG tablet, Take 1 tablet by mouth 2 (Two) Times a Day., Disp: 180 tablet, Rfl: 3    cetirizine (zyrTEC) 10 MG tablet, Take 1 tablet by mouth Daily As Needed for Allergies., Disp: , Rfl:     [employer]S Aspirin Low Dose 81 MG EC tablet, TAKE 1 TABLET BY MOUTH EVERY DAY, Disp: 90 tablet, Rfl: 3    dapagliflozin Propanediol 10 MG tablet, Take 10 mg by mouth Daily., Disp: 30 tablet, Rfl: 11    EPINEPHrine (EPIPEN) 0.3 MG/0.3ML solution auto-injector injection, Inject 0.3 mL into the appropriate muscle as directed by prescriber 1 (One) Time. If needed, Disp: , Rfl:     estradiol (ESTRACE) 0.1 MG/GM vaginal cream, Use 1 gram vaginally nightly x 2 weeks then 2-3 times per week, Disp: 42.5 g, Rfl: 2    fluticasone (FLONASE) 50 MCG/ACT nasal spray, 2 sprays into the nostril(s) as directed  "by provider Daily., Disp: 16 g, Rfl: 11    Inclisiran Sodium 284 MG/1.5ML solution prefilled syringe, Inject 1.5 mL under the skin into the appropriate area as directed. Next due on 11/30- receives from oncology, Disp: , Rfl:     losartan (COZAAR) 50 MG tablet, TAKE 1 TABLET BY MOUTH EVERY DAY, Disp: 90 tablet, Rfl: 0    omeprazole (priLOSEC) 40 MG capsule, TAKE 1 CAPSULE BY MOUTH EVERY DAY, Disp: 30 capsule, Rfl: 2    sertraline (ZOLOFT) 100 MG tablet, TAKE 2 TABLETS BY MOUTH EVERY DAY, Disp: 180 tablet, Rfl: 3    spironolactone (ALDACTONE) 25 MG tablet, Take 0.5 tablets by mouth Daily., Disp: 30 tablet, Rfl: 5    sulfaSALAzine (AZULFIDINE) 500 MG tablet, Take 1 tablet by mouth Daily., Disp: 90 tablet, Rfl: 0    vitamin D (ERGOCALCIFEROL) 1.25 MG (96993 UT) capsule capsule, Take 1 capsule by mouth 1 (One) Time Per Week., Disp: , Rfl:     ticagrelor (Brilinta) 60 MG tablet tablet, Take 1 tablet by mouth 2 (Two) Times a Day., Disp: 60 tablet, Rfl: 11     Objective   Vital Signs:  /76   Pulse 68   Ht 165.1 cm (65\")   Wt 91.6 kg (202 lb)   SpO2 98%   BMI 33.61 kg/m²   Estimated body mass index is 33.61 kg/m² as calculated from the following:    Height as of this encounter: 165.1 cm (65\").    Weight as of this encounter: 91.6 kg (202 lb).      Constitutional:       Appearance: Healthy appearance. Not in distress.   Pulmonary:      Effort: Pulmonary effort is normal.      Breath sounds: Normal breath sounds.   Cardiovascular:      PMI at left midclavicular line. Normal rate. Regular rhythm.      Murmurs: There is no murmur.      No gallop.  No click. No rub.   Edema:     Peripheral edema absent.   Abdominal:      General: Bowel sounds are normal.   Musculoskeletal: Normal range of motion.      Cervical back: Normal range of motion and neck supple. Skin:     General: Skin is warm.   Neurological:      Mental Status: Alert and oriented to person, place and time.        Result Review :  The following data was " reviewed by: FITZ May on 01/24/2025:  CMP   CMP          2/2/2024    11:42 2/29/2024    11:23 8/12/2024    10:24   CMP   Glucose 90  106  101    BUN 17  23  17    Creatinine 1.45  1.42  1.21    EGFR 41.6  42.7  51.4    Sodium 143  142  142    Potassium 4.9  4.5  3.9    Chloride 107  104  106    Calcium 9.8  10.0  9.2    BUN/Creatinine Ratio 11.7  16.2  14.0    Anion Gap 11.0  12.0  7.0      Lipid Panel   Lipid Panel          2/2/2024    11:42   Lipid Panel   Total Cholesterol 114    Triglycerides 306    HDL Cholesterol 28    VLDL Cholesterol 46    LDL Cholesterol  40    LDL/HDL Ratio 0.89      BMP   BMP          2/2/2024    11:42 2/29/2024    11:23 8/12/2024    10:24   BMP   BUN 17  23  17    Creatinine 1.45  1.42  1.21    Sodium 143  142  142    Potassium 4.9  4.5  3.9    Chloride 107  104  106    CO2 25.0  26.0  29.0    Calcium 9.8  10.0  9.2           ECG 12 Lead    Date/Time: 1/24/2025 9:15 AM  Performed by: Trev Lyon APRN    Authorized by: Trev Lyon APRN  Comparison: compared with previous ECG from 8/13/2024  Similar to previous ECG  Comparison to previous ECG: No sinus rhythm  Rhythm: sinus rhythm  Rate: normal  QRS axis: normal    Clinical impression: normal ECG            Assessment and Plan   Diagnoses and all orders for this visit:    1. Coronary artery disease involving native coronary artery of native heart without angina pectoris (Primary)  2. Primary hypertension  3. Mixed hyperlipidemia  -No anginal symptoms as of late  - Continue aspirin 81 mg daily  - Will reduce ticagrelor to 60 mg twice daily as a maintenance dose given her significant CAD history  - Continue spironolactone 12.5 mg daily  - Continue carvedilol 6.25 mg twice daily  - Continue losartan 50 mg daily  - Continue Leqvio for LDL management, lab work to be done today    4. PAF (paroxysmal atrial fibrillation)  -Not on Eliquis given significant risk for bleeding, no known recurrence of A-fib as of late    5.  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 CASSANDRA (obstructive sleep apnea)  -Patient recently diagnosed with sleep apnea and using CPAP nightly           I spent 2 minutes on the separately reported service of EKG interpretation. This time is not included in the time used to support the E/M service also reported today.      Follow Up   Return in about 6 months (around 7/24/2025).  Patient was given instructions and counseling regarding her condition or for health maintenance advice. Please see specific information pulled into the AVS if appropriate.       Part of this note may be an electronic transcription/translation of spoken language to printed text using the Dragon Dictation System.

## 2025-01-26 LAB — BACTERIA SPEC AEROBE CULT: ABNORMAL

## 2025-01-29 DIAGNOSIS — I85.00 ESOPHAGEAL VARICES WITHOUT BLEEDING: ICD-10-CM

## 2025-01-29 DIAGNOSIS — R07.9 CHEST PAIN, UNSPECIFIED: ICD-10-CM

## 2025-01-29 RX ORDER — OMEPRAZOLE 40 MG/1
40 CAPSULE, DELAYED RELEASE ORAL DAILY
Qty: 90 CAPSULE | Refills: 1 | Status: SHIPPED | OUTPATIENT
Start: 2025-01-29

## 2025-02-05 ENCOUNTER — HOSPITAL ENCOUNTER (OUTPATIENT)
Dept: BONE DENSITY | Facility: HOSPITAL | Age: 60
Discharge: HOME | End: 2025-02-05
Payer: MEDICARE

## 2025-02-05 ENCOUNTER — HOSPITAL ENCOUNTER (OUTPATIENT)
Dept: MAMMOGRAPHY | Facility: HOSPITAL | Age: 60
Discharge: HOME | End: 2025-02-05
Payer: MEDICARE

## 2025-02-05 ENCOUNTER — HOSPITAL ENCOUNTER (OUTPATIENT)
Dept: CT IMAGING | Facility: HOSPITAL | Age: 60
Discharge: HOME | End: 2025-02-05
Payer: MEDICARE

## 2025-02-05 DIAGNOSIS — Z78.0 MENOPAUSE: ICD-10-CM

## 2025-02-05 DIAGNOSIS — Z87.891 PERSONAL HISTORY OF NICOTINE DEPENDENCE: ICD-10-CM

## 2025-02-05 DIAGNOSIS — Z12.31 ENCOUNTER FOR SCREENING MAMMOGRAM FOR BREAST CANCER: ICD-10-CM

## 2025-02-05 DIAGNOSIS — Z12.2 ENCOUNTER FOR SCREENING FOR LUNG CANCER: ICD-10-CM

## 2025-02-05 PROCEDURE — 71271 CT THORAX LUNG CANCER SCR C-: CPT

## 2025-02-05 PROCEDURE — 77063 BREAST TOMOSYNTHESIS BI: CPT

## 2025-02-05 PROCEDURE — 77080 DXA BONE DENSITY AXIAL: CPT

## 2025-02-05 PROCEDURE — 77067 SCR MAMMO BI INCL CAD: CPT

## 2025-02-17 RX ORDER — SPIRONOLACTONE 25 MG/1
12.5 TABLET ORAL DAILY
Qty: 30 TABLET | Refills: 5 | Status: SHIPPED | OUTPATIENT
Start: 2025-02-17

## 2025-02-20 DIAGNOSIS — N18.31 STAGE 3A CHRONIC KIDNEY DISEASE: ICD-10-CM

## 2025-02-20 DIAGNOSIS — I10 PRIMARY HYPERTENSION: ICD-10-CM

## 2025-02-20 RX ORDER — DAPAGLIFLOZIN 10 MG/1
10 TABLET, FILM COATED ORAL DAILY
Qty: 30 TABLET | Refills: 3 | Status: SHIPPED | OUTPATIENT
Start: 2025-02-20

## 2025-02-20 RX ORDER — LOSARTAN POTASSIUM 50 MG/1
50 TABLET ORAL DAILY
Qty: 30 TABLET | Refills: 3 | Status: SHIPPED | OUTPATIENT
Start: 2025-02-20

## 2025-03-10 ENCOUNTER — LAB (OUTPATIENT)
Dept: LAB | Facility: HOSPITAL | Age: 60
End: 2025-03-10
Payer: MEDICARE

## 2025-03-10 ENCOUNTER — HOSPITAL ENCOUNTER (OUTPATIENT)
Dept: GENERAL RADIOLOGY | Facility: HOSPITAL | Age: 60
Discharge: HOME | End: 2025-03-10
Payer: MEDICARE

## 2025-03-10 ENCOUNTER — TRANSCRIBE ORDERS (OUTPATIENT)
Dept: LAB | Facility: HOSPITAL | Age: 60
End: 2025-03-10
Payer: MEDICARE

## 2025-03-10 ENCOUNTER — TRANSCRIBE ORDERS (OUTPATIENT)
Dept: GENERAL RADIOLOGY | Facility: HOSPITAL | Age: 60
End: 2025-03-10
Payer: MEDICARE

## 2025-03-10 DIAGNOSIS — M54.42 LOW BACK PAIN WITH LEFT-SIDED SCIATICA, UNSPECIFIED BACK PAIN LATERALITY, UNSPECIFIED CHRONICITY: Primary | ICD-10-CM

## 2025-03-10 DIAGNOSIS — M05.79 SEROPOSITIVE RHEUMATOID ARTHRITIS OF MULTIPLE SITES: ICD-10-CM

## 2025-03-10 DIAGNOSIS — M05.79 SEROPOSITIVE RHEUMATOID ARTHRITIS OF MULTIPLE SITES: Primary | ICD-10-CM

## 2025-03-10 DIAGNOSIS — M54.42 LOW BACK PAIN WITH LEFT-SIDED SCIATICA, UNSPECIFIED BACK PAIN LATERALITY, UNSPECIFIED CHRONICITY: ICD-10-CM

## 2025-03-10 LAB
ALBUMIN SERPL-MCNC: 4.7 G/DL (ref 3.5–5.2)
ALBUMIN/GLOB SERPL: 1.6 G/DL
ALP SERPL-CCNC: 85 U/L (ref 39–117)
ALT SERPL W P-5'-P-CCNC: 17 U/L (ref 1–33)
ANION GAP SERPL CALCULATED.3IONS-SCNC: 12 MMOL/L (ref 5–15)
AST SERPL-CCNC: 27 U/L (ref 1–32)
BASOPHILS # BLD AUTO: 0.02 10*3/MM3 (ref 0–0.2)
BASOPHILS NFR BLD AUTO: 0.5 % (ref 0–1.5)
BILIRUB SERPL-MCNC: 0.5 MG/DL (ref 0–1.2)
BUN SERPL-MCNC: 15 MG/DL (ref 8–23)
BUN/CREAT SERPL: 10.9 (ref 7–25)
CALCIUM SPEC-SCNC: 9.5 MG/DL (ref 8.6–10.5)
CHLORIDE SERPL-SCNC: 105 MMOL/L (ref 98–107)
CO2 SERPL-SCNC: 24 MMOL/L (ref 22–29)
CREAT SERPL-MCNC: 1.38 MG/DL (ref 0.57–1)
CRP SERPL-MCNC: 0.55 MG/DL (ref 0–0.5)
DEPRECATED RDW RBC AUTO: 49.1 FL (ref 37–54)
EGFRCR SERPLBLD CKD-EPI 2021: 43.9 ML/MIN/1.73
EOSINOPHIL # BLD AUTO: 0.05 10*3/MM3 (ref 0–0.4)
EOSINOPHIL NFR BLD AUTO: 1.1 % (ref 0.3–6.2)
ERYTHROCYTE [DISTWIDTH] IN BLOOD BY AUTOMATED COUNT: 15.5 % (ref 12.3–15.4)
ERYTHROCYTE [SEDIMENTATION RATE] IN BLOOD: 10 MM/HR (ref 0–30)
GLOBULIN UR ELPH-MCNC: 3 GM/DL
GLUCOSE SERPL-MCNC: 75 MG/DL (ref 65–99)
HCT VFR BLD AUTO: 42.5 % (ref 34–46.6)
HGB BLD-MCNC: 13.9 G/DL (ref 12–15.9)
IMM GRANULOCYTES # BLD AUTO: 0.02 10*3/MM3 (ref 0–0.05)
IMM GRANULOCYTES NFR BLD AUTO: 0.5 % (ref 0–0.5)
LYMPHOCYTES # BLD AUTO: 0.82 10*3/MM3 (ref 0.7–3.1)
LYMPHOCYTES NFR BLD AUTO: 18.5 % (ref 19.6–45.3)
MCH RBC QN AUTO: 28.7 PG (ref 26.6–33)
MCHC RBC AUTO-ENTMCNC: 32.7 G/DL (ref 31.5–35.7)
MCV RBC AUTO: 87.6 FL (ref 79–97)
MONOCYTES # BLD AUTO: 0.33 10*3/MM3 (ref 0.1–0.9)
MONOCYTES NFR BLD AUTO: 7.4 % (ref 5–12)
NEUTROPHILS NFR BLD AUTO: 3.19 10*3/MM3 (ref 1.7–7)
NEUTROPHILS NFR BLD AUTO: 72 % (ref 42.7–76)
NRBC BLD AUTO-RTO: 0 /100 WBC (ref 0–0.2)
PLATELET # BLD AUTO: 148 10*3/MM3 (ref 140–450)
PMV BLD AUTO: 10.3 FL (ref 6–12)
POTASSIUM SERPL-SCNC: 4.2 MMOL/L (ref 3.5–5.2)
PROT SERPL-MCNC: 7.7 G/DL (ref 6–8.5)
RBC # BLD AUTO: 4.85 10*6/MM3 (ref 3.77–5.28)
SODIUM SERPL-SCNC: 141 MMOL/L (ref 136–145)
WBC NRBC COR # BLD AUTO: 4.43 10*3/MM3 (ref 3.4–10.8)

## 2025-03-10 PROCEDURE — 80053 COMPREHEN METABOLIC PANEL: CPT

## 2025-03-10 PROCEDURE — 85652 RBC SED RATE AUTOMATED: CPT

## 2025-03-10 PROCEDURE — 72114 X-RAY EXAM L-S SPINE BENDING: CPT

## 2025-03-10 PROCEDURE — 36415 COLL VENOUS BLD VENIPUNCTURE: CPT

## 2025-03-10 PROCEDURE — 85025 COMPLETE CBC W/AUTO DIFF WBC: CPT

## 2025-03-10 PROCEDURE — 86140 C-REACTIVE PROTEIN: CPT

## 2025-03-17 RX ORDER — POTASSIUM CHLORIDE 750 MG/1
CAPSULE, EXTENDED RELEASE ORAL
Qty: 60 CAPSULE | Refills: 11 | OUTPATIENT
Start: 2025-03-17

## 2025-03-27 ENCOUNTER — OFFICE VISIT (OUTPATIENT)
Dept: OBSTETRICS AND GYNECOLOGY | Age: 60
End: 2025-03-27
Payer: MEDICARE

## 2025-03-27 VITALS
DIASTOLIC BLOOD PRESSURE: 76 MMHG | HEIGHT: 65 IN | BODY MASS INDEX: 33.99 KG/M2 | WEIGHT: 204 LBS | SYSTOLIC BLOOD PRESSURE: 126 MMHG

## 2025-03-27 DIAGNOSIS — M85.80 OSTEOPENIA, UNSPECIFIED LOCATION: ICD-10-CM

## 2025-03-27 DIAGNOSIS — N89.8 VAGINAL DRYNESS: ICD-10-CM

## 2025-03-27 DIAGNOSIS — E55.9 VITAMIN D DEFICIENCY: Primary | ICD-10-CM

## 2025-03-27 RX ORDER — ERGOCALCIFEROL 1.25 MG/1
50000 CAPSULE, LIQUID FILLED ORAL WEEKLY
Qty: 12 CAPSULE | Refills: 3 | Status: SHIPPED | OUTPATIENT
Start: 2025-03-27

## 2025-03-27 RX ORDER — LIDOCAINE 50 MG/G
PATCH TOPICAL
COMMUNITY

## 2025-03-27 RX ORDER — TIZANIDINE 2 MG/1
TABLET ORAL
COMMUNITY
Start: 2025-03-06

## 2025-03-27 RX ORDER — ESTRADIOL 0.1 MG/G
CREAM VAGINAL
Qty: 42.5 G | Refills: 2 | Status: SHIPPED | OUTPATIENT
Start: 2025-03-27

## 2025-03-27 NOTE — PROGRESS NOTES
"Chief Complaint   Patient presents with   • Gynecologic Exam     Patient here for yearly follow up, hx of hysterectomy, mammogram & dexa 2/5/25.        Subjective     Alessandra Baker is a 60 y.o. female    History of Present Illness  History of Present Illness  The patient is a 60-year-old female who presents for a yearly follow up exam and follow-up on vaginal irritation.    She also mentions that she and her  are no longer sexually active due to his multiple sclerosis. The irritation does not cause her daily discomfort but is noticeable during intercourse.     She is currently on Zoloft for depression, prescribed by Dr. Feldman. She reports no suicidal or homicidal ideations. Does feel like irritation isn't controlled.     /76 (BP Location: Left arm, Patient Position: Sitting, Cuff Size: Large Adult)   Ht 165.1 cm (65\")   Wt 92.5 kg (204 lb)   BMI 33.95 kg/m²     Outpatient Encounter Medications as of 3/27/2025   Medication Sig Dispense Refill   • azaTHIOprine (IMURAN) 50 MG tablet Take 1 tablet by mouth Every 12 (Twelve) Hours.     • carvedilol (COREG) 6.25 MG tablet Take 1 tablet by mouth 2 (Two) Times a Day. 180 tablet 3   • cetirizine (zyrTEC) 10 MG tablet Take 1 tablet by mouth Daily As Needed for Allergies.     • [facility] Aspirin Low Dose 81 MG EC tablet TAKE 1 TABLET BY MOUTH EVERY DAY 90 tablet 3   • EPINEPHrine (EPIPEN) 0.3 MG/0.3ML solution auto-injector injection Inject 0.3 mL into the appropriate muscle as directed by prescriber 1 (One) Time. If needed     • estradiol (ESTRACE) 0.1 MG/GM vaginal cream Use 1 gram vaginally nightly x 2 weeks then 2-3 times per week 42.5 g 2   • fluticasone (FLONASE) 50 MCG/ACT nasal spray 2 sprays into the nostril(s) as directed by provider Daily. 16 g 11   • Inclisiran Sodium 284 MG/1.5ML solution prefilled syringe Inject 1.5 mL under the skin into the appropriate area as directed. Next due on 11/30- receives from oncology     • lidocaine (LIDODERM) 5 % APPLY 1 " PATCH ON THE SKIN TO THE APPROPRIATE AREA(S) DAILY. REMOVE PATCH AFTER 12 HOURS.     • losartan (COZAAR) 50 MG tablet Take 1 tablet by mouth Daily. 30 tablet 3   • omeprazole (priLOSEC) 40 MG capsule Take 1 capsule by mouth Daily. 90 capsule 1   • sertraline (ZOLOFT) 100 MG tablet TAKE 2 TABLETS BY MOUTH EVERY  tablet 3   • spironolactone (ALDACTONE) 25 MG tablet TAKE 1/2 TABLET BY MOUTH DAILY 30 tablet 5   • sulfaSALAzine (AZULFIDINE) 500 MG tablet Take 1 tablet by mouth Daily. 90 tablet 0   • ticagrelor (Brilinta) 60 MG tablet tablet Take 1 tablet by mouth 2 (Two) Times a Day. 60 tablet 11   • tiZANidine (ZANAFLEX) 2 MG tablet 1/2 to 1 tablet up to three times a day Orally as needed for 30 days     • vitamin D (ERGOCALCIFEROL) 1.25 MG (52925 UT) capsule capsule Take 1 capsule by mouth 1 (One) Time Per Week. 12 capsule 3   • [DISCONTINUED] dapagliflozin Propanediol 10 MG tablet Take 10 mg by mouth Daily. 30 tablet 3   • [DISCONTINUED] estradiol (ESTRACE) 0.1 MG/GM vaginal cream Use 1 gram vaginally nightly x 2 weeks then 2-3 times per week 42.5 g 2   • [DISCONTINUED] vitamin D (ERGOCALCIFEROL) 1.25 MG (87231 UT) capsule capsule Take 1 capsule by mouth 1 (One) Time Per Week. 12 capsule 0     No facility-administered encounter medications on file as of 3/27/2025.       Past Medical History  Past Medical History:   Diagnosis Date   • Anxiety    • Arthritis    • Atrial fibrillation with RVR 11/19/2023   • CAD (coronary artery disease)    • Chronic kidney disease    • Cirrhosis    • Depression    • Esophageal varices    • GERD (gastroesophageal reflux disease)    • Headache    • Heart problem    • Hyperlipidemia    • Hypertension 11/2012   • Liver disease 2015   • Liver problem    • Myocardial infarction 11/2012   • Rheumatoid arthritis    • Skin cancer    • Sleep apnea    • Stroke    • Ulnar neuropathy    • Visual impairment         Surgical History  Past Surgical History:   Procedure Laterality Date   •  ANGIOPLASTY      11/2012   • APPENDECTOMY     • ATRIAL APPENDAGE EXCLUSION LEFT WITH TRANSESOPHAGEAL ECHOCARDIOGRAM N/A 11/29/2023    Procedure: ATRIAL APPENDAGE EXCLUSION LEFT WITH 40MM ATRICLIP;  Surgeon: Hosea Granados MD;  Location: [facility] OR;  Service: Cardiothoracic;  Laterality: N/A;   • CARDIAC CATHETERIZATION  07/21/2015 11/1/12, 4/21/22   • CARDIAC CATHETERIZATION Left 11/22/2023    Procedure: Cardiac Catheterization/Vascular Study;  Surgeon: Jayson Castillo MD;  Location: [facility] CATH INVASIVE LOCATION;  Service: Cardiology;  Laterality: Left;   • COLONOSCOPY     • COLONOSCOPY N/A 04/24/2023    Procedure: COLONOSCOPY WITH ANESTHESIA;  Surgeon: Rick Tidwell DO;  Location: [facility] ENDOSCOPY;  Service: Gastroenterology;  Laterality: N/A;  preop; hx of polyps   postop; polyps  PCP Eric Feldman    • CORONARY ARTERY BYPASS GRAFT N/A 11/29/2023    Procedure: CORONARY ARTERY BYPASS GRAFTING X2, LEFT INTERNAL MAMMARY ARTERY GRAFT, RIGHT LEG ENDOSCOPIC VEIN HARVEST, TRANSESOPHAGEAL ECHOCARDIOGRAM, APPLICATION OF PREVENA;  Surgeon: Hosea Granados MD;  Location: [facility] OR;  Service: Cardiothoracic;  Laterality: N/A;   • CORONARY STENT PLACEMENT      x3 per patient, X 1 -  11/2012, x 2 - 4/21/22   • ENDOSCOPY     • ENDOSCOPY N/A 04/24/2023    Procedure: ESOPHAGOGASTRODUODENOSCOPY WITH ANESTHESIA;  Surgeon: Rick Tidwell DO;  Location: [facility] ENDOSCOPY;  Service: Gastroenterology;  Laterality: N/A;  preop; hx of varices   postop; varices   PCP Eric Feldman    • HAND RECONSTRUCTION Right 06/15/1967   • HYSTERECTOMY      2016   • MAZE PROCEDURE N/A 11/29/2023    Procedure: MAZE PROCEDURE WITH ENCOMPASS CLAMP;  Surgeon: Hosea Granados MD;  Location: [facility] OR;  Service: Cardiothoracic;  Laterality: N/A;   • TONSILLECTOMY         Family History  Family History   Problem Relation Age of Onset   • Hyperlipidemia Mother    • Hypertension Mother    • Stroke Mother    • Diabetes Mother    • Arthritis Mother     • Hyperlipidemia Father    • Hypertension Father    • Heart attack Father    • Arthritis Father    • Early death Father    • Heart failure Sister    • Stroke Sister    • Heart attack Brother    • Alcohol abuse Brother    • Heart attack Brother    • Mental illness Maternal Aunt         Father's  sister   • Breast cancer Maternal Aunt    • Heart failure Paternal Aunt    • Heart failure Paternal Uncle    • Heart failure Paternal Uncle    • Colon cancer Neg Hx    • Colon polyps Neg Hx    • Esophageal cancer Neg Hx        The following portions of the patient's history were reviewed and updated as appropriate: allergies, current medications, past family history, past medical history, past social history, past surgical history, and problem list.    Review of Systems   Constitutional:  Negative for activity change and unexpected weight loss.   HENT:  Negative for congestion.    Cardiovascular:  Negative for chest pain.   Gastrointestinal:  Negative for blood in stool, constipation and diarrhea.   Endocrine: Negative for cold intolerance and heat intolerance.   Genitourinary:  Negative for dyspareunia, pelvic pain and vaginal discharge.   Musculoskeletal:  Negative for arthralgias, back pain, neck pain and neck stiffness.   Skin:  Negative for rash.   Neurological:  Negative for dizziness and headache.   Psychiatric/Behavioral:  Negative for sleep disturbance. The patient is not nervous/anxious.        Objective   Physical Exam  Vitals and nursing note reviewed. Exam conducted with a chaperone present.   Constitutional:       General: She is not in acute distress.     Appearance: She is well-developed.   HENT:      Head: Normocephalic and atraumatic.   Neck:      Thyroid: No thyromegaly.   Cardiovascular:      Rate and Rhythm: Normal rate and regular rhythm.      Heart sounds: No murmur heard.  Pulmonary:      Effort: Pulmonary effort is normal.      Breath sounds: Normal breath sounds.   Chest:   Breasts:     Right: No  inverted nipple or mass.      Left: No inverted nipple or mass.   Abdominal:      General: There is no distension.      Palpations: Abdomen is soft.      Tenderness: There is no abdominal tenderness.   Genitourinary:     Exam position: Supine.      Labia:         Right: No tenderness or lesion.         Left: No tenderness or lesion.       Vagina: Normal. No vaginal discharge or bleeding.      Uterus: Absent.       Adnexa: Right adnexa normal and left adnexa normal.        Right: No tenderness or fullness.          Left: No tenderness or fullness.        Rectum: Normal anal tone.      Comments: Patient s/ophysterectomy, Vaginal cuff unremarkable. Labia normal, no lesions noted. Urethral meatus unremarkable, no prolapse of mucosa. Anus/perineum normal  Musculoskeletal:         General: Normal range of motion.      Cervical back: Normal range of motion and neck supple.   Skin:     General: Skin is warm and dry.   Neurological:      Mental Status: She is alert and oriented to person, place, and time.   Psychiatric:         Behavior: Behavior normal.         Judgment: Judgment normal.       Assessment & Plan   Diagnoses and all orders for this visit:    1. Vitamin D deficiency (Primary)  -     vitamin D (ERGOCALCIFEROL) 1.25 MG (12559 UT) capsule capsule; Take 1 capsule by mouth 1 (One) Time Per Week.  Dispense: 12 capsule; Refill: 3    2. Vaginal dryness  -     estradiol (ESTRACE) 0.1 MG/GM vaginal cream; Use 1 gram vaginally nightly x 2 weeks then 2-3 times per week  Dispense: 42.5 g; Refill: 2    3. Osteopenia, unspecified location         Normal GYN exam. Encouraged SBE, pt is aware how to do self breast exam and the importance of same. Discussed weight management and importance of maintaining a healthy weight. Discussed Vitamin D intake and the importance of adequate vitamin D for both bone health and a healthy immune system.  Discussed daily exercise and the importance of same, in regards to a healthy heart as well  as helping to maintain her weight and improving her mental health.  Colonoscopy is up to date. Mammogram is up to date. Bone density is up to date. Pap smear is not done per ASCCP guidelines. HPV is not done. Lab work up is up to date.      Assessment & Plan  1. Vaginal irritation.  She has been advised to resume the use of estradiol cream as needed, particularly in anticipation of sexual activity. A prescription refill for the estradiol cream will be provided. She has no complaints of dryness unless its related to intercourse which isn't currently happening.     2. Osteopenia.  She is currently taking calcium with vitamin D supplements. A prescription refill for vitamin D will be provided.    3. Depression.  She is currently on Zoloft. She has been advised to consult with Dr. Feldman regarding any necessary adjustments to her medication regimen. No suicidal or homicidal thoughts or plans.       BMI is >= 30 and <35. (Class 1 Obesity). The following options were offered after discussion;: weight loss educational material (shared in after visit summary)      FITZ Ponce  3/27/2025    Return in about 1 year (around 3/27/2026) for Annual physical.   Patient or patient representative verbalized consent for the use of Ambient Listening during the visit with  FITZ Ponce for chart documentation. 3/27/2025  10:57 CDT

## 2025-04-03 ENCOUNTER — OFFICE VISIT (OUTPATIENT)
Dept: FAMILY MEDICINE CLINIC | Facility: CLINIC | Age: 60
End: 2025-04-03
Payer: MEDICARE

## 2025-04-03 VITALS
OXYGEN SATURATION: 98 % | HEART RATE: 74 BPM | BODY MASS INDEX: 34.39 KG/M2 | DIASTOLIC BLOOD PRESSURE: 90 MMHG | HEIGHT: 65 IN | SYSTOLIC BLOOD PRESSURE: 120 MMHG | WEIGHT: 206.4 LBS | TEMPERATURE: 96.9 F

## 2025-04-03 DIAGNOSIS — F32.A DEPRESSION, UNSPECIFIED DEPRESSION TYPE: Primary | ICD-10-CM

## 2025-04-03 RX ORDER — BUPROPION HYDROCHLORIDE 150 MG/1
150 TABLET ORAL EVERY MORNING
Qty: 30 TABLET | Refills: 1 | Status: SHIPPED | OUTPATIENT
Start: 2025-04-03

## 2025-04-14 NOTE — PROGRESS NOTES
"Chief Complaint  Anxiety (Patient presents to clinic with C/O feeling as if medication is not working. Patient reports having a \"short fuse\", becoming easily annoyed/irritable. )    Subjective        Alessandra Baker presents to [facility]  Anxiety      Easily irritable with more depression despite compliance with sertraline, anxiety ok, no SI    Objective   Vital Signs:  /90 (BP Location: Right arm, Patient Position: Sitting, Cuff Size: Adult)   Pulse 74   Temp 96.9 °F (36.1 °C) (Temporal)   Ht 165.1 cm (65\")   Wt 93.6 kg (206 lb 6.4 oz)   SpO2 98%   BMI 34.35 kg/m²   Estimated body mass index is 34.35 kg/m² as calculated from the following:    Height as of this encounter: 165.1 cm (65\").    Weight as of this encounter: 93.6 kg (206 lb 6.4 oz).            Physical Exam  Vitals and nursing note reviewed.   Constitutional:       General: She is not in acute distress.     Appearance: She is not diaphoretic.   HENT:      Head: Normocephalic and atraumatic.      Nose: Nose normal.   Eyes:      General: No scleral icterus.        Right eye: No discharge.         Left eye: No discharge.      Conjunctiva/sclera: Conjunctivae normal.   Neck:      Trachea: No tracheal deviation.   Pulmonary:      Effort: Pulmonary effort is normal.   Skin:     General: Skin is warm and dry.      Coloration: Skin is not pale.   Neurological:      Mental Status: She is alert and oriented to person, place, and time.   Psychiatric:         Mood and Affect: Mood is depressed.         Behavior: Behavior normal.         Thought Content: Thought content normal.         Judgment: Judgment normal.        Result Review :                Assessment and Plan   Diagnoses and all orders for this visit:    1. Depression, unspecified depression type (Primary)  -     TSH; Future  -     T4, free; Future  -     buPROPion XL (Wellbutrin XL) 150 MG 24 hr tablet; Take 1 tablet by mouth Every Morning.  Dispense: 30 tablet; " Refill: 1             Follow Up   Return in about 6 weeks (around 5/15/2025) for Recheck.  Patient was given instructions and counseling regarding her condition or for health maintenance advice. Please see specific information pulled into the AVS if appropriate.

## 2025-05-11 ENCOUNTER — HEALTH MAINTENANCE LETTER (OUTPATIENT)
Age: 60
End: 2025-05-11

## 2025-05-14 ENCOUNTER — OFFICE VISIT (OUTPATIENT)
Dept: FAMILY MEDICINE CLINIC | Facility: CLINIC | Age: 60
End: 2025-05-14
Payer: MEDICARE

## 2025-05-14 VITALS
HEIGHT: 65 IN | OXYGEN SATURATION: 98 % | DIASTOLIC BLOOD PRESSURE: 90 MMHG | TEMPERATURE: 96.5 F | HEART RATE: 78 BPM | SYSTOLIC BLOOD PRESSURE: 120 MMHG | BODY MASS INDEX: 34.39 KG/M2 | WEIGHT: 206.4 LBS

## 2025-05-14 DIAGNOSIS — R21 RASH AND NONSPECIFIC SKIN ERUPTION: ICD-10-CM

## 2025-05-14 DIAGNOSIS — F32.A DEPRESSION, UNSPECIFIED DEPRESSION TYPE: Primary | ICD-10-CM

## 2025-05-14 RX ORDER — TRIAMCINOLONE ACETONIDE 1 MG/G
1 OINTMENT TOPICAL 2 TIMES DAILY
Qty: 80 G | Refills: 2 | Status: SHIPPED | OUTPATIENT
Start: 2025-05-14

## 2025-05-20 NOTE — PROGRESS NOTES
"Chief Complaint  Rash (Patient has c/o random skin lesions that itch and hurt) and Depression    Subjective        Alessandra Baker presents to [facility]  Rash  Depression    Nighttime awakenings:     PMH Includes: depression      Itchy rash on bilateral arms for 2 weeks  Mood got worse with Wellbutrin  Objective   Vital Signs:  /90 (BP Location: Left arm, Patient Position: Sitting, Cuff Size: Adult)   Pulse 78   Temp 96.5 °F (35.8 °C) (Temporal)   Ht 165.1 cm (65\")   Wt 93.6 kg (206 lb 6.4 oz)   SpO2 98%   BMI 34.35 kg/m²   Estimated body mass index is 34.35 kg/m² as calculated from the following:    Height as of this encounter: 165.1 cm (65\").    Weight as of this encounter: 93.6 kg (206 lb 6.4 oz).            Physical Exam  Vitals and nursing note reviewed.   Constitutional:       General: She is not in acute distress.     Appearance: She is not diaphoretic.   HENT:      Head: Normocephalic and atraumatic.      Nose: Nose normal.   Eyes:      General: No scleral icterus.        Right eye: No discharge.         Left eye: No discharge.      Conjunctiva/sclera: Conjunctivae normal.   Neck:      Trachea: No tracheal deviation.   Pulmonary:      Effort: Pulmonary effort is normal.   Skin:     General: Skin is warm and dry.      Coloration: Skin is not pale.      Comments: Eczema bilateral forearms   Neurological:      Mental Status: She is alert and oriented to person, place, and time.   Psychiatric:         Mood and Affect: Mood is depressed.         Behavior: Behavior normal.         Thought Content: Thought content normal.         Judgment: Judgment normal.        Result Review :                Assessment and Plan   Diagnoses and all orders for this visit:    1. Depression, unspecified depression type (Primary)  -     Cariprazine HCl (VRAYLAR) 1.5 MG capsule capsule; Take 1 capsule by mouth Daily.  Dispense: 30 capsule; Refill: 2    2. Rash and nonspecific skin eruption  -   "   triamcinolone (KENALOG) 0.1 % ointment; Apply 1 Application topically to the appropriate area as directed 2 (Two) Times a Day.  Dispense: 80 g; Refill: 2             Follow Up   Return in about 8 weeks (around 7/9/2025).  Patient was given instructions and counseling regarding her condition or for health maintenance advice. Please see specific information pulled into the AVS if appropriate.

## 2025-06-06 DIAGNOSIS — N18.31 STAGE 3A CHRONIC KIDNEY DISEASE: ICD-10-CM

## 2025-06-06 RX ORDER — DAPAGLIFLOZIN 10 MG/1
1 TABLET, FILM COATED ORAL DAILY
Qty: 30 TABLET | Refills: 3 | Status: SHIPPED | OUTPATIENT
Start: 2025-06-06

## 2025-06-07 DIAGNOSIS — I10 PRIMARY HYPERTENSION: ICD-10-CM

## 2025-06-09 RX ORDER — LOSARTAN POTASSIUM 50 MG/1
50 TABLET ORAL DAILY
Qty: 30 TABLET | Refills: 3 | Status: SHIPPED | OUTPATIENT
Start: 2025-06-09

## 2025-06-25 DIAGNOSIS — I25.10 CORONARY ARTERY DISEASE INVOLVING NATIVE CORONARY ARTERY OF NATIVE HEART, UNSPECIFIED WHETHER ANGINA PRESENT: ICD-10-CM

## 2025-06-25 RX ORDER — TICAGRELOR 90 MG/1
90 TABLET ORAL 2 TIMES DAILY
OUTPATIENT
Start: 2025-06-25

## 2025-06-27 DIAGNOSIS — I25.10 CORONARY ARTERY DISEASE INVOLVING NATIVE CORONARY ARTERY OF NATIVE HEART, UNSPECIFIED WHETHER ANGINA PRESENT: ICD-10-CM

## 2025-06-27 DIAGNOSIS — E78.2 MIXED HYPERLIPIDEMIA: Primary | ICD-10-CM

## 2025-06-27 RX ORDER — TICAGRELOR 90 MG/1
90 TABLET ORAL 2 TIMES DAILY
Qty: 60 TABLET | Refills: 11 | OUTPATIENT
Start: 2025-06-27

## 2025-06-27 NOTE — TELEPHONE ENCOUNTER
Pt informed to be fasting when she comes in for the shot Monday so that they can draw a fasting lab.  Martín Lugo, CMA

## 2025-06-27 NOTE — TELEPHONE ENCOUNTER
This patient is coming in Monday for 6mo maintenance dose of Leqvio, but we need new orders. Also, if the patient needs fasting lipid for this dose, please advise and we will need orders for that, as well. I noticed she had them last in Jan. 2025. Thank you :)

## 2025-06-30 NOTE — TELEPHONE ENCOUNTER
Sveta from the cancer center called asking for a therapy plan for the Leqvio.  I printed out what she said to fax her but it needs to be signed.  I put this on your desk to sign.  She said from now on if you could put this in the chart they will not have to call again.  Martín Lugo, CMA

## 2025-07-01 ENCOUNTER — INFUSION (OUTPATIENT)
Dept: ONCOLOGY | Facility: HOSPITAL | Age: 60
End: 2025-07-01
Payer: MEDICARE

## 2025-07-01 ENCOUNTER — LAB (OUTPATIENT)
Dept: LAB | Facility: HOSPITAL | Age: 60
End: 2025-07-01
Payer: MEDICARE

## 2025-07-01 VITALS
OXYGEN SATURATION: 95 % | TEMPERATURE: 96.9 F | HEIGHT: 65 IN | WEIGHT: 203.8 LBS | DIASTOLIC BLOOD PRESSURE: 78 MMHG | HEART RATE: 76 BPM | SYSTOLIC BLOOD PRESSURE: 129 MMHG | BODY MASS INDEX: 33.95 KG/M2 | RESPIRATION RATE: 18 BRPM

## 2025-07-01 DIAGNOSIS — E78.2 MIXED HYPERLIPIDEMIA: ICD-10-CM

## 2025-07-01 DIAGNOSIS — E78.2 MIXED HYPERLIPIDEMIA: Primary | ICD-10-CM

## 2025-07-01 DIAGNOSIS — I25.10 CORONARY ARTERY DISEASE, UNSPECIFIED VESSEL OR LESION TYPE, UNSPECIFIED WHETHER ANGINA PRESENT, UNSPECIFIED WHETHER NATIVE OR TRANSPLANTED HEART: ICD-10-CM

## 2025-07-01 LAB
CHOLEST SERPL-MCNC: 137 MG/DL (ref 0–200)
HDLC SERPL-MCNC: 25 MG/DL (ref 40–60)
LDLC SERPL CALC-MCNC: 67 MG/DL (ref 0–100)
LDLC/HDLC SERPL: 2.23 {RATIO}
TRIGL SERPL-MCNC: 281 MG/DL (ref 0–150)
VLDLC SERPL-MCNC: 45 MG/DL (ref 5–40)

## 2025-07-01 PROCEDURE — 80061 LIPID PANEL: CPT

## 2025-07-01 PROCEDURE — 36415 COLL VENOUS BLD VENIPUNCTURE: CPT

## 2025-07-01 PROCEDURE — 25010000002 INCLISIRAN SODIUM 284 MG/1.5ML SOLUTION PREFILLED SYRINGE: Performed by: HOSPITALIST

## 2025-07-01 PROCEDURE — 96372 THER/PROPH/DIAG INJ SC/IM: CPT

## 2025-07-01 RX ADMIN — INCLISIRAN 284 MG: 284 INJECTION, SOLUTION SUBCUTANEOUS at 08:08

## 2025-07-13 ENCOUNTER — HEALTH MAINTENANCE LETTER (OUTPATIENT)
Age: 60
End: 2025-07-13

## 2025-07-29 ENCOUNTER — OFFICE VISIT (OUTPATIENT)
Dept: CARDIOLOGY | Facility: CLINIC | Age: 60
End: 2025-07-29
Payer: MEDICARE

## 2025-07-29 VITALS
HEART RATE: 75 BPM | WEIGHT: 203 LBS | OXYGEN SATURATION: 98 % | SYSTOLIC BLOOD PRESSURE: 120 MMHG | HEIGHT: 65 IN | DIASTOLIC BLOOD PRESSURE: 70 MMHG | BODY MASS INDEX: 33.82 KG/M2

## 2025-07-29 DIAGNOSIS — E78.2 MIXED HYPERLIPIDEMIA: ICD-10-CM

## 2025-07-29 DIAGNOSIS — I25.10 CORONARY ARTERY DISEASE INVOLVING NATIVE CORONARY ARTERY OF NATIVE HEART WITHOUT ANGINA PECTORIS: Primary | ICD-10-CM

## 2025-07-29 DIAGNOSIS — I48.0 PAF (PAROXYSMAL ATRIAL FIBRILLATION): ICD-10-CM

## 2025-07-29 DIAGNOSIS — I10 PRIMARY HYPERTENSION: ICD-10-CM

## 2025-07-29 PROCEDURE — G2211 COMPLEX E/M VISIT ADD ON: HCPCS | Performed by: HOSPITALIST

## 2025-07-29 PROCEDURE — 3074F SYST BP LT 130 MM HG: CPT | Performed by: HOSPITALIST

## 2025-07-29 PROCEDURE — 99214 OFFICE O/P EST MOD 30 MIN: CPT | Performed by: HOSPITALIST

## 2025-07-29 PROCEDURE — 3078F DIAST BP <80 MM HG: CPT | Performed by: HOSPITALIST

## 2025-07-29 NOTE — PROGRESS NOTES
Reason For Visit:  CAD, atrial fibrillation, hypertension, hyperlipidemia    Subjective        Alessandra Baker is a 60 y.o. female with the below pertinent PMH who presents for follow-up of the above issues.    Alessandra Baker was most recently seen in cardiology clinic 1/24/2025 at which time she reported doing well and was tolerating an increased dose of carvedilol that had been changed by her PCP.  Ticagrelor was reduced to 60 mg twice daily..    Today, the patient states that she has been doing well since her last visit.  She is stable activity tolerance without any chest discomfort or abnormal shortness of breath.  She also denies concerning palpitations, lightheadedness, peripheral edema, abdominal distention, or orthopnea.  She is interested in trying to reduce medications if able.    ROS: Pertinent findings are included above.    Cardiac Studies  Cath 11/2012: NSTEMI.20% LMCA stenosis, no significant LAD stenosis, 70% proximal RCA stenosis, 90% distal RCA stenosis; underwent ZACHARY to both the proximal and distal RCA.  Cath 2015: 50% proximal RCA ISR (normal IFR) and scattered 5% narrowings in the proximal and mid LAD (normal FFR).  Cath 2019: 75% mid LAD stenosis, 50% mid circumflex stenosis, and 80 to 90% proximal RCA ISR with patent distal RCA stent.  Underwent ZACHARY in proximal LAD and ZACHARY inside prior stent for proximal RCA.  Cath 4/2022: ISR of proximal RCA that was managed with another ZACHARY.  There was moderate flow-limiting mid LAD ISR (IFR 0.88) involving a moderate-sized diagonal branch that was managed medically.    Cath 11/22/2023: LVEDP 17, LVEF 55% without significant MR, distal LMCA 60% stenosis with large atherosclerotic plaque, ISR of proximal LAD (50% stenosis), no significant diagonal branch disease, proximal LCx 60% stenosis, moderate-sized OM without any obstructive disease, RCA is flush occlusion with multiple stents from the proximal to distal region with some distal collateral filling  Echo  11/19/2023: LVEF 66-70%, mild concentric LVH, normal diastolic function, normal RV size/function, no hemodynamically significant valve pathology.  Cardiac monitor 2/13/2024: Relatively benign.  Predominantly sinus with average rate 89 () BPM.  No sustained arrhythmias, frequent ectopy, high-grade AV block, or pauses detected.  1 episode of paroxysmal SVT lasting 6 beats.  No reported symptoms.     Pertinent PMH  CAD with prior MI, multiple stents, and CABG (LIMA-LAD, SVT-OM, 11/29/23, Dr. Granados)  Paroxysmal atrial fibrillation s/p MAZE and RONALD clipping (11/29/23)-anticoagulant discontinued after clipping given increased bleeding risk with esophageal varices  Hypertension  Hyperlipidemia with statin intolerance  Prior stroke  CKD 3a  GERD  Rheumatoid arthritis  Cirrhosis complicated by grade 1 esophageal varices  CASSANDRA on CPAP    Pertinent past medical, surgical, family, and social history were reviewed.      Current Outpatient Medications:     azaTHIOprine (IMURAN) 50 MG tablet, Take 1 tablet by mouth Every 12 (Twelve) Hours., Disp: , Rfl:     Calcium Carbonate (CALCIUM 500 PO), Take  by mouth., Disp: , Rfl:     carvedilol (COREG) 6.25 MG tablet, Take 1 tablet by mouth 2 (Two) Times a Day., Disp: 180 tablet, Rfl: 3    cetirizine (zyrTEC) 10 MG tablet, Take 1 tablet by mouth Daily As Needed for Allergies., Disp: , Rfl:     [facility] Aspirin Low Dose 81 MG EC tablet, TAKE 1 TABLET BY MOUTH EVERY DAY, Disp: 90 tablet, Rfl: 3    EPINEPHrine (EPIPEN) 0.3 MG/0.3ML solution auto-injector injection, Inject 0.3 mL into the appropriate muscle as directed by prescriber 1 (One) Time. If needed, Disp: , Rfl:     estradiol (ESTRACE) 0.1 MG/GM vaginal cream, Use 1 gram vaginally nightly x 2 weeks then 2-3 times per week, Disp: 42.5 g, Rfl: 2    Farxiga 10 MG tablet, TAKE 1 TABLET BY MOUTH EVERY DAY, Disp: 30 tablet, Rfl: 3    fluticasone (FLONASE) 50 MCG/ACT nasal spray, 2 sprays into the nostril(s) as directed by provider Daily.,  "Disp: 16 g, Rfl: 11    Inclisiran Sodium 284 MG/1.5ML solution prefilled syringe, Inject 1.5 mL under the skin into the appropriate area as directed Every 6 (Six) Months., Disp: 1 mL, Rfl: 2    omeprazole (priLOSEC) 40 MG capsule, Take 1 capsule by mouth Daily., Disp: 90 capsule, Rfl: 1    sertraline (ZOLOFT) 100 MG tablet, TAKE 2 TABLETS BY MOUTH EVERY DAY, Disp: 180 tablet, Rfl: 3    spironolactone (ALDACTONE) 25 MG tablet, TAKE 1/2 TABLET BY MOUTH DAILY, Disp: 30 tablet, Rfl: 5    sulfaSALAzine (AZULFIDINE) 500 MG tablet, Take 1 tablet by mouth Daily., Disp: 90 tablet, Rfl: 0    ticagrelor (Brilinta) 60 MG tablet tablet, Take 1 tablet by mouth 2 (Two) Times a Day., Disp: 60 tablet, Rfl: 11    tiZANidine (ZANAFLEX) 2 MG tablet, 1/2 to 1 tablet up to three times a day Orally as needed for 30 days, Disp: , Rfl:     triamcinolone (KENALOG) 0.1 % ointment, Apply 1 Application topically to the appropriate area as directed 2 (Two) Times a Day., Disp: 80 g, Rfl: 2    vitamin D (ERGOCALCIFEROL) 1.25 MG (08888 UT) capsule capsule, Take 1 capsule by mouth 1 (One) Time Per Week., Disp: 12 capsule, Rfl: 3    Cariprazine HCl (VRAYLAR) 1.5 MG capsule capsule, Take 1 capsule by mouth Daily. (Patient not taking: Reported on 7/29/2025), Disp: 30 capsule, Rfl: 2    lidocaine (LIDODERM) 5 %, APPLY 1 PATCH ON THE SKIN TO THE APPROPRIATE AREA(S) DAILY. REMOVE PATCH AFTER 12 HOURS. (Patient not taking: Reported on 7/29/2025), Disp: , Rfl:     losartan (COZAAR) 50 MG tablet, TAKE 1 TABLET BY MOUTH EVERY DAY (Patient not taking: Reported on 7/29/2025), Disp: 30 tablet, Rfl: 3     Objective   Vital Signs:  /70   Pulse 75   Ht 165.1 cm (65\")   Wt 92.1 kg (203 lb)   SpO2 98%   BMI 33.78 kg/m²   Estimated body mass index is 33.78 kg/m² as calculated from the following:    Height as of this encounter: 165.1 cm (65\").    Weight as of this encounter: 92.1 kg (203 lb).      Constitutional:       Appearance: Healthy appearance. Not in " distress.   Neck:      Vascular: JVD normal.   Pulmonary:      Effort: Pulmonary effort is normal.      Breath sounds: Normal breath sounds.   Cardiovascular:      Normal rate. Regular rhythm.      Murmurs: There is no murmur.      No gallop.  No click. No rub.   Edema:     Peripheral edema absent.   Abdominal:      General: There is no distension.      Palpations: Abdomen is soft.      Tenderness: There is no abdominal tenderness.   Skin:     General: Skin is warm and dry.   Neurological:      Mental Status: Alert and oriented to person, place and time.        Result Review :  The following data was reviewed by: Ricky Slade MD on 07/29/2025:  CMP   CMP          8/12/2024    10:24 1/24/2025    10:08 3/10/2025    15:06   CMP   Glucose 101  102  75    BUN 17  21  15    Creatinine 1.21  1.22  1.38    EGFR 51.4  50.9  43.9    Sodium 142  142  141    Potassium 3.9  3.9  4.2    Chloride 106  104  105    Calcium 9.2  9.5  9.5    Total Protein  7.4  7.7    Albumin  4.4  4.7    Globulin  3.0  3.0    Total Bilirubin  0.5  0.5    Alkaline Phosphatase  89  85    AST (SGOT)  22  27    ALT (SGPT)  12  17    Albumin/Globulin Ratio  1.5  1.6    BUN/Creatinine Ratio 14.0  17.2  10.9    Anion Gap 7.0  12.0  12.0      CBC   CBC          3/10/2025    15:06   CBC   WBC 4.43    RBC 4.85    Hemoglobin 13.9    Hematocrit 42.5    MCV 87.6    MCH 28.7    MCHC 32.7    RDW 15.5    Platelets 148      Lipid Panel   Lipid Panel          1/24/2025    10:08 7/1/2025    07:39   Lipid Panel   Total Cholesterol 135  137    Triglycerides 222  281    HDL Cholesterol 29  25    VLDL Cholesterol 37  45    LDL Cholesterol  69  67    LDL/HDL Ratio 2.12  2.23             ECG 12 Lead    Date/Time: 7/29/2025 10:13 AM  Performed by: Ricky Slade MD    Authorized by: Ricky Slade MD  Comparison: compared with previous ECG from 1/24/2025  Similar to previous ECG  Rhythm: sinus rhythm  Rate: normal  BPM: 75  Conduction: conduction normal  QRS axis:  normal  Other findings comments: Nonspecific T wave abnormality    Clinical impression: abnormal EKG            Assessment and Plan   Diagnoses and all orders for this visit:    1. Coronary artery disease involving native coronary artery of native heart without angina pectoris (Primary)    2. Mixed hyperlipidemia    3. Primary hypertension    4. PAF (paroxysmal atrial fibrillation)    Other orders  -     ECG 12 Lead    - Overall stable.  Adequate BP control.  Most recent lipids show LDL below 70 but not below 55 and elevated triglycerides (although reportedly had some body armor before the labs were drawn).  - Plan to continue Leqvio.  Provided dietary information to help lower triglycerides.  Plan to reassess lipids in the future and if not at goal consider Vascepa and/or Zetia PRN.  Intolerant  - Continue aspirin 81 mg daily and ticagrelor 60 mg twice daily.  We did discuss the possibility of stopping ticagrelor and after risk/benefit discussion with shared decision making she elected to stay on ticagrelor.  - Continue carvedilol 6.25 mg twice daily  - Continue spironolactone 12.5 mg daily.    Follow Up   Return in about 6 months (around 1/29/2026).  Patient was given instructions and counseling regarding her condition or for health maintenance advice. Please see specific information pulled into the AVS if appropriate.       Part of this note may be an electronic transcription/translation of spoken language to printed text using the Dragon Dictation System.

## 2025-08-14 ENCOUNTER — TELEPHONE (OUTPATIENT)
Dept: FAMILY MEDICINE CLINIC | Facility: CLINIC | Age: 60
End: 2025-08-14

## (undated) DEVICE — CATH US OD 5FR OD SEC 2.9FR EAGLE EYE PLATINUM 0.014 85900P

## (undated) DEVICE — CLTH CLENS READYCLEANSE PERI CARE PK/5

## (undated) DEVICE — TB SXN SURG DLP MALL/CARD SFT/TP 6F 6IN

## (undated) DEVICE — CATH F5 INF JR 4 100CM: Brand: INFINITI

## (undated) DEVICE — TRAP FLD MINIVAC MEGADYNE 100ML

## (undated) DEVICE — DEFIB PRO-PADZ LVP LQD GEL ADULT 8900-2105-01

## (undated) DEVICE — BG OR ZSUT SADDLE 20IN CLR STRL

## (undated) DEVICE — GLV SURG BIOGEL LTX PF 6 1/2

## (undated) DEVICE — PINNACLE INTRODUCER SHEATH: Brand: PINNACLE

## (undated) DEVICE — PEN ABLAT ISOL TRNSPOLAR 19CM

## (undated) DEVICE — GW VASC OMNIWIRE J L185CM PRESSURE 89185J

## (undated) DEVICE — BRA COMPR SURG STL958 XL

## (undated) DEVICE — ST TBG PNEUMOCLEAR EVAC SMOKE HIFLO

## (undated) DEVICE — INTRO GLIDESHEATH SLENDER 6FR 10X45CM 60-1060

## (undated) DEVICE — YANKAUER,BULB TIP WITH VENT: Brand: ARGYLE

## (undated) DEVICE — PAD, DEFIB, ADULT, RADIOTRANS, PHYSIO: Brand: MEDLINE

## (undated) DEVICE — PREVENA INCISION MANAGEMENT SYSTEM- PEEL & PLACE DRESSING: Brand: PREVENA™ PEEL & PLACE™

## (undated) DEVICE — PK SET UP ANES OPN HEART 30

## (undated) DEVICE — ELECTRD BLD MEGADYNE EZCLEAN STD 2.75IN XLNG

## (undated) DEVICE — MANIFOLD KIT ANGIO AUTOMATED 014613

## (undated) DEVICE — SYR LUERLOK 20CC BX/50

## (undated) DEVICE — OPTIFOAM GENTLE SA, POSTOP, 4X12: Brand: MEDLINE

## (undated) DEVICE — SNAR POLYP CAPTIVATOR MICROHEX 13 240CM

## (undated) DEVICE — 1/2 FORCE SURGICAL SPRING CLIP: Brand: STEALTH® SPRING CLIP

## (undated) DEVICE — CATH F5 INF AR MOD 100CM: Brand: INFINITI

## (undated) DEVICE — CATH BALLOON NC EMERGE 3.00X20MM H7493926720300

## (undated) DEVICE — SOLIDIFIER LIQUI LOC PLUS 2000CC

## (undated) DEVICE — KIT HAND CONTROL ANGIOTOUCH ACIST 65CM AT-P65

## (undated) DEVICE — Device: Brand: DEFENDO AIR/WATER/SUCTION AND BIOPSY VALVE

## (undated) DEVICE — SUT SILK 2/0 FS BLK 18IN 685G

## (undated) DEVICE — GUIDEWIRE VASC 0.014INX180CM RUNTHROUGH 25-1011

## (undated) DEVICE — CONMED SCOPE SAVER BITE BLOCK, 20X27 MM: Brand: SCOPE SAVER

## (undated) DEVICE — FR5 INFINITI MULTIPAC: Brand: INFINITI

## (undated) DEVICE — PERCLOSE™ PROSTYLE™ SUTURE-MEDIATED CLOSURE AND REPAIR SYSTEM: Brand: PERCLOSE™ PROSTYLE™

## (undated) DEVICE — MASK,OXYGEN,MED CONC,ADLT,7' TUB, UC: Brand: PENDING

## (undated) DEVICE — SLEEVE TR BAND RADIAL COMPRESSION DEVICE 24CM TRB24-REG

## (undated) DEVICE — KT CATH ARTERIAL LINE FEM 18G

## (undated) DEVICE — MODEL BT2000 P/N 700287-012KIT CONTENTS: MANIFOLD WITH SALINE AND CONTRAST PORTS, SALINE TUBING WITH SPIKE AND HAND SYRINGE, TRANSDUCER: Brand: BT2000 AUTOMATED MANIFOLD KIT

## (undated) DEVICE — SENSR O2 OXIMAX FNGR A/ 18IN NONSTR

## (undated) DEVICE — SYS VASOVIEW HEMOPRO ENDOSCOPIC HARVST VESL

## (undated) DEVICE — ANTIBACTERIAL UNDYED BRAIDED (POLYGLACTIN 910), SYNTHETIC ABSORBABLE SUTURE: Brand: COATED VICRYL

## (undated) DEVICE — DRSNG WND SIL OPTIFOAM GNTL BRDR ADHS 1.6X2IN

## (undated) DEVICE — CATH BALLOON EMERGE 2.5X12MM H7493918912250

## (undated) DEVICE — CATH ANGIO JUDKINS R4 6FRX100CM INFINITI 534621T

## (undated) DEVICE — KT ANTI FOG W/FLD AND SPNG

## (undated) DEVICE — SCANLAN® SURG-I-PAW® INSTRUMENT COVERS - RED, 1/10" X 5"/ 3 MMX13 CM (2 - 5" PCS /PKG): Brand: SCANLAN® SURG-I-PAW® INSTRUMENT COVERS

## (undated) DEVICE — BLAKE CARDIO CONNECTOR 1:1: Brand: J-VAC

## (undated) DEVICE — VALVE HEMOSTASIS .096" COPILOT MECH 1003331

## (undated) DEVICE — CUFF,BP,DISP,1 TUBE,ADULT,HP: Brand: MEDLINE

## (undated) DEVICE — RAD INFLATOR BASIC COMPAK  IN4130

## (undated) DEVICE — ELECTRD BLD EZ CLN MOD 4IN

## (undated) DEVICE — ADHS LIQ MASTISOL 2/3ML

## (undated) DEVICE — SUT SILK 2/0 SH CR8 18IN CR8 C012D

## (undated) DEVICE — CATH DIAG DXTERITY NOTO CRV 5F 100CM 0/SH

## (undated) DEVICE — E-PACK PROCEDURE KIT: Brand: E-PACK

## (undated) DEVICE — CATH ANGIO JUDKINS JL4 6FRX100CM INFINITI 534620T

## (undated) DEVICE — MODEL AT P65, P/N 701554-001KIT CONTENTS: HAND CONTROLLER, 3-WAY HIGH-PRESSURE STOPCOCK WITH ROTATING END AND PREMIUM HIGH-PRESSURE TUBING: Brand: ANGIOTOUCH® KIT

## (undated) DEVICE — SYS DISTNTION VEIN BONCHEK 300MMHG

## (undated) DEVICE — SUT PROLN 4/0 RB1 36IN 4PK M8557

## (undated) DEVICE — PK OPN HEART 30

## (undated) DEVICE — CATH BALLOON NC EMERGE 2.75X12MM H7493926712270

## (undated) DEVICE — OASIS DRAIN, SINGLE, INLINE & ATS COMPATIBLE: Brand: OASIS

## (undated) DEVICE — DRAIN,WOUND,ROUND,24FR,5/16",FULL-FLUTED: Brand: MEDLINE

## (undated) DEVICE — CLMP ABLAT ISOLATORSYNERGY TRNSPOLAR

## (undated) DEVICE — SOL IRR NACL 0.9PCT BT 1000ML

## (undated) DEVICE — CATH F5 INF AL I 100CM: Brand: INFINITI

## (undated) DEVICE — CANN NASL ETCO2 LO/FLO A/

## (undated) DEVICE — STRIP,CLOSURE,WOUND,MEDI-STRIP,1/2X4: Brand: MEDLINE

## (undated) DEVICE — SUT POLY BR TP 2STRND 1/8X30IN

## (undated) DEVICE — SUREFIT, DUAL DISPERSIVE ELECTRODE, CONTACT QUALITY MONITOR: Brand: SUREFIT

## (undated) DEVICE — PK CATH CARD 30 CA/4

## (undated) DEVICE — MARKR SKIN W/RULR AND LBL

## (undated) DEVICE — GUIDEWIRE VASC 0.035INX150CM INQWIRE J TIP IQ35F150J3F/A

## (undated) DEVICE — GLV SURG BIOGEL M LTX PF 7 1/2

## (undated) DEVICE — DRP SLUSH MACH OM-ORS-320

## (undated) DEVICE — SUT ETHIB 2/0 SH SH 36IN X523H

## (undated) DEVICE — THE CHANNEL CLEANING BRUSH IS A NYLON FLEXI BRUSH ATTACHED TO A FLEXIBLE PLASTIC SHEATH DESIGNED TO SAFELY REMOVE DEBRIS FROM FLEXIBLE ENDOSCOPES.

## (undated) DEVICE — STERNUM BLADE, OFFSET (32.0 X 0.8 X 6.3MM)

## (undated) DEVICE — BLAKE SILICONE DRAINS CARDIO CONNECTOR 2:1: Brand: BLAKE

## (undated) DEVICE — BLD SCLPL BEAVR MINI STR 2BVL 180D LF

## (undated) DEVICE — CATH BALLOON NC EMERGE 3.50X20MM H7493926720350

## (undated) DEVICE — CATH LAUNCHER 6FR AR 1.0 LA6AR10

## (undated) DEVICE — DRSNG SURESITE123 4X10IN

## (undated) DEVICE — BAPTIST TURNOVER KIT: Brand: MEDLINE INDUSTRIES, INC.

## (undated) DEVICE — GUIDE SELECT ATRICLIP

## (undated) DEVICE — GW STARTER FXD CORE J .035 3X150CM 3MM

## (undated) DEVICE — THE SINGLE USE ETRAP – POLYP TRAP IS USED FOR SUCTION RETRIEVAL OF ENDOSCOPICALLY REMOVED POLYPS.: Brand: ETRAP

## (undated) DEVICE — ROTATING SURGICAL PUNCHES, 1 PER POUCH: Brand: A&E MEDICAL / ROTATING SURGICAL PUNCHES

## (undated) DEVICE — WIRE GUIDE 0.035"X260CM SAFE-T-J EXCHANGE G00517